# Patient Record
Sex: MALE | Race: WHITE | NOT HISPANIC OR LATINO | Employment: FULL TIME | URBAN - METROPOLITAN AREA
[De-identification: names, ages, dates, MRNs, and addresses within clinical notes are randomized per-mention and may not be internally consistent; named-entity substitution may affect disease eponyms.]

---

## 2018-04-26 ENCOUNTER — TELEPHONE (OUTPATIENT)
Dept: UROLOGY | Facility: MEDICAL CENTER | Age: 58
End: 2018-04-26

## 2018-04-26 ENCOUNTER — HOSPITAL ENCOUNTER (EMERGENCY)
Facility: HOSPITAL | Age: 58
Discharge: HOME | End: 2018-04-26
Attending: EMERGENCY MEDICINE | Admitting: EMERGENCY MEDICINE
Payer: COMMERCIAL

## 2018-04-26 ENCOUNTER — APPOINTMENT (EMERGENCY)
Dept: CT IMAGING | Facility: HOSPITAL | Age: 58
End: 2018-04-26
Payer: COMMERCIAL

## 2018-04-26 VITALS
TEMPERATURE: 97.8 F | SYSTOLIC BLOOD PRESSURE: 153 MMHG | WEIGHT: 265 LBS | DIASTOLIC BLOOD PRESSURE: 71 MMHG | OXYGEN SATURATION: 98 % | RESPIRATION RATE: 17 BRPM | HEART RATE: 60 BPM

## 2018-04-26 DIAGNOSIS — N20.1 RIGHT URETERAL STONE: Primary | ICD-10-CM

## 2018-04-26 LAB
BACTERIA UR QL AUTO: ABNORMAL /HPF
BILIRUB UR QL STRIP: NEGATIVE
CLARITY UR: CLEAR
COLOR UR: YELLOW
COLOR, POC: YELLOW
GLUCOSE UR STRIP-MCNC: NEGATIVE MG/DL
HGB UR QL STRIP.AUTO: ABNORMAL
KETONES UR STRIP-MCNC: NEGATIVE MG/DL
LEUKOCYTE ESTERASE UR QL STRIP: ABNORMAL
NITRITE UR QL STRIP: NEGATIVE
NON-SQ EPI CELLS URNS QL MICRO: ABNORMAL /HPF
PH UR STRIP.AUTO: 5 [PH] (ref 4.5–8)
PROT UR STRIP-MCNC: NEGATIVE MG/DL
RBC #/AREA URNS AUTO: ABNORMAL /HPF
SP GR UR STRIP.AUTO: 1.02 (ref 1–1.03)
UROBILINOGEN UR QL STRIP.AUTO: 0.2 E.U./DL
WBC #/AREA URNS AUTO: ABNORMAL /HPF

## 2018-04-26 PROCEDURE — 81002 URINALYSIS NONAUTO W/O SCOPE: CPT | Performed by: EMERGENCY MEDICINE

## 2018-04-26 PROCEDURE — 99284 EMERGENCY DEPT VISIT MOD MDM: CPT

## 2018-04-26 PROCEDURE — 81001 URINALYSIS AUTO W/SCOPE: CPT

## 2018-04-26 PROCEDURE — 74176 CT ABD & PELVIS W/O CONTRAST: CPT

## 2018-04-26 PROCEDURE — 96374 THER/PROPH/DIAG INJ IV PUSH: CPT

## 2018-04-26 RX ORDER — ROSUVASTATIN CALCIUM 20 MG/1
20 TABLET, COATED ORAL
COMMUNITY
Start: 2018-02-28 | End: 2021-07-29 | Stop reason: SDUPTHER

## 2018-04-26 RX ORDER — OMEGA-3 FATTY ACIDS/FISH OIL 300-1000MG
3 CAPSULE ORAL
COMMUNITY
Start: 2013-05-15 | End: 2021-04-20 | Stop reason: HOSPADM

## 2018-04-26 RX ORDER — ONDANSETRON 4 MG/1
4 TABLET, ORALLY DISINTEGRATING ORAL EVERY 8 HOURS PRN
Qty: 10 TABLET | Refills: 0 | Status: SHIPPED | OUTPATIENT
Start: 2018-04-26 | End: 2018-06-18

## 2018-04-26 RX ORDER — DEXLANSOPRAZOLE 60 MG/1
CAPSULE, DELAYED RELEASE ORAL
COMMUNITY
End: 2022-04-26 | Stop reason: SDUPTHER

## 2018-04-26 RX ORDER — KETOROLAC TROMETHAMINE 30 MG/ML
30 INJECTION, SOLUTION INTRAMUSCULAR; INTRAVENOUS ONCE
Status: DISCONTINUED | OUTPATIENT
Start: 2018-04-26 | End: 2018-04-26

## 2018-04-26 RX ORDER — KETOROLAC TROMETHAMINE 30 MG/ML
30 INJECTION, SOLUTION INTRAMUSCULAR; INTRAVENOUS ONCE
Status: COMPLETED | OUTPATIENT
Start: 2018-04-26 | End: 2018-04-26

## 2018-04-26 RX ORDER — METHYLPREDNISOLONE ACETATE 40 MG/ML
1 INJECTION, SUSPENSION INTRA-ARTICULAR; INTRALESIONAL; INTRAMUSCULAR; SOFT TISSUE
COMMUNITY
End: 2018-04-26

## 2018-04-26 RX ORDER — SIMVASTATIN 40 MG
TABLET ORAL
COMMUNITY
End: 2018-04-26

## 2018-04-26 RX ORDER — ALLOPURINOL 100 MG/1
TABLET ORAL
COMMUNITY
End: 2022-08-03

## 2018-04-26 RX ORDER — LISINOPRIL 20 MG/1
20 TABLET ORAL
COMMUNITY
Start: 2018-02-28 | End: 2019-08-22 | Stop reason: SDUPTHER

## 2018-04-26 RX ORDER — OXYCODONE HYDROCHLORIDE AND ACETAMINOPHEN 5; 325 MG/1; MG/1
1 TABLET ORAL EVERY 4 HOURS PRN
Qty: 10 TABLET | Refills: 0 | Status: SHIPPED | OUTPATIENT
Start: 2018-04-26 | End: 2018-06-18

## 2018-04-26 RX ORDER — HYDROCODONE BITARTRATE AND ACETAMINOPHEN 5; 325 MG/1; MG/1
TABLET ORAL
COMMUNITY
End: 2018-04-26 | Stop reason: ALTCHOICE

## 2018-04-26 RX ADMIN — KETOROLAC TROMETHAMINE 30 MG: 30 INJECTION, SOLUTION INTRAMUSCULAR at 12:36

## 2018-04-26 NOTE — ED PROVIDER NOTES
History  Chief Complaint   Patient presents with    Abdominal Pain     Patient reports RLQ abdominal pain which started this morning  Patient states he became diaphoretic and nauseous while experiencing the pain, which has improved  Patient also denies nausea  Patient has a hx of kidney stones and states the pain was similar to the kidney stone pain he had experienced in the past       39-year-old male with a history of kidney stones requiring stenting and lithotripsy, hypercholesterolemia presents to the emergency department with right inguinal pain that began early this morning associated with nausea and diaphoresis  He states he called his urologist and was told to come to the emergency department  He now states he has a dull ache in the right inguinal area but otherwise feels better  He states he has had a total of 13 kidney stones mostly on the right side  His last 1 did require stenting and this was about 5 years ago  He has had no fevers or chills  No dysuria  No gross hematuria noted          History provided by:  Patient   used: No    Abdominal Pain   Pain location:  RLQ  Pain quality: aching    Pain radiates to:  Does not radiate  Pain severity:  Unable to specify  Onset quality:  Gradual  Duration:  3 hours  Timing:  Constant  Progression:  Improving  Chronicity:  Recurrent  Context: not eating, not recent illness, not recent travel, not sick contacts, not suspicious food intake and not trauma    Relieved by:  None tried  Worsened by:  Nothing  Ineffective treatments:  None tried  Associated symptoms: nausea    Associated symptoms: no anorexia, no belching, no chest pain, no chills, no constipation, no cough, no diarrhea, no dysuria, no fatigue, no fever, no flatus, no hematemesis, no hematochezia, no hematuria, no melena, no shortness of breath, no sore throat and no vomiting    Risk factors: being elderly and obesity    Risk factors: has not had multiple surgeries and no NSAID use        Prior to Admission Medications   Prescriptions Last Dose Informant Patient Reported? Taking? ASPIRIN EC PO   Yes Yes   Sig: Take 81 mg by mouth   HYDROcodone-acetaminophen (NORCO) 5-325 mg per tablet   Yes Yes   Sig: hydrocodone 5 mg-acetaminophen 325 mg tablet   Omega 3 1000 MG CAPS   Yes Yes   Sig: Take 3 capsules by mouth   allopurinol (ZYLOPRIM) 100 mg tablet   Yes Yes   Sig: allopurinol 100 mg tablet   dexlansoprazole (DEXILANT) 60 MG capsule   Yes Yes   Sig: Dexilant 60 mg capsule, delayed release   lisinopril (ZESTRIL) 20 mg tablet   Yes Yes   Sig: Take 20 mg by mouth   rosuvastatin (CRESTOR) 20 MG tablet   Yes Yes   Sig: Take 20 mg by mouth      Facility-Administered Medications: None       Past Medical History:   Diagnosis Date    GERD (gastroesophageal reflux disease)     Gout     Hyperlipidemia     Kidney calculi        Past Surgical History:   Procedure Laterality Date    HERNIA REPAIR         History reviewed  No pertinent family history  I have reviewed and agree with the history as documented  Social History   Substance Use Topics    Smoking status: Never Smoker    Smokeless tobacco: Never Used    Alcohol use No        Review of Systems   Constitutional: Negative  Negative for chills, fatigue and fever  HENT: Negative  Negative for sore throat  Eyes: Negative  Respiratory: Negative  Negative for cough and shortness of breath  Cardiovascular: Negative  Negative for chest pain  Gastrointestinal: Positive for abdominal pain and nausea  Negative for anorexia, constipation, diarrhea, flatus, hematemesis, hematochezia, melena and vomiting  Genitourinary: Negative  Negative for decreased urine volume, difficulty urinating, discharge, dysuria, flank pain, frequency, hematuria, penile pain, penile swelling, scrotal swelling, testicular pain and urgency  Musculoskeletal: Negative for neck pain  Skin: Negative  Allergic/Immunologic: Negative  Neurological: Negative  Negative for weakness, numbness and headaches  Hematological: Negative  Psychiatric/Behavioral: Negative  All other systems reviewed and are negative  Physical Exam  ED Triage Vitals   Temperature Pulse Respirations Blood Pressure SpO2   04/26/18 0950 04/26/18 0950 04/26/18 0950 04/26/18 0950 04/26/18 0950   97 8 °F (36 6 °C) 69 18 (!) 192/88 98 %      Temp Source Heart Rate Source Patient Position - Orthostatic VS BP Location FiO2 (%)   04/26/18 0950 04/26/18 0950 04/26/18 0950 04/26/18 1111 --   Oral Monitor Sitting Left arm       Pain Score       04/26/18 0950       4           Orthostatic Vital Signs  Vitals:    04/26/18 0950 04/26/18 1111 04/26/18 1219   BP: (!) 192/88 155/72 153/71   Pulse: 69 58 60   Patient Position - Orthostatic VS: Sitting Lying Lying       Physical Exam   Constitutional: He is oriented to person, place, and time  He appears well-developed and well-nourished  Non-toxic appearance  He does not have a sickly appearance  He does not appear ill  No distress  HENT:   Head: Normocephalic and atraumatic  Right Ear: External ear normal    Left Ear: External ear normal    Eyes: Conjunctivae are normal  Pupils are equal, round, and reactive to light  No scleral icterus  Cardiovascular: Normal rate, regular rhythm and normal heart sounds  Pulmonary/Chest: Effort normal and breath sounds normal    Abdominal: Soft  Normal appearance and bowel sounds are normal  He exhibits no distension and no mass  There is no tenderness  There is no rebound, no guarding and no CVA tenderness  No hernia  Obese   Musculoskeletal: Normal range of motion  He exhibits no edema, tenderness or deformity  Neurological: He is alert and oriented to person, place, and time  He has normal strength and normal reflexes  He exhibits normal muscle tone  Skin: Skin is warm and dry  No rash noted  He is not diaphoretic  No erythema  No pallor     Psychiatric: He has a normal mood and affect  Nursing note and vitals reviewed  ED Medications  Medications   ketorolac (TORADOL) injection 30 mg (30 mg Intravenous Given 4/26/18 1236)       Diagnostic Studies  Results Reviewed     Procedure Component Value Units Date/Time    Urine Microscopic [[id number]]  (Abnormal) Collected:  04/26/18 1023    Lab Status:  Final result Specimen:  Urine from Urine, Clean Catch Updated:  04/26/18 1054     RBC, UA 20-30 (A) /hpf      WBC, UA 1-2 (A) /hpf      Epithelial Cells Occasional /hpf      Bacteria, UA Occasional /hpf     POCT urinalysis dipstick [[id number]]  (Abnormal) Resulted:  04/26/18 1024    Lab Status:  Final result Specimen:  Urine Updated:  04/26/18 1024     Color, UA yellow    ED Urine Macroscopic [[id number]]  (Abnormal) Collected:  04/26/18 1023    Lab Status:  Final result Specimen:  Urine Updated:  04/26/18 1022     Color, UA Yellow     Clarity, UA Clear     pH, UA 5 0     Leukocytes, UA Trace (A)     Nitrite, UA Negative     Protein, UA Negative mg/dl      Glucose, UA Negative mg/dl      Ketones, UA Negative mg/dl      Urobilinogen, UA 0 2 E U /dl      Bilirubin, UA Negative     Blood, UA Large (A)     Specific Ord, UA 1 020    Narrative:       CLINITEK RESULT                 CT renal stone study abdomen pelvis without contrast   Final Result by Joaquin Cutler MD (04/26 1136)   4 mm proximal right ureteral calculus resulting in mild right hydroureteronephrosis  Additional nonobstructive bilateral nephrolithiasis as above  Workstation performed: [id number]                    Procedures  Procedures       Phone Contacts  ED Phone Contact    ED Course  ED Course as of Apr 26 1244   Thu Apr 26, 2018   1239 Spoke With the office of Dr Aidan Rankin from Urology  They can see the patient tomorrow at 9:30 a m     1239 Patient rating his pain at a 4/10  He appears comfortable  He and his wife were given the results of the CT scan    He will be stable for discharge MDM  Number of Diagnoses or Management Options  Diagnosis management comments: 50-year-old male with a history of multiple kidney stones presents with right inguinal pain, diaphoresis and nausea that started this morning  This was similar to his previous kidney stones  Currently his pain is resolving  He describes it as a dull ache in the right inguinal region  On exam he appears well and is in no distress  He does not have any abdominal tenderness or hernia on exam   Given his multiple kidney stones in the past will CT abdomen pelvis to rule out kidney stone  Highly doubt aortic aneurysm given his well appearance  Currently he is declining any pain medications  Amount and/or Complexity of Data Reviewed  Tests in the radiology section of CPT®: ordered and reviewed  Independent visualization of images, tracings, or specimens: yes      CritCare Time    Disposition  Final diagnoses:   Right ureteral stone     Time reflects when diagnosis was documented in both MDM as applicable and the Disposition within this note     Time User Action Codes Description Comment    4/26/2018 12:40 PM Duane COLLINS Add [N20 1] Right ureteral stone       ED Disposition     ED Disposition Condition Comment    Discharge  Kamron Lockett discharge to home/self care      Condition at discharge: Good        Follow-up Information     Follow up With Specialties Details Why Ace Arrington MD Urology  You have an appointment tomorrow 4/27/18 at 9:30 a m  [address] [facility] [address] [phone]          Patient's Medications   Discharge Prescriptions    ONDANSETRON (ZOFRAN-ODT) 4 MG DISINTEGRATING TABLET    Take 1 tablet (4 mg total) by mouth every 8 (eight) hours as needed for nausea or vomiting       Start Date: 4/26/2018 End Date: --       Order Dose: 4 mg       Quantity: 10 tablet    Refills: 0    OXYCODONE-ACETAMINOPHEN (PERCOCET) 5-325 MG PER TABLET    Take 1 tablet by mouth every 4 (four) hours as needed for moderate pain Max Daily Amount: 6 tablets       Start Date: 4/26/2018 End Date: --       Order Dose: 1 tablet       Quantity: 10 tablet    Refills: 0     No discharge procedures on file      ED Provider  Electronically Signed by           Monica Douglas DO  04/26/18 1244

## 2018-04-26 NOTE — TELEPHONE ENCOUNTER
Triage needed  Patient has right side flank and front pain and thinks he has a kidney stone  He's nauseous and would like to speak to a nurse

## 2018-04-26 NOTE — TELEPHONE ENCOUNTER
Pt stated that he is having severe lower right abdomen pain with nausea  Stated that he is sweating and chills  Pt was instructed to go to the ER

## 2018-04-26 NOTE — DISCHARGE INSTRUCTIONS
Ureteral Stones   WHAT YOU NEED TO KNOW:   A ureteral stone is a stone that forms in the kidney and moves down the ureter and gets stuck there  The ureter is the tube that takes urine from the kidney to the bladder  Stones can form in the urinary system when your urine has high levels of minerals and salts  Urinary stones can be made of uric acid, calcium, phosphate, or oxalate crystals  DISCHARGE INSTRUCTIONS:   Return to the emergency department if:   · You have severe pain that does not improve, even after you take medicine  · You have vomiting that is not relieved by medicine  · You develop a fever  Contact your healthcare provider if:   · You develop a fever  · You have any questions or concerns about your condition or care  Follow up with your healthcare provider as directed: You may need to return for more tests  Write down your questions so you remember to ask them during your visits  Medicines: You may need any of the following:  · NSAIDs , such as ibuprofen, help decrease swelling, pain, and fever  This medicine is available with or without a doctor's order  NSAIDs can cause stomach bleeding or kidney problems in certain people  If you take blood thinner medicine, always ask your healthcare provider if NSAIDs are safe for you  Always read the medicine label and follow directions  · Prescription pain medicine  may help decrease pain or help your ureteral stone pass  Do not wait until the pain is severe before you take pain medicine  · Nausea medicine  may help calm your stomach and prevent vomiting  · Take your medicine as directed  Contact your healthcare provider if you think your medicine is not helping or if you have side effects  Tell him or her if you are allergic to any medicine  Keep a list of the medicines, vitamins, and herbs you take  Include the amounts, and when and why you take them  Bring the list or the pill bottles to follow-up visits   Carry your medicine list with you in case of an emergency  Self-care:   · Drink plenty of liquids  Your healthcare provider may tell you to drink at least 8 to 12 (eight-ounce) cups of liquids each day  This helps flush out the ureteral stones when you urinate  Water is the best liquid to drink  · Strain your urine every time you go to the bathroom  Urinate through a strainer or a piece of thin cloth to catch the stones  Take the stones to your healthcare provider so they can be sent to the lab for tests  This will help your healthcare providers plan the best treatment for you  · Ask your healthcare provider about any nutrition changes you need to make  You may need to limit certain foods such as foods high in sodium (salt), certain protein foods, or foods high in oxalate  After you pass your ureteral stone: Once you have passed your ureteral stone, you may need to do a 24-hour urine test  You may need to save all of your urine for 24 hours  Each time you go to the bathroom, you will urinate into a container  Then you will pour your urine into a larger container that is kept cold  You may be told to write down the time and amount of urine you passed  At the end of 24 hours, the urine is sent to a lab for tests  Results from the test will help your healthcare provider plan ways to prevent more stones from forming  © 2017 [address] [employer] Information is for End User's use only and may not be sold, redistributed or otherwise used for commercial purposes  All illustrations and images included in CareNotes® are the copyrighted property of [employer]  or [employer]  The above information is an  only  It is not intended as medical advice for individual conditions or treatments  Talk to your doctor, nurse or pharmacist before following any medical regimen to see if it is safe and effective for you

## 2018-04-26 NOTE — ED NOTES
Patient transported to [facility] [place] GUZMAN Gonzalez  04/26/18 7688

## 2018-04-27 ENCOUNTER — OFFICE VISIT (OUTPATIENT)
Dept: UROLOGY | Facility: MEDICAL CENTER | Age: 58
End: 2018-04-27
Payer: COMMERCIAL

## 2018-04-27 VITALS
WEIGHT: 270.2 LBS | HEIGHT: 68 IN | TEMPERATURE: 97.6 F | SYSTOLIC BLOOD PRESSURE: 142 MMHG | BODY MASS INDEX: 40.95 KG/M2 | DIASTOLIC BLOOD PRESSURE: 80 MMHG

## 2018-04-27 DIAGNOSIS — N20.1 RIGHT URETERAL STONE: Primary | ICD-10-CM

## 2018-04-27 DIAGNOSIS — N13.30 HYDROURETERONEPHROSIS: ICD-10-CM

## 2018-04-27 DIAGNOSIS — Z71.89 OTHER SPECIFIED COUNSELING: ICD-10-CM

## 2018-04-27 LAB
SL AMB  POCT GLUCOSE, UA: NEGATIVE
SL AMB LEUKOCYTE ESTERASE,UA: NEGATIVE
SL AMB POCT BILIRUBIN,UA: NEGATIVE
SL AMB POCT BLOOD,UA: ABNORMAL
SL AMB POCT CLARITY,UA: CLEAR
SL AMB POCT COLOR,UA: YELLOW
SL AMB POCT KETONES,UA: NEGATIVE
SL AMB POCT NITRITE,UA: NEGATIVE
SL AMB POCT PH,UA: 5.5
SL AMB POCT SPECIFIC GRAVITY,UA: 1.02
SL AMB POCT URINE PROTEIN: ABNORMAL
SL AMB POCT UROBILINOGEN: 0.2

## 2018-04-27 PROCEDURE — 81003 URINALYSIS AUTO W/O SCOPE: CPT | Performed by: UROLOGY

## 2018-04-27 PROCEDURE — 99214 OFFICE O/P EST MOD 30 MIN: CPT | Performed by: UROLOGY

## 2018-04-27 PROCEDURE — 96372 THER/PROPH/DIAG INJ SC/IM: CPT | Performed by: UROLOGY

## 2018-04-27 RX ORDER — KETOROLAC TROMETHAMINE 30 MG/ML
30 INJECTION, SOLUTION INTRAMUSCULAR; INTRAVENOUS ONCE
Status: DISCONTINUED | OUTPATIENT
Start: 2018-04-27 | End: 2018-04-27

## 2018-04-27 RX ORDER — OXYCODONE HYDROCHLORIDE AND ACETAMINOPHEN 5; 325 MG/1; MG/1
1 TABLET ORAL EVERY 4 HOURS PRN
Qty: 20 TABLET | Refills: 0 | Status: SHIPPED | OUTPATIENT
Start: 2018-04-27 | End: 2018-06-18

## 2018-04-27 RX ORDER — KETOROLAC TROMETHAMINE 30 MG/ML
30 INJECTION, SOLUTION INTRAMUSCULAR; INTRAVENOUS ONCE
Status: DISCONTINUED | OUTPATIENT
Start: 2018-04-27 | End: 2018-04-27 | Stop reason: HOSPADM

## 2018-04-27 RX ADMIN — KETOROLAC TROMETHAMINE 30 MG: 30 INJECTION, SOLUTION INTRAMUSCULAR; INTRAVENOUS at 11:31

## 2018-04-27 NOTE — PROGRESS NOTES
Assessment/Plan:    Assessment:  1  Right ureteral stone at the ureteropelvic junction on CT scan yesterday, based on location of pain today, the stone is in the distal ureter  Plan:    1  Continue conservative management and see if the patient can pass the stone  2  Percocet 5/325, 10 tablets without refills  The patient has 5 tablets left and may need more to get through the weekend  If no longer manageable as an outpatient, he can be put on the OR schedule for stone extraction next week  No problem-specific Assessment & Plan notes found for this encounter  Diagnoses and all orders for this visit:    Right ureteral stone  -     POCT urine dip auto non-scope  -     Discontinue: ketorolac (TORADOL) 60 mg/2 mL IM injection 30 mg; Inject 1 mL (30 mg total) into the shoulder, thigh, or buttocks once   -     oxyCODONE-acetaminophen (PERCOCET) 5-325 mg per tablet; Take 1 tablet by mouth every 4 (four) hours as needed for moderate pain for up to 10 doses Max Daily Amount: 6 tablets  -     ketorolac (TORADOL) injection 30 mg; Inject 1 mL (30 mg total) into the shoulder, thigh, or buttocks once     Hydroureteronephrosis          Subjective:      Patient ID: Vani Healy is a 60 y o  male  HPI  Right Ureteral Stone:  Seen in ER yesterday with right upper ureteral stone  I have reviewed the images and the report  Nauseated last night  Has taken 5 of 10 Percocet he was given  Pain now in right groin and testis  By the patient's count, this is his 14th stone  Stones have been both calcium oxalate and uric acid  Has gout hx  He is taking allopurinol which should prevent gout and uric acid lithiasis  Right now, the patient is in pain and he will be given a shot of ketoralac  Accompaned by wife          The following portions of the patient's history were reviewed and updated as appropriate: allergies, current medications, past family history, past medical history, past social history, past surgical history and problem list     Review of Systems   Constitutional: Negative for activity change and fatigue  Obese   Respiratory: Negative for shortness of breath and wheezing  Cardiovascular: Negative for chest pain  Mild hyperlipidemia and hypertension, controlled   Gastrointestinal: Negative for abdominal pain  Genitourinary: Negative for difficulty urinating, dysuria, frequency, hematuria and urgency  Musculoskeletal: Negative for back pain and gait problem  Skin: Negative  Allergic/Immunologic: Negative  Neurological: Negative  Psychiatric/Behavioral: Negative  Objective:      /80 (BP Location: Left arm, Patient Position: Sitting, Cuff Size: Standard)   Temp 97 6 °F (36 4 °C)   Ht 5' 8" (1 727 m)   Wt 123 kg (270 lb 3 2 oz)   BMI 41 08 kg/m²          Physical Exam   Constitutional: He is oriented to person, place, and time  He appears well-developed and well-nourished  HENT:   Head: Normocephalic and atraumatic  Pulmonary/Chest: Effort normal    Abdominal: Soft  Bowel sounds are normal    There is no left costovertebral angle tenderness  There is mild tenderness on the right  Musculoskeletal: Normal range of motion  Neurological: He is alert and oriented to person, place, and time  Skin: Skin is warm and dry  Psychiatric: He has a normal mood and affect   His behavior is normal  Judgment and thought content normal

## 2018-04-27 NOTE — PROGRESS NOTES
IPSS Questionnaire (AUA-7): Over the past month    1)  How often have you had a sensation of not emptying your bladder completely after you finish urinating? 2 - Less than half the time   2)  How often have you had to urinate again less than two hours after you finished urinating? 1 - Less than 1 time in 5   3)  How often have you found you stopped and started again several times when you urinated? 0 - Not at all   4) How difficult have you found it to postpone urination? 0 - Not at all   5) How often have you had a weak urinary stream?  0 - Not at all   6) How often have you had to push or strain to begin urination? 0 - Not at all   7) How many times did you most typically get up to urinate from the time you went to bed until the time you got up in the morning? 1 - 1 time   Total Score:  4     QOL: Pleased

## 2018-04-27 NOTE — LETTER
April 27, 2018     Sherri Trujillo MD  [address]    Patient: Rosmery Rivas   YOB: 1960   Date of Visit: 4/27/2018       Dear Dr Lauren Valentine: Thank you for referring [place] to me for evaluation  Below are my notes for this consultation  If you have questions, please do not hesitate to call me  I look forward to following your patient along with you  Sincerely,        Jonah Tinoco MD        CC: No Recipients  Jonah Tinoco MD  4/27/2018 11:40 AM  Sign at close encounter  Assessment/Plan:    Assessment:  1  Right ureteral stone at the ureteropelvic junction on CT scan yesterday, based on location of pain today, the stone is in the distal ureter  Plan:    1  Continue conservative management and see if the patient can pass the stone  2  Percocet 5/325, 10 tablets without refills  The patient has 5 tablets left and may need more to get through the weekend  If no longer manageable as an outpatient, he can be put on the OR schedule for stone extraction next week  No problem-specific Assessment & Plan notes found for this encounter  Diagnoses and all orders for this visit:    Right ureteral stone  -     POCT urine dip auto non-scope  -     Discontinue: ketorolac (TORADOL) 60 mg/2 mL IM injection 30 mg; Inject 1 mL (30 mg total) into the shoulder, thigh, or buttocks once   -     oxyCODONE-acetaminophen (PERCOCET) 5-325 mg per tablet; Take 1 tablet by mouth every 4 (four) hours as needed for moderate pain for up to 10 doses Max Daily Amount: 6 tablets  -     ketorolac (TORADOL) injection 30 mg; Inject 1 mL (30 mg total) into the shoulder, thigh, or buttocks once     Hydroureteronephrosis          Subjective:      Patient ID: Rosmery Rivas is a 60 y o  male  HPI  Right Ureteral Stone:  Seen in ER yesterday with right upper ureteral stone  I have reviewed the images and the report  Nauseated last night    Has taken 5 of 10 Percocet he was given   Pain now in right groin and testis  By the patient's count, this is his 14th stone  Stones have been both calcium oxalate and uric acid  Has gout hx  He is taking allopurinol which should prevent gout and uric acid lithiasis  Right now, the patient is in pain and he will be given a shot of ketoralac  Accompaned by wife  The following portions of the patient's history were reviewed and updated as appropriate: allergies, current medications, past family history, past medical history, past social history, past surgical history and problem list     Review of Systems   Constitutional: Negative for activity change and fatigue  Obese   Respiratory: Negative for shortness of breath and wheezing  Cardiovascular: Negative for chest pain  Mild hyperlipidemia and hypertension, controlled   Gastrointestinal: Negative for abdominal pain  Genitourinary: Negative for difficulty urinating, dysuria, frequency, hematuria and urgency  Musculoskeletal: Negative for back pain and gait problem  Skin: Negative  Allergic/Immunologic: Negative  Neurological: Negative  Psychiatric/Behavioral: Negative  Objective:      /80 (BP Location: Left arm, Patient Position: Sitting, Cuff Size: Standard)   Temp 97 6 °F (36 4 °C)   Ht 5' 8" (1 727 m)   Wt 123 kg (270 lb 3 2 oz)   BMI 41 08 kg/m²           Physical Exam   Constitutional: He is oriented to person, place, and time  He appears well-developed and well-nourished  HENT:   Head: Normocephalic and atraumatic  Pulmonary/Chest: Effort normal    Abdominal: Soft  Bowel sounds are normal    There is no left costovertebral angle tenderness  There is mild tenderness on the right  Musculoskeletal: Normal range of motion  Neurological: He is alert and oriented to person, place, and time  Skin: Skin is warm and dry  Psychiatric: He has a normal mood and affect   His behavior is normal  Judgment and thought content normal

## 2018-04-28 ENCOUNTER — HOSPITAL ENCOUNTER (OUTPATIENT)
Facility: HOSPITAL | Age: 58
Setting detail: OBSERVATION
Discharge: HOME | End: 2018-04-29
Attending: EMERGENCY MEDICINE | Admitting: UROLOGY
Payer: COMMERCIAL

## 2018-04-28 ENCOUNTER — APPOINTMENT (EMERGENCY)
Dept: RADIOLOGY | Facility: HOSPITAL | Age: 58
End: 2018-04-28
Payer: COMMERCIAL

## 2018-04-28 ENCOUNTER — ANESTHESIA EVENT (OUTPATIENT)
Dept: PERIOP | Facility: HOSPITAL | Age: 58
End: 2018-04-28
Payer: COMMERCIAL

## 2018-04-28 DIAGNOSIS — N20.1 RIGHT URETERAL CALCULUS: Primary | ICD-10-CM

## 2018-04-28 DIAGNOSIS — N20.1 RIGHT URETERAL STONE: ICD-10-CM

## 2018-04-28 DIAGNOSIS — N28.9 ACUTE RENAL INSUFFICIENCY: ICD-10-CM

## 2018-04-28 DIAGNOSIS — R52 INTRACTABLE PAIN: ICD-10-CM

## 2018-04-28 PROBLEM — N20.0 KIDNEY CALCULI: Status: ACTIVE | Noted: 2018-04-28

## 2018-04-28 LAB
ANION GAP SERPL CALCULATED.3IONS-SCNC: 12 MMOL/L (ref 4–13)
BACTERIA UR QL AUTO: ABNORMAL /HPF
BASOPHILS # BLD AUTO: 0.05 THOUSANDS/ΜL (ref 0–0.1)
BASOPHILS NFR BLD AUTO: 0 % (ref 0–1)
BILIRUB UR QL STRIP: NEGATIVE
BUN SERPL-MCNC: 23 MG/DL (ref 5–25)
CALCIUM SERPL-MCNC: 8.9 MG/DL (ref 8.3–10.1)
CHLORIDE SERPL-SCNC: 106 MMOL/L (ref 100–108)
CLARITY UR: CLEAR
CO2 SERPL-SCNC: 25 MMOL/L (ref 21–32)
COLOR UR: YELLOW
CREAT SERPL-MCNC: 1.57 MG/DL (ref 0.6–1.3)
EOSINOPHIL # BLD AUTO: 0.19 THOUSAND/ΜL (ref 0–0.61)
EOSINOPHIL NFR BLD AUTO: 1 % (ref 0–6)
ERYTHROCYTE [DISTWIDTH] IN BLOOD BY AUTOMATED COUNT: 13.7 % (ref 11.6–15.1)
GFR SERPL CREATININE-BSD FRML MDRD: 48 ML/MIN/1.73SQ M
GLUCOSE SERPL-MCNC: 118 MG/DL (ref 65–140)
GLUCOSE UR STRIP-MCNC: NEGATIVE MG/DL
HCT VFR BLD AUTO: 42.6 % (ref 36.5–49.3)
HGB BLD-MCNC: 14.1 G/DL (ref 12–17)
HGB UR QL STRIP.AUTO: ABNORMAL
KETONES UR STRIP-MCNC: NEGATIVE MG/DL
LEUKOCYTE ESTERASE UR QL STRIP: NEGATIVE
LYMPHOCYTES # BLD AUTO: 2.49 THOUSANDS/ΜL (ref 0.6–4.47)
LYMPHOCYTES NFR BLD AUTO: 17 % (ref 14–44)
MCH RBC QN AUTO: 29.4 PG (ref 26.8–34.3)
MCHC RBC AUTO-ENTMCNC: 33.1 G/DL (ref 31.4–37.4)
MCV RBC AUTO: 89 FL (ref 82–98)
MONOCYTES # BLD AUTO: 1.06 THOUSAND/ΜL (ref 0.17–1.22)
MONOCYTES NFR BLD AUTO: 7 % (ref 4–12)
NEUTROPHILS # BLD AUTO: 11.07 THOUSANDS/ΜL (ref 1.85–7.62)
NEUTS SEG NFR BLD AUTO: 75 % (ref 43–75)
NITRITE UR QL STRIP: NEGATIVE
NON-SQ EPI CELLS URNS QL MICRO: ABNORMAL /HPF
NRBC BLD AUTO-RTO: 0 /100 WBCS
OTHER STN SPEC: ABNORMAL
PH UR STRIP.AUTO: 5.5 [PH] (ref 4.5–8)
PLATELET # BLD AUTO: 255 THOUSANDS/UL (ref 149–390)
PMV BLD AUTO: 10.1 FL (ref 8.9–12.7)
POTASSIUM SERPL-SCNC: 4 MMOL/L (ref 3.5–5.3)
PROT UR STRIP-MCNC: NEGATIVE MG/DL
RBC # BLD AUTO: 4.8 MILLION/UL (ref 3.88–5.62)
RBC #/AREA URNS AUTO: ABNORMAL /HPF
SODIUM SERPL-SCNC: 143 MMOL/L (ref 136–145)
SP GR UR STRIP.AUTO: 1.02 (ref 1–1.03)
UROBILINOGEN UR QL STRIP.AUTO: 0.2 E.U./DL
WBC # BLD AUTO: 14.86 THOUSAND/UL (ref 4.31–10.16)
WBC #/AREA URNS AUTO: ABNORMAL /HPF

## 2018-04-28 PROCEDURE — 99285 EMERGENCY DEPT VISIT HI MDM: CPT

## 2018-04-28 PROCEDURE — 81002 URINALYSIS NONAUTO W/O SCOPE: CPT

## 2018-04-28 PROCEDURE — 36415 COLL VENOUS BLD VENIPUNCTURE: CPT | Performed by: EMERGENCY MEDICINE

## 2018-04-28 PROCEDURE — 96376 TX/PRO/DX INJ SAME DRUG ADON: CPT

## 2018-04-28 PROCEDURE — 81001 URINALYSIS AUTO W/SCOPE: CPT

## 2018-04-28 PROCEDURE — 96375 TX/PRO/DX INJ NEW DRUG ADDON: CPT

## 2018-04-28 PROCEDURE — 74018 RADEX ABDOMEN 1 VIEW: CPT

## 2018-04-28 PROCEDURE — 80048 BASIC METABOLIC PNL TOTAL CA: CPT | Performed by: EMERGENCY MEDICINE

## 2018-04-28 PROCEDURE — 96374 THER/PROPH/DIAG INJ IV PUSH: CPT

## 2018-04-28 PROCEDURE — 85025 COMPLETE CBC W/AUTO DIFF WBC: CPT | Performed by: EMERGENCY MEDICINE

## 2018-04-28 RX ORDER — HYDROCODONE BITARTRATE AND ACETAMINOPHEN 5; 325 MG/1; MG/1
1 TABLET ORAL EVERY 6 HOURS PRN
Status: DISCONTINUED | OUTPATIENT
Start: 2018-04-28 | End: 2018-04-29 | Stop reason: SDUPTHER

## 2018-04-28 RX ORDER — MORPHINE SULFATE 4 MG/ML
4 INJECTION, SOLUTION INTRAMUSCULAR; INTRAVENOUS
Status: DISCONTINUED | OUTPATIENT
Start: 2018-04-28 | End: 2018-04-29 | Stop reason: HOSPADM

## 2018-04-28 RX ORDER — ONDANSETRON 2 MG/ML
4 INJECTION INTRAMUSCULAR; INTRAVENOUS ONCE
Status: COMPLETED | OUTPATIENT
Start: 2018-04-28 | End: 2018-04-28

## 2018-04-28 RX ORDER — MORPHINE SULFATE 10 MG/ML
10 INJECTION, SOLUTION INTRAMUSCULAR; INTRAVENOUS ONCE AS NEEDED
Status: DISCONTINUED | OUTPATIENT
Start: 2018-04-28 | End: 2018-04-28

## 2018-04-28 RX ORDER — DEXTROSE AND SODIUM CHLORIDE 5; .45 G/100ML; G/100ML
125 INJECTION, SOLUTION INTRAVENOUS CONTINUOUS
Status: DISCONTINUED | OUTPATIENT
Start: 2018-04-28 | End: 2018-04-29 | Stop reason: HOSPADM

## 2018-04-28 RX ORDER — SODIUM CHLORIDE 9 MG/ML
125 INJECTION, SOLUTION INTRAVENOUS CONTINUOUS
Status: DISCONTINUED | OUTPATIENT
Start: 2018-04-28 | End: 2018-04-28

## 2018-04-28 RX ORDER — KETOROLAC TROMETHAMINE 30 MG/ML
15 INJECTION, SOLUTION INTRAMUSCULAR; INTRAVENOUS ONCE
Status: COMPLETED | OUTPATIENT
Start: 2018-04-28 | End: 2018-04-28

## 2018-04-28 RX ORDER — MORPHINE SULFATE 4 MG/ML
4 INJECTION, SOLUTION INTRAMUSCULAR; INTRAVENOUS
Status: COMPLETED | OUTPATIENT
Start: 2018-04-28 | End: 2018-04-28

## 2018-04-28 RX ADMIN — MORPHINE SULFATE 4 MG: 4 INJECTION, SOLUTION INTRAMUSCULAR; INTRAVENOUS at 10:50

## 2018-04-28 RX ADMIN — DEXTROSE AND SODIUM CHLORIDE 125 ML/HR: 5; .45 INJECTION, SOLUTION INTRAVENOUS at 23:42

## 2018-04-28 RX ADMIN — ONDANSETRON 4 MG: 2 INJECTION INTRAMUSCULAR; INTRAVENOUS at 09:52

## 2018-04-28 RX ADMIN — SODIUM CHLORIDE 125 ML/HR: 0.9 INJECTION, SOLUTION INTRAVENOUS at 13:17

## 2018-04-28 RX ADMIN — KETOROLAC TROMETHAMINE 15 MG: 30 INJECTION, SOLUTION INTRAMUSCULAR at 09:53

## 2018-04-28 RX ADMIN — MORPHINE SULFATE 4 MG: 4 INJECTION, SOLUTION INTRAMUSCULAR; INTRAVENOUS at 22:35

## 2018-04-28 RX ADMIN — DEXTROSE AND SODIUM CHLORIDE 125 ML/HR: 5; .45 INJECTION, SOLUTION INTRAVENOUS at 15:41

## 2018-04-28 RX ADMIN — HYDROCODONE BITARTRATE AND ACETAMINOPHEN 1 TABLET: 5; 325 TABLET ORAL at 14:05

## 2018-04-28 RX ADMIN — MORPHINE SULFATE 4 MG: 4 INJECTION, SOLUTION INTRAMUSCULAR; INTRAVENOUS at 11:52

## 2018-04-28 RX ADMIN — MORPHINE SULFATE 4 MG: 4 INJECTION, SOLUTION INTRAMUSCULAR; INTRAVENOUS at 09:55

## 2018-04-28 RX ADMIN — HYDROCODONE BITARTRATE AND ACETAMINOPHEN 1 TABLET: 5; 325 TABLET ORAL at 18:20

## 2018-04-28 NOTE — ED PROVIDER NOTES
History  Chief Complaint   Patient presents with    Flank Pain     Patient reports right sided flank and groin pain, patient has known stone  Reports was unable to get percocet filled until today  Patient reports here for a shot for something for pain  Patient reports took last percocet at 7am without relief  79-year-old male presents for evaluation persistent right flank and right lower quadrant pain  The patient was in the emergency department 2 days ago and diagnosed with a proximal 4 mm right ureteral calculus  He followed up with Dr don bee of Urology yesterday was given a shot of Toradol in the office as well as additional Percocet  The patient has been taking the Percocet at home without relief of pain  He attempted to call the service and was advised to come to the emergency department for further evaluation  The patient has had associated nausea and vomiting  Nothing has made his symptoms better  He denies any associated fevers or chills  The patient states this is his 14th kidney stone        History provided by:  Patient  Flank Pain   Associated symptoms: nausea and vomiting    Associated symptoms: no chest pain, no chills, no fever and no shortness of breath        Prior to Admission Medications   Prescriptions Last Dose Informant Patient Reported? Taking?    ASPIRIN EC PO   Yes No   Sig: Take 81 mg by mouth   Omega 3 1000 MG CAPS   Yes No   Sig: Take 3 capsules by mouth   allopurinol (ZYLOPRIM) 100 mg tablet   Yes No   Sig: allopurinol 100 mg tablet   dexlansoprazole (DEXILANT) 60 MG capsule   Yes No   Sig: Dexilant 60 mg capsule, delayed release   lisinopril (ZESTRIL) 20 mg tablet   Yes No   Sig: Take 20 mg by mouth   ondansetron (ZOFRAN-ODT) 4 mg disintegrating tablet   No No   Sig: Take 1 tablet (4 mg total) by mouth every 8 (eight) hours as needed for nausea or vomiting   oxyCODONE-acetaminophen (PERCOCET) 5-325 mg per tablet   No No   Sig: Take 1 tablet by mouth every 4 (four) hours as needed for moderate pain Max Daily Amount: 6 tablets   oxyCODONE-acetaminophen (PERCOCET) 5-325 mg per tablet   No No   Sig: Take 1 tablet by mouth every 4 (four) hours as needed for moderate pain for up to 10 doses Max Daily Amount: 6 tablets   rosuvastatin (CRESTOR) 20 MG tablet   Yes No   Sig: Take 20 mg by mouth      Facility-Administered Medications Last Administration Doses Remaining   ketorolac (TORADOL) injection 30 mg 4/27/2018 11:31 AM 0          Past Medical History:   Diagnosis Date    BPH without obstruction/lower urinary tract symptoms     Elevated PSA     GERD (gastroesophageal reflux disease)     Gout     Hyperlipidemia     Kidney calculi     Obese abdomen     Sleep apnea     Ureteral calculi        Past Surgical History:   Procedure Laterality Date    CYSTOSCOPY W/ LASER LITHOTRIPSY  2001    CYSTOSTOMY W/ STENT INSERTION  2011    ESOPHAGOGASTRODUODENOSCOPY      EXTRACORPOREAL SHOCK WAVE LITHOTRIPSY Right 2011    HERNIA REPAIR         History reviewed  No pertinent family history  I have reviewed and agree with the history as documented  Social History   Substance Use Topics    Smoking status: Never Smoker    Smokeless tobacco: Never Used    Alcohol use No        Review of Systems   Constitutional: Negative for chills and fever  HENT: Negative  Respiratory: Negative for shortness of breath  Cardiovascular: Negative for chest pain  Gastrointestinal: Positive for nausea and vomiting  Negative for abdominal pain  Genitourinary: Positive for flank pain  Negative for difficulty urinating  All other systems reviewed and are negative        Physical Exam  ED Triage Vitals [04/28/18 0928]   Temperature Pulse Respirations Blood Pressure SpO2   97 5 °F (36 4 °C) 65 18 (!) 201/84 99 %      Temp Source Heart Rate Source Patient Position - Orthostatic VS BP Location FiO2 (%)   Oral Monitor Sitting Right arm --      Pain Score       Worst Possible Pain Orthostatic Vital Signs  Vitals:    04/28/18 0928 04/28/18 1130   BP: (!) 201/84 143/65   Pulse: 65 62   Patient Position - Orthostatic VS: Sitting Lying       Physical Exam   Constitutional: He is oriented to person, place, and time  He appears well-developed and well-nourished  No distress  HENT:   Head: Normocephalic and atraumatic  Right Ear: External ear normal    Left Ear: External ear normal    Mouth/Throat: No oropharyngeal exudate  Eyes: EOM are normal  Pupils are equal, round, and reactive to light  No scleral icterus  Neck: Normal range of motion  Neck supple  Cardiovascular: Normal rate, regular rhythm and normal heart sounds  Pulmonary/Chest: Effort normal and breath sounds normal  No respiratory distress  Abdominal: Soft  Bowel sounds are normal  There is tenderness in the right lower quadrant  There is CVA tenderness ( right)  There is no rebound and no guarding  Musculoskeletal: Normal range of motion  Neurological: He is alert and oriented to person, place, and time  Skin: Skin is dry  No rash noted  Cool     Psychiatric: He has a normal mood and affect  Nursing note and vitals reviewed        ED Medications  Medications   sodium chloride 0 9 % infusion (not administered)   ondansetron (ZOFRAN) injection 4 mg (4 mg Intravenous Given 4/28/18 0952)   ketorolac (TORADOL) injection 15 mg (15 mg Intravenous Given 4/28/18 0953)   morphine (PF) 4 mg/mL injection 4 mg (4 mg Intravenous Given 4/28/18 1152)       Diagnostic Studies  Results Reviewed     Procedure Component Value Units Date/Time    Basic metabolic panel [[id number]]  (Abnormal) Collected:  04/28/18 0955    Lab Status:  Final result Specimen:  Blood from Arm, Right Updated:  04/28/18 1015     Sodium 143 mmol/L      Potassium 4 0 mmol/L      Chloride 106 mmol/L      CO2 25 mmol/L      Anion Gap 12 mmol/L      BUN 23 mg/dL      Creatinine 1 57 (H) mg/dL      Glucose 118 mg/dL      Calcium 8 9 mg/dL      eGFR 48 ml/min/1 73sq m     Narrative:         National Kidney Disease Education Program recommendations are as follows:  GFR calculation is accurate only with a steady state creatinine  Chronic Kidney disease less than 60 ml/min/1 73 sq  meters  Kidney failure less than 15 ml/min/1 73 sq  meters      CBC and differential [[id number]]  (Abnormal) Collected:  04/28/18 0955    Lab Status:  Final result Specimen:  Blood from Arm, Right Updated:  04/28/18 1007     WBC 14 86 (H) Thousand/uL      RBC 4 80 Million/uL      Hemoglobin 14 1 g/dL      Hematocrit 42 6 %      MCV 89 fL      MCH 29 4 pg      MCHC 33 1 g/dL      RDW 13 7 %      MPV 10 1 fL      Platelets 637 Thousands/uL      nRBC 0 /100 WBCs      Neutrophils Relative 75 %      Lymphocytes Relative 17 %      Monocytes Relative 7 %      Eosinophils Relative 1 %      Basophils Relative 0 %      Neutrophils Absolute 11 07 (H) Thousands/µL      Lymphocytes Absolute 2 49 Thousands/µL      Monocytes Absolute 1 06 Thousand/µL      Eosinophils Absolute 0 19 Thousand/µL      Basophils Absolute 0 05 Thousands/µL     Urine Microscopic [[id number]]  (Abnormal) Collected:  04/28/18 0932    Lab Status:  Final result Specimen:  Urine from Urine, Clean Catch Updated:  04/28/18 0949     RBC, UA 30-50 (A) /hpf      WBC, UA None Seen /hpf      Epithelial Cells Occasional /hpf      Bacteria, UA None Seen /hpf      OTHER OBSERVATIONS Yeast Cells Present    POCT urinalysis dipstick [[id number]]  (Abnormal) Resulted:  04/28/18 0933    Lab Status:  Final result Updated:  04/28/18 0933    ED Urine Macroscopic [[id number]]  (Abnormal) Collected:  04/28/18 0932    Lab Status:  Final result Specimen:  Urine Updated:  04/28/18 0931     Color, UA Yellow     Clarity, UA Clear     pH, UA 5 5     Leukocytes, UA Negative     Nitrite, UA Negative     Protein, UA Negative mg/dl      Glucose, UA Negative mg/dl      Ketones, UA Negative mg/dl      Urobilinogen, UA 0 2 E U /dl      Bilirubin, UA Negative     Blood, UA Large (A)     Specific [place], UA 1 025    Narrative:       CLINITEK RESULT                 XR abdomen 1 view kub   Final Result by Ciro Johnsno MD (04/28 1044)      No visible radiopaque ureteral calculus  Workstation performed: [id number]                    Procedures  Procedures       Phone Contacts  ED Phone Contact    ED Course  ED Course as of Apr 28 1221   Sat Apr 28, 2018   1048 Patient pain at a six upon re-eval  Will Re-dose morphine    1213 Patient's pain unchanged at a 6 upon re-evaluation after 3 doses of morphine  Urology paged    [id number] Discussed case with Urology (Dr Stephanie Tafoya) who will admit to his service                                MDM  Number of Diagnoses or Management Options  Acute renal insufficiency: new and requires workup  Intractable pain: new and requires workup  Right ureteral calculus: new and requires workup  Diagnosis management comments: Patient with known 4 mm ureteral calculus  Plan is for pain control as well as to check laboratories to ensure there is no evidence of infection or acute renal insufficiency  AK we will be obtained in temp to see if there has been stone migration      All labs reviewed and utilized in the medical decision making process    All radiology studies independently viewed by me and interpreted by the radiologist          Amount and/or Complexity of Data Reviewed  Clinical lab tests: ordered and reviewed  Tests in the radiology section of CPT®: ordered and reviewed  Review and summarize past medical records: yes (Reviewed recent your visit note from 2 days ago as well as Dr Byron Closs office note from yesterday noting plan for pain management and if unable to control pain, then outpatient urologic procedure)  Discuss the patient with other providers: yes  Independent visualization of images, tracings, or specimens: yes      CritCare Time    Disposition  Final diagnoses:   Right ureteral calculus   Intractable pain   Acute renal insufficiency     Time reflects when diagnosis was documented in both MDM as applicable and the Disposition within this note     Time User Action Codes Description Comment    4/28/2018 12:20 PM Sukhwinder Snow [N20 1] Right ureteral calculus     4/28/2018 12:20 PM Sukhwinder Snow [R52] Intractable pain     4/28/2018 12:21 PM Sukhwinder Snow [N28 9] Acute renal insufficiency       ED Disposition     ED Disposition Condition Comment    Admit  Case was discussed with Dr Stephen Hidalgo and the patient's admission status was agreed to be Admission Status: observation status to the service of Dr Stephen Hidalgo          Follow-up Information    None       Patient's Medications   Discharge Prescriptions    No medications on file     No discharge procedures on file      ED Provider  Electronically Signed by           Adilson Martinez DO  04/29/18 1116

## 2018-04-28 NOTE — PLAN OF CARE
DISCHARGE PLANNING     Discharge to home or other facility with appropriate resources Progressing        GENITOURINARY - ADULT     Maintains or returns to baseline urinary function Progressing        INFECTION - ADULT     Absence or prevention of progression during hospitalization Progressing        METABOLIC, FLUID AND ELECTROLYTES - ADULT     Electrolytes maintained within normal limits Progressing        PAIN - ADULT     Verbalizes/displays adequate comfort level or baseline comfort level Progressing        Potential for Falls     Patient will remain free of falls Progressing

## 2018-04-29 ENCOUNTER — ANESTHESIA (OUTPATIENT)
Dept: PERIOP | Facility: HOSPITAL | Age: 58
End: 2018-04-29
Payer: COMMERCIAL

## 2018-04-29 ENCOUNTER — APPOINTMENT (OUTPATIENT)
Dept: RADIOLOGY | Facility: HOSPITAL | Age: 58
End: 2018-04-29
Payer: COMMERCIAL

## 2018-04-29 VITALS
WEIGHT: 268 LBS | DIASTOLIC BLOOD PRESSURE: 58 MMHG | SYSTOLIC BLOOD PRESSURE: 127 MMHG | HEART RATE: 61 BPM | RESPIRATION RATE: 12 BRPM | OXYGEN SATURATION: 95 % | BODY MASS INDEX: 40.75 KG/M2 | TEMPERATURE: 98.8 F

## 2018-04-29 PROCEDURE — 52332 CYSTOSCOPY AND TREATMENT: CPT | Performed by: UROLOGY

## 2018-04-29 PROCEDURE — C1769 GUIDE WIRE: HCPCS | Performed by: UROLOGY

## 2018-04-29 PROCEDURE — 99223 1ST HOSP IP/OBS HIGH 75: CPT | Performed by: UROLOGY

## 2018-04-29 PROCEDURE — 52351 CYSTOURETERO & OR PYELOSCOPE: CPT | Performed by: UROLOGY

## 2018-04-29 PROCEDURE — 74450 X-RAY URETHRA/BLADDER: CPT

## 2018-04-29 PROCEDURE — C2617 STENT, NON-COR, TEM W/O DEL: HCPCS | Performed by: UROLOGY

## 2018-04-29 DEVICE — STENT URETERAL 6 FR 26CM INLAY OPTIMA: Type: IMPLANTABLE DEVICE | Site: URETER | Status: FUNCTIONAL

## 2018-04-29 RX ORDER — ONDANSETRON 2 MG/ML
INJECTION INTRAMUSCULAR; INTRAVENOUS AS NEEDED
Status: DISCONTINUED | OUTPATIENT
Start: 2018-04-29 | End: 2018-04-29 | Stop reason: SURG

## 2018-04-29 RX ORDER — SODIUM CHLORIDE 9 MG/ML
INJECTION, SOLUTION INTRAVENOUS CONTINUOUS PRN
Status: DISCONTINUED | OUTPATIENT
Start: 2018-04-29 | End: 2018-04-29 | Stop reason: SURG

## 2018-04-29 RX ORDER — FENTANYL CITRATE 50 UG/ML
INJECTION, SOLUTION INTRAMUSCULAR; INTRAVENOUS AS NEEDED
Status: DISCONTINUED | OUTPATIENT
Start: 2018-04-29 | End: 2018-04-29 | Stop reason: SURG

## 2018-04-29 RX ORDER — OXYCODONE HYDROCHLORIDE AND ACETAMINOPHEN 5; 325 MG/1; MG/1
1 TABLET ORAL EVERY 4 HOURS PRN
Status: DISCONTINUED | OUTPATIENT
Start: 2018-04-29 | End: 2018-04-29 | Stop reason: SDUPTHER

## 2018-04-29 RX ORDER — MIDAZOLAM HYDROCHLORIDE 1 MG/ML
INJECTION INTRAMUSCULAR; INTRAVENOUS AS NEEDED
Status: DISCONTINUED | OUTPATIENT
Start: 2018-04-29 | End: 2018-04-29 | Stop reason: SURG

## 2018-04-29 RX ORDER — ROCURONIUM BROMIDE 10 MG/ML
INJECTION, SOLUTION INTRAVENOUS AS NEEDED
Status: DISCONTINUED | OUTPATIENT
Start: 2018-04-29 | End: 2018-04-29 | Stop reason: SURG

## 2018-04-29 RX ORDER — MAGNESIUM HYDROXIDE 1200 MG/15ML
LIQUID ORAL AS NEEDED
Status: DISCONTINUED | OUTPATIENT
Start: 2018-04-29 | End: 2018-04-29 | Stop reason: HOSPADM

## 2018-04-29 RX ORDER — PANTOPRAZOLE SODIUM 40 MG/1
40 TABLET, DELAYED RELEASE ORAL
Status: DISCONTINUED | OUTPATIENT
Start: 2018-04-29 | End: 2018-04-29 | Stop reason: HOSPADM

## 2018-04-29 RX ORDER — FENTANYL CITRATE/PF 50 MCG/ML
25 SYRINGE (ML) INJECTION
Status: DISCONTINUED | OUTPATIENT
Start: 2018-04-29 | End: 2018-04-29 | Stop reason: HOSPADM

## 2018-04-29 RX ORDER — OXYCODONE HYDROCHLORIDE AND ACETAMINOPHEN 5; 325 MG/1; MG/1
1 TABLET ORAL EVERY 4 HOURS PRN
Status: DISCONTINUED | OUTPATIENT
Start: 2018-04-29 | End: 2018-04-29 | Stop reason: HOSPADM

## 2018-04-29 RX ORDER — LISINOPRIL 20 MG/1
20 TABLET ORAL DAILY
Status: DISCONTINUED | OUTPATIENT
Start: 2018-04-29 | End: 2018-04-29 | Stop reason: HOSPADM

## 2018-04-29 RX ORDER — PROPOFOL 10 MG/ML
INJECTION, EMULSION INTRAVENOUS AS NEEDED
Status: DISCONTINUED | OUTPATIENT
Start: 2018-04-29 | End: 2018-04-29 | Stop reason: SURG

## 2018-04-29 RX ORDER — ONDANSETRON 2 MG/ML
4 INJECTION INTRAMUSCULAR; INTRAVENOUS ONCE AS NEEDED
Status: DISCONTINUED | OUTPATIENT
Start: 2018-04-29 | End: 2018-04-29 | Stop reason: HOSPADM

## 2018-04-29 RX ORDER — GLYCOPYRROLATE 0.2 MG/ML
INJECTION INTRAMUSCULAR; INTRAVENOUS AS NEEDED
Status: DISCONTINUED | OUTPATIENT
Start: 2018-04-29 | End: 2018-04-29 | Stop reason: SURG

## 2018-04-29 RX ORDER — ONDANSETRON 4 MG/1
4 TABLET, ORALLY DISINTEGRATING ORAL EVERY 8 HOURS PRN
Status: DISCONTINUED | OUTPATIENT
Start: 2018-04-29 | End: 2018-04-29 | Stop reason: HOSPADM

## 2018-04-29 RX ORDER — SODIUM CHLORIDE 9 MG/ML
INJECTION, SOLUTION INTRAVENOUS AS NEEDED
Status: DISCONTINUED | OUTPATIENT
Start: 2018-04-29 | End: 2018-04-29 | Stop reason: HOSPADM

## 2018-04-29 RX ORDER — KETOROLAC TROMETHAMINE 30 MG/ML
INJECTION, SOLUTION INTRAMUSCULAR; INTRAVENOUS AS NEEDED
Status: DISCONTINUED | OUTPATIENT
Start: 2018-04-29 | End: 2018-04-29 | Stop reason: SURG

## 2018-04-29 RX ADMIN — ONDANSETRON HYDROCHLORIDE 4 MG: 2 INJECTION, SOLUTION INTRAVENOUS at 08:21

## 2018-04-29 RX ADMIN — NEOSTIGMINE METHYLSULFATE 3 MG: 1 INJECTION, SOLUTION INTRAMUSCULAR; INTRAVENOUS; SUBCUTANEOUS at 08:43

## 2018-04-29 RX ADMIN — PROPOFOL 300 MG: 10 INJECTION, EMULSION INTRAVENOUS at 08:15

## 2018-04-29 RX ADMIN — DEXAMETHASONE SODIUM PHOSPHATE 4 MG: 10 INJECTION INTRAMUSCULAR; INTRAVENOUS at 08:21

## 2018-04-29 RX ADMIN — Medication 3000 MG: at 08:23

## 2018-04-29 RX ADMIN — ROCURONIUM BROMIDE 40 MG: 10 INJECTION INTRAVENOUS at 08:15

## 2018-04-29 RX ADMIN — KETOROLAC TROMETHAMINE 30 MG: 30 INJECTION, SOLUTION INTRAMUSCULAR at 08:47

## 2018-04-29 RX ADMIN — SODIUM CHLORIDE: 0.9 INJECTION, SOLUTION INTRAVENOUS at 08:09

## 2018-04-29 RX ADMIN — MIDAZOLAM HYDROCHLORIDE 2 MG: 1 INJECTION, SOLUTION INTRAMUSCULAR; INTRAVENOUS at 08:08

## 2018-04-29 RX ADMIN — GLYCOPYRROLATE 0.6 MG: 0.2 INJECTION, SOLUTION INTRAMUSCULAR; INTRAVENOUS at 08:43

## 2018-04-29 RX ADMIN — DEXTROSE AND SODIUM CHLORIDE 125 ML/HR: 5; .45 INJECTION, SOLUTION INTRAVENOUS at 09:31

## 2018-04-29 RX ADMIN — FENTANYL CITRATE 100 MCG: 50 INJECTION, SOLUTION INTRAMUSCULAR; INTRAVENOUS at 08:11

## 2018-04-29 NOTE — CASE MANAGEMENT
Thank you,  [address] [facility] in the [facility] by Keo Tamayo for 2017  Network Utilization Review Department  Phone: [phone]; Fax [phone]  ATTENTION: The Network Utilization Review Department is now centralized for our 7 Facilities  Make a note that we have a new phone and fax numbers for our Department  Please call with any questions or concerns to [phone] and carefully follow the prompts so that you are directed to the right person  All voicemails are confidential  Fax any determinations, approvals, denials, and requests for initial or continue stay review clinical to [phone]  Due to HIGH CALL volume, it would be easier if you could please send faxed requests to expedite your requests and in part, help us provide discharge notifications faster  Initial Clinical Review    Admission: Date/Time/Statement: OBSERVATION 4/2/18 @1220    Orders Placed This Encounter   Procedures    Place in Observation (expected length of stay for this patient is less than two midnights)     Standing Status:   Standing     Number of Occurrences:   1     Order Specific Question:   Admitting Physician     Answer:   Dennis Koehler     Order Specific Question:   Level of Care     Answer:   Med Surg [16]       ED Arrival Information     Expected Arrival Acuity Means of Arrival Escorted By Service Admission Type    - 4/28/2018 09:15 Urgent Walk-In Self Urology Urgent    Arrival Complaint    Abdominal Pain          Chief Complaint   Patient presents with    Flank Pain     Patient reports right sided flank and groin pain, patient has known stone  Reports was unable to get percocet filled until today  Patient reports here for a shot for something for pain  Patient reports took last percocet at 7am without relief  History of Illness: Pt gives history of 14 prior stones in the past  On one occasion he required stone extraction   He was seen twice this week for right flank pain radiating to groin  CT revealed proximal right 4 mm calculus with hydronephrosis  D/c from ER  Seen by Dr Herman Vargas in office, plan for conservative management  He returned to ER with persistent pain, nausea  No fever, chills  +right CVA tenderness  ED Vital Signs:   ED Triage Vitals [04/28/18 0928]   Temperature Pulse Respirations Blood Pressure SpO2   97 5 °F (36 4 °C) 65 18 (!) 201/84 99 %      Temp Source Heart Rate Source Patient Position - Orthostatic VS BP Location FiO2 (%)   Oral Monitor Sitting Right arm --      Pain Score       Worst Possible Pain        Wt Readings from Last 1 Encounters:   04/28/18 122 kg (268 lb)        Abnormal Labs/Diagnostic Test Results:   4/26 outpt CT stone study:RIGHT KIDNEY AND URETER:4 mm proximal right ureteral calculus resulting in mild right hydroureteronephrosis   No perinephric collection evident   Additional punctate nonobstructive calculi  LEFT KIDNEY AND URETER:Punctate nonobstructive calculi without hydronephrosis or hydroureter  4/27: UA was negative  4/28: wbc 14 86   Creat 1 57   UA: lg bld   30-50 rbc   +yeast   occ epithelials    KUB: no ureteral calculus    ED Treatment:   Medication Administration from 04/28/2018 0915 to 04/28/2018 1306       Date/Time Order Dose Route Action Action by Comments     04/28/2018 0952 ondansetron (ZOFRAN) injection 4 mg 4 mg Intravenous Given Reina Pressley RN      04/28/2018 0953 ketorolac (TORADOL) injection 15 mg 15 mg Intravenous Given Reina Pressley RN      04/28/2018 1152 morphine (PF) 4 mg/mL injection 4 mg 4 mg Intravenous Given Jade Donovan RN      04/28/2018 1050 morphine (PF) 4 mg/mL injection 4 mg 4 mg Intravenous Given Reina Pressley RN      04/28/2018 0955 morphine (PF) 4 mg/mL injection 4 mg 4 mg Intravenous Given Reina Pressley RN           Past Medical/Surgical History:    Active Ambulatory Problems     Diagnosis Date Noted    No Active Ambulatory Problems     Resolved Ambulatory Problems     Diagnosis Date Noted    No Resolved Ambulatory Problems     Past Medical History:   Diagnosis Date    BPH without obstruction/lower urinary tract symptoms     Elevated PSA     GERD (gastroesophageal reflux disease)     Gout     Hyperlipidemia     Kidney calculi     Obese abdomen     Sleep apnea     Ureteral calculi        Admitting Diagnosis: Acute renal insufficiency [N28 9]  Right ureteral calculus [N20 1]  Intractable pain [R52]  Unspecified abdominal pain [R10 9]    Age/Sex: 60 y o  male    Assessment/Plan: Assessment  Right ureteral calculus, refractory pain and nausea     Plan  Admitted for hydration, analgesia, anti-emetic  No evidence of spontaneous passage, discussed observation vs intervention  Wants stone removed  Discussed technique, risks, benefits of right ureteroscopy, laser, stone extraction, stent placement  Consent obtained  Understands possibility of 2 stage procedure      Admission Orders:  Scheduled Meds:   Current Facility-Administered Medications:  dextrose 5 % and sodium chloride 0 45 % 125 mL/hr Intravenous Continuous Marce Anthony MD Last Rate: 125 mL/hr (04/29/18 0931)   lisinopril 20 mg Oral Daily Marce Anthony MD    morphine injection 4 mg Intravenous Q3H PRN Marce Anthony MD    ondansetron 4 mg Oral Q8H PRN Marce Anthony MD    oxyCODONE-acetaminophen 1 tablet Oral Q4H PRN Marce Anthony MD    pantoprazole 40 mg Oral Daily Before Breakfast Marce Anthony MD      Continuous Infusions:   dextrose 5 % and sodium chloride 0 45 % 125 mL/hr Last Rate: 125 mL/hr (04/29/18 0931)     PRN Meds: morphine injection    ondansetron    oxyCODONE-acetaminophen     hse diet  Activity as tarsha  Retrograde pyelogram  Strain all urine    TAKEN TO OR 4/29 @0827:  CYSTOSCOPY URETEROSCOPY, RETROGRADE PYELOGRAM AND INSERTION STENT URETERAL (Right)  General anesthesia

## 2018-04-29 NOTE — ANESTHESIA PREPROCEDURE EVALUATION
Review of Systems/Medical History          Cardiovascular  Hyperlipidemia, Hypertension ,    Pulmonary  Sleep apnea ,        GI/Hepatic    GERD ,        Kidney stones,        Endo/Other    Obesity  morbid obesity   GYN       Hematology  Negative hematology ROS      Musculoskeletal  Gout,        Neurology  Negative neurology ROS      Psychology   Negative psychology ROS              Physical Exam    Airway    Mallampati score: II  TM Distance: >3 FB  Neck ROM: full     Dental   No notable dental hx     Cardiovascular  Rhythm: regular, Rate: normal, Cardiovascular exam normal    Pulmonary  Pulmonary exam normal     Other Findings        Anesthesia Plan  ASA Score- 3 Emergent    Anesthesia Type- general with ASA Monitors  Additional Monitors:   Airway Plan: ETT  Plan Factors-    Induction- intravenous  Postoperative Plan-     Informed Consent- Anesthetic plan and risks discussed with patient

## 2018-04-29 NOTE — OP NOTE
OPERATIVE REPORT  PATIENT NAME: Mary Nicole    :  1960  MRN: [id number]  Pt Location: [facility] OR ROOM 08    SURGERY DATE: 2018    Surgeon(s) and Role:     * Lore Vásquez MD - Primary    Preop Diagnosis:  Right ureteral calculus [N20 1]    Post-Op Diagnosis Codes:     * Right ureteral calculus [N20 1]    Procedure(s) (LRB):  CYSTOSCOPY URETEROSCOPY, RETROGRADE PYELOGRAM AND INSERTION STENT URETERAL (Right)    Specimen(s):  * No specimens in log *    Estimated Blood Loss:   Minimal    Drains:  Ureteral Drain/Stent Right ureter 6 Fr  (Active)   Number of days: 0       Anesthesia Type:   General    Operative Indications:  Right ureteral calculus [N20 1]      Operative Findings:  No stone identified    Complications:   None    Procedure and Technique:  The patient was identified, brought to the operating room, and placed on the table in supine position  After induction of general anesthesia, the patient was placed in dorsal lithotomy position and prepped and draped in the usual sterile fashion  A complete formal timeout was performed  The 25 Canadian rigid cystoscope was placed per urethra and cystoscopy was performed  There was no bladder abnormality identified  The right ureteral orifice was identified and cannulated with a sensor wire  A semi rigid ureteral scope was passed alongside the wire into the ureter  The scope was able to reach up to the proximal ureter and no stone was identified  Because the stone may have been displaced into the kidney, I then placed a second guidewire and a 5 3 Canadian flexible ureteral scope  Complete ureteroscopy and renoscopy evaluating all calices was then performed and there was no stone identified  I carefully re-evaluated the ureter on removal of the scope and there was no stone seen  At this point, a retrograde pyelogram was performed delineating the upper urinary tract anatomy  No filling defect identified    The safety wire was backloaded into the cystoscope and a 26 cm x 6 Danish double-J stent was placed with string  The patient tolerated the procedure well and was transferred to the recovery room awake alert and in stable condition       I was present for the entire procedure    Patient Disposition:  PACU     SIGNATURE: Marce Anthony MD  DATE: April 29, 2018  TIME: 8:52 AM

## 2018-04-29 NOTE — H&P
HPI  Pt gives history of 14 prior stones in the past  On one occasion he required stone extraction  He was seen twice this week for right flank pain radiating to groin  CT revealed proximal right 4 mm calculus with hydronephrosis  D/c from ER  Seen by Dr Cintia Butt in office, plan for conservative management  He returned to ER with persistent pain, nausea  No fever, chills  History of both calcium and uric acid stones  PMH/PSH  Calculi  BPH  GERD  Hyperlipidemia  Obesity  Sleep apnea  Lithotripsy  Hernia repair    Social Hx  No tobacco, alcohol    Family Hx  Non-contributory    ROS  +nausea  +flank pain  No fever, chills  All other systems on 10 point review negative  PE  Alert, oriented x3  Vital signs stable  Well developed  Chest clear  Heart regular rhythm  Abdomen obese, soft   +right CVA tenderness  Ext moving all 4 without difficulty  Neuro grossly non-focal    Assessment  Right ureteral calculus, refractory pain and nausea    Plan  Admitted for hydration, analgesia, anti-emetic  No evidence of spontaneous passage, discussed observation vs intervention  Wants stone removed  Discussed technique, risks, benefits of right ureteroscopy, laser, stone extraction, stent placement  Consent obtained  Understands possibility of 2 stage procedure  All questions answered

## 2018-04-30 ENCOUNTER — TELEPHONE (OUTPATIENT)
Dept: UROLOGY | Facility: AMBULATORY SURGERY CENTER | Age: 58
End: 2018-04-30

## 2018-05-01 ENCOUNTER — OFFICE VISIT (OUTPATIENT)
Dept: UROLOGY | Facility: MEDICAL CENTER | Age: 58
End: 2018-05-01
Payer: COMMERCIAL

## 2018-05-01 VITALS
HEIGHT: 69 IN | SYSTOLIC BLOOD PRESSURE: 136 MMHG | WEIGHT: 270 LBS | DIASTOLIC BLOOD PRESSURE: 78 MMHG | BODY MASS INDEX: 39.99 KG/M2

## 2018-05-01 DIAGNOSIS — N20.1 RIGHT URETERAL STONE: Primary | ICD-10-CM

## 2018-05-01 LAB
SL AMB  POCT GLUCOSE, UA: NEGATIVE
SL AMB LEUKOCYTE ESTERASE,UA: ABNORMAL
SL AMB POCT BILIRUBIN,UA: NEGATIVE
SL AMB POCT BLOOD,UA: ABNORMAL
SL AMB POCT CLARITY,UA: ABNORMAL
SL AMB POCT COLOR,UA: ABNORMAL
SL AMB POCT KETONES,UA: NEGATIVE
SL AMB POCT NITRITE,UA: NEGATIVE
SL AMB POCT PH,UA: 5.5
SL AMB POCT SPECIFIC GRAVITY,UA: 1.02
SL AMB POCT URINE PROTEIN: ABNORMAL
SL AMB POCT UROBILINOGEN: 0.2

## 2018-05-01 PROCEDURE — 99213 OFFICE O/P EST LOW 20 MIN: CPT | Performed by: UROLOGY

## 2018-05-01 PROCEDURE — 81003 URINALYSIS AUTO W/O SCOPE: CPT | Performed by: UROLOGY

## 2018-05-01 NOTE — PATIENT INSTRUCTIONS
Ureteral Stones   WHAT YOU NEED TO KNOW:   What is a ureteral stone? A ureteral stone is a stone that forms in the kidney and moves down the ureter and gets stuck there  The ureter is the tube that takes urine from the kidney to the bladder  Stones can form in the urinary system when your urine has high levels of minerals and salts  Urinary stones can be made of uric acid, calcium, phosphate, or oxalate crystals  What increases my risk for urinary stones? · You do not drink enough liquids (especially water) each day  · You follow a certain type of diet  For example, people who eat a diet high in meat or salt may be at higher risk for urinary stones  People who eat foods high in oxalate may also be at higher risk  Foods that are high in oxalate include nuts, chocolate, coffee, and green leafy vegetables  · You take certain medicines such as diuretics or calcium or vitamin C supplements  · You have a family member who has had urinary stones  · You have conditions such as obesity, a kidney or bowel disorder, or gout  What are the signs and symptoms of ureteral stones? · Severe pain on your lower abdomen or groin    · Nausea and vomiting    · Urge to urinate often, burning feeling when you urinate, or pink or red urine  How are ureteral stones diagnosed? · Urine tests  may show if you have blood in your urine  They may also show high amounts of the substances that form stones, such as uric acid  · Blood tests  show how well your kidneys are working  They may also be used to check the levels of calcium or uric acid in your blood  · An x-ray or CT scan  of your kidneys, ureters, and bladder may be done  You may be given a dye before the pictures are taken to help healthcare providers see the pictures better  You may need to have more than one x-ray  Tell the healthcare provider if you have ever had an allergic reaction to contrast dye  How are ureteral stones treated?    · NSAIDs , such as ibuprofen, help decrease swelling, pain, and fever  This medicine is available with or without a doctor's order  NSAIDs can cause stomach bleeding or kidney problems in certain people  If you take blood thinner medicine, always ask your healthcare provider if NSAIDs are safe for you  Always read the medicine label and follow directions  · Prescription pain medicine  may help decrease pain or help your ureteral stone pass  Do not wait until the pain is severe before you take pain medicine  · Nausea medicine  may help calm your stomach and prevent vomiting  · A procedure or surgery  to remove the ureteral stone may be needed if it does not pass on its own  How can I care for myself at home? · Drink plenty of liquids  Your healthcare provider may tell you to drink at least 8 to 12 (eight-ounce) cups of liquids each day  This helps flush out the stones when you urinate  Water is the best liquid to drink  · Strain your urine every time you go to the bathroom  Urinate through a strainer or a piece of thin cloth to catch the stone  Take the stone to your healthcare provider so it can be sent to the lab for tests  This will help your healthcare providers plan the best treatment for you  · Ask your healthcare provider about any nutrition changes you need to make  You may need to limit certain foods such as foods high in sodium (salt), certain protein foods, or foods high in oxalate  When should I seek immediate care? · You have severe pain that does not improve, even after you take medicine  · You have vomiting that is not relieved by medicine  When should I contact my healthcare provider? · You develop a fever  · You have any questions or concerns about your condition or care  CARE AGREEMENT:   You have the right to help plan your care  Learn about your health condition and how it may be treated   Discuss treatment options with your caregivers to decide what care you want to receive  You always have the right to refuse treatment  The above information is an  only  It is not intended as medical advice for individual conditions or treatments  Talk to your doctor, nurse or pharmacist before following any medical regimen to see if it is safe and effective for you  © 2017 [employer] Information is for End User's use only and may not be sold, redistributed or otherwise used for commercial purposes  All illustrations and images included in CareNotes® are the copyrighted property of [employer]  or [employer]

## 2018-05-01 NOTE — PROGRESS NOTES
Assessment/Plan:    Assessment:  1  Right ureteral stone, possibly retained, possibly passed spontaneously    Plan:  1   CT stone study to be sure the stone has passed  2  If stone is passed, remove stent  If stone has not passed, repeat ureteroscopy    No problem-specific Assessment & Plan notes found for this encounter  Diagnoses and all orders for this visit:    Right ureteral stone  -     POCT urine dip auto non-scope  -     CT renal stone study abdomen pelvis wo contrast; Future          Subjective:      Patient ID: Maria Eugenia Ponce is a 60 y o  male  HPI  Right ureteral stone: The patient was hospitalized last week and with right renal colic  He had been seen in the office previously and the decision was made at that time to try to treat his stone conservatively  This proved unsuccessful  Dr Vanesa Santoro took the patient to the operating room over the weekend and performed ureteroscopy and pyeloscopy  He was unable to identify the stone  The patient was discharged with a stent in place and a string at his urethral meatus  At this point, it is not clear whether not the patient passed his stone  Before pulling the stent, I want to be sure that he does not have a retained stone  We will repeat his CT stone study  If the stone is gone, we can discontinue the stent  If the stone remains in the kidney, he should be ureteroscoped once again  The patient is having some stent related symptoms, primarily right flank pain with a full bladder and urinary urgency  The stent has certainly worn out it is welcome and will be discontinued as soon as possible  The patient understands the clinical strategy were care and is agreeable to this plan of care      The following portions of the patient's history were reviewed and updated as appropriate: allergies, current medications, past family history, past medical history, past social history, past surgical history and problem list     Review of Systems Constitutional: Negative for activity change and fatigue  Obese   Respiratory: Negative for shortness of breath and wheezing  Cardiovascular: Negative for chest pain  Gastrointestinal: Negative for abdominal pain  Genitourinary: Negative for difficulty urinating, dysuria, frequency, hematuria and urgency  Musculoskeletal: Negative for back pain and gait problem  Skin: Negative  Allergic/Immunologic: Negative  Neurological: Negative  Psychiatric/Behavioral: Negative  Objective:      /78 (BP Location: Left arm, Patient Position: Sitting, Cuff Size: Standard)   Ht 5' 9" (1 753 m)   Wt 122 kg (270 lb)   BMI 39 87 kg/m²          Physical Exam   Constitutional: He is oriented to person, place, and time  He appears well-developed and well-nourished  HENT:   Head: Normocephalic and atraumatic  Neck: Normal range of motion  Neck supple  Pulmonary/Chest: Effort normal    Musculoskeletal: Normal range of motion  Neurological: He is alert and oriented to person, place, and time  He has normal reflexes  Skin: Skin is warm and dry  Psychiatric: He has a normal mood and affect   His behavior is normal  Judgment and thought content normal

## 2018-05-01 NOTE — LETTER
May 1, 2018     Nile Solano MD  [address]    Patient: Spencer Ceron   YOB: 1960   Date of Visit: 5/1/2018       Dear Dr Jace Tello: Thank you for referring Spencer Ceron to me for evaluation  Below are my notes for this consultation  If you have questions, please do not hesitate to call me  I look forward to following your patient along with you  Sincerely,        Elsi Barajas MD        CC: No Recipients  Elsi Barajas MD  5/1/2018  4:11 PM  Sign at close encounter  Assessment/Plan:    Assessment:  1  Right ureteral stone, possibly retained, possibly passed spontaneously    Plan:  1   CT stone study to be sure the stone has passed  2  If stone is passed, remove stent  If stone has not passed, repeat ureteroscopy    No problem-specific Assessment & Plan notes found for this encounter  Diagnoses and all orders for this visit:    Right ureteral stone  -     POCT urine dip auto non-scope  -     CT renal stone study abdomen pelvis wo contrast; Future          Subjective:      Patient ID: Spencer Ceron is a 60 y o  male  HPI  Right ureteral stone: The patient was hospitalized last week and with right renal colic  He had been seen in the office previously and the decision was made at that time to try to treat his stone conservatively  This proved unsuccessful  Dr Ishaan Rodriguez took the patient to the operating room over the weekend and performed ureteroscopy and pyeloscopy  He was unable to identify the stone  The patient was discharged with a stent in place and a string at his urethral meatus  At this point, it is not clear whether not the patient passed his stone  Before pulling the stent, I want to be sure that he does not have a retained stone  We will repeat his CT stone study  If the stone is gone, we can discontinue the stent  If the stone remains in the kidney, he should be ureteroscoped once again      The patient is having some stent related symptoms, primarily right flank pain with a full bladder and urinary urgency  The stent has certainly worn out it is welcome and will be discontinued as soon as possible  The patient understands the clinical strategy were care and is agreeable to this plan of care  The following portions of the patient's history were reviewed and updated as appropriate: allergies, current medications, past family history, past medical history, past social history, past surgical history and problem list     Review of Systems   Constitutional: Negative for activity change and fatigue  Obese   Respiratory: Negative for shortness of breath and wheezing  Cardiovascular: Negative for chest pain  Gastrointestinal: Negative for abdominal pain  Genitourinary: Negative for difficulty urinating, dysuria, frequency, hematuria and urgency  Musculoskeletal: Negative for back pain and gait problem  Skin: Negative  Allergic/Immunologic: Negative  Neurological: Negative  Psychiatric/Behavioral: Negative  Objective:      /78 (BP Location: Left arm, Patient Position: Sitting, Cuff Size: Standard)   Ht 5' 9" (1 753 m)   Wt 122 kg (270 lb)   BMI 39 87 kg/m²           Physical Exam   Constitutional: He is oriented to person, place, and time  He appears well-developed and well-nourished  HENT:   Head: Normocephalic and atraumatic  Neck: Normal range of motion  Neck supple  Pulmonary/Chest: Effort normal    Musculoskeletal: Normal range of motion  Neurological: He is alert and oriented to person, place, and time  He has normal reflexes  Skin: Skin is warm and dry  Psychiatric: He has a normal mood and affect   His behavior is normal  Judgment and thought content normal

## 2018-05-04 ENCOUNTER — HOSPITAL ENCOUNTER (OUTPATIENT)
Dept: CT IMAGING | Facility: HOSPITAL | Age: 58
Discharge: HOME | End: 2018-05-04
Attending: UROLOGY
Payer: COMMERCIAL

## 2018-05-04 DIAGNOSIS — N20.1 RIGHT URETERAL STONE: ICD-10-CM

## 2018-05-04 PROCEDURE — 74176 CT ABD & PELVIS W/O CONTRAST: CPT

## 2018-05-07 ENCOUNTER — TELEPHONE (OUTPATIENT)
Dept: UROLOGY | Facility: AMBULATORY SURGERY CENTER | Age: 58
End: 2018-05-07

## 2018-05-07 NOTE — TELEPHONE ENCOUNTER
Spoke with patient, patient wants a call back from the nurses  He has some questions and wants to know what do to  Patient is also looking for results of his cat scan  Please advise and call him back  Thank you

## 2018-05-07 NOTE — TELEPHONE ENCOUNTER
Pt had CT done 5/4 at 7 pm  Notified final report not in office, to call Wed if he does not hear from office

## 2018-05-09 NOTE — TELEPHONE ENCOUNTER
Checked if results final, in transcription at this time  Pt notified  Hoping to get stent out this week depending on results  Will periodically check for final report  Pt notified

## 2018-05-09 NOTE — TELEPHONE ENCOUNTER
Report final  Will have Triage 240 address with Dr Devon Sanabria to see if stent can be removed this week

## 2018-05-10 ENCOUNTER — CLINICAL SUPPORT (OUTPATIENT)
Dept: UROLOGY | Facility: MEDICAL CENTER | Age: 58
End: 2018-05-10
Payer: COMMERCIAL

## 2018-05-10 VITALS
WEIGHT: 270 LBS | SYSTOLIC BLOOD PRESSURE: 130 MMHG | HEIGHT: 69 IN | BODY MASS INDEX: 39.99 KG/M2 | DIASTOLIC BLOOD PRESSURE: 78 MMHG

## 2018-05-10 DIAGNOSIS — N20.1 RIGHT URETERAL STONE: Primary | ICD-10-CM

## 2018-05-10 PROCEDURE — 99211 OFF/OP EST MAY X REQ PHY/QHP: CPT

## 2018-05-10 NOTE — PROGRESS NOTES
The patient was placed in the supine position  The stent was gently removed in its entirety by using the string which was present at the urethral meatus  The procedure was well-tolerated and without complications  Instructions were given to call the office immediately for bloody urine, difficulty urinating, urinary retention, painful or frequent urination, fever, chills, nausea, vomiting or other illness  The patient stated that they understood these instructions and would comply with them

## 2018-06-18 ENCOUNTER — OFFICE VISIT (OUTPATIENT)
Dept: UROLOGY | Facility: MEDICAL CENTER | Age: 58
End: 2018-06-18
Payer: COMMERCIAL

## 2018-06-18 VITALS
HEIGHT: 69 IN | BODY MASS INDEX: 39.69 KG/M2 | WEIGHT: 268 LBS | SYSTOLIC BLOOD PRESSURE: 136 MMHG | DIASTOLIC BLOOD PRESSURE: 84 MMHG

## 2018-06-18 DIAGNOSIS — N20.1 RIGHT URETERAL CALCULUS: Primary | ICD-10-CM

## 2018-06-18 DIAGNOSIS — R97.20 ELEVATED PSA: ICD-10-CM

## 2018-06-18 DIAGNOSIS — N40.1 BENIGN PROSTATIC HYPERPLASIA WITH NOCTURIA: ICD-10-CM

## 2018-06-18 DIAGNOSIS — R35.1 BENIGN PROSTATIC HYPERPLASIA WITH NOCTURIA: ICD-10-CM

## 2018-06-18 PROCEDURE — 99214 OFFICE O/P EST MOD 30 MIN: CPT | Performed by: UROLOGY

## 2018-06-18 PROCEDURE — 81003 URINALYSIS AUTO W/O SCOPE: CPT | Performed by: UROLOGY

## 2018-06-18 NOTE — LETTER
June 18, 2018     Hamilton Oleary MD  [address] [address]    Patient: Marialuisa Kerr   YOB: 1960   Date of Visit: 6/18/2018       Dear Dr Mariel Wills: Thank you for referring Nain Hunt to me for evaluation  Below are my notes for this consultation  If you have questions, please do not hesitate to call me  I look forward to following your patient along with you  Sincerely,        Jeanna Buck MD        CC: No Recipients  Jeanna Buck MD  6/18/2018  4:22 PM  Sign at close encounter  Assessment/Plan:    Kidney calculi   Known retained 2 mm stones in the left kidney, no treatment indicated  Benign prostatic hyperplasia with nocturia    Minimally symptomatic  Return in 1 year for re-evaluation  Elevated PSA    Total and free PSA ordered  Diagnoses and all orders for this visit:    Right ureteral calculus  -     POCT urine dip auto non-scope    Benign prostatic hyperplasia with nocturia    Elevated PSA  -     PSA, total and free; Future          Subjective:      Patient ID: Marialuisa Kerr is a 60 y o  male  HPI  BPH:  He notes Very mildurinary frequency, urinary urgency  He denies other significant urinary symptoms  He denies gross hematuria, urinary tract infections or incontinence  He is taking  Neither medications nor supplements for his symptoms  Elevated PSA:   His PSA in December 2016 was 4 24  He is due for a total and free PSA  Ureteral stone: The patient has recovered completely from his recent stone episode  He reports a total of 14 stones past during his lifetime  He was placed on allopurinol years ago by Dr Kam Cade and has continued ever since  The patient has 3 stones remaining in his left kidney, approximately 2 mm each      The following portions of the patient's history were reviewed and updated as appropriate: allergies, current medications, past family history, past medical history, past social history, past surgical history and problem list     Review of Systems   Constitutional: Negative for activity change and fatigue  Respiratory: Negative for shortness of breath and wheezing  Cardiovascular: Negative for chest pain  Gastrointestinal: Negative for abdominal pain  Genitourinary: Negative for difficulty urinating, dysuria, frequency, hematuria and urgency  Musculoskeletal: Negative for back pain and gait problem  Skin: Negative  Allergic/Immunologic: Negative  Neurological: Negative  Psychiatric/Behavioral: Negative  Objective:      /84 (BP Location: Left arm, Patient Position: Sitting, Cuff Size: Standard)   Ht 5' 9" (1 753 m)   Wt 122 kg (268 lb)   BMI 39 58 kg/m²           Physical Exam   Constitutional: He is oriented to person, place, and time  He appears well-developed and well-nourished  HENT:   Head: Normocephalic and atraumatic  Neck: Normal range of motion  Neck supple  Pulmonary/Chest: Effort normal    Genitourinary: Rectum normal    Genitourinary Comments: The prostate is approximately 30 g, smooth, firm, nontender  Musculoskeletal: Normal range of motion  Neurological: He is alert and oriented to person, place, and time  He has normal reflexes  Skin: Skin is warm and dry  Psychiatric: He has a normal mood and affect   His behavior is normal  Judgment and thought content normal

## 2018-06-18 NOTE — PROGRESS NOTES
IPSS Questionnaire (AUA-7): Over the past month    1)  How often have you had a sensation of not emptying your bladder completely after you finish urinating? 1 - Less than 1 time in 5   2)  How often have you had to urinate again less than two hours after you finished urinating? 1 - Less than 1 time in 5   3)  How often have you found you stopped and started again several times when you urinated? 1 - Less than 1 time in 5   4) How difficult have you found it to postpone urination? 1 - Less than 1 time in 5   5) How often have you had a weak urinary stream?  0 - Not at all   6) How often have you had to push or strain to begin urination? 0 - Not at all   7) How many times did you most typically get up to urinate from the time you went to bed until the time you got up in the morning?  0 - None   Total Score:  4     QOL: Pleased

## 2018-06-18 NOTE — PROGRESS NOTES
Assessment/Plan:    Kidney calculi   Known retained 2 mm stones in the left kidney, no treatment indicated  Benign prostatic hyperplasia with nocturia    Minimally symptomatic  Return in 1 year for re-evaluation  Elevated PSA    Total and free PSA ordered  Diagnoses and all orders for this visit:    Right ureteral calculus  -     POCT urine dip auto non-scope    Benign prostatic hyperplasia with nocturia    Elevated PSA  -     PSA, total and free; Future          Subjective:      Patient ID: Marialuisa Kerr is a 60 y o  male  HPI  BPH:  He notes Very mildurinary frequency, urinary urgency  He denies other significant urinary symptoms  He denies gross hematuria, urinary tract infections or incontinence  He is taking  Neither medications nor supplements for his symptoms  Elevated PSA:   His PSA in December 2016 was 4 24  He is due for a total and free PSA  Ureteral stone: The patient has recovered completely from his recent stone episode  He reports a total of 14 stones past during his lifetime  He was placed on allopurinol years ago by Dr Kam Cade and has continued ever since  The patient has 3 stones remaining in his left kidney, approximately 2 mm each  The following portions of the patient's history were reviewed and updated as appropriate: allergies, current medications, past family history, past medical history, past social history, past surgical history and problem list     Review of Systems   Constitutional: Negative for activity change and fatigue  Respiratory: Negative for shortness of breath and wheezing  Cardiovascular: Negative for chest pain  Gastrointestinal: Negative for abdominal pain  Genitourinary: Negative for difficulty urinating, dysuria, frequency, hematuria and urgency  Musculoskeletal: Negative for back pain and gait problem  Skin: Negative  Allergic/Immunologic: Negative  Neurological: Negative  Psychiatric/Behavioral: Negative  Objective:      /84 (BP Location: Left arm, Patient Position: Sitting, Cuff Size: Standard)   Ht 5' 9" (1 753 m)   Wt 122 kg (268 lb)   BMI 39 58 kg/m²          Physical Exam   Constitutional: He is oriented to person, place, and time  He appears well-developed and well-nourished  HENT:   Head: Normocephalic and atraumatic  Neck: Normal range of motion  Neck supple  Pulmonary/Chest: Effort normal    Genitourinary: Rectum normal    Genitourinary Comments: The prostate is approximately 30 g, smooth, firm, nontender  Musculoskeletal: Normal range of motion  Neurological: He is alert and oriented to person, place, and time  He has normal reflexes  Skin: Skin is warm and dry  Psychiatric: He has a normal mood and affect   His behavior is normal  Judgment and thought content normal

## 2018-11-05 ENCOUNTER — TELEPHONE (OUTPATIENT)
Dept: UROLOGY | Facility: AMBULATORY SURGERY CENTER | Age: 58
End: 2018-11-05

## 2018-11-05 ENCOUNTER — TELEPHONE (OUTPATIENT)
Dept: UROLOGY | Facility: MEDICAL CENTER | Age: 58
End: 2018-11-05

## 2018-11-05 ENCOUNTER — HOSPITAL ENCOUNTER (EMERGENCY)
Facility: HOSPITAL | Age: 58
Discharge: HOME | End: 2018-11-05
Attending: EMERGENCY MEDICINE | Admitting: EMERGENCY MEDICINE
Payer: COMMERCIAL

## 2018-11-05 ENCOUNTER — APPOINTMENT (EMERGENCY)
Dept: CT IMAGING | Facility: HOSPITAL | Age: 58
End: 2018-11-05
Payer: COMMERCIAL

## 2018-11-05 VITALS
OXYGEN SATURATION: 100 % | DIASTOLIC BLOOD PRESSURE: 93 MMHG | BODY MASS INDEX: 39.58 KG/M2 | WEIGHT: 268 LBS | RESPIRATION RATE: 20 BRPM | SYSTOLIC BLOOD PRESSURE: 201 MMHG | HEART RATE: 70 BPM | TEMPERATURE: 97.4 F

## 2018-11-05 DIAGNOSIS — N20.1 LEFT URETERAL CALCULUS: Primary | ICD-10-CM

## 2018-11-05 LAB
ANION GAP SERPL CALCULATED.3IONS-SCNC: 9 MMOL/L (ref 4–13)
BACTERIA UR QL AUTO: ABNORMAL /HPF
BASOPHILS # BLD AUTO: 0.08 THOUSANDS/ΜL (ref 0–0.1)
BASOPHILS NFR BLD AUTO: 1 % (ref 0–1)
BILIRUB UR QL STRIP: NEGATIVE
BUN SERPL-MCNC: 19 MG/DL (ref 5–25)
CALCIUM SERPL-MCNC: 9.4 MG/DL (ref 8.3–10.1)
CHLORIDE SERPL-SCNC: 102 MMOL/L (ref 100–108)
CLARITY UR: CLEAR
CO2 SERPL-SCNC: 28 MMOL/L (ref 21–32)
COLOR UR: YELLOW
COLOR, POC: YELLOW
CREAT SERPL-MCNC: 1.41 MG/DL (ref 0.6–1.3)
EOSINOPHIL # BLD AUTO: 0.28 THOUSAND/ΜL (ref 0–0.61)
EOSINOPHIL NFR BLD AUTO: 2 % (ref 0–6)
ERYTHROCYTE [DISTWIDTH] IN BLOOD BY AUTOMATED COUNT: 13.1 % (ref 11.6–15.1)
GFR SERPL CREATININE-BSD FRML MDRD: 54 ML/MIN/1.73SQ M
GLUCOSE SERPL-MCNC: 151 MG/DL (ref 65–140)
GLUCOSE UR STRIP-MCNC: NEGATIVE MG/DL
HCT VFR BLD AUTO: 43.7 % (ref 36.5–49.3)
HGB BLD-MCNC: 14.6 G/DL (ref 12–17)
HGB UR QL STRIP.AUTO: ABNORMAL
IMM GRANULOCYTES # BLD AUTO: 0.07 THOUSAND/UL (ref 0–0.2)
IMM GRANULOCYTES NFR BLD AUTO: 1 % (ref 0–2)
KETONES UR STRIP-MCNC: NEGATIVE MG/DL
LEUKOCYTE ESTERASE UR QL STRIP: NEGATIVE
LYMPHOCYTES # BLD AUTO: 3.41 THOUSANDS/ΜL (ref 0.6–4.47)
LYMPHOCYTES NFR BLD AUTO: 24 % (ref 14–44)
MCH RBC QN AUTO: 30.3 PG (ref 26.8–34.3)
MCHC RBC AUTO-ENTMCNC: 33.4 G/DL (ref 31.4–37.4)
MCV RBC AUTO: 91 FL (ref 82–98)
MONOCYTES # BLD AUTO: 0.84 THOUSAND/ΜL (ref 0.17–1.22)
MONOCYTES NFR BLD AUTO: 6 % (ref 4–12)
NEUTROPHILS # BLD AUTO: 9.28 THOUSANDS/ΜL (ref 1.85–7.62)
NEUTS SEG NFR BLD AUTO: 66 % (ref 43–75)
NITRITE UR QL STRIP: NEGATIVE
NON-SQ EPI CELLS URNS QL MICRO: ABNORMAL /HPF
NRBC BLD AUTO-RTO: 0 /100 WBCS
PH UR STRIP.AUTO: 5 [PH] (ref 4.5–8)
PLATELET # BLD AUTO: 318 THOUSANDS/UL (ref 149–390)
PMV BLD AUTO: 8.9 FL (ref 8.9–12.7)
POTASSIUM SERPL-SCNC: 4 MMOL/L (ref 3.5–5.3)
PROT UR STRIP-MCNC: NEGATIVE MG/DL
RBC # BLD AUTO: 4.82 MILLION/UL (ref 3.88–5.62)
RBC #/AREA URNS AUTO: ABNORMAL /HPF
SODIUM SERPL-SCNC: 139 MMOL/L (ref 136–145)
SP GR UR STRIP.AUTO: >=1.03 (ref 1–1.03)
UROBILINOGEN UR QL STRIP.AUTO: 0.2 E.U./DL
WBC # BLD AUTO: 13.96 THOUSAND/UL (ref 4.31–10.16)
WBC #/AREA URNS AUTO: ABNORMAL /HPF

## 2018-11-05 PROCEDURE — 85025 COMPLETE CBC W/AUTO DIFF WBC: CPT | Performed by: EMERGENCY MEDICINE

## 2018-11-05 PROCEDURE — 80048 BASIC METABOLIC PNL TOTAL CA: CPT | Performed by: EMERGENCY MEDICINE

## 2018-11-05 PROCEDURE — 96374 THER/PROPH/DIAG INJ IV PUSH: CPT

## 2018-11-05 PROCEDURE — 81001 URINALYSIS AUTO W/SCOPE: CPT

## 2018-11-05 PROCEDURE — 36415 COLL VENOUS BLD VENIPUNCTURE: CPT | Performed by: EMERGENCY MEDICINE

## 2018-11-05 PROCEDURE — 99284 EMERGENCY DEPT VISIT MOD MDM: CPT

## 2018-11-05 PROCEDURE — 96375 TX/PRO/DX INJ NEW DRUG ADDON: CPT

## 2018-11-05 PROCEDURE — 74176 CT ABD & PELVIS W/O CONTRAST: CPT

## 2018-11-05 RX ORDER — MORPHINE SULFATE 4 MG/ML
4 INJECTION, SOLUTION INTRAMUSCULAR; INTRAVENOUS ONCE
Status: COMPLETED | OUTPATIENT
Start: 2018-11-05 | End: 2018-11-05

## 2018-11-05 RX ORDER — OXYCODONE HYDROCHLORIDE AND ACETAMINOPHEN 5; 325 MG/1; MG/1
1 TABLET ORAL ONCE
Status: COMPLETED | OUTPATIENT
Start: 2018-11-05 | End: 2018-11-05

## 2018-11-05 RX ORDER — ONDANSETRON 4 MG/1
4 TABLET, ORALLY DISINTEGRATING ORAL EVERY 6 HOURS PRN
Qty: 8 TABLET | Refills: 0 | Status: SHIPPED | OUTPATIENT
Start: 2018-11-05 | End: 2019-06-26 | Stop reason: ALTCHOICE

## 2018-11-05 RX ORDER — ONDANSETRON 2 MG/ML
4 INJECTION INTRAMUSCULAR; INTRAVENOUS ONCE
Status: COMPLETED | OUTPATIENT
Start: 2018-11-05 | End: 2018-11-05

## 2018-11-05 RX ORDER — OXYCODONE HYDROCHLORIDE AND ACETAMINOPHEN 5; 325 MG/1; MG/1
1 TABLET ORAL EVERY 4 HOURS PRN
Qty: 15 TABLET | Refills: 0 | Status: SHIPPED | OUTPATIENT
Start: 2018-11-05 | End: 2018-11-15

## 2018-11-05 RX ORDER — KETOROLAC TROMETHAMINE 30 MG/ML
15 INJECTION, SOLUTION INTRAMUSCULAR; INTRAVENOUS ONCE
Status: COMPLETED | OUTPATIENT
Start: 2018-11-05 | End: 2018-11-05

## 2018-11-05 RX ORDER — NAPROXEN 500 MG/1
500 TABLET ORAL 2 TIMES DAILY PRN
Qty: 14 TABLET | Refills: 0 | Status: SHIPPED | OUTPATIENT
Start: 2018-11-05 | End: 2021-02-12

## 2018-11-05 RX ADMIN — MORPHINE SULFATE 4 MG: 4 INJECTION INTRAVENOUS at 18:26

## 2018-11-05 RX ADMIN — KETOROLAC TROMETHAMINE 15 MG: 30 INJECTION, SOLUTION INTRAMUSCULAR at 17:20

## 2018-11-05 RX ADMIN — ONDANSETRON 4 MG: 2 INJECTION INTRAMUSCULAR; INTRAVENOUS at 17:17

## 2018-11-05 RX ADMIN — OXYCODONE HYDROCHLORIDE AND ACETAMINOPHEN 1 TABLET: 5; 325 TABLET ORAL at 18:25

## 2018-11-05 NOTE — ED PROVIDER NOTES
History  Chief Complaint   Patient presents with    Flank Pain     c/o left sided flank and abdominal pain x 1 hour  Hx of kidney stone on b/l sides  Also reports nausea  History provided by:  Patient  Abdominal Pain   Pain location:  LLQ and L flank  Pain quality: aching    Pain radiates to:  Groin  Pain severity:  Severe  Onset quality:  Sudden  Duration:  1 hour  Timing:  Constant  Progression:  Waxing and waning  Chronicity:  Recurrent (similar to previous renal colic, although worse than the last time April)  Associated symptoms: nausea    Associated symptoms: no chest pain, no chills, no cough, no diarrhea, no dysuria, no fever, no hematemesis, no hematochezia, no hematuria, no shortness of breath, no sore throat and no vomiting    Risk factors: has not had multiple surgeries (he has had lithotripsy, but he says most of the time the stone pass)        Prior to Admission Medications   Prescriptions Last Dose Informant Patient Reported? Taking?    ASPIRIN EC PO   Yes Yes   Sig: Take 81 mg by mouth   Omega 3 1000 MG CAPS   Yes Yes   Sig: Take 3 capsules by mouth   allopurinol (ZYLOPRIM) 100 mg tablet   Yes Yes   Sig: allopurinol 100 mg tablet   dexlansoprazole (DEXILANT) 60 MG capsule   Yes Yes   Sig: Dexilant 60 mg capsule, delayed release   lisinopril (ZESTRIL) 20 mg tablet   Yes Yes   Sig: Take 20 mg by mouth   rosuvastatin (CRESTOR) 20 MG tablet   Yes Yes   Sig: Take 20 mg by mouth      Facility-Administered Medications: None       Past Medical History:   Diagnosis Date    BPH without obstruction/lower urinary tract symptoms     Elevated PSA     GERD (gastroesophageal reflux disease)     Gout     Hyperlipidemia     Kidney calculi     Obese abdomen     Sleep apnea     Ureteral calculi        Past Surgical History:   Procedure Laterality Date    CYSTOSCOPY W/ LASER LITHOTRIPSY  2001    CYSTOSTOMY W/ STENT INSERTION  2011    ESOPHAGOGASTRODUODENOSCOPY      EXTRACORPOREAL SHOCK WAVE LITHOTRIPSY Right 2011    HERNIA REPAIR      TX CYSTO/URETERO W/LITHOTRIPSY &INDWELL STENT INSRT Right 4/29/2018    Procedure: CYSTOSCOPY URETEROSCOPY, RETROGRADE PYELOGRAM AND INSERTION STENT URETERAL;  Surgeon: Ant Oconnor MD;  Location: [facility] OR;  Service: Urology       History reviewed  No pertinent family history  I have reviewed and agree with the history as documented  Social History   Substance Use Topics    Smoking status: Never Smoker    Smokeless tobacco: Never Used    Alcohol use No        Review of Systems   Constitutional: Negative for appetite change, chills and fever  HENT: Negative for sore throat  Respiratory: Negative for cough, shortness of breath and wheezing  Cardiovascular: Negative for chest pain and palpitations  Gastrointestinal: Positive for abdominal pain and nausea  Negative for diarrhea, hematemesis, hematochezia and vomiting  Genitourinary: Negative for dysuria and hematuria  Musculoskeletal: Negative for neck pain  Skin: Negative for rash  Neurological: Negative for dizziness, weakness and headaches  Psychiatric/Behavioral: Negative for suicidal ideas  All other systems reviewed and are negative  Physical Exam  Physical Exam   Constitutional: He is oriented to person, place, and time  He appears well-developed and well-nourished  Non-toxic appearance  Appears uncomfortable, wants to stand,    HENT:   Head: Normocephalic  Right Ear: Tympanic membrane and external ear normal    Left Ear: External ear normal    Nose: Nose normal    Mouth/Throat: Oropharynx is clear and moist    Eyes: Pupils are equal, round, and reactive to light  Conjunctivae, EOM and lids are normal    Neck: Normal range of motion  Neck supple  No JVD present  No Brudzinski's sign and no Kernig's sign noted  Cardiovascular: Normal rate, regular rhythm and normal heart sounds  No murmur heard    Pulmonary/Chest: Effort normal and breath sounds normal  No accessory muscle usage  No tachypnea  No respiratory distress  He has no wheezes  Abdominal: Soft  Normal appearance and bowel sounds are normal  He exhibits no distension and no mass  There is no tenderness (obese, no localizing tenderness, he subjectively indicates L inguinal area and LLQ as location of constant pain)  There is no rigidity, no rebound and no guarding  Musculoskeletal: Normal range of motion  Lymphadenopathy:        Head (right side): No submental, no submandibular, no preauricular and no posterior auricular adenopathy present  Head (left side): No submental, no submandibular, no preauricular and no posterior auricular adenopathy present  He has no cervical adenopathy  Neurological: He is alert and oriented to person, place, and time  He has normal strength and normal reflexes  No cranial nerve deficit or sensory deficit  Coordination normal  GCS eye subscore is 4  GCS verbal subscore is 5  GCS motor subscore is 6  Skin: Skin is warm and dry  No rash noted  He is not diaphoretic  Psychiatric: He has a normal mood and affect  His speech is normal and behavior is normal  Thought content normal  Cognition and memory are normal    Nursing note and vitals reviewed        Vital Signs  ED Triage Vitals [11/05/18 1626]   Temperature Pulse Respirations Blood Pressure SpO2   (!) 97 4 °F (36 3 °C) 70 (!) 24 (!) 180/80 98 %      Temp Source Heart Rate Source Patient Position - Orthostatic VS BP Location FiO2 (%)   Temporal -- -- -- --      Pain Score       9           Vitals:    11/05/18 1626 11/05/18 1813   BP: (!) 180/80 (!) 201/93   Pulse: 70 70       Visual Acuity      ED Medications  Medications   ketorolac (TORADOL) injection 15 mg (15 mg Intravenous Given 11/5/18 1720)   ondansetron (ZOFRAN) injection 4 mg (4 mg Intravenous Given 11/5/18 1717)   morphine (PF) 4 mg/mL injection 4 mg (4 mg Intravenous Given 11/5/18 1826)   oxyCODONE-acetaminophen (PERCOCET) 5-325 mg per tablet 1 tablet (1 tablet Oral Given 11/5/18 1825)       Diagnostic Studies  Results Reviewed     Procedure Component Value Units Date/Time    Urine Microscopic [[id number]]  (Abnormal) Collected:  11/05/18 1849    Lab Status:  Final result Specimen:  Urine from Urine, Clean Catch Updated:  11/05/18 1818     RBC, UA 10-20 (A) /hpf      WBC, UA 1-2 (A) /hpf      Epithelial Cells Occasional /hpf      Bacteria, UA Occasional /hpf     POCT urinalysis dipstick [[id number]]  (Normal) Resulted:  11/05/18 1737    Lab Status:  Final result Specimen:  Urine Updated:  11/05/18 1737     Color, UA yellow    ED Urine Macroscopic [[id number]]  (Abnormal) Collected:  11/05/18 1849    Lab Status:  Final result Specimen:  Urine Updated:  11/05/18 1736     Color, UA Yellow     Clarity, UA Clear     pH, UA 5 0     Leukocytes, UA Negative     Nitrite, UA Negative     Protein, UA Negative mg/dl      Glucose, UA Negative mg/dl      Ketones, UA Negative mg/dl      Urobilinogen, UA 0 2 E U /dl      Bilirubin, UA Negative     Blood, UA Large (A)     Specific Gravity, UA >=1 030    Narrative:       CLINITEK RESULT    Basic metabolic panel [[id number]]  (Abnormal) Collected:  11/05/18 1713    Lab Status:  Final result Specimen:  Blood from Arm, Right Updated:  11/05/18 1728     Sodium 139 mmol/L      Potassium 4 0 mmol/L      Chloride 102 mmol/L      CO2 28 mmol/L      ANION GAP 9 mmol/L      BUN 19 mg/dL      Creatinine 1 41 (H) mg/dL      Glucose 151 (H) mg/dL      Calcium 9 4 mg/dL      eGFR 54 ml/min/1 73sq m     Narrative:         National Kidney Disease Education Program recommendations are as follows:  GFR calculation is accurate only with a steady state creatinine  Chronic Kidney disease less than 60 ml/min/1 73 sq  meters  Kidney failure less than 15 ml/min/1 73 sq  meters      CBC and differential [[id number]]  (Abnormal) Collected:  11/05/18 1713    Lab Status:  Final result Specimen:  Blood from Arm, Right Updated:  11/05/18 1721     WBC 13 96 (H) Thousand/uL RBC 4 82 Million/uL      Hemoglobin 14 6 g/dL      Hematocrit 43 7 %      MCV 91 fL      MCH 30 3 pg      MCHC 33 4 g/dL      RDW 13 1 %      MPV 8 9 fL      Platelets 668 Thousands/uL      nRBC 0 /100 WBCs      Neutrophils Relative 66 %      Immat GRANS % 1 %      Lymphocytes Relative 24 %      Monocytes Relative 6 %      Eosinophils Relative 2 %      Basophils Relative 1 %      Neutrophils Absolute 9 28 (H) Thousands/µL      Immature Grans Absolute 0 07 Thousand/uL      Lymphocytes Absolute 3 41 Thousands/µL      Monocytes Absolute 0 84 Thousand/µL      Eosinophils Absolute 0 28 Thousand/µL      Basophils Absolute 0 08 Thousands/µL                  CT renal stone study abdomen pelvis without contrast   Final Result by Arjun Gonzalez MD (11/05 1741)      2 mm left UVJ calculus causes mild upstream hydroureteronephrosis  The study was marked in [facility] for immediate notification  Workstation performed: [id number]                    Procedures  Procedures       Phone Contacts  ED Phone Contact    ED Course  ED Course as of Nov 05 1909   [facility] Nov 05, 2018   9755 Reviewed results with patient at bedside and updated on the plan  Pain is controlled at this time  OK to followup  MDM  CritCare Time    Disposition  Final diagnoses:   Left ureteral calculus     Time reflects when diagnosis was documented in both MDM as applicable and the Disposition within this note     Time User Action Codes Description Comment    11/5/2018  7:09 PM Rocco TARIQ Add [N20 1] Left ureteral calculus       ED Disposition     ED Disposition Condition Comment    Discharge  [address] [facility] discharge to home/self care      Condition at discharge: Good        Follow-up Information     Follow up With Specialties Details Why Mamie Callejas MD Urology Call For followup, Next available [address]  [place] [address]  [phone] Patient's Medications   Discharge Prescriptions    NAPROXEN (NAPROSYN) 500 MG TABLET    Take 1 tablet (500 mg total) by mouth 2 (two) times a day as needed for mild pain or moderate pain for up to 14 doses       Start Date: 11/5/2018 End Date: --       Order Dose: 500 mg       Quantity: 14 tablet    Refills: 0    ONDANSETRON (ZOFRAN-ODT) 4 MG DISINTEGRATING TABLET    Take 1 tablet (4 mg total) by mouth every 6 (six) hours as needed for nausea or vomiting       Start Date: 11/5/2018 End Date: --       Order Dose: 4 mg       Quantity: 8 tablet    Refills: 0    OXYCODONE-ACETAMINOPHEN (PERCOCET) 5-325 MG PER TABLET    Take 1 tablet by mouth every 4 (four) hours as needed for severe pain for up to 10 days Max Daily Amount: 6 tablets       Start Date: 11/5/2018 End Date: 11/15/2018       Order Dose: 1 tablet       Quantity: 15 tablet    Refills: 0     No discharge procedures on file      ED Provider  Electronically Signed by           Sharlene Mao MD  11/05/18 4603

## 2018-11-05 NOTE — TELEPHONE ENCOUNTER
Spoke with Tammy Gonsalez from the Call Center; She states that the patient is calling with severe stone pains  While on the phone with her, she can hear the patient vomiting  I've told her that she must instruct the patient to go to the ER immediately  She states that she's already hung up on him, but that she'll call back with these instructions

## 2018-11-05 NOTE — ED NOTES
Patient requesting additional pain medication as "my pain is back" per patient  Dr Eddie Medina notified       Kati Rodrigues, RN  11/05/18 Belinda Quintero , RN  11/05/18 [id number]

## 2018-11-05 NOTE — ED NOTES
Patient actively vomiting in triage, appears in distress at this time        Noa Gale RN  11/05/18 [id number]

## 2019-06-19 DIAGNOSIS — R97.20 ELEVATED PSA: Primary | ICD-10-CM

## 2019-06-20 LAB
PSA FREE MFR SERPL: 27 % (CALC)
PSA FREE SERPL-MCNC: 2.1 NG/ML
PSA SERPL-MCNC: 7.7 NG/ML

## 2019-06-24 RX ORDER — SIMVASTATIN 40 MG
TABLET ORAL
COMMUNITY
End: 2019-06-26

## 2019-06-26 ENCOUNTER — OFFICE VISIT (OUTPATIENT)
Dept: UROLOGY | Facility: MEDICAL CENTER | Age: 59
End: 2019-06-26
Payer: COMMERCIAL

## 2019-06-26 VITALS
HEIGHT: 69 IN | DIASTOLIC BLOOD PRESSURE: 70 MMHG | HEART RATE: 68 BPM | WEIGHT: 266 LBS | BODY MASS INDEX: 39.4 KG/M2 | SYSTOLIC BLOOD PRESSURE: 150 MMHG

## 2019-06-26 DIAGNOSIS — N20.0 CALCULUS OF KIDNEY: ICD-10-CM

## 2019-06-26 DIAGNOSIS — N40.1 BENIGN PROSTATIC HYPERPLASIA WITH NOCTURIA: Primary | ICD-10-CM

## 2019-06-26 DIAGNOSIS — R35.1 BENIGN PROSTATIC HYPERPLASIA WITH NOCTURIA: Primary | ICD-10-CM

## 2019-06-26 DIAGNOSIS — R97.20 ELEVATED PSA: ICD-10-CM

## 2019-06-26 LAB
SL AMB  POCT GLUCOSE, UA: ABNORMAL
SL AMB LEUKOCYTE ESTERASE,UA: ABNORMAL
SL AMB POCT BILIRUBIN,UA: ABNORMAL
SL AMB POCT BLOOD,UA: ABNORMAL
SL AMB POCT CLARITY,UA: CLEAR
SL AMB POCT COLOR,UA: YELLOW
SL AMB POCT KETONES,UA: ABNORMAL
SL AMB POCT NITRITE,UA: ABNORMAL
SL AMB POCT PH,UA: 5.5
SL AMB POCT SPECIFIC GRAVITY,UA: 1.02
SL AMB POCT URINE PROTEIN: ABNORMAL
SL AMB POCT UROBILINOGEN: 0.2

## 2019-06-26 PROCEDURE — 99214 OFFICE O/P EST MOD 30 MIN: CPT | Performed by: UROLOGY

## 2019-06-26 PROCEDURE — 81003 URINALYSIS AUTO W/O SCOPE: CPT | Performed by: UROLOGY

## 2019-06-26 RX ORDER — METHYLPREDNISOLONE 4 MG/1
TABLET ORAL
COMMUNITY
Start: 2019-06-01 | End: 2019-06-26 | Stop reason: ALTCHOICE

## 2019-06-26 RX ORDER — LISINOPRIL 20 MG/1
20 TABLET ORAL DAILY
COMMUNITY
Start: 2019-06-13 | End: 2021-04-20 | Stop reason: HOSPADM

## 2019-06-26 RX ORDER — DOXYCYCLINE 100 MG/1
100 CAPSULE ORAL 2 TIMES DAILY
Qty: 28 CAPSULE | Refills: 0 | Status: SHIPPED | OUTPATIENT
Start: 2019-06-26 | End: 2019-07-10

## 2019-07-29 LAB
PSA FREE MFR SERPL: 18 % (CALC)
PSA FREE SERPL-MCNC: 1 NG/ML
PSA SERPL-MCNC: 5.5 NG/ML

## 2019-07-31 ENCOUNTER — TELEPHONE (OUTPATIENT)
Dept: UROLOGY | Facility: AMBULATORY SURGERY CENTER | Age: 59
End: 2019-07-31

## 2019-07-31 NOTE — TELEPHONE ENCOUNTER
Notes recorded by Jonah Tinoco MD on 7/30/2019 at 2:28 PM EDT  Please call the patient regarding his abnormal result   Repeat PSA is down to 5 5   This is still too high given the patient's long history of much lower PSA is   His free PSA fraction indicates ordered chance of prostate cancer of approximately 20%   Please ask the patient to make an appointment with me to discuss this in more detail   Thank you  Pt notified  Appt for 8/22 to discuss level

## 2019-08-22 ENCOUNTER — OFFICE VISIT (OUTPATIENT)
Dept: UROLOGY | Facility: MEDICAL CENTER | Age: 59
End: 2019-08-22
Payer: COMMERCIAL

## 2019-08-22 VITALS
HEART RATE: 81 BPM | BODY MASS INDEX: 39.99 KG/M2 | SYSTOLIC BLOOD PRESSURE: 114 MMHG | HEIGHT: 69 IN | DIASTOLIC BLOOD PRESSURE: 78 MMHG | WEIGHT: 270 LBS

## 2019-08-22 DIAGNOSIS — R97.20 ELEVATED PSA: Primary | ICD-10-CM

## 2019-08-22 LAB
SL AMB  POCT GLUCOSE, UA: NEGATIVE
SL AMB LEUKOCYTE ESTERASE,UA: NEGATIVE
SL AMB POCT BILIRUBIN,UA: NEGATIVE
SL AMB POCT BLOOD,UA: ABNORMAL
SL AMB POCT CLARITY,UA: CLEAR
SL AMB POCT COLOR,UA: YELLOW
SL AMB POCT KETONES,UA: NEGATIVE
SL AMB POCT NITRITE,UA: NEGATIVE
SL AMB POCT PH,UA: 5.5
SL AMB POCT SPECIFIC GRAVITY,UA: 1.02
SL AMB POCT URINE PROTEIN: NEGATIVE
SL AMB POCT UROBILINOGEN: 0.2

## 2019-08-22 PROCEDURE — 81003 URINALYSIS AUTO W/O SCOPE: CPT | Performed by: UROLOGY

## 2019-08-22 PROCEDURE — 99214 OFFICE O/P EST MOD 30 MIN: CPT | Performed by: UROLOGY

## 2019-08-22 RX ORDER — LORAZEPAM 1 MG/1
1 TABLET ORAL ONCE
Qty: 1 TABLET | Refills: 0 | Status: SHIPPED | OUTPATIENT
Start: 2019-08-22 | End: 2021-02-12

## 2019-08-22 RX ORDER — CIPROFLOXACIN 500 MG/1
500 TABLET, FILM COATED ORAL EVERY 12 HOURS SCHEDULED
Qty: 2 TABLET | Refills: 0 | Status: SHIPPED | OUTPATIENT
Start: 2019-08-22 | End: 2019-08-23

## 2019-08-22 NOTE — PROGRESS NOTES
Assessment/Plan:    Elevated PSA  Prostate ultrasound and biopsy scheduled  Diagnoses and all orders for this visit:    Elevated PSA  -     POCT urine dip auto non-scope  -     ciprofloxacin (CIPRO) 500 mg tablet; Take 1 tablet (500 mg total) by mouth every 12 (twelve) hours for 1 day Begin the day before biopsy   -     LORazepam (ATIVAN) 1 mg tablet; Take 1 tablet (1 mg total) by mouth once for 1 dose          Subjective:      Patient ID: Beckie Ferro is a 60 y o  male  HPI  Elevated PSA:  Repeat PSA following a course of antibiotics was 5 5 which is still too high given the patient's PSA history  Free PSA indicates chance of prostate cancer of approximately 20%  The patient returns today to discuss a prostate ultrasound and biopsy  The following portions of the patient's history were reviewed and updated as appropriate: allergies, current medications, past family history, past medical history, past social history, past surgical history and problem list     Review of Systems   Constitutional: Negative for activity change and fatigue  Respiratory: Negative for shortness of breath and wheezing  Cardiovascular: Negative for chest pain  Hypertension  Gastrointestinal: Negative for abdominal pain  Genitourinary: Negative for difficulty urinating, dysuria, frequency, hematuria and urgency  Musculoskeletal: Negative for back pain and gait problem  Skin: Negative  Allergic/Immunologic: Negative  Neurological: Negative  Psychiatric/Behavioral: Negative  Objective:      /78 (BP Location: Left arm, Patient Position: Sitting, Cuff Size: Standard)   Pulse 81   Ht 5' 9" (1 753 m)   Wt 122 kg (270 lb)   BMI 39 87 kg/m²          Physical Exam   Constitutional: He is oriented to person, place, and time  He appears well-developed and well-nourished  HENT:   Head: Normocephalic and atraumatic  Eyes: EOM are normal    Neck: Normal range of motion  Neck supple  Cardiovascular: Normal rate, regular rhythm and normal heart sounds  Pulmonary/Chest: Effort normal and breath sounds normal    Abdominal: Soft  There is no tenderness  Genitourinary:   Genitourinary Comments: The prostate is approximately 30 g, firm, smooth, non-tender  Musculoskeletal: Normal range of motion  Neurological: He is alert and oriented to person, place, and time  Skin: Skin is warm and dry  Psychiatric: He has a normal mood and affect   His behavior is normal  Judgment and thought content normal

## 2019-09-24 ENCOUNTER — PROCEDURE VISIT (OUTPATIENT)
Dept: UROLOGY | Facility: MEDICAL CENTER | Age: 59
End: 2019-09-24
Payer: COMMERCIAL

## 2019-09-24 VITALS
BODY MASS INDEX: 39.99 KG/M2 | SYSTOLIC BLOOD PRESSURE: 152 MMHG | RESPIRATION RATE: 71 BRPM | WEIGHT: 270 LBS | HEIGHT: 69 IN | DIASTOLIC BLOOD PRESSURE: 86 MMHG

## 2019-09-24 DIAGNOSIS — R97.20 ELEVATED PSA: Primary | ICD-10-CM

## 2019-09-24 PROCEDURE — 88342 IMHCHEM/IMCYTCHM 1ST ANTB: CPT | Performed by: PATHOLOGY

## 2019-09-24 PROCEDURE — G0416 PROSTATE BIOPSY, ANY MTHD: HCPCS | Performed by: PATHOLOGY

## 2019-09-24 PROCEDURE — 76942 ECHO GUIDE FOR BIOPSY: CPT | Performed by: UROLOGY

## 2019-09-24 PROCEDURE — 88344 IMHCHEM/IMCYTCHM EA MLT ANTB: CPT | Performed by: PATHOLOGY

## 2019-09-24 PROCEDURE — 55700 PR BIOPSY OF PROSTATE,NEEDLE/PUNCH: CPT | Performed by: UROLOGY

## 2019-09-24 RX ORDER — PREDNISONE 1 MG/1
TABLET ORAL
COMMUNITY
Start: 2019-08-29 | End: 2021-02-12

## 2019-09-24 NOTE — PROGRESS NOTES
Preoperative diagnosis:  Elevated PSA     Postoperative diagnosis: Same    Operation:  Transrectal ultrasound of the prostate with biopsy    Surgeon: Dorian Gosselin, MD, [facility]    Anesthesia:  Local, xylocaine 1% without epinephrine    Procedure: The patient was placed on the examining table with his left side down  Xylocaine jelly, 10 cc,  was instilled into the rectum for mucosal analgesia and anesthesia  The ultrasound probe was inserted  Five mL of 1% xylocaine without epinephrine was infiltrated into each neurovascular bundle in the groove between the base of the prostate and the seminal vesicles  A total of 10 mL was injected  Ultrasound images were obtained scanning from the base to the apex and from the left side to the right side  The prostate volume was 45 g  No hypoechoic lesions were identified  Twelve biopsies were taken per protocol  The procedure was completed  The patient tolerated well  He was discharged home in satisfactory condition  Blood loss was insignificant  Following the procedure, the patient and his wife were advised to complete his antibiotics  He was alerted to the possibility of hematuria, blood per rectum and blood his semen

## 2019-09-24 NOTE — LETTER
September 24, 2019     Katelynn Leahy DO  [address]    Patient: Zahida Connor   YOB: 1960   Date of Visit: 9/24/2019       Dear Dr Darlyn Rodriguez: Thank you for referring Maria Eugenia Ponce to me for evaluation  Below are my notes for this consultation  If you have questions, please do not hesitate to call me  I look forward to following your patient along with you  Sincerely,        Phineas Boast, MD        CC: No Recipients  Phineas Boast, MD  9/24/2019  9:19 AM  Sign at close encounter      Preoperative diagnosis:  Elevated PSA     Postoperative diagnosis: Same    Operation:  Transrectal ultrasound of the prostate with biopsy    Surgeon: Candance Farr, MD, [facility]    Anesthesia:  Local, xylocaine 1% without epinephrine    Procedure: The patient was placed on the examining table with his left side down  Xylocaine jelly, 10 cc,  was instilled into the rectum for mucosal analgesia and anesthesia  The ultrasound probe was inserted  Five mL of 1% xylocaine without epinephrine was infiltrated into each neurovascular bundle in the groove between the base of the prostate and the seminal vesicles  A total of 10 mL was injected  Ultrasound images were obtained scanning from the base to the apex and from the left side to the right side  The prostate volume was 45 g  No hypoechoic lesions were identified  Twelve biopsies were taken per protocol  The procedure was completed  The patient tolerated well  He was discharged home in satisfactory condition  Blood loss was insignificant  Following the procedure, the patient and his wife were advised to complete his antibiotics  He was alerted to the possibility of hematuria, blood per rectum and blood his semen

## 2019-10-07 ENCOUNTER — OFFICE VISIT (OUTPATIENT)
Dept: UROLOGY | Facility: MEDICAL CENTER | Age: 59
End: 2019-10-07
Payer: COMMERCIAL

## 2019-10-07 VITALS
HEIGHT: 69 IN | HEART RATE: 57 BPM | WEIGHT: 271 LBS | DIASTOLIC BLOOD PRESSURE: 84 MMHG | SYSTOLIC BLOOD PRESSURE: 132 MMHG | BODY MASS INDEX: 40.14 KG/M2

## 2019-10-07 DIAGNOSIS — C61 PROSTATE CANCER (HCC): Primary | ICD-10-CM

## 2019-10-07 PROCEDURE — 99213 OFFICE O/P EST LOW 20 MIN: CPT | Performed by: UROLOGY

## 2019-10-07 PROCEDURE — 81003 URINALYSIS AUTO W/O SCOPE: CPT | Performed by: UROLOGY

## 2019-10-07 RX ORDER — ACETAMINOPHEN 160 MG
1 TABLET,DISINTEGRATING ORAL DAILY
COMMUNITY
Start: 2019-10-02 | End: 2022-07-24

## 2019-10-07 NOTE — PROGRESS NOTES
Assessment/Plan:    No problem-specific Assessment & Plan notes found for this encounter  Diagnoses and all orders for this visit:    Prostate cancer ([facility] [address] )  -     PSA, Total Screen; Future    Other orders  -     Cholecalciferol (VITAMIN D3) 2000 units capsule; Take 1 capsule by mouth daily          Subjective:      Patient ID: Daymon Dubin is a 60 y o  male  HPI  Prostate cancer: The patient's biopsy showed a single core with a 4 mm stretch of prostate cancer  Per NCCN guidelines version 2 2019, the patient falls into the very low risk group  Per guidelines on page prostate 4, he fits comfortably into the category for active surveillance  We discussed active surveillance in detail  The patient could also qualify for radiation therapy if his life expectancy recur greater than 20 years  We discussed this in some detail  In general, the radiation therapy is more likely in my opinion to make him worse rather than better  The patient agree to a course of active surveillance  He will be reassessed in 6 months I will consider an MRI in 1 year  New PSA baseline is 7 7 in June 2019  The following portions of the patient's history were reviewed and updated as appropriate: allergies, current medications, past family history, past medical history, past social history, past surgical history and problem list     Review of Systems   Constitutional: Negative for activity change and fatigue  Respiratory: Negative for shortness of breath and wheezing  Cardiovascular: Negative for chest pain  Hypertension  Gastrointestinal: Negative for abdominal pain  Genitourinary: Negative for difficulty urinating, dysuria, frequency, hematuria and urgency  Musculoskeletal: Negative for back pain and gait problem  Skin: Negative  Allergic/Immunologic: Negative  Neurological: Negative  Psychiatric/Behavioral: Negative            Objective:      /84 (BP Location: Left arm, Patient Position: Sitting, Cuff Size: Standard)   Pulse 57   Ht 5' 9" (1 753 m)   Wt 123 kg (271 lb)   BMI 40 02 kg/m²          Physical Exam   Constitutional: He is oriented to person, place, and time  He appears well-developed and well-nourished  HENT:   Head: Normocephalic and atraumatic  Eyes: EOM are normal    Neck: Normal range of motion  Pulmonary/Chest: Effort normal    Musculoskeletal: Normal range of motion  Neurological: He is alert and oriented to person, place, and time  Skin: Skin is warm and dry  Psychiatric: He has a normal mood and affect  His behavior is normal  Judgment and thought content normal          I have spent 25 minutes with Patient  today in which greater than 50% of this time was spent in counseling/coordination of care regarding Diagnostic results, Prognosis and Risks and benefits of tx options

## 2019-10-20 PROBLEM — C61 PROSTATE CANCER (HCC): Status: ACTIVE | Noted: 2019-10-20

## 2019-10-20 NOTE — ASSESSMENT & PLAN NOTE
The patient will go on active surveillance for his prostate cancer  He will have PSAs and office visits semiannually  Should his PSA began to rise, we will consider either repeat biopsy or multiparametric MRI

## 2020-03-26 ENCOUNTER — TELEPHONE (OUTPATIENT)
Dept: UROLOGY | Facility: AMBULATORY SURGERY CENTER | Age: 60
End: 2020-03-26

## 2020-03-26 DIAGNOSIS — C61 PROSTATE CANCER (HCC): Primary | ICD-10-CM

## 2020-03-26 LAB — PSA SERPL-MCNC: 4.9 NG/ML

## 2020-03-26 NOTE — TELEPHONE ENCOUNTER
Left a voicemail for patient to call us back to get his results and recommendations from Dr Susanna Ruelas   Office phone number given

## 2020-03-26 NOTE — TELEPHONE ENCOUNTER
----- Message from Smitha Vasquez MD sent at 3/26/2020  8:33 AM EDT -----  The patient's PSA is down a little bit to 4 9 from 5 58 months ago  These minor fluctuations are not unusual, and the downward trend is reassuring that there is nothing bag going on  At this point, I would like to check a PSA on him in October and see him in the office then  Please inform the patient  Thank you

## 2020-04-13 ENCOUNTER — TELEMEDICINE (OUTPATIENT)
Dept: UROLOGY | Facility: MEDICAL CENTER | Age: 60
End: 2020-04-13
Payer: COMMERCIAL

## 2020-04-13 VITALS — WEIGHT: 270 LBS | BODY MASS INDEX: 39.99 KG/M2 | HEIGHT: 69 IN

## 2020-04-13 DIAGNOSIS — C61 PROSTATE CANCER (HCC): Primary | ICD-10-CM

## 2020-04-13 PROCEDURE — 99442 PR PHYS/QHP TELEPHONE EVALUATION 11-20 MIN: CPT | Performed by: UROLOGY

## 2020-10-05 ENCOUNTER — TELEPHONE (OUTPATIENT)
Dept: UROLOGY | Facility: MEDICAL CENTER | Age: 60
End: 2020-10-05

## 2020-10-19 RX ORDER — CYCLOBENZAPRINE HCL 10 MG
TABLET ORAL
COMMUNITY
End: 2020-10-21 | Stop reason: ALTCHOICE

## 2020-10-21 ENCOUNTER — OFFICE VISIT (OUTPATIENT)
Dept: UROLOGY | Facility: MEDICAL CENTER | Age: 60
End: 2020-10-21
Payer: COMMERCIAL

## 2020-10-21 VITALS
BODY MASS INDEX: 39.99 KG/M2 | TEMPERATURE: 97.8 F | DIASTOLIC BLOOD PRESSURE: 70 MMHG | SYSTOLIC BLOOD PRESSURE: 130 MMHG | HEART RATE: 73 BPM | WEIGHT: 270 LBS | HEIGHT: 69 IN

## 2020-10-21 DIAGNOSIS — N40.1 BENIGN PROSTATIC HYPERPLASIA WITH NOCTURIA: ICD-10-CM

## 2020-10-21 DIAGNOSIS — R97.20 ELEVATED PSA: ICD-10-CM

## 2020-10-21 DIAGNOSIS — N43.3 HYDROCELE IN ADULT: ICD-10-CM

## 2020-10-21 DIAGNOSIS — C61 PROSTATE CANCER (HCC): Primary | ICD-10-CM

## 2020-10-21 DIAGNOSIS — R35.1 BENIGN PROSTATIC HYPERPLASIA WITH NOCTURIA: ICD-10-CM

## 2020-10-21 PROBLEM — N20.1 RIGHT URETERAL CALCULUS: Status: RESOLVED | Noted: 2018-04-28 | Resolved: 2020-10-21

## 2020-10-21 LAB
SL AMB  POCT GLUCOSE, UA: ABNORMAL
SL AMB LEUKOCYTE ESTERASE,UA: ABNORMAL
SL AMB POCT BILIRUBIN,UA: ABNORMAL
SL AMB POCT BLOOD,UA: ABNORMAL
SL AMB POCT CLARITY,UA: CLEAR
SL AMB POCT COLOR,UA: YELLOW
SL AMB POCT KETONES,UA: ABNORMAL
SL AMB POCT NITRITE,UA: ABNORMAL
SL AMB POCT PH,UA: 5
SL AMB POCT SPECIFIC GRAVITY,UA: 1.02
SL AMB POCT URINE PROTEIN: ABNORMAL
SL AMB POCT UROBILINOGEN: 0.2

## 2020-10-21 PROCEDURE — 81003 URINALYSIS AUTO W/O SCOPE: CPT | Performed by: UROLOGY

## 2020-10-21 PROCEDURE — 99214 OFFICE O/P EST MOD 30 MIN: CPT | Performed by: UROLOGY

## 2020-10-24 ENCOUNTER — HOSPITAL ENCOUNTER (OUTPATIENT)
Dept: ULTRASOUND IMAGING | Facility: HOSPITAL | Age: 60
Discharge: HOME | End: 2020-10-24
Attending: UROLOGY
Payer: COMMERCIAL

## 2020-10-24 DIAGNOSIS — N43.3 HYDROCELE IN ADULT: ICD-10-CM

## 2020-10-24 PROCEDURE — 76870 US EXAM SCROTUM: CPT

## 2020-11-09 ENCOUNTER — TELEPHONE (OUTPATIENT)
Dept: UROLOGY | Facility: MEDICAL CENTER | Age: 60
End: 2020-11-09

## 2020-11-09 DIAGNOSIS — R93.89 ABNORMAL ULTRASOUND: Primary | ICD-10-CM

## 2020-11-09 RX ORDER — DOXYCYCLINE 100 MG/1
100 TABLET ORAL 2 TIMES DAILY
Qty: 28 TABLET | Refills: 1 | Status: SHIPPED | OUTPATIENT
Start: 2020-11-09 | End: 2020-11-30

## 2020-11-27 ENCOUNTER — OFFICE VISIT (OUTPATIENT)
Dept: UROLOGY | Facility: MEDICAL CENTER | Age: 60
End: 2020-11-27
Payer: COMMERCIAL

## 2020-11-27 VITALS
WEIGHT: 275 LBS | DIASTOLIC BLOOD PRESSURE: 80 MMHG | BODY MASS INDEX: 40.73 KG/M2 | SYSTOLIC BLOOD PRESSURE: 140 MMHG | HEART RATE: 75 BPM | HEIGHT: 69 IN

## 2020-11-27 DIAGNOSIS — N43.3 HYDROCELE IN ADULT: Primary | ICD-10-CM

## 2020-11-27 DIAGNOSIS — N50.89 MASS OF LEFT TESTICLE: ICD-10-CM

## 2020-11-27 PROCEDURE — 99213 OFFICE O/P EST LOW 20 MIN: CPT | Performed by: UROLOGY

## 2020-11-30 DIAGNOSIS — R93.89 ABNORMAL ULTRASOUND: ICD-10-CM

## 2020-11-30 RX ORDER — DOXYCYCLINE 100 MG/1
100 TABLET ORAL 2 TIMES DAILY
Qty: 28 TABLET | Refills: 0 | Status: SHIPPED | OUTPATIENT
Start: 2020-11-30 | End: 2020-12-14

## 2021-01-20 ENCOUNTER — HOSPITAL ENCOUNTER (OUTPATIENT)
Dept: ULTRASOUND IMAGING | Facility: HOSPITAL | Age: 61
Discharge: HOME | End: 2021-01-20
Attending: UROLOGY
Payer: COMMERCIAL

## 2021-01-20 DIAGNOSIS — N43.3 HYDROCELE IN ADULT: ICD-10-CM

## 2021-01-20 DIAGNOSIS — N50.89 MASS OF LEFT TESTICLE: ICD-10-CM

## 2021-01-20 PROCEDURE — 76870 US EXAM SCROTUM: CPT

## 2021-01-27 ENCOUNTER — OFFICE VISIT (OUTPATIENT)
Dept: UROLOGY | Facility: MEDICAL CENTER | Age: 61
End: 2021-01-27
Payer: COMMERCIAL

## 2021-01-27 ENCOUNTER — TELEPHONE (OUTPATIENT)
Dept: UROLOGY | Facility: MEDICAL CENTER | Age: 61
End: 2021-01-27

## 2021-01-27 VITALS
SYSTOLIC BLOOD PRESSURE: 150 MMHG | DIASTOLIC BLOOD PRESSURE: 80 MMHG | HEART RATE: 78 BPM | HEIGHT: 69 IN | BODY MASS INDEX: 39.99 KG/M2 | WEIGHT: 270 LBS

## 2021-01-27 DIAGNOSIS — C61 PROSTATE CANCER (HCC): Primary | ICD-10-CM

## 2021-01-27 DIAGNOSIS — N50.89 MASS OF LEFT TESTIS: ICD-10-CM

## 2021-01-27 LAB
SL AMB  POCT GLUCOSE, UA: NORMAL
SL AMB LEUKOCYTE ESTERASE,UA: NORMAL
SL AMB POCT BILIRUBIN,UA: NORMAL
SL AMB POCT BLOOD,UA: NORMAL
SL AMB POCT CLARITY,UA: CLEAR
SL AMB POCT COLOR,UA: YELLOW
SL AMB POCT KETONES,UA: NORMAL
SL AMB POCT NITRITE,UA: NORMAL
SL AMB POCT PH,UA: 6
SL AMB POCT SPECIFIC GRAVITY,UA: 1.02
SL AMB POCT URINE PROTEIN: NORMAL
SL AMB POCT UROBILINOGEN: 0.2

## 2021-01-27 PROCEDURE — 99215 OFFICE O/P EST HI 40 MIN: CPT | Performed by: UROLOGY

## 2021-01-27 PROCEDURE — 81003 URINALYSIS AUTO W/O SCOPE: CPT | Performed by: UROLOGY

## 2021-01-27 RX ORDER — CEFAZOLIN SODIUM 2 G/50ML
2000 SOLUTION INTRAVENOUS ONCE
Status: CANCELLED | OUTPATIENT
Start: 2021-02-19 | End: 2021-01-27

## 2021-01-27 NOTE — PROGRESS NOTES
Assessment/Plan:    Mass of left testis  Left inguinal orchiectomy scheduled  Consent signed  Prostate cancer [facility])  This was not the focus of today's visit  The patient has done well on active surveillance  Diagnoses and all orders for this visit:    Prostate cancer ([facility] [address] )  -     POCT urine dip auto non-scope    Mass of left testis  -     Case request operating room: ORCHIECTOMY INGUINAL, possible biopsy; Standing  -     Case request operating room: ORCHIECTOMY INGUINAL, possible biopsy    Other orders  -     Diet NPO; Sips with meds; Standing  -     Place sequential compression device; Standing  -     Electrocardiogram, 12-lead; Standing  -     ceFAZolin (ANCEF) 2,000 mg in dextrose 5 % 100 mL IVPB  -     hCG, quantitative; Standing  -     AFP tumor marker; Standing  -     Comprehensive metabolic panel; Standing  -     CBC and Platelet; Standing          Subjective:      Patient ID: Rosalinda De is a 63 y o  male  HPI  Testis mass:  The patient presented with a hydrocele in October 2020  Scrotal ultrasound revealed a mildly lobulated and heterogeneous testis with increased vascularity  A repeat ultrasound performed on January 20, 2021 showed continued enlargement of the testis raising the question of neoplasm  The patient returns today for further evaluation  Testis is markedly enlarged  It is now interfering with ADL's  Discussed radical inguinal orchiectomy andpt agrees  Prostate cancer on active surveillance: The patient's biopsy on September 24, 2019 showed a single core with a 4 mm stretch of prostate cancer   Per NCCN guidelines version 2 2019, the patient fell into the very low risk group   Per guidelines on page prostate 4, he fit comfortably into the category for active surveillance  AUA SYMPTOM SCORE      Most Recent Value   AUA SYMPTOM SCORE   How often have you had a sensation of not emptying your bladder completely after you finished urinating?   3   How often have you had to urinate again less than two hours after you finished urinating? 3   How often have you found you stopped and started again several times when you urinate? 2   How often have you found it difficult to postpone urination? 4   How often have you had a weak urinary stream?  2   How often have you had to push or strain to begin urination? 1   How many times did you most typically get up to urinate from the time you went to bed at night until the time you got up in the morning? 2   Quality of Life: If you were to spend the rest of your life with your urinary condition just the way it is now, how would you feel about that?  4   AUA SYMPTOM SCORE  17          The following portions of the patient's history were reviewed and updated as appropriate: allergies, current medications, past family history, past medical history, past social history, past surgical history and problem list     Review of Systems    Constitutional: Negative for activity change and fatigue  Respiratory: Negative for shortness of breath and wheezing  Cardiovascular: Negative for chest pain  Hypertension  Gastrointestinal: Negative for abdominal pain  Endocrine:        Non-insulin dependent diabetes  Hemoglobin A1c's run in the low sevens  Taking metformin to counteract the Prednisone he is taking for polymyalgia rheumatica  He is tapering off it  Genitourinary: Negative for difficulty urinating, dysuria, frequency, hematuria and urgency  Known punctate renal stones bilaterally  Musculoskeletal: Negative for back pain and gait problem  Polymyalgia rheumatica  Skin: Negative  Allergic/Immunologic: Negative  Neurological: Negative  Psychiatric/Behavioral: Negative  Objective:      /80   Pulse 78   Ht 5' 9" (1 753 m)   Wt 122 kg (270 lb)   BMI 39 87 kg/m²          Physical Exam  Constitutional:       Appearance: He is well-developed  Comments: Obese     HENT:      Head: Normocephalic and atraumatic  Neck:      Musculoskeletal: Normal range of motion and neck supple  Cardiovascular:      Rate and Rhythm: Normal rate and regular rhythm  Heart sounds: Normal heart sounds  Pulmonary:      Effort: Pulmonary effort is normal       Breath sounds: Normal breath sounds  Abdominal:      Palpations: Abdomen is soft  Tenderness: There is no abdominal tenderness  Genitourinary:     Comments: The left testis is markedly enlarged consistent with the measurements on ultrasound  It is smooth with and firm-to-hard but not distinctly nodular  The right testis is almost crowded out of the scrotum  FIGUEROA not performed today  Musculoskeletal: Normal range of motion  Skin:     General: Skin is warm and dry  Neurological:      Mental Status: He is alert and oriented to person, place, and time  Psychiatric:         Behavior: Behavior normal          Thought Content:  Thought content normal          Judgment: Judgment normal

## 2021-01-27 NOTE — TELEPHONE ENCOUNTER
I spoke to the patient and scheduled his Left Ing Orchiectomy for 2/19/2021 at [facility] with Dr Bubba Orozco

## 2021-02-09 ENCOUNTER — TRANSCRIBE ORDERS (OUTPATIENT)
Dept: ADMINISTRATIVE | Facility: HOSPITAL | Age: 61
End: 2021-02-09

## 2021-02-09 ENCOUNTER — APPOINTMENT (OUTPATIENT)
Dept: LAB | Facility: CLINIC | Age: 61
End: 2021-02-09
Payer: COMMERCIAL

## 2021-02-09 DIAGNOSIS — N50.89 MASS OF LEFT TESTIS: ICD-10-CM

## 2021-02-09 LAB
ALBUMIN SERPL BCP-MCNC: 3.6 G/DL (ref 3.5–5)
ALP SERPL-CCNC: 104 U/L (ref 46–116)
ALT SERPL W P-5'-P-CCNC: 83 U/L (ref 12–78)
ANION GAP SERPL CALCULATED.3IONS-SCNC: 5 MMOL/L (ref 4–13)
AST SERPL W P-5'-P-CCNC: 80 U/L (ref 5–45)
BASOPHILS # BLD AUTO: 0.06 THOUSANDS/ΜL (ref 0–0.1)
BASOPHILS NFR BLD AUTO: 1 % (ref 0–1)
BILIRUB SERPL-MCNC: 0.71 MG/DL (ref 0.2–1)
BUN SERPL-MCNC: 19 MG/DL (ref 5–25)
CALCIUM SERPL-MCNC: 9.7 MG/DL (ref 8.3–10.1)
CHLORIDE SERPL-SCNC: 109 MMOL/L (ref 100–108)
CO2 SERPL-SCNC: 28 MMOL/L (ref 21–32)
CREAT SERPL-MCNC: 1.05 MG/DL (ref 0.6–1.3)
EOSINOPHIL # BLD AUTO: 0.2 THOUSAND/ΜL (ref 0–0.61)
EOSINOPHIL NFR BLD AUTO: 2 % (ref 0–6)
ERYTHROCYTE [DISTWIDTH] IN BLOOD BY AUTOMATED COUNT: 13.4 % (ref 11.6–15.1)
GFR SERPL CREATININE-BSD FRML MDRD: 76 ML/MIN/1.73SQ M
GLUCOSE P FAST SERPL-MCNC: 105 MG/DL (ref 65–99)
HCT VFR BLD AUTO: 42.7 % (ref 36.5–49.3)
HGB BLD-MCNC: 13.6 G/DL (ref 12–17)
IMM GRANULOCYTES # BLD AUTO: 0.03 THOUSAND/UL (ref 0–0.2)
IMM GRANULOCYTES NFR BLD AUTO: 0 % (ref 0–2)
LYMPHOCYTES # BLD AUTO: 2.83 THOUSANDS/ΜL (ref 0.6–4.47)
LYMPHOCYTES NFR BLD AUTO: 31 % (ref 14–44)
MCH RBC QN AUTO: 28.9 PG (ref 26.8–34.3)
MCHC RBC AUTO-ENTMCNC: 31.9 G/DL (ref 31.4–37.4)
MCV RBC AUTO: 91 FL (ref 82–98)
MONOCYTES # BLD AUTO: 0.87 THOUSAND/ΜL (ref 0.17–1.22)
MONOCYTES NFR BLD AUTO: 10 % (ref 4–12)
NEUTROPHILS # BLD AUTO: 5.05 THOUSANDS/ΜL (ref 1.85–7.62)
NEUTS SEG NFR BLD AUTO: 56 % (ref 43–75)
NRBC BLD AUTO-RTO: 0 /100 WBCS
PLATELET # BLD AUTO: 333 THOUSANDS/UL (ref 149–390)
PMV BLD AUTO: 10 FL (ref 8.9–12.7)
POTASSIUM SERPL-SCNC: 4.4 MMOL/L (ref 3.5–5.3)
PROT SERPL-MCNC: 7.7 G/DL (ref 6.4–8.2)
RBC # BLD AUTO: 4.71 MILLION/UL (ref 3.88–5.62)
SODIUM SERPL-SCNC: 142 MMOL/L (ref 136–145)
WBC # BLD AUTO: 9.04 THOUSAND/UL (ref 4.31–10.16)

## 2021-02-09 PROCEDURE — 36415 COLL VENOUS BLD VENIPUNCTURE: CPT

## 2021-02-09 PROCEDURE — 85025 COMPLETE CBC W/AUTO DIFF WBC: CPT

## 2021-02-09 PROCEDURE — 87086 URINE CULTURE/COLONY COUNT: CPT

## 2021-02-09 PROCEDURE — 80053 COMPREHEN METABOLIC PANEL: CPT

## 2021-02-10 LAB — BACTERIA UR CULT: NORMAL

## 2021-02-12 NOTE — PRE-PROCEDURE INSTRUCTIONS
Pre-Surgery Instructions:   Medication Instructions    allopurinol (ZYLOPRIM) 100 mg tablet Instructed patient per Anesthesia Guidelines   ASPIRIN EC PO Instructed patient per Anesthesia Guidelines   dexlansoprazole (DEXILANT) 60 MG capsule Instructed patient per Anesthesia Guidelines   lisinopril (ZESTRIL) 20 mg tablet Instructed patient per Anesthesia Guidelines   metFORMIN (GLUCOPHAGE) 500 mg tablet Instructed patient per Anesthesia Guidelines   Omega 3 1000 MG CAPS Instructed patient per Anesthesia Guidelines   PREDNISONE PO Instructed patient per Anesthesia Guidelines   rosuvastatin (CRESTOR) 20 MG tablet Instructed patient per Anesthesia Guidelines  Pt instructed to take allopurinol, prednisone and dexilant the morning of surgery with a small sip of water  [facility] preop instructions reviewed with pt  Pt will get surgical soap

## 2021-02-14 DIAGNOSIS — N50.89 MASS OF LEFT TESTIS: Primary | ICD-10-CM

## 2021-02-14 PROCEDURE — NC001 PR NO CHARGE: Performed by: UROLOGY

## 2021-02-14 NOTE — H&P
Rajendra Thomason MD   Physician   Specialty:  Urology   Progress Notes       Signed   Encounter Date:  1/27/2021            Progress Notes         Show:Clear all  [x]Manual[x]Template[x]Copied    Added by:  Marianela Villalobos MD    []Felicia for details  Assessment/Plan:     Mass of left testis  Left inguinal orchiectomy scheduled  Consent signed      Prostate cancer [facility])  This was not the focus of today's visit  The patient has done well on active surveillance          Diagnoses and all orders for this visit:     Prostate cancer ([facility] [address] )  -     POCT urine dip auto non-scope     Mass of left testis  -     Case request operating room: ORCHIECTOMY INGUINAL, possible biopsy; Standing  -     Case request operating room: ORCHIECTOMY INGUINAL, possible biopsy     Other orders  -     Diet NPO; Sips with meds; Standing  -     Place sequential compression device; Standing  -     Electrocardiogram, 12-lead; Standing  -     ceFAZolin (ANCEF) 2,000 mg in dextrose 5 % 100 mL IVPB  -     hCG, quantitative; Standing  -     AFP tumor marker; Standing  -     Comprehensive metabolic panel; Standing  -     CBC and Platelet; Standing            Subjective:       Patient ID: Princess Huddleston is a 63 y o  male      HPI  Testis mass:  The patient presented with a hydrocele in October 2020  Scrotal ultrasound revealed a mildly lobulated and heterogeneous testis with increased vascularity  A repeat ultrasound performed on January 20, 2021 showed continued enlargement of the testis raising the question of neoplasm  The patient returns today for further evaluation      Testis is markedly enlarged  It is now interfering with ADL's        Discussed radical inguinal orchiectomy andpt agrees        Prostate cancer on active surveillance:   The patient's biopsy on September 24, 2019 showed a single core with a 4 mm stretch of prostate cancer   Per NCCN guidelines version 2 2019, the patient fell into the very low risk group   Per guidelines on page prostate 4, he fit comfortably into the category for active surveillance          AUA SYMPTOM SCORE      Most Recent Value   AUA SYMPTOM SCORE   How often have you had a sensation of not emptying your bladder completely after you finished urinating? 3   How often have you had to urinate again less than two hours after you finished urinating? 3   How often have you found you stopped and started again several times when you urinate? 2   How often have you found it difficult to postpone urination? 4   How often have you had a weak urinary stream?  2   How often have you had to push or strain to begin urination? 1   How many times did you most typically get up to urinate from the time you went to bed at night until the time you got up in the morning? 2   Quality of Life: If you were to spend the rest of your life with your urinary condition just the way it is now, how would you feel about that?  4   AUA SYMPTOM SCORE  17             The following portions of the patient's history were reviewed and updated as appropriate: allergies, current medications, past family history, past medical history, past social history, past surgical history and problem list      Review of Systems    Constitutional: Negative for activity change and fatigue  Respiratory: Negative for shortness of breath and wheezing     Cardiovascular: Negative for chest pain         Hypertension    Gastrointestinal: Negative for abdominal pain  Endocrine:        Non-insulin dependent diabetes   Hemoglobin A1c's run in the low sevens   Taking metformin to counteract the Prednisone he is taking for polymyalgia rheumatica   He is tapering off it    Genitourinary: Negative for difficulty urinating, dysuria, frequency, hematuria and urgency          Known punctate renal stones bilaterally    Musculoskeletal: Negative for back pain and gait problem         Polymyalgia rheumatica    Skin: Negative     Allergic/Immunologic: Negative     Neurological: Negative     Psychiatric/Behavioral: Negative     Objective:        /80   Pulse 78   Ht 5' 9" (1 753 m)   Wt 122 kg (270 lb)   BMI 39 87 kg/m²             Physical Exam  Constitutional:       Appearance: He is well-developed  Comments: Obese  HENT:      Head: Normocephalic and atraumatic  Neck:      Musculoskeletal: Normal range of motion and neck supple  Cardiovascular:      Rate and Rhythm: Normal rate and regular rhythm  Heart sounds: Normal heart sounds  Pulmonary:      Effort: Pulmonary effort is normal       Breath sounds: Normal breath sounds  Abdominal:      Palpations: Abdomen is soft  Tenderness: There is no abdominal tenderness  Genitourinary:     Comments: The left testis is markedly enlarged consistent with the measurements on ultrasound  It is smooth with and firm-to-hard but not distinctly nodular  The right testis is almost crowded out of the scrotum  FIGUEROA not performed today  Musculoskeletal: Normal range of motion  Skin:     General: Skin is warm and dry  Neurological:      Mental Status: He is alert and oriented to person, place, and time  Psychiatric:         Behavior: Behavior normal          Thought Content: Thought content normal          Judgment: Judgment normal               Electronically signed by Marivel Mathews MD at 1/27/2021 12:22 PM        US scrotum and testicles  Status: Final result   PACS Images     Show images for US scrotum and testicles   Study Result    SCROTAL ULTRASOUND     INDICATION:    N43 3: Hydrocele, unspecified  N50 89: Other specified disorders of the male genital organs  Testicular mass      COMPARISON: Testicle ultrasound from 10/24/2020      TECHNIQUE:   Ultrasound the scrotal contents was performed with a high frequency linear transducer utilizing volumetric sweep imaging as well as standard still image techniques  Imaging performed in longitudinal and transverse orientation   Color and spectral Doppler evaluation also performed bilaterally      FINDINGS:     TESTES:      RIGHT testis = 4 7 x 2 4 x 2 9 cm   Normal contour with homogeneous smooth echotexture  No intratesticular mass lesion or calcifications      LEFT testis = 9 1 x 4 9 x 6 8 cm corresponding to 157 mL volume (this is increased in size compared to prior, where it measured 7 2 x 3 9 x 5 1 cm, 76 mL in volume )     Again seen is diffusely heterogeneous echotexture throughout the left testicle      Vascularity is seen throughout the enlarged left testicle      EPIDIDYMIDES:   Normal Size  Doppler ultrasound demonstrates normal blood flow  No epididymal lesions      HYDROCELE:  Unchanged trace bilateral hydroceles      VARICOCELE:  None present      SCROTUM:  Scrotal thickness and appearance within normal limits  No evidence for extratesticular mass or hernia demonstrated      IMPRESSION:     Compared to 10/24/2020, there is further enlargement of a diffusely heterogeneous left testicle, currently 9 1 x 4 9 x 6 8 cm, previously 7 2 x 3 9 x 5 1 cm  Because of persistent abnormal appearance and increased size, this is highly suspicious for   testicular neoplasm  I personally discussed this study with Noé Huerta on 1/20/2021 at 3:08 PM               Workstation performed: [id number]      Imaging    US scrotum and testicles (Order: [id number]) - 1/20/2021  Result History    US scrotum and testicles (Order #[id number]) on 1/20/2021 - Order Result History Report   Order Report    Order Details  Order Questions    Question Answer Comment   Exam reason testicular mass    Note:  Enter reason for exam          Reason for Exam    testicular mass   Dx: Hydrocele in adult [N43 3 (ICD-10-CM)];  Mass of left testicle [N50 89 (ICD-10-CM)]   All Reviewers List    Peyton Starr MD on 1/22/2021 11:25 AM   Signed by    Signed Date/Time  Phone Pager   Lorna Atkinosn 1/20/2021 15:08 [phone]    Exam Information    Status Exam Begun  Exam Ended Performing Tech   Final [99] 1/20/2021 07:32 1/20/2021 07:55 Eloina Fausto   External Results Report    Open External Results Report    Encounter    View Encounter             Patient Care Timeline    No data selected in time range  Pre-op Summary    Pre-op           Recovery Summary    Recovery             Routing History    None

## 2021-02-15 ENCOUNTER — TELEPHONE (OUTPATIENT)
Dept: UROLOGY | Facility: MEDICAL CENTER | Age: 61
End: 2021-02-15

## 2021-02-15 NOTE — TELEPHONE ENCOUNTER
----- Message from José Luis Finn MD sent at 2/14/2021  8:50 AM EST -----   I am reordering tumor markers for this patient to be performed prior to surgical date this week

## 2021-02-16 ENCOUNTER — APPOINTMENT (OUTPATIENT)
Dept: LAB | Facility: CLINIC | Age: 61
End: 2021-02-16
Payer: COMMERCIAL

## 2021-02-16 LAB
AFP-TM SERPL-MCNC: 1.6 NG/ML (ref 0.5–8)
B-HCG SERPL-ACNC: <2 MIU/ML

## 2021-02-16 PROCEDURE — 84702 CHORIONIC GONADOTROPIN TEST: CPT | Performed by: UROLOGY

## 2021-02-16 PROCEDURE — 82105 ALPHA-FETOPROTEIN SERUM: CPT | Performed by: UROLOGY

## 2021-02-16 PROCEDURE — 36415 COLL VENOUS BLD VENIPUNCTURE: CPT | Performed by: UROLOGY

## 2021-02-18 ENCOUNTER — ANESTHESIA EVENT (OUTPATIENT)
Dept: PERIOP | Facility: HOSPITAL | Age: 61
End: 2021-02-18
Payer: COMMERCIAL

## 2021-02-19 ENCOUNTER — HOSPITAL ENCOUNTER (OUTPATIENT)
Facility: HOSPITAL | Age: 61
Setting detail: OUTPATIENT SURGERY
Discharge: HOME | End: 2021-02-19
Attending: UROLOGY | Admitting: UROLOGY
Payer: COMMERCIAL

## 2021-02-19 ENCOUNTER — ANESTHESIA (OUTPATIENT)
Dept: PERIOP | Facility: HOSPITAL | Age: 61
End: 2021-02-19
Payer: COMMERCIAL

## 2021-02-19 VITALS
WEIGHT: 270 LBS | OXYGEN SATURATION: 95 % | RESPIRATION RATE: 12 BRPM | DIASTOLIC BLOOD PRESSURE: 67 MMHG | HEIGHT: 69 IN | BODY MASS INDEX: 39.99 KG/M2 | TEMPERATURE: 98.3 F | HEART RATE: 64 BPM | SYSTOLIC BLOOD PRESSURE: 133 MMHG

## 2021-02-19 VITALS — HEART RATE: 103 BPM

## 2021-02-19 DIAGNOSIS — N50.89 MASS OF LEFT TESTIS: ICD-10-CM

## 2021-02-19 LAB — GLUCOSE SERPL-MCNC: 111 MG/DL (ref 65–140)

## 2021-02-19 PROCEDURE — 54530 REMOVAL OF TESTIS: CPT | Performed by: PHYSICIAN ASSISTANT

## 2021-02-19 PROCEDURE — 88309 TISSUE EXAM BY PATHOLOGIST: CPT | Performed by: PATHOLOGY

## 2021-02-19 PROCEDURE — 54530 REMOVAL OF TESTIS: CPT | Performed by: UROLOGY

## 2021-02-19 PROCEDURE — 82948 REAGENT STRIP/BLOOD GLUCOSE: CPT

## 2021-02-19 RX ORDER — ROCURONIUM BROMIDE 10 MG/ML
INJECTION, SOLUTION INTRAVENOUS AS NEEDED
Status: DISCONTINUED | OUTPATIENT
Start: 2021-02-19 | End: 2021-02-19

## 2021-02-19 RX ORDER — ONDANSETRON 2 MG/ML
4 INJECTION INTRAMUSCULAR; INTRAVENOUS ONCE AS NEEDED
Status: DISCONTINUED | OUTPATIENT
Start: 2021-02-19 | End: 2021-02-19 | Stop reason: HOSPADM

## 2021-02-19 RX ORDER — DEXAMETHASONE SODIUM PHOSPHATE 10 MG/ML
INJECTION, SOLUTION INTRAMUSCULAR; INTRAVENOUS AS NEEDED
Status: DISCONTINUED | OUTPATIENT
Start: 2021-02-19 | End: 2021-02-19

## 2021-02-19 RX ORDER — PROPOFOL 10 MG/ML
INJECTION, EMULSION INTRAVENOUS AS NEEDED
Status: DISCONTINUED | OUTPATIENT
Start: 2021-02-19 | End: 2021-02-19

## 2021-02-19 RX ORDER — MIDAZOLAM HYDROCHLORIDE 2 MG/2ML
INJECTION, SOLUTION INTRAMUSCULAR; INTRAVENOUS AS NEEDED
Status: DISCONTINUED | OUTPATIENT
Start: 2021-02-19 | End: 2021-02-19

## 2021-02-19 RX ORDER — SODIUM CHLORIDE 9 MG/ML
125 INJECTION, SOLUTION INTRAVENOUS CONTINUOUS
Status: DISCONTINUED | OUTPATIENT
Start: 2021-02-19 | End: 2021-02-19 | Stop reason: HOSPADM

## 2021-02-19 RX ORDER — OXYCODONE HYDROCHLORIDE AND ACETAMINOPHEN 5; 325 MG/1; MG/1
1 TABLET ORAL EVERY 6 HOURS PRN
Qty: 15 TABLET | Refills: 0 | Status: SHIPPED | OUTPATIENT
Start: 2021-02-19 | End: 2021-03-01

## 2021-02-19 RX ORDER — HYDROMORPHONE HCL/PF 1 MG/ML
0.5 SYRINGE (ML) INJECTION
Status: DISCONTINUED | OUTPATIENT
Start: 2021-02-19 | End: 2021-02-19 | Stop reason: HOSPADM

## 2021-02-19 RX ORDER — FENTANYL CITRATE/PF 50 MCG/ML
50 SYRINGE (ML) INJECTION
Status: DISCONTINUED | OUTPATIENT
Start: 2021-02-19 | End: 2021-02-19 | Stop reason: HOSPADM

## 2021-02-19 RX ORDER — ONDANSETRON 2 MG/ML
INJECTION INTRAMUSCULAR; INTRAVENOUS AS NEEDED
Status: DISCONTINUED | OUTPATIENT
Start: 2021-02-19 | End: 2021-02-19

## 2021-02-19 RX ORDER — MAGNESIUM HYDROXIDE 1200 MG/15ML
LIQUID ORAL AS NEEDED
Status: DISCONTINUED | OUTPATIENT
Start: 2021-02-19 | End: 2021-02-19 | Stop reason: HOSPADM

## 2021-02-19 RX ORDER — FENTANYL CITRATE 50 UG/ML
INJECTION, SOLUTION INTRAMUSCULAR; INTRAVENOUS AS NEEDED
Status: DISCONTINUED | OUTPATIENT
Start: 2021-02-19 | End: 2021-02-19

## 2021-02-19 RX ORDER — SUCCINYLCHOLINE/SOD CL,ISO/PF 100 MG/5ML
SYRINGE (ML) INTRAVENOUS AS NEEDED
Status: DISCONTINUED | OUTPATIENT
Start: 2021-02-19 | End: 2021-02-19

## 2021-02-19 RX ORDER — KETOROLAC TROMETHAMINE 30 MG/ML
INJECTION, SOLUTION INTRAMUSCULAR; INTRAVENOUS AS NEEDED
Status: DISCONTINUED | OUTPATIENT
Start: 2021-02-19 | End: 2021-02-19

## 2021-02-19 RX ORDER — OXYCODONE HYDROCHLORIDE AND ACETAMINOPHEN 5; 325 MG/1; MG/1
1 TABLET ORAL EVERY 4 HOURS PRN
Status: DISCONTINUED | OUTPATIENT
Start: 2021-02-19 | End: 2021-02-19 | Stop reason: HOSPADM

## 2021-02-19 RX ORDER — CEFAZOLIN SODIUM 2 G/50ML
2000 SOLUTION INTRAVENOUS ONCE
Status: COMPLETED | OUTPATIENT
Start: 2021-02-19 | End: 2021-02-19

## 2021-02-19 RX ORDER — MORPHINE SULFATE 4 MG/ML
4 INJECTION, SOLUTION INTRAMUSCULAR; INTRAVENOUS EVERY 6 HOURS PRN
Status: DISCONTINUED | OUTPATIENT
Start: 2021-02-19 | End: 2021-02-19 | Stop reason: HOSPADM

## 2021-02-19 RX ORDER — CEFAZOLIN SODIUM 1 G/3ML
INJECTION, POWDER, FOR SOLUTION INTRAMUSCULAR; INTRAVENOUS AS NEEDED
Status: DISCONTINUED | OUTPATIENT
Start: 2021-02-19 | End: 2021-02-19

## 2021-02-19 RX ADMIN — SODIUM CHLORIDE: 0.9 INJECTION, SOLUTION INTRAVENOUS at 08:03

## 2021-02-19 RX ADMIN — MIDAZOLAM 2 MG: 1 INJECTION INTRAMUSCULAR; INTRAVENOUS at 07:56

## 2021-02-19 RX ADMIN — ROCURONIUM BROMIDE 5 MG: 10 INJECTION, SOLUTION INTRAVENOUS at 08:04

## 2021-02-19 RX ADMIN — PROPOFOL 200 MG: 10 INJECTION, EMULSION INTRAVENOUS at 08:04

## 2021-02-19 RX ADMIN — ONDANSETRON 4 MG: 2 INJECTION INTRAMUSCULAR; INTRAVENOUS at 09:36

## 2021-02-19 RX ADMIN — ROCURONIUM BROMIDE 45 MG: 10 INJECTION, SOLUTION INTRAVENOUS at 08:30

## 2021-02-19 RX ADMIN — FENTANYL CITRATE 100 MCG: 50 INJECTION, SOLUTION INTRAMUSCULAR; INTRAVENOUS at 08:04

## 2021-02-19 RX ADMIN — SUGAMMADEX 200 MG: 100 INJECTION, SOLUTION INTRAVENOUS at 09:52

## 2021-02-19 RX ADMIN — DEXAMETHASONE SODIUM PHOSPHATE 8 MG: 10 INJECTION, SOLUTION INTRAMUSCULAR; INTRAVENOUS at 08:40

## 2021-02-19 RX ADMIN — Medication 100 MG: at 08:04

## 2021-02-19 RX ADMIN — SODIUM CHLORIDE 125 ML/HR: 0.9 INJECTION, SOLUTION INTRAVENOUS at 06:10

## 2021-02-19 RX ADMIN — KETOROLAC TROMETHAMINE 30 MG: 30 INJECTION, SOLUTION INTRAMUSCULAR at 09:36

## 2021-02-19 RX ADMIN — CEFAZOLIN SODIUM 1000 MG: 1 INJECTION, POWDER, FOR SOLUTION INTRAMUSCULAR; INTRAVENOUS at 08:09

## 2021-02-19 RX ADMIN — CEFAZOLIN SODIUM 2000 MG: 2 SOLUTION INTRAVENOUS at 07:52

## 2021-02-19 RX ADMIN — FENTANYL CITRATE 100 MCG: 50 INJECTION, SOLUTION INTRAMUSCULAR; INTRAVENOUS at 09:09

## 2021-02-19 RX ADMIN — OXYCODONE HYDROCHLORIDE AND ACETAMINOPHEN 1 TABLET: 5; 325 TABLET ORAL at 11:37

## 2021-02-19 RX ADMIN — FENTANYL CITRATE 50 MCG: 50 INJECTION INTRAMUSCULAR; INTRAVENOUS at 10:44

## 2021-02-19 NOTE — INTERVAL H&P NOTE
H&P reviewed  After examining the patient I find no changes in the patients condition since the H&P had been written    /67   Pulse 66   Temp 98 3 °F (36 8 °C) (Temporal)   Resp 18   Ht 5' 9" (1 753 m)   Wt 122 kg (270 lb)   SpO2 98%   BMI 39 87 kg/m²     Vitals:    02/19/21 0606   BP: 147/67   Pulse: 66   Resp: 18   Temp: 98 3 °F (36 8 °C)   SpO2: 98%

## 2021-02-19 NOTE — OP NOTE
OPERATIVE REPORT  PATIENT NAME: Garfield Interiano    :  1960  MRN: [id number]  Pt Location: [facility] OR ROOM 07    SURGERY DATE: 2021    Surgeon(s) and Role:     * Sydney Samuels MD - Primary     * Jeferson Nichols PA-C - Assisting--no qualified resident or attending was available to provide 1st assistance    Preop Diagnosis:  Mass of left testis [N50 89]    Post-Op Diagnosis Codes:     * Mass of left testis [N50 89]    Procedure(s) (LRB):  Radical  ORCHIECTOMY (Left)    Specimen(s):  ID Type Source Tests Collected by Time Destination   1 :  Left Testicle and spermatic cord Tissue Testis, Left TISSUE EXAM Sydney Samuels MD 2021 [id number]        Estimated Blood Loss:   Minimal    Drains:  * No LDAs found *    Anesthesia Type:   General    Operative Indications: Mass of left testis [N50 89]       Operative Findings:  Please see operative note below    Complications:   None    Procedure and Technique:  The patient was seen in the [facility] area and I reviewed his history testicular ultrasound examinations in  and  and performed a physical examination  Identified the left testicle as having a palpable mass which at least on palpation in part included a hydrocele  That along with ultrasound results indicated the possibility of a testicular malignancy and I explained radical left orchiectomy to the patient  He was made aware of possible complications of bleeding infection need for additional surgical procedures or treatments the possibility of malignancy either local or metastatic  The patient understands that his significant morbid obesity may make the complication rate increase in this patient  He agreed to proceed  The patient was taken to the operating room identified by the surgeon placed supine on the operating table prepped and draped in the usual sterile fashion  After appropriate time-out a curvilinear incision was made overlying the left inguinal skin crease    Electric cautery was used to divide the subcutaneous fatty tissue and Favio's fascia along the extent of the original skin incision  The patient's extreme obesity made anatomical landmarks difficult to identify and retraction was quite difficult  Ultimately a bowel 4 retractor was used to retract the edges of the left inguinal incision  External oblique aponeurosis was identified at the external inguinal ring and divided along a line parallel with his surgical fibers up to an area overlying the internal inguinal ring  Blunt sharp dissection at the level of the pubic tubercle on the left took place mobilizing a very thick spermatic cord with associated lipoma  Once the surgeon was able to get his hand circumferentially around the spermatic cord at this level the Kittner dissect ir i e  P not was used to brush avascular fibers away from the spermatic cord moving up towards the internal inguinal ring  Exposure and retraction was quite difficult at this point  Once the spermatic cord was mobilized proximally to very large Armida clamps were placed on the spermatic cord proximally and 1 distally still in the inguinal canal   The electric cautery was then used to divide the spermatic cord allowing the 3rd Armida clamp to remain on the spermatic cord in the inguinal canal   The proximal portion of the spermatic cord was then doubly ligated using 0 silk suture ligatures with no bleeding  The 0 silk suture ligature was left 1 in long for future identification should retroperitoneal lymph node dissection become necessary from a therapeutic standpoint  Blunt sharp dissection of the remaining portion of the spermatic cord moving distally took place without any blood loss  The testis was then pushed up towards the external inguinal ring and the surgeon's finger was inserted around the testis and intact tunica vaginalis allowing mobilization of the testis up into the inguinal region    The only attachment at this point was the good burn Accu lump and using both blunt sharp dissection and the electric cautery loop inaccurate attachments were divided without entering the tunica vaginalis  There was no scrotal bleeding and no injury to the scrotal skin and complete removal of the testis and spermatic cord with associated adnexa with intact tunica vaginalis and no spillage  There was no bleeding after the specimen was handed off to be evaluated by pathology  Please note that upon dissection and division of the spermatic cord attention was taken to identify any intra-abdominal elements of a hernia and this was not identified  Hemostasis was obtained through the use electric cautery  Favio's fascia was closed using running 2 0 Vicryl suture in a simple fashion  Skin was then closed using 4-0 Monocryl subcuticular closure and bio glue was applied to the incision and after that dried sterile dressings were applied  Instrument sponge and needle counts were reported to me as being correct  There were no intraoperative complications  The patient was extubated and transferred to the recovery room having tolerated the procedure well and in good condition  He recovered uneventfully that day was discharged from the hospital   Instructions to return to me or Dr Thomas Reeves in approximately 2 weeks to discuss pathology as well as for wound inspection were given  The patient and his wife expressed understanding         I was present for the entire procedure    Patient Disposition:  PACU     SIGNATURE: Jacklyn Martinez MD  DATE: February 19, 2021  TIME: 9:39 AM

## 2021-02-19 NOTE — DISCHARGE INSTRUCTIONS
Vigorous oral fluid intake  Limited activity for 2 weeks avoid heavy lifting exertion all exercise contact her impact sports    Further instructions after return to office

## 2021-02-19 NOTE — ANESTHESIA PREPROCEDURE EVALUATION
Procedure:  INGUINAL ORCHIECTOMY, poss biopsy (Left Groin)    Relevant Problems   CARDIO   (+) Hypertension      GI/HEPATIC   (+) GERD (gastroesophageal reflux disease)      /RENAL   (+) Calculus of kidney   (+) Prostate cancer ([facility] [address] )      Other   (+) Mass of left testis        Physical Exam    Airway    Mallampati score: II  TM Distance: <3 FB  Neck ROM: full     Dental       Cardiovascular  Rhythm: regular, Rate: normal,     Pulmonary  Breath sounds clear to auscultation,     Other Findings        Anesthesia Plan  ASA Score- 2     Anesthesia Type- general with ASA Monitors  Additional Monitors:   Airway Plan:           Plan Factors-Exercise tolerance (METS): <4 METS  Chart reviewed  Existing labs reviewed  Patient summary reviewed  Patient is not a current smoker  Patient not instructed to abstain from smoking on day of procedure  Patient did not smoke on day of surgery  Induction- intravenous  Postoperative Plan-     Informed Consent- Anesthetic plan and risks discussed with patient

## 2021-03-04 NOTE — TELEPHONE ENCOUNTER
Patient called stating he had surgery and a biopsy was done  Patient would like to know if results are in        Patient can be reached at [phone]

## 2021-03-04 NOTE — TELEPHONE ENCOUNTER
Called pt  Biopsy results are back  He has appt with Dr Yuni Bustos on 3/11/21 to discuss results and post op ck

## 2021-03-11 ENCOUNTER — PATIENT OUTREACH (OUTPATIENT)
Dept: UROLOGY | Facility: MEDICAL CENTER | Age: 61
End: 2021-03-11

## 2021-03-11 ENCOUNTER — OFFICE VISIT (OUTPATIENT)
Dept: UROLOGY | Facility: MEDICAL CENTER | Age: 61
End: 2021-03-11
Payer: COMMERCIAL

## 2021-03-11 VITALS
DIASTOLIC BLOOD PRESSURE: 68 MMHG | BODY MASS INDEX: 39.99 KG/M2 | WEIGHT: 270 LBS | SYSTOLIC BLOOD PRESSURE: 122 MMHG | HEIGHT: 69 IN

## 2021-03-11 DIAGNOSIS — C62.12 SEMINOMA OF DESCENDED LEFT TESTIS (HCC): Primary | ICD-10-CM

## 2021-03-11 PROCEDURE — 99215 OFFICE O/P EST HI 40 MIN: CPT | Performed by: UROLOGY

## 2021-03-11 NOTE — PROGRESS NOTES
I was present at [facility] visit with Dr Artemio Gregg today  His wife was also at the visit  Dr Artemio Gregg reviewed his path  He was diagnosed with seminoma of the left testicle  Dr Artemio Gregg ordered CT C/A/P and repeat tumor markers  He understands his diagnosis and we reviewed that staging studies are needed prior to determining if further treatment is needed  Dr Artemio Gregg will request Medical Oncology consult to be done after staging studies are completed  Intermittent [place] paperwork completed for his wife and given back to them at the visit  He was given my direct contact information and encouraged to call if he has questions or need assistance

## 2021-03-11 NOTE — PROGRESS NOTES
Assessment/Plan:    Seminoma of descended left testis [facility])    Metastatic workup ordered  The patient will then be referred to Hematology - oncology  Diagnoses and all orders for this visit:    Seminoma of descended left testis ([address] )  -     CBC and Platelet; Future  -     Comprehensive metabolic panel; Future  -     Cancel: CT chest w contrast abdomen and pelvis wo; Future  -     CT chest abdomen pelvis w contrast; Future  -     LD,Blood; Future  -     AFP tumor marker; Future  -     HCG, tumor marker; Future  -     Ambulatory referral to Hematology / Oncology; Future          Subjective:      Patient ID: Cipriano Husain is a 57 y o  male  HPI  Testicular cancer: The patient's pathology report revealed pure seminoma with involvement of the rete testis and a negative spermatic cord margin of resection  The patient and his wife return today to discuss the pathology report and the path forward  Figueroa Monica, RN was present in for the entire meeting  We reviewed the pathology report  We then moved on to the NCCN guidelines version 1 2021 for workup of stage I B testicular tumor  We discussed the need to look for metastatic disease and the role of CT scan of the chest abdomen and pelvis  We discussed the concept of tumor markers and the need to repeat tumor markers including LDH  The NCCN guidelines mention a brain MRI but at this point, the patient does not need this test     I indicated that the patient would need a referral to Hematology - oncology  This was ordered but will not be carried out until the patient's metastatic workup has been completed  Regarding the patient's recovery from surgery, he had very little pain afterwards  Wound care has not been a problem  Please see the physical exam below  Figueroa Other, our nurse navigator then spent time with the patient and his wife helping with FMLA forms and continuing her role as patient educator and nurse navigator      The following portions of the patient's history were reviewed and updated as appropriate: allergies, current medications, past family history, past medical history, past social history, past surgical history and problem list     Review of Systems    Constitutional: Negative for activity change and fatigue  Respiratory: Negative for shortness of breath and wheezing     Cardiovascular: Negative for chest pain         Hypertension    Gastrointestinal: Negative for abdominal pain  Endocrine:        Non-insulin dependent diabetes   Hemoglobin A1c's run in the low sevens   Taking metformin to counteract the Prednisone he is taking for polymyalgia rheumatica   He is tapering off it    Genitourinary: Negative for difficulty urinating, dysuria, frequency, hematuria and urgency          Known punctate renal stones bilaterally    Musculoskeletal: Negative for back pain and gait problem         Polymyalgia rheumatica    Skin: Negative     Allergic/Immunologic: Negative     Neurological: Negative     Psychiatric/Behavioral: Negative    Objective:      /68   Ht 5' 9" (1 753 m)   Wt 122 kg (270 lb)   BMI 39 87 kg/m²          Physical Exam  Constitutional:       Appearance: He is well-developed  HENT:      Head: Normocephalic and atraumatic  Neck:      Musculoskeletal: Normal range of motion  Pulmonary:      Effort: Pulmonary effort is normal    Abdominal:      Comments: The left inguinal incision is healing nicely  There is no sign of infection  The surface coding applied at the end of the case is still in situ  There is some diaper rash from moisture in the skin fold  Genitourinary:     Comments: There is swelling in the left hemiscrotum without  ecchymosis or skin discoloration  It actually feels like the testis is still present  The area of swelling is approximately 2 x 2 by a 4 cm  The area is not tender  Musculoskeletal: Normal range of motion  Skin:     General: Skin is warm and dry     Neurological: Mental Status: He is alert and oriented to person, place, and time  Psychiatric:         Behavior: Behavior normal          Thought Content:  Thought content normal          Judgment: Judgment normal

## 2021-03-12 ENCOUNTER — TELEPHONE (OUTPATIENT)
Dept: HEMATOLOGY ONCOLOGY | Facility: CLINIC | Age: 61
End: 2021-03-12

## 2021-03-12 NOTE — TELEPHONE ENCOUNTER
New Patient  Form     Patient Details:     Cipriano Husain     1960     [id number]     Background Information:     [address] starts by opening a telephone encounter and gathering the following information     Who is calling to schedule and relationship?  self   Who is the referring provider? Dr Kaushik Scott   Reason for Visit? New Diagnosis   Tumor Type?  testicular cancer    Does the patient have confirmed tissue pathology with either biopsy or surgery? yes   When was biopsy/surgery (diagnosis made) and where 2/19/21   Does the Patient have a Urologist?  Yes   Name of Patient's Urologist Dr Kaushik Scott   Did the patient have imaging done? yes   If yes, when and where CT Scheduled 3/20/21   Did patient have blood work done?  yes   If Yes, when and where [facility]   *Please contact  Navigator for review if Urology is NOT making the referral to Med Onc, or     They answer No to Urologist and Pathology question  *     Scheduling Information:     [facility]   Are there any days the patient cannot be seen? No   Miscellaneous: Pt scheduled with Dr Bonny Schafer on 4/1/21 in [facility]  After completing the above information, please route to finance, nurse navigation and clinical trials for review

## 2021-03-12 NOTE — PROGRESS NOTES
Tess Hernandez is aware of the date, time, and location of CT, follow up with Dr Андрей Vargas, and Consult with Dr Bee Degroot

## 2021-03-15 ENCOUNTER — TRANSCRIBE ORDERS (OUTPATIENT)
Dept: ADMINISTRATIVE | Facility: HOSPITAL | Age: 61
End: 2021-03-15

## 2021-03-15 ENCOUNTER — APPOINTMENT (OUTPATIENT)
Dept: LAB | Facility: CLINIC | Age: 61
End: 2021-03-15
Payer: COMMERCIAL

## 2021-03-15 DIAGNOSIS — C62.12 SEMINOMA OF DESCENDED LEFT TESTIS (HCC): ICD-10-CM

## 2021-03-15 LAB
AFP-TM SERPL-MCNC: 1.7 NG/ML (ref 0.5–8)
ALBUMIN SERPL BCP-MCNC: 3 G/DL (ref 3.5–5)
ALP SERPL-CCNC: 81 U/L (ref 46–116)
ALT SERPL W P-5'-P-CCNC: 48 U/L (ref 12–78)
ANION GAP SERPL CALCULATED.3IONS-SCNC: 5 MMOL/L (ref 4–13)
AST SERPL W P-5'-P-CCNC: 45 U/L (ref 5–45)
BILIRUB SERPL-MCNC: 0.74 MG/DL (ref 0.2–1)
BUN SERPL-MCNC: 16 MG/DL (ref 5–25)
CALCIUM ALBUM COR SERPL-MCNC: 10 MG/DL (ref 8.3–10.1)
CALCIUM SERPL-MCNC: 9.2 MG/DL (ref 8.3–10.1)
CHLORIDE SERPL-SCNC: 109 MMOL/L (ref 100–108)
CO2 SERPL-SCNC: 26 MMOL/L (ref 21–32)
CREAT SERPL-MCNC: 1.04 MG/DL (ref 0.6–1.3)
ERYTHROCYTE [DISTWIDTH] IN BLOOD BY AUTOMATED COUNT: 12.9 % (ref 11.6–15.1)
GFR SERPL CREATININE-BSD FRML MDRD: 77 ML/MIN/1.73SQ M
GLUCOSE P FAST SERPL-MCNC: 153 MG/DL (ref 65–99)
HCG-TM SERPL-SCNC: <2 MLU/ML
HCT VFR BLD AUTO: 39.1 % (ref 36.5–49.3)
HGB BLD-MCNC: 12.3 G/DL (ref 12–17)
LDH SERPL-CCNC: 339 U/L (ref 81–234)
MCH RBC QN AUTO: 28.2 PG (ref 26.8–34.3)
MCHC RBC AUTO-ENTMCNC: 31.5 G/DL (ref 31.4–37.4)
MCV RBC AUTO: 90 FL (ref 82–98)
PLATELET # BLD AUTO: 301 THOUSANDS/UL (ref 149–390)
PMV BLD AUTO: 10.4 FL (ref 8.9–12.7)
POTASSIUM SERPL-SCNC: 4.7 MMOL/L (ref 3.5–5.3)
PROT SERPL-MCNC: 7.5 G/DL (ref 6.4–8.2)
RBC # BLD AUTO: 4.36 MILLION/UL (ref 3.88–5.62)
SODIUM SERPL-SCNC: 140 MMOL/L (ref 136–145)
WBC # BLD AUTO: 6.26 THOUSAND/UL (ref 4.31–10.16)

## 2021-03-15 PROCEDURE — 82105 ALPHA-FETOPROTEIN SERUM: CPT

## 2021-03-15 PROCEDURE — 84702 CHORIONIC GONADOTROPIN TEST: CPT

## 2021-03-15 PROCEDURE — 36415 COLL VENOUS BLD VENIPUNCTURE: CPT

## 2021-03-15 PROCEDURE — 80053 COMPREHEN METABOLIC PANEL: CPT

## 2021-03-15 PROCEDURE — 85027 COMPLETE CBC AUTOMATED: CPT

## 2021-03-15 PROCEDURE — 83615 LACTATE (LD) (LDH) ENZYME: CPT

## 2021-03-16 ENCOUNTER — PATIENT OUTREACH (OUTPATIENT)
Dept: UROLOGY | Facility: AMBULATORY SURGERY CENTER | Age: 61
End: 2021-03-16

## 2021-03-16 ENCOUNTER — DOCUMENTATION (OUTPATIENT)
Dept: UROLOGY | Facility: AMBULATORY SURGERY CENTER | Age: 61
End: 2021-03-16

## 2021-03-16 DIAGNOSIS — C62.12 SEMINOMA OF DESCENDED LEFT TESTIS (HCC): Primary | ICD-10-CM

## 2021-03-16 NOTE — PROGRESS NOTES
Jessenia Bermudez  Made him aware of the order for repeat LD in 1 week prior to his follow up with Dr Arun Maurice

## 2021-03-16 NOTE — PROGRESS NOTES
Oncology Navigator Note: Multidisciplinary Urology Case Review 3/16/21  Physician Recommended Plan    Estefania Garcia is a 57 y o  male    Diagnosis: Seminoma of left testis    Patient was discussed at the Multidisciplinary Urology Case review on 3/16/21  The group agreed with the plan for CT, tumor markers, and referral to Medical Oncology  They recommend to repeat LD in about 1 week      NCCN guidelines were available for review    CT scheduled 3/20/21  Consult with Dr Luis Soto scheduled for 4/1/21

## 2021-03-20 ENCOUNTER — HOSPITAL ENCOUNTER (OUTPATIENT)
Dept: CT IMAGING | Facility: HOSPITAL | Age: 61
Discharge: HOME | End: 2021-03-20
Attending: UROLOGY
Payer: COMMERCIAL

## 2021-03-20 DIAGNOSIS — C62.12 SEMINOMA OF DESCENDED LEFT TESTIS (HCC): ICD-10-CM

## 2021-03-20 PROCEDURE — G1004 CDSM NDSC: HCPCS

## 2021-03-20 PROCEDURE — 74177 CT ABD & PELVIS W/CONTRAST: CPT

## 2021-03-20 PROCEDURE — 71260 CT THORAX DX C+: CPT

## 2021-03-20 RX ADMIN — IOHEXOL 100 ML: 350 INJECTION, SOLUTION INTRAVENOUS at 08:14

## 2021-03-23 ENCOUNTER — APPOINTMENT (OUTPATIENT)
Dept: LAB | Facility: CLINIC | Age: 61
End: 2021-03-23
Payer: COMMERCIAL

## 2021-03-23 DIAGNOSIS — C62.12 SEMINOMA OF DESCENDED LEFT TESTIS (HCC): ICD-10-CM

## 2021-03-23 LAB — LDH SERPL-CCNC: 358 U/L (ref 81–234)

## 2021-03-23 PROCEDURE — 83615 LACTATE (LD) (LDH) ENZYME: CPT

## 2021-03-23 PROCEDURE — 36415 COLL VENOUS BLD VENIPUNCTURE: CPT

## 2021-03-25 ENCOUNTER — TELEPHONE (OUTPATIENT)
Dept: UROLOGY | Facility: AMBULATORY SURGERY CENTER | Age: 61
End: 2021-03-25

## 2021-03-25 NOTE — TELEPHONE ENCOUNTER
Patient managed by Dr Castro [facility] called in to report significant CT findings   Radiologist can be reached at  [phone]

## 2021-03-26 ENCOUNTER — OFFICE VISIT (OUTPATIENT)
Dept: UROLOGY | Facility: MEDICAL CENTER | Age: 61
End: 2021-03-26
Payer: COMMERCIAL

## 2021-03-26 VITALS
HEART RATE: 64 BPM | BODY MASS INDEX: 37.95 KG/M2 | SYSTOLIC BLOOD PRESSURE: 126 MMHG | DIASTOLIC BLOOD PRESSURE: 88 MMHG | WEIGHT: 257 LBS

## 2021-03-26 DIAGNOSIS — Z71.89 OTHER SPECIFIED COUNSELING: ICD-10-CM

## 2021-03-26 DIAGNOSIS — R74.02 ELEVATED LDH: ICD-10-CM

## 2021-03-26 DIAGNOSIS — R59.0 LYMPHADENOPATHY, RETROPERITONEAL: ICD-10-CM

## 2021-03-26 DIAGNOSIS — C62.12 SEMINOMA OF DESCENDED LEFT TESTIS (HCC): Primary | ICD-10-CM

## 2021-03-26 PROCEDURE — 99214 OFFICE O/P EST MOD 30 MIN: CPT | Performed by: UROLOGY

## 2021-03-26 NOTE — PROGRESS NOTES
Assessment/Plan:    Seminoma of descended left testis [facility])  The patient has a appointment with Hematology-Oncology on April 1  At this point, he is a chemotherapy candidate due to his metastatic disease in elevated LDH  We will continue to follow him through his post- surgical   But care will be dictated primarily by Oncology at this point  Diagnoses and all orders for this visit:    Seminoma of descended left testis [facility])          Subjective:      Patient ID: Teresa Ramirez is a 57 y o  male  HPI  Testicular cancer -pure seminoma: The patient returns to discuss his metabolic workup  After reviewing NCCN guidelines version 1 2021, based upon the patient's LDH which is elevated to 1 52 times the upper limit of normal and rising, he is at the low end of stage IIIB  This should be treated with chemotherapy  The patient has an appointment to see Hematology - oncology on April 1  Note that had his LDH been just a little bit lower (<1 5 X upper limit of nl) he would have had been stage II and possibly qualified for radiation instead of chemotherapy  Accompanied by wife  The following portions of the patient's history were reviewed and updated as appropriate: allergies, current medications, past family history, past medical history, past social history, past surgical history and problem list     Review of Systems    Constitutional: Negative for activity change and fatigue  Respiratory: Negative for shortness of breath and wheezing     Cardiovascular: Negative for chest pain         Hypertension    Gastrointestinal: Negative for abdominal pain  Endocrine:        Non-insulin dependent diabetes   Hemoglobin A1c's run in the low sevens   Taking metformin to counteract the Prednisone he is taking for polymyalgia rheumatica   He is tapering off it    Genitourinary: Negative for difficulty urinating, dysuria, frequency, hematuria and urgency          Known punctate renal stones bilaterally    Musculoskeletal: Negative for back pain and gait problem         Polymyalgia rheumatica    Skin: Negative     Allergic/Immunologic: Negative     Neurological: Negative     Psychiatric/Behavioral: Negative     Objective:      /88   Pulse 64   Wt 117 kg (257 lb)   BMI 37 95 kg/m²          Physical Exam  Constitutional:       Appearance: He is well-developed  HENT:      Head: Normocephalic and atraumatic  Neck:      Musculoskeletal: Normal range of motion  Pulmonary:      Effort: Pulmonary effort is normal    Abdominal:      Comments: Incision is healing normally  Genitourinary:     Penis: Normal        Comments: Right testis nl  There is swelling in left hemiscrotum  If I did not know better I would think he still has a testis  This is smooth and soft and does not feel like recurrent tumor  It is softer and smaller than at last visit  Musculoskeletal: Normal range of motion  Skin:     General: Skin is warm and dry  Neurological:      Mental Status: He is alert and oriented to person, place, and time  Psychiatric:         Behavior: Behavior normal          Thought Content: Thought content normal          Judgment: Judgment normal          I have spent  30 minutes with Patient and wife today in which greater than 50% of this time was spent in counseling/coordination of care regarding Diagnostic results, Risks and benefits of tx options and Intructions for management  Note that only a few minutes was spent examining the patient's surgical site  More than 90% of the visit was spent discussing future treatment

## 2021-03-27 NOTE — ASSESSMENT & PLAN NOTE
The patient has a appointment with Hematology-Oncology on April 1  At this point, he is a chemotherapy candidate due to his metastatic disease in elevated LDH  We will continue to follow him through his post- surgical   But care will be dictated primarily by Oncology at this point

## 2021-03-31 ENCOUNTER — TELEPHONE (OUTPATIENT)
Dept: UROLOGY | Facility: MEDICAL CENTER | Age: 61
End: 2021-03-31

## 2021-03-31 NOTE — TELEPHONE ENCOUNTER
Received signed authorization from the patient to complete his [employer] cancer forms for his initial CA prostate claim & Left testicular seminoma claim  Per patient request, Medical Records & completed [employer] claim forms were faxed to [address] [place] [address] on 3/31/2021  Copy of completed [employer] claim forms were mailed to patient home address

## 2021-04-01 ENCOUNTER — CONSULT (OUTPATIENT)
Dept: HEMATOLOGY ONCOLOGY | Facility: CLINIC | Age: 61
End: 2021-04-01
Payer: COMMERCIAL

## 2021-04-01 ENCOUNTER — APPOINTMENT (OUTPATIENT)
Dept: LAB | Facility: CLINIC | Age: 61
End: 2021-04-01
Payer: COMMERCIAL

## 2021-04-01 VITALS
WEIGHT: 261.6 LBS | DIASTOLIC BLOOD PRESSURE: 76 MMHG | HEART RATE: 76 BPM | RESPIRATION RATE: 16 BRPM | SYSTOLIC BLOOD PRESSURE: 140 MMHG | TEMPERATURE: 98.6 F | OXYGEN SATURATION: 97 % | BODY MASS INDEX: 38.75 KG/M2 | HEIGHT: 69 IN

## 2021-04-01 DIAGNOSIS — C62.92 SEMINOMA OF TESTIS, STAGE 3, LEFT (HCC): Primary | ICD-10-CM

## 2021-04-01 DIAGNOSIS — Z29.8 CHEMOPREVENTION: ICD-10-CM

## 2021-04-01 DIAGNOSIS — D70.1 CHEMOTHERAPY-INDUCED NEUTROPENIA (HCC): ICD-10-CM

## 2021-04-01 DIAGNOSIS — T45.1X5A CHEMOTHERAPY-INDUCED NEUTROPENIA (HCC): ICD-10-CM

## 2021-04-01 DIAGNOSIS — C62.92 SEMINOMA OF TESTIS, STAGE 3, LEFT (HCC): ICD-10-CM

## 2021-04-01 DIAGNOSIS — Z78.9 NEED FOR FOLLOW-UP BY SOCIAL WORKER: ICD-10-CM

## 2021-04-01 DIAGNOSIS — C62.12 SEMINOMA OF DESCENDED LEFT TESTIS (HCC): ICD-10-CM

## 2021-04-01 PROBLEM — Z29.89 CHEMOPREVENTION: Status: ACTIVE | Noted: 2021-04-01

## 2021-04-01 LAB
ALBUMIN SERPL BCP-MCNC: 3.2 G/DL (ref 3.5–5)
ALP SERPL-CCNC: 97 U/L (ref 46–116)
ALT SERPL W P-5'-P-CCNC: 50 U/L (ref 12–78)
ANION GAP SERPL CALCULATED.3IONS-SCNC: 5 MMOL/L (ref 4–13)
AST SERPL W P-5'-P-CCNC: 41 U/L (ref 5–45)
BASOPHILS # BLD AUTO: 0.05 THOUSANDS/ΜL (ref 0–0.1)
BASOPHILS NFR BLD AUTO: 1 % (ref 0–1)
BILIRUB SERPL-MCNC: 0.68 MG/DL (ref 0.2–1)
BUN SERPL-MCNC: 14 MG/DL (ref 5–25)
CALCIUM ALBUM COR SERPL-MCNC: 10 MG/DL (ref 8.3–10.1)
CALCIUM SERPL-MCNC: 9.4 MG/DL (ref 8.3–10.1)
CHLORIDE SERPL-SCNC: 110 MMOL/L (ref 100–108)
CO2 SERPL-SCNC: 26 MMOL/L (ref 21–32)
CREAT SERPL-MCNC: 1.13 MG/DL (ref 0.6–1.3)
EOSINOPHIL # BLD AUTO: 0.17 THOUSAND/ΜL (ref 0–0.61)
EOSINOPHIL NFR BLD AUTO: 2 % (ref 0–6)
ERYTHROCYTE [DISTWIDTH] IN BLOOD BY AUTOMATED COUNT: 13.5 % (ref 11.6–15.1)
GFR SERPL CREATININE-BSD FRML MDRD: 69 ML/MIN/1.73SQ M
GLUCOSE SERPL-MCNC: 127 MG/DL (ref 65–140)
HCT VFR BLD AUTO: 41.4 % (ref 36.5–49.3)
HGB BLD-MCNC: 13.3 G/DL (ref 12–17)
IMM GRANULOCYTES # BLD AUTO: 0.04 THOUSAND/UL (ref 0–0.2)
IMM GRANULOCYTES NFR BLD AUTO: 1 % (ref 0–2)
LDH SERPL-CCNC: 403 U/L (ref 81–234)
LYMPHOCYTES # BLD AUTO: 2.53 THOUSANDS/ΜL (ref 0.6–4.47)
LYMPHOCYTES NFR BLD AUTO: 33 % (ref 14–44)
MCH RBC QN AUTO: 28.5 PG (ref 26.8–34.3)
MCHC RBC AUTO-ENTMCNC: 32.1 G/DL (ref 31.4–37.4)
MCV RBC AUTO: 89 FL (ref 82–98)
MONOCYTES # BLD AUTO: 0.9 THOUSAND/ΜL (ref 0.17–1.22)
MONOCYTES NFR BLD AUTO: 12 % (ref 4–12)
NEUTROPHILS # BLD AUTO: 4.06 THOUSANDS/ΜL (ref 1.85–7.62)
NEUTS SEG NFR BLD AUTO: 51 % (ref 43–75)
NRBC BLD AUTO-RTO: 0 /100 WBCS
PLATELET # BLD AUTO: 289 THOUSANDS/UL (ref 149–390)
PMV BLD AUTO: 10.2 FL (ref 8.9–12.7)
POTASSIUM SERPL-SCNC: 4.3 MMOL/L (ref 3.5–5.3)
PROT SERPL-MCNC: 7.9 G/DL (ref 6.4–8.2)
RBC # BLD AUTO: 4.66 MILLION/UL (ref 3.88–5.62)
SODIUM SERPL-SCNC: 141 MMOL/L (ref 136–145)
WBC # BLD AUTO: 7.75 THOUSAND/UL (ref 4.31–10.16)

## 2021-04-01 PROCEDURE — 83615 LACTATE (LD) (LDH) ENZYME: CPT

## 2021-04-01 PROCEDURE — 85025 COMPLETE CBC W/AUTO DIFF WBC: CPT

## 2021-04-01 PROCEDURE — 80053 COMPREHEN METABOLIC PANEL: CPT

## 2021-04-01 PROCEDURE — 36415 COLL VENOUS BLD VENIPUNCTURE: CPT

## 2021-04-01 PROCEDURE — 99205 OFFICE O/P NEW HI 60 MIN: CPT | Performed by: INTERNAL MEDICINE

## 2021-04-01 RX ORDER — SODIUM CHLORIDE 9 MG/ML
20 INJECTION, SOLUTION INTRAVENOUS ONCE
Status: CANCELLED | OUTPATIENT
Start: 2021-04-06

## 2021-04-01 RX ORDER — SODIUM CHLORIDE 9 MG/ML
20 INJECTION, SOLUTION INTRAVENOUS ONCE
Status: CANCELLED | OUTPATIENT
Start: 2021-04-08

## 2021-04-01 RX ORDER — SODIUM CHLORIDE 9 MG/ML
20 INJECTION, SOLUTION INTRAVENOUS ONCE
Status: CANCELLED | OUTPATIENT
Start: 2021-04-07

## 2021-04-01 RX ORDER — PROCHLORPERAZINE MALEATE 10 MG
10 TABLET ORAL EVERY 6 HOURS PRN
Qty: 60 TABLET | Refills: 0 | Status: SHIPPED | OUTPATIENT
Start: 2021-04-01 | End: 2021-04-20 | Stop reason: HOSPADM

## 2021-04-01 RX ORDER — SODIUM CHLORIDE 9 MG/ML
20 INJECTION, SOLUTION INTRAVENOUS ONCE
Status: CANCELLED | OUTPATIENT
Start: 2021-04-05

## 2021-04-01 RX ORDER — SODIUM CHLORIDE 9 MG/ML
20 INJECTION, SOLUTION INTRAVENOUS ONCE
Status: CANCELLED | OUTPATIENT
Start: 2021-04-09

## 2021-04-01 NOTE — PROGRESS NOTES
Oncology Consult Note  Anny Smith 57 y o  male MRN: [id number]  Unit/Bed#:  Encounter: [id number]      Presenting Complaint: stage II B (pT2, pN2, S1) left-sided pure seminoma    History of Presenting Illness:   he is 50-year-old  male with history of prostate cancer, nephrolithiasis, benign prostatic hypertrophy, polymyalgia, hypertension, sleep apnea, obesity, GERD, hiatal hernia, gout          His prostate cancers diagnosed on September 2019 with single core with 4 mm stretch of prostate cancer with very low risk group he is on active surveillance with PSA 4 9 on March 2020 followed by Urology, was found to have enlarged of the left testicle area    Ultrasound of the scrotum on 10/24/2020 showed left testicle measuring 7 2 x 3 9 x 5 1 cm with lobulated contour and diffuse heterogeneous echotexture    Repeat ultrasound on 01/20/2021 showed 9 1 x 4 9 x 6 8 cm increase in size compared to prior ultrasound    LDH was elevated at 376 ( upper 250) normal hCG, alpha fetoprotein    Status post left inguinal orchiectomy on 02/19/2021, showing pure seminoma primary measuring 8 2 cm, abuts and invades into but not through tunica albuginea, invades hilar fat and spermatic cord soft tissue with lymphovascular invasion, spermatic cord is not involved by the tumor confirmed by 2nd opinion at [facility]   CT scan of the chest abdomen and pelvis on 03/20/2021 showed enlarged left iliac and left para-aortic lymph nodes for example left para-aortic lymph node measuring 2 3 cm, left external iliac lymph node measuring 30 mm no evidence of metastatic disease in the chest    He has pain at the left orchiectomy site on and off most likely responding to acetaminophen from healing    He reported 12 lb weight loss after the surgery denies any headache blurred vision nausea vomiting diarrhea constipation dysuria hematuria melena hematochezia night sweats low-grade fever tingling or numbness    Review of Systems - As stated in the HPI otherwise the fourteen point review of systems was negative      Past Medical History:   Diagnosis Date    BPH without obstruction/lower urinary tract symptoms     Cancer (HCC)     prostate    CPAP (continuous positive airway pressure) dependence     Elevated PSA     GERD (gastroesophageal reflux disease)     Gout     Hyperlipidemia     Hypertension     Kidney stone     Obese abdomen     Obesity     Polymyalgia (HCC)     Rash     under both arms and in between legs - family doctor aware - pt uses Desitin    Sleep apnea     Urinary frequency     Urinary urgency     Wears glasses        Social History     Socioeconomic History    Marital status: /Civil Union     Spouse name: None    Number of children: None    Years of education: None    Highest education level: None   Occupational History    None   Social Needs    Financial resource strain: None    Food insecurity     Worry: None     Inability: None    Transportation needs     Medical: None     Non-medical: None   Tobacco Use    Smoking status: Never Smoker    Smokeless tobacco: Never Used   Substance and Sexual Activity    Alcohol use: No    Drug use: No    Sexual activity: None   Lifestyle    Physical activity     Days per week: None     Minutes per session: None    Stress: None   Relationships    Social connections     Talks on phone: None     Gets together: None     Attends Orthodox service: None     Active member of club or organization: None     Attends meetings of clubs or organizations: None     Relationship status: None    Intimate partner violence     Fear of current or ex partner: None     Emotionally abused: None     Physically abused: None     Forced sexual activity: None   Other Topics Concern    None   Social History Narrative    None       Family History   Problem Relation Age of Onset    Alzheimer's disease Mother        No Known Allergies      Current Outpatient Medications:    allopurinol (ZYLOPRIM) 100 mg tablet, allopurinol 100 mg tablet, Disp: , Rfl:     ASPIRIN EC PO, Take 81 mg by mouth, Disp: , Rfl:     Cholecalciferol (VITAMIN D3) 2000 units capsule, Take 1 capsule by mouth daily, Disp: , Rfl:     dexlansoprazole (DEXILANT) 60 MG capsule, Dexilant 60 mg capsule, delayed release, Disp: , Rfl:     lisinopril (ZESTRIL) 20 mg tablet, Take 10 mg by mouth daily , Disp: , Rfl:     metFORMIN (GLUCOPHAGE) 500 mg tablet, , Disp: , Rfl:     Omega 3 1000 MG CAPS, Take 3 capsules by mouth, Disp: , Rfl:     PREDNISONE PO, Take 2 mg by mouth daily , Disp: , Rfl:     rosuvastatin (CRESTOR) 20 MG tablet, Take 20 mg by mouth, Disp: , Rfl:     prochlorperazine (COMPAZINE) 10 mg tablet, Take 1 tablet (10 mg total) by mouth every 6 (six) hours as needed for nausea or vomiting, Disp: 60 tablet, Rfl: 0      /76 (BP Location: Left arm, Patient Position: Sitting, Cuff Size: Adult)   Pulse 76   Temp 98 6 °F (37 °C) (Tympanic)   Resp 16   Ht 5' 9" (1 753 m)   Wt 119 kg (261 lb 9 6 oz)   SpO2 97%   BMI 38 63 kg/m²       General Appearance:    Alert, oriented, obese        Eyes:    PERRL   Ears:    Normal external ear canals, both ears   Nose:   Nares normal, septum midline   Throat:   Mucosa moist  Pharynx without injection  Neck:   Supple, short neck       Lungs:     Clear to auscultation bilaterally   Chest Wall:    No tenderness or deformity    Heart:    Regular rate and rhythm       Abdomen:     Soft, non-tender, obese with ventral hernia bowel sounds +, no organomegaly           Extremities:   Extremities no cyanosis or edema       Skin:   no rash or icterus  Lymph nodes:   Cervical, supraclavicular, and axillary nodes normal   Neurologic:   CNII-XII intact, normal strength, sensation and reflexes     Throughout               No results found for this or any previous visit (from the past 48 hour(s))        Ct Chest Abdomen Pelvis W Contrast    Result Date: 3/25/2021  Narrative: CT CHEST, ABDOMEN AND PELVIS WITH IV CONTRAST INDICATION:   C62 12: Malignant neoplasm of descended left testis  Stage IB seminoma of the left rete testis, status post resection  Staging  COMPARISON:  CT, dated 11/5/2018  TECHNIQUE: CT examination of the chest, abdomen and pelvis was performed  Axial, sagittal, and coronal 2D reformatted images were created from the source data and submitted for interpretation  Radiation dose length product (DLP) for this visit:  1380 mGy-cm   This examination, like all CT scans performed in the [facility], was performed utilizing techniques to minimize radiation dose exposure, including the use of iterative reconstruction and automated exposure control  IV Contrast:  100 mL of iohexol (OMNIPAQUE) Enteric Contrast: Enteric contrast was administered  FINDINGS: CHEST LUNGS:  Lungs are clear  There is no tracheal or endobronchial lesion  PLEURA:  Unremarkable  HEART/GREAT VESSELS:  Unremarkable for patient's age  MEDIASTINUM AND ANNA:  Unremarkable  CHEST WALL AND LOWER NECK: Note is made of a 7 mm hypoattenuating left thyroid nodule  Incidental discovery of one or more thyroid nodule(s) measuring less than 1 5 cm and without suspicious features is noted in this patient who is above 28years old; according to guidelines published in the February 2015 white paper on incidental thyroid nodules in the Journal of the [employer] of Radiology [facility]), no further evaluation is recommended  ABDOMEN LIVER/BILIARY TREE:  Unremarkable  GALLBLADDER:  No calcified gallstones  No pericholecystic inflammatory change  SPLEEN:  Unremarkable  PANCREAS:  Unremarkable  ADRENAL GLANDS:  Unremarkable  KIDNEYS/URETERS:  Unremarkable  No hydronephrosis  STOMACH AND BOWEL:  Unremarkable  APPENDIX:  No findings to suggest appendicitis  ABDOMINOPELVIC CAVITY:  There are multiple enlarged lymph nodes extending along the left external iliac and left para-aortic chain    A reference left para-aortic lymph node measures 23 mm in short axis (series 2, image 75), while a reference left external iliac lymph node measures 30 mm in short axis (series 2, image 99)  VESSELS:  Unremarkable for patient's age  PELVIS REPRODUCTIVE ORGANS:  Postsurgical changes related to left orchiectomy are present  Infiltration within the left inguinal region is expected  URINARY BLADDER:  Unremarkable  ABDOMINAL WALL/INGUINAL REGIONS:  Unremarkable  OSSEOUS STRUCTURES:  No acute fracture or destructive osseous lesion  Impression: CHEST: No evidence of metastatic disease  ABDOMEN AND PELVIS: Enlarged lymph nodes extending along the left external iliac and para-aortic chain are concerning for metastatic involvement  The left para-aortic lymph nodes would be amenable to percutaneous biopsy  The study was marked in EPIC for significant notification  Workstation performed: [id number]     ECOG :0    Assessment and plan:  at least 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 with external right iliac lymphadenopathy measuring up to 3 cm and left para-aortic lymphadenopathy measuring up to 2 3 cm (pT2, N2, S1) with persistent elevation of LDH less than 1 5 normal upper limit    Repeat alpha fetoprotein, hCG are normal, repeat  ( )     we reviewed the CT scans on the epic system     according to the NCCN guideline patient needs to be treated with 4 cycles of  Etoposide/ cisplatin     Side effects such as alopecia, nausea, vomiting, neutropenic fever, hepatic and renal insufficiency, hearing loss, secondary malignancy were told he agree to proceed, he signed the consent    To prevent chemotherapy-induced neutropenia pegfilgrastim a day 5  Compazine 10 mg p o  every 6 hours p r n  60 tablet prescription was given     Emend, Zofran, dexamethasone for chemotherapy-induced nausea and vomiting    Follow-up every 3 weeks with CBC, CMP, LDH    He signed the consent for the PICC line a day 1   [facility]

## 2021-04-02 ENCOUNTER — PATIENT OUTREACH (OUTPATIENT)
Dept: CASE MANAGEMENT | Facility: HOSPITAL | Age: 61
End: 2021-04-02

## 2021-04-02 ENCOUNTER — HOSPITAL ENCOUNTER (OUTPATIENT)
Dept: INFUSION CENTER | Facility: HOSPITAL | Age: 61
Discharge: HOME | End: 2021-04-02
Payer: COMMERCIAL

## 2021-04-02 VITALS — HEART RATE: 98 BPM

## 2021-04-02 DIAGNOSIS — C62.12 SEMINOMA OF DESCENDED LEFT TESTIS (HCC): ICD-10-CM

## 2021-04-02 DIAGNOSIS — C62.92 SEMINOMA OF TESTIS, STAGE 3, LEFT (HCC): Primary | ICD-10-CM

## 2021-04-02 PROCEDURE — C1751 CATH, INF, PER/CENT/MIDLINE: HCPCS

## 2021-04-02 PROCEDURE — 36569 INSJ PICC 5 YR+ W/O IMAGING: CPT

## 2021-04-02 NOTE — PROCEDURES
Insert PICC line    Date/Time: 4/2/2021 9:02 AM  Performed by: Cosme Byrd RN  Authorized by: Nomi Herrera MD     Patient location:  Bedside  Other Assisting Provider: Yes (comment) Matt Mackenzie Infusion Tech)    Consent:     Consent obtained:  Written    Consent given by:  Patient    Procedural risks discussed: with MD when obtaining consent  Alternatives discussed: with MD when obtaining consent  Universal protocol:     Procedure explained and questions answered to patient or proxy's satisfaction: yes      Relevant documents present and verified: yes      Test results available and properly labeled: yes      Radiology Images displayed and confirmed  If images not available, report reviewed: yes      Required blood products, implants, devices, and special equipment available: yes      Site/side marked: yes      Immediately prior to procedure, a time out was called: yes      Patient identity confirmed:  Verbally with patient and hospital-assigned identification number  Pre-procedure details:     Hand hygiene: Hand hygiene performed prior to insertion      Sterile barrier technique: All elements of maximal sterile technique followed      Skin preparation:  ChloraPrep    Skin preparation agent: Skin preparation agent completely dried prior to procedure    Indications:     PICC line indications: chemotherapy    Anesthesia (see MAR for exact dosages):      Anesthesia method:  Local infiltration    Local anesthetic:  Lidocaine 1% w/o epi (3 mls)  Procedure details:     Location:  Basilic    Vessel type: vein      Laterality:  Right    Approach: percutaneous technique used      Patient position:  Flat    Procedural supplies:  Single lumen    Catheter size:  4 Fr    Landmarks identified: yes      Ultrasound guidance: yes      Ultrasound image availability:  Not saved    Sterile ultrasound techniques: Sterile gel and sterile probe covers were used      Number of attempts:  1    Successful placement: yes Vessel of catheter tip end:  Sherlock 3CG confirmed ([id number] Giulia Bingham to use, no xray required)    Total catheter length (cm):  46    Catheter out on skin (cm):  0    Max flow rate:  999    Arm circumference:  36  Post-procedure details:     Post-procedure:  Dressing applied and securement device placed    Assessment:  Blood return through all ports    Post-procedure complications: none      Patient tolerance of procedure:   Tolerated well, no immediate complications

## 2021-04-02 NOTE — PROGRESS NOTES
Oncology LSW received pt's completed distress thermometer in which pt self scored a 1/10 and noted insurance/financial, work/school, treatment decisions and fears  Pt has a h/o prostate cancer with recent plan to start chemotherapy  Phoned pt today for outreach  Mr  Hany Dexter stated he was just leaving [facility] after PICC placement  He explained he will be receiving chemotherapy M-F for 4 treatments with 2 week breaks in between  He [place] with his wife and will be attending [facility] for treatments  His wife and son will assist with transportation  Pt is employed and was working up until his surgery in February  He is not collecting any STD or bringing in any income at this time  He shared his STD is based on a person's weight and he is currently "overweight "  His wife does work and is very supportive  LSW offered to meet with pt next Tuesday during his treatments to discuss further and he is agreeable

## 2021-04-02 NOTE — PLAN OF CARE
Problem: Potential for Falls  Goal: Patient will remain free of falls  Description: INTERVENTIONS:  - Assess patient frequently for physical needs  -  Identify cognitive and physical deficits and behaviors that affect risk of falls    -  [place] fall precautions as indicated by assessment   - Educate patient/family on patient safety including physical limitations  - Instruct patient to call for assistance with activity based on assessment  - Modify environment to reduce risk of injury  - Consider OT/PT consult to assist with strengthening/mobility  Outcome: Progressing No difficulties

## 2021-04-02 NOTE — PROGRESS NOTES
Single lumen 4 FR PICC inserted in R basilic vein  Patient is for chemo next week at [facility] infusion  Offered patient appointment Saturday at [facility] for PICC flush and skip Sunday PICC flush or teaching for him how to flush it over the weekend  Patient opted for teaching  Patient and son instructed how to care for and flush PICC line over the weekend  Steps in PICC flush also recorded for patient's wife and daughter  Patient aware if he is unable to flush PICC or has significant bleeding outside the dressing he is to call [place] infusion on Saturday in the morning  Any signs of infection or other problems with the PICC he is to call oncologist office  [facility] will be caring for the PICC starting Monday and it is to be pulled next Friday

## 2021-04-05 ENCOUNTER — HOSPITAL ENCOUNTER (OUTPATIENT)
Dept: INFUSION CENTER | Facility: CLINIC | Age: 61
Discharge: HOME | End: 2021-04-05
Payer: COMMERCIAL

## 2021-04-05 VITALS
TEMPERATURE: 98.4 F | RESPIRATION RATE: 18 BRPM | BODY MASS INDEX: 38.2 KG/M2 | SYSTOLIC BLOOD PRESSURE: 136 MMHG | WEIGHT: 257.94 LBS | HEART RATE: 70 BPM | DIASTOLIC BLOOD PRESSURE: 76 MMHG | HEIGHT: 69 IN

## 2021-04-05 DIAGNOSIS — D70.1 CHEMOTHERAPY-INDUCED NEUTROPENIA (HCC): ICD-10-CM

## 2021-04-05 DIAGNOSIS — Z29.8 CHEMOPREVENTION: Primary | ICD-10-CM

## 2021-04-05 DIAGNOSIS — C62.92 SEMINOMA OF TESTIS, STAGE 3, LEFT (HCC): ICD-10-CM

## 2021-04-05 DIAGNOSIS — C62.92 SEMINOMA OF TESTIS, STAGE 3, LEFT (HCC): Primary | ICD-10-CM

## 2021-04-05 DIAGNOSIS — T45.1X5A CHEMOTHERAPY-INDUCED NEUTROPENIA (HCC): ICD-10-CM

## 2021-04-05 PROCEDURE — 96413 CHEMO IV INFUSION 1 HR: CPT

## 2021-04-05 PROCEDURE — 96361 HYDRATE IV INFUSION ADD-ON: CPT

## 2021-04-05 PROCEDURE — 96367 TX/PROPH/DG ADDL SEQ IV INF: CPT

## 2021-04-05 PROCEDURE — 96417 CHEMO IV INFUS EACH ADDL SEQ: CPT

## 2021-04-05 RX ORDER — SODIUM CHLORIDE 9 MG/ML
20 INJECTION, SOLUTION INTRAVENOUS ONCE
Status: COMPLETED | OUTPATIENT
Start: 2021-04-05 | End: 2021-04-05

## 2021-04-05 RX ADMIN — FOSAPREPITANT 150 MG: 150 INJECTION, POWDER, LYOPHILIZED, FOR SOLUTION INTRAVENOUS at 09:14

## 2021-04-05 RX ADMIN — DEXAMETHASONE SODIUM PHOSPHATE: 10 INJECTION, SOLUTION INTRAMUSCULAR; INTRAVENOUS at 08:49

## 2021-04-05 RX ADMIN — SODIUM CHLORIDE 20 ML/HR: 0.9 INJECTION, SOLUTION INTRAVENOUS at 08:47

## 2021-04-05 RX ADMIN — CISPLATIN 46.4 MG: 1 INJECTION INTRAVENOUS at 09:47

## 2021-04-05 RX ADMIN — SODIUM CHLORIDE 232 MG: 0.9 INJECTION, SOLUTION INTRAVENOUS at 10:56

## 2021-04-05 RX ADMIN — SODIUM CHLORIDE 500 ML: 0.9 INJECTION, SOLUTION INTRAVENOUS at 12:07

## 2021-04-05 RX ADMIN — SODIUM CHLORIDE 500 ML: 0.9 INJECTION, SOLUTION INTRAVENOUS at 08:47

## 2021-04-05 NOTE — PLAN OF CARE
Problem: Potential for Falls  Goal: Patient will remain free of falls  Description: INTERVENTIONS:  - Assess patient frequently for physical needs  -  Identify cognitive and physical deficits and behaviors that affect risk of falls    -  [place] fall precautions as indicated by assessment   - Educate patient/family on patient safety including physical limitations  - Instruct patient to call for assistance with activity based on assessment  - Modify environment to reduce risk of injury  - Consider OT/PT consult to assist with strengthening/mobility  Outcome: Progressing

## 2021-04-06 ENCOUNTER — HOSPITAL ENCOUNTER (OUTPATIENT)
Dept: INFUSION CENTER | Facility: CLINIC | Age: 61
Discharge: HOME | End: 2021-04-06
Payer: COMMERCIAL

## 2021-04-06 VITALS — HEIGHT: 69 IN | WEIGHT: 257.94 LBS | BODY MASS INDEX: 38.2 KG/M2

## 2021-04-06 DIAGNOSIS — C62.92 SEMINOMA OF TESTIS, STAGE 3, LEFT (HCC): ICD-10-CM

## 2021-04-06 DIAGNOSIS — D70.1 CHEMOTHERAPY-INDUCED NEUTROPENIA (HCC): ICD-10-CM

## 2021-04-06 DIAGNOSIS — D70.1 CHEMOTHERAPY-INDUCED NEUTROPENIA (HCC): Primary | ICD-10-CM

## 2021-04-06 DIAGNOSIS — T45.1X5A CHEMOTHERAPY-INDUCED NEUTROPENIA (HCC): ICD-10-CM

## 2021-04-06 DIAGNOSIS — Z29.8 CHEMOPREVENTION: Primary | ICD-10-CM

## 2021-04-06 DIAGNOSIS — T45.1X5A CHEMOTHERAPY-INDUCED NEUTROPENIA (HCC): Primary | ICD-10-CM

## 2021-04-06 PROCEDURE — 96417 CHEMO IV INFUS EACH ADDL SEQ: CPT

## 2021-04-06 PROCEDURE — 96367 TX/PROPH/DG ADDL SEQ IV INF: CPT

## 2021-04-06 PROCEDURE — 96361 HYDRATE IV INFUSION ADD-ON: CPT

## 2021-04-06 PROCEDURE — 96413 CHEMO IV INFUSION 1 HR: CPT

## 2021-04-06 RX ORDER — SODIUM CHLORIDE 9 MG/ML
20 INJECTION, SOLUTION INTRAVENOUS ONCE
Status: COMPLETED | OUTPATIENT
Start: 2021-04-06 | End: 2021-04-06

## 2021-04-06 RX ADMIN — CISPLATIN 46.4 MG: 1 INJECTION INTRAVENOUS at 09:50

## 2021-04-06 RX ADMIN — SODIUM CHLORIDE 500 ML: 0.9 INJECTION, SOLUTION INTRAVENOUS at 08:40

## 2021-04-06 RX ADMIN — SODIUM CHLORIDE 500 ML: 0.9 INJECTION, SOLUTION INTRAVENOUS at 12:24

## 2021-04-06 RX ADMIN — SODIUM CHLORIDE 20 ML/HR: 0.9 INJECTION, SOLUTION INTRAVENOUS at 08:40

## 2021-04-06 RX ADMIN — SODIUM CHLORIDE 232 MG: 0.9 INJECTION, SOLUTION INTRAVENOUS at 10:57

## 2021-04-06 RX ADMIN — DEXAMETHASONE SODIUM PHOSPHATE: 10 INJECTION, SOLUTION INTRAMUSCULAR; INTRAVENOUS at 09:25

## 2021-04-06 NOTE — PLAN OF CARE
Problem: Potential for Falls  Goal: Patient will remain free of falls  Description: INTERVENTIONS:  - Assess patient frequently for physical needs  -  Identify cognitive and physical deficits and behaviors that affect risk of falls    -  [place] fall precautions as indicated by assessment   - Educate patient/family on patient safety including physical limitations  - Instruct patient to call for assistance with activity based on assessment  - Modify environment to reduce risk of injury  - Consider OT/PT consult to assist with strengthening/mobility  Outcome: Progressing done

## 2021-04-07 ENCOUNTER — HOSPITAL ENCOUNTER (OUTPATIENT)
Dept: INFUSION CENTER | Facility: CLINIC | Age: 61
Discharge: HOME | End: 2021-04-07
Payer: COMMERCIAL

## 2021-04-07 ENCOUNTER — PATIENT OUTREACH (OUTPATIENT)
Dept: CASE MANAGEMENT | Facility: HOSPITAL | Age: 61
End: 2021-04-07

## 2021-04-07 VITALS
RESPIRATION RATE: 18 BRPM | TEMPERATURE: 98.5 F | WEIGHT: 257.94 LBS | BODY MASS INDEX: 38.2 KG/M2 | HEIGHT: 69 IN | DIASTOLIC BLOOD PRESSURE: 70 MMHG | OXYGEN SATURATION: 99 % | SYSTOLIC BLOOD PRESSURE: 141 MMHG | HEART RATE: 69 BPM

## 2021-04-07 DIAGNOSIS — Z29.8 CHEMOPREVENTION: Primary | ICD-10-CM

## 2021-04-07 DIAGNOSIS — C62.92 SEMINOMA OF TESTIS, STAGE 3, LEFT (HCC): ICD-10-CM

## 2021-04-07 DIAGNOSIS — D70.1 CHEMOTHERAPY-INDUCED NEUTROPENIA (HCC): ICD-10-CM

## 2021-04-07 DIAGNOSIS — T45.1X5A CHEMOTHERAPY-INDUCED NEUTROPENIA (HCC): ICD-10-CM

## 2021-04-07 PROCEDURE — 96413 CHEMO IV INFUSION 1 HR: CPT

## 2021-04-07 PROCEDURE — 96367 TX/PROPH/DG ADDL SEQ IV INF: CPT

## 2021-04-07 PROCEDURE — 96417 CHEMO IV INFUS EACH ADDL SEQ: CPT

## 2021-04-07 PROCEDURE — 96361 HYDRATE IV INFUSION ADD-ON: CPT

## 2021-04-07 RX ORDER — SODIUM CHLORIDE 9 MG/ML
20 INJECTION, SOLUTION INTRAVENOUS ONCE
Status: COMPLETED | OUTPATIENT
Start: 2021-04-07 | End: 2021-04-07

## 2021-04-07 RX ADMIN — SODIUM CHLORIDE 500 ML: 0.9 INJECTION, SOLUTION INTRAVENOUS at 08:58

## 2021-04-07 RX ADMIN — SODIUM CHLORIDE 20 ML/HR: 0.9 INJECTION, SOLUTION INTRAVENOUS at 09:25

## 2021-04-07 RX ADMIN — DEXAMETHASONE SODIUM PHOSPHATE: 10 INJECTION, SOLUTION INTRAMUSCULAR; INTRAVENOUS at 09:25

## 2021-04-07 RX ADMIN — SODIUM CHLORIDE 500 ML: 0.9 INJECTION, SOLUTION INTRAVENOUS at 12:26

## 2021-04-07 RX ADMIN — CISPLATIN 46.4 MG: 1 INJECTION INTRAVENOUS at 10:06

## 2021-04-07 RX ADMIN — SODIUM CHLORIDE 232 MG: 0.9 INJECTION, SOLUTION INTRAVENOUS at 11:13

## 2021-04-07 NOTE — PROGRESS NOTES
Patient tolerated treatment well  Patient states light headedness has gotten better  Patent encouraged to make movements slowly  Patient encouraged to drink more fluids   Patient declined AVS

## 2021-04-07 NOTE — PROGRESS NOTES
Oncology LSW met with pt today during infusion  Provided emotional support as well as printed out information about SSI and SSDI  Allowed for time for pt to share his concerns and family information  Ally Freeman lives with his wife, daughter and 10year old grandson  His daughter recently fell and broke her ankle and is unable to work because of this  His wife is working as a  at [employer]  She carries their health benefits  She has been utilizing intermittent FMLA to help get pt to appointments  Pt's son is also local and works at [employer]  He is bringing pt to his infusion treatments as he works 2nd shift and is available in the morning  Pt's wife will pick him up in the afternoon  Pt was working FT as a propane   He received his last paycheck last week and is now out of vacation time  He stated his boss is keeping his job open for him when he returns in July  It appears there is no STD or LTD available through his employer, as the benefit seems to be based on a person's weight  He stated "the more overweight you are, the higher the cost to pay in "  LSW reviewed printed out information for SSI and SSDI  Encouraged pt to apply for both, as he may be eligible for SNAP benefits in addition to receiving supplemental income  He stated he will look at information with his daughter who will help him apply online  Pt's treatments are one week on and two weeks off for a total of 4 cycles  LSW will continue to follow and offer support

## 2021-04-07 NOTE — PROGRESS NOTES
Upon arrival patient with complain of feeling wobbly since last night  Patient also not eating well since surgery  Patient's PICC line dressing with a little bit of bloody drainage and some yellowish drainage  Patient states he took shower last night  Beige made aware  No new orders received at this time  PICC dressing changed  Will continue to monitor

## 2021-04-07 NOTE — PLAN OF CARE
Problem: Potential for Falls  Goal: Patient will remain free of falls  Description: INTERVENTIONS:  - Assess patient frequently for physical needs  -  Identify cognitive and physical deficits and behaviors that affect risk of falls    -  [place] fall precautions as indicated by assessment   - Educate patient/family on patient safety including physical limitations  - Instruct patient to call for assistance with activity based on assessment  - Modify environment to reduce risk of injury  - Consider OT/PT consult to assist with strengthening/mobility  Outcome: Progressing

## 2021-04-08 ENCOUNTER — HOSPITAL ENCOUNTER (OUTPATIENT)
Dept: INFUSION CENTER | Facility: CLINIC | Age: 61
Discharge: HOME | End: 2021-04-08
Payer: COMMERCIAL

## 2021-04-08 VITALS
HEART RATE: 55 BPM | WEIGHT: 279 LBS | TEMPERATURE: 97.9 F | SYSTOLIC BLOOD PRESSURE: 151 MMHG | RESPIRATION RATE: 18 BRPM | DIASTOLIC BLOOD PRESSURE: 73 MMHG | HEIGHT: 69 IN | BODY MASS INDEX: 41.32 KG/M2

## 2021-04-08 DIAGNOSIS — D70.1 CHEMOTHERAPY-INDUCED NEUTROPENIA (HCC): ICD-10-CM

## 2021-04-08 DIAGNOSIS — T45.1X5A CHEMOTHERAPY-INDUCED NEUTROPENIA (HCC): ICD-10-CM

## 2021-04-08 DIAGNOSIS — C62.92 SEMINOMA OF TESTIS, STAGE 3, LEFT (HCC): ICD-10-CM

## 2021-04-08 DIAGNOSIS — Z29.8 CHEMOPREVENTION: Primary | ICD-10-CM

## 2021-04-08 DIAGNOSIS — C62.12 SEMINOMA OF DESCENDED LEFT TESTIS (HCC): Primary | ICD-10-CM

## 2021-04-08 PROCEDURE — 96417 CHEMO IV INFUS EACH ADDL SEQ: CPT

## 2021-04-08 PROCEDURE — 96413 CHEMO IV INFUSION 1 HR: CPT

## 2021-04-08 PROCEDURE — 96367 TX/PROPH/DG ADDL SEQ IV INF: CPT

## 2021-04-08 PROCEDURE — 96361 HYDRATE IV INFUSION ADD-ON: CPT

## 2021-04-08 RX ORDER — SODIUM CHLORIDE 9 MG/ML
20 INJECTION, SOLUTION INTRAVENOUS ONCE
Status: COMPLETED | OUTPATIENT
Start: 2021-04-08 | End: 2021-04-08

## 2021-04-08 RX ORDER — FUROSEMIDE 20 MG/1
20 TABLET ORAL DAILY
Qty: 30 TABLET | Refills: 3 | Status: SHIPPED | OUTPATIENT
Start: 2021-04-08 | End: 2021-04-20 | Stop reason: HOSPADM

## 2021-04-08 RX ADMIN — DEXAMETHASONE SODIUM PHOSPHATE: 10 INJECTION, SOLUTION INTRAMUSCULAR; INTRAVENOUS at 09:58

## 2021-04-08 RX ADMIN — SODIUM CHLORIDE 500 ML: 0.9 INJECTION, SOLUTION INTRAVENOUS at 12:45

## 2021-04-08 RX ADMIN — CISPLATIN 46.4 MG: 1 INJECTION INTRAVENOUS at 10:38

## 2021-04-08 RX ADMIN — SODIUM CHLORIDE 232 MG: 0.9 INJECTION, SOLUTION INTRAVENOUS at 11:44

## 2021-04-08 RX ADMIN — SODIUM CHLORIDE 500 ML: 0.9 INJECTION, SOLUTION INTRAVENOUS at 09:20

## 2021-04-08 RX ADMIN — SODIUM CHLORIDE 20 ML/HR: 0.9 INJECTION, SOLUTION INTRAVENOUS at 09:15

## 2021-04-08 NOTE — PROGRESS NOTES
Pt  Verbalized feeling bloated today; wt gain noted at 22 lbs  Pt  Also states his urine output has decreased  Office of Dr Natalie Sam notified

## 2021-04-08 NOTE — PROGRESS NOTES
Proceeded with treatment as per Dr Teri Springer ordered Lasix 20mg daily and prescription sent to pharmacy  Pt  Received alert that medication was ready for  prior to leaving infusion  RN instructed on the importance of picking up the Lasix and taking the medication today  Pt also instructed to call physician with any new concerns  Pt  Will return tomorrow for Day #5 Cisplatin Etoposide    Pt  Declined AVS

## 2021-04-09 ENCOUNTER — HOSPITAL ENCOUNTER (OUTPATIENT)
Dept: INFUSION CENTER | Facility: CLINIC | Age: 61
Discharge: HOME | End: 2021-04-09
Payer: COMMERCIAL

## 2021-04-09 ENCOUNTER — PATIENT OUTREACH (OUTPATIENT)
Dept: CASE MANAGEMENT | Facility: HOSPITAL | Age: 61
End: 2021-04-09

## 2021-04-09 VITALS
DIASTOLIC BLOOD PRESSURE: 77 MMHG | HEIGHT: 69 IN | HEART RATE: 56 BPM | RESPIRATION RATE: 20 BRPM | BODY MASS INDEX: 41.08 KG/M2 | WEIGHT: 277.34 LBS | SYSTOLIC BLOOD PRESSURE: 159 MMHG | TEMPERATURE: 97 F

## 2021-04-09 DIAGNOSIS — T45.1X5A CHEMOTHERAPY-INDUCED NEUTROPENIA (HCC): ICD-10-CM

## 2021-04-09 DIAGNOSIS — C62.92 SEMINOMA OF TESTIS, STAGE 3, LEFT (HCC): ICD-10-CM

## 2021-04-09 DIAGNOSIS — Z29.8 CHEMOPREVENTION: Primary | ICD-10-CM

## 2021-04-09 DIAGNOSIS — D70.1 CHEMOTHERAPY-INDUCED NEUTROPENIA (HCC): ICD-10-CM

## 2021-04-09 PROCEDURE — 96361 HYDRATE IV INFUSION ADD-ON: CPT

## 2021-04-09 PROCEDURE — 96377 APPLICATON ON-BODY INJECTOR: CPT

## 2021-04-09 PROCEDURE — 96367 TX/PROPH/DG ADDL SEQ IV INF: CPT

## 2021-04-09 PROCEDURE — 96413 CHEMO IV INFUSION 1 HR: CPT

## 2021-04-09 PROCEDURE — 96417 CHEMO IV INFUS EACH ADDL SEQ: CPT

## 2021-04-09 RX ORDER — SODIUM CHLORIDE 9 MG/ML
20 INJECTION, SOLUTION INTRAVENOUS ONCE
Status: COMPLETED | OUTPATIENT
Start: 2021-04-09 | End: 2021-04-09

## 2021-04-09 RX ADMIN — SODIUM CHLORIDE 232 MG: 0.9 INJECTION, SOLUTION INTRAVENOUS at 11:52

## 2021-04-09 RX ADMIN — PEGFILGRASTIM 6 MG: KIT SUBCUTANEOUS at 14:27

## 2021-04-09 RX ADMIN — SODIUM CHLORIDE 20 ML/HR: 0.9 INJECTION, SOLUTION INTRAVENOUS at 10:06

## 2021-04-09 RX ADMIN — DEXAMETHASONE SODIUM PHOSPHATE: 10 INJECTION, SOLUTION INTRAMUSCULAR; INTRAVENOUS at 10:08

## 2021-04-09 RX ADMIN — SODIUM CHLORIDE 500 ML: 0.9 INJECTION, SOLUTION INTRAVENOUS at 13:01

## 2021-04-09 RX ADMIN — CISPLATIN 46.4 MG: 1 INJECTION INTRAVENOUS at 10:47

## 2021-04-09 RX ADMIN — SODIUM CHLORIDE 500 ML: 0.9 INJECTION, SOLUTION INTRAVENOUS at 09:04

## 2021-04-09 NOTE — PROGRESS NOTES
Oncology LSW met with pt today during infusion  Today is his last treatment for 2 weeks until he returns for his 2nd cycle  He stated he is feeling "not that great" but denies any uncontrolled symptoms  He shared he has a lot of fluid retention and now has to take Lasix to diurese some fluid weight  Pt stated he will work on the SSI/SSDI application with his daughter next week  Provided emotional support and active listening  Will follow

## 2021-04-09 NOTE — PROGRESS NOTES
Pt arrived to unit c/o feeling bloated  Pt took Lasix as instructed on 4/8/21, pt did notice slight improvement  Tolerated Hydration, Cisplatin and Toposar infusions without incident  PICC removed per protocol  Neulasta onpro placed on left arm  Pt given education on Neulasta onpro  Pt verbalized understanding  AVS provided  Pt left unit in stable condition

## 2021-04-09 NOTE — PLAN OF CARE
Problem: Potential for Falls  Goal: Patient will remain free of falls  Description: INTERVENTIONS:  - Assess patient frequently for physical needs  -  Identify cognitive and physical deficits and behaviors that affect risk of falls    -  [place] fall precautions as indicated by assessment   - Educate patient/family on patient safety including physical limitations  - Instruct patient to call for assistance with activity based on assessment  - Modify environment to reduce risk of injury  - Consider OT/PT consult to assist with strengthening/mobility  Outcome: Progressing

## 2021-04-15 ENCOUNTER — APPOINTMENT (EMERGENCY)
Dept: CT IMAGING | Facility: HOSPITAL | Age: 61
DRG: 682 | End: 2021-04-15
Payer: COMMERCIAL

## 2021-04-15 ENCOUNTER — NURSE TRIAGE (OUTPATIENT)
Dept: HEMATOLOGY ONCOLOGY | Facility: CLINIC | Age: 61
End: 2021-04-15

## 2021-04-15 ENCOUNTER — HOSPITAL ENCOUNTER (INPATIENT)
Facility: HOSPITAL | Age: 61
LOS: 5 days | Discharge: HOME | DRG: 682 | End: 2021-04-20
Attending: EMERGENCY MEDICINE | Admitting: INTERNAL MEDICINE
Payer: COMMERCIAL

## 2021-04-15 DIAGNOSIS — R53.83 FATIGUE: ICD-10-CM

## 2021-04-15 DIAGNOSIS — N17.9 AKI (ACUTE KIDNEY INJURY) (HCC): Primary | ICD-10-CM

## 2021-04-15 DIAGNOSIS — R07.9 CHEST PAIN, UNSPECIFIED TYPE: ICD-10-CM

## 2021-04-15 DIAGNOSIS — R42 DIZZINESS: ICD-10-CM

## 2021-04-15 DIAGNOSIS — D70.9 NEUTROPENIA, UNSPECIFIED TYPE (HCC): ICD-10-CM

## 2021-04-15 DIAGNOSIS — R10.84 GENERALIZED ABDOMINAL PAIN: ICD-10-CM

## 2021-04-15 DIAGNOSIS — R73.9 STEROID-INDUCED HYPERGLYCEMIA: ICD-10-CM

## 2021-04-15 DIAGNOSIS — T38.0X5A STEROID-INDUCED HYPERGLYCEMIA: ICD-10-CM

## 2021-04-15 PROBLEM — G47.33 OSA ON CPAP: Status: ACTIVE | Noted: 2021-04-15

## 2021-04-15 PROBLEM — M35.3 POLYMYALGIA RHEUMATICA SYNDROME (HCC): Status: ACTIVE | Noted: 2019-11-19

## 2021-04-15 PROBLEM — Z99.89 OSA ON CPAP: Status: ACTIVE | Noted: 2021-04-15

## 2021-04-15 PROBLEM — R07.89 CHEST DISCOMFORT: Status: ACTIVE | Noted: 2021-04-15

## 2021-04-15 LAB
ALBUMIN SERPL BCP-MCNC: 3.2 G/DL (ref 3.5–5)
ALP SERPL-CCNC: 82 U/L (ref 46–116)
ALT SERPL W P-5'-P-CCNC: 41 U/L (ref 12–78)
ANION GAP SERPL CALCULATED.3IONS-SCNC: 18 MMOL/L (ref 4–13)
APTT PPP: 31 SECONDS (ref 23–37)
AST SERPL W P-5'-P-CCNC: 15 U/L (ref 5–45)
ATRIAL RATE: 75 BPM
ATRIAL RATE: 80 BPM
ATRIAL RATE: 84 BPM
BACTERIA UR QL AUTO: ABNORMAL /HPF
BASOPHILS # BLD MANUAL: 0 THOUSAND/UL (ref 0–0.1)
BASOPHILS NFR MAR MANUAL: 0 % (ref 0–1)
BILIRUB SERPL-MCNC: 1.12 MG/DL (ref 0.2–1)
BILIRUB UR QL STRIP: NEGATIVE
BUN SERPL-MCNC: 100 MG/DL (ref 5–25)
CALCIUM ALBUM COR SERPL-MCNC: 8.2 MG/DL (ref 8.3–10.1)
CALCIUM SERPL-MCNC: 7.6 MG/DL (ref 8.3–10.1)
CHLORIDE SERPL-SCNC: 98 MMOL/L (ref 100–108)
CK SERPL-CCNC: 35 U/L (ref 39–308)
CLARITY UR: CLEAR
CO2 SERPL-SCNC: 22 MMOL/L (ref 21–32)
COLOR UR: YELLOW
CREAT SERPL-MCNC: 4.29 MG/DL (ref 0.6–1.3)
EOSINOPHIL # BLD MANUAL: 0 THOUSAND/UL (ref 0–0.4)
EOSINOPHIL NFR BLD MANUAL: 0 % (ref 0–6)
ERYTHROCYTE [DISTWIDTH] IN BLOOD BY AUTOMATED COUNT: 12.8 % (ref 11.6–15.1)
EST. AVERAGE GLUCOSE BLD GHB EST-MCNC: 148 MG/DL
FLUAV RNA RESP QL NAA+PROBE: NEGATIVE
FLUBV RNA RESP QL NAA+PROBE: NEGATIVE
GFR SERPL CREATININE-BSD FRML MDRD: 14 ML/MIN/1.73SQ M
GLUCOSE SERPL-MCNC: 118 MG/DL (ref 65–140)
GLUCOSE SERPL-MCNC: 132 MG/DL (ref 65–140)
GLUCOSE SERPL-MCNC: 137 MG/DL (ref 65–140)
GLUCOSE SERPL-MCNC: 140 MG/DL (ref 65–140)
GLUCOSE UR STRIP-MCNC: NEGATIVE MG/DL
HBA1C MFR BLD: 6.8 %
HCT VFR BLD AUTO: 37.4 % (ref 36.5–49.3)
HGB BLD-MCNC: 12.6 G/DL (ref 12–17)
HGB UR QL STRIP.AUTO: ABNORMAL
HYALINE CASTS #/AREA URNS LPF: ABNORMAL /LPF
INR PPP: 1.32 (ref 0.84–1.19)
KETONES UR STRIP-MCNC: NEGATIVE MG/DL
LEUKOCYTE ESTERASE UR QL STRIP: NEGATIVE
LYMPHOCYTES # BLD AUTO: 1.23 THOUSAND/UL (ref 0.6–4.47)
LYMPHOCYTES # BLD AUTO: 98 % (ref 14–44)
MAGNESIUM SERPL-MCNC: 1.8 MG/DL (ref 1.6–2.6)
MCH RBC QN AUTO: 27.9 PG (ref 26.8–34.3)
MCHC RBC AUTO-ENTMCNC: 33.7 G/DL (ref 31.4–37.4)
MCV RBC AUTO: 83 FL (ref 82–98)
MONOCYTES # BLD AUTO: 0.01 THOUSAND/UL (ref 0–1.22)
MONOCYTES NFR BLD: 1 % (ref 4–12)
NEUTROPHILS # BLD MANUAL: 0.01 THOUSAND/UL (ref 1.85–7.62)
NEUTS SEG NFR BLD AUTO: 1 % (ref 43–75)
NITRITE UR QL STRIP: NEGATIVE
NON-SQ EPI CELLS URNS QL MICRO: ABNORMAL /HPF
NRBC BLD AUTO-RTO: 0 /100 WBCS
NT-PROBNP SERPL-MCNC: 66 PG/ML
P AXIS: 56 DEGREES
P AXIS: 65 DEGREES
P AXIS: 71 DEGREES
PH UR STRIP.AUTO: 5 [PH] (ref 4.5–8)
PLATELET # BLD AUTO: 103 THOUSANDS/UL (ref 149–390)
PLATELET BLD QL SMEAR: ABNORMAL
PMV BLD AUTO: 10.6 FL (ref 8.9–12.7)
POTASSIUM SERPL-SCNC: 3.7 MMOL/L (ref 3.5–5.3)
PR INTERVAL: 162 MS
PR INTERVAL: 162 MS
PR INTERVAL: 166 MS
PROT SERPL-MCNC: 7.7 G/DL (ref 6.4–8.2)
PROT UR STRIP-MCNC: ABNORMAL MG/DL
PROTHROMBIN TIME: 16.1 SECONDS (ref 11.6–14.5)
QRS AXIS: 45 DEGREES
QRS AXIS: 50 DEGREES
QRS AXIS: 63 DEGREES
QRSD INTERVAL: 78 MS
QRSD INTERVAL: 78 MS
QRSD INTERVAL: 80 MS
QT INTERVAL: 402 MS
QT INTERVAL: 422 MS
QT INTERVAL: 438 MS
QTC INTERVAL: 463 MS
QTC INTERVAL: 489 MS
QTC INTERVAL: 498 MS
RBC # BLD AUTO: 4.52 MILLION/UL (ref 3.88–5.62)
RBC #/AREA URNS AUTO: ABNORMAL /HPF
RBC MORPH BLD: NORMAL
RSV RNA RESP QL NAA+PROBE: NEGATIVE
SARS-COV-2 RNA RESP QL NAA+PROBE: NEGATIVE
SODIUM SERPL-SCNC: 138 MMOL/L (ref 136–145)
SP GR UR STRIP.AUTO: 1.02 (ref 1–1.03)
T WAVE AXIS: 57 DEGREES
T WAVE AXIS: 64 DEGREES
T WAVE AXIS: 77 DEGREES
TOTAL CELLS COUNTED SPEC: 100
TROPONIN I SERPL-MCNC: <0.02 NG/ML
TSH SERPL DL<=0.05 MIU/L-ACNC: 0.75 UIU/ML (ref 0.36–3.74)
UROBILINOGEN UR QL STRIP.AUTO: 0.2 E.U./DL
VENTRICULAR RATE: 75 BPM
VENTRICULAR RATE: 80 BPM
VENTRICULAR RATE: 84 BPM
WBC # BLD AUTO: 1.26 THOUSAND/UL (ref 4.31–10.16)
WBC #/AREA URNS AUTO: ABNORMAL /HPF

## 2021-04-15 PROCEDURE — 80053 COMPREHEN METABOLIC PANEL: CPT | Performed by: NURSE PRACTITIONER

## 2021-04-15 PROCEDURE — 93005 ELECTROCARDIOGRAM TRACING: CPT

## 2021-04-15 PROCEDURE — 84484 ASSAY OF TROPONIN QUANT: CPT | Performed by: NURSE PRACTITIONER

## 2021-04-15 PROCEDURE — 93010 ELECTROCARDIOGRAM REPORT: CPT | Performed by: INTERNAL MEDICINE

## 2021-04-15 PROCEDURE — 85007 BL SMEAR W/DIFF WBC COUNT: CPT | Performed by: NURSE PRACTITIONER

## 2021-04-15 PROCEDURE — 85610 PROTHROMBIN TIME: CPT | Performed by: NURSE PRACTITIONER

## 2021-04-15 PROCEDURE — 84443 ASSAY THYROID STIM HORMONE: CPT | Performed by: NURSE PRACTITIONER

## 2021-04-15 PROCEDURE — 85730 THROMBOPLASTIN TIME PARTIAL: CPT | Performed by: NURSE PRACTITIONER

## 2021-04-15 PROCEDURE — 99285 EMERGENCY DEPT VISIT HI MDM: CPT | Performed by: NURSE PRACTITIONER

## 2021-04-15 PROCEDURE — 81001 URINALYSIS AUTO W/SCOPE: CPT

## 2021-04-15 PROCEDURE — 96374 THER/PROPH/DIAG INJ IV PUSH: CPT

## 2021-04-15 PROCEDURE — 0241U HB NFCT DS VIR RESP RNA 4 TRGT: CPT | Performed by: INTERNAL MEDICINE

## 2021-04-15 PROCEDURE — G1004 CDSM NDSC: HCPCS

## 2021-04-15 PROCEDURE — 83880 ASSAY OF NATRIURETIC PEPTIDE: CPT | Performed by: NURSE PRACTITIONER

## 2021-04-15 PROCEDURE — 85027 COMPLETE CBC AUTOMATED: CPT | Performed by: NURSE PRACTITIONER

## 2021-04-15 PROCEDURE — 96361 HYDRATE IV INFUSION ADD-ON: CPT

## 2021-04-15 PROCEDURE — 83735 ASSAY OF MAGNESIUM: CPT | Performed by: NURSE PRACTITIONER

## 2021-04-15 PROCEDURE — 96375 TX/PRO/DX INJ NEW DRUG ADDON: CPT

## 2021-04-15 PROCEDURE — 82948 REAGENT STRIP/BLOOD GLUCOSE: CPT

## 2021-04-15 PROCEDURE — 74176 CT ABD & PELVIS W/O CONTRAST: CPT

## 2021-04-15 PROCEDURE — 70450 CT HEAD/BRAIN W/O DYE: CPT

## 2021-04-15 PROCEDURE — 71250 CT THORAX DX C-: CPT

## 2021-04-15 PROCEDURE — 99285 EMERGENCY DEPT VISIT HI MDM: CPT

## 2021-04-15 PROCEDURE — 82550 ASSAY OF CK (CPK): CPT | Performed by: NURSE PRACTITIONER

## 2021-04-15 PROCEDURE — 36415 COLL VENOUS BLD VENIPUNCTURE: CPT | Performed by: NURSE PRACTITIONER

## 2021-04-15 PROCEDURE — 83036 HEMOGLOBIN GLYCOSYLATED A1C: CPT | Performed by: NURSE PRACTITIONER

## 2021-04-15 PROCEDURE — 99222 1ST HOSP IP/OBS MODERATE 55: CPT | Performed by: INTERNAL MEDICINE

## 2021-04-15 RX ORDER — ONDANSETRON 2 MG/ML
4 INJECTION INTRAMUSCULAR; INTRAVENOUS EVERY 4 HOURS PRN
Status: DISCONTINUED | OUTPATIENT
Start: 2021-04-15 | End: 2021-04-20 | Stop reason: HOSPADM

## 2021-04-15 RX ORDER — SODIUM CHLORIDE 9 MG/ML
125 INJECTION, SOLUTION INTRAVENOUS CONTINUOUS
Status: DISCONTINUED | OUTPATIENT
Start: 2021-04-15 | End: 2021-04-15

## 2021-04-15 RX ORDER — OYSTER SHELL CALCIUM WITH VITAMIN D 500; 200 MG/1; [IU]/1
1 TABLET, FILM COATED ORAL 2 TIMES DAILY
COMMUNITY
End: 2021-04-20 | Stop reason: HOSPADM

## 2021-04-15 RX ORDER — ONDANSETRON 2 MG/ML
4 INJECTION INTRAMUSCULAR; INTRAVENOUS ONCE
Status: COMPLETED | OUTPATIENT
Start: 2021-04-15 | End: 2021-04-15

## 2021-04-15 RX ORDER — FAMOTIDINE 20 MG/1
20 TABLET, FILM COATED ORAL DAILY
Status: DISCONTINUED | OUTPATIENT
Start: 2021-04-16 | End: 2021-04-16

## 2021-04-15 RX ORDER — HEPARIN SODIUM 5000 [USP'U]/ML
5000 INJECTION, SOLUTION INTRAVENOUS; SUBCUTANEOUS EVERY 8 HOURS SCHEDULED
Status: DISCONTINUED | OUTPATIENT
Start: 2021-04-15 | End: 2021-04-18

## 2021-04-15 RX ORDER — FENTANYL CITRATE 50 UG/ML
25 INJECTION, SOLUTION INTRAMUSCULAR; INTRAVENOUS ONCE
Status: COMPLETED | OUTPATIENT
Start: 2021-04-15 | End: 2021-04-15

## 2021-04-15 RX ORDER — ASPIRIN 81 MG/1
81 TABLET ORAL DAILY
Status: DISCONTINUED | OUTPATIENT
Start: 2021-04-16 | End: 2021-04-20 | Stop reason: HOSPADM

## 2021-04-15 RX ORDER — SODIUM CHLORIDE, SODIUM GLUCONATE, SODIUM ACETATE, POTASSIUM CHLORIDE, MAGNESIUM CHLORIDE, SODIUM PHOSPHATE, DIBASIC, AND POTASSIUM PHOSPHATE .53; .5; .37; .037; .03; .012; .00082 G/100ML; G/100ML; G/100ML; G/100ML; G/100ML; G/100ML; G/100ML
125 INJECTION, SOLUTION INTRAVENOUS CONTINUOUS
Status: DISCONTINUED | OUTPATIENT
Start: 2021-04-15 | End: 2021-04-16

## 2021-04-15 RX ORDER — HYDROMORPHONE HCL/PF 1 MG/ML
0.5 SYRINGE (ML) INJECTION ONCE
Status: COMPLETED | OUTPATIENT
Start: 2021-04-15 | End: 2021-04-15

## 2021-04-15 RX ORDER — ACETAMINOPHEN 325 MG/1
650 TABLET ORAL EVERY 6 HOURS PRN
Status: DISCONTINUED | OUTPATIENT
Start: 2021-04-15 | End: 2021-04-20 | Stop reason: HOSPADM

## 2021-04-15 RX ORDER — PREDNISONE 1 MG/1
1 TABLET ORAL DAILY
Status: DISCONTINUED | OUTPATIENT
Start: 2021-04-16 | End: 2021-04-20 | Stop reason: HOSPADM

## 2021-04-15 RX ORDER — ATORVASTATIN CALCIUM 40 MG/1
40 TABLET, FILM COATED ORAL
Status: DISCONTINUED | OUTPATIENT
Start: 2021-04-15 | End: 2021-04-20 | Stop reason: HOSPADM

## 2021-04-15 RX ADMIN — ONDANSETRON 4 MG: 2 INJECTION INTRAMUSCULAR; INTRAVENOUS at 21:00

## 2021-04-15 RX ADMIN — SODIUM CHLORIDE, SODIUM GLUCONATE, SODIUM ACETATE, POTASSIUM CHLORIDE, MAGNESIUM CHLORIDE, SODIUM PHOSPHATE, DIBASIC, AND POTASSIUM PHOSPHATE 125 ML/HR: .53; .5; .37; .037; .03; .012; .00082 INJECTION, SOLUTION INTRAVENOUS at 18:24

## 2021-04-15 RX ADMIN — FENTANYL CITRATE 25 MCG: 50 INJECTION, SOLUTION INTRAMUSCULAR; INTRAVENOUS at 11:47

## 2021-04-15 RX ADMIN — SODIUM CHLORIDE 1000 ML: 0.9 INJECTION, SOLUTION INTRAVENOUS at 16:41

## 2021-04-15 RX ADMIN — FAMOTIDINE 20 MG: 10 INJECTION INTRAVENOUS at 11:50

## 2021-04-15 RX ADMIN — HYDROMORPHONE HYDROCHLORIDE 0.5 MG: 1 INJECTION, SOLUTION INTRAMUSCULAR; INTRAVENOUS; SUBCUTANEOUS at 20:16

## 2021-04-15 RX ADMIN — ONDANSETRON 4 MG: 2 INJECTION INTRAMUSCULAR; INTRAVENOUS at 11:43

## 2021-04-15 RX ADMIN — HEPARIN SODIUM 5000 UNITS: 5000 INJECTION INTRAVENOUS; SUBCUTANEOUS at 21:01

## 2021-04-15 RX ADMIN — ACETAMINOPHEN 650 MG: 325 TABLET, FILM COATED ORAL at 20:16

## 2021-04-15 RX ADMIN — SODIUM CHLORIDE 75 ML/HR: 0.9 INJECTION, SOLUTION INTRAVENOUS at 12:54

## 2021-04-15 NOTE — ED NOTES
Patient's daughter left bedside and patient's wife will remain at bedside at this time       Darcy Russell RN  04/15/21 3039

## 2021-04-15 NOTE — PLAN OF CARE
Problem: Potential for Falls  Goal: Patient will remain free of falls  Description: INTERVENTIONS:  - Assess patient frequently for physical needs  -  Identify cognitive and physical deficits and behaviors that affect risk of falls  -  [place] fall precautions as indicated by assessment   - Educate patient/family on patient safety including physical limitations  - Instruct patient to call for assistance with activity based on assessment  - Modify environment to reduce risk of injury  - Consider OT/PT consult to assist with strengthening/mobility  Outcome: Progressing     Problem: Nutrition/Hydration-ADULT  Goal: Nutrient/Hydration intake appropriate for improving, restoring or maintaining nutritional needs  Description: Monitor and assess patient's nutrition/hydration status for malnutrition  Collaborate with interdisciplinary team and initiate plan and interventions as ordered  Monitor patient's weight and dietary intake as ordered or per policy  Utilize nutrition screening tool and intervene as necessary  Determine patient's food preferences and provide high-protein, high-caloric foods as appropriate       INTERVENTIONS:  - Monitor oral intake, urinary output, labs, and treatment plans  - Assess nutrition and hydration status and recommend course of action  - Evaluate amount of meals eaten  - Assist patient with eating if necessary   - Allow adequate time for meals  - Recommend/ encourage appropriate diets, oral nutritional supplements, and vitamin/mineral supplements  - Order, calculate, and assess calorie counts as needed  - Recommend, monitor, and adjust tube feedings and TPN/PPN based on assessed needs  - Assess need for intravenous fluids  - Provide specific nutrition/hydration education as appropriate  - Include patient/family/caregiver in decisions related to nutrition  Outcome: Progressing     Problem: PAIN - ADULT  Goal: Verbalizes/displays adequate comfort level or baseline comfort level  Description: Interventions:  - Encourage patient to monitor pain and request assistance  - Assess pain using appropriate pain scale  - Administer analgesics based on type and severity of pain and evaluate response  - Implement non-pharmacological measures as appropriate and evaluate response  - Consider cultural and social influences on pain and pain management  - Notify physician/advanced practitioner if interventions unsuccessful or patient reports new pain  Outcome: Progressing     Problem: INFECTION - ADULT  Goal: Absence or prevention of progression during hospitalization  Description: INTERVENTIONS:  - Assess and monitor for signs and symptoms of infection  - Monitor lab/diagnostic results  - Monitor all insertion sites, i e  indwelling lines, tubes, and drains  - Monitor endotracheal if appropriate and nasal secretions for changes in amount and color  - [place] appropriate cooling/warming therapies per order  - Administer medications as ordered  - Instruct and encourage patient and family to use good hand hygiene technique  - Identify and instruct in appropriate isolation precautions for identified infection/condition  Outcome: Progressing  Goal: Absence of fever/infection during neutropenic period  Description: INTERVENTIONS:  - Monitor WBC    Outcome: Progressing     Problem: SAFETY ADULT  Goal: Patient will remain free of falls  Description: INTERVENTIONS:  - Assess patient frequently for physical needs  -  Identify cognitive and physical deficits and behaviors that affect risk of falls    -  [place] fall precautions as indicated by assessment   - Educate patient/family on patient safety including physical limitations  - Instruct patient to call for assistance with activity based on assessment  - Modify environment to reduce risk of injury  - Consider OT/PT consult to assist with strengthening/mobility  Outcome: Progressing  Goal: Maintain or return to baseline ADL function  Description: INTERVENTIONS:  -  Assess patient's ability to carry out ADLs; assess patient's baseline for ADL function and identify physical deficits which impact ability to perform ADLs (bathing, care of mouth/teeth, toileting, grooming, dressing, etc )  - Assess/evaluate cause of self-care deficits   - Assess range of motion  - Assess patient's mobility; develop plan if impaired  - Assess patient's need for assistive devices and provide as appropriate  - Encourage maximum independence but intervene and supervise when necessary  - Involve family in performance of ADLs  - Assess for home care needs following discharge   - Consider OT consult to assist with ADL evaluation and planning for discharge  - Provide patient education as appropriate  Outcome: Progressing  Goal: Maintain or return mobility status to optimal level  Description: INTERVENTIONS:  - Assess patient's baseline mobility status (ambulation, transfers, stairs, etc )    - Identify cognitive and physical deficits and behaviors that affect mobility  - Identify mobility aids required to assist with transfers and/or ambulation (gait belt, sit-to-stand, lift, walker, cane, etc )  - [place] fall precautions as indicated by assessment  - Record patient progress and toleration of activity level on Mobility SBAR; progress patient to next Phase/Stage  - Instruct patient to call for assistance with activity based on assessment  - Consider rehabilitation consult to assist with strengthening/weightbearing, etc   Outcome: Progressing     Problem: DISCHARGE PLANNING  Goal: Discharge to home or other facility with appropriate resources  Description: INTERVENTIONS:  - Identify barriers to discharge w/patient and caregiver  - Arrange for needed discharge resources and transportation as appropriate  - Identify discharge learning needs (meds, wound care, etc )  - Arrange for interpretive services to assist at discharge as needed  - Refer to Case Management Department for coordinating discharge planning if the patient needs post-hospital services based on physician/advanced practitioner order or complex needs related to functional status, cognitive ability, or social support system  Outcome: Progressing     Problem: Knowledge Deficit  Goal: Patient/family/caregiver demonstrates understanding of disease process, treatment plan, medications, and discharge instructions  Description: Complete learning assessment and assess knowledge base    Interventions:  - Provide teaching at level of understanding  - Provide teaching via preferred learning methods  Outcome: Progressing     Problem: GENITOURINARY - ADULT  Goal: Maintains or returns to baseline urinary function  Description: INTERVENTIONS:  - Assess urinary function  - Encourage oral fluids to ensure adequate hydration if ordered  - Administer IV fluids as ordered to ensure adequate hydration  - Administer ordered medications as needed  - Offer frequent toileting  - Follow urinary retention protocol if ordered  Outcome: Progressing  Goal: Absence of urinary retention  Description: INTERVENTIONS:  - Assess patients ability to void and empty bladder  - Monitor I/O  - Bladder scan as needed  - Discuss with physician/AP medications to alleviate retention as needed  - Discuss catheterization for long term situations as appropriate  Outcome: Progressing  Goal: Urinary catheter remains patent  Description: INTERVENTIONS:  - Assess patency of urinary catheter  - If patient has a chronic [place]nard, consider changing catheter if non-functioning  - Follow guidelines for intermittent irrigation of non-functioning urinary catheter  Outcome: Progressing     Problem: METABOLIC, FLUID AND ELECTROLYTES - ADULT  Goal: Electrolytes maintained within normal limits  Description: INTERVENTIONS:  - Monitor labs and assess patient for signs and symptoms of electrolyte imbalances  - Administer electrolyte replacement as ordered  - Monitor response to electrolyte replacements, including repeat lab results as appropriate  - Instruct patient on fluid and nutrition as appropriate  Outcome: Progressing  Goal: Fluid balance maintained  Description: INTERVENTIONS:  - Monitor labs   - Monitor I/O and WT  - Instruct patient on fluid and nutrition as appropriate  - Assess for signs & symptoms of volume excess or deficit  Outcome: Progressing  Goal: Glucose maintained within target range  Description: INTERVENTIONS:  - Monitor Blood Glucose as ordered  - Assess for signs and symptoms of hyperglycemia and hypoglycemia  - Administer ordered medications to maintain glucose within target range  - Assess nutritional intake and initiate nutrition service referral as needed  Outcome: Progressing

## 2021-04-15 NOTE — H&P
[facility]  H&P- Hailey Salvage 1960, 57 y o  male MRN: [id number]  Unit/Bed#: ED 06 Encounter: [id number]  Primary Care Provider: Nunu Bray DO   Date and time admitted to hospital: 4/15/2021 10:54 AM    * JULIO C (acute kidney injury) ([facility] )  Assessment & Plan  · POA with generalized weakness, abdominal pain, nausea  · CR 4 29, increased from 1 13 only 2 weeks ago  · Over the past 2 weeks patient completed his 1st round of chemotherapy and due to volume overload was initiated on furosemide (reports he gained 22 pounds)  · Patient appears hypovolemic, mildly hypotensive SBP 92 in the ER  · Fluid resuscitation with 2L NSS ordered, IV hydration with Plasmalyte  · Hold furosemide, lisinopril, metformin  · Renally dose all medications  · UA no indication of infection- + hyaline casts, 1+protein  · Trend creatinine  · Avoid hypotension  · CT abdomen/pelvis no acute renal abnormality  · Appreciate nephrology consultation    Chest discomfort  Assessment & Plan  · Chest discomfort midsternal, not reproducible  · No history of cardiac disease  · EKG on admission bradycardic with nonspecific ST changes  · Initial troponin negative, continue to trend  · Aspirin 81 mg daily  · JOSEFINA 1  · Suspect uremia symptoms    Steroid-induced hyperglycemia  Assessment & Plan  · Check hemoglobin A1c  · Metformin on hold in setting of JULIO C  · Sliding scale insulin coverage with Accu-Cheks  · Carbohydrate controlled diet    Polymyalgia rheumatica syndrome ([facility] )  Assessment & Plan  · Follows with Rheumatology  · Currently on prednisone taper, 1 mg daily for month of April then will decrease to 0 5 mg in May  · Continue steroid taper  · Outpatient follow-up    SIMON on CPAP  Assessment & Plan  · CPAP ordered    Chemotherapy-induced neutropenia (HCC)  Assessment & Plan  · Completed chemotherapy regimen 4/8  · WBC 1 26 with ANC 0 01  · Thrombocytopenia 103  · Continue to trend blood count  · Neutropenia precautions    Seminoma of descended left testis [facility])  Assessment & Plan  · Status post left inguinal orchiectomy on 02/19/2021, showing pure seminoma primary   · Completed 1/3 rounds of chemotherapy on 04/08   · Due to initiate next round on  Monday  · Outpatient heme Onc follow-up    Obesity  Assessment & Plan  · BMI 41 5  · Lifestyle interventions encouraged    GERD (gastroesophageal reflux disease)  Assessment & Plan  · Dexilant home regimen on hold  · Continue Pepcid 20 mg renally dosed    Essential hypertension  Assessment & Plan  · History hypertension  · Home regimen lisinopril 20 mg daily  · Blood pressure medications on hold due to JULIO C  · Avoid hypotension  · Trend blood pressures    VTE Prophylaxis: Heparin  / sequential compression device   Code Status: DNR/DNI  POLST: POLST form is not discussed and not completed at this time  Discussion with family: wife at bedside    Anticipated Length of Stay:  Patient will be admitted on an Inpatient basis with an anticipated length of stay of  > 2 midnights  Justification for Hospital Stay: JULIO C    Total Time for Visit, including Counseling / Coordination of Care: 30 minutes  Greater than 50% of this total time spent on direct patient counseling and coordination of care  Chief Complaint:   weakness    History of Present Illness:    Kyle Horn is a 57 y o  male who presents with history of prostate cancer diagnosed in September 2019 than left testicular seminoma s/p orchiectomy on 02/19/21  Initiated on chemotherapy with completion of 1st round of 3 on 04/08/2021  Patient reports he had a 22 lb weight gain during that week along with significant abdominal distension was initiated on furosemide 20 mg daily  Over the past week he has had progressively worsening generalized weakness, chest and abdominal pain, nausea with dry heaving  He reports continuing to try to drink fluids but unable to tolerate food  Denies decrease in urinary output    In the emergency department patient was found with significant JULIO C creatinine increased from 1 13 to over 4 in a 2 week time span  Suspected multifactorial with chemotherapy-induced nausea/vomiting along with multiple nephrotoxic medications  Review of Systems:    Review of Systems   Constitutional: Positive for fatigue  Negative for chills and fever  HENT: Negative for ear pain and sore throat  Eyes: Negative for pain and visual disturbance  Respiratory: Negative for cough and shortness of breath  Cardiovascular: Positive for chest pain  Negative for palpitations  Gastrointestinal: Positive for abdominal distention, abdominal pain, nausea and vomiting  Genitourinary: Negative for dysuria and hematuria  Musculoskeletal: Positive for arthralgias and myalgias  Negative for back pain  Skin: Negative for color change and rash  Neurological: Positive for weakness  Negative for seizures and syncope  Psychiatric/Behavioral: Negative for confusion  All other systems reviewed and are negative        Past Medical and Surgical History:     Past Medical History:   Diagnosis Date    BPH without obstruction/lower urinary tract symptoms     Cancer (HCC)     prostate    CPAP (continuous positive airway pressure) dependence     Elevated PSA     GERD (gastroesophageal reflux disease)     Gout     Hyperlipidemia     Hypertension     Kidney stone     Obese abdomen     Obesity     Polymyalgia (HCC)     Prostate cancer (HCC)     Rash     under both arms and in between legs - family doctor aware - pt uses Desitin    Sleep apnea     [facility] )     Urinary frequency     Urinary urgency     Wears glasses        Past Surgical History:   Procedure Laterality Date    CARPAL TUNNEL RELEASE Bilateral 07/2020    COLONOSCOPY      CYSTOSCOPY W/ LASER LITHOTRIPSY  2001    CYSTOSTOMY W/ STENT INSERTION  2011    ESOPHAGOGASTRODUODENOSCOPY      EXTRACORPOREAL SHOCK WAVE LITHOTRIPSY Right 2011  HERNIA REPAIR      KNEE ARTHROSCOPY Left     KS CYSTO/URETERO W/LITHOTRIPSY &INDWELL STENT INSRT Right 4/29/2018    Procedure: CYSTOSCOPY URETEROSCOPY, RETROGRADE PYELOGRAM AND INSERTION STENT URETERAL;  Surgeon: Matilda Carbone MD;  Location: [facility];  Service: Urology    KS REMOVAL TESTIS,RADICAL Left 2/19/2021    Procedure: Radical  ORCHIECTOMY;  Surgeon: Rc Rivera MD;  Location: [facility];  Service: Urology       Meds/Allergies:    Prior to Admission medications    Medication Sig Start Date End Date Taking? Authorizing Provider   allopurinol (ZYLOPRIM) 100 mg tablet allopurinol 100 mg tablet   Yes Historical Provider, MD   ASPIRIN EC PO Take 81 mg by mouth   Yes Historical Provider, MD   calcium-vitamin D (OSCAL 500 + D) 500 mg-200 units per tablet Take 1 tablet by mouth 2 (two) times a day   Yes Historical Provider, MD   dexlansoprazole (DEXILANT) 60 MG capsule Dexilant 60 mg capsule, delayed release   Yes Historical Provider, MD   furosemide (LASIX) 20 mg tablet Take 1 tablet (20 mg total) by mouth daily 4/8/21  Yes Briana Cortez MD   lisinopril (ZESTRIL) 20 mg tablet Take 20 mg by mouth daily  6/13/19  Yes Historical Provider, MD   metFORMIN (GLUCOPHAGE) 500 mg tablet 2 (two) times a day with meals  4/11/20  Yes Historical Provider, MD   Omega 3 1000 MG CAPS Take 3 capsules by mouth 5/15/13  Yes Historical Provider, MD   PREDNISONE PO Take 1 mg by mouth daily 1mg daily in April   0 5mg daily in May   Yes Historical Provider, MD   prochlorperazine (COMPAZINE) 10 mg tablet Take 1 tablet (10 mg total) by mouth every 6 (six) hours as needed for nausea or vomiting 4/1/21  Yes Briana Cortez MD   rosuvastatin (CRESTOR) 20 MG tablet Take 20 mg by mouth daily at bedtime  2/28/18 4/15/21 Yes Historical Provider, MD   Cholecalciferol (VITAMIN D3) 2000 units capsule Take 1 capsule by mouth daily 10/2/19 4/5/21  Historical Provider, MD     I have reviewed home medications with patient personally  Allergies: No Known Allergies    Social History:     Marital Status: /Civil Union   Occupation:  [employer] , last worked in February  Patient Pre-hospital Living Situation:  Independent with spouse  Patient Pre-hospital Level of Mobility:  Independent  Patient Pre-hospital Diet Restrictions:  Cardiac/diabetic  Substance Use History:   Social History     Substance and Sexual Activity   Alcohol Use No     Social History     Tobacco Use   Smoking Status Never Smoker   Smokeless Tobacco Never Used     Social History     Substance and Sexual Activity   Drug Use No       Family History:    Family History   Problem Relation Age of Onset    Alzheimer's disease Mother        Physical Exam:     Vitals:   Blood Pressure: 108/58 (04/15/21 1620)  Pulse: 78 (04/15/21 1620)  Temperature: 98 6 °F (37 °C) (04/15/21 1058)  Temp Source: Oral (04/15/21 1058)  Respirations: 16 (04/15/21 1620)  SpO2: 99 % (04/15/21 1620)    Physical Exam  Vitals signs and nursing note reviewed  Constitutional:       Appearance: Normal appearance  He is normal weight  He is ill-appearing  HENT:      Head: Normocephalic and atraumatic  Mouth/Throat:      Mouth: Mucous membranes are dry  Eyes:      Conjunctiva/sclera: Conjunctivae normal    Neck:      Musculoskeletal: Normal range of motion and neck supple  Cardiovascular:      Rate and Rhythm: Normal rate and regular rhythm  Pulses: Normal pulses  Heart sounds: Normal heart sounds  Pulmonary:      Effort: Pulmonary effort is normal       Breath sounds: Normal breath sounds  Abdominal:      General: Abdomen is flat  There is distension  Palpations: Abdomen is soft  Tenderness: There is abdominal tenderness  Comments: Generalized tenderness without guarding or rebound   Musculoskeletal: Normal range of motion  Right lower leg: No edema  Left lower leg: No edema        Comments: No lower extremity edema   Skin:     General: Skin is warm and dry  Capillary Refill: Capillary refill takes less than 2 seconds  Neurological:      General: No focal deficit present  Mental Status: He is alert and oriented to person, place, and time  Cranial Nerves: No cranial nerve deficit  Sensory: No sensory deficit  Motor: Weakness present  Coordination: Coordination normal       Gait: Gait normal       Comments: Generalized weakness, neurological exam nonfocal   Psychiatric:         Mood and Affect: Mood normal          Behavior: Behavior normal          Additional Data:     Lab Results: I have personally reviewed pertinent reports  Results from last 7 days   Lab Units 04/15/21  1141   WBC Thousand/uL 1 26*   HEMOGLOBIN g/dL 12 6   HEMATOCRIT % 37 4   PLATELETS Thousands/uL 103*   LYMPHO PCT % 98*   MONO PCT % 1*   EOS PCT % 0     Results from last 7 days   Lab Units 04/15/21  1141   SODIUM mmol/L 138   POTASSIUM mmol/L 3 7   CHLORIDE mmol/L 98*   CO2 mmol/L 22   BUN mg/dL 100*   CREATININE mg/dL 4 29*   ANION GAP mmol/L 18*   CALCIUM mg/dL 7 6*   ALBUMIN g/dL 3 2*   TOTAL BILIRUBIN mg/dL 1 12*   ALK PHOS U/L 82   ALT U/L 41   AST U/L 15   GLUCOSE RANDOM mg/dL 140     Results from last 7 days   Lab Units 04/15/21  1141   INR  1 32*     Results from last 7 days   Lab Units 04/15/21  1122   POC GLUCOSE mg/dl 132               Imaging: I have personally reviewed pertinent films in PACS    CT chest abdomen pelvis wo contrast   Final Result by Marielena Pelaez MD (04/15 9123)         1  Postoperative change again noted reflecting previous left orchiectomy  2   Predominantly left-sided retroperitoneal abdominal and pelvic adenopathy again noted, significantly improved when compared to prior exam of 3/20/2021, presumably reflecting favorable therapeutic response  3   No acute abnormality identified in the chest, abdomen or pelvis  4   Additional findings as noted                       Workstation performed: [id number] CT head without contrast   Final Result by Rhea Lazo MD (04/15 3320)      No acute intracranial abnormality  Workstation performed: [id number]             EKG, Pathology, and Other Studies Reviewed on Admission:   · EKG:  Bradycardia with nonspecific ST changes    Allscripts / Epic Records Reviewed: Yes     ** Please Note: This note has been constructed using a voice recognition system   **

## 2021-04-15 NOTE — ED PROVIDER NOTES
History  Chief Complaint   Patient presents with    Weakness - Generalized     patient states extreme generalized weakness  dizziness  epigastric pain  hx of GERD but unsure if it the same pain  nausea      This is a 57year old male who comes to the ED with c/o testicular cancer diagnosis 1 month ago and just started chemo last week and was doing well until earlier in the week he became weak, fatigued and today was dry heaving  He has had pressure like chest pain x 1 week  He denies radiation of the pain  She he is short of breath at times  He states he does have abdominal pain and bloating  States had 3 loose stools this am   He states he has GERD but this doesn't feel like his GERD  He states that he has not taken any of his medications this am due to nausea  He denies vomiting or diarrhea  He states he is voiding in small amounts  History provided by:  Medical records and relative   used: No        Prior to Admission Medications   Prescriptions Last Dose Informant Patient Reported? Taking? ASPIRIN EC PO Past Week at Unknown time Self Yes Yes   Sig: Take 81 mg by mouth   Cholecalciferol (VITAMIN D3) 2000 units capsule  Self Yes No   Sig: Take 1 capsule by mouth daily   Omega 3 1000 MG CAPS Past Week at Unknown time Self Yes Yes   Sig: Take 3 capsules by mouth   PREDNISONE PO 4/14/2021 at Unknown time Self Yes Yes   Sig: Take 1 mg by mouth daily 1mg daily in April   0 5mg daily in May   allopurinol (ZYLOPRIM) 100 mg tablet Past Week at Unknown time Self Yes Yes   Sig: allopurinol 100 mg tablet   calcium-vitamin D (OSCAL 500 + D) 500 mg-200 units per tablet Past Week at Unknown time  Yes Yes   Sig: Take 1 tablet by mouth 2 (two) times a day   dexlansoprazole (DEXILANT) 60 MG capsule 4/14/2021 at Unknown time Self Yes Yes   Sig: Dexilant 60 mg capsule, delayed release   furosemide (LASIX) 20 mg tablet 4/14/2021 at Unknown time  No Yes   Sig: Take 1 tablet (20 mg total) by mouth daily   lisinopril (ZESTRIL) 20 mg tablet 2021 at Unknown time Self Yes Yes   Sig: Take 20 mg by mouth daily    metFORMIN (GLUCOPHAGE) 500 mg tablet Past Week at Unknown time Self Yes Yes   Si (two) times a day with meals    prochlorperazine (COMPAZINE) 10 mg tablet 2021 at Unknown time  No Yes   Sig: Take 1 tablet (10 mg total) by mouth every 6 (six) hours as needed for nausea or vomiting   rosuvastatin (CRESTOR) 20 MG tablet Past Week at Unknown time Self Yes Yes   Sig: Take 20 mg by mouth daily at bedtime       Facility-Administered Medications: None       Past Medical History:   Diagnosis Date    BPH without obstruction/lower urinary tract symptoms     Cancer (HCC)     prostate    CPAP (continuous positive airway pressure) dependence     Elevated PSA     GERD (gastroesophageal reflux disease)     Gout     Hyperlipidemia     Hypertension     Kidney stone     Obese abdomen     Obesity     Polymyalgia ([address]     Pro[facility] [address] [facility]     Rash     under both arms and in between legs - family doctor aware - pt uses [facility]    Sleep apnea     Testicular carcinoma ([facility] [address] [facility]     Urinary frequency     Urinary urgency     Wears glasses        Past Surgical History:   Procedure Laterality Date    CARPAL TUNNEL RELEASE Bilateral 2020    COLONOSCOPY      CYSTOSCOPY W/ LASER LITHOTRIPSY      CYSTOSTOMY W/ STENT INSERTION      ESOPHAGOGASTRODUODENOSCOPY      EXTRACORPOREAL SHOCK WAVE LITHOTRIPSY Right     HERNIA REPAIR      KNEE ARTHROSCOPY Left     LA CYSTO/URETERO W/LITHOTRIPSY &INDWELL STENT INSRT Right 2018    Procedure: CYSTOSCOPY URETEROSCOPY, RETROGRADE PYELOGRAM AND INSERTION STENT URETERAL;  Surgeon: Geraldine Inman MD;  Location: [facility];  Service: Urology    LA REMOVAL TESTIS,RADICAL Left 2021    Procedure: Radical  ORCHIECTOMY;  Surgeon: Walter Banegas MD;  Location: [facility];  Service: Urology       Family History   Problem Relation Age of Onset    Alzheimer's disease Mother      I have reviewed and agree with the history as documented  E-Cigarette/Vaping    E-Cigarette Use Never User      E-Cigarette/Vaping Substances     Social History     Tobacco Use    Smoking status: Never Smoker    Smokeless tobacco: Never Used   Substance Use Topics    Alcohol use: No    Drug use: No       Review of Systems   Constitutional: Positive for fatigue  HENT: Negative  Eyes: Negative  Respiratory: Positive for shortness of breath  Cardiovascular: Positive for chest pain  Gastrointestinal: Positive for abdominal distention, abdominal pain and nausea  Endocrine: Negative  Genitourinary: Negative  Musculoskeletal: Negative  Skin: Negative  Allergic/Immunologic: Negative  Neurological: Negative  Hematological: Negative  Psychiatric/Behavioral: Negative  Physical Exam  Physical Exam  Vitals signs and nursing note reviewed  Constitutional:       General: He is not in acute distress  Appearance: Normal appearance  He is obese  He is not ill-appearing, toxic-appearing or diaphoretic  HENT:      Head: Normocephalic and atraumatic  Eyes:      Extraocular Movements: Extraocular movements intact  Neck:      Musculoskeletal: Normal range of motion  Cardiovascular:      Rate and Rhythm: Normal rate and regular rhythm  Pulses: Normal pulses  Heart sounds: Normal heart sounds  Pulmonary:      Effort: Pulmonary effort is normal       Comments: RUL decreased   Abdominal:      General: Bowel sounds are normal  There is distension  Tenderness: There is abdominal tenderness  Musculoskeletal: Normal range of motion  Skin:     General: Skin is warm and dry  Capillary Refill: Capillary refill takes less than 2 seconds  Neurological:      General: No focal deficit present  Mental Status: He is alert and oriented to person, place, and time     Psychiatric:         Mood and Affect: Mood normal  Behavior: Behavior normal          Thought Content:  Thought content normal          Judgment: Judgment normal          Vital Signs  ED Triage Vitals   Temperature Pulse Respirations Blood Pressure SpO2   04/15/21 1058 04/15/21 1058 04/15/21 1058 04/15/21 1058 04/15/21 1058   98 6 °F (37 °C) 82 18 140/62 97 %      Temp Source Heart Rate Source Patient Position - Orthostatic VS BP Location FiO2 (%)   04/15/21 1058 04/15/21 1058 04/15/21 1221 04/15/21 1221 --   Oral Monitor Sitting Right arm       Pain Score       04/15/21 1147       6           Vitals:    04/15/21 1221 04/15/21 1330 04/15/21 1517 04/15/21 1620   BP: 93/54 94/50 112/59 108/58   Pulse: 69 72 78 78   Patient Position - Orthostatic VS: Sitting Lying Lying Lying         Visual Acuity      ED Medications  Medications   sodium chloride 0 9 % bolus 1,000 mL (has no administration in time range)   multi-electrolyte (PLASMALYTE-A/ISOLYTE-S PH 7 4) IV solution (has no administration in time range)   sodium chloride 0 9 % bolus 1,000 mL (has no administration in time range)   famotidine (PEPCID) injection 20 mg (20 mg Intravenous Given 4/15/21 1150)   ondansetron (ZOFRAN) injection 4 mg (4 mg Intravenous Given 4/15/21 1143)   fentanyl citrate (PF) 100 MCG/2ML 25 mcg (25 mcg Intravenous Given 4/15/21 1147)       Diagnostic Studies  Results Reviewed     Procedure Component Value Units Date/Time    Urine Microscopic [[id number]]  (Abnormal) Collected: 04/15/21 1514    Lab Status: Final result Specimen: Urine, Clean Catch Updated: 04/15/21 1544     RBC, UA None Seen /hpf      WBC, UA 2-4 /hpf      Epithelial Cells Occasional /hpf      Bacteria, UA Moderate /hpf      Hyaline Casts, UA 2-4 /lpf     Urine Macroscopic, POC [[id number]]  (Abnormal) Collected: 04/15/21 1514    Lab Status: Final result Specimen: Urine Updated: 04/15/21 1516     Color, UA Yellow     Clarity, UA Clear     pH, UA 5 0     Leukocytes, UA Negative     Nitrite, UA Negative     Protein, UA 30 (1+) mg/dl      Glucose, UA Negative mg/dl      Ketones, UA Negative mg/dl      Urobilinogen, UA 0 2 E U /dl      Bilirubin, UA Negative     Blood, UA Trace     Specific [place], UA 1 020    Narrative:      CLINITEK RESULT    Troponin I repeat in 6 hrs [[id number]]     Lab Status: No result Specimen: Blood     Troponin I repeat in 3 hrs [[id number]]  (Normal) Collected: 04/15/21 1421    Lab Status: Final result Specimen: Blood from Arm, Right Updated: 04/15/21 1447     Troponin I <0 02 ng/mL     TSH [[id number]]  (Normal) Collected: 04/15/21 1141    Lab Status: Final result Specimen: Blood from Arm, Right Updated: 04/15/21 1348     TSH 3RD GENERATON 0 753 uIU/mL     Narrative:      Patients undergoing fluorescein dye angiography may retain small amounts of fluorescein in the body for 48-72 hours post procedure  Samples containing fluorescein can produce falsely depressed TSH values  If the patient had this procedure,a specimen should be resubmitted post fluorescein clearance        Magnesium [[id number]]  (Normal) Collected: 04/15/21 1141    Lab Status: Final result Specimen: Blood from Arm, Right Updated: 04/15/21 1316     Magnesium 1 8 mg/dL     CK Total with Reflex CKMB [[id number]]  (Abnormal) Collected: 04/15/21 1141    Lab Status: Final result Specimen: Blood from Arm, Right Updated: 04/15/21 1316     Total CK 35 U/L     NT-BNP PRO [[id number]]  (Normal) Collected: 04/15/21 1141    Lab Status: Final result Specimen: Blood from Arm, Right Updated: 04/15/21 1316     NT-proBNP 66 pg/mL     Troponin I [[id number]]  (Normal) Collected: 04/15/21 1141    Lab Status: Final result Specimen: Blood from Arm, Right Updated: 04/15/21 1314     Troponin I <0 02 ng/mL     Comprehensive metabolic panel [[id number]]  (Abnormal) Collected: 04/15/21 1141    Lab Status: Final result Specimen: Blood from Arm, Right Updated: 04/15/21 1309     Sodium 138 mmol/L      Potassium 3 7 mmol/L      Chloride 98 mmol/L      CO2 22 mmol/L      ANION GAP 18 mmol/L       mg/dL      Creatinine 4 29 mg/dL      Glucose 140 mg/dL      Calcium 7 6 mg/dL      Corrected Calcium 8 2 mg/dL      AST 15 U/L      ALT 41 U/L      Alkaline Phosphatase 82 U/L      Total Protein 7 7 g/dL      Albumin 3 2 g/dL      Total Bilirubin 1 12 mg/dL      eGFR 14 ml/min/1 73sq m     Narrative:      National Kidney Disease Foundation guidelines for Chronic Kidney Disease (CKD):     Stage 1 with normal or high GFR (GFR > 90 mL/min/1 73 square meters)    Stage 2 Mild CKD (GFR = 60-89 mL/min/1 73 square meters)    Stage 3A Moderate CKD (GFR = 45-59 mL/min/1 73 square meters)    Stage 3B Moderate CKD (GFR = 30-44 mL/min/1 73 square meters)    Stage 4 Severe CKD (GFR = 15-29 mL/min/1 73 square meters)    Stage 5 End Stage CKD (GFR <15 mL/min/1 73 square meters)  Note: GFR calculation is accurate only with a steady state creatinine    CBC and differential [[id number]]  (Abnormal) Collected: 04/15/21 1141    Lab Status: Final result Specimen: Blood from Arm, Right Updated: 04/15/21 1246     WBC 1 26 Thousand/uL      RBC 4 52 Million/uL      Hemoglobin 12 6 g/dL      Hematocrit 37 4 %      MCV 83 fL      MCH 27 9 pg      MCHC 33 7 g/dL      RDW 12 8 %      MPV 10 6 fL      Platelets 796 Thousands/uL      nRBC 0 /100 WBCs     Manual Differential(PHLEBS Do Not Order) [[id number]]  (Abnormal) Collected: 04/15/21 1141    Lab Status: Final result Specimen: Blood from Arm, Right Updated: 04/15/21 1246     Segmented % 1 %      Lymphocytes % 98 %      Monocytes % 1 %      Eosinophils, % 0 %      Basophils % 0 %      Absolute Neutrophils 0 01 Thousand/uL      Lymphocytes Absolute 1 23 Thousand/uL      Monocytes Absolute 0 01 Thousand/uL      Eosinophils Absolute 0 00 Thousand/uL      Basophils Absolute 0 00 Thousand/uL      Total Counted 100     RBC Morphology Normal     Platelet Estimate Borderline    Protime-INR [[id number]]  (Abnormal) Collected: 04/15/21 1141    Lab Status: Final result Specimen: Blood from Arm, Right Updated: 04/15/21 1222     Protime 16 1 seconds      INR 1 32    APTT [[id number]]  (Normal) Collected: 04/15/21 1141    Lab Status: Final result Specimen: Blood from Arm, Right Updated: 04/15/21 1222     PTT 31 seconds     Fingerstick Glucose (POCT) [[id number]]  (Normal) Collected: 04/15/21 1122    Lab Status: Final result Updated: 04/15/21 1126     POC Glucose 132 mg/dl                  CT chest abdomen pelvis wo contrast   Final Result by Sanjay Rodríguez MD (04/15 3053)         1  Postoperative change again noted reflecting previous left orchiectomy  2   Predominantly left-sided retroperitoneal abdominal and pelvic adenopathy again noted, significantly improved when compared to prior exam of 3/20/2021, presumably reflecting favorable therapeutic response  3   No acute abnormality identified in the chest, abdomen or pelvis  4   Additional findings as noted  Workstation performed: [id number]         CT head without contrast   Final Result by Sanjay Rodríguez MD (04/15 3002)      No acute intracranial abnormality                    Workstation performed: [id number]                    Procedures  ECG 12 Lead Documentation Only    Date/Time: 4/15/2021 11:06 AM  Performed by: PEREZ Cesar  Authorized by: PEREZ Cesar     Indications / Diagnosis:  Chest pain, weakness, fatigue   ECG reviewed by me, the ED Provider: yes (Dr Mackenzie Mesa )    Patient location:  ED  Previous ECG:     Previous ECG:  Compared to current    Similarity:  No change    Comparison to cardiac monitor: Yes    Interpretation:     Interpretation: normal    Rate:     ECG rate:  80    ECG rate assessment: normal    Rhythm:     Rhythm: sinus rhythm    Ectopy:     Ectopy: none    QRS:     QRS axis:  Normal    QRS intervals:  Normal  Conduction:     Conduction: normal    ST segments:     ST segments:  Normal  T waves:     T waves: normal      ECG 12 Lead Documentation Only    Date/Time: 4/15/2021 2:18 PM  Performed by: PEREZ Walker  Authorized by: PEREZ Walker     Indications / Diagnosis:  Chest pain  ECG reviewed by me, the ED Provider: yes (Dr Preethi Caba )    Patient location:  ED  Previous ECG:     Previous ECG:  Compared to current    Comparison ECG info:       Similarity:  Changes noted    Comparison to cardiac monitor: Yes    Interpretation:     Interpretation: abnormal    Rate:     ECG rate assessment: normal    Rhythm:     Rhythm: sinus rhythm    Ectopy:     Ectopy: none    QRS:     QRS axis:  Normal    QRS intervals:  Normal  Conduction:     Conduction: normal    ST segments:     ST segments:  Normal  T waves:     T waves: normal               ED Course  ED Course as of Apr 15 1637   Thu Apr 15, 2021   1210 ECG 12 lead   1246 WBC(!!): 1 26   1246 Protime(!): 16 1   1246 INR(!): 1 32   1247 Platelet Count(!): 735   1247 Platelet Estimate(!): Borderline   1247 Segs Relative(!): 1   1247 Lymphocytes %(!): 98   1247 WBC 1 26 - pt has just started chemo  Monocytes(!): 1   1317 eGFR: 14   1317 Creatinine(!): 4 29   1317 BUN(!): 100   1317 Anion Gap(!): 18   1319 Trop < 0 02  EKG NSR    Unable to perform CT for PE due to GFR 14 and Cr 4 29   2 weeks ago was Gfr 69 and cr  113  Pt is now netropenic as well  Will perform dry scan and admit to [facility]        1331 All labs reviewed and discussed with pt and wife  If PE study needed a VQ will need to be ordered  I doubt PE as VSS, hr 69 and respers 18  Sat 100%  HR score 2       1449 CT head negative for acute process  1504 EKG NSR with QTC of 489 (was 463)  Trop#2 < 0 02   Waiting on CT results  18 CT C/A/P IMPRESSION:     1  Postoperative change again noted reflecting previous left orchiectomy  2   Predominantly left-sided retroperitoneal abdominal and pelvic adenopathy again noted, significantly improved when compared to prior exam of 3/20/2021, presumably reflecting favorable therapeutic response    3  No acute abnormality identified in the chest, abdomen or pelvis  4   Additional findings as noted          1518 Discussed CT results with pt and wife  Eduard sandie to ANN MARIE for admission         [address]  for admission                 HEART Risk Score      Most Recent Value   Heart Score Risk Calculator   History  0 Filed at: 04/15/2021 1332   ECG  0 Filed at: 04/15/2021 1332   Age  1 Filed at: 04/15/2021 1332   Risk Factors  1 Filed at: 04/15/2021 1332   Troponin  0 Filed at: 04/15/2021 1332   HEART Score  2 Filed at: 04/15/2021 1332                                    [facility]  Number of Diagnoses or Management Options  Diagnosis management comments: Chest pain, SOB, Nausea, loose stool, abdominal pain, weakness, fatigue  Recently diagnosed with testicular cancer and started chemo last week    DDX:  Illness related to chemo  PE  metastatic disease  Dehydration    Plan  Labs  Urine  IV  EKG  Tele  CT C/A/P  Oxygen  Pain control  pepcid  Most likely will need admission           Amount and/or Complexity of Data Reviewed  Clinical lab tests: ordered and reviewed  Tests in the radiology section of CPT®: ordered and reviewed  Review and summarize past medical records: yes  Independent visualization of images, tracings, or specimens: yes        Disposition  Final diagnoses:   JULIO C (acute kidney injury) ([facility] [address] )   Dizziness   Chest pain, unspecified type   Neutropenia, unspecified type ([facility] [address] )   Generalized abdominal pain   Fatigue     Time reflects when diagnosis was documented in both MDM as applicable and the Disposition within this note     Time User Action Codes Description Comment    4/15/2021  1:48 PM Tiney Fisher Add [N17 9] JULIO C (acute kidney injury) ([facility] [address] )     4/15/2021  1:48 PM Tiney Fisher Add [R42] Dizziness     4/15/2021  1:48 PM Tiney Fisher Add [R07 9] Chest pain, unspecified type     4/15/2021  1:49 PM Tiney Fisher Add [D70 9] Neutropenia, unspecified type ([facility] [address] )     4/15/2021  1:49 PM Bath Alna, Arnulfo Wilkinson Add [R10 84] Generalized abdominal pain     4/15/2021  1:49 PM Mikalabeba Antonio Add [R53 83] Fatigue       ED Disposition     ED Disposition Condition Date/Time Comment    Admit Stable Thu Apr 15, 2021  3:24 PM Case was discussed with ARTHUR and the patient's admission status was agreed to be Admission Status: inpatient status to the service of Dr Beba Morales    Follow-up Information    None         Patient's Medications   Discharge Prescriptions    No medications on file     No discharge procedures on file      PDMP Review       Value Time User    PDMP Reviewed  Yes 2/19/2021  9:50 AM Luna Joseph MD          ED Provider  Electronically Signed by           Mark Ansari  04/15/21 6522

## 2021-04-15 NOTE — ASSESSMENT & PLAN NOTE
· Follows with Rheumatology  · Currently on prednisone taper, 1 mg daily for month of April then will decrease to 0 5 mg in May  · Continue steroid taper  · Outpatient follow-up

## 2021-04-15 NOTE — ASSESSMENT & PLAN NOTE
· Check hemoglobin A1c  · Metformin on hold in setting of JULIO C  · Sliding scale insulin coverage with Accu-Cheks  · Carbohydrate controlled diet

## 2021-04-15 NOTE — ASSESSMENT & PLAN NOTE
· POA with generalized weakness, abdominal pain, nausea  · CR 4 29, increased from 1 13 only 2 weeks ago  · Over the past 2 weeks patient completed his 1st round of chemotherapy and due to volume overload was initiated on furosemide (reports he gained 22 pounds)  · Patient appears hypovolemic, mildly hypotensive SBP 92 in the ER  · Fluid resuscitation with 2L NSS ordered, IV hydration with Plasmalyte  · Hold furosemide, lisinopril, metformin  · Renally dose all medications  · UA no indication of infection- + hyaline casts, 1+protein  · Trend creatinine  · Avoid hypotension  · CT abdomen/pelvis no acute renal abnormality  · Appreciate nephrology consultation

## 2021-04-15 NOTE — ASSESSMENT & PLAN NOTE
· Completed chemotherapy regimen 4/8  · WBC 1 26 with ANC 0 01  · Thrombocytopenia 103  · Continue to trend blood count  · Neutropenia precautions

## 2021-04-15 NOTE — ASSESSMENT & PLAN NOTE
· Chest discomfort midsternal, not reproducible  · No history of cardiac disease  · EKG on admission bradycardic with nonspecific ST changes  · Initial troponin negative, continue to trend  · Aspirin 81 mg daily  · JOSEFINA 1  · Suspect uremia symptoms

## 2021-04-15 NOTE — ASSESSMENT & PLAN NOTE
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

## 2021-04-15 NOTE — ED NOTES
Per charge RN, patient may be COVID swabbed and sent to his assigned inpatient bed at this time       Audra Garay RN  04/15/21 2864

## 2021-04-15 NOTE — TELEPHONE ENCOUNTER
Patients daughter calling to report that patient has not been feeling well the past week  Patient has nausea, dry heaves, not eating or drinking, has difficulty walking/balancing and is very weak  I spoke with patient briefly - he had difficulty explaining his symptoms due to weakness and feeling ill  Suggested to daughter that they take patient to the ED for evaluation    Daughter agreeable - she states patient is "very sick"  If daughter is unable to get patient in the car she will call an ambulance to transport him    Will forward to Dr Nicola Carrillo team to update

## 2021-04-15 NOTE — ASSESSMENT & PLAN NOTE
· History hypertension  · Home regimen lisinopril 20 mg daily  · Blood pressure medications on hold due to JULIO C  · Avoid hypotension  · Trend blood pressures

## 2021-04-15 NOTE — TELEPHONE ENCOUNTER
Patient's daughter called back to report that they are driving her father to [facility] ER  She requested that they bring a wheelchair out to them  I did advise her that her brother can go into the ER and request a wheelchair if there is not one at the door  Will notify ER of request     Reason for Disposition   Nursing judgment or information in reference    Protocols used: NO GUIDELINE AVAILABLE-ADULT-    Message sent to [place] [id number] ER via Epic notification

## 2021-04-15 NOTE — ED NOTES
Patient given a bedside urinal and is aware that a urine sample is needed       Bert Abbott RN  04/15/21 6416

## 2021-04-16 LAB
ALBUMIN SERPL BCP-MCNC: 2.8 G/DL (ref 3.5–5)
ALP SERPL-CCNC: 69 U/L (ref 46–116)
ALT SERPL W P-5'-P-CCNC: 30 U/L (ref 12–78)
ANION GAP SERPL CALCULATED.3IONS-SCNC: 15 MMOL/L (ref 4–13)
AST SERPL W P-5'-P-CCNC: 11 U/L (ref 5–45)
BILIRUB SERPL-MCNC: 0.95 MG/DL (ref 0.2–1)
BUN SERPL-MCNC: 99 MG/DL (ref 5–25)
CALCIUM ALBUM COR SERPL-MCNC: 8 MG/DL (ref 8.3–10.1)
CALCIUM SERPL-MCNC: 7 MG/DL (ref 8.3–10.1)
CHLORIDE SERPL-SCNC: 99 MMOL/L (ref 100–108)
CHLORIDE UR-SCNC: 61 MMOL/L
CO2 SERPL-SCNC: 22 MMOL/L (ref 21–32)
CREAT SERPL-MCNC: 4.71 MG/DL (ref 0.6–1.3)
CREAT UR-MCNC: 71.9 MG/DL
ERYTHROCYTE [DISTWIDTH] IN BLOOD BY AUTOMATED COUNT: 13 % (ref 11.6–15.1)
GFR SERPL CREATININE-BSD FRML MDRD: 12 ML/MIN/1.73SQ M
GLUCOSE SERPL-MCNC: 113 MG/DL (ref 65–140)
GLUCOSE SERPL-MCNC: 121 MG/DL (ref 65–140)
GLUCOSE SERPL-MCNC: 129 MG/DL (ref 65–140)
GLUCOSE SERPL-MCNC: 139 MG/DL (ref 65–140)
GLUCOSE SERPL-MCNC: 150 MG/DL (ref 65–140)
HCT VFR BLD AUTO: 34.8 % (ref 36.5–49.3)
HGB BLD-MCNC: 11.4 G/DL (ref 12–17)
MAGNESIUM SERPL-MCNC: 1.7 MG/DL (ref 1.6–2.6)
MCH RBC QN AUTO: 28.1 PG (ref 26.8–34.3)
MCHC RBC AUTO-ENTMCNC: 32.8 G/DL (ref 31.4–37.4)
MCV RBC AUTO: 86 FL (ref 82–98)
NRBC BLD AUTO-RTO: 0 /100 WBCS
PHOSPHATE SERPL-MCNC: 4.5 MG/DL (ref 2.3–4.1)
PLATELET # BLD AUTO: 46 THOUSANDS/UL (ref 149–390)
PMV BLD AUTO: 10.4 FL (ref 8.9–12.7)
POTASSIUM SERPL-SCNC: 3.5 MMOL/L (ref 3.5–5.3)
PROT SERPL-MCNC: 6.8 G/DL (ref 6.4–8.2)
RBC # BLD AUTO: 4.06 MILLION/UL (ref 3.88–5.62)
SODIUM 24H UR-SCNC: 55 MOL/L
SODIUM SERPL-SCNC: 136 MMOL/L (ref 136–145)
WBC # BLD AUTO: 0.76 THOUSAND/UL (ref 4.31–10.16)

## 2021-04-16 PROCEDURE — 82948 REAGENT STRIP/BLOOD GLUCOSE: CPT

## 2021-04-16 PROCEDURE — 84300 ASSAY OF URINE SODIUM: CPT | Performed by: NURSE PRACTITIONER

## 2021-04-16 PROCEDURE — 80053 COMPREHEN METABOLIC PANEL: CPT | Performed by: NURSE PRACTITIONER

## 2021-04-16 PROCEDURE — 85027 COMPLETE CBC AUTOMATED: CPT | Performed by: NURSE PRACTITIONER

## 2021-04-16 PROCEDURE — 82436 ASSAY OF URINE CHLORIDE: CPT | Performed by: NURSE PRACTITIONER

## 2021-04-16 PROCEDURE — 99223 1ST HOSP IP/OBS HIGH 75: CPT | Performed by: PHYSICIAN ASSISTANT

## 2021-04-16 PROCEDURE — 84100 ASSAY OF PHOSPHORUS: CPT | Performed by: NURSE PRACTITIONER

## 2021-04-16 PROCEDURE — 83735 ASSAY OF MAGNESIUM: CPT | Performed by: NURSE PRACTITIONER

## 2021-04-16 PROCEDURE — 82570 ASSAY OF URINE CREATININE: CPT | Performed by: NURSE PRACTITIONER

## 2021-04-16 PROCEDURE — 99232 SBSQ HOSP IP/OBS MODERATE 35: CPT | Performed by: INTERNAL MEDICINE

## 2021-04-16 PROCEDURE — 99223 1ST HOSP IP/OBS HIGH 75: CPT | Performed by: NURSE PRACTITIONER

## 2021-04-16 RX ORDER — FAMOTIDINE 20 MG/1
20 TABLET, FILM COATED ORAL 2 TIMES DAILY
Status: DISCONTINUED | OUTPATIENT
Start: 2021-04-16 | End: 2021-04-20 | Stop reason: HOSPADM

## 2021-04-16 RX ORDER — CALCIUM CARBONATE 200(500)MG
500 TABLET,CHEWABLE ORAL 3 TIMES DAILY PRN
Status: DISCONTINUED | OUTPATIENT
Start: 2021-04-16 | End: 2021-04-20 | Stop reason: HOSPADM

## 2021-04-16 RX ORDER — OXYCODONE HYDROCHLORIDE 10 MG/1
10 TABLET ORAL ONCE
Status: COMPLETED | OUTPATIENT
Start: 2021-04-16 | End: 2021-04-16

## 2021-04-16 RX ORDER — SODIUM CHLORIDE AND POTASSIUM CHLORIDE .9; .15 G/100ML; G/100ML
50 SOLUTION INTRAVENOUS CONTINUOUS
Status: DISCONTINUED | OUTPATIENT
Start: 2021-04-16 | End: 2021-04-19

## 2021-04-16 RX ADMIN — HEPARIN SODIUM 5000 UNITS: 5000 INJECTION INTRAVENOUS; SUBCUTANEOUS at 14:05

## 2021-04-16 RX ADMIN — ASPIRIN 81 MG: 81 TABLET ORAL at 08:42

## 2021-04-16 RX ADMIN — FAMOTIDINE 20 MG: 20 TABLET ORAL at 21:33

## 2021-04-16 RX ADMIN — SODIUM CHLORIDE AND POTASSIUM CHLORIDE 100 ML/HR: .9; .15 SOLUTION INTRAVENOUS at 12:34

## 2021-04-16 RX ADMIN — FAMOTIDINE 20 MG: 20 TABLET ORAL at 08:42

## 2021-04-16 RX ADMIN — HEPARIN SODIUM 5000 UNITS: 5000 INJECTION INTRAVENOUS; SUBCUTANEOUS at 21:33

## 2021-04-16 RX ADMIN — PREDNISONE 1 MG: 1 TABLET ORAL at 08:42

## 2021-04-16 RX ADMIN — ATORVASTATIN CALCIUM 40 MG: 40 TABLET, FILM COATED ORAL at 17:05

## 2021-04-16 RX ADMIN — SODIUM CHLORIDE, SODIUM GLUCONATE, SODIUM ACETATE, POTASSIUM CHLORIDE, MAGNESIUM CHLORIDE, SODIUM PHOSPHATE, DIBASIC, AND POTASSIUM PHOSPHATE 125 ML/HR: .53; .5; .37; .037; .03; .012; .00082 INJECTION, SOLUTION INTRAVENOUS at 02:28

## 2021-04-16 RX ADMIN — OXYCODONE HYDROCHLORIDE 10 MG: 10 TABLET ORAL at 02:27

## 2021-04-16 RX ADMIN — HEPARIN SODIUM 5000 UNITS: 5000 INJECTION INTRAVENOUS; SUBCUTANEOUS at 05:33

## 2021-04-16 RX ADMIN — ANTACID TABLETS 500 MG: 500 TABLET, CHEWABLE ORAL at 00:56

## 2021-04-16 NOTE — CONSULTS
Consultation - Nephrology   Juany Smith 57 y o  male MRN: [id number]  Unit/Bed#: E5 -01 Encounter: [id number]    Plan:  -acute kidney injury with creatinine increasing to 4 7, baseline 1-1 1  Continues on IV fluids with potassium  Holding further diuretics and Ace inhibitor  Checking bladder scan  CT negative for hydro  -hypertension, with fluctuations in blood pressure, holding Lasix and ACE-inhibitor  Monitoring off of diuretics  -testicular cancer, last chemotherapy on Friday, plan for next chemotherapy next week  Would hold off if creatinine is elevated  ASSESSMENT/PLAN:  JULIO C (POA):  Suspected prerenal etiology with recent chemotherapy treatment (cisplatin) and diuretic initiation also hemodynamics with drop in BP  Likely progress ATN  -baseline creatinine 1 0-1 1   -presented with creatinine of 4 29   -received fluid bolus in the emergency department   -continues on IV fluids with potassium  -would hold further diuretics and Ace inhibitor  -CT abdomen/pelvis negative for hydro   -continue to check bladder scans PVR with urinary retention protocol  -UA shows trace blood, +1 protein, 2-4 hyaline casts, moderate bacteria  Hypertension:  Drop in blood pressure from 140/62 to 93/54, now stable and acceptable   -continue to avoid hypotension high fluctuations in blood pressure   -recommend holding parameters antihypertensive for systolic blood pressure less than 130    -home antihypertensives:  Lasix 20 mg daily, lisinopril 20 mg daily  -currently monitoring off of antihypertensives  Testicular cancer with retroperitoneal abdominal adenopathy:  -receiving chemotherapy with cisplatin and topside    -oncology consulted  Neutropenia/thrombocytopenia: In the setting of cancer with chemotherapy  Continues on neutropenic precautions  -oncology consulted  Polymyalgia rheumatica syndrome:  Following outpatient with Rheumatology    -currently on prednisone taper 1 mg for the month of April with decreased to 0 5 mg in May  Hyperphosphatemia: In the setting of acute renal failure  Will continue to monitor and trend  Will start on phosphorus binder if remains elevated  Recommend low phos diet  Other:  SIMON on CPAP, GERD  HISTORY OF PRESENT ILLNESS:  Requesting Physician: Padmini Gu DO  Reason for Consult: JULIO C (POA)    Lynda Ha is a 57y o  year old male past medical history of renal calculi, hypertension, testicular cancer with recent start on chemotherapy, hyperglycemia induced by steroids, polymyalgia rheumatica syndrome, who was admitted to [facility] after presenting with complaints of generalized weakness  A renal consultation is requested today for assistance in the management of acute kidney injury  The patient reports completing his 1st round of chemotherapy last Friday  He reports being initiated on Lasix last Friday as well  He reports ever since his chemotherapy and initiation on Lasix he is felt generally fatigued  He reports having indigestion when eating  He reports having overall poor appetite  He admits to shortness of breath with exertion  He admits to intermittent chest pain  He denies any diarrhea  He denies any changes to his urination  He is not checking his weights at home  The patient reports having history of at least 11 kidney stones in the past   He denies taking NSAIDs at home      PAST MEDICAL HISTORY:  Past Medical History:   Diagnosis Date    BPH without obstruction/lower urinary tract symptoms     Cancer (HCC)     prostate    CPAP (continuous positive airway pressure) dependence     Elevated PSA     GERD (gastroesophageal reflux disease)     Gout     Hyperlipidemia     Hypertension     Kidney stone     Obese abdomen     Obesity     Polymyalgia (HCC)     Prostate cancer (HCC)     Rash     under both arms and in between legs - family doctor aware - pt uses Desitin    Sleep apnea     Testicular carcinoma ([facility] [address] )     Urinary frequency     Urinary urgency     Wears glasses        PAST SURGICAL HISTORY:  Past Surgical History:   Procedure Laterality Date    CARPAL TUNNEL RELEASE Bilateral 07/2020    COLONOSCOPY      CYSTOSCOPY W/ LASER LITHOTRIPSY  2001    CYSTOSTOMY W/ STENT INSERTION  2011    ESOPHAGOGASTRODUODENOSCOPY      EXTRACORPOREAL SHOCK WAVE LITHOTRIPSY Right 2011    HERNIA REPAIR      KNEE ARTHROSCOPY Left     CT CYSTO/URETERO W/LITHOTRIPSY &INDWELL STENT INSRT Right 4/29/2018    Procedure: CYSTOSCOPY URETEROSCOPY, RETROGRADE PYELOGRAM AND INSERTION STENT URETERAL;  Surgeon: Antony Saldivar MD;  Location: [facility];  Service: Urology    CT REMOVAL TESTIS,RADICAL Left 2/19/2021    Procedure: Radical  ORCHIECTOMY;  Surgeon: Adams Portillo MD;  Location: [facility];  Service: Urology       ALLERGIES:  No Known Allergies    SOCIAL HISTORY:  Social History     Substance and Sexual Activity   Alcohol Use No     Social History     Substance and Sexual Activity   Drug Use No     Social History     Tobacco Use   Smoking Status Never Smoker   Smokeless Tobacco Never Used       FAMILY HISTORY:  Family History   Problem Relation Age of Onset    Alzheimer's disease Mother        MEDICATIONS:    Current Facility-Administered Medications:     acetaminophen (TYLENOL) tablet 650 mg, 650 mg, Oral, Q6H PRN, Yesi S Sophy, CRNP, 650 mg at 04/15/21 2016    aspirin (ECOTRIN LOW STRENGTH) EC tablet 81 mg, 81 mg, Oral, Daily, Yesi S Sophy, CRNP, 81 mg at 04/16/21 0842    atorvastatin (LIPITOR) tablet 40 mg, 40 mg, Oral, Daily With Dinner, Yesi S Sophy, CRNP    calcium carbonate (TUMS) chewable tablet 500 mg, 500 mg, Oral, TID PRN, Lilli Darby PA-C, 500 mg at 04/16/21 0056    famotidine (PEPCID) tablet 20 mg, 20 mg, Oral, Daily, Yesi S Sophy, CRNP, 20 mg at 04/16/21 0842    heparin (porcine) subcutaneous injection 5,000 Units, 5,000 Units, Subcutaneous, Q8H [address], Yesi S Sophy, CRNP, 5,000 Units at 04/16/21 0533   insulin lispro (HumaLOG) 100 units/mL subcutaneous injection 2-12 Units, 2-12 Units, Subcutaneous, TID AC **AND** Fingerstick Glucose (POCT), , , TID AC, Yesi S Sophy, CRNP    insulin lispro (HumaLOG) 100 units/mL subcutaneous injection 2-12 Units, 2-12 Units, Subcutaneous, HS, Yesi S Sophy, CRNP    multi-electrolyte (PLASMALYTE-A/ISOLYTE-S PH 7 4) IV solution, 125 mL/hr, Intravenous, Continuous, Yesi S Sophy, CRNP, Last Rate: 125 mL/hr at 04/16/21 0228, 125 mL/hr at 04/16/21 0228    ondansetron (ZOFRAN) injection 4 mg, 4 mg, Intravenous, Q4H PRN, Abelardo Farias, DO, 4 mg at 04/15/21 2100    predniSONE tablet 1 mg, 1 mg, Oral, Daily, Yesi S Sophy, CRNP, 1 mg at 04/16/21 1397    REVIEW OF SYSTEMS:  A complete review of systems was performed and found to be negative unless otherwise noted in the history of present illness  General: No fevers, chills  Cardiovascular:  + chest pain, - leg edema  Respiratory: No cough, sputum production,  + shortness of breath  Gastrointestinal:  + nausea/-vomiting,  - diarrhea,  - abdominal pain  Genitourinary: No hematuria  No foamy urine    No dysuria    PHYSICAL EXAM:  Current Weight: Weight - Scale: 112 kg (246 lb 14 6 oz)  First Weight: Weight - Scale: 115 kg (253 lb 15 5 oz)  Vitals:    04/15/21 1802 04/15/21 2300 04/16/21 0600 04/16/21 0823   BP:  125/56  130/58   BP Location:  Left arm  Right arm   Pulse:  80  82   Resp:  18  18   Temp:  97 9 °F (36 6 °C)  97 9 °F (36 6 °C)   TempSrc:  Temporal  Temporal   SpO2:  98%  99%   Weight: 115 kg (253 lb 15 5 oz)  112 kg (246 lb 14 6 oz)        Intake/Output Summary (Last 24 hours) at 4/16/2021 1018  Last data filed at 4/16/2021 0824  Gross per 24 hour   Intake 1586 25 ml   Output 550 ml   Net 1036 25 ml     General: NAD  Skin: warm, dry, intact, no rash  HEENT: Moist mucous membranes, sclera anicteric, normocephalic, atraumatic  Neck: No apparent JVD appreciated  Chest:lung sounds clear B/L, on RA   CVS:Regular rate and rhythm, no murmer   Abdomen: Soft, round, non-tender, +BS  Extremities:  B/L LE edema present  Neuro: alert and oriented  Psych: appropriate mood and affect     Invasive Devices:      Lab Results:   Results from last 7 days   Lab Units 04/16/21  0842 04/15/21  1141   WBC Thousand/uL 0 76* 1 26*   HEMOGLOBIN g/dL 11 4* 12 6   HEMATOCRIT % 34 8* 37 4   PLATELETS Thousands/uL 46* 103*   SODIUM mmol/L 136 138   POTASSIUM mmol/L 3 5 3 7   CHLORIDE mmol/L 99* 98*   CO2 mmol/L 22 22   BUN mg/dL 99* 100*   CREATININE mg/dL 4 71* 4 29*   CALCIUM mg/dL 7 0* 7 6*   MAGNESIUM mg/dL 1 7 1 8   PHOSPHORUS mg/dL 4 5*  --    ALK PHOS U/L 69 82   ALT U/L 30 41   AST U/L 11 15

## 2021-04-16 NOTE — PLAN OF CARE
Problem: Potential for Falls  Goal: Patient will remain free of falls  Description: INTERVENTIONS:  - Assess patient frequently for physical needs  -  Identify cognitive and physical deficits and behaviors that affect risk of falls  -  [place] fall precautions as indicated by assessment   - Educate patient/family on patient safety including physical limitations  - Instruct patient to call for assistance with activity based on assessment  - Modify environment to reduce risk of injury  - Consider OT/PT consult to assist with strengthening/mobility  Outcome: Progressing     Problem: Nutrition/Hydration-ADULT  Goal: Nutrient/Hydration intake appropriate for improving, restoring or maintaining nutritional needs  Description: Monitor and assess patient's nutrition/hydration status for malnutrition  Collaborate with interdisciplinary team and initiate plan and interventions as ordered  Monitor patient's weight and dietary intake as ordered or per policy  Utilize nutrition screening tool and intervene as necessary  Determine patient's food preferences and provide high-protein, high-caloric foods as appropriate       INTERVENTIONS:  - Monitor oral intake, urinary output, labs, and treatment plans  - Assess nutrition and hydration status and recommend course of action  - Evaluate amount of meals eaten  - Assist patient with eating if necessary   - Allow adequate time for meals  - Recommend/ encourage appropriate diets, oral nutritional supplements, and vitamin/mineral supplements  - Order, calculate, and assess calorie counts as needed  - Recommend, monitor, and adjust tube feedings and TPN/PPN based on assessed needs  - Assess need for intravenous fluids  - Provide specific nutrition/hydration education as appropriate  - Include patient/family/caregiver in decisions related to nutrition  Outcome: Progressing     Problem: PAIN - ADULT  Goal: Verbalizes/displays adequate comfort level or baseline comfort level  Description: Interventions:  - Encourage patient to monitor pain and request assistance  - Assess pain using appropriate pain scale  - Administer analgesics based on type and severity of pain and evaluate response  - Implement non-pharmacological measures as appropriate and evaluate response  - Consider cultural and social influences on pain and pain management  - Notify physician/advanced practitioner if interventions unsuccessful or patient reports new pain  Outcome: Progressing     Problem: INFECTION - ADULT  Goal: Absence or prevention of progression during hospitalization  Description: INTERVENTIONS:  - Assess and monitor for signs and symptoms of infection  - Monitor lab/diagnostic results  - Monitor all insertion sites, i e  indwelling lines, tubes, and drains  - Monitor endotracheal if appropriate and nasal secretions for changes in amount and color  - [place] appropriate cooling/warming therapies per order  - Administer medications as ordered  - Instruct and encourage patient and family to use good hand hygiene technique  - Identify and instruct in appropriate isolation precautions for identified infection/condition  Outcome: Progressing  Goal: Absence of fever/infection during neutropenic period  Description: INTERVENTIONS:  - Monitor WBC    Outcome: Progressing     Problem: SAFETY ADULT  Goal: Patient will remain free of falls  Description: INTERVENTIONS:  - Assess patient frequently for physical needs  -  Identify cognitive and physical deficits and behaviors that affect risk of falls    -  [place] fall precautions as indicated by assessment   - Educate patient/family on patient safety including physical limitations  - Instruct patient to call for assistance with activity based on assessment  - Modify environment to reduce risk of injury  - Consider OT/PT consult to assist with strengthening/mobility  Outcome: Progressing  Goal: Maintain or return to baseline ADL function  Description: INTERVENTIONS:  -  Assess patient's ability to carry out ADLs; assess patient's baseline for ADL function and identify physical deficits which impact ability to perform ADLs (bathing, care of mouth/teeth, toileting, grooming, dressing, etc )  - Assess/evaluate cause of self-care deficits   - Assess range of motion  - Assess patient's mobility; develop plan if impaired  - Assess patient's need for assistive devices and provide as appropriate  - Encourage maximum independence but intervene and supervise when necessary  - Involve family in performance of ADLs  - Assess for home care needs following discharge   - Consider OT consult to assist with ADL evaluation and planning for discharge  - Provide patient education as appropriate  Outcome: Progressing  Goal: Maintain or return mobility status to optimal level  Description: INTERVENTIONS:  - Assess patient's baseline mobility status (ambulation, transfers, stairs, etc )    - Identify cognitive and physical deficits and behaviors that affect mobility  - Identify mobility aids required to assist with transfers and/or ambulation (gait belt, sit-to-stand, lift, walker, cane, etc )  - [place] fall precautions as indicated by assessment  - Record patient progress and toleration of activity level on Mobility SBAR; progress patient to next Phase/Stage  - Instruct patient to call for assistance with activity based on assessment  - Consider rehabilitation consult to assist with strengthening/weightbearing, etc   Outcome: Progressing     Problem: DISCHARGE PLANNING  Goal: Discharge to home or other facility with appropriate resources  Description: INTERVENTIONS:  - Identify barriers to discharge w/patient and caregiver  - Arrange for needed discharge resources and transportation as appropriate  - Identify discharge learning needs (meds, wound care, etc )  - Arrange for interpretive services to assist at discharge as needed  - Refer to Case Management Department for coordinating discharge planning if the patient needs post-hospital services based on physician/advanced practitioner order or complex needs related to functional status, cognitive ability, or social support system  Outcome: Progressing     Problem: Knowledge Deficit  Goal: Patient/family/caregiver demonstrates understanding of disease process, treatment plan, medications, and discharge instructions  Description: Complete learning assessment and assess knowledge base    Interventions:  - Provide teaching at level of understanding  - Provide teaching via preferred learning methods  Outcome: Progressing     Problem: GENITOURINARY - ADULT  Goal: Maintains or returns to baseline urinary function  Description: INTERVENTIONS:  - Assess urinary function  - Encourage oral fluids to ensure adequate hydration if ordered  - Administer IV fluids as ordered to ensure adequate hydration  - Administer ordered medications as needed  - Offer frequent toileting  - Follow urinary retention protocol if ordered  Outcome: Progressing  Goal: Absence of urinary retention  Description: INTERVENTIONS:  - Assess patients ability to void and empty bladder  - Monitor I/O  - Bladder scan as needed  - Discuss with physician/AP medications to alleviate retention as needed  - Discuss catheterization for long term situations as appropriate  Outcome: Progressing  Goal: Urinary catheter remains patent  Description: INTERVENTIONS:  - Assess patency of urinary catheter  - If patient has a chronic maynard, consider changing catheter if non-functioning  - Follow guidelines for intermittent irrigation of non-functioning urinary catheter  Outcome: Progressing     Problem: METABOLIC, FLUID AND ELECTROLYTES - ADULT  Goal: Electrolytes maintained within normal limits  Description: INTERVENTIONS:  - Monitor labs and assess patient for signs and symptoms of electrolyte imbalances  - Administer electrolyte replacement as ordered  - Monitor response to electrolyte replacements, including repeat lab results as appropriate  - Instruct patient on fluid and nutrition as appropriate  Outcome: Progressing  Goal: Fluid balance maintained  Description: INTERVENTIONS:  - Monitor labs   - Monitor I/O and WT  - Instruct patient on fluid and nutrition as appropriate  - Assess for signs & symptoms of volume excess or deficit  Outcome: Progressing  Goal: Glucose maintained within target range  Description: INTERVENTIONS:  - Monitor Blood Glucose as ordered  - Assess for signs and symptoms of hyperglycemia and hypoglycemia  - Administer ordered medications to maintain glucose within target range  - Assess nutritional intake and initiate nutrition service referral as needed  Outcome: Progressing

## 2021-04-16 NOTE — NURSING NOTE
Agree with previous nurse's assessment  Patient resting in bed comfortably with no complaints of pain or discomfort at this time  Blood sugars stable  Bed low and locked, alarm activated with call bell and belongings within reach

## 2021-04-16 NOTE — PROGRESS NOTES
Progress Note - Navin Smith 57 y o  male MRN: [id number]    Unit/Bed#: E5 -01 Encounter: [id number]    Assessment/Plan:    JULIO C    creatinine increased to 4 7 no benefit with fluids, await nephrology consult, reviewed urinalysis, possibly related to metformin/diuretic and recent chemotherapy, holding nephrotoxic agents    PMR    continue low-dose prednisone and rheumatology follow-up    SIMON    continue CPAP    Obesity   would benefit from weight loss    [place]   completed 1 of 3 courses of chemotherapy continue oncology follow-up    Dyslipidemia   continue statin for LDL control    Hypertension   currently controlled without meds    Pancytopenia   related to chemotherapy    Subjective:   Feels weak and worn out, chronic chest pain/GERD, no shortness of breath no nausea vomiting no fevers chills tolerating appetite    Objective:     Vitals: Blood pressure 130/58, pulse 82, temperature 97 9 °F (36 6 °C), temperature source Temporal, resp  rate 18, weight 112 kg (246 lb 14 6 oz), SpO2 99 %  ,Body mass index is 36 99 kg/m²        Results from last 7 days   Lab Units 04/16/21  0842 04/15/21  1141   WBC Thousand/uL 0 76* 1 26*   HEMOGLOBIN g/dL 11 4* 12 6   HEMATOCRIT % 34 8* 37 4   PLATELETS Thousands/uL 46* 103*   INR   --  1 32*     Results from last 7 days   Lab Units 04/16/21  0842   POTASSIUM mmol/L 3 5   CHLORIDE mmol/L 99*   CO2 mmol/L 22   BUN mg/dL 99*   CREATININE mg/dL 4 71*   CALCIUM mg/dL 7 0*   ALK PHOS U/L 69   ALT U/L 30   AST U/L 11       Scheduled Meds:  Current Facility-Administered Medications   Medication Dose Route Frequency Provider Last Rate    acetaminophen  650 mg Oral Q6H PRN Yesi S Sophy, CRNP      aspirin  81 mg Oral Daily Yesi S Sophy, CRNP      atorvastatin  40 mg Oral Daily With [employer] S Sophy, CRNP      calcium carbonate  500 mg Oral TID PRN Michelle Braxton PA-C      famotidine  20 mg Oral Daily Yesi S Sophy, CRNP      heparin (porcine)  5,000 Units Subcutaneous Q8H [facility] Yesi S Sophy, CRNP      insulin lispro  2-12 Units Subcutaneous TID AC Yesi S Sophy, CRNP      insulin lispro  2-12 Units Subcutaneous HS Yesi S Sophy, CRNP      ondansetron  4 mg Intravenous Q4H PRN Abelardo Farias,       predniSONE  1 mg Oral Daily Yesi S Sophy, CRNP      sodium chloride 0 9 % with KCl 20 mEq/L  75 mL/hr Intravenous Continuous [place], DO         Continuous Infusions:  sodium chloride 0 9 % with KCl 20 mEq/L, 75 mL/hr          Physical exam:  General appearance:  Alert no distress interaction appropriate  Head/Eyes:  Nonicteric pale PERRL EOMI  Neck:  Supple  Lungs: CTA bilateral no wheezing rhonchi or rales  Heart: normal S1 S2 regular  Abdomen:  Obese nontender with bowel sounds  Extremities: no edema  Skin: no rash    Invasive Devices     Peripheral Intravenous Line            Peripheral IV 04/15/21 Right Antecubital less than 1 day                  VTE Pharmacologic Prophylaxis:  Heparin      Counseling / Coordination of Care  Total floor / unit time spent today 30  minutes  Greater than 50% of total time was spent with the patient and / or family counseling and / or coordination of care  A description of the counseling / coordination of care: Laura Goldberg

## 2021-04-16 NOTE — PLAN OF CARE
Problem: Potential for Falls  Goal: Patient will remain free of falls  Description: INTERVENTIONS:  - Assess patient frequently for physical needs  -  Identify cognitive and physical deficits and behaviors that affect risk of falls  -  [place] fall precautions as indicated by assessment   - Educate patient/family on patient safety including physical limitations  - Instruct patient to call for assistance with activity based on assessment  - Modify environment to reduce risk of injury  - Consider OT/PT consult to assist with strengthening/mobility  Outcome: Progressing     Problem: Nutrition/Hydration-ADULT  Goal: Nutrient/Hydration intake appropriate for improving, restoring or maintaining nutritional needs  Description: Monitor and assess patient's nutrition/hydration status for malnutrition  Collaborate with interdisciplinary team and initiate plan and interventions as ordered  Monitor patient's weight and dietary intake as ordered or per policy  Utilize nutrition screening tool and intervene as necessary  Determine patient's food preferences and provide high-protein, high-caloric foods as appropriate       INTERVENTIONS:  - Monitor oral intake, urinary output, labs, and treatment plans  - Assess nutrition and hydration status and recommend course of action  - Evaluate amount of meals eaten  - Assist patient with eating if necessary   - Allow adequate time for meals  - Recommend/ encourage appropriate diets, oral nutritional supplements, and vitamin/mineral supplements  - Order, calculate, and assess calorie counts as needed  - Recommend, monitor, and adjust tube feedings and TPN/PPN based on assessed needs  - Assess need for intravenous fluids  - Provide specific nutrition/hydration education as appropriate  - Include patient/family/caregiver in decisions related to nutrition  Outcome: Progressing     Problem: PAIN - ADULT  Goal: Verbalizes/displays adequate comfort level or baseline comfort level  Description: Interventions:  - Encourage patient to monitor pain and request assistance  - Assess pain using appropriate pain scale  - Administer analgesics based on type and severity of pain and evaluate response  - Implement non-pharmacological measures as appropriate and evaluate response  - Consider cultural and social influences on pain and pain management  - Notify physician/advanced practitioner if interventions unsuccessful or patient reports new pain  Outcome: Progressing     Problem: INFECTION - ADULT  Goal: Absence or prevention of progression during hospitalization  Description: INTERVENTIONS:  - Assess and monitor for signs and symptoms of infection  - Monitor lab/diagnostic results  - Monitor all insertion sites, i e  indwelling lines, tubes, and drains  - Monitor endotracheal if appropriate and nasal secretions for changes in amount and color  - [place] appropriate cooling/warming therapies per order  - Administer medications as ordered  - Instruct and encourage patient and family to use good hand hygiene technique  - Identify and instruct in appropriate isolation precautions for identified infection/condition  Outcome: Progressing  Goal: Absence of fever/infection during neutropenic period  Description: INTERVENTIONS:  - Monitor WBC    Outcome: Progressing     Problem: SAFETY ADULT  Goal: Patient will remain free of falls  Description: INTERVENTIONS:  - Assess patient frequently for physical needs  -  Identify cognitive and physical deficits and behaviors that affect risk of falls    -  [place] fall precautions as indicated by assessment   - Educate patient/family on patient safety including physical limitations  - Instruct patient to call for assistance with activity based on assessment  - Modify environment to reduce risk of injury  - Consider OT/PT consult to assist with strengthening/mobility  Outcome: Progressing  Goal: Maintain or return to baseline ADL function  Description: INTERVENTIONS:  -  Assess patient's ability to carry out ADLs; assess patient's baseline for ADL function and identify physical deficits which impact ability to perform ADLs (bathing, care of mouth/teeth, toileting, grooming, dressing, etc )  - Assess/evaluate cause of self-care deficits   - Assess range of motion  - Assess patient's mobility; develop plan if impaired  - Assess patient's need for assistive devices and provide as appropriate  - Encourage maximum independence but intervene and supervise when necessary  - Involve family in performance of ADLs  - Assess for home care needs following discharge   - Consider OT consult to assist with ADL evaluation and planning for discharge  - Provide patient education as appropriate  Outcome: Progressing  Goal: Maintain or return mobility status to optimal level  Description: INTERVENTIONS:  - Assess patient's baseline mobility status (ambulation, transfers, stairs, etc )    - Identify cognitive and physical deficits and behaviors that affect mobility  - Identify mobility aids required to assist with transfers and/or ambulation (gait belt, sit-to-stand, lift, walker, cane, etc )  - [place] fall precautions as indicated by assessment  - Record patient progress and toleration of activity level on Mobility SBAR; progress patient to next Phase/Stage  - Instruct patient to call for assistance with activity based on assessment  - Consider rehabilitation consult to assist with strengthening/weightbearing, etc   Outcome: Progressing     Problem: DISCHARGE PLANNING  Goal: Discharge to home or other facility with appropriate resources  Description: INTERVENTIONS:  - Identify barriers to discharge w/patient and caregiver  - Arrange for needed discharge resources and transportation as appropriate  - Identify discharge learning needs (meds, wound care, etc )  - Arrange for interpretive services to assist at discharge as needed  - Refer to Case Management Department for coordinating discharge planning if the patient needs post-hospital services based on physician/advanced practitioner order or complex needs related to functional status, cognitive ability, or social support system  Outcome: Progressing     Problem: Knowledge Deficit  Goal: Patient/family/caregiver demonstrates understanding of disease process, treatment plan, medications, and discharge instructions  Description: Complete learning assessment and assess knowledge base    Interventions:  - Provide teaching at level of understanding  - Provide teaching via preferred learning methods  Outcome: Progressing     Problem: GENITOURINARY - ADULT  Goal: Maintains or returns to baseline urinary function  Description: INTERVENTIONS:  - Assess urinary function  - Encourage oral fluids to ensure adequate hydration if ordered  - Administer IV fluids as ordered to ensure adequate hydration  - Administer ordered medications as needed  - Offer frequent toileting  - Follow urinary retention protocol if ordered  Outcome: Progressing  Goal: Absence of urinary retention  Description: INTERVENTIONS:  - Assess patients ability to void and empty bladder  - Monitor I/O  - Bladder scan as needed  - Discuss with physician/AP medications to alleviate retention as needed  - Discuss catheterization for long term situations as appropriate  Outcome: Progressing  Goal: Urinary catheter remains patent  Description: INTERVENTIONS:  - Assess patency of urinary catheter  - If patient has a chronic [place]nard, consider changing catheter if non-functioning  - Follow guidelines for intermittent irrigation of non-functioning urinary catheter  Outcome: Progressing     Problem: METABOLIC, FLUID AND ELECTROLYTES - ADULT  Goal: Electrolytes maintained within normal limits  Description: INTERVENTIONS:  - Monitor labs and assess patient for signs and symptoms of electrolyte imbalances  - Administer electrolyte replacement as ordered  - Monitor response to electrolyte replacements, including repeat lab results as appropriate  - Instruct patient on fluid and nutrition as appropriate  Outcome: Progressing  Goal: Fluid balance maintained  Description: INTERVENTIONS:  - Monitor labs   - Monitor I/O and WT  - Instruct patient on fluid and nutrition as appropriate  - Assess for signs & symptoms of volume excess or deficit  Outcome: Progressing  Goal: Glucose maintained within target range  Description: INTERVENTIONS:  - Monitor Blood Glucose as ordered  - Assess for signs and symptoms of hyperglycemia and hypoglycemia  - Administer ordered medications to maintain glucose within target range  - Assess nutritional intake and initiate nutrition service referral as needed  Outcome: Progressing     Problem: Prexisting or High Potential for Compromised Skin Integrity  Goal: Skin integrity is maintained or improved  Description: INTERVENTIONS:  - Identify patients at risk for skin breakdown  - Assess and monitor skin integrity  - Assess and monitor nutrition and hydration status  - Monitor labs   - Assess for incontinence   - Turn and reposition patient  - Assist with mobility/ambulation  - Relieve pressure over bony prominences  - Avoid friction and shearing  - Provide appropriate hygiene as needed including keeping skin clean and dry  - Evaluate need for skin moisturizer/barrier cream  - Collaborate with interdisciplinary team   - Patient/family teaching  - Consider wound care consult   Outcome: Progressing

## 2021-04-16 NOTE — CONSULTS
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 with external right iliac lymphadenopathy measuring up to 3 cm and left para-aortic lymphadenopathy measuring up to 2 3 cm (pT2, N2, S1) with persistent elevation of LDH less than 1 5 normal upper limit    Etoposide/ cisplatin C1D1 within min day 1 and daily Zofran 16 milligrams dexamethasone 10 milligrams along with Neulasta Onpro day 5 initiated 4/5/21 (peg-filgrastim 4/10/21)     2  Fatigue, weakness  Multifactorial        3   JULIO C  Neprhology evaluating  Diuretics, ACEI on hold  He is on IV hydration and potassium     4   chemo induced cytopenias  Total WBC 0 76 with platelet count 37,446  As this is day 6 post Neulasta, will see if there is improvement in WBC tomorrow 4/17/21  If still no recovery, Neupogen could be considered  Patient has outpatient f/u 4/22/21 in our office which he is encouraged to keep         History of Presenting Illness:    Piyush Angel is a 57-year-old  male seen initially 4/1/21 regarding stage II B (pT2, 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, S1) left-sided pure seminoma    He has a history of prostate cancer, nephrolithiasis, benign prostatic hypertrophy, polymyalgia, hypertension, sleep apnea, obesity, GERD, hiatal hernia, gout  His prostate cancer was diagnosed on September 2019 with single core with 4 mm focus of prostate cancer with very low risk group  He is on active surveillance with PSA 4 9 on March 2020 followed by Urology  He was found to have enlargement of the left testicle        Ultrasound of the scrotum on 10/24/2020 showed left testicle measuring 7 2 x 3 9 x 5 1 cm with lobulated contour and diffuse heterogeneous echotexture     Repeat ultrasound on 01/20/2021 showed 9 1 x 4 9 x 6 8 cm increase in size compared to prior ultrasound    LDH was elevated at 376 ( ULN 250) normal hCG, alpha fetoprotein    Status post left inguinal orchiectomy on 02/19/2021, showing pure seminoma primary measuring 8 2 cm, abuts and invades into but not through tunica albuginea, invades hilar fat and spermatic cord soft tissue with lymphovascular invasion, spermatic cord is not involved by the tumor [place] [facility] [place] scan of the chest abdomen and pelvis on 03/20/2021 showed enlarged left iliac and left para-aortic lymph nodes for example left para-aortic lymph node measuring 2 3 cm, left external iliac lymph node measuring 30 mm no evidence of metastatic disease in the chest  He had pain at the left orchiectomy site on and off most likely responding to acetaminophen from healing  He reported 12 lb weight loss after the surgery  4/1/21:  cr 1 13, hemoglobin 1 3 3, MCV of 89, white blood cell count 7 75, 51% neutrophils, 33% lymphocytes, 12% monocytes, platelets 438    4 cycles of  Etoposide/ cisplatin per the NCCN guidelines recommended  For chemotherapy-induced neutropenia, pegfilgrastim recommended  C1D1 4/5/21    Emend given day one with Zofran 16mg and decadron 10mg days 1-5  Neulasta Onpro placed 4/9/21 (4/10/21 administration)       Presented to [address] ED 4/15/21 with weakness, abdominal pain, nausea  He was mildly hypotensive in ED  Cr 4 29 with  4/15/21  Review of Systems - As stated in the HPI otherwise the fourteen point review of systems was negative        Past Medical History:   Diagnosis Date    BPH without obstruction/lower urinary tract symptoms     Cancer (HCC)     prostate    CPAP (continuous positive airway pressure) dependence     Elevated PSA     GERD (gastroesophageal reflux disease)     Gout     Hyperlipidemia     Hypertension     Kidney stone     Obese abdomen     Obesity     Polymyalgia (HCC)     Prostate cancer (HCC)     Rash     under both arms and in between legs - family doctor aware - pt uses Desitin    Sleep apnea     Testicular carcinoma (HCC)     Urinary frequency     Urinary urgency     Wears glasses        Past Surgical History:   Procedure Laterality Date    CARPAL TUNNEL RELEASE Bilateral 07/2020    COLONOSCOPY      CYSTOSCOPY W/ LASER LITHOTRIPSY  2001    CYSTOSTOMY W/ STENT INSERTION  2011    ESOPHAGOGASTRODUODENOSCOPY      EXTRACORPOREAL SHOCK WAVE LITHOTRIPSY Right 2011    HERNIA REPAIR      KNEE ARTHROSCOPY Left     NE CYSTO/URETERO W/LITHOTRIPSY &INDWELL STENT INSRT Right 4/29/2018    Procedure: CYSTOSCOPY URETEROSCOPY, RETROGRADE PYELOGRAM AND INSERTION STENT URETERAL;  Surgeon: Khari Edwards MD;  Location: [facility];  Service: Urology    NE REMOVAL TESTIS,RADICAL Left 2/19/2021    Procedure: Radical  ORCHIECTOMY;  Surgeon: Doris Park MD;  Location[facility];  Service: Urology       Social History     Socioeconomic History    Marital status: /Civil Union     Spouse name: None    Number of children: None    Years of education: None    Highest education level: None   Occupational History    None   Social Needs    Financial resource strain: None    Food insecurity     Worry: None     Inability: None    Transportation needs     Medical: None     Non-medical: None   Tobacco Use    Smoking status: Never Smoker    Smokeless tobacco: Never Used   Substance and Sexual Activity    Alcohol use: No    Drug use: No    Sexual activity: None   Lifestyle    Physical activity     Days per week: None     Minutes per session: None    Stress: None   Relationships    Social connections     Talks on phone: None     Gets together: None     Attends Sikh service: None     Active member of club or organization: None     Attends meetings of clubs or organizations: None     Relationship status: None    Intimate partner violence     Fear of current or ex partner: None     Emotionally abused: None     Physically abused: None     Forced sexual activity: None   Other Topics Concern    None   Social History Narrative    None       Family History   Problem Relation Age of Onset    Alzheimer's disease Mother        No Known Allergies      Current Facility-Administered Medications:     acetaminophen (TYLENOL) tablet 650 mg, 650 mg, Oral, Q6H PRN, Yesi S Sophy, CRNP, 650 mg at 04/15/21 2016    aspirin (ECOTRIN LOW STRENGTH) EC tablet 81 mg, 81 mg, Oral, Daily, Yesi S Sophy, CRNP, 81 mg at 04/16/21 0842    atorvastatin (LIPITOR) tablet 40 mg, 40 mg, Oral, Daily With Dinner, Yesi S Sophy, CRNP    calcium carbonate (TUMS) chewable tablet 500 mg, 500 mg, Oral, TID PRN, Norvell Snellen, PA-C, 500 mg at 04/16/21 0056    famotidine (PEPCID) tablet 20 mg, 20 mg, Oral, BID, Andres Valiente DO    heparin (porcine) subcutaneous injection 5,000 Units, 5,000 Units, Subcutaneous, Q8H [address], Yesi S Sophy, CRNP, 5,000 Units at 04/16/21 1405    insulin lispro (HumaLOG) 100 units/mL subcutaneous injection 2-12 Units, 2-12 Units, Subcutaneous, TID AC **AND** Fingerstick Glucose (POCT), , , TID AC, Yesi S Sophy, CRNP    insulin lispro (HumaLOG) 100 units/mL subcutaneous injection 2-12 Units, 2-12 Units, Subcutaneous, HS, Yesi S Sophy, CRNP    ondansetron (ZOFRAN) injection 4 mg, 4 mg, Intravenous, Q4H PRN, Abelardo Farias DO, 4 mg at 04/15/21 2100    predniSONE tablet 1 mg, 1 mg, Oral, Daily, Yesi S Sophy, CRNP, 1 mg at 04/16/21 0842    sodium chloride 0 9 % with KCl 20 mEq/L infusion (premix), 100 mL/hr, Intravenous, Continuous, PEREZ Johnson, Last Rate: 100 mL/hr at 04/16/21 1234, 100 mL/hr at 04/16/21 1234    Medications Prior to Admission   Medication    allopurinol (ZYLOPRIM) 100 mg tablet    ASPIRIN EC PO    calcium-vitamin D (OSCAL 500 + D) 500 mg-200 units per tablet    dexlansoprazole (DEXILANT) 60 MG capsule    furosemide (LASIX) 20 mg tablet    lisinopril (ZESTRIL) 20 mg tablet    metFORMIN (GLUCOPHAGE) 500 mg tablet    Omega 3 1000 MG CAPS    PREDNISONE PO    prochlorperazine (COMPAZINE) 10 mg tablet    rosuvastatin (CRESTOR) 20 MG tablet    Cholecalciferol (VITAMIN D3) 2000 units capsule         PHYSICAL EXAMINATION     /58 (BP Location: Right arm)   Pulse 82   Temp 97 9 °F (36 6 °C) (Temporal)   Resp 18   Wt 112 kg (246 lb 14 6 oz)   SpO2 99%   BMI 36 99 kg/m²     Ht Readings from Last 3 Encounters:   04/09/21 5' 8 5" (1 74 m)   04/08/21 5' 8 5" (1 74 m)   04/07/21 5' 8 5" (1 74 m)        Wt Readings from Last 3 Encounters:   04/16/21 112 kg (246 lb 14 6 oz)   04/09/21 126 kg (277 lb 5 4 oz)   04/08/21 127 kg (279 lb)       2 25 meters squared    Physical Exam:      Constitutional:  Oriented to person, place, and time  Appears well-developed and well-nourished  Looks Tired  HEENT: Normocephalic and atraumatic  Conjunctivae, EOM and lids are normal  Pupils are equal, round  Oral mucosa moist   Hearing intact  Cardiovascular: Regular rate and rhythm without rubs, murmurs or gallops  Extremities:  Trace BLE edema  Pulmonary/Chest: CTA without wheezing, rales or rhonchi  Abdomen:  NABS, Soft, non-tender without rebound or guarding  Liver and spleen non-palpable  Musculoskeletal: Normal range of motion  Neurological: Grossly normal strength without sensory deficit  Skin: Skin is warm, dry and intact  No diaphoresis  No rashes or bruises    Psychiatric:  Normal mood and affect, speech    Cognition and memory are normal            RESULTS    Recent Results (from the past 48 hour(s))   ECG 12 lead    Collection Time: 04/15/21 11:06 AM   Result Value Ref Range    Ventricular Rate 80 BPM    Atrial Rate 80 BPM    WA Interval 162 ms    QRSD Interval 78 ms    QT Interval 402 ms    QTC Interval 463 ms    P Axis 71 degrees    QRS Axis 63 degrees    T Wave Axis 77 degrees   Fingerstick Glucose (POCT)    Collection Time: 04/15/21 11:22 AM   Result Value Ref Range    POC Glucose 132 65 - 140 mg/dl   CBC and differential Collection Time: 04/15/21 11:41 AM   Result Value Ref Range    WBC 1 26 (LL) 4 31 - 10 16 Thousand/uL    RBC 4 52 3 88 - 5 62 Million/uL    Hemoglobin 12 6 12 0 - 17 0 g/dL    Hematocrit 37 4 36 5 - 49 3 %    MCV 83 82 - 98 fL    MCH 27 9 26 8 - 34 3 pg    MCHC 33 7 31 4 - 37 4 g/dL    RDW 12 8 11 6 - 15 1 %    MPV 10 6 8 9 - 12 7 fL    Platelets 434 (L) 191 - 390 Thousands/uL    nRBC 0 /100 WBCs   Protime-INR    Collection Time: 04/15/21 11:41 AM   Result Value Ref Range    Protime 16 1 (H) 11 6 - 14 5 seconds    INR 1 32 (H) 0 84 - 1 19   APTT    Collection Time: 04/15/21 11:41 AM   Result Value Ref Range    PTT 31 23 - 37 seconds   Comprehensive metabolic panel    Collection Time: 04/15/21 11:41 AM   Result Value Ref Range    Sodium 138 136 - 145 mmol/L    Potassium 3 7 3 5 - 5 3 mmol/L    Chloride 98 (L) 100 - 108 mmol/L    CO2 22 21 - 32 mmol/L    ANION GAP 18 (H) 4 - 13 mmol/L     (H) 5 - 25 mg/dL    Creatinine 4 29 (H) 0 60 - 1 30 mg/dL    Glucose 140 65 - 140 mg/dL    Calcium 7 6 (L) 8 3 - 10 1 mg/dL    Corrected Calcium 8 2 (L) 8 3 - 10 1 mg/dL    AST 15 5 - 45 U/L    ALT 41 12 - 78 U/L    Alkaline Phosphatase 82 46 - 116 U/L    Total Protein 7 7 6 4 - 8 2 g/dL    Albumin 3 2 (L) 3 5 - 5 0 g/dL    Total Bilirubin 1 12 (H) 0 20 - 1 00 mg/dL    eGFR 14 ml/min/1 73sq m   TSH    Collection Time: 04/15/21 11:41 AM   Result Value Ref Range    TSH 3RD GENERATON 0 753 0 358 - 3 740 uIU/mL   Magnesium    Collection Time: 04/15/21 11:41 AM   Result Value Ref Range    Magnesium 1 8 1 6 - 2 6 mg/dL   CK Total with Reflex CKMB    Collection Time: 04/15/21 11:41 AM   Result Value Ref Range    Total CK 35 (L) 39 - 308 U/L   Troponin I    Collection Time: 04/15/21 11:41 AM   Result Value Ref Range    Troponin I <0 02 <=0 04 ng/mL   NT-BNP PRO    Collection Time: 04/15/21 11:41 AM   Result Value Ref Range    NT-proBNP 66 <125 pg/mL   Manual Differential(PHLEBS Do Not Order)    Collection Time: 04/15/21 11:41 AM Result Value Ref Range    Segmented % 1 (L) 43 - 75 %    Lymphocytes % 98 (H) 14 - 44 %    Monocytes % 1 (L) 4 - 12 %    Eosinophils, % 0 0 - 6 %    Basophils % 0 0 - 1 %    Absolute Neutrophils 0 01 (L) 1 85 - 7 62 Thousand/uL    Lymphocytes Absolute 1 23 0 60 - 4 47 Thousand/uL    Monocytes Absolute 0 01 0 00 - 1 22 Thousand/uL    Eosinophils Absolute 0 00 0 00 - 0 40 Thousand/uL    Basophils Absolute 0 00 0 00 - 0 10 Thousand/uL    Total Counted 100     RBC Morphology Normal     Platelet Estimate Borderline (A) Adequate   Hemoglobin A1c w/EAG Estimation (Orders if not completed within the last 90 days)    Collection Time: 04/15/21 11:41 AM   Result Value Ref Range    Hemoglobin A1C 6 8 (H) Normal 3 8-5 6%; PreDiabetic 5 7-6 4%;  Diabetic >=6 5%; Glycemic control for adults with diabetes <7 0% %     mg/dl   ECG 12 lead    Collection Time: 04/15/21  2:18 PM   Result Value Ref Range    Ventricular Rate 75 BPM    Atrial Rate 75 BPM    CO Interval 166 ms    QRSD Interval 80 ms    QT Interval 438 ms    QTC Interval 489 ms    P Axis 56 degrees    QRS Axis 45 degrees    T Wave Axis 57 degrees   Troponin I repeat in 3 hrs    Collection Time: 04/15/21  2:21 PM   Result Value Ref Range    Troponin I <0 02 <=0 04 ng/mL   Urine Macroscopic, POC    Collection Time: 04/15/21  3:14 PM   Result Value Ref Range    Color, UA Yellow     Clarity, UA Clear     pH, UA 5 0 4 5 - 8 0    Leukocytes, UA Negative Negative    Nitrite, UA Negative Negative    Protein, UA 30 (1+) (A) Negative mg/dl    Glucose, UA Negative Negative mg/dl    Ketones, UA Negative Negative mg/dl    Urobilinogen, UA 0 2 0 2, 1 0 E U /dl E U /dl    Bilirubin, UA Negative Negative    Blood, UA Trace (A) Negative    Specific Gravity, UA 1 020 1 003 - 1 030   Urine Microscopic    Collection Time: 04/15/21  3:14 PM   Result Value Ref Range    RBC, UA None Seen None Seen, 2-4 /hpf    WBC, UA 2-4 None Seen, 2-4 /hpf    Epithelial Cells Occasional None Seen, Occasional /hpf    Bacteria, UA Moderate (A) None Seen, Occasional /hpf    Hyaline Casts, UA 2-4 (A) (none) /lpf   Troponin I repeat in 6 hrs    Collection Time: 04/15/21  5:08 PM   Result Value Ref Range    Troponin I <0 02 <=0 04 ng/mL   COVID19, Influenza A/B, RSV PCR, [facility]    Collection Time: 04/15/21  5:19 PM    Specimen: Nasopharyngeal Swab; Nares   Result Value Ref Range    SARS-CoV-2 Negative Negative    INFLUENZA A PCR Negative Negative    INFLUENZA B PCR Negative Negative    RSV PCR Negative Negative   ECG 12 lead    Collection Time: 04/15/21  5:22 PM   Result Value Ref Range    Ventricular Rate 84 BPM    Atrial Rate 84 BPM    OR Interval 162 ms    QRSD Interval 78 ms    QT Interval 422 ms    QTC Interval 498 ms    P Axis 65 degrees    QRS Axis 50 degrees    T Wave Axis 64 degrees   Fingerstick Glucose (POCT)    Collection Time: 04/15/21  6:29 PM   Result Value Ref Range    POC Glucose 118 65 - 140 mg/dl   Fingerstick Glucose (POCT)    Collection Time: 04/15/21  8:12 PM   Result Value Ref Range    POC Glucose 137 65 - 140 mg/dl   Fingerstick Glucose (POCT)    Collection Time: 04/16/21  7:20 AM   Result Value Ref Range    POC Glucose 121 65 - 140 mg/dl   AM Magnesium    Collection Time: 04/16/21  8:42 AM   Result Value Ref Range    Magnesium 1 7 1 6 - 2 6 mg/dL   AM Phosphorus    Collection Time: 04/16/21  8:42 AM   Result Value Ref Range    Phosphorus 4 5 (H) 2 3 - 4 1 mg/dL   AM CMP    Collection Time: 04/16/21  8:42 AM   Result Value Ref Range    Sodium 136 136 - 145 mmol/L    Potassium 3 5 3 5 - 5 3 mmol/L    Chloride 99 (L) 100 - 108 mmol/L    CO2 22 21 - 32 mmol/L    ANION GAP 15 (H) 4 - 13 mmol/L    BUN 99 (H) 5 - 25 mg/dL    Creatinine 4 71 (H) 0 60 - 1 30 mg/dL    Glucose 139 65 - 140 mg/dL    Calcium 7 0 (L) 8 3 - 10 1 mg/dL    Corrected Calcium 8 0 (L) 8 3 - 10 1 mg/dL    AST 11 5 - 45 U/L    ALT 30 12 - 78 U/L    Alkaline Phosphatase 69 46 - 116 U/L    Total Protein 6 8 6 4 - 8 2 g/dL Albumin 2 8 (L) 3 5 - 5 0 g/dL    Total Bilirubin 0 95 0 20 - 1 00 mg/dL    eGFR 12 ml/min/1 73sq m   AM CBC and Diff    Collection Time: 04/16/21  8:42 AM   Result Value Ref Range    WBC 0 76 (LL) 4 31 - 10 16 Thousand/uL    RBC 4 06 3 88 - 5 62 Million/uL    Hemoglobin 11 4 (L) 12 0 - 17 0 g/dL    Hematocrit 34 8 (L) 36 5 - 49 3 %    MCV 86 82 - 98 fL    MCH 28 1 26 8 - 34 3 pg    MCHC 32 8 31 4 - 37 4 g/dL    RDW 13 0 11 6 - 15 1 %    MPV 10 4 8 9 - 12 7 fL    Platelets 46 (LL) 612 - 390 Thousands/uL    nRBC 0 /100 WBCs   Fingerstick Glucose (POCT)    Collection Time: 04/16/21 11:31 AM   Result Value Ref Range    POC Glucose 150 (H) 65 - 140 mg/dl   Chloride, urine, random    Collection Time: 04/16/21  2:14 PM   Result Value Ref Range    Chloride, Ur 61 mmol/L   Creatinine, urine, random    Collection Time: 04/16/21  2:14 PM   Result Value Ref Range    Creatinine, Ur 71 9 mg/dL   Sodium, urine, random    Collection Time: 04/16/21  2:14 PM   Result Value Ref Range    Sodium, Ur 55        Procedure: Ct Chest Abdomen Pelvis Wo Contrast    Result Date: 4/15/2021  Narrative: CT CHEST, ABDOMEN AND PELVIS WITHOUT IV CONTRAST INDICATION:   chest pain, abdominal pain   + testicular cancer, chest pain   eval pain  COMPARISON:  CT 3/20/2021 TECHNIQUE: CT examination of the chest, abdomen and pelvis was performed without intravenous contrast   Axial, sagittal, and coronal 2D reformatted images were created from the source data and submitted for interpretation  Radiation dose length product (DLP) for this visit:  1496 mGy-cm   This examination, like all CT scans performed in the [facility], was performed utilizing techniques to minimize radiation dose exposure, including the use of iterative reconstruction and automated exposure control  Enteric contrast was not administered  FINDINGS: The study is somewhat limited due to lack of intravenous contrast  CHEST LUNGS:  Lungs are clear    There is no tracheal or endobronchial lesion  PLEURA:  Unremarkable  HEART/GREAT VESSELS:  Unremarkable for patient's age  MEDIASTINUM AND ANNA:  Unremarkable  CHEST WALL AND LOWER NECK:   Stable tiny 6 mm left thyroid nodule again noted, less conspicuous than on the prior exam, and not necessitating additional follow-up  ABDOMEN LIVER/BILIARY TREE:  Diffuse hepatic steatosis is present without focal abnormality GALLBLADDER:  No calcified gallstones  No pericholecystic inflammatory change  SPLEEN:  Unremarkable  PANCREAS:  Unremarkable  ADRENAL GLANDS:  Unremarkable  KIDNEYS/URETERS:  Unremarkable  No hydronephrosis  STOMACH AND BOWEL:  Small hiatal hernia is noted  No bowel wall thickening or intestinal obstruction is appreciated  APPENDIX:  No findings to suggest appendicitis  ABDOMINOPELVIC CAVITY:  There is been interim considerable improvement in previously identified abdominal and pelvic adenopathy  In particular, previously identified retroperitoneal adenopathy in the left para-aortic location has improved with residual enlarged lymph nodes measuring up to 1 0 cm in greatest short axis dimension (previously 2 3 cm), residual left renal hilar adenopathy measuring up to 1 8 cm in greatest short axis dimension (previously 2 0 cm), residual left common iliac adenopathy measuring up to 1 7 cm in greatest short axis dimension (previously 2 3 cm), and residual left external iliac adenopathy measuring up to 1 3 cm in greatest short axis dimension (previously 3 0 cm)  No new adenopathy is appreciated  VESSELS:  Unremarkable for patient's age  PELVIS REPRODUCTIVE ORGANS:  Mild prostatomegaly similar the prior exam  URINARY BLADDER:  Unremarkable  ABDOMINAL WALL/INGUINAL REGIONS:  Postoperative change in the left inguinal region is again noted likely related to previous orchiectomy  OSSEOUS STRUCTURES:  No acute fracture or destructive osseous lesion  Impression: 1  Postoperative change again noted reflecting previous left orchiectomy  2   Predominantly left-sided retroperitoneal abdominal and pelvic adenopathy again noted, significantly improved when compared to prior exam of 3/20/2021, presumably reflecting favorable therapeutic response  3   No acute abnormality identified in the chest, abdomen or pelvis  4   Additional findings as noted  Workstation performed: [id number]     Procedure: Ct Head Without Contrast    Result Date: 4/15/2021  Narrative: CT BRAIN - WITHOUT CONTRAST INDICATION:   Dizziness, non-specific dizziness   hx of cancer  R/O mets  History of left testicular seminoma COMPARISON:  None  TECHNIQUE:  CT examination of the brain was performed  In addition to axial images, sagittal and coronal 2D reformatted images were created and submitted for interpretation  Radiation dose length product (DLP) for this visit:  892 mGy-cm   This examination, like all CT scans performed in the [facility], was performed utilizing techniques to minimize radiation dose exposure, including the use of iterative reconstruction and automated exposure control  IMAGE QUALITY:  Diagnostic  FINDINGS: PARENCHYMA:  No intracranial mass, mass effect or midline shift  No CT signs of acute infarction  No acute parenchymal hemorrhage  VENTRICLES AND EXTRA-AXIAL SPACES:  Normal for the patient's age  VISUALIZED ORBITS AND PARANASAL SINUSES:  Opacification of a single anterior left ethmoid air cell incidentally noted with otherwise clear paranasal sinuses  Orbits appear unremarkable  CALVARIUM AND EXTRACRANIAL SOFT TISSUES:  Normal      Impression: No acute intracranial abnormality   Workstation performed: [id number]

## 2021-04-17 LAB
ANION GAP SERPL CALCULATED.3IONS-SCNC: 15 MMOL/L (ref 4–13)
BASOPHILS # BLD AUTO: 0 THOUSANDS/ΜL (ref 0–0.1)
BASOPHILS NFR BLD AUTO: 0 % (ref 0–1)
BUN SERPL-MCNC: 86 MG/DL (ref 5–25)
C DIFF TOX B TCDB STL QL NAA+PROBE: NEGATIVE
CALCIUM SERPL-MCNC: 7.4 MG/DL (ref 8.3–10.1)
CHLORIDE SERPL-SCNC: 99 MMOL/L (ref 100–108)
CO2 SERPL-SCNC: 21 MMOL/L (ref 21–32)
CREAT SERPL-MCNC: 4.13 MG/DL (ref 0.6–1.3)
EOSINOPHIL # BLD AUTO: 0.01 THOUSAND/ΜL (ref 0–0.61)
EOSINOPHIL NFR BLD AUTO: 1 % (ref 0–6)
ERYTHROCYTE [DISTWIDTH] IN BLOOD BY AUTOMATED COUNT: 13 % (ref 11.6–15.1)
GFR SERPL CREATININE-BSD FRML MDRD: 14 ML/MIN/1.73SQ M
GLUCOSE SERPL-MCNC: 115 MG/DL (ref 65–140)
GLUCOSE SERPL-MCNC: 120 MG/DL (ref 65–140)
GLUCOSE SERPL-MCNC: 135 MG/DL (ref 65–140)
GLUCOSE SERPL-MCNC: 138 MG/DL (ref 65–140)
GLUCOSE SERPL-MCNC: 144 MG/DL (ref 65–140)
HCT VFR BLD AUTO: 37.6 % (ref 36.5–49.3)
HGB BLD-MCNC: 11.5 G/DL (ref 12–17)
IMM GRANULOCYTES # BLD AUTO: 0.01 THOUSAND/UL (ref 0–0.2)
IMM GRANULOCYTES NFR BLD AUTO: 1 % (ref 0–2)
LYMPHOCYTES # BLD AUTO: 0.73 THOUSANDS/ΜL (ref 0.6–4.47)
LYMPHOCYTES NFR BLD AUTO: 90 % (ref 14–44)
MCH RBC QN AUTO: 27.2 PG (ref 26.8–34.3)
MCHC RBC AUTO-ENTMCNC: 30.6 G/DL (ref 31.4–37.4)
MCV RBC AUTO: 89 FL (ref 82–98)
MONOCYTES # BLD AUTO: 0.06 THOUSAND/ΜL (ref 0.17–1.22)
MONOCYTES NFR BLD AUTO: 7 % (ref 4–12)
NEUTROPHILS # BLD AUTO: 0.01 THOUSANDS/ΜL (ref 1.85–7.62)
NEUTS SEG NFR BLD AUTO: 1 % (ref 43–75)
NRBC BLD AUTO-RTO: 0 /100 WBCS
PLATELET # BLD AUTO: 19 THOUSANDS/UL (ref 149–390)
PMV BLD AUTO: 11.1 FL (ref 8.9–12.7)
POTASSIUM SERPL-SCNC: 3.3 MMOL/L (ref 3.5–5.3)
RBC # BLD AUTO: 4.23 MILLION/UL (ref 3.88–5.62)
SODIUM SERPL-SCNC: 135 MMOL/L (ref 136–145)
WBC # BLD AUTO: 0.82 THOUSAND/UL (ref 4.31–10.16)

## 2021-04-17 PROCEDURE — 82948 REAGENT STRIP/BLOOD GLUCOSE: CPT

## 2021-04-17 PROCEDURE — 87493 C DIFF AMPLIFIED PROBE: CPT | Performed by: INTERNAL MEDICINE

## 2021-04-17 PROCEDURE — 85025 COMPLETE CBC W/AUTO DIFF WBC: CPT | Performed by: INTERNAL MEDICINE

## 2021-04-17 PROCEDURE — 99232 SBSQ HOSP IP/OBS MODERATE 35: CPT | Performed by: INTERNAL MEDICINE

## 2021-04-17 PROCEDURE — 80048 BASIC METABOLIC PNL TOTAL CA: CPT | Performed by: NURSE PRACTITIONER

## 2021-04-17 RX ORDER — DIPHENOXYLATE HYDROCHLORIDE AND ATROPINE SULFATE 2.5; .025 MG/1; MG/1
1 TABLET ORAL 2 TIMES DAILY PRN
Status: DISCONTINUED | OUTPATIENT
Start: 2021-04-17 | End: 2021-04-20 | Stop reason: HOSPADM

## 2021-04-17 RX ADMIN — PREDNISONE 1 MG: 1 TABLET ORAL at 09:22

## 2021-04-17 RX ADMIN — ONDANSETRON 4 MG: 2 INJECTION INTRAMUSCULAR; INTRAVENOUS at 21:51

## 2021-04-17 RX ADMIN — SODIUM CHLORIDE AND POTASSIUM CHLORIDE 100 ML/HR: .9; .15 SOLUTION INTRAVENOUS at 05:20

## 2021-04-17 RX ADMIN — HEPARIN SODIUM 5000 UNITS: 5000 INJECTION INTRAVENOUS; SUBCUTANEOUS at 05:40

## 2021-04-17 RX ADMIN — ONDANSETRON 4 MG: 2 INJECTION INTRAMUSCULAR; INTRAVENOUS at 17:42

## 2021-04-17 RX ADMIN — ATORVASTATIN CALCIUM 40 MG: 40 TABLET, FILM COATED ORAL at 17:41

## 2021-04-17 RX ADMIN — SODIUM CHLORIDE AND POTASSIUM CHLORIDE 100 ML/HR: .9; .15 SOLUTION INTRAVENOUS at 21:37

## 2021-04-17 RX ADMIN — HEPARIN SODIUM 5000 UNITS: 5000 INJECTION INTRAVENOUS; SUBCUTANEOUS at 21:48

## 2021-04-17 RX ADMIN — FAMOTIDINE 20 MG: 20 TABLET ORAL at 21:48

## 2021-04-17 RX ADMIN — ASPIRIN 81 MG: 81 TABLET ORAL at 09:22

## 2021-04-17 RX ADMIN — DIPHENOXYLATE HYDROCHLORIDE AND ATROPINE SULFATE 1 TABLET: 2.5; .025 TABLET ORAL at 17:41

## 2021-04-17 RX ADMIN — HEPARIN SODIUM 5000 UNITS: 5000 INJECTION INTRAVENOUS; SUBCUTANEOUS at 14:54

## 2021-04-17 RX ADMIN — FAMOTIDINE 20 MG: 20 TABLET ORAL at 09:22

## 2021-04-17 NOTE — PROGRESS NOTES
Progress Note - Maribel Smith 57 y o  male MRN: [id number]    Unit/Bed#: E5 -01 Encounter: [id number]    Assessment/Plan:    A KI   creatinine improved today to 4 1, etiology possible cisplatin with a pre renal component progressed to ATN, continue IV fluids and monitor with Nephrology    PMR   continue low-dose prednisone and rheumatology follow-up    SIMON   tolerating CPAP    Morbid obesity  would benefit from weight loss    Seminoma  completed 1 of 3 courses of chemotherapy, continue oncology follow-up    Dyslipidemia  continue statin for LDL control    Hypertension  controlled without medication    Pancytopenia  related to chemotherapy    Subjective:   Still feels tired and washed out, no chest pain or shortness of breath no nausea vomiting no fevers chills appetite is poor    Objective:     Vitals: Blood pressure 108/56, pulse 74, temperature 97 9 °F (36 6 °C), temperature source Temporal, resp  rate 18, weight 112 kg (247 lb 12 8 oz), SpO2 98 %  ,Body mass index is 37 13 kg/m²  Results from last 7 days   Lab Units 04/17/21  0618  04/15/21  1141   WBC Thousand/uL 0 82*   < > 1 26*   HEMOGLOBIN g/dL 11 5*   < > 12 6   HEMATOCRIT % 37 6   < > 37 4   PLATELETS Thousands/uL 19*   < > 103*   INR   --   --  1 32*    < > = values in this interval not displayed       Results from last 7 days   Lab Units 04/17/21  0618 04/16/21  0842   POTASSIUM mmol/L 3 3* 3 5   CHLORIDE mmol/L 99* 99*   CO2 mmol/L 21 22   BUN mg/dL 86* 99*   CREATININE mg/dL 4 13* 4 71*   CALCIUM mg/dL 7 4* 7 0*   ALK PHOS U/L  --  69   ALT U/L  --  30   AST U/L  --  11       Scheduled Meds:  Current Facility-Administered Medications   Medication Dose Route Frequency Provider Last Rate    acetaminophen  650 mg Oral Q6H PRN Yesi S Sophy, CRNP      aspirin  81 mg Oral Daily Yesi S Sophy, CRNP      atorvastatin  40 mg Oral Daily With [employer] S Sophy, CRNP      calcium carbonate  500 mg Oral TID PRN Garret Gutierrez PA-C      famotidine  20 mg Oral BID [facility], DO      heparin (porcine)  5,000 Units Subcutaneous Q8H [facility] Yesi S Sophy, CRNP      insulin lispro  2-12 Units Subcutaneous TID AC Yesi S Sophy, CRNP      insulin lispro  2-12 Units Subcutaneous HS Yesi S Sophy, CRNP      ondansetron  4 mg Intravenous Q4H PRN Abelardo Ferrell, DO      predniSONE  1 mg Oral Daily Yesi S Sophy, CRNP      sodium chloride 0 9 % with KCl 20 mEq/L  100 mL/hr Intravenous Continuous Bruno Link, CRNP 100 mL/hr (04/17/21 0520)       Continuous Infusions:  sodium chloride 0 9 % with KCl 20 mEq/L, 100 mL/hr, Last Rate: 100 mL/hr (04/17/21 0520)          Physical exam:  General appearance:  Alert no distress interaction appropriate  Head/Eyes:  Nonicteric pale PERRL EOMI  Neck:  Supple   Lungs: CTA bilateral no wheezing rhonchi or rales  Heart: normal S1 S2 regular  Abdomen:  Obese nontender with bowel sounds  Extremities: no edema  Skin: no rash    Invasive Devices     Peripheral Intravenous Line            Peripheral IV 04/15/21 Right Antecubital 2 days                  VTE Pharmacologic Prophylaxis:  Heparin    Counseling / Coordination of Care  Total floor / unit time spent today 30  minutes  Greater than 50% of total time was spent with the patient and / or family counseling and / or coordination of care    A description of the counseling / coordination of care:  Discussed with renal

## 2021-04-17 NOTE — PLAN OF CARE
Problem: Potential for Falls  Goal: Patient will remain free of falls  Description: INTERVENTIONS:  - Assess patient frequently for physical needs  -  Identify cognitive and physical deficits and behaviors that affect risk of falls  -  [place] fall precautions as indicated by assessment   - Educate patient/family on patient safety including physical limitations  - Instruct patient to call for assistance with activity based on assessment  - Modify environment to reduce risk of injury  - Consider OT/PT consult to assist with strengthening/mobility  Outcome: Progressing     Problem: Nutrition/Hydration-ADULT  Goal: Nutrient/Hydration intake appropriate for improving, restoring or maintaining nutritional needs  Description: Monitor and assess patient's nutrition/hydration status for malnutrition  Collaborate with interdisciplinary team and initiate plan and interventions as ordered  Monitor patient's weight and dietary intake as ordered or per policy  Utilize nutrition screening tool and intervene as necessary  Determine patient's food preferences and provide high-protein, high-caloric foods as appropriate       INTERVENTIONS:  - Monitor oral intake, urinary output, labs, and treatment plans  - Assess nutrition and hydration status and recommend course of action  - Evaluate amount of meals eaten  - Assist patient with eating if necessary   - Allow adequate time for meals  - Recommend/ encourage appropriate diets, oral nutritional supplements, and vitamin/mineral supplements  - Order, calculate, and assess calorie counts as needed  - Recommend, monitor, and adjust tube feedings and TPN/PPN based on assessed needs  - Assess need for intravenous fluids  - Provide specific nutrition/hydration education as appropriate  - Include patient/family/caregiver in decisions related to nutrition  Outcome: Progressing     Problem: PAIN - ADULT  Goal: Verbalizes/displays adequate comfort level or baseline comfort level  Description: Interventions:  - Encourage patient to monitor pain and request assistance  - Assess pain using appropriate pain scale  - Administer analgesics based on type and severity of pain and evaluate response  - Implement non-pharmacological measures as appropriate and evaluate response  - Consider cultural and social influences on pain and pain management  - Notify physician/advanced practitioner if interventions unsuccessful or patient reports new pain  Outcome: Progressing     Problem: INFECTION - ADULT  Goal: Absence or prevention of progression during hospitalization  Description: INTERVENTIONS:  - Assess and monitor for signs and symptoms of infection  - Monitor lab/diagnostic results  - Monitor all insertion sites, i e  indwelling lines, tubes, and drains  - Monitor endotracheal if appropriate and nasal secretions for changes in amount and color  - [place] appropriate cooling/warming therapies per order  - Administer medications as ordered  - Instruct and encourage patient and family to use good hand hygiene technique  - Identify and instruct in appropriate isolation precautions for identified infection/condition  Outcome: Progressing  Goal: Absence of fever/infection during neutropenic period  Description: INTERVENTIONS:  - Monitor WBC    Outcome: Progressing     Problem: SAFETY ADULT  Goal: Patient will remain free of falls  Description: INTERVENTIONS:  - Assess patient frequently for physical needs  -  Identify cognitive and physical deficits and behaviors that affect risk of falls    -  [place] fall precautions as indicated by assessment   - Educate patient/family on patient safety including physical limitations  - Instruct patient to call for assistance with activity based on assessment  - Modify environment to reduce risk of injury  - Consider OT/PT consult to assist with strengthening/mobility  Outcome: Progressing  Goal: Maintain or return to baseline ADL function  Description: INTERVENTIONS:  -  Assess patient's ability to carry out ADLs; assess patient's baseline for ADL function and identify physical deficits which impact ability to perform ADLs (bathing, care of mouth/teeth, toileting, grooming, dressing, etc )  - Assess/evaluate cause of self-care deficits   - Assess range of motion  - Assess patient's mobility; develop plan if impaired  - Assess patient's need for assistive devices and provide as appropriate  - Encourage maximum independence but intervene and supervise when necessary  - Involve family in performance of ADLs  - Assess for home care needs following discharge   - Consider OT consult to assist with ADL evaluation and planning for discharge  - Provide patient education as appropriate  Outcome: Progressing  Goal: Maintain or return mobility status to optimal level  Description: INTERVENTIONS:  - Assess patient's baseline mobility status (ambulation, transfers, stairs, etc )    - Identify cognitive and physical deficits and behaviors that affect mobility  - Identify mobility aids required to assist with transfers and/or ambulation (gait belt, sit-to-stand, lift, walker, cane, etc )  - [place] fall precautions as indicated by assessment  - Record patient progress and toleration of activity level on Mobility SBAR; progress patient to next Phase/Stage  - Instruct patient to call for assistance with activity based on assessment  - Consider rehabilitation consult to assist with strengthening/weightbearing, etc   Outcome: Progressing     Problem: DISCHARGE PLANNING  Goal: Discharge to home or other facility with appropriate resources  Description: INTERVENTIONS:  - Identify barriers to discharge w/patient and caregiver  - Arrange for needed discharge resources and transportation as appropriate  - Identify discharge learning needs (meds, wound care, etc )  - Arrange for interpretive services to assist at discharge as needed  - Refer to Case Management Department for coordinating discharge planning if the patient needs post-hospital services based on physician/advanced practitioner order or complex needs related to functional status, cognitive ability, or social support system  Outcome: Progressing     Problem: Knowledge Deficit  Goal: Patient/family/caregiver demonstrates understanding of disease process, treatment plan, medications, and discharge instructions  Description: Complete learning assessment and assess knowledge base    Interventions:  - Provide teaching at level of understanding  - Provide teaching via preferred learning methods  Outcome: Progressing     Problem: GENITOURINARY - ADULT  Goal: Maintains or returns to baseline urinary function  Description: INTERVENTIONS:  - Assess urinary function  - Encourage oral fluids to ensure adequate hydration if ordered  - Administer IV fluids as ordered to ensure adequate hydration  - Administer ordered medications as needed  - Offer frequent toileting  - Follow urinary retention protocol if ordered  Outcome: Progressing  Goal: Absence of urinary retention  Description: INTERVENTIONS:  - Assess patients ability to void and empty bladder  - Monitor I/O  - Bladder scan as needed  - Discuss with physician/AP medications to alleviate retention as needed  - Discuss catheterization for long term situations as appropriate  Outcome: Progressing  Goal: Urinary catheter remains patent  Description: INTERVENTIONS:  - Assess patency of urinary catheter  - If patient has a chronic maynard, consider changing catheter if non-functioning  - Follow guidelines for intermittent irrigation of non-functioning urinary catheter  Outcome: Progressing     Problem: METABOLIC, FLUID AND ELECTROLYTES - ADULT  Goal: Electrolytes maintained within normal limits  Description: INTERVENTIONS:  - Monitor labs and assess patient for signs and symptoms of electrolyte imbalances  - Administer electrolyte replacement as ordered  - Monitor response to electrolyte replacements, including repeat lab results as appropriate  - Instruct patient on fluid and nutrition as appropriate  Outcome: Progressing  Goal: Fluid balance maintained  Description: INTERVENTIONS:  - Monitor labs   - Monitor I/O and WT  - Instruct patient on fluid and nutrition as appropriate  - Assess for signs & symptoms of volume excess or deficit  Outcome: Progressing  Goal: Glucose maintained within target range  Description: INTERVENTIONS:  - Monitor Blood Glucose as ordered  - Assess for signs and symptoms of hyperglycemia and hypoglycemia  - Administer ordered medications to maintain glucose within target range  - Assess nutritional intake and initiate nutrition service referral as needed  Outcome: Progressing     Problem: Prexisting or High Potential for Compromised Skin Integrity  Goal: Skin integrity is maintained or improved  Description: INTERVENTIONS:  - Identify patients at risk for skin breakdown  - Assess and monitor skin integrity  - Assess and monitor nutrition and hydration status  - Monitor labs   - Assess for incontinence   - Turn and reposition patient  - Assist with mobility/ambulation  - Relieve pressure over bony prominences  - Avoid friction and shearing  - Provide appropriate hygiene as needed including keeping skin clean and dry  - Evaluate need for skin moisturizer/barrier cream  - Collaborate with interdisciplinary team   - Patient/family teaching  - Consider wound care consult   Outcome: Progressing

## 2021-04-17 NOTE — PROGRESS NOTES
NEPHROLOGY PROGRESS NOTE   Xiomy Smith 57 y o  male MRN: [id number]  Unit/Bed#: E5 -01 Encounter: [id number]      ASSESSMENT/PLAN:  1  Acute kidney injury, POA:  Possibly due to recent cisplatin based chemotherapy and could also have prerenal component the setting of diuretics, hemodynamic changes and poor oral intake which  Likely progressed to ATN  ·  creatinine 4 2 on admission which peaked at 4 7 yesterday and now trending down   ·  Creatinine today a little bit better down to 4 1  ·  baseline creatinine around 1-1 1  ·  CT: No hydronephrosis  ·  UA: Trace blood, +1 protein, 2-4 hyaline casts, moderate bacteria  ·   Continue to hold lisinopril and Lasix  2   testicular cancer with retroperitoneal abdominal adenopathy:  Recently started on chemotherapy with cisplatin and etoposide, status post 1st cycle on 04/09  3  hypertension:  4  Polymyalgia rheumatica: On low-dose prednisone  5  Pancytopenia due to recent chemotherapy   6  Anion gap metabolic acidosis: Due to acute renal failure   7   hypokalemia: Will replace    Plan Summary:    Continue NS +20meq  KCL at 100cc/h as he still has poor oral intake and diarrhea    Check am BMP     no current need for dialysis    SUBJECTIVE:   complains of having a lot of diarrhea and feeling very weak and tired  Appetite poor  No chest pain or shortness of breath      OBJECTIVE:  Current Weight: Weight - Scale: 112 kg (247 lb 12 8 oz)  Vitals:    04/17/21 0732   BP: 108/56   Pulse: 74   Resp: 18   Temp: 97 9 °F (36 6 °C)   SpO2: 98%       Intake/Output Summary (Last 24 hours) at 4/17/2021 1422  Last data filed at 4/17/2021 0900  Gross per 24 hour   Intake 720 ml   Output 300 ml   Net 420 ml       General:  No acute distress  Skin:  No rash  Eyes:  Sclerae anicteric  ENT:  Moist mucous membranes  Neck:  Trachea midline with no JVD  Chest:  Clear to auscultation bilaterally  CVS:  Regular rate and rhythm  Abdomen:  Soft, nontender, nondistended, obese  Extremities:  No edema  Neuro:  Awake and alert  Psych:  Appropriate affect      Medications:  Scheduled Meds:  Current Facility-Administered Medications   Medication Dose Route Frequency Provider Last Rate    acetaminophen  650 mg Oral Q6H PRN Yesi S Sophy, CRNP      aspirin  81 mg Oral Daily Yesi S Sophy, CRNP      atorvastatin  40 mg Oral Daily With Faraz Gibbs S Sophy, CRNP      calcium carbonate  500 mg Oral TID PRN Yaritza Silva PA-C      famotidine  20 mg Oral BID Mark Mcgarry DO      heparin (porcine)  5,000 Units Subcutaneous Q8H [facility] Yesi S Sophy, CRNP      ondansetron  4 mg Intravenous Q4H PRN Abelardo [place], DO      predniSONE  1 mg Oral Daily Yesi S Sophy, CRNP      sodium chloride 0 9 % with KCl 20 mEq/L  100 mL/hr Intravenous Continuous PEREZ Sam 100 mL/hr (04/17/21 0520)       PRN Meds:   acetaminophen    calcium carbonate    ondansetron    Continuous Infusions:sodium chloride 0 9 % with KCl 20 mEq/L, 100 mL/hr, Last Rate: 100 mL/hr (04/17/21 0520)        Laboratory Results:  Results from last 7 days   Lab Units 04/17/21  0618 04/16/21  0842 04/15/21  1141   WBC Thousand/uL 0 82* 0 76* 1 26*   HEMOGLOBIN g/dL 11 5* 11 4* 12 6   HEMATOCRIT % 37 6 34 8* 37 4   PLATELETS Thousands/uL 19* 46* 103*   SODIUM mmol/L 135* 136 138   POTASSIUM mmol/L 3 3* 3 5 3 7   CHLORIDE mmol/L 99* 99* 98*   CO2 mmol/L 21 22 22   BUN mg/dL 86* 99* 100*   CREATININE mg/dL 4 13* 4 71* 4 29*   CALCIUM mg/dL 7 4* 7 0* 7 6*   MAGNESIUM mg/dL  --  1 7 1 8   PHOSPHORUS mg/dL  --  4 5*  --

## 2021-04-17 NOTE — PLAN OF CARE
Problem: Potential for Falls  Goal: Patient will remain free of falls  Description: INTERVENTIONS:  - Assess patient frequently for physical needs  -  Identify cognitive and physical deficits and behaviors that affect risk of falls  -  [place] fall precautions as indicated by assessment   - Educate patient/family on patient safety including physical limitations  - Instruct patient to call for assistance with activity based on assessment  - Modify environment to reduce risk of injury  - Consider OT/PT consult to assist with strengthening/mobility  Outcome: Progressing     Problem: Nutrition/Hydration-ADULT  Goal: Nutrient/Hydration intake appropriate for improving, restoring or maintaining nutritional needs  Description: Monitor and assess patient's nutrition/hydration status for malnutrition  Collaborate with interdisciplinary team and initiate plan and interventions as ordered  Monitor patient's weight and dietary intake as ordered or per policy  Utilize nutrition screening tool and intervene as necessary  Determine patient's food preferences and provide high-protein, high-caloric foods as appropriate       INTERVENTIONS:  - Monitor oral intake, urinary output, labs, and treatment plans  - Assess nutrition and hydration status and recommend course of action  - Evaluate amount of meals eaten  - Assist patient with eating if necessary   - Allow adequate time for meals  - Recommend/ encourage appropriate diets, oral nutritional supplements, and vitamin/mineral supplements  - Order, calculate, and assess calorie counts as needed  - Recommend, monitor, and adjust tube feedings and TPN/PPN based on assessed needs  - Assess need for intravenous fluids  - Provide specific nutrition/hydration education as appropriate  - Include patient/family/caregiver in decisions related to nutrition  Outcome: Progressing     Problem: PAIN - ADULT  Goal: Verbalizes/displays adequate comfort level or baseline comfort level  Description: Interventions:  - Encourage patient to monitor pain and request assistance  - Assess pain using appropriate pain scale  - Administer analgesics based on type and severity of pain and evaluate response  - Implement non-pharmacological measures as appropriate and evaluate response  - Consider cultural and social influences on pain and pain management  - Notify physician/advanced practitioner if interventions unsuccessful or patient reports new pain  Outcome: Progressing     Problem: INFECTION - ADULT  Goal: Absence or prevention of progression during hospitalization  Description: INTERVENTIONS:  - Assess and monitor for signs and symptoms of infection  - Monitor lab/diagnostic results  - Monitor all insertion sites, i e  indwelling lines, tubes, and drains  - Monitor endotracheal if appropriate and nasal secretions for changes in amount and color  - Ama appropriate cooling/warming therapies per order  - Administer medications as ordered  - Instruct and encourage patient and family to use good hand hygiene technique  - Identify and instruct in appropriate isolation precautions for identified infection/condition  Outcome: Progressing  Goal: Absence of fever/infection during neutropenic period  Description: INTERVENTIONS:  - Monitor WBC    Outcome: Progressing     Problem: SAFETY ADULT  Goal: Patient will remain free of falls  Description: INTERVENTIONS:  - Assess patient frequently for physical needs  -  Identify cognitive and physical deficits and behaviors that affect risk of falls    -  [place] fall precautions as indicated by assessment   - Educate patient/family on patient safety including physical limitations  - Instruct patient to call for assistance with activity based on assessment  - Modify environment to reduce risk of injury  - Consider OT/PT consult to assist with strengthening/mobility  Outcome: Progressing  Goal: Maintain or return to baseline ADL function  Description: INTERVENTIONS:  -  Assess patient's ability to carry out ADLs; assess patient's baseline for ADL function and identify physical deficits which impact ability to perform ADLs (bathing, care of mouth/teeth, toileting, grooming, dressing, etc )  - Assess/evaluate cause of self-care deficits   - Assess range of motion  - Assess patient's mobility; develop plan if impaired  - Assess patient's need for assistive devices and provide as appropriate  - Encourage maximum independence but intervene and supervise when necessary  - Involve family in performance of ADLs  - Assess for home care needs following discharge   - Consider OT consult to assist with ADL evaluation and planning for discharge  - Provide patient education as appropriate  Outcome: Progressing  Goal: Maintain or return mobility status to optimal level  Description: INTERVENTIONS:  - Assess patient's baseline mobility status (ambulation, transfers, stairs, etc )    - Identify cognitive and physical deficits and behaviors that affect mobility  - Identify mobility aids required to assist with transfers and/or ambulation (gait belt, sit-to-stand, lift, walker, cane, etc )  - [place] fall precautions as indicated by assessment  - Record patient progress and toleration of activity level on Mobility SBAR; progress patient to next Phase/Stage  - Instruct patient to call for assistance with activity based on assessment  - Consider rehabilitation consult to assist with strengthening/weightbearing, etc   Outcome: Progressing     Problem: DISCHARGE PLANNING  Goal: Discharge to home or other facility with appropriate resources  Description: INTERVENTIONS:  - Identify barriers to discharge w/patient and caregiver  - Arrange for needed discharge resources and transportation as appropriate  - Identify discharge learning needs (meds, wound care, etc )  - Arrange for interpretive services to assist at discharge as needed  - Refer to Case Management Department for coordinating discharge planning if the patient needs post-hospital services based on physician/advanced practitioner order or complex needs related to functional status, cognitive ability, or social support system  Outcome: Progressing     Problem: Knowledge Deficit  Goal: Patient/family/caregiver demonstrates understanding of disease process, treatment plan, medications, and discharge instructions  Description: Complete learning assessment and assess knowledge base    Interventions:  - Provide teaching at level of understanding  - Provide teaching via preferred learning methods  Outcome: Progressing     Problem: GENITOURINARY - ADULT  Goal: Maintains or returns to baseline urinary function  Description: INTERVENTIONS:  - Assess urinary function  - Encourage oral fluids to ensure adequate hydration if ordered  - Administer IV fluids as ordered to ensure adequate hydration  - Administer ordered medications as needed  - Offer frequent toileting  - Follow urinary retention protocol if ordered  Outcome: Progressing  Goal: Absence of urinary retention  Description: INTERVENTIONS:  - Assess patients ability to void and empty bladder  - Monitor I/O  - Bladder scan as needed  - Discuss with physician/AP medications to alleviate retention as needed  - Discuss catheterization for long term situations as appropriate  Outcome: Progressing  Goal: Urinary catheter remains patent  Description: INTERVENTIONS:  - Assess patency of urinary catheter  - If patient has a chronic maynard, consider changing catheter if non-functioning  - Follow guidelines for intermittent irrigation of non-functioning urinary catheter  Outcome: Progressing     Problem: METABOLIC, FLUID AND ELECTROLYTES - ADULT  Goal: Electrolytes maintained within normal limits  Description: INTERVENTIONS:  - Monitor labs and assess patient for signs and symptoms of electrolyte imbalances  - Administer electrolyte replacement as ordered  - Monitor response to electrolyte replacements, including repeat lab results as appropriate  - Instruct patient on fluid and nutrition as appropriate  Outcome: Progressing  Goal: Fluid balance maintained  Description: INTERVENTIONS:  - Monitor labs   - Monitor I/O and WT  - Instruct patient on fluid and nutrition as appropriate  - Assess for signs & symptoms of volume excess or deficit  Outcome: Progressing  Goal: Glucose maintained within target range  Description: INTERVENTIONS:  - Monitor Blood Glucose as ordered  - Assess for signs and symptoms of hyperglycemia and hypoglycemia  - Administer ordered medications to maintain glucose within target range  - Assess nutritional intake and initiate nutrition service referral as needed  Outcome: Progressing     Problem: Prexisting or High Potential for Compromised Skin Integrity  Goal: Skin integrity is maintained or improved  Description: INTERVENTIONS:  - Identify patients at risk for skin breakdown  - Assess and monitor skin integrity  - Assess and monitor nutrition and hydration status  - Monitor labs   - Assess for incontinence   - Turn and reposition patient  - Assist with mobility/ambulation  - Relieve pressure over bony prominences  - Avoid friction and shearing  - Provide appropriate hygiene as needed including keeping skin clean and dry  - Evaluate need for skin moisturizer/barrier cream  - Collaborate with interdisciplinary team   - Patient/family teaching  - Consider wound care consult   Outcome: Progressing

## 2021-04-18 LAB
ANION GAP SERPL CALCULATED.3IONS-SCNC: 13 MMOL/L (ref 4–13)
BUN SERPL-MCNC: 62 MG/DL (ref 5–25)
CALCIUM SERPL-MCNC: 7.4 MG/DL (ref 8.3–10.1)
CHLORIDE SERPL-SCNC: 104 MMOL/L (ref 100–108)
CO2 SERPL-SCNC: 20 MMOL/L (ref 21–32)
CREAT SERPL-MCNC: 3.32 MG/DL (ref 0.6–1.3)
ERYTHROCYTE [DISTWIDTH] IN BLOOD BY AUTOMATED COUNT: 13 % (ref 11.6–15.1)
GFR SERPL CREATININE-BSD FRML MDRD: 19 ML/MIN/1.73SQ M
GLUCOSE SERPL-MCNC: 114 MG/DL (ref 65–140)
GLUCOSE SERPL-MCNC: 125 MG/DL (ref 65–140)
GLUCOSE SERPL-MCNC: 129 MG/DL (ref 65–140)
GLUCOSE SERPL-MCNC: 97 MG/DL (ref 65–140)
HCT VFR BLD AUTO: 29.3 % (ref 36.5–49.3)
HGB BLD-MCNC: 9.7 G/DL (ref 12–17)
MCH RBC QN AUTO: 27.8 PG (ref 26.8–34.3)
MCHC RBC AUTO-ENTMCNC: 33.1 G/DL (ref 31.4–37.4)
MCV RBC AUTO: 84 FL (ref 82–98)
NRBC BLD AUTO-RTO: 0 /100 WBCS
PLATELET # BLD AUTO: 9 THOUSANDS/UL (ref 149–390)
PMV BLD AUTO: 12.1 FL (ref 8.9–12.7)
POTASSIUM SERPL-SCNC: 3.6 MMOL/L (ref 3.5–5.3)
RBC # BLD AUTO: 3.49 MILLION/UL (ref 3.88–5.62)
SODIUM SERPL-SCNC: 137 MMOL/L (ref 136–145)
WBC # BLD AUTO: 1.53 THOUSAND/UL (ref 4.31–10.16)

## 2021-04-18 PROCEDURE — 82948 REAGENT STRIP/BLOOD GLUCOSE: CPT

## 2021-04-18 PROCEDURE — 85025 COMPLETE CBC W/AUTO DIFF WBC: CPT | Performed by: INTERNAL MEDICINE

## 2021-04-18 PROCEDURE — 99232 SBSQ HOSP IP/OBS MODERATE 35: CPT | Performed by: INTERNAL MEDICINE

## 2021-04-18 PROCEDURE — 80048 BASIC METABOLIC PNL TOTAL CA: CPT | Performed by: INTERNAL MEDICINE

## 2021-04-18 RX ADMIN — FAMOTIDINE 20 MG: 20 TABLET ORAL at 08:42

## 2021-04-18 RX ADMIN — ATORVASTATIN CALCIUM 40 MG: 40 TABLET, FILM COATED ORAL at 16:10

## 2021-04-18 RX ADMIN — SODIUM CHLORIDE AND POTASSIUM CHLORIDE 100 ML/HR: .9; .15 SOLUTION INTRAVENOUS at 10:00

## 2021-04-18 RX ADMIN — HEPARIN SODIUM 5000 UNITS: 5000 INJECTION INTRAVENOUS; SUBCUTANEOUS at 06:35

## 2021-04-18 RX ADMIN — ASPIRIN 81 MG: 81 TABLET ORAL at 08:42

## 2021-04-18 RX ADMIN — DIPHENOXYLATE HYDROCHLORIDE AND ATROPINE SULFATE 1 TABLET: 2.5; .025 TABLET ORAL at 08:42

## 2021-04-18 RX ADMIN — ACETAMINOPHEN 650 MG: 325 TABLET, FILM COATED ORAL at 18:27

## 2021-04-18 RX ADMIN — PREDNISONE 1 MG: 1 TABLET ORAL at 08:42

## 2021-04-18 NOTE — PROGRESS NOTES
Progress Note - Xiomy Smith 57 y o  male MRN: [id number]    Unit/Bed#: E5 -01 Encounter: [id number]    Assessment/Plan:    JULIO C   appears related to ATN, now improving creatinine 3 3, holding lisinopril and Lasix with gentle IV fluids    Seminoma  completed 1 course of chemotherapy next course planned for 4/921    Pancytopenia  related to chemotherapy continue to monitor patient reports no active bleeding or fevers chills     PMR   control with low-dose steroid    SIMON   tolerating CPAP    Morbid obesity would benefit from weight loss    Hypertension  controlled without medication    Subjective:   Little more energy today, appetite improving, no chest pain or shortness of breath no nausea vomiting no fevers chills      Objective:     Vitals: Blood pressure 111/52, pulse 73, temperature 98 3 °F (36 8 °C), temperature source Temporal, resp  rate 19, weight 113 kg (250 lb 3 6 oz), SpO2 99 %  ,Body mass index is 37 49 kg/m²  Results from last 7 days   Lab Units 04/18/21  0612  04/15/21  1141   WBC Thousand/uL 1 53*   < > 1 26*   HEMOGLOBIN g/dL 9 7*   < > 12 6   HEMATOCRIT % 29 3*   < > 37 4   PLATELETS Thousands/uL 9*   < > 103*   INR   --   --  1 32*    < > = values in this interval not displayed  Results from last 7 days   Lab Units 04/18/21  0612  04/16/21  0842   POTASSIUM mmol/L 3 6   < > 3 5   CHLORIDE mmol/L 104   < > 99*   CO2 mmol/L 20*   < > 22   BUN mg/dL 62*   < > 99*   CREATININE mg/dL 3 32*   < > 4 71*   CALCIUM mg/dL 7 4*   < > 7 0*   ALK PHOS U/L  --   --  69   ALT U/L  --   --  30   AST U/L  --   --  11    < > = values in this interval not displayed         Scheduled Meds:  Current Facility-Administered Medications   Medication Dose Route Frequency Provider Last Rate    acetaminophen  650 mg Oral Q6H PRN Yesi S Sophy, CRNP      aspirin  81 mg Oral Daily Yesi S Sophy, CRNP      atorvastatin  40 mg Oral Daily With [employer] S Sophy, CRNP      calcium carbonate  500 mg Oral TID PRN Osmany Jake, PA-C      diphenoxylate-atropine  1 tablet Oral BID PRN Chica Dewey, DO      famotidine  20 mg Oral BID Chica Dewey, DO      heparin (porcine)  5,000 Units Subcutaneous Q8H [facility] Yesi S Sophy, CRNP      ondansetron  4 mg Intravenous Q4H PRN Bernida Heller, DO      predniSONE  1 mg Oral Daily Yesi S Sophy, CRNP      sodium chloride 0 9 % with KCl 20 mEq/L  50 mL/hr Intravenous Continuous REFUGIO CarranzaC 100 mL/hr (04/18/21 1000)       Continuous Infusions:  sodium chloride 0 9 % with KCl 20 mEq/L, 50 mL/hr, Last Rate: 100 mL/hr (04/18/21 1000)          Physical exam:  General appearance:  Alert no distress interaction appropriate  Head/Eyes:  Nonicteric pale PERRL EOMI  Neck:  Supple  Lungs: CTA bilateral no wheezing rhonchi or rales  Heart: normal S1 S2 regular  Abdomen:  Obese nontender with bowel sounds  Extremities: no edema  Skin: no rash    Invasive Devices     Peripheral Intravenous Line            Peripheral IV 04/15/21 Right Antecubital 3 days                  Counseling / Coordination of Care  Total floor / unit time spent today 30  minutes  Greater than 50% of total time was spent with the patient and / or family counseling and / or coordination of care    A description of the counseling / coordination of care:

## 2021-04-18 NOTE — PROGRESS NOTES
NEPHROLOGY PROGRESS NOTE   Kinga Smith 57 y o  male MRN: [id number]  Unit/Bed#: E5 -01 Encounter: [id number]      ASSESSMENT/PLAN:  1  Acute kidney injury, POA:  Possibly due to recent cisplatin based chemotherapy and could also have prerenal component the setting of diuretics, hemodynamic changes and poor oral intake which  Likely progressed to ATN  ·  creatinine 4 2 on admission which peaked at 4 7 but now improving down to 3 3 today   ·  baseline creatinine around 1-1 1  ·  CT: No hydronephrosis  ·  UA: Trace blood, +1 protein, 2-4 hyaline casts, moderate bacteria  ·   Continue to hold lisinopril and Lasix  · Decrease rate of IVF to 50cc/h   2  Testicular cancer with retroperitoneal abdominal adenopathy:  Recently started on chemotherapy with cisplatin and etoposide, status post 1st cycle on 04/09  3  Hypertension: BP acceptable   4  Polymyalgia rheumatica: On low-dose prednisone  5  Pancytopenia due to recent chemotherapy   6  Hypokalemia: improved to 3 6 today     Plan Summary:    Currently on  NS +20meq  KCL at 100cc/h  Will decrease rate to 50cc/h and if creatinine still improving will d/c IVF tomorrow    Check am BMP       SUBJECTIVE:  Still having some diarrhea  Appetite and oral intake poor  No chest pain or shortness of breath currently       OBJECTIVE:  Current Weight: Weight - Scale: 113 kg (250 lb 3 6 oz)  Vitals:    04/18/21 0804   BP: 111/52   Pulse: 73   Resp: 19   Temp: 98 3 °F (36 8 °C)   SpO2: 99%       Intake/Output Summary (Last 24 hours) at 4/18/2021 1040  Last data filed at 4/18/2021 0809  Gross per 24 hour   Intake 480 ml   Output 1100 ml   Net -620 ml       General:  No acute distress  Skin:  No rash  Eyes:  Sclerae anicteric  ENT:  Moist mucous membranes  Neck:  Trachea midline with no JVD  Chest:  Clear to auscultation bilaterally  CVS:  Regular rate and rhythm  Abdomen:  Soft, nontender, obese  Extremities:  No edema  Neuro:  Awake and alert  Psych:  Appropriate affect      Medications:  Scheduled Meds:  Current Facility-Administered Medications   Medication Dose Route Frequency Provider Last Rate    acetaminophen  650 mg Oral Q6H PRN Yesi S Sophy, CRNP      aspirin  81 mg Oral Daily Yesi S Sophy, CRNP      atorvastatin  40 mg Oral Daily With [employer] S Sophy, CRNP      calcium carbonate  500 mg Oral TID PRN Matilda Garcia PA-C      diphenoxylate-atropine  1 tablet Oral BID PRN [place], DO      famotidine  20 mg Oral BID Hardy [place], DO      heparin (porcine)  5,000 Units Subcutaneous Q8H [address] Yesi S Sophy, CRNP      ondansetron  4 mg Intravenous Q4H PRN Abelardo Huff, DO      predniSONE  1 mg Oral Daily Yesi S Sophy, CRNP      sodium chloride 0 9 % with KCl 20 mEq/L  100 mL/hr Intravenous Continuous George Franc, CRNP 100 mL/hr (04/18/21 1000)       PRN Meds:   acetaminophen    calcium carbonate    diphenoxylate-atropine    ondansetron    Continuous Infusions:sodium chloride 0 9 % with KCl 20 mEq/L, 100 mL/hr, Last Rate: 100 mL/hr (04/18/21 1000)        Laboratory Results:  Results from last 7 days   Lab Units 04/18/21  0612 04/17/21  0618 04/16/21  0842 04/15/21  1141   WBC Thousand/uL 1 53* 0 82* 0 76* 1 26*   HEMOGLOBIN g/dL 9 7* 11 5* 11 4* 12 6   HEMATOCRIT % 29 3* 37 6 34 8* 37 4   PLATELETS Thousands/uL 9* 19* 46* 103*   SODIUM mmol/L 137 135* 136 138   POTASSIUM mmol/L 3 6 3 3* 3 5 3 7   CHLORIDE mmol/L 104 99* 99* 98*   CO2 mmol/L 20* 21 22 22   BUN mg/dL 62* 86* 99* 100*   CREATININE mg/dL 3 32* 4 13* 4 71* 4 29*   CALCIUM mg/dL 7 4* 7 4* 7 0* 7 6*   MAGNESIUM mg/dL  --   --  1 7 1 8   PHOSPHORUS mg/dL  --   --  4 5*  --

## 2021-04-18 NOTE — PLAN OF CARE
Problem: Potential for Falls  Goal: Patient will remain free of falls  Description: INTERVENTIONS:  - Assess patient frequently for physical needs  -  Identify cognitive and physical deficits and behaviors that affect risk of falls  -  [place] fall precautions as indicated by assessment   - Educate patient/family on patient safety including physical limitations  - Instruct patient to call for assistance with activity based on assessment  - Modify environment to reduce risk of injury  - Consider OT/PT consult to assist with strengthening/mobility  Outcome: Progressing     Problem: Nutrition/Hydration-ADULT  Goal: Nutrient/Hydration intake appropriate for improving, restoring or maintaining nutritional needs  Description: Monitor and assess patient's nutrition/hydration status for malnutrition  Collaborate with interdisciplinary team and initiate plan and interventions as ordered  Monitor patient's weight and dietary intake as ordered or per policy  Utilize nutrition screening tool and intervene as necessary  Determine patient's food preferences and provide high-protein, high-caloric foods as appropriate       INTERVENTIONS:  - Monitor oral intake, urinary output, labs, and treatment plans  - Assess nutrition and hydration status and recommend course of action  - Evaluate amount of meals eaten  - Assist patient with eating if necessary   - Allow adequate time for meals  - Recommend/ encourage appropriate diets, oral nutritional supplements, and vitamin/mineral supplements  - Order, calculate, and assess calorie counts as needed  - Recommend, monitor, and adjust tube feedings and TPN/PPN based on assessed needs  - Assess need for intravenous fluids  - Provide specific nutrition/hydration education as appropriate  - Include patient/family/caregiver in decisions related to nutrition  Outcome: Progressing     Problem: PAIN - ADULT  Goal: Verbalizes/displays adequate comfort level or baseline comfort level  Description: Interventions:  - Encourage patient to monitor pain and request assistance  - Assess pain using appropriate pain scale  - Administer analgesics based on type and severity of pain and evaluate response  - Implement non-pharmacological measures as appropriate and evaluate response  - Consider cultural and social influences on pain and pain management  - Notify physician/advanced practitioner if interventions unsuccessful or patient reports new pain  Outcome: Progressing     Problem: INFECTION - ADULT  Goal: Absence or prevention of progression during hospitalization  Description: INTERVENTIONS:  - Assess and monitor for signs and symptoms of infection  - Monitor lab/diagnostic results  - Monitor all insertion sites, i e  indwelling lines, tubes, and drains  - Monitor endotracheal if appropriate and nasal secretions for changes in amount and color  - [place] appropriate cooling/warming therapies per order  - Administer medications as ordered  - Instruct and encourage patient and family to use good hand hygiene technique  - Identify and instruct in appropriate isolation precautions for identified infection/condition  Outcome: Progressing  Goal: Absence of fever/infection during neutropenic period  Description: INTERVENTIONS:  - Monitor WBC    Outcome: Progressing     Problem: SAFETY ADULT  Goal: Patient will remain free of falls  Description: INTERVENTIONS:  - Assess patient frequently for physical needs  -  Identify cognitive and physical deficits and behaviors that affect risk of falls    -  [place] fall precautions as indicated by assessment   - Educate patient/family on patient safety including physical limitations  - Instruct patient to call for assistance with activity based on assessment  - Modify environment to reduce risk of injury  - Consider OT/PT consult to assist with strengthening/mobility  Outcome: Progressing  Goal: Maintain or return to baseline ADL function  Description: INTERVENTIONS:  -  Assess patient's ability to carry out ADLs; assess patient's baseline for ADL function and identify physical deficits which impact ability to perform ADLs (bathing, care of mouth/teeth, toileting, grooming, dressing, etc )  - Assess/evaluate cause of self-care deficits   - Assess range of motion  - Assess patient's mobility; develop plan if impaired  - Assess patient's need for assistive devices and provide as appropriate  - Encourage maximum independence but intervene and supervise when necessary  - Involve family in performance of ADLs  - Assess for home care needs following discharge   - Consider OT consult to assist with ADL evaluation and planning for discharge  - Provide patient education as appropriate  Outcome: Progressing  Goal: Maintain or return mobility status to optimal level  Description: INTERVENTIONS:  - Assess patient's baseline mobility status (ambulation, transfers, stairs, etc )    - Identify cognitive and physical deficits and behaviors that affect mobility  - Identify mobility aids required to assist with transfers and/or ambulation (gait belt, sit-to-stand, lift, walker, cane, etc )  - [place] fall precautions as indicated by assessment  - Record patient progress and toleration of activity level on Mobility SBAR; progress patient to next Phase/Stage  - Instruct patient to call for assistance with activity based on assessment  - Consider rehabilitation consult to assist with strengthening/weightbearing, etc   Outcome: Progressing     Problem: DISCHARGE PLANNING  Goal: Discharge to home or other facility with appropriate resources  Description: INTERVENTIONS:  - Identify barriers to discharge w/patient and caregiver  - Arrange for needed discharge resources and transportation as appropriate  - Identify discharge learning needs (meds, wound care, etc )  - Arrange for interpretive services to assist at discharge as needed  - Refer to Case Management Department for coordinating discharge planning if the patient needs post-hospital services based on physician/advanced practitioner order or complex needs related to functional status, cognitive ability, or social support system  Outcome: Progressing     Problem: Knowledge Deficit  Goal: Patient/family/caregiver demonstrates understanding of disease process, treatment plan, medications, and discharge instructions  Description: Complete learning assessment and assess knowledge base    Interventions:  - Provide teaching at level of understanding  - Provide teaching via preferred learning methods  Outcome: Progressing     Problem: GENITOURINARY - ADULT  Goal: Maintains or returns to baseline urinary function  Description: INTERVENTIONS:  - Assess urinary function  - Encourage oral fluids to ensure adequate hydration if ordered  - Administer IV fluids as ordered to ensure adequate hydration  - Administer ordered medications as needed  - Offer frequent toileting  - Follow urinary retention protocol if ordered  Outcome: Progressing  Goal: Absence of urinary retention  Description: INTERVENTIONS:  - Assess patients ability to void and empty bladder  - Monitor I/O  - Bladder scan as needed  - Discuss with physician/AP medications to alleviate retention as needed  - Discuss catheterization for long term situations as appropriate  Outcome: Progressing  Goal: Urinary catheter remains patent  Description: INTERVENTIONS:  - Assess patency of urinary catheter  - If patient has a chronic maynard, consider changing catheter if non-functioning  - Follow guidelines for intermittent irrigation of non-functioning urinary catheter  Outcome: Progressing     Problem: METABOLIC, FLUID AND ELECTROLYTES - ADULT  Goal: Electrolytes maintained within normal limits  Description: INTERVENTIONS:  - Monitor labs and assess patient for signs and symptoms of electrolyte imbalances  - Administer electrolyte replacement as ordered  - Monitor response to electrolyte replacements, including repeat lab results as appropriate  - Instruct patient on fluid and nutrition as appropriate  Outcome: Progressing  Goal: Fluid balance maintained  Description: INTERVENTIONS:  - Monitor labs   - Monitor I/O and WT  - Instruct patient on fluid and nutrition as appropriate  - Assess for signs & symptoms of volume excess or deficit  Outcome: Progressing  Goal: Glucose maintained within target range  Description: INTERVENTIONS:  - Monitor Blood Glucose as ordered  - Assess for signs and symptoms of hyperglycemia and hypoglycemia  - Administer ordered medications to maintain glucose within target range  - Assess nutritional intake and initiate nutrition service referral as needed  Outcome: Progressing     Problem: Prexisting or High Potential for Compromised Skin Integrity  Goal: Skin integrity is maintained or improved  Description: INTERVENTIONS:  - Identify patients at risk for skin breakdown  - Assess and monitor skin integrity  - Assess and monitor nutrition and hydration status  - Monitor labs   - Assess for incontinence   - Turn and reposition patient  - Assist with mobility/ambulation  - Relieve pressure over bony prominences  - Avoid friction and shearing  - Provide appropriate hygiene as needed including keeping skin clean and dry  - Evaluate need for skin moisturizer/barrier cream  - Collaborate with interdisciplinary team   - Patient/family teaching  - Consider wound care consult   Outcome: Progressing

## 2021-04-19 LAB
ANION GAP SERPL CALCULATED.3IONS-SCNC: 13 MMOL/L (ref 4–13)
BASOPHILS # BLD MANUAL: 0 THOUSAND/UL (ref 0–0.1)
BASOPHILS NFR MAR MANUAL: 0 % (ref 0–1)
BLASTS NFR BLD MANUAL: 3 %
BUN SERPL-MCNC: 50 MG/DL (ref 5–25)
CALCIUM SERPL-MCNC: 8 MG/DL (ref 8.3–10.1)
CHLORIDE SERPL-SCNC: 104 MMOL/L (ref 100–108)
CO2 SERPL-SCNC: 19 MMOL/L (ref 21–32)
CREAT SERPL-MCNC: 2.77 MG/DL (ref 0.6–1.3)
EOSINOPHIL # BLD MANUAL: 0.05 THOUSAND/UL (ref 0–0.4)
EOSINOPHIL NFR BLD MANUAL: 1 % (ref 0–6)
ERYTHROCYTE [DISTWIDTH] IN BLOOD BY AUTOMATED COUNT: 13 % (ref 11.6–15.1)
GFR SERPL CREATININE-BSD FRML MDRD: 23 ML/MIN/1.73SQ M
GIANT PLATELETS BLD QL SMEAR: PRESENT
GLUCOSE SERPL-MCNC: 118 MG/DL (ref 65–140)
GLUCOSE SERPL-MCNC: 89 MG/DL (ref 65–140)
GLUCOSE SERPL-MCNC: 98 MG/DL (ref 65–140)
HCT VFR BLD AUTO: 32.2 % (ref 36.5–49.3)
HGB BLD-MCNC: 10.6 G/DL (ref 12–17)
LYMPHOCYTES # BLD AUTO: 2.32 THOUSAND/UL (ref 0.6–4.47)
LYMPHOCYTES # BLD AUTO: 43 % (ref 14–44)
MCH RBC QN AUTO: 28 PG (ref 26.8–34.3)
MCHC RBC AUTO-ENTMCNC: 32.9 G/DL (ref 31.4–37.4)
MCV RBC AUTO: 85 FL (ref 82–98)
METAMYELOCYTES NFR BLD MANUAL: 7 % (ref 0–1)
MONOCYTES # BLD AUTO: 1.13 THOUSAND/UL (ref 0–1.22)
MONOCYTES NFR BLD: 21 % (ref 4–12)
MYELOCYTES NFR BLD MANUAL: 3 % (ref 0–1)
NEUTROPHILS # BLD MANUAL: 1.02 THOUSAND/UL (ref 1.85–7.62)
NEUTS BAND NFR BLD MANUAL: 10 % (ref 0–8)
NEUTS SEG NFR BLD AUTO: 9 % (ref 43–75)
NRBC BLD AUTO-RTO: 0 /100 WBCS
PLATELET # BLD AUTO: 20 THOUSANDS/UL (ref 149–390)
PLATELET BLD QL SMEAR: ABNORMAL
PMV BLD AUTO: 12.5 FL (ref 8.9–12.7)
POTASSIUM SERPL-SCNC: 5 MMOL/L (ref 3.5–5.3)
RBC # BLD AUTO: 3.79 MILLION/UL (ref 3.88–5.62)
RBC MORPH BLD: NORMAL
SODIUM SERPL-SCNC: 136 MMOL/L (ref 136–145)
TOTAL CELLS COUNTED SPEC: 100
VARIANT LYMPHS # BLD AUTO: 3 %
WBC # BLD AUTO: 5.39 THOUSAND/UL (ref 4.31–10.16)

## 2021-04-19 PROCEDURE — 85027 COMPLETE CBC AUTOMATED: CPT | Performed by: INTERNAL MEDICINE

## 2021-04-19 PROCEDURE — 99232 SBSQ HOSP IP/OBS MODERATE 35: CPT | Performed by: INTERNAL MEDICINE

## 2021-04-19 PROCEDURE — 80048 BASIC METABOLIC PNL TOTAL CA: CPT | Performed by: INTERNAL MEDICINE

## 2021-04-19 PROCEDURE — 85007 BL SMEAR W/DIFF WBC COUNT: CPT | Performed by: INTERNAL MEDICINE

## 2021-04-19 PROCEDURE — 82948 REAGENT STRIP/BLOOD GLUCOSE: CPT

## 2021-04-19 RX ORDER — SODIUM CHLORIDE 9 MG/ML
60 INJECTION, SOLUTION INTRAVENOUS CONTINUOUS
Status: DISCONTINUED | OUTPATIENT
Start: 2021-04-19 | End: 2021-04-20 | Stop reason: HOSPADM

## 2021-04-19 RX ORDER — HYDROMORPHONE HCL IN WATER/PF 6 MG/30 ML
0.2 PATIENT CONTROLLED ANALGESIA SYRINGE INTRAVENOUS ONCE
Status: COMPLETED | OUTPATIENT
Start: 2021-04-19 | End: 2021-04-19

## 2021-04-19 RX ORDER — OXYCODONE HYDROCHLORIDE 5 MG/1
5 TABLET ORAL EVERY 6 HOURS PRN
Status: DISCONTINUED | OUTPATIENT
Start: 2021-04-19 | End: 2021-04-20 | Stop reason: HOSPADM

## 2021-04-19 RX ADMIN — PREDNISONE 1 MG: 1 TABLET ORAL at 09:10

## 2021-04-19 RX ADMIN — HYDROMORPHONE HYDROCHLORIDE 0.2 MG: 0.2 INJECTION, SOLUTION INTRAMUSCULAR; INTRAVENOUS; SUBCUTANEOUS at 00:37

## 2021-04-19 RX ADMIN — SODIUM CHLORIDE AND POTASSIUM CHLORIDE 50 ML/HR: .9; .15 SOLUTION INTRAVENOUS at 05:45

## 2021-04-19 RX ADMIN — SODIUM CHLORIDE 60 ML/HR: 0.9 INJECTION, SOLUTION INTRAVENOUS at 15:12

## 2021-04-19 RX ADMIN — OXYCODONE HYDROCHLORIDE 5 MG: 5 TABLET ORAL at 05:49

## 2021-04-19 RX ADMIN — ATORVASTATIN CALCIUM 40 MG: 40 TABLET, FILM COATED ORAL at 16:20

## 2021-04-19 RX ADMIN — ASPIRIN 81 MG: 81 TABLET ORAL at 09:10

## 2021-04-19 RX ADMIN — FAMOTIDINE 20 MG: 20 TABLET ORAL at 21:23

## 2021-04-19 RX ADMIN — OXYCODONE HYDROCHLORIDE 5 MG: 5 TABLET ORAL at 21:23

## 2021-04-19 RX ADMIN — FAMOTIDINE 20 MG: 20 TABLET ORAL at 09:10

## 2021-04-19 RX ADMIN — FAMOTIDINE 20 MG: 20 TABLET ORAL at 00:04

## 2021-04-19 NOTE — PLAN OF CARE
Problem: Potential for Falls  Goal: Patient will remain free of falls  Description: INTERVENTIONS:  - Assess patient frequently for physical needs  -  Identify cognitive and physical deficits and behaviors that affect risk of falls  -  [place] fall precautions as indicated by assessment   - Educate patient/family on patient safety including physical limitations  - Instruct patient to call for assistance with activity based on assessment  - Modify environment to reduce risk of injury  - Consider OT/PT consult to assist with strengthening/mobility  Outcome: Progressing     Problem: Nutrition/Hydration-ADULT  Goal: Nutrient/Hydration intake appropriate for improving, restoring or maintaining nutritional needs  Description: Monitor and assess patient's nutrition/hydration status for malnutrition  Collaborate with interdisciplinary team and initiate plan and interventions as ordered  Monitor patient's weight and dietary intake as ordered or per policy  Utilize nutrition screening tool and intervene as necessary  Determine patient's food preferences and provide high-protein, high-caloric foods as appropriate       INTERVENTIONS:  - Monitor oral intake, urinary output, labs, and treatment plans  - Assess nutrition and hydration status and recommend course of action  - Evaluate amount of meals eaten  - Assist patient with eating if necessary   - Allow adequate time for meals  - Recommend/ encourage appropriate diets, oral nutritional supplements, and vitamin/mineral supplements  - Order, calculate, and assess calorie counts as needed  - Recommend, monitor, and adjust tube feedings and TPN/PPN based on assessed needs  - Assess need for intravenous fluids  - Provide specific nutrition/hydration education as appropriate  - Include patient/family/caregiver in decisions related to nutrition  Outcome: Progressing     Problem: PAIN - ADULT  Goal: Verbalizes/displays adequate comfort level or baseline comfort level  Description: Interventions:  - Encourage patient to monitor pain and request assistance  - Assess pain using appropriate pain scale  - Administer analgesics based on type and severity of pain and evaluate response  - Implement non-pharmacological measures as appropriate and evaluate response  - Consider cultural and social influences on pain and pain management  - Notify physician/advanced practitioner if interventions unsuccessful or patient reports new pain  Outcome: Progressing     Problem: INFECTION - ADULT  Goal: Absence or prevention of progression during hospitalization  Description: INTERVENTIONS:  - Assess and monitor for signs and symptoms of infection  - Monitor lab/diagnostic results  - Monitor all insertion sites, i e  indwelling lines, tubes, and drains  - Monitor endotracheal if appropriate and nasal secretions for changes in amount and color  - [place] appropriate cooling/warming therapies per order  - Administer medications as ordered  - Instruct and encourage patient and family to use good hand hygiene technique  - Identify and instruct in appropriate isolation precautions for identified infection/condition  Outcome: Progressing  Goal: Absence of fever/infection during neutropenic period  Description: INTERVENTIONS:  - Monitor WBC    Outcome: Progressing     Problem: SAFETY ADULT  Goal: Patient will remain free of falls  Description: INTERVENTIONS:  - Assess patient frequently for physical needs  -  Identify cognitive and physical deficits and behaviors that affect risk of falls    -  [place] fall precautions as indicated by assessment   - Educate patient/family on patient safety including physical limitations  - Instruct patient to call for assistance with activity based on assessment  - Modify environment to reduce risk of injury  - Consider OT/PT consult to assist with strengthening/mobility  Outcome: Progressing  Goal: Maintain or return to baseline ADL function  Description: INTERVENTIONS:  -  Assess patient's ability to carry out ADLs; assess patient's baseline for ADL function and identify physical deficits which impact ability to perform ADLs (bathing, care of mouth/teeth, toileting, grooming, dressing, etc )  - Assess/evaluate cause of self-care deficits   - Assess range of motion  - Assess patient's mobility; develop plan if impaired  - Assess patient's need for assistive devices and provide as appropriate  - Encourage maximum independence but intervene and supervise when necessary  - Involve family in performance of ADLs  - Assess for home care needs following discharge   - Consider OT consult to assist with ADL evaluation and planning for discharge  - Provide patient education as appropriate  Outcome: Progressing  Goal: Maintain or return mobility status to optimal level  Description: INTERVENTIONS:  - Assess patient's baseline mobility status (ambulation, transfers, stairs, etc )    - Identify cognitive and physical deficits and behaviors that affect mobility  - Identify mobility aids required to assist with transfers and/or ambulation (gait belt, sit-to-stand, lift, walker, cane, etc )  - [place] fall precautions as indicated by assessment  - Record patient progress and toleration of activity level on Mobility SBAR; progress patient to next Phase/Stage  - Instruct patient to call for assistance with activity based on assessment  - Consider rehabilitation consult to assist with strengthening/weightbearing, etc   Outcome: Progressing     Problem: DISCHARGE PLANNING  Goal: Discharge to home or other facility with appropriate resources  Description: INTERVENTIONS:  - Identify barriers to discharge w/patient and caregiver  - Arrange for needed discharge resources and transportation as appropriate  - Identify discharge learning needs (meds, wound care, etc )  - Arrange for interpretive services to assist at discharge as needed  - Refer to Case Management Department for coordinating discharge planning if the patient needs post-hospital services based on physician/advanced practitioner order or complex needs related to functional status, cognitive ability, or social support system  Outcome: Progressing     Problem: Knowledge Deficit  Goal: Patient/family/caregiver demonstrates understanding of disease process, treatment plan, medications, and discharge instructions  Description: Complete learning assessment and assess knowledge base    Interventions:  - Provide teaching at level of understanding  - Provide teaching via preferred learning methods  Outcome: Progressing     Problem: GENITOURINARY - ADULT  Goal: Maintains or returns to baseline urinary function  Description: INTERVENTIONS:  - Assess urinary function  - Encourage oral fluids to ensure adequate hydration if ordered  - Administer IV fluids as ordered to ensure adequate hydration  - Administer ordered medications as needed  - Offer frequent toileting  - Follow urinary retention protocol if ordered  Outcome: Progressing  Goal: Absence of urinary retention  Description: INTERVENTIONS:  - Assess patients ability to void and empty bladder  - Monitor I/O  - Bladder scan as needed  - Discuss with physician/AP medications to alleviate retention as needed  - Discuss catheterization for long term situations as appropriate  Outcome: Progressing  Goal: Urinary catheter remains patent  Description: INTERVENTIONS:  - Assess patency of urinary catheter  - If patient has a chronic maynard, consider changing catheter if non-functioning  - Follow guidelines for intermittent irrigation of non-functioning urinary catheter  Outcome: Progressing     Problem: METABOLIC, FLUID AND ELECTROLYTES - ADULT  Goal: Electrolytes maintained within normal limits  Description: INTERVENTIONS:  - Monitor labs and assess patient for signs and symptoms of electrolyte imbalances  - Administer electrolyte replacement as ordered  - Monitor response to electrolyte replacements, including repeat lab results as appropriate  - Instruct patient on fluid and nutrition as appropriate  Outcome: Progressing  Goal: Fluid balance maintained  Description: INTERVENTIONS:  - Monitor labs   - Monitor I/O and WT  - Instruct patient on fluid and nutrition as appropriate  - Assess for signs & symptoms of volume excess or deficit  Outcome: Progressing  Goal: Glucose maintained within target range  Description: INTERVENTIONS:  - Monitor Blood Glucose as ordered  - Assess for signs and symptoms of hyperglycemia and hypoglycemia  - Administer ordered medications to maintain glucose within target range  - Assess nutritional intake and initiate nutrition service referral as needed  Outcome: Progressing     Problem: Prexisting or High Potential for Compromised Skin Integrity  Goal: Skin integrity is maintained or improved  Description: INTERVENTIONS:  - Identify patients at risk for skin breakdown  - Assess and monitor skin integrity  - Assess and monitor nutrition and hydration status  - Monitor labs   - Assess for incontinence   - Turn and reposition patient  - Assist with mobility/ambulation  - Relieve pressure over bony prominences  - Avoid friction and shearing  - Provide appropriate hygiene as needed including keeping skin clean and dry  - Evaluate need for skin moisturizer/barrier cream  - Collaborate with interdisciplinary team   - Patient/family teaching  - Consider wound care consult   Outcome: Progressing

## 2021-04-19 NOTE — RESPIRATORY THERAPY NOTE
Pt continues to decline cpap recommended he bring home unit it to improve compliance, pt instructed to call nsg/rt should he reconsider

## 2021-04-19 NOTE — PLAN OF CARE
Problem: Potential for Falls  Goal: Patient will remain free of falls  Description: INTERVENTIONS:  - Assess patient frequently for physical needs  -  Identify cognitive and physical deficits and behaviors that affect risk of falls  -  [place] fall precautions as indicated by assessment   - Educate patient/family on patient safety including physical limitations  - Instruct patient to call for assistance with activity based on assessment  - Modify environment to reduce risk of injury  - Consider OT/PT consult to assist with strengthening/mobility  Outcome: Progressing     Problem: Nutrition/Hydration-ADULT  Goal: Nutrient/Hydration intake appropriate for improving, restoring or maintaining nutritional needs  Description: Monitor and assess patient's nutrition/hydration status for malnutrition  Collaborate with interdisciplinary team and initiate plan and interventions as ordered  Monitor patient's weight and dietary intake as ordered or per policy  Utilize nutrition screening tool and intervene as necessary  Determine patient's food preferences and provide high-protein, high-caloric foods as appropriate       INTERVENTIONS:  - Monitor oral intake, urinary output, labs, and treatment plans  - Assess nutrition and hydration status and recommend course of action  - Evaluate amount of meals eaten  - Assist patient with eating if necessary   - Allow adequate time for meals  - Recommend/ encourage appropriate diets, oral nutritional supplements, and vitamin/mineral supplements  - Order, calculate, and assess calorie counts as needed  - Recommend, monitor, and adjust tube feedings and TPN/PPN based on assessed needs  - Assess need for intravenous fluids  - Provide specific nutrition/hydration education as appropriate  - Include patient/family/caregiver in decisions related to nutrition  Outcome: Progressing     Problem: PAIN - ADULT  Goal: Verbalizes/displays adequate comfort level or baseline comfort level  Description: Interventions:  - Encourage patient to monitor pain and request assistance  - Assess pain using appropriate pain scale  - Administer analgesics based on type and severity of pain and evaluate response  - Implement non-pharmacological measures as appropriate and evaluate response  - Consider cultural and social influences on pain and pain management  - Notify physician/advanced practitioner if interventions unsuccessful or patient reports new pain  Outcome: Progressing     Problem: INFECTION - ADULT  Goal: Absence or prevention of progression during hospitalization  Description: INTERVENTIONS:  - Assess and monitor for signs and symptoms of infection  - Monitor lab/diagnostic results  - Monitor all insertion sites, i e  indwelling lines, tubes, and drains  - Monitor endotracheal if appropriate and nasal secretions for changes in amount and color  - [place] appropriate cooling/warming therapies per order  - Administer medications as ordered  - Instruct and encourage patient and family to use good hand hygiene technique  - Identify and instruct in appropriate isolation precautions for identified infection/condition  Outcome: Progressing  Goal: Absence of fever/infection during neutropenic period  Description: INTERVENTIONS:  - Monitor WBC    Outcome: Progressing     Problem: SAFETY ADULT  Goal: Patient will remain free of falls  Description: INTERVENTIONS:  - Assess patient frequently for physical needs  -  Identify cognitive and physical deficits and behaviors that affect risk of falls    -  [place] fall precautions as indicated by assessment   - Educate patient/family on patient safety including physical limitations  - Instruct patient to call for assistance with activity based on assessment  - Modify environment to reduce risk of injury  - Consider OT/PT consult to assist with strengthening/mobility  Outcome: Progressing  Goal: Maintain or return to baseline ADL function  Description: INTERVENTIONS:  -  Assess patient's ability to carry out ADLs; assess patient's baseline for ADL function and identify physical deficits which impact ability to perform ADLs (bathing, care of mouth/teeth, toileting, grooming, dressing, etc )  - Assess/evaluate cause of self-care deficits   - Assess range of motion  - Assess patient's mobility; develop plan if impaired  - Assess patient's need for assistive devices and provide as appropriate  - Encourage maximum independence but intervene and supervise when necessary  - Involve family in performance of ADLs  - Assess for home care needs following discharge   - Consider OT consult to assist with ADL evaluation and planning for discharge  - Provide patient education as appropriate  Outcome: Progressing  Goal: Maintain or return mobility status to optimal level  Description: INTERVENTIONS:  - Assess patient's baseline mobility status (ambulation, transfers, stairs, etc )    - Identify cognitive and physical deficits and behaviors that affect mobility  - Identify mobility aids required to assist with transfers and/or ambulation (gait belt, sit-to-stand, lift, walker, cane, etc )  - [place] fall precautions as indicated by assessment  - Record patient progress and toleration of activity level on Mobility SBAR; progress patient to next Phase/Stage  - Instruct patient to call for assistance with activity based on assessment  - Consider rehabilitation consult to assist with strengthening/weightbearing, etc   Outcome: Progressing     Problem: DISCHARGE PLANNING  Goal: Discharge to home or other facility with appropriate resources  Description: INTERVENTIONS:  - Identify barriers to discharge w/patient and caregiver  - Arrange for needed discharge resources and transportation as appropriate  - Identify discharge learning needs (meds, wound care, etc )  - Arrange for interpretive services to assist at discharge as needed  - Refer to Case Management Department for coordinating discharge planning if the patient needs post-hospital services based on physician/advanced practitioner order or complex needs related to functional status, cognitive ability, or social support system  Outcome: Progressing     Problem: Knowledge Deficit  Goal: Patient/family/caregiver demonstrates understanding of disease process, treatment plan, medications, and discharge instructions  Description: Complete learning assessment and assess knowledge base    Interventions:  - Provide teaching at level of understanding  - Provide teaching via preferred learning methods  Outcome: Progressing     Problem: GENITOURINARY - ADULT  Goal: Maintains or returns to baseline urinary function  Description: INTERVENTIONS:  - Assess urinary function  - Encourage oral fluids to ensure adequate hydration if ordered  - Administer IV fluids as ordered to ensure adequate hydration  - Administer ordered medications as needed  - Offer frequent toileting  - Follow urinary retention protocol if ordered  Outcome: Progressing  Goal: Absence of urinary retention  Description: INTERVENTIONS:  - Assess patients ability to void and empty bladder  - Monitor I/O  - Bladder scan as needed  - Discuss with physician/AP medications to alleviate retention as needed  - Discuss catheterization for long term situations as appropriate  Outcome: Progressing  Goal: Urinary catheter remains patent  Description: INTERVENTIONS:  - Assess patency of urinary catheter  - If patient has a chronic maynard, consider changing catheter if non-functioning  - Follow guidelines for intermittent irrigation of non-functioning urinary catheter  Outcome: Progressing     Problem: METABOLIC, FLUID AND ELECTROLYTES - ADULT  Goal: Electrolytes maintained within normal limits  Description: INTERVENTIONS:  - Monitor labs and assess patient for signs and symptoms of electrolyte imbalances  - Administer electrolyte replacement as ordered  - Monitor response to electrolyte replacements, including repeat lab results as appropriate  - Instruct patient on fluid and nutrition as appropriate  Outcome: Progressing  Goal: Fluid balance maintained  Description: INTERVENTIONS:  - Monitor labs   - Monitor I/O and WT  - Instruct patient on fluid and nutrition as appropriate  - Assess for signs & symptoms of volume excess or deficit  Outcome: Progressing  Goal: Glucose maintained within target range  Description: INTERVENTIONS:  - Monitor Blood Glucose as ordered  - Assess for signs and symptoms of hyperglycemia and hypoglycemia  - Administer ordered medications to maintain glucose within target range  - Assess nutritional intake and initiate nutrition service referral as needed  Outcome: Progressing     Problem: Prexisting or High Potential for Compromised Skin Integrity  Goal: Skin integrity is maintained or improved  Description: INTERVENTIONS:  - Identify patients at risk for skin breakdown  - Assess and monitor skin integrity  - Assess and monitor nutrition and hydration status  - Monitor labs   - Assess for incontinence   - Turn and reposition patient  - Assist with mobility/ambulation  - Relieve pressure over bony prominences  - Avoid friction and shearing  - Provide appropriate hygiene as needed including keeping skin clean and dry  - Evaluate need for skin moisturizer/barrier cream  - Collaborate with interdisciplinary team   - Patient/family teaching  - Consider wound care consult   Outcome: Progressing

## 2021-04-19 NOTE — PROGRESS NOTES
NEPHROLOGY PROGRESS NOTE   Jax Smith 57 y o  male MRN: [id number]  Unit/Bed#: E5 -01 Encounter: [id number]  Reason for Consult: JULIO C    ASSESSMENT AND PLAN:  JULIO C POA, baseline creatinine 1 to 1 1  -JULIO C suspect in the setting of cisplatin related nephrotoxicity, component of prerenal, use of diuretics, component of ATN  -peak creatinine 4 7 now continue to improve to 2 7 today  -UA this admission shows 1+ proteinuria, no RBCs, 2 to 4 hyaline cast   -BMP in a m , avoid nephrotoxins or NSAIDs   -currently on IV normal saline at 60 mL/hour, continue for today    Borderline hyperkalemia, serum potassium 5 0, continue to monitor  -this is moderately hemolyzed specimen  Low bicarb, bicarb level slowly worsening and dropping 19 0 today   -monitor with improving renal function   -if worsening further, check VBG and possible bicarb supplementation  Testicular cancer with retroperitoneal adenopathy, status post 1st cycle of chemotherapy on 4/9/21  Polymyalgia rheumatica on a low-dose prednisone    Pancytopenia, slowly improving, management as per primary team    Discussed above plan in detail with primary team    SUBJECTIVE:  Patient seen and examined at bedside   No chest pain, shortness of breath, nausea, vomiting, abdominal pain    OBJECTIVE:  Current Weight: Weight - Scale: 113 kg (250 lb 3 6 oz)  Vitals:    04/19/21 0935   BP: 114/55   Pulse: 63   Resp: 18   Temp: 97 9 °F (36 6 °C)   SpO2: 96%       Intake/Output Summary (Last 24 hours) at 4/19/2021 1434  Last data filed at 4/19/2021 0900  Gross per 24 hour   Intake 240 ml   Output 2350 ml   Net -2110 ml     Wt Readings from Last 3 Encounters:   04/18/21 113 kg (250 lb 3 6 oz)   04/09/21 126 kg (277 lb 5 4 oz)   04/08/21 127 kg (279 lb)     Temp Readings from Last 3 Encounters:   04/19/21 97 9 °F (36 6 °C)   04/09/21 (!) 97 °F (36 1 °C) (Tympanic)   04/08/21 97 9 °F (36 6 °C) (Temporal)     BP Readings from Last 3 Encounters:   04/19/21 114/55 04/09/21 159/77   04/08/21 151/73     Pulse Readings from Last 3 Encounters:   04/19/21 63   04/09/21 56   04/08/21 55        Physical Examination:  General:  Sitting in bed, no acute distress   Eyes:  Mild conjunctival pallor present  ENT:  External examination of ears and nose unremarkable  Neck:  No obvious lymphadenopathy appreciated  Respiratory:  Bilateral air entry present  CVS:  S1, S2 present  GI:  Soft, nondistended  CNS:  Active alert oriented x3  Skin:  No new rash in legs  Musculoskeletal:  No obvious new gross deformity noted    Medications:    Current Facility-Administered Medications:     acetaminophen (TYLENOL) tablet 650 mg, 650 mg, Oral, Q6H PRN, Yesi S Sophy, CRNP, 650 mg at 04/18/21 1827    aspirin (ECOTRIN LOW STRENGTH) EC tablet 81 mg, 81 mg, Oral, Daily, Yesi S Sophy, CRNP, 81 mg at 04/19/21 0910    atorvastatin (LIPITOR) tablet 40 mg, 40 mg, Oral, Daily With Werner ARVIZU Sophy, CRNP, 40 mg at 04/18/21 1610    calcium carbonate (TUMS) chewable tablet 500 mg, 500 mg, Oral, TID PRN, Wander Helms PA-C, 500 mg at 04/16/21 0056    diphenoxylate-atropine (LOMOTIL) 2 5-0 025 mg per tablet 1 tablet, 1 tablet, Oral, BID PRN, [facility], 1 tablet at 04/18/21 0842    famotidine (PEPCID) tablet 20 mg, 20 mg, Oral, BID, [facility], 20 mg at 04/19/21 0910    ondansetron (ZOFRAN) injection 4 mg, 4 mg, Intravenous, Q4H PRN, Abelardo Farias DO, 4 mg at 04/17/21 2151    oxyCODONE (ROXICODONE) IR tablet 5 mg, 5 mg, Oral, Q6H PRN, Karson Benjamin, ELIENP, 5 mg at 04/19/21 0549    predniSONE tablet 1 mg, 1 mg, Oral, Daily, Yesi S Sophy, CRNP, 1 mg at 04/19/21 0910    sodium chloride 0 9 % infusion, 60 mL/hr, Intravenous, Continuous, [facility]    Laboratory Results:  Results from last 7 days   Lab Units 04/19/21  0605 04/18/21  0612 04/17/21  0618 04/16/21  0842 04/15/21  1141   WBC Thousand/uL 5 39 1 53* 0 82* 0 76* 1 26*   HEMOGLOBIN g/dL 10 6* 9 7* 11 5* 11 4* 12 6 HEMATOCRIT % 32 2* 29 3* 37 6 34 8* 37 4   PLATELETS Thousands/uL 20* 9* 19* 46* 103*   SODIUM mmol/L 136 137 135* 136 138   POTASSIUM mmol/L 5 0 3 6 3 3* 3 5 3 7   CHLORIDE mmol/L 104 104 99* 99* 98*   CO2 mmol/L 19* 20* 21 22 22   BUN mg/dL 50* 62* 86* 99* 100*   CREATININE mg/dL 2 77* 3 32* 4 13* 4 71* 4 29*   CALCIUM mg/dL 8 0* 7 4* 7 4* 7 0* 7 6*   MAGNESIUM mg/dL  --   --   --  1 7 1 8   PHOSPHORUS mg/dL  --   --   --  4 5*  --        CT chest abdomen pelvis wo contrast   Final Result by Marielena Pelaez MD (04/15 7710)         1  Postoperative change again noted reflecting previous left orchiectomy  2   Predominantly left-sided retroperitoneal abdominal and pelvic adenopathy again noted, significantly improved when compared to prior exam of 3/20/2021, presumably reflecting favorable therapeutic response  3   No acute abnormality identified in the chest, abdomen or pelvis  4   Additional findings as noted  Workstation performed: [id number]         CT head without contrast   Final Result by Marielena Pelaez MD (04/15 7356)      No acute intracranial abnormality  Workstation performed: [id number]             Portions of the record may have been created with voice recognition software  Occasional wrong word or "sound a like" substitutions may have occurred due to the inherent limitations of voice recognition software  Read the chart carefully and recognize, using context, where substitutions have occurred

## 2021-04-19 NOTE — PROGRESS NOTES
Progress Note - Leanne Smith 57 y o  male MRN: [id number]    Unit/Bed#: E5 -01 Encounter: [id number]    Assessment/Plan:    A COREY    ATJESS, continues to improve creatinine 2 77 today, continue gentle IV fluids and monitor with Nephrology    Seminoma   outpatient chemotherapy ongoing    Pancytopenia   related to chemotherapy, continue to monitor    PMR    controlled with low-dose steroid    SIMON    CPAP with sleep    Hypertension   well controlled without medication    Morbid obesity   would benefit from weight loss    Subjective:   Still decreased energy, appetite improved, no chest pain or shortness of breath no nausea vomiting no fevers chills    Objective:     Vitals: Blood pressure 114/55, pulse 63, temperature 97 9 °F (36 6 °C), resp  rate 18, weight 113 kg (250 lb 3 6 oz), SpO2 96 %  ,Body mass index is 37 49 kg/m²  Results from last 7 days   Lab Units 04/19/21  0605  04/15/21  1141   WBC Thousand/uL 5 39   < > 1 26*   HEMOGLOBIN g/dL 10 6*   < > 12 6   HEMATOCRIT % 32 2*   < > 37 4   PLATELETS Thousands/uL 20*   < > 103*   INR   --   --  1 32*    < > = values in this interval not displayed  Results from last 7 days   Lab Units 04/19/21  0605  04/16/21  0842   POTASSIUM mmol/L 5 0   < > 3 5   CHLORIDE mmol/L 104   < > 99*   CO2 mmol/L 19*   < > 22   BUN mg/dL 50*   < > 99*   CREATININE mg/dL 2 77*   < > 4 71*   CALCIUM mg/dL 8 0*   < > 7 0*   ALK PHOS U/L  --   --  69   ALT U/L  --   --  30   AST U/L  --   --  11    < > = values in this interval not displayed         Scheduled Meds:  Current Facility-Administered Medications   Medication Dose Route Frequency Provider Last Rate    acetaminophen  650 mg Oral Q6H PRN Yesi S Sophy, CRNP      aspirin  81 mg Oral Daily Yesi S Sophy, CRNP      atorvastatin  40 mg Oral Daily With [employer] S Sophy, CRNP      calcium carbonate  500 mg Oral TID PRN Gabriella Reyes PA-C      diphenoxylate-atropine  1 tablet Oral BID PRN Bismark Ovalles DO      famotidine  20 mg Oral BID Dev Menendez,       ondansetron  4 mg Intravenous Q4H PRN Gabriela Wylie, DO      oxyCODONE  5 mg Oral Q6H PRN PEREZ Solomon      predniSONE  1 mg Oral Daily Yesi S Sophy, CRNP      sodium chloride 0 9 % with KCl 20 mEq/L  50 mL/hr Intravenous Continuous Cammy TASH Jacome 50 mL/hr (04/19/21 0545)       Continuous Infusions:  sodium chloride 0 9 % with KCl 20 mEq/L, 50 mL/hr, Last Rate: 50 mL/hr (04/19/21 0545)          Physical exam:  General appearance:  Alert no distress interaction appropriate  Head/Eyes:  Nonicteric pale PERRL EOMI  Neck:  Supple  Lungs: CTA bilateral no wheezing rhonchi or rales  Heart: normal S1 S2 regular  Abdomen:  Obese nontender with bowel sounds  Extremities: no edema  Skin: no rash    Invasive Devices     Peripheral Intravenous Line            Peripheral IV 04/15/21 Right Antecubital 3 days    Peripheral IV 04/19/21 Left;Ventral (anterior) Hand less than 1 day                  Counseling / Coordination of Care  Total floor / unit time spent today 30  minutes  Greater than 50% of total time was spent with the patient and / or family counseling and / or coordination of care    A description of the counseling / coordination of care:

## 2021-04-20 VITALS
TEMPERATURE: 97.9 F | WEIGHT: 250.22 LBS | BODY MASS INDEX: 37.49 KG/M2 | DIASTOLIC BLOOD PRESSURE: 59 MMHG | OXYGEN SATURATION: 97 % | SYSTOLIC BLOOD PRESSURE: 123 MMHG | RESPIRATION RATE: 18 BRPM | HEART RATE: 61 BPM

## 2021-04-20 LAB
ANION GAP SERPL CALCULATED.3IONS-SCNC: 10 MMOL/L (ref 4–13)
BUN SERPL-MCNC: 37 MG/DL (ref 5–25)
CALCIUM SERPL-MCNC: 8.2 MG/DL (ref 8.3–10.1)
CHLORIDE SERPL-SCNC: 108 MMOL/L (ref 100–108)
CO2 SERPL-SCNC: 24 MMOL/L (ref 21–32)
CREAT SERPL-MCNC: 2.55 MG/DL (ref 0.6–1.3)
ERYTHROCYTE [DISTWIDTH] IN BLOOD BY AUTOMATED COUNT: 13.2 % (ref 11.6–15.1)
GFR SERPL CREATININE-BSD FRML MDRD: 26 ML/MIN/1.73SQ M
GLUCOSE SERPL-MCNC: 100 MG/DL (ref 65–140)
GLUCOSE SERPL-MCNC: 106 MG/DL (ref 65–140)
GLUCOSE SERPL-MCNC: 95 MG/DL (ref 65–140)
HCT VFR BLD AUTO: 31.5 % (ref 36.5–49.3)
HGB BLD-MCNC: 10.1 G/DL (ref 12–17)
MCH RBC QN AUTO: 27.8 PG (ref 26.8–34.3)
MCHC RBC AUTO-ENTMCNC: 32.1 G/DL (ref 31.4–37.4)
MCV RBC AUTO: 87 FL (ref 82–98)
NRBC BLD AUTO-RTO: 0 /100 WBCS
PLATELET # BLD AUTO: 18 THOUSANDS/UL (ref 149–390)
PMV BLD AUTO: 11.5 FL (ref 8.9–12.7)
POTASSIUM SERPL-SCNC: 3.7 MMOL/L (ref 3.5–5.3)
RBC # BLD AUTO: 3.63 MILLION/UL (ref 3.88–5.62)
SODIUM SERPL-SCNC: 142 MMOL/L (ref 136–145)
WBC # BLD AUTO: 13.55 THOUSAND/UL (ref 4.31–10.16)

## 2021-04-20 PROCEDURE — 85027 COMPLETE CBC AUTOMATED: CPT | Performed by: INTERNAL MEDICINE

## 2021-04-20 PROCEDURE — 82948 REAGENT STRIP/BLOOD GLUCOSE: CPT

## 2021-04-20 PROCEDURE — 80048 BASIC METABOLIC PNL TOTAL CA: CPT | Performed by: INTERNAL MEDICINE

## 2021-04-20 PROCEDURE — 99232 SBSQ HOSP IP/OBS MODERATE 35: CPT | Performed by: NURSE PRACTITIONER

## 2021-04-20 PROCEDURE — 99239 HOSP IP/OBS DSCHRG MGMT >30: CPT | Performed by: INTERNAL MEDICINE

## 2021-04-20 RX ADMIN — SODIUM CHLORIDE 60 ML/HR: 0.9 INJECTION, SOLUTION INTRAVENOUS at 07:42

## 2021-04-20 RX ADMIN — FAMOTIDINE 20 MG: 20 TABLET ORAL at 09:15

## 2021-04-20 RX ADMIN — ASPIRIN 81 MG: 81 TABLET ORAL at 09:15

## 2021-04-20 RX ADMIN — PREDNISONE 1 MG: 1 TABLET ORAL at 09:15

## 2021-04-20 NOTE — PROGRESS NOTES
NEPHROLOGY PROGRESS NOTE   Dea Smith 57 y o  male MRN: [id number]  Unit/Bed#: E5 -01 Encounter: [id number]      ASSESSMENT/PLAN:  Acute kidney injury (POA):  - etiology suspect in the setting of cisplatin related nephrotoxicity, prerenal component, use of diuretics, progression to ATN  - upon review of medical records, baseline creatinine 1 0-1 1 mg/dL  - creatinine 4 29 mg/dL upon admission 4/15, peak creatinine 4 71 mg/dL on 4/16   - most recent creatinine 2 55 mg/dL today, continues to improve  - continued on normal saline at 60 mL/hour, will discontinue  - continue to hold outpatient Ace inhibitor and diuretic    - UA, +1 protein, no rbc's, 2-4 hyaline casts  - imaging, CT scan revealed no hydronephrosis  - avoid NSAIDs, nephrotoxic agents, IV contrast   - adjust medications to appropriate GFR  - monitor volume status with intake/output  Electrolytes, acid/base:  - most recent potassium 3 7, stable  - most recent bicarbonate 24 with anion gap of 10, improved from prior with improving renal function  - will continue to monitor with repeat lab studies  Hypertension:  - blood pressure overall stable, noted some fluctuation  - currently off antihypertensive medications, continue to hold outpatient ACE-inhibitor diuretic for now  - optimize hemodynamics, avoid hypotension and fluctuations of blood pressure  Other medical problems:  Testicular cancer with retroperitoneal adenopathy, status post 1st cycle of chemotherapy on 4/9/21   - follow- up with outpatient Hematology/Oncology  Polymyalgia rheumatica, continue low-dose prednisone  Pancytopenia, due to recent chemotherapy, continue to monitor  Disposition:  Message has been sent to office for follow-up upon discharge  Patient will have repeat lab studies completed on Friday  Recommend to continue to hold lisinopril and diuretic for now  SUBJECTIVE:  Patient resting comfortably in bed    Patient with shortness of breath or chest pain  Patient states he is trying to eat and drink more      OBJECTIVE:  Current Weight: Weight - Scale: 113 kg (250 lb 3 6 oz)  Vitals:    04/20/21 0835   BP: 123/59   Pulse: 61   Resp: 18   Temp: 97 9 °F (36 6 °C)   SpO2: 97%       Intake/Output Summary (Last 24 hours) at 4/20/2021 4106  Last data filed at 4/20/2021 8524  Gross per 24 hour   Intake 1490 ml   Output 650 ml   Net 840 ml       General:  No acute distress  Skin:  Warm, no rash  Eyes:  Sclerae anicteric  ENT:  Moist lips and mucous membranes  Neck:  Supple trachea midline  Chest:  Clear breath sounds bilaterally   CVS:  Regular rate regular rhythm  Abdomen:  Soft, nontender, normoactive bowel sounds  Extremities:  No overt edema   Neuro:  Awake and interactive  Psych:  Appropriate affect      Medications:  Scheduled Meds:  Current Facility-Administered Medications   Medication Dose Route Frequency Provider Last Rate    acetaminophen  650 mg Oral Q6H PRN Yesi S Sophy, CRNP      aspirin  81 mg Oral Daily Yesi S Sophy, CRNP      atorvastatin  40 mg Oral Daily With [employer] S Sophy, CRNP      calcium carbonate  500 mg Oral TID PRN Fredo Brock PA-C      diphenoxylate-atropine  1 tablet Oral BID PRN Galdino Fergusson, DO      famotidine  20 mg Oral BID Galdino Ferrobertson, DO      ondansetron  4 mg Intravenous Q4H PRN Arvil Issac, DO      oxyCODONE  5 mg Oral Q6H PRN Gary Elias, ELIENP      predniSONE  1 mg Oral Daily Yesi S Sophy, CRNP      sodium chloride  60 mL/hr Intravenous Continuous Galdino Fung, DO 60 mL/hr (04/20/21 8478)       PRN Meds:   acetaminophen    calcium carbonate    diphenoxylate-atropine    ondansetron    oxyCODONE    Continuous Infusions:sodium chloride, 60 mL/hr, Last Rate: 60 mL/hr (04/20/21 0742)        Laboratory Results:  Results from last 7 days   Lab Units 04/20/21  0621 04/19/21  0605 04/18/21  0612 04/17/21  0618 04/16/21  0842 04/15/21  1141   WBC Thousand/uL  --  5 39 1 53* 0 82* 0 76* 1 26* HEMOGLOBIN g/dL  --  10 6* 9 7* 11 5* 11 4* 12 6   HEMATOCRIT %  --  32 2* 29 3* 37 6 34 8* 37 4   PLATELETS Thousands/uL  --  20* 9* 19* 46* 103*   SODIUM mmol/L 142 136 137 135* 136 138   POTASSIUM mmol/L 3 7 5 0 3 6 3 3* 3 5 3 7   CHLORIDE mmol/L 108 104 104 99* 99* 98*   CO2 mmol/L 24 19* 20* 21 22 22   BUN mg/dL 37* 50* 62* 86* 99* 100*   CREATININE mg/dL 2 55* 2 77* 3 32* 4 13* 4 71* 4 29*   CALCIUM mg/dL 8 2* 8 0* 7 4* 7 4* 7 0* 7 6*   MAGNESIUM mg/dL  --   --   --   --  1 7 1 8   PHOSPHORUS mg/dL  --   --   --   --  4 5*  --

## 2021-04-20 NOTE — DISCHARGE INSTRUCTIONS
Acute Kidney Injury, Ambulatory Care   GENERAL INFORMATION:   Acute kidney injury  happens when your kidneys suddenly stop working correctly  Normally, the kidneys turn fluid, chemicals, and waste from your blood into urine  In acute kidney injury, your kidneys can no longer do this  In most cases, it is temporary, but it may become a chronic kidney condition  Common symptoms include the following:   · Decreased urination or dark-colored urine    · Swelling in your arms, legs, or feet     · Abdominal or low back pain    · Vomiting, diarrhea, or loss of appetite    · Fatigue     · Skin rash  Seek immediate care for the following symptoms:   · Heart beating faster than normal for you    · Sudden chest pain or trouble breathing    · Seizure  Treatment for acute kidney injury:  Treatment depends upon the cause of your acute kidney injury and how severe it is  Medicines may be given to increase blood flow to your kidneys and protect your kidneys  You may also need medicine to decrease inflammation in your kidneys  You may be given IV fluids to replenish fluids and help your heart pump blood  Dialysis may be needed to remove chemicals and waste from your blood when your kidneys cannot  Manage acute kidney injury:   · Manage other health conditions  Care for your diabetes, high blood pressure, or heart disease  These conditions increase your risk for acute kidney injury  · Talk to your healthcare provider before you take over-the-counter-medicine  NSAIDs, stomach medicine, or laxatives may harm your kidneys and increase your risk for acute kidney injury  Follow up with your healthcare provider as directed:  Write down your questions so you remember to ask them during your visits  CARE AGREEMENT:   You have the right to help plan your care  Learn about your health condition and how it may be treated  Discuss treatment options with your caregivers to decide what care you want to receive   You always have the right to refuse treatment  The above information is an  only  It is not intended as medical advice for individual conditions or treatments  Talk to your doctor, nurse or pharmacist before following any medical regimen to see if it is safe and effective for you  © 2014 [address] is for End User's use only and may not be sold, redistributed or otherwise used for commercial purposes  All illustrations and images included in CareNotes® are the copyrighted property of [employer] D [employer]  or [employer] Tamay[employer]

## 2021-04-20 NOTE — ASSESSMENT & PLAN NOTE
· Chest discomfort midsternal, not reproducible  · No history of cardiac disease  · EKG on admission bradycardic with nonspecific ST changes  · Initial troponin negative, continue to trend  · Suspected from uremia

## 2021-04-20 NOTE — ASSESSMENT & PLAN NOTE
· POA with generalized weakness, abdominal pain, nausea  · CR 4 29, increased from 1 13 only 2 weeks ago  · Over the past 2 weeks patient completed his 1st round of chemotherapy and due to volume overload was initiated on furosemide (reports he gained 22 pounds)    Renal function improved  Will order repeat BMP in 1 week  Recent Labs     04/18/21  0612 04/19/21  0605 04/20/21  0621   CREATININE 3 32* 2 77* 2 55*

## 2021-04-20 NOTE — DISCHARGE INSTR - AVS FIRST PAGE
Dear Jenna Wilson,     It was our pleasure to care for you here at [facility]  It is our hope that we were always able to exceed the expected standards for your care during your stay  You were hospitalized due to acute kidney injury  You were cared for on the 5th floor by Zachary Arcos DO with the [facility] [place] [facility] who covers for your primary care physician (PCP), Jackie Osuna DO, while you were hospitalized  If you have any questions or concerns related to this hospitalization, you may contact us at [phone]  For follow up as well as any medication refills, we recommend that you follow up with your primary care physician  A registered nurse will reach out to you by phone within a few days after your discharge to answer any additional questions that you may have after going home  However, at this time we provide for you here, the most important instructions / recommendations at discharge:     · Notable Medication Adjustments -   · Discontinue metformin, start Januvia  · Discontinue ACE-inhibitor - lisinopril  ·   · Testing Required after Discharge -   · BMP in 1 week with your primary care provider and Nephrology  · Important follow up information -   · Please discussed her renal function with your oncologist to discuss her chemotherapy  · Other Instructions -   · Please hydrate adequately  · You still remain neutropenic, do not eating any fresh fruits or vegetables  Please stay away from anyone that may be sick with any contagious disease  · Please review this entire after visit summary as additional general instructions including medication list, appointments, activity, diet, any pertinent wound care, and other additional recommendations from your care team that may be provided for you        Sincerely,     Zachary Arcos DO and Nurse Pavel Patterson

## 2021-04-20 NOTE — PLAN OF CARE
Problem: Potential for Falls  Goal: Patient will remain free of falls  Description: INTERVENTIONS:  - Assess patient frequently for physical needs  -  Identify cognitive and physical deficits and behaviors that affect risk of falls  -  [place] fall precautions as indicated by assessment   - Educate patient/family on patient safety including physical limitations  - Instruct patient to call for assistance with activity based on assessment  - Modify environment to reduce risk of injury  - Consider OT/PT consult to assist with strengthening/mobility  Outcome: Progressing     Problem: Nutrition/Hydration-ADULT  Goal: Nutrient/Hydration intake appropriate for improving, restoring or maintaining nutritional needs  Description: Monitor and assess patient's nutrition/hydration status for malnutrition  Collaborate with interdisciplinary team and initiate plan and interventions as ordered  Monitor patient's weight and dietary intake as ordered or per policy  Utilize nutrition screening tool and intervene as necessary  Determine patient's food preferences and provide high-protein, high-caloric foods as appropriate       INTERVENTIONS:  - Monitor oral intake, urinary output, labs, and treatment plans  - Assess nutrition and hydration status and recommend course of action  - Evaluate amount of meals eaten  - Assist patient with eating if necessary   - Allow adequate time for meals  - Recommend/ encourage appropriate diets, oral nutritional supplements, and vitamin/mineral supplements  - Order, calculate, and assess calorie counts as needed  - Recommend, monitor, and adjust tube feedings and TPN/PPN based on assessed needs  - Assess need for intravenous fluids  - Provide specific nutrition/hydration education as appropriate  - Include patient/family/caregiver in decisions related to nutrition  Outcome: Progressing     Problem: PAIN - ADULT  Goal: Verbalizes/displays adequate comfort level or baseline comfort level  Description: Interventions:  - Encourage patient to monitor pain and request assistance  - Assess pain using appropriate pain scale  - Administer analgesics based on type and severity of pain and evaluate response  - Implement non-pharmacological measures as appropriate and evaluate response  - Consider cultural and social influences on pain and pain management  - Notify physician/advanced practitioner if interventions unsuccessful or patient reports new pain  Outcome: Progressing     Problem: INFECTION - ADULT  Goal: Absence or prevention of progression during hospitalization  Description: INTERVENTIONS:  - Assess and monitor for signs and symptoms of infection  - Monitor lab/diagnostic results  - Monitor all insertion sites, i e  indwelling lines, tubes, and drains  - Monitor endotracheal if appropriate and nasal secretions for changes in amount and color  - [place] appropriate cooling/warming therapies per order  - Administer medications as ordered  - Instruct and encourage patient and family to use good hand hygiene technique  - Identify and instruct in appropriate isolation precautions for identified infection/condition  Outcome: Progressing  Goal: Absence of fever/infection during neutropenic period  Description: INTERVENTIONS:  - Monitor WBC    Outcome: Progressing     Problem: SAFETY ADULT  Goal: Patient will remain free of falls  Description: INTERVENTIONS:  - Assess patient frequently for physical needs  -  Identify cognitive and physical deficits and behaviors that affect risk of falls    -  [place] fall precautions as indicated by assessment   - Educate patient/family on patient safety including physical limitations  - Instruct patient to call for assistance with activity based on assessment  - Modify environment to reduce risk of injury  - Consider OT/PT consult to assist with strengthening/mobility  Outcome: Progressing  Goal: Maintain or return to baseline ADL function  Description: INTERVENTIONS:  -  Assess patient's ability to carry out ADLs; assess patient's baseline for ADL function and identify physical deficits which impact ability to perform ADLs (bathing, care of mouth/teeth, toileting, grooming, dressing, etc )  - Assess/evaluate cause of self-care deficits   - Assess range of motion  - Assess patient's mobility; develop plan if impaired  - Assess patient's need for assistive devices and provide as appropriate  - Encourage maximum independence but intervene and supervise when necessary  - Involve family in performance of ADLs  - Assess for home care needs following discharge   - Consider OT consult to assist with ADL evaluation and planning for discharge  - Provide patient education as appropriate  Outcome: Progressing  Goal: Maintain or return mobility status to optimal level  Description: INTERVENTIONS:  - Assess patient's baseline mobility status (ambulation, transfers, stairs, etc )    - Identify cognitive and physical deficits and behaviors that affect mobility  - Identify mobility aids required to assist with transfers and/or ambulation (gait belt, sit-to-stand, lift, walker, cane, etc )  - [place] fall precautions as indicated by assessment  - Record patient progress and toleration of activity level on Mobility SBAR; progress patient to next Phase/Stage  - Instruct patient to call for assistance with activity based on assessment  - Consider rehabilitation consult to assist with strengthening/weightbearing, etc   Outcome: Progressing     Problem: DISCHARGE PLANNING  Goal: Discharge to home or other facility with appropriate resources  Description: INTERVENTIONS:  - Identify barriers to discharge w/patient and caregiver  - Arrange for needed discharge resources and transportation as appropriate  - Identify discharge learning needs (meds, wound care, etc )  - Arrange for interpretive services to assist at discharge as needed  - Refer to Case Management Department for coordinating discharge planning if the patient needs post-hospital services based on physician/advanced practitioner order or complex needs related to functional status, cognitive ability, or social support system  Outcome: Progressing     Problem: Knowledge Deficit  Goal: Patient/family/caregiver demonstrates understanding of disease process, treatment plan, medications, and discharge instructions  Description: Complete learning assessment and assess knowledge base    Interventions:  - Provide teaching at level of understanding  - Provide teaching via preferred learning methods  Outcome: Progressing     Problem: GENITOURINARY - ADULT  Goal: Maintains or returns to baseline urinary function  Description: INTERVENTIONS:  - Assess urinary function  - Encourage oral fluids to ensure adequate hydration if ordered  - Administer IV fluids as ordered to ensure adequate hydration  - Administer ordered medications as needed  - Offer frequent toileting  - Follow urinary retention protocol if ordered  Outcome: Progressing  Goal: Absence of urinary retention  Description: INTERVENTIONS:  - Assess patients ability to void and empty bladder  - Monitor I/O  - Bladder scan as needed  - Discuss with physician/AP medications to alleviate retention as needed  - Discuss catheterization for long term situations as appropriate  Outcome: Progressing  Goal: Urinary catheter remains patent  Description: INTERVENTIONS:  - Assess patency of urinary catheter  - If patient has a chronic maynard, consider changing catheter if non-functioning  - Follow guidelines for intermittent irrigation of non-functioning urinary catheter  Outcome: Progressing     Problem: METABOLIC, FLUID AND ELECTROLYTES - ADULT  Goal: Electrolytes maintained within normal limits  Description: INTERVENTIONS:  - Monitor labs and assess patient for signs and symptoms of electrolyte imbalances  - Administer electrolyte replacement as ordered  - Monitor response to electrolyte replacements, including repeat lab results as appropriate  - Instruct patient on fluid and nutrition as appropriate  Outcome: Progressing  Goal: Fluid balance maintained  Description: INTERVENTIONS:  - Monitor labs   - Monitor I/O and WT  - Instruct patient on fluid and nutrition as appropriate  - Assess for signs & symptoms of volume excess or deficit  Outcome: Progressing  Goal: Glucose maintained within target range  Description: INTERVENTIONS:  - Monitor Blood Glucose as ordered  - Assess for signs and symptoms of hyperglycemia and hypoglycemia  - Administer ordered medications to maintain glucose within target range  - Assess nutritional intake and initiate nutrition service referral as needed  Outcome: Progressing     Problem: Prexisting or High Potential for Compromised Skin Integrity  Goal: Skin integrity is maintained or improved  Description: INTERVENTIONS:  - Identify patients at risk for skin breakdown  - Assess and monitor skin integrity  - Assess and monitor nutrition and hydration status  - Monitor labs   - Assess for incontinence   - Turn and reposition patient  - Assist with mobility/ambulation  - Relieve pressure over bony prominences  - Avoid friction and shearing  - Provide appropriate hygiene as needed including keeping skin clean and dry  - Evaluate need for skin moisturizer/barrier cream  - Collaborate with interdisciplinary team   - Patient/family teaching  - Consider wound care consult   Outcome: Progressing

## 2021-04-20 NOTE — DISCHARGE SUMMARY
[address] [facility]  Discharge- Brian Guerrero 1960, 57 y o  male MRN: [id number]  Unit/Bed#: E5 -01 Encounter: [id number]  Primary Care Provider: Farheen Ybarra DO   Date and time admitted to hospital: 4/15/2021 10:54 AM  Admitting Provider:  DO Dang  Discharge Provider:  Julia Cevallos DO  Admission Date: 4/15/2021       Discharge Date: 04/20/21   LOS: 5  Primary Care Physician at Discharge: Farheen Ybarra, Brodie Leon:  Brian Guerrero is a 57 y o  male who presented abdominal pain, nausea and the setting of acute renal failure as well as uremia  The patient had recent received chemotherapy and had been restarted on furosemide for weight gain  At the time of admission the patient was found to have acute kidney injury present on admission  He was evaluated in consultation by the Oncology and Nephrology Service  His renal function improved with intravenous diuresis, and was felt to likely represent a prerenal state      The patient will be discharged with planned outpatient follow-up with nephrology as well as his oncologist     At the time of discharge the patient was tolerating oral diet, he was without any acute complaints and was medically cleared for discharge    All questions were answered the patient's satisfaction, and he was in agreement with discharge plan      DISCHARGE DIAGNOSES  ARF (acute renal failure) with tubular necrosis ([address] )  Assessment & Plan  Likely secondary to metformin, chemotherapy and diuretic use  Resolved  Outpatient follow-up    Chest discomfort  Assessment & Plan  · Chest discomfort midsternal, not reproducible  · No history of cardiac disease  · EKG on admission bradycardic with nonspecific ST changes  · Initial troponin negative, continue to trend  · Suspected from uremia    Steroid-induced hyperglycemia  Assessment & Plan  · Will discontinue metformin and place on Januvia alone given renal function    Polymyalgia rheumatica syndrome ([facility] )  Assessment & Plan  · Follows with Rheumatology  · Currently on prednisone taper, 1 mg daily for month of April then will decrease to 0 5 mg in May  · Continue steroid taper  · Outpatient follow-up    SIMON on CPAP  Assessment & Plan  · CPAP ordered    Chemotherapy-induced neutropenia (HCC)  Assessment & Plan  · Completed chemotherapy regimen 4/8  · WBC 1 26 with ANC 0 01  · Thrombocytopenia 103  · Continue to trend blood count  · Neutropenia precautions    Seminoma of descended left testis [facility])  Assessment & Plan  · Status post left inguinal orchiectomy on 02/19/2021, showing pure seminoma primary   · Completed 1/3 rounds of chemotherapy on 04/08   · Due to initiate next round on  Monday  · Outpatient heme Onc follow-up    Obesity  Assessment & Plan  · BMI 41 5  · Lifestyle interventions encouraged    GERD (gastroesophageal reflux disease)  Assessment & Plan  · Dexilant home regimen on hold  · Continue Pepcid 20 mg renally dosed    Essential hypertension  Assessment & Plan  · History hypertension  · Blood pressure appropriate off of antihypertensive regimen    Benign prostatic hyperplasia with nocturia  Assessment & Plan  Severe resume home medication    * JULIO C (acute kidney injury) ([facility] )  Assessment & Plan  · POA with generalized weakness, abdominal pain, nausea  · CR 4 29, increased from 1 13 only 2 weeks ago  · Over the past 2 weeks patient completed his 1st round of chemotherapy and due to volume overload was initiated on furosemide (reports he gained 22 pounds)    Renal function improved  Will order repeat BMP in 1 week  Recent Labs     04/18/21  0612 04/19/21  0605 04/20/21  0621   CREATININE 3 32* 2 77* 2 55*           CONSULTING PROVIDERS   IP CONSULT TO NEPHROLOGY  IP CONSULT TO ONCOLOGY  IP CONSULT TO CASE MANAGEMENT    PROCEDURES PERFORMED  * No surgery found *    RADIOLOGY RESULTS  Ct Chest Abdomen Pelvis Wo Contrast    Result Date: 4/15/2021  Narrative: CT CHEST, ABDOMEN AND PELVIS WITHOUT IV CONTRAST INDICATION:   chest pain, abdominal pain   + testicular cancer, chest pain   eval pain  COMPARISON:  CT 3/20/2021 TECHNIQUE: CT examination of the chest, abdomen and pelvis was performed without intravenous contrast   Axial, sagittal, and coronal 2D reformatted images were created from the source data and submitted for interpretation  Radiation dose length product (DLP) for this visit:  1496 mGy-cm   This examination, like all CT scans performed in the [facility], was performed utilizing techniques to minimize radiation dose exposure, including the use of iterative reconstruction and automated exposure control  Enteric contrast was not administered  FINDINGS: The study is somewhat limited due to lack of intravenous contrast  CHEST LUNGS:  Lungs are clear  There is no tracheal or endobronchial lesion  PLEURA:  Unremarkable  HEART/GREAT VESSELS:  Unremarkable for patient's age  MEDIASTINUM AND ANNA:  Unremarkable  CHEST WALL AND LOWER NECK:   Stable tiny 6 mm left thyroid nodule again noted, less conspicuous than on the prior exam, and not necessitating additional follow-up  ABDOMEN LIVER/BILIARY TREE:  Diffuse hepatic steatosis is present without focal abnormality GALLBLADDER:  No calcified gallstones  No pericholecystic inflammatory change  SPLEEN:  Unremarkable  PANCREAS:  Unremarkable  ADRENAL GLANDS:  Unremarkable  KIDNEYS/URETERS:  Unremarkable  No hydronephrosis  STOMACH AND BOWEL:  Small hiatal hernia is noted  No bowel wall thickening or intestinal obstruction is appreciated  APPENDIX:  No findings to suggest appendicitis  ABDOMINOPELVIC CAVITY:  There is been interim considerable improvement in previously identified abdominal and pelvic adenopathy    In particular, previously identified retroperitoneal adenopathy in the left para-aortic location has improved with residual enlarged lymph nodes measuring up to 1 0 cm in greatest short axis dimension (previously 2 3 cm), residual left renal hilar adenopathy measuring up to 1 8 cm in greatest short axis dimension (previously 2 0 cm), residual left common iliac adenopathy measuring up to 1 7 cm in greatest short axis dimension (previously 2 3 cm), and residual left external iliac adenopathy measuring up to 1 3 cm in greatest short axis dimension (previously 3 0 cm)  No new adenopathy is appreciated  VESSELS:  Unremarkable for patient's age  PELVIS REPRODUCTIVE ORGANS:  Mild prostatomegaly similar the prior exam  URINARY BLADDER:  Unremarkable  ABDOMINAL WALL/INGUINAL REGIONS:  Postoperative change in the left inguinal region is again noted likely related to previous orchiectomy  OSSEOUS STRUCTURES:  No acute fracture or destructive osseous lesion  Impression: 1  Postoperative change again noted reflecting previous left orchiectomy  2   Predominantly left-sided retroperitoneal abdominal and pelvic adenopathy again noted, significantly improved when compared to prior exam of 3/20/2021, presumably reflecting favorable therapeutic response  3   No acute abnormality identified in the chest, abdomen or pelvis  4   Additional findings as noted  Workstation performed: [id number]     Ct Head Without Contrast    Result Date: 4/15/2021  Narrative: CT BRAIN - WITHOUT CONTRAST INDICATION:   Dizziness, non-specific dizziness   hx of cancer  R/O mets  History of left testicular seminoma COMPARISON:  None  TECHNIQUE:  CT examination of the brain was performed  In addition to axial images, sagittal and coronal 2D reformatted images were created and submitted for interpretation  Radiation dose length product (DLP) for this visit:  892 mGy-cm   This examination, like all CT scans performed in the [facility], was performed utilizing techniques to minimize radiation dose exposure, including the use of iterative reconstruction and automated exposure control  IMAGE QUALITY:  Diagnostic   FINDINGS: PARENCHYMA:  No intracranial mass, mass effect or midline shift  No CT signs of acute infarction  No acute parenchymal hemorrhage  VENTRICLES AND EXTRA-AXIAL SPACES:  Normal for the patient's age  VISUALIZED ORBITS AND PARANASAL SINUSES:  Opacification of a single anterior left ethmoid air cell incidentally noted with otherwise clear paranasal sinuses  Orbits appear unremarkable  CALVARIUM AND EXTRACRANIAL SOFT TISSUES:  Normal      Impression: No acute intracranial abnormality   Workstation performed: [id number]       LABS  Results from last 7 days   Lab Units 04/20/21  0621 04/19/21  0605 04/18/21  0612 04/17/21  0618 04/16/21  0842 04/15/21  1141   WBC Thousand/uL 13 55* 5 39 1 53* 0 82* 0 76* 1 26*   HEMOGLOBIN g/dL 10 1* 10 6* 9 7* 11 5* 11 4* 12 6   HEMATOCRIT % 31 5* 32 2* 29 3* 37 6 34 8* 37 4   MCV fL 87 85 84 89 86 83   TOTAL NEUT ABS Thousand/uL  --  1 02*  --   --   --  0 01*   BANDS PCT %  --  10*  --   --   --   --    PLATELETS Thousands/uL 18* 20* 9* 19* 46* 103*   INR   --   --   --   --   --  1 32*     Results from last 7 days   Lab Units 04/20/21 0621 04/19/21 0605 04/18/21  0612 04/17/21  0618 04/16/21  0842 04/15/21  1141   SODIUM mmol/L 142 136 137 135* 136 138   POTASSIUM mmol/L 3 7 5 0 3 6 3 3* 3 5 3 7   CHLORIDE mmol/L 108 104 104 99* 99* 98*   CO2 mmol/L 24 19* 20* 21 22 22   BUN mg/dL 37* 50* 62* 86* 99* 100*   CREATININE mg/dL 2 55* 2 77* 3 32* 4 13* 4 71* 4 29*   CALCIUM mg/dL 8 2* 8 0* 7 4* 7 4* 7 0* 7 6*   ALBUMIN g/dL  --   --   --   --  2 8* 3 2*   TOTAL BILIRUBIN mg/dL  --   --   --   --  0 95 1 12*   ALK PHOS U/L  --   --   --   --  69 82   ALT U/L  --   --   --   --  30 41   AST U/L  --   --   --   --  11 15   EGFR ml/min/1 73sq m 26 23 19 14 12 14   GLUCOSE RANDOM mg/dL 95 89 97 120 139 140     Results from last 7 days   Lab Units 04/15/21  1708 04/15/21  1421 04/15/21  1141   CK TOTAL U/L  --   --  35*   TROPONIN I ng/mL <0 02 <0 02 <0 02     Results from last 7 days   Lab Units 04/15/21  1141   NT-PRO BNP pg/mL 66          Results from last 7 days   Lab Units 04/20/21  1126 04/20/21  0718 04/19/21  2121 04/19/21  1639 04/18/21  2104 04/18/21  1134 04/18/21  0806 04/17/21  2125 04/17/21  1535 04/17/21  1053   POC GLUCOSE mg/dl 100 106 98 118 125 129 114 135 138 144*     Results from last 7 days   Lab Units 04/15/21  1141   HEMOGLOBIN A1C % 6 8*     Results from last 7 days   Lab Units 04/15/21  1141   TSH 3RD GENERATON uIU/mL 0 753               Cultures:   Results from last 7 days   Lab Units 04/15/21  1514   COLOR UA  Yellow   CLARITY UA  Clear   SPEC GRAV UA  1 020   PH UA  5 0   LEUKOCYTES UA  Negative   NITRITE UA  Negative   GLUCOSE UA mg/dl Negative   KETONES UA mg/dl Negative   BILIRUBIN UA  Negative   BLOOD UA  Trace*      Results from last 7 days   Lab Units 04/15/21  1514   RBC UA /hpf None Seen   WBC UA /hpf 2-4   EPITHELIAL CELLS WET PREP /hpf Occasional   BACTERIA UA /hpf Moderate*      Results from last 7 days   Lab Units 04/17/21  0412 04/15/21  1719   INFLUENZA A PCR   --  Negative   C DIFF TOXIN B BY PCR  Negative  --        PHYSICAL EXAM:  Vitals:   Blood Pressure: 123/59 (04/20/21 0835)  Pulse: 61 (04/20/21 0835)  Temperature: 97 9 °F (36 6 °C) (04/20/21 0835)  Temp Source: Oral (04/20/21 0835)  Respirations: 18 (04/20/21 0835)  Weight - Scale: 113 kg (250 lb 3 6 oz) (04/18/21 0600)  SpO2: 97 % (04/20/21 0835)      General: well appearing, no acute distress  HEENT: atraumatic, PERRLA, moist mucosa, normal pharynx, normal tonsils and adenoids, normal tongue, no fluid in sinuses  Neck: Trachea midline, no carotid bruit, no masses  Respiratory: normal chest wall expansion, CTA B, no r/r/w, no rubs  Cardiovascular: RRR, no m/r/g, Normal S1 and S2  Abdomen: Soft, non-tender, non-distended, normal bowel sounds in all quadrants, no hepatosplenomegaly, no tympany  Rectal: deferred  Musculoskeletal: normal ROM in upper and lower extremities  Integumentary: warm, dry, and pink, with no rash, purpura, or petechia  Heme/Lymph: no lymphadenopathy, no bruises  Neurological: Cranial Nerves II-XII grossly intact, no tics, normal sensation to pressure and light touch  Psychiatric: cooperative with normal mood, affect, and cognition      Discharge Disposition: Home/Self Care      Test Results Pending at Discharge:           Medications   · Summary of Medication Adjustments made as a result of this hospitalization:  Discontinue lisinopril, had start Januvia, discontinue furosemide  · Medication Dosing Tapers - Please refer to Discharge Medication List for details on any medication dosing tapers (if applicable to patient)  · Discharge Medication List: See after visit summary for reconciled discharge medications  Diet restrictions:         Diet Orders   (From admission, onward)             Start     Ordered    04/16/21 1123  Diet Cardiovascular; Cardiac; Consistent Carbohydrate Diet Level 1 (4 carb servings/60 grams CHO/meal)  Diet effective now     Question Answer Comment   Diet Type Cardiovascular    Cardiac Cardiac    Other Restriction(s): Consistent Carbohydrate Diet Level 1 (4 carb servings/60 grams CHO/meal)    RD to adjust diet per protocol? Yes        04/16/21 1122              Activity restrictions: No strenuous activity  Discharge Condition: good    Outpatient Follow-Up and Discharge Instructions  See after visit summary section titled Discharge Instructions for information provided to patient and family  Code Status: Level 3 - DNAR and DNI  Discharge Statement   I spent 35 minutes discharging the patient  This time was spent on the day of discharge  Greater than 50% of total time was spent with the patient and / or family counseling and / or coordination of care  ** Please Note: This note was completed in part utilizing M-Modal Fluency Direct Software    Grammatical errors, random word insertions, spelling mistakes, and incomplete sentences may be an occasional consequence of this system secondary to software limitations, ambient noise, and hardware issues  If you have any questions or concerns about the content, text, or information contained within the body of this dictation, please contact the provider for clarification  **

## 2021-04-21 ENCOUNTER — APPOINTMENT (OUTPATIENT)
Dept: LAB | Facility: CLINIC | Age: 61
End: 2021-04-21
Payer: COMMERCIAL

## 2021-04-21 ENCOUNTER — TELEPHONE (OUTPATIENT)
Dept: OTHER | Facility: OTHER | Age: 61
End: 2021-04-21

## 2021-04-21 DIAGNOSIS — N17.9 AKI (ACUTE KIDNEY INJURY) (HCC): Primary | ICD-10-CM

## 2021-04-21 LAB
ALBUMIN SERPL BCP-MCNC: 2.5 G/DL (ref 3.5–5)
ALP SERPL-CCNC: 113 U/L (ref 46–116)
ALT SERPL W P-5'-P-CCNC: 20 U/L (ref 12–78)
ANION GAP SERPL CALCULATED.3IONS-SCNC: 6 MMOL/L (ref 4–13)
ANISOCYTOSIS BLD QL SMEAR: PRESENT
AST SERPL W P-5'-P-CCNC: 14 U/L (ref 5–45)
BILIRUB SERPL-MCNC: 0.26 MG/DL (ref 0.2–1)
BUN SERPL-MCNC: 29 MG/DL (ref 5–25)
CALCIUM ALBUM COR SERPL-MCNC: 9.7 MG/DL (ref 8.3–10.1)
CALCIUM SERPL-MCNC: 8.5 MG/DL (ref 8.3–10.1)
CHLORIDE SERPL-SCNC: 114 MMOL/L (ref 100–108)
CO2 SERPL-SCNC: 23 MMOL/L (ref 21–32)
CREAT SERPL-MCNC: 2.3 MG/DL (ref 0.6–1.3)
ERYTHROCYTE [DISTWIDTH] IN BLOOD BY AUTOMATED COUNT: 13.2 % (ref 11.6–15.1)
GFR SERPL CREATININE-BSD FRML MDRD: 29 ML/MIN/1.73SQ M
GLUCOSE P FAST SERPL-MCNC: 109 MG/DL (ref 65–99)
HCT VFR BLD AUTO: 32.8 % (ref 36.5–49.3)
HGB BLD-MCNC: 10.6 G/DL (ref 12–17)
LDH SERPL-CCNC: 373 U/L (ref 81–234)
LYMPHOCYTES # BLD AUTO: 26 %
LYMPHOCYTES # BLD AUTO: 6.73 THOUSAND/UL (ref 0.6–4.47)
MCH RBC QN AUTO: 27.6 PG (ref 26.8–34.3)
MCHC RBC AUTO-ENTMCNC: 32.3 G/DL (ref 31.4–37.4)
MCV RBC AUTO: 85 FL (ref 82–98)
METAMYELOCYTES NFR BLD MANUAL: 7 % (ref 0–1)
MONOCYTES # BLD AUTO: 2.33 THOUSAND/UL (ref 0–1.22)
MONOCYTES NFR BLD AUTO: 9 % (ref 4–12)
MYELOCYTES NFR BLD MANUAL: 5 % (ref 0–1)
NEUTS BAND NFR BLD MANUAL: 17 % (ref 0–8)
NEUTS SEG # BLD: 12.94 THOUSAND/UL (ref 1.81–6.82)
NEUTS SEG NFR BLD AUTO: 33 %
NRBC BLD AUTO-RTO: 0 /100 WBCS
PLATELET # BLD AUTO: 33 THOUSANDS/UL (ref 149–390)
PLATELET BLD QL SMEAR: ABNORMAL
PMV BLD AUTO: 12.2 FL (ref 8.9–12.7)
POTASSIUM SERPL-SCNC: 3.7 MMOL/L (ref 3.5–5.3)
PROT SERPL-MCNC: 7 G/DL (ref 6.4–8.2)
RBC # BLD AUTO: 3.84 MILLION/UL (ref 3.88–5.62)
SODIUM SERPL-SCNC: 143 MMOL/L (ref 136–145)
TOTAL CELLS COUNTED SPEC: 100
VARIANT LYMPHS # BLD AUTO: 3 % (ref 0–0)
WBC # BLD AUTO: 25.87 THOUSAND/UL (ref 4.31–10.16)

## 2021-04-21 PROCEDURE — 85007 BL SMEAR W/DIFF WBC COUNT: CPT

## 2021-04-21 PROCEDURE — 85027 COMPLETE CBC AUTOMATED: CPT

## 2021-04-21 PROCEDURE — 36415 COLL VENOUS BLD VENIPUNCTURE: CPT

## 2021-04-21 PROCEDURE — 83615 LACTATE (LD) (LDH) ENZYME: CPT

## 2021-04-21 PROCEDURE — 80053 COMPREHEN METABOLIC PANEL: CPT

## 2021-04-21 NOTE — TELEPHONE ENCOUNTER
Lab Result: 33 platelet    Date/Time Drawn: 04/21 @1820   Ordering Provider: Arcenio Moody Name: Padmini Currie       The following critical/stat result was read back to the lab as stated above and [employer] to the on-call provider  [phone]/ Lili/ [facility] lab/ PT   Dustin Salazar 57 13 4022/ Critical value : Platelet count of 33

## 2021-04-22 ENCOUNTER — OFFICE VISIT (OUTPATIENT)
Dept: HEMATOLOGY ONCOLOGY | Facility: CLINIC | Age: 61
End: 2021-04-22
Payer: COMMERCIAL

## 2021-04-22 VITALS
BODY MASS INDEX: 36.88 KG/M2 | WEIGHT: 249 LBS | TEMPERATURE: 98.2 F | SYSTOLIC BLOOD PRESSURE: 168 MMHG | HEIGHT: 69 IN | DIASTOLIC BLOOD PRESSURE: 82 MMHG | RESPIRATION RATE: 18 BRPM | HEART RATE: 76 BPM | OXYGEN SATURATION: 98 %

## 2021-04-22 DIAGNOSIS — C62.12 SEMINOMA OF DESCENDED LEFT TESTIS (HCC): Primary | ICD-10-CM

## 2021-04-22 PROCEDURE — 99214 OFFICE O/P EST MOD 30 MIN: CPT | Performed by: INTERNAL MEDICINE

## 2021-04-22 NOTE — PROGRESS NOTES
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 with external right iliac lymphadenopathy measuring up to 3 cm and left para-aortic lymphadenopathy measuring up to 2 3 cm (pT2, N2, S1) with persistent elevation of LDH less than 1 5 normal upper limit     Repeat alpha fetoprotein, hCG are normal, repeat  ( )    Received 1st cycle of etoposide/ cisplatin with anti emetics  Protocol, admitted to the hospital after 10 days of therapy with nausea, weakness, was found to have acute renal insufficiency with creatinine of 4, , received IV fluid with gradual improvement, he also developed neutropenia and thrombocytopenia grade 4 requiring platelet transfusion    Currently he is recovering with creatinine 2 2     CT scan showed significant response of chemotherapy in the abdominal lymphadenopathy     At this time I will hold chemotherapy for 2 weeks until recovery of the renal functions     If we decide to proceed with 2nd cycle of chemotherapy, will reduce the dose of both etoposide and cisplatin by 25% and will do the cycles as inpatient to monitor dehydration and control chemotherapy-induced nausea and vomiting            Labs and imaging studies are reviewed by ordering provider once results are available  If there are findings that need immediate attention, you will be contacted when results available  Discussing results and the implication on your healthcare is best discussed in person at your follow-up visit         HPI:    25-year-old  male with history of prostate cancer, nephrolithiasis, benign prostatic hypertrophy, polymyalgia, hypertension, sleep apnea, obesity, GERD, hiatal hernia, gout           His prostate cancers diagnosed on September 2019 with single core with 4 mm stretch of prostate cancer with very low risk group he is on active surveillance with PSA 4 9 on March 2020 followed by Urology, was found to have enlarged of the left testicle area     Ultrasound of the scrotum on 10/24/2020 showed left testicle measuring 7 2 x 3 9 x 5 1 cm with lobulated contour and diffuse heterogeneous echotexture     Repeat ultrasound on 01/20/2021 showed 9 1 x 4 9 x 6 8 cm increase in size compared to prior ultrasound     LDH was elevated at 376 ( upper 250) normal hCG, alpha fetoprotein     Status post left inguinal orchiectomy on 02/19/2021, showing pure seminoma primary measuring 8 2 cm, abuts and invades into but not through tunica albuginea, invades hilar fat and spermatic cord soft tissue with lymphovascular invasion, spermatic cord is not involved by the tumor confirmed by 2nd opinion at [facility] [place]   [place] scan of the chest abdomen and pelvis on 03/20/2021 showed enlarged left iliac and left para-aortic lymph nodes for example left para-aortic lymph node measuring 2 3 cm, left external iliac lymph node measuring 30 mm no evidence of metastatic disease in the chest     He has pain at the left orchiectomy site on and off most likely responding to acetaminophen from healing     He reported 12 lb weight loss after the surgery denies any headache blurred vision nausea vomiting diarrhea constipation dysuria hematuria melena hematochezia night sweats low-grade fever tingling or numbness    Was diagnosed with 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 with external iliac lymphadenopathy measuring up to 3 cm and left para-aortic lymphadenopathy measuring up to 2 3 cm ( pT2, N2, S1) and persistent elevation of LDH however less than 1 5 normal upper limit 358, he was treated according to the NCCN guideline with 4 cycle of etoposide/ cisplatin, cycle 1   On 04/05/2021    After 10 days he was admitted to the hospital with acute renal failure, dehydration, nausea, vomiting, abdominal pain, weakness, creatinine 4 29,  anion gap 18, WBC 1 26, platelets 441     He had sampson WBC of 0 76 with recovery later on     Evaluated by Nephrology, started on IV fluid with improvement of creatinine at the time of the discharge creatinine 2 2 he received platelet transfusion with sampson of 9  Interval History:        Previous Treatment:         Test Results:    Imaging: Ct Chest Abdomen Pelvis Wo Contrast    Result Date: 4/15/2021  Narrative: CT CHEST, ABDOMEN AND PELVIS WITHOUT IV CONTRAST INDICATION:   chest pain, abdominal pain   + testicular cancer, chest pain   eval pain  COMPARISON:  CT 3/20/2021 TECHNIQUE: CT examination of the chest, abdomen and pelvis was performed without intravenous contrast   Axial, sagittal, and coronal 2D reformatted images were created from the source data and submitted for interpretation  Radiation dose length product (DLP) for this visit:  1496 mGy-cm   This examination, like all CT scans performed in the [facility], was performed utilizing techniques to minimize radiation dose exposure, including the use of iterative reconstruction and automated exposure control  Enteric contrast was not administered  FINDINGS: The study is somewhat limited due to lack of intravenous contrast  CHEST LUNGS:  Lungs are clear  There is no tracheal or endobronchial lesion  PLEURA:  Unremarkable  HEART/GREAT VESSELS:  Unremarkable for patient's age  MEDIASTINUM AND ANNA:  Unremarkable  CHEST WALL AND LOWER NECK:   Stable tiny 6 mm left thyroid nodule again noted, less conspicuous than on the prior exam, and not necessitating additional follow-up  ABDOMEN LIVER/BILIARY TREE:  Diffuse hepatic steatosis is present without focal abnormality GALLBLADDER:  No calcified gallstones  No pericholecystic inflammatory change  SPLEEN:  Unremarkable  PANCREAS:  Unremarkable  ADRENAL GLANDS:  Unremarkable  KIDNEYS/URETERS:  Unremarkable  No hydronephrosis  STOMACH AND BOWEL:  Small hiatal hernia is noted    No bowel wall thickening or intestinal obstruction is appreciated  APPENDIX:  No findings to suggest appendicitis  ABDOMINOPELVIC CAVITY:  There is been interim considerable improvement in previously identified abdominal and pelvic adenopathy  In particular, previously identified retroperitoneal adenopathy in the left para-aortic location has improved with residual enlarged lymph nodes measuring up to 1 0 cm in greatest short axis dimension (previously 2 3 cm), residual left renal hilar adenopathy measuring up to 1 8 cm in greatest short axis dimension (previously 2 0 cm), residual left common iliac adenopathy measuring up to 1 7 cm in greatest short axis dimension (previously 2 3 cm), and residual left external iliac adenopathy measuring up to 1 3 cm in greatest short axis dimension (previously 3 0 cm)  No new adenopathy is appreciated  VESSELS:  Unremarkable for patient's age  PELVIS REPRODUCTIVE ORGANS:  Mild prostatomegaly similar the prior exam  URINARY BLADDER:  Unremarkable  ABDOMINAL WALL/INGUINAL REGIONS:  Postoperative change in the left inguinal region is again noted likely related to previous orchiectomy  OSSEOUS STRUCTURES:  No acute fracture or destructive osseous lesion  Impression: 1  Postoperative change again noted reflecting previous left orchiectomy  2   Predominantly left-sided retroperitoneal abdominal and pelvic adenopathy again noted, significantly improved when compared to prior exam of 3/20/2021, presumably reflecting favorable therapeutic response  3   No acute abnormality identified in the chest, abdomen or pelvis  4   Additional findings as noted  Workstation performed: [id number]     Ct Head Without Contrast    Result Date: 4/15/2021  Narrative: CT BRAIN - WITHOUT CONTRAST INDICATION:   Dizziness, non-specific dizziness   hx of cancer  R/O mets  History of left testicular seminoma COMPARISON:  None  TECHNIQUE:  CT examination of the brain was performed    In addition to axial images, sagittal and coronal 2D reformatted images were created and submitted for interpretation  Radiation dose length product (DLP) for this visit:  892 mGy-cm   This examination, like all CT scans performed in the [facility], was performed utilizing techniques to minimize radiation dose exposure, including the use of iterative reconstruction and automated exposure control  IMAGE QUALITY:  Diagnostic  FINDINGS: PARENCHYMA:  No intracranial mass, mass effect or midline shift  No CT signs of acute infarction  No acute parenchymal hemorrhage  VENTRICLES AND EXTRA-AXIAL SPACES:  Normal for the patient's age  VISUALIZED ORBITS AND PARANASAL SINUSES:  Opacification of a single anterior left ethmoid air cell incidentally noted with otherwise clear paranasal sinuses  Orbits appear unremarkable  CALVARIUM AND EXTRACRANIAL SOFT TISSUES:  Normal      Impression: No acute intracranial abnormality  Workstation performed: [id number]       Labs:   Lab Results   Component Value Date    WBC 25 87 (H) 04/21/2021    HGB 10 6 (L) 04/21/2021    HCT 32 8 (L) 04/21/2021    MCV 85 04/21/2021    PLT 33 (LL) 04/21/2021     Lab Results   Component Value Date    K 3 7 04/21/2021     (H) 04/21/2021    CO2 23 04/21/2021    BUN 29 (H) 04/21/2021    CREATININE 2 30 (H) 04/21/2021    GLUF 109 (H) 04/21/2021    CALCIUM 8 5 04/21/2021    CORRECTEDCA 9 7 04/21/2021    AST 14 04/21/2021    ALT 20 04/21/2021    ALKPHOS 113 04/21/2021    EGFR 29 04/21/2021       No results found for: IRON, TIBC, FERRITIN    No results found for: [id number]      ROS: Review of Systems   Constitutional: Positive for fatigue  Negative for appetite change, chills, diaphoresis, fever and unexpected weight change  HENT:   Negative for hearing loss, lump/mass, mouth sores, nosebleeds, sore throat, trouble swallowing and voice change  Eyes: Negative  Negative for eye problems and icterus  Respiratory: Negative  Negative for chest tightness, cough, hemoptysis and shortness of breath  Cardiovascular: Positive for leg swelling  Negative for chest pain  Gastrointestinal: Negative for abdominal distention, abdominal pain, blood in stool, constipation, diarrhea and nausea  Endocrine: Negative  Genitourinary: Negative for dysuria, frequency, hematuria and pelvic pain  Musculoskeletal: Negative  Negative for arthralgias, back pain, flank pain, gait problem, myalgias and neck stiffness  Skin: Negative for itching and rash  Neurological: Negative for dizziness, gait problem, headaches, light-headedness, numbness and speech difficulty  Hematological: Negative for adenopathy  Does not bruise/bleed easily  Psychiatric/Behavioral: Negative for confusion, decreased concentration, depression and sleep disturbance  The patient is not nervous/anxious  Current Medications: Reviewed  Allergies: Reviewed  PMH/FH/SH:  Reviewed      Physical Exam:    Body surface area is 2 26 meters squared  Wt Readings from Last 3 Encounters:   04/22/21 113 kg (249 lb)   04/18/21 113 kg (250 lb 3 6 oz)   04/09/21 126 kg (277 lb 5 4 oz)        Temp Readings from Last 3 Encounters:   04/22/21 98 2 °F (36 8 °C)   04/20/21 97 9 °F (36 6 °C) (Oral)   04/09/21 (!) 97 °F (36 1 °C) (Tympanic)        BP Readings from Last 3 Encounters:   04/22/21 168/82   04/20/21 123/59   04/09/21 159/77         Pulse Readings from Last 3 Encounters:   04/22/21 76   04/20/21 61   04/09/21 56        Physical Exam  Vitals signs reviewed  Constitutional:       General: He is not in acute distress  Appearance: He is well-developed  He is not diaphoretic  HENT:      Head: Normocephalic and atraumatic  Eyes:      Conjunctiva/sclera: Conjunctivae normal    Neck:      Musculoskeletal: Normal range of motion and neck supple  Trachea: No tracheal deviation  Cardiovascular:      Rate and Rhythm: Normal rate and regular rhythm  Heart sounds: No murmur  No friction rub  No gallop      Pulmonary:      Effort: Pulmonary effort is normal  No respiratory distress  Breath sounds: Normal breath sounds  No wheezing or rales  Chest:      Chest wall: No tenderness  Abdominal:      General: There is no distension  Palpations: Abdomen is soft  Tenderness: There is no abdominal tenderness  Musculoskeletal:      Right lower leg: No edema  Left lower leg: No edema  Lymphadenopathy:      Cervical: No cervical adenopathy  Skin:     General: Skin is warm and dry  Coloration: Skin is not pale  Findings: No erythema  Neurological:      Mental Status: He is alert and oriented to person, place, and time  Psychiatric:         Behavior: Behavior normal          Thought Content: Thought content normal          Judgment: Judgment normal          ECO    Goals and Barriers:  Current Goal: Minimize effects of disease  Barriers: None  Patient's Capacity to Self Care:  Patient is able to self care      Code Status: [unfilled]

## 2021-04-23 DIAGNOSIS — C62.92 SEMINOMA OF TESTIS, STAGE 3, LEFT (HCC): Primary | ICD-10-CM

## 2021-04-23 DIAGNOSIS — Z29.8 CHEMOPREVENTION: ICD-10-CM

## 2021-04-23 DIAGNOSIS — D70.1 CHEMOTHERAPY-INDUCED NEUTROPENIA (HCC): ICD-10-CM

## 2021-04-23 DIAGNOSIS — T45.1X5A CHEMOTHERAPY-INDUCED NEUTROPENIA (HCC): ICD-10-CM

## 2021-04-26 ENCOUNTER — HOSPITAL ENCOUNTER (OUTPATIENT)
Dept: INFUSION CENTER | Facility: HOSPITAL | Age: 61
End: 2021-04-26
Attending: INTERNAL MEDICINE

## 2021-04-26 ENCOUNTER — HOSPITAL ENCOUNTER (OUTPATIENT)
Dept: INFUSION CENTER | Facility: HOSPITAL | Age: 61
End: 2021-04-26

## 2021-04-28 ENCOUNTER — HOSPITAL ENCOUNTER (OUTPATIENT)
Dept: INFUSION CENTER | Facility: CLINIC | Age: 61
End: 2021-04-28

## 2021-04-30 ENCOUNTER — APPOINTMENT (OUTPATIENT)
Dept: LAB | Facility: CLINIC | Age: 61
End: 2021-04-30
Payer: COMMERCIAL

## 2021-04-30 ENCOUNTER — TRANSCRIBE ORDERS (OUTPATIENT)
Dept: ADMINISTRATIVE | Facility: HOSPITAL | Age: 61
End: 2021-04-30

## 2021-05-07 ENCOUNTER — APPOINTMENT (OUTPATIENT)
Dept: LAB | Facility: CLINIC | Age: 61
End: 2021-05-07
Payer: COMMERCIAL

## 2021-05-07 DIAGNOSIS — D70.1 CHEMOTHERAPY-INDUCED NEUTROPENIA (HCC): ICD-10-CM

## 2021-05-07 DIAGNOSIS — C62.92 SEMINOMA OF TESTIS, STAGE 3, LEFT (HCC): ICD-10-CM

## 2021-05-07 DIAGNOSIS — T45.1X5A CHEMOTHERAPY-INDUCED NEUTROPENIA (HCC): ICD-10-CM

## 2021-05-07 DIAGNOSIS — Z29.8 CHEMOPREVENTION: ICD-10-CM

## 2021-05-07 LAB
ALBUMIN SERPL BCP-MCNC: 3.2 G/DL (ref 3.5–5)
ALP SERPL-CCNC: 75 U/L (ref 46–116)
ALT SERPL W P-5'-P-CCNC: 25 U/L (ref 12–78)
ANION GAP SERPL CALCULATED.3IONS-SCNC: 7 MMOL/L (ref 4–13)
AST SERPL W P-5'-P-CCNC: 25 U/L (ref 5–45)
BASOPHILS # BLD AUTO: 0.18 THOUSANDS/ΜL (ref 0–0.1)
BASOPHILS NFR BLD AUTO: 2 % (ref 0–1)
BILIRUB SERPL-MCNC: 0.5 MG/DL (ref 0.2–1)
BUN SERPL-MCNC: 25 MG/DL (ref 5–25)
CALCIUM ALBUM COR SERPL-MCNC: 10.5 MG/DL (ref 8.3–10.1)
CALCIUM SERPL-MCNC: 9.9 MG/DL (ref 8.3–10.1)
CHLORIDE SERPL-SCNC: 107 MMOL/L (ref 100–108)
CO2 SERPL-SCNC: 23 MMOL/L (ref 21–32)
CREAT SERPL-MCNC: 1.73 MG/DL (ref 0.6–1.3)
EOSINOPHIL # BLD AUTO: 0.02 THOUSAND/ΜL (ref 0–0.61)
EOSINOPHIL NFR BLD AUTO: 0 % (ref 0–6)
ERYTHROCYTE [DISTWIDTH] IN BLOOD BY AUTOMATED COUNT: 14.9 % (ref 11.6–15.1)
GFR SERPL CREATININE-BSD FRML MDRD: 41 ML/MIN/1.73SQ M
GLUCOSE P FAST SERPL-MCNC: 92 MG/DL (ref 65–99)
HCT VFR BLD AUTO: 32.2 % (ref 36.5–49.3)
HGB BLD-MCNC: 10.2 G/DL (ref 12–17)
IMM GRANULOCYTES # BLD AUTO: 0.09 THOUSAND/UL (ref 0–0.2)
IMM GRANULOCYTES NFR BLD AUTO: 1 % (ref 0–2)
LDH SERPL-CCNC: 234 U/L (ref 81–234)
LYMPHOCYTES # BLD AUTO: 3.04 THOUSANDS/ΜL (ref 0.6–4.47)
LYMPHOCYTES NFR BLD AUTO: 31 % (ref 14–44)
MCH RBC QN AUTO: 28.2 PG (ref 26.8–34.3)
MCHC RBC AUTO-ENTMCNC: 31.7 G/DL (ref 31.4–37.4)
MCV RBC AUTO: 89 FL (ref 82–98)
MONOCYTES # BLD AUTO: 1.32 THOUSAND/ΜL (ref 0.17–1.22)
MONOCYTES NFR BLD AUTO: 13 % (ref 4–12)
NEUTROPHILS # BLD AUTO: 5.2 THOUSANDS/ΜL (ref 1.85–7.62)
NEUTS SEG NFR BLD AUTO: 53 % (ref 43–75)
NRBC BLD AUTO-RTO: 0 /100 WBCS
PLATELET # BLD AUTO: 518 THOUSANDS/UL (ref 149–390)
PMV BLD AUTO: 10.3 FL (ref 8.9–12.7)
POTASSIUM SERPL-SCNC: 4.6 MMOL/L (ref 3.5–5.3)
PROT SERPL-MCNC: 8.2 G/DL (ref 6.4–8.2)
RBC # BLD AUTO: 3.62 MILLION/UL (ref 3.88–5.62)
SODIUM SERPL-SCNC: 137 MMOL/L (ref 136–145)
WBC # BLD AUTO: 9.85 THOUSAND/UL (ref 4.31–10.16)

## 2021-05-07 PROCEDURE — 83615 LACTATE (LD) (LDH) ENZYME: CPT

## 2021-05-07 PROCEDURE — 36415 COLL VENOUS BLD VENIPUNCTURE: CPT

## 2021-05-07 PROCEDURE — 85025 COMPLETE CBC W/AUTO DIFF WBC: CPT

## 2021-05-07 PROCEDURE — 80053 COMPREHEN METABOLIC PANEL: CPT

## 2021-05-10 ENCOUNTER — TELEPHONE (OUTPATIENT)
Dept: HEMATOLOGY ONCOLOGY | Facility: CLINIC | Age: 61
End: 2021-05-10

## 2021-05-10 NOTE — TELEPHONE ENCOUNTER
Appointment Confirmation     Appointment with  Ping Gonsales   Appointment date & time 5-12-21 @ 1:20pm   Location [facility]

## 2021-05-12 ENCOUNTER — OFFICE VISIT (OUTPATIENT)
Dept: HEMATOLOGY ONCOLOGY | Facility: CLINIC | Age: 61
End: 2021-05-12
Payer: COMMERCIAL

## 2021-05-12 VITALS
DIASTOLIC BLOOD PRESSURE: 78 MMHG | SYSTOLIC BLOOD PRESSURE: 140 MMHG | BODY MASS INDEX: 37.62 KG/M2 | TEMPERATURE: 98.4 F | HEIGHT: 68 IN | OXYGEN SATURATION: 96 % | RESPIRATION RATE: 18 BRPM | WEIGHT: 248.2 LBS | HEART RATE: 85 BPM

## 2021-05-12 DIAGNOSIS — Z29.8 CHEMOPREVENTION: ICD-10-CM

## 2021-05-12 DIAGNOSIS — T45.1X5A CHEMOTHERAPY-INDUCED NEUTROPENIA (HCC): ICD-10-CM

## 2021-05-12 DIAGNOSIS — C62.92 SEMINOMA OF TESTIS, STAGE 3, LEFT (HCC): Primary | ICD-10-CM

## 2021-05-12 DIAGNOSIS — D70.1 CHEMOTHERAPY-INDUCED NEUTROPENIA (HCC): ICD-10-CM

## 2021-05-12 PROCEDURE — 99214 OFFICE O/P EST MOD 30 MIN: CPT | Performed by: INTERNAL MEDICINE

## 2021-05-12 NOTE — PROGRESS NOTES
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 with external right iliac lymphadenopathy measuring up to 3 cm and left para-aortic lymphadenopathy measuring up to 2 3 cm (pT2, N2, S1) with persistent elevation of LDH less than 1 5 normal upper limit     Repeat alpha fetoprotein, hCG are normal, repeat  ( )     Received 1st cycle of etoposide/ cisplatin with anti emetics  Protocol, admitted to the hospital after 10 days of therapy with nausea, weakness, was found to have acute renal insufficiency with creatinine of 4, , received IV fluid with gradual improvement, he also developed neutropenia and thrombocytopenia grade 4 requiring platelet transfusion     Currently he is recovering with creatinine 2 2      CT scan showed significant response of chemotherapy in the abdominal lymphadenopathy    Creatinine 1 73     After long discussion, we decided to discontinue cisplatin, will add carboplatin AUC 5 on day 1, continue etoposide 100 milligram/meter squared for 5 days followed by pegfilgrastim    Emend, Zofran, dexamethasone for chemotherapy-induced nausea and vomiting     Compazine at home         Labs and imaging studies are reviewed by ordering provider once results are available  If there are findings that need immediate attention, you will be contacted when results available  Discussing results and the implication on your healthcare is best discussed in person at your follow-up visit         HPI:    Bing Tse is a 40-year-old  male seen initially 4/1/21 regarding stage II B (pT2, 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, S1) left-sided pure seminoma     He has a history of prostate cancer, nephrolithiasis, benign prostatic hypertrophy, polymyalgia, hypertension, sleep apnea, obesity, GERD, hiatal hernia, gout        His prostate cancer was diagnosed on September 2019 with single core with 4 mm focus of prostate cancer with very low risk group  He is on active surveillance with PSA 4 9 on March 2020 followed by Urology  He was found to have enlargement of the left testicle        Ultrasound of the scrotum on 10/24/2020 showed left testicle measuring 7 2 x 3 9 x 5 1 cm with lobulated contour and diffuse heterogeneous echotexture      Repeat ultrasound on 01/20/2021 showed 9 1 x 4 9 x 6 8 cm increase in size compared to prior ultrasound     LDH was elevated at 376 ( ) normal hCG, alpha fetoprotein     Status post left inguinal orchiectomy on 02/19/2021, showing pure seminoma primary measuring 8 2 cm, abuts and invades into but not through tunica albuginea, invades hilar fat and spermatic cord soft tissue with lymphovascular invasion, spermatic cord is not involved by the tumor confirmed by 2nd opinion at [facility]       CT scan of the chest abdomen and pelvis on 03/20/2021 showed enlarged left iliac and left para-aortic lymph nodes for example left para-aortic lymph node measuring 2 3 cm, left external iliac lymph node measuring 30 mm no evidence of metastatic disease in the chest  He had pain at the left orchiectomy site on and off most likely responding to acetaminophen from healing      He reported 12 lb weight loss after the surgery         4/1/21:  cr 1 13, hemoglobin 1 3 3, MCV of 89, white blood cell count 7 75, 51% neutrophils, 33% lymphocytes, 12% monocytes, platelets 094     4 cycles of  Etoposide/ cisplatin per the NCCN guidelines recommended  For chemotherapy-induced neutropenia, pegfilgrastim recommended      The chemotherapy was complicated with dehydration, acute kidney injury requiring admission to the hospital    His creatinine improved to 1 73    CT scan in the hospital showed significant reduction in seminoma with decrease of the abdominal lymphadenopathy    Interval History:        Previous Treatment:         Test Results:    Imaging: Ct Chest Abdomen Pelvis Wo Contrast    Result Date: 4/15/2021  Narrative: CT CHEST, ABDOMEN AND PELVIS WITHOUT IV CONTRAST INDICATION:   chest pain, abdominal pain   + testicular cancer, chest pain   eval pain  COMPARISON:  CT 3/20/2021 TECHNIQUE: CT examination of the chest, abdomen and pelvis was performed without intravenous contrast   Axial, sagittal, and coronal 2D reformatted images were created from the source data and submitted for interpretation  Radiation dose length product (DLP) for this visit:  1496 mGy-cm   This examination, like all CT scans performed in the [facility], was performed utilizing techniques to minimize radiation dose exposure, including the use of iterative reconstruction and automated exposure control  Enteric contrast was not administered  FINDINGS: The study is somewhat limited due to lack of intravenous contrast  CHEST LUNGS:  Lungs are clear  There is no tracheal or endobronchial lesion  PLEURA:  Unremarkable  HEART/GREAT VESSELS:  Unremarkable for patient's age  MEDIASTINUM AND ANNA:  Unremarkable  CHEST WALL AND LOWER NECK:   Stable tiny 6 mm left thyroid nodule again noted, less conspicuous than on the prior exam, and not necessitating additional follow-up  ABDOMEN LIVER/BILIARY TREE:  Diffuse hepatic steatosis is present without focal abnormality GALLBLADDER:  No calcified gallstones  No pericholecystic inflammatory change  SPLEEN:  Unremarkable  PANCREAS:  Unremarkable  ADRENAL GLANDS:  Unremarkable  KIDNEYS/URETERS:  Unremarkable  No hydronephrosis  STOMACH AND BOWEL:  Small hiatal hernia is noted  No bowel wall thickening or intestinal obstruction is appreciated  APPENDIX:  No findings to suggest appendicitis  ABDOMINOPELVIC CAVITY:  There is been interim considerable improvement in previously identified abdominal and pelvic adenopathy    In particular, previously identified retroperitoneal adenopathy in the left para-aortic location has improved with residual enlarged lymph nodes measuring up to 1 0 cm in greatest short axis dimension (previously 2 3 cm), residual left renal hilar adenopathy measuring up to 1 8 cm in greatest short axis dimension (previously 2 0 cm), residual left common iliac adenopathy measuring up to 1 7 cm in greatest short axis dimension (previously 2 3 cm), and residual left external iliac adenopathy measuring up to 1 3 cm in greatest short axis dimension (previously 3 0 cm)  No new adenopathy is appreciated  VESSELS:  Unremarkable for patient's age  PELVIS REPRODUCTIVE ORGANS:  Mild prostatomegaly similar the prior exam  URINARY BLADDER:  Unremarkable  ABDOMINAL WALL/INGUINAL REGIONS:  Postoperative change in the left inguinal region is again noted likely related to previous orchiectomy  OSSEOUS STRUCTURES:  No acute fracture or destructive osseous lesion  Impression: 1  Postoperative change again noted reflecting previous left orchiectomy  2   Predominantly left-sided retroperitoneal abdominal and pelvic adenopathy again noted, significantly improved when compared to prior exam of 3/20/2021, presumably reflecting favorable therapeutic response  3   No acute abnormality identified in the chest, abdomen or pelvis  4   Additional findings as noted  Workstation performed: [id number]     Ct Head Without Contrast    Result Date: 4/15/2021  Narrative: CT BRAIN - WITHOUT CONTRAST INDICATION:   Dizziness, non-specific dizziness   hx of cancer  R/O mets  History of left testicular seminoma COMPARISON:  None  TECHNIQUE:  CT examination of the brain was performed  In addition to axial images, sagittal and coronal 2D reformatted images were created and submitted for interpretation  Radiation dose length product (DLP) for this visit:  892 mGy-cm     This examination, like all CT scans performed in the [facility], was performed utilizing techniques to minimize radiation dose exposure, including the use of iterative reconstruction and automated exposure control  IMAGE QUALITY:  Diagnostic  FINDINGS: PARENCHYMA:  No intracranial mass, mass effect or midline shift  No CT signs of acute infarction  No acute parenchymal hemorrhage  VENTRICLES AND EXTRA-AXIAL SPACES:  Normal for the patient's age  VISUALIZED ORBITS AND PARANASAL SINUSES:  Opacification of a single anterior left ethmoid air cell incidentally noted with otherwise clear paranasal sinuses  Orbits appear unremarkable  CALVARIUM AND EXTRACRANIAL SOFT TISSUES:  Normal      Impression: No acute intracranial abnormality  Workstation performed: [id number]       Labs:   Lab Results   Component Value Date    WBC 9 85 05/07/2021    HGB 10 2 (L) 05/07/2021    HCT 32 2 (L) 05/07/2021    MCV 89 05/07/2021     (H) 05/07/2021     Lab Results   Component Value Date    K 4 6 05/07/2021     05/07/2021    CO2 23 05/07/2021    BUN 25 05/07/2021    CREATININE 1 73 (H) 05/07/2021    GLUF 92 05/07/2021    CALCIUM 9 9 05/07/2021    CORRECTEDCA 10 5 (H) 05/07/2021    AST 25 05/07/2021    ALT 25 05/07/2021    ALKPHOS 75 05/07/2021    EGFR 41 05/07/2021       No results found for: IRON, TIBC, FERRITIN    No results found for: [id number]      ROS: Review of Systems   Constitutional: Negative  Negative for appetite change, chills, diaphoresis, fatigue, fever and unexpected weight change  HENT:   Negative for hearing loss, lump/mass, mouth sores, nosebleeds, sore throat, trouble swallowing and voice change  Eyes: Negative  Negative for eye problems and icterus  Respiratory: Negative  Negative for chest tightness, cough, hemoptysis and shortness of breath  Cardiovascular: Negative for chest pain and leg swelling  Gastrointestinal: Negative for abdominal distention, abdominal pain, blood in stool, constipation, diarrhea and nausea  Endocrine: Negative  Genitourinary: Negative for dysuria, frequency, hematuria and pelvic pain  Musculoskeletal: Negative  Negative for arthralgias, back pain, flank pain, gait problem, myalgias and neck stiffness  Skin: Negative for itching and rash  Neurological: Negative for dizziness, gait problem, headaches, light-headedness, numbness and speech difficulty  Hematological: Negative for adenopathy  Does not bruise/bleed easily  Psychiatric/Behavioral: Negative for confusion, decreased concentration, depression and sleep disturbance  The patient is not nervous/anxious  Current Medications: Reviewed  Allergies: Reviewed  PMH/FH/SH:  Reviewed      Physical Exam:    Body surface area is 2 24 meters squared  Wt Readings from Last 3 Encounters:   05/12/21 113 kg (248 lb 3 2 oz)   04/22/21 113 kg (249 lb)   04/18/21 113 kg (250 lb 3 6 oz)        Temp Readings from Last 3 Encounters:   05/12/21 98 4 °F (36 9 °C) (Tympanic)   04/22/21 98 2 °F (36 8 °C)   04/20/21 97 9 °F (36 6 °C) (Oral)        BP Readings from Last 3 Encounters:   05/12/21 140/78   04/22/21 168/82   04/20/21 123/59         Pulse Readings from Last 3 Encounters:   05/12/21 85   04/22/21 76   04/20/21 61        Physical Exam  Vitals signs reviewed  Constitutional:       General: He is not in acute distress  Appearance: He is well-developed  He is not diaphoretic  HENT:      Head: Normocephalic and atraumatic  Eyes:      Conjunctiva/sclera: Conjunctivae normal    Neck:      Musculoskeletal: Normal range of motion and neck supple  Trachea: No tracheal deviation  Cardiovascular:      Rate and Rhythm: Normal rate and regular rhythm  Heart sounds: No murmur  No friction rub  No gallop  Pulmonary:      Effort: Pulmonary effort is normal  No respiratory distress  Breath sounds: Normal breath sounds  No wheezing or rales  Chest:      Chest wall: No tenderness  Abdominal:      General: There is no distension  Palpations: Abdomen is soft  Tenderness: There is no abdominal tenderness     Musculoskeletal:      Right lower leg: No edema  Left lower leg: No edema  Lymphadenopathy:      Cervical: No cervical adenopathy  Skin:     General: Skin is warm and dry  Coloration: Skin is not pale  Findings: No erythema  Neurological:      Mental Status: He is alert and oriented to person, place, and time  Psychiatric:         Behavior: Behavior normal          Thought Content: Thought content normal          Judgment: Judgment normal          ECO    Goals and Barriers:  Current Goal: Minimize effects of disease  Barriers: None  Patient's Capacity to Self Care:  Patient is able to self care      Code Status: @[facility]@

## 2021-05-13 ENCOUNTER — APPOINTMENT (OUTPATIENT)
Dept: LAB | Facility: CLINIC | Age: 61
End: 2021-05-13
Payer: COMMERCIAL

## 2021-05-13 ENCOUNTER — TRANSCRIBE ORDERS (OUTPATIENT)
Dept: ADMINISTRATIVE | Facility: HOSPITAL | Age: 61
End: 2021-05-13

## 2021-05-13 DIAGNOSIS — T45.1X5A CHEMOTHERAPY-INDUCED NEUTROPENIA (HCC): ICD-10-CM

## 2021-05-13 DIAGNOSIS — D70.1 CHEMOTHERAPY-INDUCED NEUTROPENIA (HCC): ICD-10-CM

## 2021-05-13 DIAGNOSIS — C62.92 SEMINOMA OF TESTIS, STAGE 3, LEFT (HCC): ICD-10-CM

## 2021-05-13 DIAGNOSIS — Z29.8 CHEMOPREVENTION: ICD-10-CM

## 2021-05-13 DIAGNOSIS — N17.9 ACUTE RENAL FAILURE, UNSPECIFIED ACUTE RENAL FAILURE TYPE (HCC): ICD-10-CM

## 2021-05-13 DIAGNOSIS — N17.9 ACUTE RENAL FAILURE, UNSPECIFIED ACUTE RENAL FAILURE TYPE (HCC): Primary | ICD-10-CM

## 2021-05-13 LAB
ALBUMIN SERPL BCP-MCNC: 3.1 G/DL (ref 3.5–5)
ALBUMIN SERPL BCP-MCNC: 3.2 G/DL (ref 3.5–5)
ALP SERPL-CCNC: 79 U/L (ref 46–116)
ALT SERPL W P-5'-P-CCNC: 22 U/L (ref 12–78)
ANION GAP SERPL CALCULATED.3IONS-SCNC: 6 MMOL/L (ref 4–13)
ANION GAP SERPL CALCULATED.3IONS-SCNC: 7 MMOL/L (ref 4–13)
AST SERPL W P-5'-P-CCNC: 18 U/L (ref 5–45)
BASOPHILS # BLD AUTO: 0.16 THOUSANDS/ΜL (ref 0–0.1)
BASOPHILS NFR BLD AUTO: 2 % (ref 0–1)
BILIRUB SERPL-MCNC: 0.41 MG/DL (ref 0.2–1)
BUN SERPL-MCNC: 19 MG/DL (ref 5–25)
BUN SERPL-MCNC: 19 MG/DL (ref 5–25)
CALCIUM ALBUM COR SERPL-MCNC: 10 MG/DL (ref 8.3–10.1)
CALCIUM ALBUM COR SERPL-MCNC: 9.8 MG/DL (ref 8.3–10.1)
CALCIUM SERPL-MCNC: 9.1 MG/DL (ref 8.3–10.1)
CALCIUM SERPL-MCNC: 9.4 MG/DL (ref 8.3–10.1)
CHLORIDE SERPL-SCNC: 108 MMOL/L (ref 100–108)
CHLORIDE SERPL-SCNC: 111 MMOL/L (ref 100–108)
CO2 SERPL-SCNC: 23 MMOL/L (ref 21–32)
CO2 SERPL-SCNC: 26 MMOL/L (ref 21–32)
CREAT SERPL-MCNC: 1.47 MG/DL (ref 0.6–1.3)
CREAT SERPL-MCNC: 1.48 MG/DL (ref 0.6–1.3)
EOSINOPHIL # BLD AUTO: 0.17 THOUSAND/ΜL (ref 0–0.61)
EOSINOPHIL NFR BLD AUTO: 2 % (ref 0–6)
ERYTHROCYTE [DISTWIDTH] IN BLOOD BY AUTOMATED COUNT: 16.5 % (ref 11.6–15.1)
GFR SERPL CREATININE-BSD FRML MDRD: 50 ML/MIN/1.73SQ M
GFR SERPL CREATININE-BSD FRML MDRD: 50 ML/MIN/1.73SQ M
GLUCOSE P FAST SERPL-MCNC: 167 MG/DL (ref 65–99)
GLUCOSE P FAST SERPL-MCNC: 171 MG/DL (ref 65–99)
HCT VFR BLD AUTO: 32 % (ref 36.5–49.3)
HGB BLD-MCNC: 10.1 G/DL (ref 12–17)
IMM GRANULOCYTES # BLD AUTO: 0.11 THOUSAND/UL (ref 0–0.2)
IMM GRANULOCYTES NFR BLD AUTO: 1 % (ref 0–2)
LYMPHOCYTES # BLD AUTO: 2.75 THOUSANDS/ΜL (ref 0.6–4.47)
LYMPHOCYTES NFR BLD AUTO: 27 % (ref 14–44)
MCH RBC QN AUTO: 28.1 PG (ref 26.8–34.3)
MCHC RBC AUTO-ENTMCNC: 31.6 G/DL (ref 31.4–37.4)
MCV RBC AUTO: 89 FL (ref 82–98)
MONOCYTES # BLD AUTO: 1 THOUSAND/ΜL (ref 0.17–1.22)
MONOCYTES NFR BLD AUTO: 10 % (ref 4–12)
NEUTROPHILS # BLD AUTO: 5.96 THOUSANDS/ΜL (ref 1.85–7.62)
NEUTS SEG NFR BLD AUTO: 58 % (ref 43–75)
NRBC BLD AUTO-RTO: 0 /100 WBCS
PHOSPHATE SERPL-MCNC: 2.5 MG/DL (ref 2.3–4.1)
PLATELET # BLD AUTO: 391 THOUSANDS/UL (ref 149–390)
PMV BLD AUTO: 10.2 FL (ref 8.9–12.7)
POTASSIUM SERPL-SCNC: 4.3 MMOL/L (ref 3.5–5.3)
POTASSIUM SERPL-SCNC: 4.4 MMOL/L (ref 3.5–5.3)
PROT SERPL-MCNC: 7.7 G/DL (ref 6.4–8.2)
RBC # BLD AUTO: 3.59 MILLION/UL (ref 3.88–5.62)
SODIUM SERPL-SCNC: 140 MMOL/L (ref 136–145)
SODIUM SERPL-SCNC: 141 MMOL/L (ref 136–145)
WBC # BLD AUTO: 10.15 THOUSAND/UL (ref 4.31–10.16)

## 2021-05-13 PROCEDURE — 80053 COMPREHEN METABOLIC PANEL: CPT

## 2021-05-13 PROCEDURE — 85025 COMPLETE CBC W/AUTO DIFF WBC: CPT

## 2021-05-13 PROCEDURE — 36415 COLL VENOUS BLD VENIPUNCTURE: CPT

## 2021-05-13 PROCEDURE — 80069 RENAL FUNCTION PANEL: CPT

## 2021-05-13 RX ORDER — SODIUM CHLORIDE 9 MG/ML
20 INJECTION, SOLUTION INTRAVENOUS ONCE
Status: CANCELLED | OUTPATIENT
Start: 2021-05-20

## 2021-05-13 RX ORDER — SODIUM CHLORIDE 9 MG/ML
20 INJECTION, SOLUTION INTRAVENOUS ONCE
Status: CANCELLED | OUTPATIENT
Start: 2021-05-21

## 2021-05-13 RX ORDER — SODIUM CHLORIDE 9 MG/ML
20 INJECTION, SOLUTION INTRAVENOUS ONCE
Status: CANCELLED | OUTPATIENT
Start: 2021-05-19

## 2021-05-13 RX ORDER — SODIUM CHLORIDE 9 MG/ML
20 INJECTION, SOLUTION INTRAVENOUS ONCE
Status: CANCELLED | OUTPATIENT
Start: 2021-05-18

## 2021-05-13 RX ORDER — SODIUM CHLORIDE 9 MG/ML
20 INJECTION, SOLUTION INTRAVENOUS ONCE
Status: CANCELLED | OUTPATIENT
Start: 2021-05-17

## 2021-05-14 ENCOUNTER — OFFICE VISIT (OUTPATIENT)
Dept: UROLOGY | Facility: MEDICAL CENTER | Age: 61
End: 2021-05-14

## 2021-05-14 VITALS
HEIGHT: 69 IN | HEART RATE: 61 BPM | BODY MASS INDEX: 36.88 KG/M2 | WEIGHT: 249 LBS | SYSTOLIC BLOOD PRESSURE: 150 MMHG | DIASTOLIC BLOOD PRESSURE: 76 MMHG

## 2021-05-14 DIAGNOSIS — Z12.5 SPECIAL SCREENING FOR MALIGNANT NEOPLASM OF PROSTATE: Primary | ICD-10-CM

## 2021-05-14 DIAGNOSIS — C62.12 SEMINOMA OF DESCENDED LEFT TESTIS (HCC): ICD-10-CM

## 2021-05-14 DIAGNOSIS — C61 PROSTATE CANCER (HCC): ICD-10-CM

## 2021-05-14 PROCEDURE — 99024 POSTOP FOLLOW-UP VISIT: CPT | Performed by: UROLOGY

## 2021-05-14 NOTE — PROGRESS NOTES
Assessment/Plan:    Seminoma of descended left testis [facility])  The patient is having a difficult time with his chemotherapy  He is responding, however  This is being managed by the Hematology-oncology service  Prostate cancer [facility])  The patient is on active surveillance  Return for re-evaluation following completion of and and recovery from chemotherapy  Diagnoses and all orders for this visit:    Special screening for malignant neoplasm of prostate  -     PSA, Total Screen; Future    Seminoma of descended left testis [facility])    Prostate cancer ([facility] [address] )          Subjective:      Patient ID: Maryclare Collet is a 57 y o  male  HPI  Testicular cancer: The patient is having a difficult time with his chemotherapy and side effects  Pt will have 4 cycles, 1 down and 3 to go  Chemo is being changed to help pt tolerate it better  He should be done the first week in July  Hospitalized from April 15-20 for about a week for dehydration and JULIO C  Prostate cancer on active surveillance:   The patient's biopsy on September 24, 2019 showed a single core with a 4 mm stretch of prostate cancer   Per NCCN guidelines version 2 2019, the patient fell into the very low risk group   Per guidelines on page prostate 4, he fit comfortably into the category for active surveillance  The patient is due for his regular prostate cancer evaluation now however he is having a difficult time with the chemotherapy for his stage II B testicular cancer  He notes nocturia x 2  Pt has been pushing fluids for his chemo  He denies other significant urinary symptoms  He denies gross hematuria, urinary tract infections or incontinence  He is taking neither medications nor supplements for his symptoms  PSA:  [  0   Lab Value Date/Time    PSA 4 9 (H) 03/25/2020 0740    PSA 5 5 (H) 07/26/2019 1925    PSA 7 7 (H) 06/19/2019 1302   ]      The patient declined digital rectal exam today because he just does not feel well    He will return in September after completing chemotherapy for a thorough annual prostate evaluation  The following portions of the patient's history were reviewed and updated as appropriate: allergies, current medications, past family history, past medical history, past social history, past surgical history and problem list     Review of Systems    Constitutional: Negative for activity change and fatigue  Respiratory: Negative for shortness of breath and wheezing     Cardiovascular: Negative for chest pain         Hypertension    Gastrointestinal: Negative for abdominal pain  Endocrine:        Non-insulin dependent diabetes   Hemoglobin A1c's run in the low sevens   Taking metformin to counteract the Prednisone he is taking for polymyalgia rheumatica   He is tapering off it    Genitourinary: Negative for difficulty urinating, dysuria, frequency, hematuria and urgency          Known punctate renal stones bilaterally    Musculoskeletal: Negative for back pain and gait problem         Polymyalgia rheumatica    Skin: Negative     Allergic/Immunologic: Negative     Neurological: Negative     Psychiatric/Behavioral: Negative    Objective:      /76   Pulse 61   Ht 5' 8 5" (1 74 m)   Wt 113 kg (249 lb)   BMI 37 31 kg/m²          Physical Exam  Constitutional:       Appearance: He is well-developed  HENT:      Head: Normocephalic and atraumatic  Neck:      Musculoskeletal: Normal range of motion  Pulmonary:      Effort: Pulmonary effort is normal    Musculoskeletal: Normal range of motion  Skin:     General: Skin is warm and dry  Neurological:      Mental Status: He is alert and oriented to person, place, and time  Psychiatric:         Behavior: Behavior normal          Thought Content:  Thought content normal          Judgment: Judgment normal

## 2021-05-17 ENCOUNTER — HOSPITAL ENCOUNTER (OUTPATIENT)
Dept: INFUSION CENTER | Facility: CLINIC | Age: 61
Discharge: HOME | End: 2021-05-17
Payer: COMMERCIAL

## 2021-05-17 VITALS
RESPIRATION RATE: 18 BRPM | HEART RATE: 78 BPM | WEIGHT: 252.43 LBS | DIASTOLIC BLOOD PRESSURE: 77 MMHG | BODY MASS INDEX: 38.26 KG/M2 | TEMPERATURE: 98.6 F | SYSTOLIC BLOOD PRESSURE: 147 MMHG | HEIGHT: 68 IN

## 2021-05-17 DIAGNOSIS — C62.92 SEMINOMA OF TESTIS, STAGE 3, LEFT (HCC): ICD-10-CM

## 2021-05-17 DIAGNOSIS — D70.1 CHEMOTHERAPY-INDUCED NEUTROPENIA (HCC): Primary | ICD-10-CM

## 2021-05-17 DIAGNOSIS — Z29.8 CHEMOPREVENTION: Primary | ICD-10-CM

## 2021-05-17 DIAGNOSIS — T45.1X5A CHEMOTHERAPY-INDUCED NEUTROPENIA (HCC): Primary | ICD-10-CM

## 2021-05-17 DIAGNOSIS — D70.1 CHEMOTHERAPY-INDUCED NEUTROPENIA (HCC): ICD-10-CM

## 2021-05-17 DIAGNOSIS — T45.1X5A CHEMOTHERAPY-INDUCED NEUTROPENIA (HCC): ICD-10-CM

## 2021-05-17 PROCEDURE — 96413 CHEMO IV INFUSION 1 HR: CPT

## 2021-05-17 PROCEDURE — 96367 TX/PROPH/DG ADDL SEQ IV INF: CPT

## 2021-05-17 PROCEDURE — 96417 CHEMO IV INFUS EACH ADDL SEQ: CPT

## 2021-05-17 RX ORDER — SODIUM CHLORIDE 9 MG/ML
20 INJECTION, SOLUTION INTRAVENOUS ONCE
Status: COMPLETED | OUTPATIENT
Start: 2021-05-17 | End: 2021-05-17

## 2021-05-17 RX ADMIN — FOSAPREPITANT 150 MG: 150 INJECTION, POWDER, LYOPHILIZED, FOR SOLUTION INTRAVENOUS at 12:22

## 2021-05-17 RX ADMIN — DEXAMETHASONE SODIUM PHOSPHATE: 10 INJECTION, SOLUTION INTRAMUSCULAR; INTRAVENOUS at 11:57

## 2021-05-17 RX ADMIN — CARBOPLATIN 440.5 MG: 10 INJECTION, SOLUTION INTRAVENOUS at 14:31

## 2021-05-17 RX ADMIN — SODIUM CHLORIDE 20 ML/HR: 0.9 INJECTION, SOLUTION INTRAVENOUS at 11:57

## 2021-05-17 RX ADMIN — SODIUM CHLORIDE 224 MG: 0.9 INJECTION, SOLUTION INTRAVENOUS at 13:15

## 2021-05-17 NOTE — PLAN OF CARE
Problem: Potential for Falls  Goal: Patient will remain free of falls  Description: INTERVENTIONS:  - Assess patient frequently for physical needs  -  Identify cognitive and physical deficits and behaviors that affect risk of falls    -  [place] fall precautions as indicated by assessment   - Educate patient/family on patient safety including physical limitations  - Instruct patient to call for assistance with activity based on assessment  - Modify environment to reduce risk of injury  - Consider OT/PT consult to assist with strengthening/mobility  Outcome: Progressing

## 2021-05-17 NOTE — PROGRESS NOTES
Pt tolerated treatment today without incident  Pt was provided with AVS and is aware of his appt tomorrow  Pt requested to keep peripheral IV in place for tomorrow

## 2021-05-18 ENCOUNTER — HOSPITAL ENCOUNTER (OUTPATIENT)
Dept: INFUSION CENTER | Facility: CLINIC | Age: 61
Discharge: HOME | End: 2021-05-18
Payer: COMMERCIAL

## 2021-05-18 VITALS
TEMPERATURE: 98.1 F | DIASTOLIC BLOOD PRESSURE: 73 MMHG | RESPIRATION RATE: 18 BRPM | WEIGHT: 252.43 LBS | HEIGHT: 68 IN | BODY MASS INDEX: 38.26 KG/M2 | HEART RATE: 69 BPM | SYSTOLIC BLOOD PRESSURE: 152 MMHG

## 2021-05-18 DIAGNOSIS — T45.1X5A CHEMOTHERAPY-INDUCED NEUTROPENIA (HCC): ICD-10-CM

## 2021-05-18 DIAGNOSIS — D70.1 CHEMOTHERAPY-INDUCED NEUTROPENIA (HCC): ICD-10-CM

## 2021-05-18 DIAGNOSIS — C62.92 SEMINOMA OF TESTIS, STAGE 3, LEFT (HCC): ICD-10-CM

## 2021-05-18 DIAGNOSIS — Z29.8 CHEMOPREVENTION: Primary | ICD-10-CM

## 2021-05-18 PROCEDURE — 96367 TX/PROPH/DG ADDL SEQ IV INF: CPT

## 2021-05-18 PROCEDURE — 96413 CHEMO IV INFUSION 1 HR: CPT

## 2021-05-18 RX ORDER — SODIUM CHLORIDE 9 MG/ML
20 INJECTION, SOLUTION INTRAVENOUS ONCE
Status: COMPLETED | OUTPATIENT
Start: 2021-05-18 | End: 2021-05-18

## 2021-05-18 RX ADMIN — SODIUM CHLORIDE 224 MG: 0.9 INJECTION, SOLUTION INTRAVENOUS at 13:36

## 2021-05-18 RX ADMIN — SODIUM CHLORIDE 20 ML/HR: 0.9 INJECTION, SOLUTION INTRAVENOUS at 13:08

## 2021-05-18 RX ADMIN — DEXAMETHASONE SODIUM PHOSPHATE: 10 INJECTION, SOLUTION INTRAMUSCULAR; INTRAVENOUS at 13:08

## 2021-05-18 NOTE — PLAN OF CARE
Problem: Potential for Falls  Goal: Patient will remain free of falls  Description: INTERVENTIONS:  - Assess patient frequently for physical needs  -  Identify cognitive and physical deficits and behaviors that affect risk of falls    -  [place] fall precautions as indicated by assessment   - Educate patient/family on patient safety including physical limitations  - Instruct patient to call for assistance with activity based on assessment  - Modify environment to reduce risk of injury  - Consider OT/PT consult to assist with strengthening/mobility  Outcome: Progressing

## 2021-05-18 NOTE — PROGRESS NOTES
Pt tolerated treatment today without incident  Pt declined AVS but is aware of appt time for tomorrow

## 2021-05-19 ENCOUNTER — HOSPITAL ENCOUNTER (OUTPATIENT)
Dept: INFUSION CENTER | Facility: CLINIC | Age: 61
Discharge: HOME | End: 2021-05-19
Payer: COMMERCIAL

## 2021-05-19 VITALS
TEMPERATURE: 98.1 F | WEIGHT: 258.38 LBS | HEART RATE: 57 BPM | RESPIRATION RATE: 18 BRPM | BODY MASS INDEX: 39.16 KG/M2 | HEIGHT: 68 IN | SYSTOLIC BLOOD PRESSURE: 133 MMHG | DIASTOLIC BLOOD PRESSURE: 74 MMHG

## 2021-05-19 DIAGNOSIS — D70.1 CHEMOTHERAPY-INDUCED NEUTROPENIA (HCC): ICD-10-CM

## 2021-05-19 DIAGNOSIS — T45.1X5A CHEMOTHERAPY-INDUCED NEUTROPENIA (HCC): ICD-10-CM

## 2021-05-19 DIAGNOSIS — C62.12 SEMINOMA OF DESCENDED LEFT TESTIS (HCC): ICD-10-CM

## 2021-05-19 DIAGNOSIS — C62.12 SEMINOMA OF DESCENDED LEFT TESTIS (HCC): Primary | ICD-10-CM

## 2021-05-19 DIAGNOSIS — Z29.8 CHEMOPREVENTION: Primary | ICD-10-CM

## 2021-05-19 DIAGNOSIS — C62.92 SEMINOMA OF TESTIS, STAGE 3, LEFT (HCC): ICD-10-CM

## 2021-05-19 LAB
ALBUMIN SERPL BCP-MCNC: 3 G/DL (ref 3.5–5.7)
ALP SERPL-CCNC: 62 U/L (ref 46–116)
ALT SERPL W P-5'-P-CCNC: 16 U/L (ref 12–78)
ANION GAP SERPL CALCULATED.3IONS-SCNC: 11 MMOL/L (ref 4–13)
AST SERPL W P-5'-P-CCNC: 21 U/L (ref 5–45)
BILIRUB SERPL-MCNC: 0.7 MG/DL (ref 0.2–1)
BUN SERPL-MCNC: 26 MG/DL (ref 5–25)
CALCIUM ALBUM COR SERPL-MCNC: 9.8 MG/DL (ref 8.3–10.1)
CALCIUM SERPL-MCNC: 9 MG/DL (ref 8.3–10.1)
CHLORIDE SERPL-SCNC: 110 MMOL/L (ref 98–108)
CO2 SERPL-SCNC: 23 MMOL/L (ref 21–32)
CREAT SERPL-MCNC: 1.4 MG/DL (ref 0.6–1.3)
GFR SERPL CREATININE-BSD FRML MDRD: 53 ML/MIN/1.73SQ M
GLUCOSE SERPL-MCNC: 116 MG/DL (ref 65–140)
NT-PROBNP SERPL-MCNC: 1978 PG/ML
POTASSIUM SERPL-SCNC: 5.1 MMOL/L (ref 3.5–5.3)
PROT SERPL-MCNC: 6.9 G/DL (ref 6.4–8.2)
SODIUM SERPL-SCNC: 144 MMOL/L (ref 136–145)

## 2021-05-19 PROCEDURE — 96367 TX/PROPH/DG ADDL SEQ IV INF: CPT

## 2021-05-19 PROCEDURE — 96413 CHEMO IV INFUSION 1 HR: CPT

## 2021-05-19 PROCEDURE — 80053 COMPREHEN METABOLIC PANEL: CPT | Performed by: INTERNAL MEDICINE

## 2021-05-19 PROCEDURE — 83880 ASSAY OF NATRIURETIC PEPTIDE: CPT | Performed by: INTERNAL MEDICINE

## 2021-05-19 RX ORDER — SODIUM CHLORIDE 9 MG/ML
20 INJECTION, SOLUTION INTRAVENOUS ONCE
Status: COMPLETED | OUTPATIENT
Start: 2021-05-19 | End: 2021-05-19

## 2021-05-19 RX ADMIN — SODIUM CHLORIDE 20 ML/HR: 0.9 INJECTION, SOLUTION INTRAVENOUS at 13:53

## 2021-05-19 RX ADMIN — DEXAMETHASONE SODIUM PHOSPHATE: 10 INJECTION, SOLUTION INTRAMUSCULAR; INTRAVENOUS at 13:53

## 2021-05-19 RX ADMIN — SODIUM CHLORIDE 224 MG: 0.9 INJECTION, SOLUTION INTRAVENOUS at 14:19

## 2021-05-19 NOTE — PROGRESS NOTES
Patient arrived to the unit and denied infection  Patient was a difficult stick  A second nurse was brought in for an IV  Patient did gain about 6 lbs since Monday  Informed Dr Kera Man nurse  Additional labs were drawn  Patient tolerated his treatment well without adverse affects and is aware to return tomorrow

## 2021-05-20 ENCOUNTER — HOSPITAL ENCOUNTER (OUTPATIENT)
Dept: INFUSION CENTER | Facility: CLINIC | Age: 61
Discharge: HOME | End: 2021-05-20
Payer: COMMERCIAL

## 2021-05-20 ENCOUNTER — PATIENT OUTREACH (OUTPATIENT)
Dept: CASE MANAGEMENT | Facility: HOSPITAL | Age: 61
End: 2021-05-20

## 2021-05-20 VITALS
SYSTOLIC BLOOD PRESSURE: 145 MMHG | HEIGHT: 68 IN | DIASTOLIC BLOOD PRESSURE: 73 MMHG | TEMPERATURE: 99 F | RESPIRATION RATE: 18 BRPM | WEIGHT: 258.38 LBS | HEART RATE: 62 BPM | BODY MASS INDEX: 39.16 KG/M2

## 2021-05-20 DIAGNOSIS — D70.1 CHEMOTHERAPY-INDUCED NEUTROPENIA (HCC): ICD-10-CM

## 2021-05-20 DIAGNOSIS — C62.92 SEMINOMA OF TESTIS, STAGE 3, LEFT (HCC): ICD-10-CM

## 2021-05-20 DIAGNOSIS — Z29.8 CHEMOPREVENTION: Primary | ICD-10-CM

## 2021-05-20 DIAGNOSIS — T45.1X5A CHEMOTHERAPY-INDUCED NEUTROPENIA (HCC): ICD-10-CM

## 2021-05-20 PROCEDURE — 96413 CHEMO IV INFUSION 1 HR: CPT

## 2021-05-20 PROCEDURE — 96367 TX/PROPH/DG ADDL SEQ IV INF: CPT

## 2021-05-20 RX ORDER — SODIUM CHLORIDE 9 MG/ML
20 INJECTION, SOLUTION INTRAVENOUS ONCE
Status: COMPLETED | OUTPATIENT
Start: 2021-05-20 | End: 2021-05-20

## 2021-05-20 RX ADMIN — SODIUM CHLORIDE 224 MG: 0.9 INJECTION, SOLUTION INTRAVENOUS at 13:54

## 2021-05-20 RX ADMIN — DEXAMETHASONE SODIUM PHOSPHATE: 10 INJECTION, SOLUTION INTRAMUSCULAR; INTRAVENOUS at 13:26

## 2021-05-20 RX ADMIN — SODIUM CHLORIDE 20 ML/HR: 0.9 INJECTION, SOLUTION INTRAVENOUS at 13:26

## 2021-05-20 NOTE — PROGRESS NOTES
Met with Jose Bowles today during infusion  He stated he had a hospitalization for about 1 week after his first cycle  His doctors adjusted his chemotherapy and he hopes he is able to tolerate this cycle easier  Jose Bowles was approved for SSI and received his first check last week  He is happy he has this coverage while he is out of work  He shared he has full intentions of returning to his job when he is better and projects this to be around Labor Day  No other concerns related to coping or financial stressors at this time  LSW will follow up in a few weeks

## 2021-05-21 ENCOUNTER — OFFICE VISIT (OUTPATIENT)
Dept: NEPHROLOGY | Facility: CLINIC | Age: 61
End: 2021-05-21
Payer: COMMERCIAL

## 2021-05-21 ENCOUNTER — HOSPITAL ENCOUNTER (OUTPATIENT)
Dept: INFUSION CENTER | Facility: CLINIC | Age: 61
Discharge: HOME | End: 2021-05-21
Payer: COMMERCIAL

## 2021-05-21 VITALS
RESPIRATION RATE: 16 BRPM | TEMPERATURE: 97.7 F | HEIGHT: 68 IN | SYSTOLIC BLOOD PRESSURE: 163 MMHG | WEIGHT: 258.82 LBS | BODY MASS INDEX: 39.23 KG/M2 | HEART RATE: 59 BPM | DIASTOLIC BLOOD PRESSURE: 83 MMHG

## 2021-05-21 VITALS
HEIGHT: 68 IN | SYSTOLIC BLOOD PRESSURE: 138 MMHG | DIASTOLIC BLOOD PRESSURE: 78 MMHG | WEIGHT: 259 LBS | BODY MASS INDEX: 39.25 KG/M2 | HEART RATE: 68 BPM | RESPIRATION RATE: 16 BRPM

## 2021-05-21 DIAGNOSIS — N17.9 AKI (ACUTE KIDNEY INJURY) (HCC): ICD-10-CM

## 2021-05-21 DIAGNOSIS — I10 ESSENTIAL HYPERTENSION: ICD-10-CM

## 2021-05-21 DIAGNOSIS — Z29.8 CHEMOPREVENTION: Primary | ICD-10-CM

## 2021-05-21 DIAGNOSIS — D70.1 CHEMOTHERAPY-INDUCED NEUTROPENIA (HCC): ICD-10-CM

## 2021-05-21 DIAGNOSIS — C61 PROSTATE CANCER (HCC): ICD-10-CM

## 2021-05-21 DIAGNOSIS — Z99.89 OSA ON CPAP: ICD-10-CM

## 2021-05-21 DIAGNOSIS — M35.3 POLYMYALGIA RHEUMATICA SYNDROME (HCC): ICD-10-CM

## 2021-05-21 DIAGNOSIS — G47.33 OSA ON CPAP: ICD-10-CM

## 2021-05-21 DIAGNOSIS — C62.92 SEMINOMA OF TESTIS, STAGE 3, LEFT (HCC): ICD-10-CM

## 2021-05-21 DIAGNOSIS — T45.1X5A CHEMOTHERAPY-INDUCED NEUTROPENIA (HCC): ICD-10-CM

## 2021-05-21 DIAGNOSIS — N20.0 CALCULUS OF KIDNEY: ICD-10-CM

## 2021-05-21 DIAGNOSIS — K21.9 GASTROESOPHAGEAL REFLUX DISEASE, UNSPECIFIED WHETHER ESOPHAGITIS PRESENT: ICD-10-CM

## 2021-05-21 DIAGNOSIS — N18.31 STAGE 3A CHRONIC KIDNEY DISEASE (HCC): Primary | ICD-10-CM

## 2021-05-21 PROCEDURE — 96413 CHEMO IV INFUSION 1 HR: CPT

## 2021-05-21 PROCEDURE — 96375 TX/PRO/DX INJ NEW DRUG ADDON: CPT

## 2021-05-21 PROCEDURE — 99214 OFFICE O/P EST MOD 30 MIN: CPT | Performed by: NURSE PRACTITIONER

## 2021-05-21 PROCEDURE — 96377 APPLICATON ON-BODY INJECTOR: CPT

## 2021-05-21 RX ORDER — SODIUM CHLORIDE 9 MG/ML
20 INJECTION, SOLUTION INTRAVENOUS ONCE
Status: COMPLETED | OUTPATIENT
Start: 2021-05-21 | End: 2021-05-21

## 2021-05-21 RX ADMIN — SODIUM CHLORIDE 224 MG: 0.9 INJECTION, SOLUTION INTRAVENOUS at 11:47

## 2021-05-21 RX ADMIN — SODIUM CHLORIDE 20 ML/HR: 0.9 INJECTION, SOLUTION INTRAVENOUS at 11:15

## 2021-05-21 RX ADMIN — PEGFILGRASTIM 6 MG: KIT SUBCUTANEOUS at 13:00

## 2021-05-21 RX ADMIN — DEXAMETHASONE SODIUM PHOSPHATE: 10 INJECTION, SOLUTION INTRAMUSCULAR; INTRAVENOUS at 11:32

## 2021-05-21 NOTE — PROGRESS NOTES
OFFICE FOLLOW UP - Nephrology   Rubia Smith 57 y o  male MRN: [id number]       ASSESSMENT and PLAN:  Diagnoses and all orders for this visit:    Stage 3a chronic kidney disease ([place] [address] )  -     Basic metabolic panel; Future  -     CBC; Future  -     Urinalysis with microscopic; Future  -     Protein / creatinine ratio, urine; Future  -     Phosphorus; Future  -     Magnesium; Future  -     PTH, intact; Future    Gastroesophageal reflux disease, unspecified whether esophagitis present    SIMON on CPAP    Essential hypertension    JULIO C (acute kidney injury) ([place] [address] )  -     Basic metabolic panel; Future  -     Ambulatory referral to Nephrology    Calculus of kidney    Prostate cancer ([place] [address] )    Polymyalgia rheumatica syndrome ([place] [address] )      Recent acute kidney injury on suspected CKD IIIA:  Suspected secondary to chemotherapy agent, previously on cisplatin, as well as prerenal component with diuretic use  Previous baseline creatinine 1 0-1 1  Peak creatinine was 4 29 on 04/15  Creatinine was 2 5 on day of admission  ACE-inhibitor and  Diuretic were held at discharge  UA while hospitalized showed +1 protein, 2-4 hyaline casts, negative for blood  Most recent renal imaging negative for hydro  -  Most recent creatinine:  1 4   -  May need higher baseline around 1 4   -  Electrolytes remains stable with sodium of 144 potassium of 5 1   -  Recommend to continue to avoid nephrotoxic agents including ibuprofen, Motrin, Advil, Aleve, and IV contrast   - continue to hydrate with water  - check labs and urine studies prior to next appointment  Hypertension:  ACE-inhibitor and diuretic held at discharge due to elevated creatinine  BP today 138/78  -  Currently off of medications  -  Goal systolic blood pressure less than 140/90   -  Recommend low-salt diet  -  May take diuretic as needed  - Please check BP daily, call office if > 140/90 mmHg  Will old off on starting ACE-I at this time due to chemo   If remains elevated, will start amlodipine daily  BPH:  Currently denies any urinary symptoms  Following with Dr Pro Reza  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  Testicle with external right iliac lymphadenopathy and left para-aortic lymphadenopathy  Following with Oncology  Also following with Urology  Plan for 4 cycles of treatment with completion in the 1st week in July  -  Completed 2nd treatment today  -  Receiving treatment with etoposide and carboplatin  GERD: continues on Dexilant  Obesity:    Recommend continued weight loss efforts  Other: SIMON on CPAP, polymyalgia rheumatica on low-dose prednisone until the end of the month, pancytopenia secondary to chemotherapy  Patient will follow up in 3-4 months  with available provider  Age related screening: completed in [id number], unsure  Your primary caregiver may do yearly screening for colorectal cancer  It is recommended in all men and women over 48years old  You may have screening earlier if you have colon disease or a family history of colorectal cancer  HPI: Lucien Paris is a 57 y o  male  with past medical history of testicular cancer, polymyalgia rheumatica, SIMON on CPAP, chemotherapy-induced neutropenia, GERD, obesity, hypertension,  And BPH, who is here for acute kidney injury hospital follow-up  Per record review, the patient was hospitalized from 04/15 to 4/20 and was found to have acute kidney injury secondary to dehydration  His creatinine slowly improved with hydration  He previously was receiving chemotherapy with cisplatin and etoposide  His cisplatin was discontinued and he is currently on carboplatin  Patient reports he is doing well  He received his 2nd course of chemotherapy today  He denies any recent illness or hospitalizations since April  He denies any fevers or chills  He denies chest pain or shortness of breath  He denies nausea, vomiting, diarrhea, or changes to his urination    He has improved appetite and is drinking approximately 1 gal of water per day  He denies the use of any NSAIDs  He does have some lower extremity edema  He does not check his blood pressure daily at but we discussed this today  He is following a low-salt diet  ROS:   A complete review of systems was done  Pertinent positives and negatives as noted in the HPI, otherwise the review of systems is negative  Allergies: Patient has no known allergies  Medications:   Current Outpatient Medications:     allopurinol (ZYLOPRIM) 100 mg tablet, allopurinol 100 mg tablet, Disp: , Rfl:     ASPIRIN EC PO, Take 81 mg by mouth, Disp: , Rfl:     dexlansoprazole (DEXILANT) 60 MG capsule, Dexilant 60 mg capsule, delayed release, Disp: , Rfl:     PREDNISONE PO, Take 1 mg by mouth daily 1mg daily in April  0 5mg daily in May, Disp: , Rfl:     Cholecalciferol (VITAMIN D3) 2000 units capsule, Take 1 capsule by mouth daily, Disp: , Rfl:     rosuvastatin (CRESTOR) 20 MG tablet, Take 20 mg by mouth daily at bedtime , Disp: , Rfl:     sitaGLIPtin (JANUVIA) 50 mg tablet, Take 1 tablet (50 mg total) by mouth daily, Disp: 30 tablet, Rfl: 0  No current facility-administered medications for this visit       Facility-Administered Medications Ordered in Other Visits:     pegfilgrastim (NEULASTA ONPRO) subcutaneous injection kit 6 mg, 6 mg, Subcutaneous, Once, Libia Franklin MD    Past Medical History:   Diagnosis Date    BPH without obstruction/lower urinary tract symptoms     Cancer ([facility] [address] )     prostate    CPAP (continuous positive airway pressure) dependence     Elevated PSA     GERD (gastroesophageal reflux disease)     Gout     Hyperlipidemia     Hypertension     Kidney stone     Obese abdomen     Obesity     Polymyalgia (HCC)     Prostate cancer (HCC)     Rash     under both arms and in between legs - family doctor aware - pt uses Desitin    Sleep apnea     Testicular carcinoma ([facility] [address] )     Urinary frequency     Urinary urgency     Wears glasses      Past Surgical History:   Procedure Laterality Date    CARPAL TUNNEL RELEASE Bilateral 07/2020    COLONOSCOPY      CYSTOSCOPY W/ LASER LITHOTRIPSY  2001    CYSTOSTOMY W/ STENT INSERTION  2011    ESOPHAGOGASTRODUODENOSCOPY      EXTRACORPOREAL SHOCK WAVE LITHOTRIPSY Right 2011    HERNIA REPAIR      KNEE ARTHROSCOPY Left     CA CYSTO/URETERO W/LITHOTRIPSY &INDWELL STENT INSRT Right 4/29/2018    Procedure: CYSTOSCOPY URETEROSCOPY, RETROGRADE PYELOGRAM AND INSERTION STENT URETERAL;  Surgeon: Geraldine Inman MD;  [facility];  Service: Urology    CA REMOVAL TESTIS,RADICAL Left 2/19/2021    Procedure: Radical  ORCHIECTOMY;  Surgeon: Walter Banegas MD;  [facility];  Service: Urology     Family History   Problem Relation Age of Onset    Nephrolithiasis Father     Alzheimer's disease Mother       reports that he has never smoked  He has never used smokeless tobacco  He reports that he does not drink alcohol or use drugs  Physical Exam:   Vitals:    05/21/21 1338   BP: 138/78   BP Location: Left arm   Patient Position: Sitting   Cuff Size: Large   Pulse: 68   Resp: 16   Weight: 117 kg (259 lb)   Height: 5' 7 99" (1 727 m)     Body mass index is 39 39 kg/m²      General: no acute distress   Eyes: conjunctivae pink, anicteric sclerae  ENT: mucous membranes moist  Neck: supple, no JVD  Chest: clear to auscultation bilaterally with no wheezes, rale or rhochi  CVS: regular rate and rhythm   Abdomen: soft, non-tender, non-distended, obese  Extremities: B/L lower extremity edema   Skin: no rash  Neuro: awake and alert       Lab Results:  Results for orders placed or performed during the hospital encounter of 05/19/21   Comprehensive metabolic panel   Result Value Ref Range    Sodium 144 136 - 145 mmol/L    Potassium 5 1 3 5 - 5 3 mmol/L    Chloride 110 (H) 98 - 108 mmol/L    CO2 23 21 - 32 mmol/L    ANION GAP 11 4 - 13 mmol/L    BUN 26 (H) 5 - 25 mg/dL Creatinine 1 40 (H) 0 60 - 1 30 mg/dL    Glucose 116 65 - 140 mg/dL    Calcium 9 0 8 3 - 10 1 mg/dL    Corrected Calcium 9 8 8 3 - 10 1 mg/dL    AST 21 5 - 45 U/L    ALT 16 12 - 78 U/L    Alkaline Phosphatase 62 46 - 116 U/L    Total Protein 6 9 6 4 - 8 2 g/dL    Albumin 3 0 (L) 3 5 - 5 7 g/dL    Total Bilirubin 0 70 0 20 - 1 00 mg/dL    eGFR 53 ml/min/1 73sq m   NT-BNP PRO   Result Value Ref Range    NT-proBNP 1,978 (H) <125 pg/mL       Results from last 7 days   Lab Units 05/19/21  1350   POTASSIUM mmol/L 5 1   CHLORIDE mmol/L 110*   CO2 mmol/L 23   BUN mg/dL 26*   CREATININE mg/dL 1 40*   CALCIUM mg/dL 9 0         Portions of the record may have been created with voice recognition software  Occasional wrong word or "sound a like" substitutions may have occurred due to the inherent limitations of voice recognition software  Read the chart carefully and recognize, using context, where substitutions have occurred  If you have any questions, please contact the dictating provider

## 2021-05-21 NOTE — PROGRESS NOTES
Pt  Denied new symptoms or concerns today  Wt remains stable  Pt  Tolerated  Etoposide today without adverse event  Neulasta ONPRO placed on CELIA  Pt  Understands process and states his daughter will remove device after injection is complete  Future appointments reviewed  AVS declined

## 2021-05-21 NOTE — PATIENT INSTRUCTIONS
We are seeing you here in the office today for follow-up on your kidneys  Your most recent creatinine was 1 4  Your kidneys remained stable  Please continue to avoid any NSAIDs which include Motrin, ibuprofen, Advil, and Aleve  Please notify the office if you require imaging with IV contrast   Please continue to hydrate with water  We will check urine studies and lab work prior to next appointment  Your blood pressure today was 138/78  You are currently not taking any blood pressure medications  You may take your diuretic which is Lasix as needed if your noticing increased swelling in her legs  If you have to take this more than 3 days in a row, please call the office  Please continue a low-salt diet

## 2021-05-21 NOTE — PLAN OF CARE
Problem: Potential for Falls  Goal: Patient will remain free of falls  Description: INTERVENTIONS:  - Assess patient frequently for physical needs  -  Identify cognitive and physical deficits and behaviors that affect risk of falls    -  Olmitz fall precautions as indicated by assessment   - Educate patient/family on patient safety including physical limitations  - Instruct patient to call for assistance with activity based on assessment  - Modify environment to reduce risk of injury  - Consider OT/PT consult to assist with strengthening/mobility  Outcome: Progressing

## 2021-05-23 NOTE — ASSESSMENT & PLAN NOTE
The patient is on active surveillance  Return for re-evaluation following completion of and and recovery from chemotherapy

## 2021-05-23 NOTE — ASSESSMENT & PLAN NOTE
The patient is having a difficult time with his chemotherapy  He is responding, however  This is being managed by the Hematology-oncology service

## 2021-05-29 ENCOUNTER — APPOINTMENT (EMERGENCY)
Dept: RADIOLOGY | Facility: HOSPITAL | Age: 61
DRG: 313 | End: 2021-05-29
Payer: COMMERCIAL

## 2021-05-29 ENCOUNTER — HOSPITAL ENCOUNTER (INPATIENT)
Facility: HOSPITAL | Age: 61
LOS: 1 days | Discharge: HOME | DRG: 313 | End: 2021-05-31
Attending: EMERGENCY MEDICINE | Admitting: INTERNAL MEDICINE
Payer: COMMERCIAL

## 2021-05-29 DIAGNOSIS — D64.9 ANEMIA: ICD-10-CM

## 2021-05-29 DIAGNOSIS — D69.6 THROMBOCYTOPENIA (HCC): ICD-10-CM

## 2021-05-29 DIAGNOSIS — R93.2 ABNORMAL CT SCAN, GALLBLADDER: ICD-10-CM

## 2021-05-29 DIAGNOSIS — N18.9 CKD (CHRONIC KIDNEY DISEASE): ICD-10-CM

## 2021-05-29 DIAGNOSIS — M54.50 ACUTE BILATERAL LOW BACK PAIN WITHOUT SCIATICA: Primary | ICD-10-CM

## 2021-05-29 DIAGNOSIS — R07.9 CHEST PAIN: ICD-10-CM

## 2021-05-29 DIAGNOSIS — C62.90 TESTICULAR CANCER (HCC): ICD-10-CM

## 2021-05-29 LAB
ALBUMIN SERPL BCP-MCNC: 2.8 G/DL (ref 3.5–5)
ALP SERPL-CCNC: 69 U/L (ref 46–116)
ALT SERPL W P-5'-P-CCNC: 18 U/L (ref 12–78)
ANION GAP SERPL CALCULATED.3IONS-SCNC: 14 MMOL/L (ref 4–13)
AST SERPL W P-5'-P-CCNC: 18 U/L (ref 5–45)
BACTERIA UR QL AUTO: ABNORMAL /HPF
BASOPHILS # BLD MANUAL: 0.03 THOUSAND/UL (ref 0–0.1)
BASOPHILS NFR MAR MANUAL: 1 % (ref 0–1)
BILIRUB SERPL-MCNC: 0.42 MG/DL (ref 0.2–1)
BILIRUB UR QL STRIP: NEGATIVE
BUN SERPL-MCNC: 22 MG/DL (ref 5–25)
CALCIUM ALBUM COR SERPL-MCNC: 8.8 MG/DL (ref 8.3–10.1)
CALCIUM SERPL-MCNC: 7.8 MG/DL (ref 8.3–10.1)
CHLORIDE SERPL-SCNC: 103 MMOL/L (ref 100–108)
CLARITY UR: CLEAR
CLARITY, POC: CLEAR
CO2 SERPL-SCNC: 20 MMOL/L (ref 21–32)
COLOR UR: YELLOW
COLOR, POC: YELLOW
CREAT SERPL-MCNC: 1.5 MG/DL (ref 0.6–1.3)
EOSINOPHIL # BLD MANUAL: 0.03 THOUSAND/UL (ref 0–0.4)
EOSINOPHIL NFR BLD MANUAL: 1 % (ref 0–6)
ERYTHROCYTE [DISTWIDTH] IN BLOOD BY AUTOMATED COUNT: 15.6 % (ref 11.6–15.1)
GFR SERPL CREATININE-BSD FRML MDRD: 49 ML/MIN/1.73SQ M
GLUCOSE SERPL-MCNC: 146 MG/DL (ref 65–140)
GLUCOSE UR STRIP-MCNC: NEGATIVE MG/DL
HCT VFR BLD AUTO: 24.9 % (ref 36.5–49.3)
HGB BLD-MCNC: 8.3 G/DL (ref 12–17)
HGB UR QL STRIP.AUTO: ABNORMAL
KETONES UR STRIP-MCNC: NEGATIVE MG/DL
LEUKOCYTE ESTERASE UR QL STRIP: NEGATIVE
LYMPHOCYTES # BLD AUTO: 1.65 THOUSAND/UL (ref 0.6–4.47)
LYMPHOCYTES # BLD AUTO: 53 % (ref 14–44)
MCH RBC QN AUTO: 29.2 PG (ref 26.8–34.3)
MCHC RBC AUTO-ENTMCNC: 33.3 G/DL (ref 31.4–37.4)
MCV RBC AUTO: 88 FL (ref 82–98)
METAMYELOCYTES NFR BLD MANUAL: 2 % (ref 0–1)
MONOCYTES # BLD AUTO: 0.19 THOUSAND/UL (ref 0–1.22)
MONOCYTES NFR BLD: 6 % (ref 4–12)
MYELOCYTES NFR BLD MANUAL: 3 % (ref 0–1)
NEUTROPHILS # BLD MANUAL: 1.06 THOUSAND/UL (ref 1.85–7.62)
NEUTS BAND NFR BLD MANUAL: 2 % (ref 0–8)
NEUTS SEG NFR BLD AUTO: 32 % (ref 43–75)
NITRITE UR QL STRIP: NEGATIVE
NON-SQ EPI CELLS URNS QL MICRO: ABNORMAL /HPF
NRBC BLD AUTO-RTO: 0 /100 WBCS
PH UR STRIP.AUTO: 5 [PH] (ref 4.5–8)
PLATELET # BLD AUTO: 17 THOUSANDS/UL (ref 149–390)
PLATELET BLD QL SMEAR: ABNORMAL
PMV BLD AUTO: 11.5 FL (ref 8.9–12.7)
POTASSIUM SERPL-SCNC: 3.9 MMOL/L (ref 3.5–5.3)
PROT SERPL-MCNC: 7 G/DL (ref 6.4–8.2)
PROT UR STRIP-MCNC: ABNORMAL MG/DL
RBC # BLD AUTO: 2.84 MILLION/UL (ref 3.88–5.62)
RBC #/AREA URNS AUTO: ABNORMAL /HPF
SODIUM SERPL-SCNC: 137 MMOL/L (ref 136–145)
SP GR UR STRIP.AUTO: 1.01 (ref 1–1.03)
TOTAL CELLS COUNTED SPEC: 100
TROPONIN I SERPL-MCNC: <0.02 NG/ML
UROBILINOGEN UR QL STRIP.AUTO: 0.2 E.U./DL
WBC # BLD AUTO: 3.11 THOUSAND/UL (ref 4.31–10.16)
WBC #/AREA URNS AUTO: ABNORMAL /HPF

## 2021-05-29 PROCEDURE — 93005 ELECTROCARDIOGRAM TRACING: CPT

## 2021-05-29 PROCEDURE — 99285 EMERGENCY DEPT VISIT HI MDM: CPT | Performed by: PHYSICIAN ASSISTANT

## 2021-05-29 PROCEDURE — 84484 ASSAY OF TROPONIN QUANT: CPT | Performed by: PHYSICIAN ASSISTANT

## 2021-05-29 PROCEDURE — 71045 X-RAY EXAM CHEST 1 VIEW: CPT

## 2021-05-29 PROCEDURE — 85027 COMPLETE CBC AUTOMATED: CPT | Performed by: PHYSICIAN ASSISTANT

## 2021-05-29 PROCEDURE — 99285 EMERGENCY DEPT VISIT HI MDM: CPT

## 2021-05-29 PROCEDURE — 36415 COLL VENOUS BLD VENIPUNCTURE: CPT | Performed by: PHYSICIAN ASSISTANT

## 2021-05-29 PROCEDURE — 81001 URINALYSIS AUTO W/SCOPE: CPT

## 2021-05-29 PROCEDURE — 80053 COMPREHEN METABOLIC PANEL: CPT | Performed by: PHYSICIAN ASSISTANT

## 2021-05-29 PROCEDURE — 85007 BL SMEAR W/DIFF WBC COUNT: CPT | Performed by: PHYSICIAN ASSISTANT

## 2021-05-30 ENCOUNTER — APPOINTMENT (EMERGENCY)
Dept: CT IMAGING | Facility: HOSPITAL | Age: 61
DRG: 313 | End: 2021-05-30
Payer: COMMERCIAL

## 2021-05-30 PROBLEM — D64.9 ANEMIA: Status: ACTIVE | Noted: 2021-05-30

## 2021-05-30 PROBLEM — N18.30 CKD (CHRONIC KIDNEY DISEASE) STAGE 3, GFR 30-59 ML/MIN (HCC): Status: ACTIVE | Noted: 2021-05-30

## 2021-05-30 PROBLEM — R07.9 CHEST PAIN: Status: ACTIVE | Noted: 2021-05-30

## 2021-05-30 PROBLEM — M54.9 BACK PAIN: Status: ACTIVE | Noted: 2021-05-30

## 2021-05-30 PROBLEM — E11.9 TYPE 2 DIABETES MELLITUS (HCC): Status: ACTIVE | Noted: 2021-05-30

## 2021-05-30 PROBLEM — N18.2 CKD (CHRONIC KIDNEY DISEASE) STAGE 2, GFR 60-89 ML/MIN: Status: ACTIVE | Noted: 2021-05-30

## 2021-05-30 PROBLEM — D69.6 THROMBOCYTOPENIA (HCC): Status: ACTIVE | Noted: 2021-05-30

## 2021-05-30 LAB
ABO GROUP BLD BPU: NORMAL
ABO GROUP BLD: NORMAL
ABO GROUP BLD: NORMAL
ATRIAL RATE: 82 BPM
ATRIAL RATE: 89 BPM
BLD GP AB SCN SERPL QL: NEGATIVE
BPU ID: NORMAL
GLUCOSE SERPL-MCNC: 100 MG/DL (ref 65–140)
GLUCOSE SERPL-MCNC: 101 MG/DL (ref 65–140)
GLUCOSE SERPL-MCNC: 114 MG/DL (ref 65–140)
GLUCOSE SERPL-MCNC: 123 MG/DL (ref 65–140)
P AXIS: 35 DEGREES
P AXIS: 62 DEGREES
PR INTERVAL: 146 MS
PR INTERVAL: 158 MS
QRS AXIS: -1 DEGREES
QRS AXIS: 57 DEGREES
QRSD INTERVAL: 80 MS
QRSD INTERVAL: 82 MS
QT INTERVAL: 350 MS
QT INTERVAL: 372 MS
QTC INTERVAL: 425 MS
QTC INTERVAL: 434 MS
RH BLD: POSITIVE
RH BLD: POSITIVE
SPECIMEN EXPIRATION DATE: NORMAL
T WAVE AXIS: 0 DEGREES
T WAVE AXIS: 57 DEGREES
TROPONIN I SERPL-MCNC: <0.02 NG/ML
TROPONIN I SERPL-MCNC: <0.02 NG/ML
UNIT DISPENSE STATUS: NORMAL
UNIT PRODUCT CODE: NORMAL
UNIT RH: NORMAL
VENTRICULAR RATE: 82 BPM
VENTRICULAR RATE: 89 BPM

## 2021-05-30 PROCEDURE — 30233R1 TRANSFUSION OF NONAUTOLOGOUS PLATELETS INTO PERIPHERAL VEIN, PERCUTANEOUS APPROACH: ICD-10-PCS | Performed by: INTERNAL MEDICINE

## 2021-05-30 PROCEDURE — 71250 CT THORAX DX C-: CPT

## 2021-05-30 PROCEDURE — 84484 ASSAY OF TROPONIN QUANT: CPT | Performed by: PHYSICIAN ASSISTANT

## 2021-05-30 PROCEDURE — 86900 BLOOD TYPING SEROLOGIC ABO: CPT | Performed by: PHYSICIAN ASSISTANT

## 2021-05-30 PROCEDURE — 74176 CT ABD & PELVIS W/O CONTRAST: CPT

## 2021-05-30 PROCEDURE — P9037 PLATE PHERES LEUKOREDU IRRAD: HCPCS

## 2021-05-30 PROCEDURE — 99223 1ST HOSP IP/OBS HIGH 75: CPT | Performed by: INTERNAL MEDICINE

## 2021-05-30 PROCEDURE — 36415 COLL VENOUS BLD VENIPUNCTURE: CPT | Performed by: PHYSICIAN ASSISTANT

## 2021-05-30 PROCEDURE — 93005 ELECTROCARDIOGRAM TRACING: CPT

## 2021-05-30 PROCEDURE — 86850 RBC ANTIBODY SCREEN: CPT | Performed by: PHYSICIAN ASSISTANT

## 2021-05-30 PROCEDURE — 93010 ELECTROCARDIOGRAM REPORT: CPT

## 2021-05-30 PROCEDURE — 86901 BLOOD TYPING SEROLOGIC RH(D): CPT | Performed by: PHYSICIAN ASSISTANT

## 2021-05-30 PROCEDURE — 82948 REAGENT STRIP/BLOOD GLUCOSE: CPT

## 2021-05-30 RX ORDER — PREDNISONE 1 MG/1
0.5 TABLET ORAL DAILY
Status: DISCONTINUED | OUTPATIENT
Start: 2021-05-30 | End: 2021-05-31 | Stop reason: HOSPADM

## 2021-05-30 RX ORDER — ONDANSETRON 2 MG/ML
4 INJECTION INTRAMUSCULAR; INTRAVENOUS EVERY 6 HOURS PRN
Status: DISCONTINUED | OUTPATIENT
Start: 2021-05-30 | End: 2021-05-31 | Stop reason: HOSPADM

## 2021-05-30 RX ORDER — SODIUM CHLORIDE 9 MG/ML
100 INJECTION, SOLUTION INTRAVENOUS CONTINUOUS
Status: DISCONTINUED | OUTPATIENT
Start: 2021-05-30 | End: 2021-05-30

## 2021-05-30 RX ORDER — ATORVASTATIN CALCIUM 40 MG/1
40 TABLET, FILM COATED ORAL
Status: DISCONTINUED | OUTPATIENT
Start: 2021-05-30 | End: 2021-05-31 | Stop reason: HOSPADM

## 2021-05-30 RX ORDER — ACETAMINOPHEN 325 MG/1
650 TABLET ORAL EVERY 6 HOURS PRN
Status: DISCONTINUED | OUTPATIENT
Start: 2021-05-30 | End: 2021-05-31 | Stop reason: HOSPADM

## 2021-05-30 RX ORDER — ALLOPURINOL 100 MG/1
100 TABLET ORAL DAILY
Status: DISCONTINUED | OUTPATIENT
Start: 2021-05-30 | End: 2021-05-31 | Stop reason: HOSPADM

## 2021-05-30 RX ORDER — MAGNESIUM HYDROXIDE/ALUMINUM HYDROXICE/SIMETHICONE 120; 1200; 1200 MG/30ML; MG/30ML; MG/30ML
30 SUSPENSION ORAL EVERY 6 HOURS PRN
Status: DISCONTINUED | OUTPATIENT
Start: 2021-05-30 | End: 2021-05-31 | Stop reason: HOSPADM

## 2021-05-30 RX ORDER — PANTOPRAZOLE SODIUM 40 MG/1
40 TABLET, DELAYED RELEASE ORAL
Status: DISCONTINUED | OUTPATIENT
Start: 2021-05-30 | End: 2021-05-31 | Stop reason: HOSPADM

## 2021-05-30 RX ADMIN — SODIUM CHLORIDE 100 ML/HR: 0.9 INJECTION, SOLUTION INTRAVENOUS at 03:41

## 2021-05-30 RX ADMIN — PREDNISONE 0.5 MG: 1 TABLET ORAL at 08:05

## 2021-05-30 RX ADMIN — ATORVASTATIN CALCIUM 40 MG: 40 TABLET, FILM COATED ORAL at 17:15

## 2021-05-30 RX ADMIN — ALLOPURINOL 100 MG: 100 TABLET ORAL at 08:05

## 2021-05-30 RX ADMIN — PANTOPRAZOLE SODIUM 40 MG: 40 TABLET, DELAYED RELEASE ORAL at 05:18

## 2021-05-30 NOTE — ASSESSMENT & PLAN NOTE
Currently being weaned off prednisone  Will be on 0 5 mg prednisone daily for the rest of May, d/c June 1st 2021

## 2021-05-30 NOTE — H&P
[facility]  H&P- Meri Felix 1960, 57 y o  male MRN: [id number]  Unit/Bed#: Rajesh De Anda [address] 218-02 Encounter: [id number]  Primary Care Provider: Micki Domínguez DO   Date and time admitted to hospital: 5/29/2021 10:09 PM    * Chest pain  Assessment & Plan  Presents with acute onset substernal sharp chest pain, "right were my hiatal hernia is"  Does not radiate anywhere  Present with minimal exertion including walking and standing up, relieved by rest   Denies nausea, vomiting, diaphoresis, shortness of breath, lightheadedness, syncope  VS stable  Initially tachycardic  ACS risk factors:  HTN, HLD, T2DM, obesity, Age  Troponin <0 02  ECG- NSR, HR 89  No signs of ischemia  CT chest- No acute cardiopulmonary findings  Questionable sludge and/or cholelithiasis without evidence of cholecystitis  Likely GERD versus ACS versus PE ( CTA not obtained due to contrast dye nephrotoxicity)  JOSEFINA score 2, HEART score 4    Plan:  - V/Q scan ordered  Pt is at increased risk given cancer and age  - Trend troponin w/ ECG x 3  - Did not receive ASA  Will defer as pt's platelets are 17  Hematology consult placed  Back pain  Assessment & Plan  Reports lower lumbar back pain the onset of chest pain  Located paraspinal bilaterally, does not radiate  Exacerbated with standing and movement, relieved via rest   CT lumbar spine- No acute findings  No fx  Likely sacroiliitis vs spinal cord compression ( less likely given no findings on CT and b/l nature of pain)  Pain management with tylenol   Avoid NSAIDs given kidney function and high-bleeding risk  Will consider MRI if pain does not improve on its own    Thrombocytopenia [facility])  Assessment & Plan  Lab Results   Component Value Date    PLT 17 (LL) 05/29/2021     (H) 05/13/2021     (H) 05/07/2021   Platelets 17, previously 391 on 5/13/21  Likely due to chemotherapy  Will transfuse 1 unit platelets  Holding DVT prophylaxis given high bleed risk from thrombocytopenia  Will resume DVT prophylaxis once platelets >45,008  Follow CBC in a m  Anemia  Assessment & Plan  Lab Results   Component Value Date    HGB 8 3 (L) 05/29/2021    HGB 10 1 (L) 05/13/2021    HGB 10 2 (L) 05/07/2021   Baseline hemoglobin around 10 0  Currently 8 3  Likely secondary to chemotherapy  Denies hematochezia, melena, other bleeding  Follow-up CBC in a m  Will consider transfusion for hemoglobin <7 0  Hematology consulted  Appreciate recs      CKD (chronic kidney disease) stage 3, GFR 30-59 ml/min [facility])  Assessment & Plan  Lab Results   Component Value Date    EGFR 49 05/29/2021    EGFR 53 05/19/2021    EGFR 50 05/13/2021    CREATININE 1 50 (H) 05/29/2021    CREATININE 1 40 (H) 05/19/2021    CREATININE 1 48 (H) 05/13/2021   Per nephrology note, new baseline is likely 1 4  Currently 1 5  Continue to avoid nephrotoxins, avoid hypotension  IV hydration overnight  Follow-up BMP in the morning    Seminoma of testis, stage 3, left [facility])  Assessment & Plan  Left Testicle seminoma with external right iliac lymphadenopathy and left para-aortic lymphadenopathy  Following with Oncology  Also following with Urology  1 week of chemo, 2 weeks off, repeat x 4  Completed 2nd round of chemo last week, next round to start on 06/07/21  Receiving treatment with etoposide and carboplatin  Type 2 diabetes mellitus ([facility] [address] )  Assessment & Plan  Lab Results   Component Value Date    HGBA1C 6 8 (H) 04/15/2021       No results for input(s): POCGLU in the last 72 hours  Blood Sugar Average: Last 72 hrs:   home regimen:  Januvia 50 mg daily  Will hold oral anti hyperglycemics  Will start sliding scale insulin  Accu-Cheks with meals and q h s    Diabetic diet    Polymyalgia rheumatica syndrome ([facility] [address] )  Assessment & Plan  Currently being weaned off prednisone  Will be on 0 5 mg prednisone daily for the rest of May, d/c June 1st 2021    GERD (gastroesophageal reflux disease)  Assessment & Plan  Continue Protonix      VTE Prophylaxis: Pharmacologic VTE Prophylaxis contraindicated due to Severe thrombocytopenia  / sequential compression device   Code Status:  Level 1 full code  POLST: There is no POLST form on file for this patient (pre-hospital)  Discussion with family:  Patient    Anticipated Length of Stay:  Patient will be admitted on an Inpatient basis with an anticipated length of stay of  greater than 2 midnights  Justification for Hospital Stay:  Thrombocytopenia and anemia    Total Time for Visit, including Counseling / Coordination of Care: 45 minutes  Greater than 50% of this total time spent on direct patient counseling and coordination of care  Chief Complaint:   Chest pain    History of Present Illness:    Rocco Neal is a 57 y o  male with PMH HTN, HLD, CKD, T2DM, obesity, seminoma of left testis on chemotherapy, SIMON, PMR who presents with chest pain and back pain x1 day  Patient reports symptoms started a 2:00 a m  on 05/29/2021  Pain is described as sharp, substernal "right over my hiatal hernia", nonradiating  Pain is exacerbated with standing and walking, alleviated through rest   Also complains of lower lumbar back pain which started at the same time as the chest pain  Also exacerbated with standing and exercise, alleviated to rest   Denies shortness of breath, nausea, vomiting, diaphoresis, lightheadedness cough, fever, changes in urinary or bowel habits  On arrival to the ED, vital signs stable  Lab work is significant for elevated creatinine of 1 5 which appears at baseline, anemia 8 3 which is down from 10 on 5/13/21, platelets 17 which is down from 400 on 5/13/21  CT chest showed no acute cardiopulmonary disease, cholelithiasis without evidence of cholecystitis  Lumbar CT showed no acute pathology, no fractures  Review of Systems:    Review of Systems   Constitutional: Negative for appetite change, chills, fatigue and fever     HENT: Negative for ear pain, sore throat and trouble swallowing  Eyes: Negative for visual disturbance  Respiratory: Negative for cough, chest tightness, shortness of breath and wheezing  Cardiovascular: Positive for chest pain  Negative for palpitations and leg swelling  Gastrointestinal: Negative for abdominal distention, abdominal pain, diarrhea, nausea and vomiting  Endocrine: Negative  Genitourinary: Negative for dysuria  Musculoskeletal: Positive for back pain (Lumbar)  Negative for gait problem and myalgias  Skin: Negative for pallor  Allergic/Immunologic: Negative for immunocompromised state  Neurological: Negative for dizziness, syncope, light-headedness, numbness and headaches         Past Medical and Surgical History:     Past Medical History:   Diagnosis Date    BPH without obstruction/lower urinary tract symptoms     Cancer (HCC)     prostate    CPAP (continuous positive airway pressure) dependence     Elevated PSA     GERD (gastroesophageal reflux disease)     Gout     Hyperlipidemia     Hypertension     Kidney stone     Obese abdomen     Obesity     Polymyalgia (HCC)     Prostate cancer (HCC)     Rash     under both arms and in between legs - family doctor aware - pt uses Desitin    Sleep apnea     Testicular carcinoma ([facility] [address] )     Urinary frequency     Urinary urgency     Wears glasses        Past Surgical History:   Procedure Laterality Date    CARPAL TUNNEL RELEASE Bilateral 07/2020    COLONOSCOPY      CYSTOSCOPY W/ LASER LITHOTRIPSY  2001    CYSTOSTOMY W/ STENT INSERTION  2011    ESOPHAGOGASTRODUODENOSCOPY      EXTRACORPOREAL SHOCK WAVE LITHOTRIPSY Right 2011    HERNIA REPAIR      KNEE ARTHROSCOPY Left     [place] CYSTO/URETERO W/LITHOTRIPSY &INDWELL STENT INSRT Right 4/29/2018    Procedure: CYSTOSCOPY URETEROSCOPY, RETROGRADE PYELOGRAM AND INSERTION STENT URETERAL;  Surgeon: Lucero Felix MD;  [facility];  Service: Urology    [place] REMOVAL TESTIS,RADICAL Left 2/19/2021    Procedure: Radical  ORCHIECTOMY;  Surgeon: Tk Patel MD;  [facility];  Service: Urology       Meds/Allergies:    Prior to Admission medications    Medication Sig Start Date End Date Taking? Authorizing Provider   allopurinol (ZYLOPRIM) 100 mg tablet allopurinol 100 mg tablet   Yes Historical Provider, MD   Cholecalciferol (VITAMIN D3) 2000 units capsule Take 1 capsule by mouth daily 10/2/19 5/29/21 Yes Historical Provider, MD   dexlansoprazole (DEXILANT) 60 MG capsule Dexilant 60 mg capsule, delayed release   Yes Historical Provider, MD   PREDNISONE PO Take 1 mg by mouth daily 1mg daily in April  0 5mg daily in May   Yes Historical Provider, MD   rosuvastatin (CRESTOR) 20 MG tablet Take 20 mg by mouth daily at bedtime  2/28/18 5/29/21 Yes Historical Provider, MD   sitaGLIPtin (JANUVIA) 50 mg tablet Take 1 tablet (50 mg total) by mouth daily 4/20/21 5/29/21 Yes Abelardo Farias DO   ASPIRIN EC PO Take 81 mg by mouth    Historical Provider, MD     I have reviewed home medications with patient personally      Allergies: No Known Allergies    Social History:     Marital Status: /Civil Union   Occupation:  Noncontributory  Patient Pre-hospital Living Situation:  Home  Patient Pre-hospital Level of Mobility:  Independent  Patient Pre-hospital Diet Restrictions:  Diabetic  Substance Use History:   Social History     Substance and Sexual Activity   Alcohol Use No     Social History     Tobacco Use   Smoking Status Never Smoker   Smokeless Tobacco Never Used     Social History     Substance and Sexual Activity   Drug Use No       Family History:    non-contributory    Physical Exam:     Vitals:   Blood Pressure: 131/66 (05/30/21 0209)  Pulse: 88 (05/30/21 0209)  Temperature: 99 4 °F (37 4 °C) (05/30/21 0209)  Temp Source: Oral (05/29/21 2136)  Respirations: 18 (05/30/21 0209)  Weight - Scale: 112 kg (247 lb 3 2 oz) (05/29/21 2134)  SpO2: 97 % (05/30/21 0209)    Physical Exam  Vitals signs and nursing note reviewed  Constitutional:       Appearance: Normal appearance  HENT:      Head: Normocephalic and atraumatic  Mouth/Throat:      Mouth: Mucous membranes are moist       Pharynx: Oropharynx is clear  No oropharyngeal exudate  Eyes:      Extraocular Movements: Extraocular movements intact  Cardiovascular:      Rate and Rhythm: Normal rate and regular rhythm  Pulses: Normal pulses  Heart sounds: Normal heart sounds  No murmur  No friction rub  No gallop  Pulmonary:      Effort: Pulmonary effort is normal  No respiratory distress  Breath sounds: Normal breath sounds  No stridor  No wheezing or rales  Abdominal:      General: Abdomen is flat  Bowel sounds are normal  There is no distension  Palpations: Abdomen is soft  Tenderness: There is no abdominal tenderness  Musculoskeletal:         General: Tenderness (Lower back paraspinal) present  Right lower leg: No edema  Left lower leg: No edema  Skin:     General: Skin is warm and dry  Neurological:      General: No focal deficit present  Mental Status: He is alert and oriented to person, place, and time  Additional Data:     Lab Results: I have personally reviewed pertinent reports  Results from last 7 days   Lab Units 05/29/21  2303   WBC Thousand/uL 3 11*   HEMOGLOBIN g/dL 8 3*   HEMATOCRIT % 24 9*   PLATELETS Thousands/uL 17*   BANDS PCT % 2   LYMPHO PCT % 53*   MONO PCT % 6   EOS PCT % 1     Results from last 7 days   Lab Units 05/29/21  2303   SODIUM mmol/L 137   POTASSIUM mmol/L 3 9   CHLORIDE mmol/L 103   CO2 mmol/L 20*   BUN mg/dL 22   CREATININE mg/dL 1 50*   ANION GAP mmol/L 14*   CALCIUM mg/dL 7 8*   ALBUMIN g/dL 2 8*   TOTAL BILIRUBIN mg/dL 0 42   ALK PHOS U/L 69   ALT U/L 18   AST U/L 18   GLUCOSE RANDOM mg/dL 146*                       Imaging: I have personally reviewed pertinent reports        XR chest 1 view portable    (Results Pending)   CT chest abdomen pelvis wo contrast    (Results Pending)   CT recon only lumbar spine    (Results Pending)       EKG, Pathology, and Other Studies Reviewed on Admission:   · EKG- NSR, HR 89  No signs of ischemia  · CT chest  · CT lumbar spine    Allscripts / Epic Records Reviewed: Yes     ** Please Note: This note has been constructed using a voice recognition system   **

## 2021-05-30 NOTE — NURSING NOTE
Per Dr Viridiana Caicedo, patient a Level 3 DNI/DNR, as deemed in previous admission; RN made Dr Viridiana Caicedo aware that Epic still shows Full Code  Patient ordered and given 1 unit of platelets during night shift  Further platelet needs to be decided by hematology      Shannan Burns RN 5/30/2021 12:23 PM

## 2021-05-30 NOTE — ASSESSMENT & PLAN NOTE
· Thrombocytopenia secondary to chemotherapy    Platelets have improved during this hospitalization    Results from last 7 days   Lab Units 05/31/21  0608 05/29/21  2303   PLATELETS Thousands/uL 66* 17*

## 2021-05-30 NOTE — ED NOTES
Patient transported to [facility], [address] [place] [address]  05/30/21 3185

## 2021-05-30 NOTE — PLAN OF CARE
Problem: Potential for Falls  Goal: Patient will remain free of falls  Description: INTERVENTIONS:  - Assess patient frequently for physical needs  -  Identify cognitive and physical deficits and behaviors that affect risk of falls    -  [place] fall precautions as indicated by assessment   - Educate patient/family on patient safety including physical limitations  - Instruct patient to call for assistance with activity based on assessment  - Modify environment to reduce risk of injury  - Consider OT/PT consult to assist with strengthening/mobility  Outcome: Progressing     Problem: PAIN - ADULT  Goal: Verbalizes/displays adequate comfort level or baseline comfort level  Description: Interventions:  - Encourage patient to monitor pain and request assistance  - Assess pain using appropriate pain scale  - Administer analgesics based on type and severity of pain and evaluate response  - Implement non-pharmacological measures as appropriate and evaluate response  - Consider cultural and social influences on pain and pain management  - Notify physician/advanced practitioner if interventions unsuccessful or patient reports new pain  Outcome: Progressing     Problem: INFECTION - ADULT  Goal: Absence or prevention of progression during hospitalization  Description: INTERVENTIONS:  - Assess and monitor for signs and symptoms of infection  - Monitor lab/diagnostic results  - Monitor all insertion sites, i e  indwelling lines, tubes, and drains  - Monitor endotracheal if appropriate and nasal secretions for changes in amount and color  - [place] appropriate cooling/warming therapies per order  - Administer medications as ordered  - Instruct and encourage patient and family to use good hand hygiene technique  - Identify and instruct in appropriate isolation precautions for identified infection/condition  Outcome: Progressing  Goal: Absence of fever/infection during neutropenic period  Description: INTERVENTIONS:  - Monitor WBC    Outcome: Progressing     Problem: SAFETY ADULT  Goal: Patient will remain free of falls  Description: INTERVENTIONS:  - Assess patient frequently for physical needs  -  Identify cognitive and physical deficits and behaviors that affect risk of falls    -  [place] fall precautions as indicated by assessment   - Educate patient/family on patient safety including physical limitations  - Instruct patient to call for assistance with activity based on assessment  - Modify environment to reduce risk of injury  - Consider OT/PT consult to assist with strengthening/mobility  Outcome: Progressing  Goal: Maintain or return to baseline ADL function  Description: INTERVENTIONS:  -  Assess patient's ability to carry out ADLs; assess patient's baseline for ADL function and identify physical deficits which impact ability to perform ADLs (bathing, care of mouth/teeth, toileting, grooming, dressing, etc )  - Assess/evaluate cause of self-care deficits   - Assess range of motion  - Assess patient's mobility; develop plan if impaired  - Assess patient's need for assistive devices and provide as appropriate  - Encourage maximum independence but intervene and supervise when necessary  - Involve family in performance of ADLs  - Assess for home care needs following discharge   - Consider OT consult to assist with ADL evaluation and planning for discharge  - Provide patient education as appropriate  Outcome: Progressing  Goal: Maintain or return mobility status to optimal level  Description: INTERVENTIONS:  - Assess patient's baseline mobility status (ambulation, transfers, stairs, etc )    - Identify cognitive and physical deficits and behaviors that affect mobility  - Identify mobility aids required to assist with transfers and/or ambulation (gait belt, sit-to-stand, lift, walker, cane, etc )  - [place] fall precautions as indicated by assessment  - Record patient progress and toleration of activity level on Mobility SBAR; progress patient to next Phase/Stage  - Instruct patient to call for assistance with activity based on assessment  - Consider rehabilitation consult to assist with strengthening/weightbearing, etc   Outcome: Progressing     Problem: DISCHARGE PLANNING  Goal: Discharge to home or other facility with appropriate resources  Description: INTERVENTIONS:  - Identify barriers to discharge w/patient and caregiver  - Arrange for needed discharge resources and transportation as appropriate  - Identify discharge learning needs (meds, wound care, etc )  - Arrange for interpretive services to assist at discharge as needed  - Refer to Case Management Department for coordinating discharge planning if the patient needs post-hospital services based on physician/advanced practitioner order or complex needs related to functional status, cognitive ability, or social support system  Outcome: Progressing     Problem: Knowledge Deficit  Goal: Patient/family/caregiver demonstrates understanding of disease process, treatment plan, medications, and discharge instructions  Description: Complete learning assessment and assess knowledge base    Interventions:  - Provide teaching at level of understanding  - Provide teaching via preferred learning methods  Outcome: Progressing     Problem: METABOLIC, FLUID AND ELECTROLYTES - ADULT  Goal: Electrolytes maintained within normal limits  Description: INTERVENTIONS:  - Monitor labs and assess patient for signs and symptoms of electrolyte imbalances  - Administer electrolyte replacement as ordered  - Monitor response to electrolyte replacements, including repeat lab results as appropriate  - Instruct patient on fluid and nutrition as appropriate  Outcome: Progressing  Goal: Fluid balance maintained  Description: INTERVENTIONS:  - Monitor labs   - Monitor I/O and WT  - Instruct patient on fluid and nutrition as appropriate  - Assess for signs & symptoms of volume excess or deficit  Outcome: Progressing Problem: HEMATOLOGIC - ADULT  Goal: Maintains hematologic stability  Description: INTERVENTIONS  - Assess for signs and symptoms of bleeding or hemorrhage  - Monitor labs  - Administer supportive blood products/factors as ordered and appropriate  Outcome: Progressing     Problem: MUSCULOSKELETAL - ADULT  Goal: Maintain or return mobility to safest level of function  Description: INTERVENTIONS:  - Assess patient's ability to carry out ADLs; assess patient's baseline for ADL function and identify physical deficits which impact ability to perform ADLs (bathing, care of mouth/teeth, toileting, grooming, dressing, etc )  - Assess/evaluate cause of self-care deficits   - Assess range of motion  - Assess patient's mobility  - Assess patient's need for assistive devices and provide as appropriate  - Encourage maximum independence but intervene and supervise when necessary  - Involve family in performance of ADLs  - Assess for home care needs following discharge   - Consider OT consult to assist with ADL evaluation and planning for discharge  - Provide patient education as appropriate  Outcome: Progressing  Goal: Maintain proper alignment of affected body part  Description: INTERVENTIONS:  - Support, maintain and protect limb and body alignment  - Provide patient/ family with appropriate education  Outcome: Progressing     Problem: Prexisting or High Potential for Compromised Skin Integrity  Goal: Skin integrity is maintained or improved  Description: INTERVENTIONS:  - Identify patients at risk for skin breakdown  - Assess and monitor skin integrity  - Assess and monitor nutrition and hydration status  - Monitor labs   - Assess for incontinence   - Turn and reposition patient  - Assist with mobility/ambulation  - Relieve pressure over bony prominences  - Avoid friction and shearing  - Provide appropriate hygiene as needed including keeping skin clean and dry  - Evaluate need for skin moisturizer/barrier cream  - Collaborate with interdisciplinary team   - Patient/family teaching  - Consider wound care consult   Outcome: Progressing

## 2021-05-30 NOTE — ED PROVIDER NOTES
History  Chief Complaint   Patient presents with    Chest Pain     Pt c/o chest pain and lower back pain started last night   Back Pain     78-year-old male with history of prostate cancer, GERD, hyperlipidemia, hypertension, kidney stones, polymyalgia, testicular cancer (most recent chemo 5/21) presents emergency department for evaluation of substernal, nonradiating chest pain, as well as lower back pain  Patient reports the symptoms started last night, he denies the chest pain radiating to his lower back, or any radiation of the lower back pain  He denies any dyspnea, abdominal pain, n/v/d, fever, cough  Patient denies any history of IV drug abuse, bladder, bowel dysfunction, numbness, tingling, weakness to lower extremities  He denies any falls, MVA, trauma  He reports that both the chest and back pain are worse with going from sitting to standing  History provided by:  Medical records, patient and spouse   used: No    Chest Pain  Pain location:  Substernal area  Pain quality: pressure    Pain radiates to:  Does not radiate  Pain radiates to the back: no    Pain severity:  Mild  Onset quality:  Gradual  Duration:  1 day  Timing:  Intermittent  Progression:  Unchanged  Chronicity:  New  Context: movement    Context: not breathing and no trauma    Relieved by:  Nothing  Worsened by:  Exertion and certain positions  Associated symptoms: back pain    Associated symptoms: no abdominal pain, no altered mental status, no anorexia, no cough, no diaphoresis, no dizziness, no lower extremity edema, no nausea, no numbness, no shortness of breath, not vomiting and no weakness    Risk factors: high cholesterol, hypertension and male sex    Risk factors: no coronary artery disease, no diabetes mellitus and no prior DVT/PE        Prior to Admission Medications   Prescriptions Last Dose Informant Patient Reported? Taking?    ASPIRIN EC PO Not Taking at Unknown time Self Yes No   Sig: Take 81 mg by mouth   Cholecalciferol (VITAMIN D3) 2000 units capsule  Self Yes Yes   Sig: Take 1 capsule by mouth daily   PREDNISONE PO  Self Yes Yes   Sig: Take 1 mg by mouth daily 1mg daily in April   0 5mg daily in May   allopurinol (ZYLOPRIM) 100 mg tablet  Self Yes Yes   Sig: allopurinol 100 mg tablet   dexlansoprazole (DEXILANT) 60 MG capsule  Self Yes Yes   Sig: Dexilant 60 mg capsule, delayed release   rosuvastatin (CRESTOR) 20 MG tablet  Self Yes Yes   Sig: Take 20 mg by mouth daily at bedtime    sitaGLIPtin (JANUVIA) 50 mg tablet  Self No Yes   Sig: Take 1 tablet (50 mg total) by mouth daily      Facility-Administered Medications: None       Past Medical History:   Diagnosis Date    BPH without obstruction/lower urinary tract symptoms     Cancer (HCC)     prostate    CPAP (continuous positive airway pressure) dependence     Elevated PSA     GERD (gastroesophageal reflux disease)     Gout     Hyperlipidemia     Hypertension     Kidney stone     Obese abdomen     Obesity     Polymyalgia (HCC)     Prostate cancer ([address] )     Rash     under both arms and in between legs - family doctor aware - pt uses Desitin    Sleep apnea     Testicular carcinoma ([address] )     Urinary frequency     Urinary urgency     Wears glasses        Past Surgical History:   Procedure Laterality Date    CARPAL TUNNEL RELEASE Bilateral 07/2020    COLONOSCOPY      CYSTOSCOPY W/ LASER LITHOTRIPSY  2001    CYSTOSTOMY W/ STENT INSERTION  2011    ESOPHAGOGASTRODUODENOSCOPY      EXTRACORPOREAL SHOCK WAVE LITHOTRIPSY Right 2011    HERNIA REPAIR      KNEE ARTHROSCOPY Left     GA CYSTO/URETERO W/LITHOTRIPSY &INDWELL STENT INSRT Right 4/29/2018    Procedure: CYSTOSCOPY URETEROSCOPY, RETROGRADE PYELOGRAM AND INSERTION STENT URETERAL;  Surgeon: Jayesh Morgan MD;  Location: [facility];  Service: Urology    GA REMOVAL TESTIS,RADICAL Left 2/19/2021    Procedure: Radical  ORCHIECTOMY;  Surgeon: Misty Gonsalez MD; Location: [facility];  Service: Urology       Family History   Problem Relation Age of Onset    Nephrolithiasis Father     Alzheimer's disease Mother      I have reviewed and agree with the history as documented  E-Cigarette/Vaping    E-Cigarette Use Never User      E-Cigarette/Vaping Substances     Social History     Tobacco Use    Smoking status: Never Smoker    Smokeless tobacco: Never Used   Substance Use Topics    Alcohol use: No    Drug use: No       Review of Systems   Constitutional: Negative for diaphoresis  Respiratory: Negative for cough and shortness of breath  Cardiovascular: Positive for chest pain  Gastrointestinal: Negative for abdominal pain, anorexia, nausea and vomiting  Genitourinary: Negative for dysuria, hematuria and urgency  Musculoskeletal: Positive for back pain  Neurological: Negative for dizziness, weakness and numbness  All other systems reviewed and are negative  Physical Exam  Physical Exam  Vitals signs and nursing note reviewed  Constitutional:       General: He is not in acute distress  Appearance: He is well-developed  He is not ill-appearing or toxic-appearing  HENT:      Head: Normocephalic and atraumatic  Right Ear: Hearing and external ear normal       Left Ear: Hearing and external ear normal    Eyes:      Conjunctiva/sclera: Conjunctivae normal    Neck:      Musculoskeletal: Full passive range of motion without pain and neck supple  Vascular: No JVD  Trachea: No tracheal deviation  Cardiovascular:      Rate and Rhythm: Normal rate and regular rhythm  Pulses:           Dorsalis pedis pulses are 2+ on the right side and 2+ on the left side  Heart sounds: Normal heart sounds, S1 normal and S2 normal  No murmur  Pulmonary:      Effort: Pulmonary effort is normal  No tachypnea, prolonged expiration or respiratory distress  Breath sounds: Normal breath sounds   No decreased breath sounds, wheezing, rhonchi or rales    Chest:      Chest wall: No swelling, tenderness or crepitus  Abdominal:      General: Bowel sounds are normal       Tenderness: There is no abdominal tenderness  There is no right CVA tenderness, left CVA tenderness, guarding or rebound  Negative signs include Fuentes's sign  Musculoskeletal:      Lumbar back: He exhibits tenderness  He exhibits no bony tenderness, no swelling, no pain and no spasm  Back:       Right lower leg: No edema  Left lower leg: No edema  Comments: No pain along bilateral lower extremities, no swelling, erythema  Skin:     General: Skin is warm and dry  Findings: No rash or wound  Neurological:      General: No focal deficit present  Mental Status: He is alert and oriented to person, place, and time  GCS: GCS eye subscore is 4  GCS verbal subscore is 5  GCS motor subscore is 6  Sensory: Sensation is intact        Gait: Gait normal    Psychiatric:         Mood and Affect: Mood normal          Speech: Speech normal          Vital Signs  ED Triage Vitals   Temperature Pulse Respirations Blood Pressure SpO2   05/29/21 2136 05/29/21 2136 05/29/21 2136 05/29/21 2137 05/29/21 2136   98 4 °F (36 9 °C) 103 18 169/74 98 %      Temp Source Heart Rate Source Patient Position - Orthostatic VS BP Location FiO2 (%)   05/29/21 2136 05/29/21 2136 05/29/21 2137 05/29/21 2137 --   Oral Monitor Sitting Right arm       Pain Score       --                  Vitals:    05/29/21 2136 05/29/21 2137 05/29/21 2214 05/30/21 0033   BP:  169/74 143/65 130/59   Pulse: 103  89 84   Patient Position - Orthostatic VS:  Sitting Lying Lying         Visual Acuity  Visual Acuity      Most Recent Value   L Pupil Size (mm)  3   R Pupil Size (mm)  3          ED Medications  Medications - No data to display    Diagnostic Studies  Results Reviewed     Procedure Component Value Units Date/Time    Troponin I [[id number]]  (Normal) Collected: 05/29/21 2303    Lab Status: Final result Specimen: Blood from Arm, Left Updated: 05/29/21 2358     Troponin I <0 02 ng/mL     Manual Differential(PHLEBS Do Not Order) [[id number]]  (Abnormal) Collected: 05/29/21 2303    Lab Status: Final result Specimen: Blood from Arm, Left Updated: 05/29/21 2347     Segmented % 32 %      Bands % 2 %      Lymphocytes % 53 %      Monocytes % 6 %      Eosinophils, % 1 %      Basophils % 1 %      Metamyelocytes% 2 %      Myelocytes % 3 %      Absolute Neutrophils 1 06 Thousand/uL      Lymphocytes Absolute 1 65 Thousand/uL      Monocytes Absolute 0 19 Thousand/uL      Eosinophils Absolute 0 03 Thousand/uL      Basophils Absolute 0 03 Thousand/uL      Total Counted 100     Platelet Estimate Decreased    Urine Microscopic [[id number]]  (Abnormal) Collected: 05/29/21 2310    Lab Status: Final result Specimen: Urine, Clean Catch Updated: 05/29/21 2347     RBC, UA None Seen /hpf      WBC, UA 1-2 /hpf      Epithelial Cells Occasional /hpf      Bacteria, UA Occasional /hpf     CBC and differential [[id number]]  (Abnormal) Collected: 05/29/21 2303    Lab Status: Final result Specimen: Blood from Arm, Left Updated: 05/29/21 2347     WBC 3 11 Thousand/uL      RBC 2 84 Million/uL      Hemoglobin 8 3 g/dL      Hematocrit 24 9 %      MCV 88 fL      MCH 29 2 pg      MCHC 33 3 g/dL      RDW 15 6 %      MPV 11 5 fL      Platelets 17 Thousands/uL      nRBC 0 /100 WBCs     Comprehensive metabolic panel [[id number]]  (Abnormal) Collected: 05/29/21 2303    Lab Status: Final result Specimen: Blood from Arm, Left Updated: 05/29/21 2322     Sodium 137 mmol/L      Potassium 3 9 mmol/L      Chloride 103 mmol/L      CO2 20 mmol/L      ANION GAP 14 mmol/L      BUN 22 mg/dL      Creatinine 1 50 mg/dL      Glucose 146 mg/dL      Calcium 7 8 mg/dL      Corrected Calcium 8 8 mg/dL      AST 18 U/L      ALT 18 U/L      Alkaline Phosphatase 69 U/L      Total Protein 7 0 g/dL      Albumin 2 8 g/dL      Total Bilirubin 0 42 mg/dL      eGFR 49 ml/min/1 73sq m Narrative:      National Kidney Disease Foundation guidelines for Chronic Kidney Disease (CKD):     Stage 1 with normal or high GFR (GFR > 90 mL/min/1 73 square meters)    Stage 2 Mild CKD (GFR = 60-89 mL/min/1 73 square meters)    Stage 3A Moderate CKD (GFR = 45-59 mL/min/1 73 square meters)    Stage 3B Moderate CKD (GFR = 30-44 mL/min/1 73 square meters)    Stage 4 Severe CKD (GFR = 15-29 mL/min/1 73 square meters)    Stage 5 End Stage CKD (GFR <15 mL/min/1 73 square meters)  Note: GFR calculation is accurate only with a steady state creatinine    POCT urinalysis dipstick [[id number]]  (Abnormal) Resulted: 05/29/21 2311    Lab Status: Final result Specimen: Urine Updated: 05/29/21 2313     Color, UA yellow     Clarity, UA clear    Urine Macroscopic, POC [[id number]]  (Abnormal) Collected: 05/29/21 2310    Lab Status: Final result Specimen: Urine Updated: 05/29/21 2311     Color, UA Yellow     Clarity, UA Clear     pH, UA 5 0     Leukocytes, UA Negative     Nitrite, UA Negative     Protein, UA 30 (1+) mg/dl      Glucose, UA Negative mg/dl      Ketones, UA Negative mg/dl      Urobilinogen, UA 0 2 E U /dl      Bilirubin, UA Negative     Blood, UA Trace     Specific Lyndon, UA 1 015    Narrative:      [facility] RESULT                 XR chest 1 view portable    (Results Pending)   CT chest abdomen pelvis wo contrast    (Results Pending)   CT recon only lumbar spine    (Results Pending)              Procedures  ECG 12 Lead Documentation Only    Date/Time: 5/29/2021 10:31 PM  Performed by: Antonieta Maddox PA-C  Authorized by: Antonieta Maddox PA-C     Indications / Diagnosis:  Chest pain  ECG reviewed by me, the ED Provider: yes (also Dr Rossy Muir)    Patient location:  ED  Rate:     ECG rate:  89    ECG rate assessment: normal    Rhythm:     Rhythm: sinus rhythm    QRS:     QRS axis:  Normal  Conduction:     Conduction: normal    ST segments:     ST segments:  Normal  T waves:     T waves: normal ED Course  ED Course as of May 30 0110   Sat May 29, 2021   2232 89 bpm; No ST elevation, depression, no T wave inversion  No prior EKG   ECG 12 lead   2258 Pulse: 103   2258 Respirations: 18   2258 SpO2: 98 %   2311 Leukocytes, UA: Negative   2312 Nitrite, UA: Negative   2312 Blood, UA(!): Trace   2312 Bilirubin, UA: Negative   2330 Sodium: 137   2330 Potassium: 3 9   2330 1 4 10 days ago; 1 4 baseline per nephro   Creatinine(!): 1 50   2330 Calcium(!): 7 8   2348 391 2 weeks ago   Platelet Count(!!): 17   2348 10 1 2 weeks ago   Hemoglobin(!): 8 3   2349 Abs Neutrophils(!): 1 06   2349 WBC(!): 3 11   2350 WBC, UA(!): 1-2   Sun May 30, 2021   0000 Troponin I: <0 02   0100 Impression from [facility]:   No acute findings in chest    Mild bladder wall thickening, nonspecific finding  Questionable sludge and/or cholelithiasis without evidence cholecystitis  No acute findings of CT lumbar spine w/o contrast   CT chest abdomen pelvis wo contrast   [address] [place] text to [facility] Allen [facility]; Dr Emma Connor service; bridging orders placed  HEART Risk Score      Most Recent Value   Heart Score Risk Calculator   History  0 Filed at: 05/30/2021 0001   ECG  0 Filed at: 05/30/2021 0001   Age  1 Filed at: 05/30/2021 0001   Risk Factors  2 Filed at: 05/30/2021 0001   Troponin  0 Filed at: 05/30/2021 0001   HEART Score  3 Filed at: 05/30/2021 0001                      SBIRT 20yo+      Most Recent Value   SBIRT (25 yo +)   In order to provide better care to our patients, we are screening all of our patients for alcohol and drug use  Would it be okay to ask you these screening questions? Yes Filed at: 05/29/2021 2214   Initial Alcohol Screen: US AUDIT-C    1  How often do you have a drink containing alcohol?  0 Filed at: 05/29/2021 2214   2  How many drinks containing alcohol do you have on a typical day you are drinking? 0 Filed at: 05/29/2021 2214   3a  Male UNDER 65:  How often do you have five or more drinks on one occasion? 0 Filed at: 05/29/2021 2214   3b  FEMALE Any Age, or MALE 65+: How often do you have 4 or more drinks on one occassion? 0 Filed at: 05/29/2021 2214   Audit-C Score  0 Filed at: 05/29/2021 2214   PERLA: How many times in the past year have you    Used an illegal drug or used a prescription medication for non-medical reasons? Never Filed at: 05/29/2021 2214                    MDM  Number of Diagnoses or Management Options  Abnormal CT scan, gallbladder:   Acute bilateral low back pain without sciatica:   Anemia:   Chest pain:   CKD (chronic kidney disease):   Testicular cancer [facility] Thrombocytopenia [facility]   Diagnosis management comments: 58-year-old male with history of prostate cancer, GERD, hyperlipidemia, hypertension, kidney stones, polymyalgia, testicular cancer (most recent chemo 5/21) presents emergency department for evaluation of substernal, nonradiating chest pain, as well as lower back pain  EKG without ischemia, troponin negative  Heart score 3  Patient initially tachycardic and given active cancer treatment, patient would be at risk for PE   CT PE study not done in ED due to CKD stage 3  Labs significant for thrombocytopenia, will need platelet transfusion  Creatinine 1 5, slightly increased over baseline 1 4 per nephrology  CT of the chest normal, CT of the abdomen pelvis, with mild bladder wall thickening, nonspecific finding  Questionable gallbladder sludge/cholelithiasis, without evidence of acute cholecystitis  No acute findings the reconstruction of the lumbar spine  Patient to be admitted  Patient in agreement with plan           Amount and/or Complexity of Data Reviewed  Clinical lab tests: ordered and reviewed  Tests in the radiology section of CPT®: ordered        Disposition  Final diagnoses:   Acute bilateral low back pain without sciatica   Thrombocytopenia (HCC)   Anemia   CKD (chronic kidney disease)   Chest pain Testicular cancer ([facility] )   Abnormal CT scan, gallbladder     Time reflects when diagnosis was documented in both MDM as applicable and the Disposition within this note     Time User Action Codes Description Comment    5/29/2021 11:55 PM Buelah Abt Add [M54 5] Acute bilateral low back pain without sciatica     5/29/2021 11:55 PM [address] [D69 6] [facility]ocytopenia [facility] )     5/30/2021 12:36 AM Buelah Abt Add [D64 9] Anemia     5/30/2021 12:36 AM Buelah Abt Add [N18 9] CKD (chronic kidney disease)     5/30/2021 12:37 AM Buelah Abt Add [R07 9] Chest pain     5/30/2021 12:37 AM Buelah Abt Add [C62 90] Testicular cancer ([facility] )     5/30/2021  1:03 AM Buelah Abt Add [R93 2] Abnormal CT scan, gallbladder       ED Disposition     ED Disposition Condition Date/Time Comment    Admit Stable Sun May 30, 2021  1:08 AM Case was discussed with Eloisa Orozco PA-C and the patient's admission status was agreed to be Admission Status: inpatient status to the service of Dr Fredrik Spatz   Follow-up Information    None         Patient's Medications   Discharge Prescriptions    No medications on file     No discharge procedures on file      PDMP Review       Value Time User    PDMP Reviewed  Yes 2/19/2021  9:50 AM Elida Bray MD          ED Provider  Electronically Signed by           Saturnino Avendano PA-C  05/30/21 0110

## 2021-05-30 NOTE — ASSESSMENT & PLAN NOTE
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

## 2021-05-30 NOTE — ASSESSMENT & PLAN NOTE
Reports lower lumbar back pain the onset of chest pain  Located paraspinal bilaterally, does not radiate  Exacerbated with standing and movement, relieved via rest   CT lumbar spine- No acute findings  No fx  Likely sacroiliitis vs spinal cord compression ( less likely given no findings on CT and b/l nature of pain)  Pain management with tylenol   Avoid NSAIDs given kidney function and high-bleeding risk  Will consider MRI if pain does not improve on its own

## 2021-05-30 NOTE — ASSESSMENT & PLAN NOTE
Presents with acute onset substernal sharp chest pain, "right were my hiatal hernia is"  Does not radiate anywhere  Present with minimal exertion including walking and standing up, relieved by rest   Denies nausea, vomiting, diaphoresis, shortness of breath, lightheadedness, syncope  VS stable  Initially tachycardic  ACS risk factors:  HTN, HLD, T2DM, obesity, Age  Troponin <0 02  ECG- NSR, HR 89  No signs of ischemia  CT chest- No acute cardiopulmonary findings  Questionable sludge and/or cholelithiasis without evidence of cholecystitis  Likely GERD versus ACS versus PE ( CTA not obtained due to contrast dye nephrotoxicity)  JOSEFINA score 2, HEART score 4    Plan:  - V/Q scan ordered  Pt is at increased risk given cancer and age  - Trend troponin w/ ECG x 3  - Did not receive ASA  Will defer as pt's platelets are 17  Hematology consult placed

## 2021-05-30 NOTE — ASSESSMENT & PLAN NOTE
Lab Results   Component Value Date    PLT 17 (LL) 05/29/2021     (H) 05/13/2021     (H) 05/07/2021   Platelets 17, previously 391 on 5/13/21  Likely due to chemotherapy  Will transfuse 1 unit platelets  Holding DVT prophylaxis given high bleed risk from thrombocytopenia  Will resume DVT prophylaxis once platelets >73,973  Follow CBC in a m

## 2021-05-30 NOTE — ASSESSMENT & PLAN NOTE
Lab Results   Component Value Date    EGFR 49 05/29/2021    EGFR 53 05/19/2021    EGFR 50 05/13/2021    CREATININE 1 50 (H) 05/29/2021    CREATININE 1 40 (H) 05/19/2021    CREATININE 1 48 (H) 05/13/2021   Per nephrology note, new baseline is likely 1 4    Currently 1 5  Continue to avoid nephrotoxins, avoid hypotension  IV hydration overnight  Follow-up BMP in the morning

## 2021-05-30 NOTE — PLAN OF CARE
Problem: Potential for Falls  Goal: Patient will remain free of falls  Description: INTERVENTIONS:  - Assess patient frequently for physical needs  -  Identify cognitive and physical deficits and behaviors that affect risk of falls    -  [place] fall precautions as indicated by assessment   - Educate patient/family on patient safety including physical limitations  - Instruct patient to call for assistance with activity based on assessment  - Modify environment to reduce risk of injury  - Consider OT/PT consult to assist with strengthening/mobility  Outcome: Progressing     Problem: PAIN - ADULT  Goal: Verbalizes/displays adequate comfort level or baseline comfort level  Description: Interventions:  - Encourage patient to monitor pain and request assistance  - Assess pain using appropriate pain scale  - Administer analgesics based on type and severity of pain and evaluate response  - Implement non-pharmacological measures as appropriate and evaluate response  - Consider cultural and social influences on pain and pain management  - Notify physician/advanced practitioner if interventions unsuccessful or patient reports new pain  Outcome: Progressing     Problem: INFECTION - ADULT  Goal: Absence or prevention of progression during hospitalization  Description: INTERVENTIONS:  - Assess and monitor for signs and symptoms of infection  - Monitor lab/diagnostic results  - Monitor all insertion sites, i e  indwelling lines, tubes, and drains  - Monitor endotracheal if appropriate and nasal secretions for changes in amount and color  - [place] appropriate cooling/warming therapies per order  - Administer medications as ordered  - Instruct and encourage patient and family to use good hand hygiene technique  - Identify and instruct in appropriate isolation precautions for identified infection/condition  Outcome: Progressing  Goal: Absence of fever/infection during neutropenic period  Description: INTERVENTIONS:  - Monitor WBC    Outcome: Progressing     Problem: SAFETY ADULT  Goal: Patient will remain free of falls  Description: INTERVENTIONS:  - Assess patient frequently for physical needs  -  Identify cognitive and physical deficits and behaviors that affect risk of falls    -  [place] fall precautions as indicated by assessment   - Educate patient/family on patient safety including physical limitations  - Instruct patient to call for assistance with activity based on assessment  - Modify environment to reduce risk of injury  - Consider OT/PT consult to assist with strengthening/mobility  Outcome: Progressing  Goal: Maintain or return to baseline ADL function  Description: INTERVENTIONS:  -  Assess patient's ability to carry out ADLs; assess patient's baseline for ADL function and identify physical deficits which impact ability to perform ADLs (bathing, care of mouth/teeth, toileting, grooming, dressing, etc )  - Assess/evaluate cause of self-care deficits   - Assess range of motion  - Assess patient's mobility; develop plan if impaired  - Assess patient's need for assistive devices and provide as appropriate  - Encourage maximum independence but intervene and supervise when necessary  - Involve family in performance of ADLs  - Assess for home care needs following discharge   - Consider OT consult to assist with ADL evaluation and planning for discharge  - Provide patient education as appropriate  Outcome: Progressing  Goal: Maintain or return mobility status to optimal level  Description: INTERVENTIONS:  - Assess patient's baseline mobility status (ambulation, transfers, stairs, etc )    - Identify cognitive and physical deficits and behaviors that affect mobility  - Identify mobility aids required to assist with transfers and/or ambulation (gait belt, sit-to-stand, lift, walker, cane, etc )  - [place] fall precautions as indicated by assessment  - Record patient progress and toleration of activity level on Mobility SBAR; progress patient to next Phase/Stage  - Instruct patient to call for assistance with activity based on assessment  - Consider rehabilitation consult to assist with strengthening/weightbearing, etc   Outcome: Progressing     Problem: DISCHARGE PLANNING  Goal: Discharge to home or other facility with appropriate resources  Description: INTERVENTIONS:  - Identify barriers to discharge w/patient and caregiver  - Arrange for needed discharge resources and transportation as appropriate  - Identify discharge learning needs (meds, wound care, etc )  - Arrange for interpretive services to assist at discharge as needed  - Refer to Case Management Department for coordinating discharge planning if the patient needs post-hospital services based on physician/advanced practitioner order or complex needs related to functional status, cognitive ability, or social support system  Outcome: Progressing     Problem: Knowledge Deficit  Goal: Patient/family/caregiver demonstrates understanding of disease process, treatment plan, medications, and discharge instructions  Description: Complete learning assessment and assess knowledge base    Interventions:  - Provide teaching at level of understanding  - Provide teaching via preferred learning methods  Outcome: Progressing     Problem: METABOLIC, FLUID AND ELECTROLYTES - ADULT  Goal: Electrolytes maintained within normal limits  Description: INTERVENTIONS:  - Monitor labs and assess patient for signs and symptoms of electrolyte imbalances  - Administer electrolyte replacement as ordered  - Monitor response to electrolyte replacements, including repeat lab results as appropriate  - Instruct patient on fluid and nutrition as appropriate  Outcome: Progressing  Goal: Fluid balance maintained  Description: INTERVENTIONS:  - Monitor labs   - Monitor I/O and WT  - Instruct patient on fluid and nutrition as appropriate  - Assess for signs & symptoms of volume excess or deficit  Outcome: Progressing Problem: HEMATOLOGIC - ADULT  Goal: Maintains hematologic stability  Description: INTERVENTIONS  - Assess for signs and symptoms of bleeding or hemorrhage  - Monitor labs  - Administer supportive blood products/factors as ordered and appropriate  Outcome: Progressing     Problem: MUSCULOSKELETAL - ADULT  Goal: Maintain or return mobility to safest level of function  Description: INTERVENTIONS:  - Assess patient's ability to carry out ADLs; assess patient's baseline for ADL function and identify physical deficits which impact ability to perform ADLs (bathing, care of mouth/teeth, toileting, grooming, dressing, etc )  - Assess/evaluate cause of self-care deficits   - Assess range of motion  - Assess patient's mobility  - Assess patient's need for assistive devices and provide as appropriate  - Encourage maximum independence but intervene and supervise when necessary  - Involve family in performance of ADLs  - Assess for home care needs following discharge   - Consider OT consult to assist with ADL evaluation and planning for discharge  - Provide patient education as appropriate  Outcome: Progressing  Goal: Maintain proper alignment of affected body part  Description: INTERVENTIONS:  - Support, maintain and protect limb and body alignment  - Provide patient/ family with appropriate education  Outcome: Progressing

## 2021-05-30 NOTE — ASSESSMENT & PLAN NOTE
· CKD 3 stable    Results from last 7 days   Lab Units 05/31/21  0608 05/29/21  2303   BUN mg/dL 22 22   CREATININE mg/dL 1 45* 1 50*   EGFR ml/min/1 73sq m 51 14

## 2021-05-30 NOTE — ASSESSMENT & PLAN NOTE
· Atypical chest pain appears more GI related or recent chemotherapy  · No events on telemetry and cardiac enzymes negative    · No further recurrence during this hospitalization    Results from last 7 days   Lab Units 05/30/21  0641 05/30/21  0348 05/29/21  2303   TROPONIN I ng/mL <0 02 <0 02 <0 02

## 2021-05-30 NOTE — ASSESSMENT & PLAN NOTE
Lab Results   Component Value Date    HGB 8 3 (L) 05/29/2021    HGB 10 1 (L) 05/13/2021    HGB 10 2 (L) 05/07/2021   Baseline hemoglobin around 10 0  Currently 8 3  Likely secondary to chemotherapy  Denies hematochezia, melena, other bleeding  Follow-up CBC in a m  Will consider transfusion for hemoglobin <7 0  Hematology consulted    Appreciate recs

## 2021-05-30 NOTE — ASSESSMENT & PLAN NOTE
Lab Results   Component Value Date    HGBA1C 6 8 (H) 04/15/2021       No results for input(s): POCGLU in the last 72 hours  Blood Sugar Average: Last 72 hrs:   home regimen:  Januvia 50 mg daily  Will hold oral anti hyperglycemics  Will start sliding scale insulin  Accu-Cheks with meals and q h s    Diabetic diet

## 2021-05-30 NOTE — ASSESSMENT & PLAN NOTE
Lab Results   Component Value Date    HGBA1C 6 8 (H) 04/15/2021     Recent Labs     05/30/21  0822 05/30/21  1123 05/30/21  1653 05/30/21 2053   POCGLU 100 123 101 114     · Glucose stable continue Januvia after discharge

## 2021-05-31 VITALS
WEIGHT: 247.2 LBS | OXYGEN SATURATION: 95 % | SYSTOLIC BLOOD PRESSURE: 125 MMHG | BODY MASS INDEX: 37.6 KG/M2 | RESPIRATION RATE: 20 BRPM | HEART RATE: 80 BPM | TEMPERATURE: 98.1 F | DIASTOLIC BLOOD PRESSURE: 66 MMHG

## 2021-05-31 LAB
ABO GROUP BLD BPU: NORMAL
ANION GAP SERPL CALCULATED.3IONS-SCNC: 12 MMOL/L (ref 4–13)
BPU ID: NORMAL
BUN SERPL-MCNC: 22 MG/DL (ref 5–25)
CALCIUM SERPL-MCNC: 7.9 MG/DL (ref 8.3–10.1)
CHLORIDE SERPL-SCNC: 105 MMOL/L (ref 100–108)
CO2 SERPL-SCNC: 24 MMOL/L (ref 21–32)
CREAT SERPL-MCNC: 1.45 MG/DL (ref 0.6–1.3)
ERYTHROCYTE [DISTWIDTH] IN BLOOD BY AUTOMATED COUNT: 16.1 % (ref 11.6–15.1)
GFR SERPL CREATININE-BSD FRML MDRD: 51 ML/MIN/1.73SQ M
GLUCOSE SERPL-MCNC: 101 MG/DL (ref 65–140)
HCT VFR BLD AUTO: 24.6 % (ref 36.5–49.3)
HGB BLD-MCNC: 8.5 G/DL (ref 12–17)
MCH RBC QN AUTO: 30 PG (ref 26.8–34.3)
MCHC RBC AUTO-ENTMCNC: 34.6 G/DL (ref 31.4–37.4)
MCV RBC AUTO: 87 FL (ref 82–98)
PLATELET # BLD AUTO: 66 THOUSANDS/UL (ref 149–390)
PMV BLD AUTO: 10.8 FL (ref 8.9–12.7)
POTASSIUM SERPL-SCNC: 3.6 MMOL/L (ref 3.5–5.3)
RBC # BLD AUTO: 2.83 MILLION/UL (ref 3.88–5.62)
SODIUM SERPL-SCNC: 141 MMOL/L (ref 136–145)
UNIT DISPENSE STATUS: NORMAL
UNIT PRODUCT CODE: NORMAL
UNIT RH: NORMAL
WBC # BLD AUTO: 9.72 THOUSAND/UL (ref 4.31–10.16)

## 2021-05-31 PROCEDURE — 85027 COMPLETE CBC AUTOMATED: CPT | Performed by: PHYSICIAN ASSISTANT

## 2021-05-31 PROCEDURE — 80048 BASIC METABOLIC PNL TOTAL CA: CPT | Performed by: PHYSICIAN ASSISTANT

## 2021-05-31 PROCEDURE — 99239 HOSP IP/OBS DSCHRG MGMT >30: CPT | Performed by: INTERNAL MEDICINE

## 2021-05-31 PROCEDURE — 99223 1ST HOSP IP/OBS HIGH 75: CPT | Performed by: INTERNAL MEDICINE

## 2021-05-31 RX ADMIN — ALLOPURINOL 100 MG: 100 TABLET ORAL at 09:21

## 2021-05-31 RX ADMIN — PANTOPRAZOLE SODIUM 40 MG: 40 TABLET, DELAYED RELEASE ORAL at 06:24

## 2021-05-31 RX ADMIN — PREDNISONE 0.5 MG: 1 TABLET ORAL at 09:21

## 2021-05-31 NOTE — CONSULTS
Patient: Yuliya Mcdaniel  Patient MRN: [id number]  Service date: 5/31/2021  Attending Physician:       CHIEF COMPLAIN  Chief Complaint   Patient presents with    Chest Pain     Pt c/o chest pain and lower back pain started last night   Back Pain       Heme / Oncology history:  Yuliya Mcdaniel is a 57 y o  male     1, Stage II left seminoma  -  Status post orchiectomy, receiving adjuvant chemotherapy, cycle 1 cisplatin plus etoposide, afterwards developed significant JULIO C  -  cycle 2  Received carboplatin plus etoposide from 05/17 to 5/1+ Neulasta             ASSESSMENT/PLAN:  Yuliya Mcdaniel is a 57 y o  male with:    1)  seminoma  2)   Anemia -  Moderate, hemoglobin 8 5,  MCV 87,  Has been trending down since chemotherapy started  3)   Thrombocytopenia -   Platelet was 17  On 87/86, received transfusion, platelets 66  On 96/20 ,  Likely due to chemotherapy  4)    CKD      -  Cytopenias likely due to chemotherapy side effect, no need further workup or transfusion at this point  Continue monitor,  No need G-CSF, or Epogen      -   CT scan showed a good partial response to chemotherapy  -  Overall from hematology oncology standpoint, there is no further workup, patient can be discharged and continue management as outpatient   After discharge patient will need weekly labs  His next  Oncology follow-up appointment was scheduled on 06/03,  Next cycle of chemotherapy was scheduled on 06/07  minutes were spent face to face with patient with greater than 50% of the time spent in counseling or coordination of care including discussions of treatment instructions  All of the patient's questions were answered to their satisfactory during this discussion         Jordan Chan MD PhD  Hematology / Oncology          HISTORY OF PRESENT ILLNESS:  Yuliya Mcdaniel is a 57 y o  male who As above summarized seminoma, and history of CKD, chronic back pain, presents  No chest pain, patient was found having cytopenia, hematology was consulted for comanagement     Patient reported overall has been doing okay reason 2nd cycle compared to 1st 1  Given her chest pain not have subsided  Patient reported no fever, no dyspnea, hemoptysis, no bleeding  Mild nausea vomiting tolerable  Patient nonsmoker  PROBLEM LIST:  Patient Active Problem List   Diagnosis    Calculus of kidney    Elevated PSA    Benign prostatic hyperplasia with nocturia    Prostate cancer ([facility] [address] [facility]    Hydrocele in adult    Mass of left testis    Essential hypertension    GERD (gastroesophageal reflux disease)    CPAP (continuous positive airway pressure) dependence    Obesity    Seminoma of descended left testis ([facility] [address] [facility]    Seminoma of testis, stage 3, left (HCC)    Chemoprevention    Chemotherapy-induced neutropenia (HCC)    JULIO C (acute kidney injury) ([address] )    SIMON on CPAP    Polymyalgia rheumatica syndrome (HCC)    Steroid-induced hyperglycemia    Chest discomfort    ARF (acute renal failure) with tubular necrosis (HCC)    Stage 3a chronic kidney disease (HCC)    CKD (chronic kidney disease) stage 3, GFR 30-59 ml/min (HCC)    Chest pain    Back pain    Thrombocytopenia (HCC)    Anemia    Type 2 diabetes mellitus ([facility] [address] )                     PAST MEDICAL HISTORY:   has a past medical history of BPH without obstruction/lower urinary tract symptoms, Cancer ([address] ), CPAP (continuous positive airway pressure) dependence, Elevated PSA, GERD (gastroesophageal reflux disease), Gout, Hyperlipidemia, Hypertension, Kidney stone, Obese abdomen, Obesity, Polymyalgia ([address] ), Prostate cancer ([address] ), Rash, Sleep apnea, Testicular carcinoma ([address] ), Urinary frequency, Urinary urgency, and Wears glasses  PAST SURGICAL HISTORY:   has a past surgical history that includes Hernia repair; Cystoscopy w/ laser lithotripsy (2001); Cystostomy w/ stent insertion (2011);  Esophagogastroduodenoscopy; pr cysto/uretero w/lithotripsy &indwell stent insrt (Right, 4/29/2018); Extracorporeal shock wave lithotripsy (Right, 2011); Carpal tunnel release (Bilateral, 07/2020); Knee arthroscopy (Left); Colonoscopy; and pr removal testis,radical (Left, 2/19/2021)  CURRENT MEDICATIONS  Scheduled Meds:  Current Facility-Administered Medications   Medication Dose Route Frequency Provider Last Rate    acetaminophen  650 mg Oral Q6H PRN Floydene TASH Henderson      allopurinol  100 mg Oral Daily FloydAlexis Falk      aluminum-magnesium hydroxide-simethicone  30 mL Oral Q6H PRN Floydene TASH Henderson      atorvastatin  40 mg Oral Daily With eyeQ [place]      ondansetron  4 mg Intravenous Q6H PRN Flochristopherene TASH Henderson      pantoprazole  40 mg Oral Early Morning Alexis Fitzgerald      predniSONE  0 5 mg Oral Daily Gypsy Aakash, DO       Continuous Infusions:   PRN Meds:   acetaminophen    aluminum-magnesium hydroxide-simethicone    ondansetron    SOCIAL HISTORY:   reports that he has never smoked  He has never used smokeless tobacco  He reports that he does not drink alcohol or use drugs  FAMILY HISTORY:  family history includes Alzheimer's disease in his mother; Nephrolithiasis in his father  ALLERGIES:  has No Known Allergies  REVIEW OF SYSTEMS:  Please note that a 14-point review of systems was performed to include Constitutional, HEENT, Respiratory, CVS, GI, , Musculoskeletal, Integumentary, Neurologic, Rheumatologic, Endocrinologic, Psychiatric, Lymphatic, and Hematologic/Oncologic systems were reviewed and are negative unless otherwise stated in HPI  Positive and negative findings pertinent to this evaluation are incorporated into the history of present illness  PHYSICAL EXAMINATION:  Vital Signs: /66 (BP Location: Right arm)   Pulse 80   Temp 98 1 °F (36 7 °C) (Oral)   Resp 20   Wt 112 kg (247 lb 3 2 oz)   SpO2 95%   BMI 37 60 kg/m²   Body surface area is 2 24 meters squared     Ht Readings from Last 3 Encounters:   05/21/21 5' 7 99" (1 727 m)   05/21/21 5' 7 99" (1 727 m)   05/20/21 5' 7 99" (1 727 m)       Wt Readings from Last 3 Encounters:   05/29/21 112 kg (247 lb 3 2 oz)   05/21/21 117 kg (258 lb 13 1 oz)   05/21/21 117 kg (259 lb)        Temp Readings from Last 3 Encounters:   05/31/21 98 1 °F (36 7 °C) (Oral)   05/21/21 97 7 °F (36 5 °C) (Tympanic)   05/20/21 99 °F (37 2 °C) (Tympanic)        BP Readings from Last 3 Encounters:   05/31/21 125/66   05/21/21 163/83   05/21/21 138/78         Pulse Readings from Last 3 Encounters:   05/31/21 80   05/21/21 59   05/21/21 68        appears pale, Status post left orchiectomy, otherwise no major findings       Constitutional: Alert and oriented    HEENT: Anicteric, PERRLA  Chest: Decreased breathing sound bilaterally, No wheezes/rales/rhonchi  CVS: Regular rhythm  Normal rate  Abdomen: Soft, nontender, nondistended  No palpable organomegaly  Extremities: No cyanosis/clubbing/edema  Integumentary: No obvious rashes or bruises  Musculoskeletal: No obvious bony or joint deformities  Psychiatric: Appropriate affect and mood  Lymph Node Survey: No palpable preauricular, submandibular, cervical, supraclavicular, axillary, epitrochlear or inguinal lymphadenopathy      LABS:  Results from last 7 days   Lab Units 05/30/21  0641 05/30/21  0348 05/29/21  2303   TROPONIN I ng/mL <0 02 <0 02 <0 02     CBC with diff:   Results from last 7 days   Lab Units 05/31/21  0608 05/29/21  2303   WBC Thousand/uL 9 72 3 11*   HEMOGLOBIN g/dL 8 5* 8 3*   HEMATOCRIT % 24 6* 24 9*   MCV fL 87 88   PLATELETS Thousands/uL 66* 17*   MCH pg 30 0 29 2   MCHC g/dL 34 6 33 3   RDW % 16 1* 15 6*   MPV fL 10 8 11 5   NRBC AUTO /100 WBCs  --  0       CMP:  Results from last 7 days   Lab Units 05/31/21  0608 05/29/21  2303   POTASSIUM mmol/L 3 6 3 9   CHLORIDE mmol/L 105 103   CO2 mmol/L 24 20*   BUN mg/dL 22 22   CREATININE mg/dL 1 45* 1 50*   CALCIUM mg/dL 7 9* 7 8*   AST U/L  --  18 ALT U/L  --  18   ALK PHOS U/L  --  69   EGFR ml/min/1 73sq m 51 52                 Invalid input(s): TNI,  PCT              IMAGING:  CT chest abdomen pelvis wo contrast   Final Result      No acute traumatic injury to the chest, abdomen or pelvis  Findings consistent with left orchiectomy with resolving retroperitoneal adenopathy  Recommend correlation with urinalysis to exclude cystitis  Mild bladder wall thickening may be secondary to underdistention  I concur with the original preliminary report provided by [employer]  Workstation performed: [id number]         CT recon only lumbar spine   Final Result      Multilevel lumbar spondylosis as described above with disc space narrowing and foraminal stenosis most pronounced at L5 upon S1 where there is also left subarticular and lateral recess recess encroachment due to a left paracentral disc herniation  Moderate right greater than left foraminal stenosis  Correlate for left S1 radiculitis  Consider follow-up MR imaging if additional soft tissue characterization of the spinal canal and foraminal contents is clinically warranted  I concur with the original preliminary be Gregorio Ma which stated that there was no acute osseous abnormality  Workstation performed: [id number]         XR chest 1 view portable   Final Result      No acute cardiopulmonary disease                 Workstation performed: [id number]

## 2021-05-31 NOTE — PLAN OF CARE
Problem: Potential for Falls  Goal: Patient will remain free of falls  Description: INTERVENTIONS:  - Assess patient frequently for physical needs  -  Identify cognitive and physical deficits and behaviors that affect risk of falls    -  [place] fall precautions as indicated by assessment   - Educate patient/family on patient safety including physical limitations  - Instruct patient to call for assistance with activity based on assessment  - Modify environment to reduce risk of injury  - Consider OT/PT consult to assist with strengthening/mobility  Outcome: Progressing     Problem: PAIN - ADULT  Goal: Verbalizes/displays adequate comfort level or baseline comfort level  Description: Interventions:  - Encourage patient to monitor pain and request assistance  - Assess pain using appropriate pain scale  - Administer analgesics based on type and severity of pain and evaluate response  - Implement non-pharmacological measures as appropriate and evaluate response  - Consider cultural and social influences on pain and pain management  - Notify physician/advanced practitioner if interventions unsuccessful or patient reports new pain  Outcome: Progressing     Problem: INFECTION - ADULT  Goal: Absence or prevention of progression during hospitalization  Description: INTERVENTIONS:  - Assess and monitor for signs and symptoms of infection  - Monitor lab/diagnostic results  - Monitor all insertion sites, i e  indwelling lines, tubes, and drains  - Monitor endotracheal if appropriate and nasal secretions for changes in amount and color  - [place] appropriate cooling/warming therapies per order  - Administer medications as ordered  - Instruct and encourage patient and family to use good hand hygiene technique  - Identify and instruct in appropriate isolation precautions for identified infection/condition  Outcome: Progressing  Goal: Absence of fever/infection during neutropenic period  Description: INTERVENTIONS:  - Monitor WBC    Outcome: Progressing     Problem: SAFETY ADULT  Goal: Patient will remain free of falls  Description: INTERVENTIONS:  - Assess patient frequently for physical needs  -  Identify cognitive and physical deficits and behaviors that affect risk of falls    -  [place] fall precautions as indicated by assessment   - Educate patient/family on patient safety including physical limitations  - Instruct patient to call for assistance with activity based on assessment  - Modify environment to reduce risk of injury  - Consider OT/PT consult to assist with strengthening/mobility  Outcome: Progressing  Goal: Maintain or return to baseline ADL function  Description: INTERVENTIONS:  -  Assess patient's ability to carry out ADLs; assess patient's baseline for ADL function and identify physical deficits which impact ability to perform ADLs (bathing, care of mouth/teeth, toileting, grooming, dressing, etc )  - Assess/evaluate cause of self-care deficits   - Assess range of motion  - Assess patient's mobility; develop plan if impaired  - Assess patient's need for assistive devices and provide as appropriate  - Encourage maximum independence but intervene and supervise when necessary  - Involve family in performance of ADLs  - Assess for home care needs following discharge   - Consider OT consult to assist with ADL evaluation and planning for discharge  - Provide patient education as appropriate  Outcome: Progressing  Goal: Maintain or return mobility status to optimal level  Description: INTERVENTIONS:  - Assess patient's baseline mobility status (ambulation, transfers, stairs, etc )    - Identify cognitive and physical deficits and behaviors that affect mobility  - Identify mobility aids required to assist with transfers and/or ambulation (gait belt, sit-to-stand, lift, walker, cane, etc )  - [place] fall precautions as indicated by assessment  - Record patient progress and toleration of activity level on Mobility SBAR; progress patient to next Phase/Stage  - Instruct patient to call for assistance with activity based on assessment  - Consider rehabilitation consult to assist with strengthening/weightbearing, etc   Outcome: Progressing     Problem: DISCHARGE PLANNING  Goal: Discharge to home or other facility with appropriate resources  Description: INTERVENTIONS:  - Identify barriers to discharge w/patient and caregiver  - Arrange for needed discharge resources and transportation as appropriate  - Identify discharge learning needs (meds, wound care, etc )  - Arrange for interpretive services to assist at discharge as needed  - Refer to Case Management Department for coordinating discharge planning if the patient needs post-hospital services based on physician/advanced practitioner order or complex needs related to functional status, cognitive ability, or social support system  Outcome: Progressing     Problem: Knowledge Deficit  Goal: Patient/family/caregiver demonstrates understanding of disease process, treatment plan, medications, and discharge instructions  Description: Complete learning assessment and assess knowledge base    Interventions:  - Provide teaching at level of understanding  - Provide teaching via preferred learning methods  Outcome: Progressing     Problem: METABOLIC, FLUID AND ELECTROLYTES - ADULT  Goal: Electrolytes maintained within normal limits  Description: INTERVENTIONS:  - Monitor labs and assess patient for signs and symptoms of electrolyte imbalances  - Administer electrolyte replacement as ordered  - Monitor response to electrolyte replacements, including repeat lab results as appropriate  - Instruct patient on fluid and nutrition as appropriate  Outcome: Progressing  Goal: Fluid balance maintained  Description: INTERVENTIONS:  - Monitor labs   - Monitor I/O and WT  - Instruct patient on fluid and nutrition as appropriate  - Assess for signs & symptoms of volume excess or deficit  Outcome: Progressing Problem: HEMATOLOGIC - ADULT  Goal: Maintains hematologic stability  Description: INTERVENTIONS  - Assess for signs and symptoms of bleeding or hemorrhage  - Monitor labs  - Administer supportive blood products/factors as ordered and appropriate  Outcome: Progressing     Problem: MUSCULOSKELETAL - ADULT  Goal: Maintain or return mobility to safest level of function  Description: INTERVENTIONS:  - Assess patient's ability to carry out ADLs; assess patient's baseline for ADL function and identify physical deficits which impact ability to perform ADLs (bathing, care of mouth/teeth, toileting, grooming, dressing, etc )  - Assess/evaluate cause of self-care deficits   - Assess range of motion  - Assess patient's mobility  - Assess patient's need for assistive devices and provide as appropriate  - Encourage maximum independence but intervene and supervise when necessary  - Involve family in performance of ADLs  - Assess for home care needs following discharge   - Consider OT consult to assist with ADL evaluation and planning for discharge  - Provide patient education as appropriate  Outcome: Progressing  Goal: Maintain proper alignment of affected body part  Description: INTERVENTIONS:  - Support, maintain and protect limb and body alignment  - Provide patient/ family with appropriate education  Outcome: Progressing     Problem: Prexisting or High Potential for Compromised Skin Integrity  Goal: Skin integrity is maintained or improved  Description: INTERVENTIONS:  - Identify patients at risk for skin breakdown  - Assess and monitor skin integrity  - Assess and monitor nutrition and hydration status  - Monitor labs   - Assess for incontinence   - Turn and reposition patient  - Assist with mobility/ambulation  - Relieve pressure over bony prominences  - Avoid friction and shearing  - Provide appropriate hygiene as needed including keeping skin clean and dry  - Evaluate need for skin moisturizer/barrier cream  - Collaborate with interdisciplinary team   - Patient/family teaching  - Consider wound care consult   Outcome: Progressing     Problem: Nutrition/Hydration-ADULT  Goal: Nutrient/Hydration intake appropriate for improving, restoring or maintaining nutritional needs  Description: Monitor and assess patient's nutrition/hydration status for malnutrition  Collaborate with interdisciplinary team and initiate plan and interventions as ordered  Monitor patient's weight and dietary intake as ordered or per policy  Utilize nutrition screening tool and intervene as necessary  Determine patient's food preferences and provide high-protein, high-caloric foods as appropriate       INTERVENTIONS:  - Monitor oral intake, urinary output, labs, and treatment plans  - Assess nutrition and hydration status and recommend course of action  - Evaluate amount of meals eaten  - Assist patient with eating if necessary   - Allow adequate time for meals  - Recommend/ encourage appropriate diets, oral nutritional supplements, and vitamin/mineral supplements  - Order, calculate, and assess calorie counts as needed  - Recommend, monitor, and adjust tube feedings and TPN/PPN based on assessed needs  - Assess need for intravenous fluids  - Provide specific nutrition/hydration education as appropriate  - Include patient/family/caregiver in decisions related to nutrition  Outcome: Progressing

## 2021-05-31 NOTE — PHYSICAL THERAPY NOTE
Physical Therapy Screen    Patient Name: Lexa Corona    VNDKL'Q Date: 5/31/2021     Problem List  Principal Problem:    Chest pain  Active Problems:    GERD (gastroesophageal reflux disease)    Seminoma of testis, stage 3, left (HCC)    Polymyalgia rheumatica syndrome (HCC)    CKD (chronic kidney disease) stage 3, GFR 30-59 ml/min (HCC)    Back pain    Thrombocytopenia (HCC)    Anemia    Type 2 diabetes mellitus ([facility] [address] )       Past Medical History  Past Medical History:   Diagnosis Date    BPH without obstruction/lower urinary tract symptoms     Cancer (HCC)     prostate    CPAP (continuous positive airway pressure) dependence     Elevated PSA     GERD (gastroesophageal reflux disease)     Gout     Hyperlipidemia     Hypertension     Kidney stone     Obese abdomen     Obesity     Polymyalgia ([facility] [address] [facility]     Prostate cancer ([facility] [address] )     Rash     under both arms and in between legs - family doctor aware - pt uses [facility]    Sleep apnea     Testicular carcinoma ([facility] [address] )     Urinary frequency     Urinary urgency     Wears glasses         Past Surgical History  Past Surgical History:   Procedure Laterality Date    CARPAL TUNNEL RELEASE Bilateral 07/2020    COLONOSCOPY      CYSTOSCOPY W/ LASER LITHOTRIPSY  2001    CYSTOSTOMY W/ STENT INSERTION  2011    ESOPHAGOGASTRODUODENOSCOPY      EXTRACORPOREAL SHOCK WAVE LITHOTRIPSY Right 2011    HERNIA REPAIR      KNEE ARTHROSCOPY Left     [place] CYSTO/URETERO W/LITHOTRIPSY &INDWELL STENT INSRT Right 4/29/2018    Procedure: CYSTOSCOPY URETEROSCOPY, RETROGRADE PYELOGRAM AND INSERTION STENT URETERAL;  Surgeon: Matthew Aleman MD;  Location: [facility];  Service: Urology    [place] REMOVAL TESTIS,RADICAL Left 2/19/2021    Procedure: Radical  ORCHIECTOMY;  Surgeon: Addie Copeland MD;  Location: [facility];  Service: Urology        ASSESSMENT:  PT consult received  Pt seen for PT SCREEN only   RN reported that pt is independent & medically cleared for D/C today  Spoke to pt & pt states no concerns about going home & states that he is functioning at his baseline  No c/o pain noted during visit  Will D/C PT  Please advise PT when needs change  Nsg notified       Mary Angel, PT

## 2021-05-31 NOTE — PLAN OF CARE
Problem: Potential for Falls  Goal: Patient will remain free of falls  Description: INTERVENTIONS:  - Assess patient frequently for physical needs  -  Identify cognitive and physical deficits and behaviors that affect risk of falls    -  [place] fall precautions as indicated by assessment   - Educate patient/family on patient safety including physical limitations  - Instruct patient to call for assistance with activity based on assessment  - Modify environment to reduce risk of injury  - Consider OT/PT consult to assist with strengthening/mobility  Outcome: Completed     Problem: PAIN - ADULT  Goal: Verbalizes/displays adequate comfort level or baseline comfort level  Description: Interventions:  - Encourage patient to monitor pain and request assistance  - Assess pain using appropriate pain scale  - Administer analgesics based on type and severity of pain and evaluate response  - Implement non-pharmacological measures as appropriate and evaluate response  - Consider cultural and social influences on pain and pain management  - Notify physician/advanced practitioner if interventions unsuccessful or patient reports new pain  Outcome: Completed     Problem: INFECTION - ADULT  Goal: Absence or prevention of progression during hospitalization  Description: INTERVENTIONS:  - Assess and monitor for signs and symptoms of infection  - Monitor lab/diagnostic results  - Monitor all insertion sites, i e  indwelling lines, tubes, and drains  - Monitor endotracheal if appropriate and nasal secretions for changes in amount and color  - [place] appropriate cooling/warming therapies per order  - Administer medications as ordered  - Instruct and encourage patient and family to use good hand hygiene technique  - Identify and instruct in appropriate isolation precautions for identified infection/condition  Outcome: Completed  Goal: Absence of fever/infection during neutropenic period  Description: INTERVENTIONS:  - Monitor WBC    Outcome: Completed     Problem: SAFETY ADULT  Goal: Patient will remain free of falls  Description: INTERVENTIONS:  - Assess patient frequently for physical needs  -  Identify cognitive and physical deficits and behaviors that affect risk of falls    -  [place] fall precautions as indicated by assessment   - Educate patient/family on patient safety including physical limitations  - Instruct patient to call for assistance with activity based on assessment  - Modify environment to reduce risk of injury  - Consider OT/PT consult to assist with strengthening/mobility  Outcome: Completed  Goal: Maintain or return to baseline ADL function  Description: INTERVENTIONS:  -  Assess patient's ability to carry out ADLs; assess patient's baseline for ADL function and identify physical deficits which impact ability to perform ADLs (bathing, care of mouth/teeth, toileting, grooming, dressing, etc )  - Assess/evaluate cause of self-care deficits   - Assess range of motion  - Assess patient's mobility; develop plan if impaired  - Assess patient's need for assistive devices and provide as appropriate  - Encourage maximum independence but intervene and supervise when necessary  - Involve family in performance of ADLs  - Assess for home care needs following discharge   - Consider OT consult to assist with ADL evaluation and planning for discharge  - Provide patient education as appropriate  Outcome: Completed  Goal: Maintain or return mobility status to optimal level  Description: INTERVENTIONS:  - Assess patient's baseline mobility status (ambulation, transfers, stairs, etc )    - Identify cognitive and physical deficits and behaviors that affect mobility  - Identify mobility aids required to assist with transfers and/or ambulation (gait belt, sit-to-stand, lift, walker, cane, etc )  - [place] fall precautions as indicated by assessment  - Record patient progress and toleration of activity level on Mobility SBAR; progress patient to next Phase/Stage  - Instruct patient to call for assistance with activity based on assessment  - Consider rehabilitation consult to assist with strengthening/weightbearing, etc   Outcome: Completed     Problem: DISCHARGE PLANNING  Goal: Discharge to home or other facility with appropriate resources  Description: INTERVENTIONS:  - Identify barriers to discharge w/patient and caregiver  - Arrange for needed discharge resources and transportation as appropriate  - Identify discharge learning needs (meds, wound care, etc )  - Arrange for interpretive services to assist at discharge as needed  - Refer to Case Management Department for coordinating discharge planning if the patient needs post-hospital services based on physician/advanced practitioner order or complex needs related to functional status, cognitive ability, or social support system  Outcome: Completed     Problem: Knowledge Deficit  Goal: Patient/family/caregiver demonstrates understanding of disease process, treatment plan, medications, and discharge instructions  Description: Complete learning assessment and assess knowledge base    Interventions:  - Provide teaching at level of understanding  - Provide teaching via preferred learning methods  Outcome: Completed     Problem: METABOLIC, FLUID AND ELECTROLYTES - ADULT  Goal: Electrolytes maintained within normal limits  Description: INTERVENTIONS:  - Monitor labs and assess patient for signs and symptoms of electrolyte imbalances  - Administer electrolyte replacement as ordered  - Monitor response to electrolyte replacements, including repeat lab results as appropriate  - Instruct patient on fluid and nutrition as appropriate  Outcome: Completed  Goal: Fluid balance maintained  Description: INTERVENTIONS:  - Monitor labs   - Monitor I/O and WT  - Instruct patient on fluid and nutrition as appropriate  - Assess for signs & symptoms of volume excess or deficit  Outcome: Completed     Problem: HEMATOLOGIC - ADULT  Goal: Maintains hematologic stability  Description: INTERVENTIONS  - Assess for signs and symptoms of bleeding or hemorrhage  - Monitor labs  - Administer supportive blood products/factors as ordered and appropriate  Outcome: Completed     Problem: MUSCULOSKELETAL - ADULT  Goal: Maintain or return mobility to safest level of function  Description: INTERVENTIONS:  - Assess patient's ability to carry out ADLs; assess patient's baseline for ADL function and identify physical deficits which impact ability to perform ADLs (bathing, care of mouth/teeth, toileting, grooming, dressing, etc )  - Assess/evaluate cause of self-care deficits   - Assess range of motion  - Assess patient's mobility  - Assess patient's need for assistive devices and provide as appropriate  - Encourage maximum independence but intervene and supervise when necessary  - Involve family in performance of ADLs  - Assess for home care needs following discharge   - Consider OT consult to assist with ADL evaluation and planning for discharge  - Provide patient education as appropriate  Outcome: Completed  Goal: Maintain proper alignment of affected body part  Description: INTERVENTIONS:  - Support, maintain and protect limb and body alignment  - Provide patient/ family with appropriate education  Outcome: Completed     Problem: Prexisting or High Potential for Compromised Skin Integrity  Goal: Skin integrity is maintained or improved  Description: INTERVENTIONS:  - Identify patients at risk for skin breakdown  - Assess and monitor skin integrity  - Assess and monitor nutrition and hydration status  - Monitor labs   - Assess for incontinence   - Turn and reposition patient  - Assist with mobility/ambulation  - Relieve pressure over bony prominences  - Avoid friction and shearing  - Provide appropriate hygiene as needed including keeping skin clean and dry  - Evaluate need for skin moisturizer/barrier cream  - Collaborate with interdisciplinary team   - Patient/family teaching  - Consider wound care consult   Outcome: Completed     Problem: Nutrition/Hydration-ADULT  Goal: Nutrient/Hydration intake appropriate for improving, restoring or maintaining nutritional needs  Description: Monitor and assess patient's nutrition/hydration status for malnutrition  Collaborate with interdisciplinary team and initiate plan and interventions as ordered  Monitor patient's weight and dietary intake as ordered or per policy  Utilize nutrition screening tool and intervene as necessary  Determine patient's food preferences and provide high-protein, high-caloric foods as appropriate       INTERVENTIONS:  - Monitor oral intake, urinary output, labs, and treatment plans  - Assess nutrition and hydration status and recommend course of action  - Evaluate amount of meals eaten  - Assist patient with eating if necessary   - Allow adequate time for meals  - Recommend/ encourage appropriate diets, oral nutritional supplements, and vitamin/mineral supplements  - Order, calculate, and assess calorie counts as needed  - Recommend, monitor, and adjust tube feedings and TPN/PPN based on assessed needs  - Assess need for intravenous fluids  - Provide specific nutrition/hydration education as appropriate  - Include patient/family/caregiver in decisions related to nutrition  Outcome: Completed

## 2021-05-31 NOTE — DISCHARGE SUMMARY
[address] [facility]  Discharge- Mari Owens 1960, 57 y o  male MRN: [id number]  Unit/Bed#: [place] 68 2 [facility] 87 [id number] Encounter: [id number]  Primary Care Provider: Nori Coffman DO   Date and time admitted to hospital: 5/29/2021 10:09 PM    Admitting Provider:  Jamie Gonzalez DO  Discharge Provider:  Annalisa Maurice DO  Admission Date: 5/29/2021       Discharge Date: 05/31/21   LOS: 1  Primary Care Physician at Discharge: Nori Coffman, Brodie Leon:  Mari Owens is a 57 y o  male with a history of seminoma PMR and diabetes who presented the hospital with chest pain  He was woken at 2:00 a m  pain was described as a discomfort not heavy or sharp  He came to the emergency department where he was noted to have thrombocytopenia and given risk factors admitted  He did not have any further symptoms during hospitalization  His cardiac enzymes were negative and there are no events on telemetry  He did have thrombocytopenia and was seen by hematology but already has improved  This is likely secondary to chemotherapy  He is being discharged home in good condition and should follow-up with oncology as previously scheduled    Please see problem list listed below  DISCHARGE DIAGNOSES  * Chest pain  Assessment & Plan  · Atypical chest pain appears more GI related or recent chemotherapy  · No events on telemetry and cardiac enzymes negative    · No further recurrence during this hospitalization    Results from last 7 days   Lab Units 05/30/21  0641 05/30/21  0348 05/29/21  2303   TROPONIN I ng/mL <0 02 <0 02 <0 02       Type 2 diabetes mellitus [facility])  Assessment & Plan  Lab Results   Component Value Date    HGBA1C 6 8 (H) 04/15/2021     Recent Labs     05/30/21  0822 05/30/21  1123 05/30/21  1653 05/30/21 2053   POCGLU 100 123 101 114     · Glucose stable continue Januvia after discharge    Thrombocytopenia (HCC)  Assessment & Plan  · Thrombocytopenia secondary to chemotherapy  Platelets have improved during this hospitalization    Results from last 7 days   Lab Units 05/31/21  0608 05/29/21  2303   PLATELETS Thousands/uL 66* 17*       CKD (chronic kidney disease) stage 3, GFR 30-59 ml/min ([facility])  Assessment & Plan  · CKD 3 stable    Results from last 7 days   Lab Units 05/31/21  0608 05/29/21  2303   BUN mg/dL 22 22   CREATININE mg/dL 1 45* 1 50*   EGFR ml/min/1 73sq m 51 49       Polymyalgia rheumatica syndrome ([facility] [address] )  Assessment & Plan  · Patient being weaned off prednisone with last dose today 5/31/2021    Seminoma of testis, stage 3, left ([facility] [address] )  Assessment & Plan  · Seminoma currently on chemotherapy  Continue outpatient follow-up with oncology    GERD (gastroesophageal reflux disease)  Assessment & Plan  · Continue PPI after discharge    CONSULTING PROVIDERS   IP CONSULT TO HEMATOLOGY    PROCEDURES PERFORMED  * No surgery found *    RADIOLOGY RESULTS  Ct Chest Abdomen Pelvis Wo Contrast  Result Date: 5/30/2021  Impression: No acute traumatic injury to the chest, abdomen or pelvis  Findings consistent with left orchiectomy with resolving retroperitoneal adenopathy  Recommend correlation with urinalysis to exclude cystitis  Mild bladder wall thickening may be secondary to underdistention  I concur with the original preliminary report provided by [employer]  Workstation performed: [id number]     Xr Chest 1 View Portable  Result Date: 5/30/2021  Impression: No acute cardiopulmonary disease  Workstation performed: [id number]     Ct Recon Only Lumbar Spine  Result Date: 5/30/2021  Impression: Multilevel lumbar spondylosis as described above with disc space narrowing and foraminal stenosis most pronounced at L5 upon S1 where there is also left subarticular and lateral recess recess encroachment due to a left paracentral disc herniation  Moderate right greater than left foraminal stenosis  Correlate for left S1 radiculitis   Consider follow-up MR imaging if additional soft tissue characterization of the spinal canal and foraminal contents is clinically warranted  I concur with the original preliminary be [employer] which stated that there was no acute osseous abnormality  Workstation performed: [id number]       LABS  Results from last 7 days   Lab Units 05/31/21  0608 05/29/21  2303   WBC Thousand/uL 9 72 3 11*   HEMOGLOBIN g/dL 8 5* 8 3*   HEMATOCRIT % 24 6* 24 9*   MCV fL 87 88   TOTAL NEUT ABS Thousand/uL  --  1 06*   BANDS PCT %  --  2   PLATELETS Thousands/uL 66* 17*     Results from last 7 days   Lab Units 05/31/21  0608 05/29/21  2303   SODIUM mmol/L 141 137   POTASSIUM mmol/L 3 6 3 9   CHLORIDE mmol/L 105 103   CO2 mmol/L 24 20*   BUN mg/dL 22 22   CREATININE mg/dL 1 45* 1 50*   CALCIUM mg/dL 7 9* 7 8*   ALBUMIN g/dL  --  2 8*   TOTAL BILIRUBIN mg/dL  --  0 42   ALK PHOS U/L  --  69   ALT U/L  --  18   AST U/L  --  18   EGFR ml/min/1 73sq m 51 49   GLUCOSE RANDOM mg/dL 101 146*     Results from last 7 days   Lab Units 05/30/21  0641 05/30/21  0348 05/29/21  2303   TROPONIN I ng/mL <0 02 <0 02 <0 02              Results from last 7 days   Lab Units 05/30/21  2053 05/30/21  1653 05/30/21  1123 05/30/21  0822   POC GLUCOSE mg/dl 114 101 123 100       Cultures:   Results from last 7 days   Lab Units 05/29/21  2311 05/29/21  2310   COLOR UA  yellow Yellow   CLARITY UA  clear Clear   SPEC GRAV UA   --  1 015   PH UA   --  5 0   LEUKOCYTES UA   --  Negative   NITRITE UA   --  Negative   GLUCOSE UA mg/dl  --  Negative   KETONES UA mg/dl  --  Negative   BILIRUBIN UA   --  Negative   BLOOD UA   --  Trace*      Results from last 7 days   Lab Units 05/29/21  2310   RBC UA /hpf None Seen   WBC UA /hpf 1-2*   EPITHELIAL CELLS WET PREP /hpf Occasional   BACTERIA UA /hpf Occasional          DISCHARGE DAY VISIT AND PHYSICAL EXAM:  Subjective:  Patient seen and examined  No further chest pain or shortness of breath  Ambulating without difficulty      Vitals:   Blood Pressure: 125/66 (05/31/21 0745)  Pulse: 80 (05/31/21 0745)  Temperature: 98 1 °F (36 7 °C) (05/31/21 0745)  Temp Source: Oral (05/31/21 0745)  Respirations: 20 (05/31/21 0745)  Weight - Scale: 112 kg (247 lb 3 2 oz) (05/29/21 2134)  SpO2: 95 % (05/30/21 2050)    Physical Exam  Vitals signs reviewed  Constitutional:       General: He is not in acute distress  Cardiovascular:      Rate and Rhythm: Regular rhythm  Heart sounds: Normal heart sounds  Pulmonary:      Effort: Pulmonary effort is normal       Breath sounds: Decreased breath sounds present  No wheezing  Abdominal:      General: Bowel sounds are normal       Palpations: Abdomen is soft  Tenderness: There is no abdominal tenderness  There is no rebound  Musculoskeletal:         General: No swelling or tenderness  Skin:     General: Skin is warm and dry  Neurological:      General: No focal deficit present  Mental Status: He is alert and oriented to person, place, and time  Cranial Nerves: No cranial nerve deficit  Psychiatric:         Mood and Affect: Mood normal        Planned Re-admission:  No  Discharge Disposition: Home/Self Care    Test Results Pending at Discharge:   Incidental findings:     Medications   · Discharge Medication List: See after visit summary for reconciled discharge medications  Diet restrictions:  Diabetic diet   Activity restrictions: No strenuous activity  Discharge Condition: stable    Outpatient Follow-Up and Discharge Instructions  See after visit summary section titled Discharge Instructions for information provided to patient and family  Code Status: Level 1 - Full Code  Discharge Statement   I spent 35 minutes discharging the patient  This time was spent on the day of discharge  Greater than 50% of total time was spent with the patient and / or family counseling and / or coordination of care  ** Please Note: This note has been constructed using a voice recognition system   **

## 2021-06-01 LAB
ATRIAL RATE: 89 BPM
P AXIS: 48 DEGREES
PR INTERVAL: 150 MS
QRS AXIS: 2 DEGREES
QRSD INTERVAL: 78 MS
QT INTERVAL: 398 MS
QTC INTERVAL: 484 MS
T WAVE AXIS: -4 DEGREES
VENTRICULAR RATE: 89 BPM

## 2021-06-01 PROCEDURE — 93010 ELECTROCARDIOGRAM REPORT: CPT | Performed by: INTERNAL MEDICINE

## 2021-06-01 NOTE — UTILIZATION REVIEW
Initial Clinical Review    Admission: Date/Time/Statement:   Admission Orders (From admission, onward)     Ordered        05/30/21 0107  Inpatient Admission  Once                   Orders Placed This Encounter   Procedures    Inpatient Admission     Standing Status:   Standing     Number of Occurrences:   1     Order Specific Question:   Level of Care     Answer:   Med Surg [16]     Order Specific Question:   Estimated length of stay     Answer:   More than 2 Midnights     Order Specific Question:   Certification     Answer:   I certify that inpatient services are medically necessary for this patient for a duration of greater than two midnights  See H&P and MD Progress Notes for additional information about the patient's course of treatment  ED Arrival Information     Expected Arrival Acuity Means of Arrival Escorted By Service Admission Type    - 5/29/2021 21:30 Urgent Wheelchair Family Member Hospitalist Urgent    Arrival Complaint    chest pain, back pain        Chief Complaint   Patient presents with    Chest Pain     Pt c/o chest pain and lower back pain started last night   Back Pain       Initial Presentation:   57 y o  male with PMH HTN, HLD, CKD, T2DM, obesity, seminoma of left testis currently on chemotherapy, SIMON and polymyalgia rheumatica, presents to the ED from home with chest pain and back pain x1 day  Patient reports symptoms started a 2:00 a m  on 05/29/2021  Pain is described as sharp, substernal, non-radiating, exacerbated with standing and walking, alleviated through rest   Also complains of lower lumbar back pain which started at the same time as the chest pain, also exacerbated with standing and exercise, alleviated with rest  ED labs revealed creatinine of 1 5 (baseline 1 4), hemoglobin of 8 3 (was 10 on 5/13/21,  platelets 17 which is down from 400 on 5/13/21  CT chest showed no acute cardiopulmonary disease, cholelithiasis without evidence of cholecystitis    Lumbar CT showed no acute pathology, no fractures  Plan: Inpatient Med Surg admission for evaluation and treatment of chest pain, back pain, thrombocytopenia and anemia:  GERD vs  ACS vs  PE  (CTA not obtained due to contrast dye nephrotoxicity):  Trend troponin, obtain VQ scan, no aspirin due to low platelets, consult Hematology  Consider MRI if back pain does not improve  Transfuse one unit of platelets, follow CBC, hold DVT prophylaxis  IV fluids, BMP in a m      5/31 Oncology Medical:    Anemia -  Moderate, hemoglobin 8 5,  MCV 87,  has been trending down since chemotherapy started  Thrombocytopenia -   Platelet was 17  On 77/48, received transfusion, platelets 66  On 32/53, likely due to chemotherapy    Cytopenias likely due to chemotherapy side effect, no need further workup or transfusion at this point  Continue to monitor  5/31 Internal Medicine: Patient did not have any further symptoms during hospitaliation  Cardiac enzymes negative and no events on telemetry  Thrombocytopenia improved  Discharged to home/self care           ED Triage Vitals   Temperature Pulse Respirations Blood Pressure SpO2   05/29/21 2136 05/29/21 2136 05/29/21 2136 05/29/21 2137 05/29/21 2136   98 4 °F (36 9 °C) 103 18 169/74 98 %      Temp Source Heart Rate Source Patient Position - Orthostatic VS BP Location FiO2 (%)   05/29/21 2136 05/29/21 2136 05/29/21 2137 05/29/21 2137 --   Oral Monitor Sitting Right arm       Pain Score       05/30/21 0227       No Pain          Wt Readings from Last 1 Encounters:   05/29/21 112 kg (247 lb 3 2 oz)     Additional Vital Signs:       Date/Time  Temp  Pulse  Resp  BP  MAP (mmHg)  SpO2  O2 Device   05/31/21 07:45:29  98 1 °F (36 7 °C)  80  20  125/66  86  --  None (Room air)   05/30/21 20:50:55  98 6 °F (37 °C)  83  18  127/67  87  95 %  --   05/30/21 2050  --  --  --  --  --  --  None (Room air)   05/30/21 14:19:40  98 2 °F (36 8 °C)  80  20  123/67  86  97 %  --   05/30/21 08:19:43  98 6 °F (37 °C) 83  20  122/68  86  96 %  None (Room air)   05/30/21 06:56:57  99 7 °F (37 6 °C)  81  20  114/59  77  95 %  --   05/30/21 05:27:34  98 9 °F (37 2 °C)  81  20  119/58  78  95 %  --   05/30/21 05:22:26  98 9 °F (37 2 °C)  82  20  121/60  80  95 %  --   05/30/21 05:17:56  98 2 °F (36 8 °C)  84  20  123/62  82  96 %  --   05/30/21 05:07:40  99 1 °F (37 3 °C)  85  20  126/63  84  97 %  --   05/30/21 02:09:50  99 4 °F (37 4 °C)  88  18  131/66  88  97 %  None (Room air)   05/30/21 0033  --  84  16  130/59  --  100 %  None (Room air)   05/29/21 2214  --  89  16  143/65  --  98 %  None (Room air)           Pertinent Labs/Diagnostic Test Results:     5/30 CT CAP w/o contrast:   No acute traumatic injury to the chest, abdomen or pelvis  Findings consistent with left orchiectomy with resolving retroperitoneal adenopathy  Recommend correlation with urinalysis to exclude cystitis  Mild bladder wall thickening may be secondary to underdistention      5/30 CT lumbar spine:  Multilevel lumbar spondylosis as described above with disc space narrowing and foraminal stenosis most pronounced at L5 upon S1 where there is also left subarticular and lateral recess recess encroachment due to a left paracentral disc herniation  Moderate right greater than left foraminal stenosis  Correlate for left S1 radiculitis  Consider follow-up MR imaging if additional soft tissue characterization of the spinal canal and foraminal contents is clinically warranted  5/30 CXR:  No acute cardiopulmonary disease      5/30 EKG:      Normal sinus rhythm  Minimal voltage criteria for LVH, may be normal variant  Nonspecific T wave abnormality  Abnormal ECG          Results from last 7 days   Lab Units 05/31/21  0608 05/29/21  2303   WBC Thousand/uL 9 72 3 11*   HEMOGLOBIN g/dL 8 5* 8 3*   HEMATOCRIT % 24 6* 24 9*   PLATELETS Thousands/uL 66* 17*   BANDS PCT %  --  2         Results from last 7 days   Lab Units 05/31/21  0608 05/29/21  8227   SODIUM mmol/L 141 137   POTASSIUM mmol/L 3 6 3 9   CHLORIDE mmol/L 105 103   CO2 mmol/L 24 20*   ANION GAP mmol/L 12 14*   BUN mg/dL 22 22   CREATININE mg/dL 1 45* 1 50*   EGFR ml/min/1 73sq m 51 49   CALCIUM mg/dL 7 9* 7 8*     Results from last 7 days   Lab Units 05/29/21  2303   AST U/L 18   ALT U/L 18   ALK PHOS U/L 69   TOTAL PROTEIN g/dL 7 0   ALBUMIN g/dL 2 8*   TOTAL BILIRUBIN mg/dL 0 42     Results from last 7 days   Lab Units 05/30/21  2053 05/30/21  1653 05/30/21  1123 05/30/21  0822   POC GLUCOSE mg/dl 114 101 123 100     Results from last 7 days   Lab Units 05/31/21  0608 05/29/21  2303   GLUCOSE RANDOM mg/dL 101 146*         Results from last 7 days   Lab Units 05/30/21  0641 05/30/21  0348 05/29/21  2303   TROPONIN I ng/mL <0 02 <0 02 <0 02         Results from last 7 days   Lab Units 05/29/21  2311 05/29/21  2310   CLARITY UA  clear Clear   COLOR UA  yellow Yellow   SPEC GRAV UA   --  1 015   PH UA   --  5 0   GLUCOSE UA mg/dl  --  Negative   KETONES UA mg/dl  --  Negative   BLOOD UA   --  Trace*   PROTEIN UA mg/dl  --  30 (1+)*   NITRITE UA   --  Negative   BILIRUBIN UA   --  Negative   UROBILINOGEN UA E U /dl  --  0 2   LEUKOCYTES UA   --  Negative   WBC UA /hpf  --  1-2*   RBC UA /hpf  --  None Seen   BACTERIA UA /hpf  --  Occasional   EPITHELIAL CELLS WET PREP /hpf  --  Occasional               ED Treatment:   Medication Administration from 05/29/2021 2130 to 05/30/2021 0152     None        Past Medical History:   Diagnosis Date    BPH without obstruction/lower urinary tract symptoms     Cancer (HCC)     prostate    CPAP (continuous positive airway pressure) dependence     Elevated PSA     GERD (gastroesophageal reflux disease)     Gout     Hyperlipidemia     Hypertension     Kidney stone     Obese abdomen     Obesity     Polymyalgia (HCC)     Prostate cancer (HCC)     Rash     under both arms and in between legs - family doctor aware - pt uses Desitin    Sleep apnea     Testicular carcinoma ([facility] [address] )     Urinary frequency     Urinary urgency     Wears glasses      Present on Admission:   GERD (gastroesophageal reflux disease)   Polymyalgia rheumatica syndrome (HCC)   Seminoma of testis, stage 3, left (HCC)      Admitting Diagnosis: Testicular cancer (HCC) [C62 90]  Chest pain [R07 9]  Anemia [D64 9]  Thrombocytopenia (HCC) [D69 6]  CKD (chronic kidney disease) [N18 9]  Abnormal CT scan, gallbladder [R93 2]  Acute bilateral low back pain without sciatica [M54 5]  Age/Sex: 57 y o  male       Admission Orders:        Scheduled Medications:      Medication Dose Route Frequency    acetaminophen  650 mg Oral Q6H PRN    allopurinol  100 mg Oral Daily    aluminum-magnesium hydroxide-simethicone  30 mL Oral Q6H PRN    atorvastatin  40 mg Oral Daily With Dinner    ondansetron  4 mg Intravenous Q6H PRN    pantoprazole  40 mg Oral Early Morning    predniSONE  0 5 mg Oral Daily       sodium chloride 0 9 % infusion   Rate: 100 mL/hr Dose: 100 mL/hr  Freq: Continuous Route: IV  Last Dose: Stopped (05/30/21 1019)  Start: 05/30/21 0330 End: 05/30/21 0840          IP CONSULT TO HEMATOLOGY          Network Utilization Review Department  ATTENTION: Please call with any questions or concerns to [phone] and carefully listen to the prompts so that you are directed to the right person  All voicemails are confidential   [place] Doing all requests for admission clinical reviews, approved or denied determinations and any other requests to dedicated fax number below belonging to the campus where the patient is receiving treatment   List of dedicated fax numbers for the Facilities:  [address] DENIALS (Administrative/Medical Necessity) [phone]   [address] (Maternity/NICU/Pediatrics) [phone]   [address] [place] [address] [address] [facility] [id number] [place] [address] [address] [facility] [place] [address] [phone]

## 2021-06-02 ENCOUNTER — APPOINTMENT (OUTPATIENT)
Dept: LAB | Facility: CLINIC | Age: 61
End: 2021-06-02
Payer: COMMERCIAL

## 2021-06-02 LAB
ALBUMIN SERPL BCP-MCNC: 3.3 G/DL (ref 3.5–5)
ALP SERPL-CCNC: 95 U/L (ref 46–116)
ALT SERPL W P-5'-P-CCNC: 28 U/L (ref 12–78)
ANION GAP SERPL CALCULATED.3IONS-SCNC: 10 MMOL/L (ref 4–13)
ANISOCYTOSIS BLD QL SMEAR: PRESENT
AST SERPL W P-5'-P-CCNC: 21 U/L (ref 5–45)
BASOPHILS # BLD MANUAL: 0.21 THOUSAND/UL (ref 0–0.1)
BASOPHILS NFR MAR MANUAL: 1 % (ref 0–1)
BILIRUB SERPL-MCNC: 0.27 MG/DL (ref 0.2–1)
BUN SERPL-MCNC: 17 MG/DL (ref 5–25)
CALCIUM ALBUM COR SERPL-MCNC: 9.2 MG/DL (ref 8.3–10.1)
CALCIUM SERPL-MCNC: 8.6 MG/DL (ref 8.3–10.1)
CHLORIDE SERPL-SCNC: 110 MMOL/L (ref 100–108)
CO2 SERPL-SCNC: 23 MMOL/L (ref 21–32)
CREAT SERPL-MCNC: 1.37 MG/DL (ref 0.6–1.3)
EOSINOPHIL # BLD MANUAL: 0 THOUSAND/UL (ref 0–0.4)
EOSINOPHIL NFR BLD MANUAL: 0 % (ref 0–6)
ERYTHROCYTE [DISTWIDTH] IN BLOOD BY AUTOMATED COUNT: 16 % (ref 11.6–15.1)
GFR SERPL CREATININE-BSD FRML MDRD: 55 ML/MIN/1.73SQ M
GLUCOSE P FAST SERPL-MCNC: 114 MG/DL (ref 65–99)
HCT VFR BLD AUTO: 27 % (ref 36.5–49.3)
HGB BLD-MCNC: 8.8 G/DL (ref 12–17)
LYMPHOCYTES # BLD AUTO: 27 % (ref 14–44)
LYMPHOCYTES # BLD AUTO: 5.65 THOUSAND/UL (ref 0.6–4.47)
MCH RBC QN AUTO: 29.2 PG (ref 26.8–34.3)
MCHC RBC AUTO-ENTMCNC: 32.6 G/DL (ref 31.4–37.4)
MCV RBC AUTO: 90 FL (ref 82–98)
METAMYELOCYTES NFR BLD MANUAL: 4 % (ref 0–1)
MICROCYTES BLD QL AUTO: PRESENT
MONOCYTES # BLD AUTO: 2.09 THOUSAND/UL (ref 0–1.22)
MONOCYTES NFR BLD: 10 % (ref 4–12)
MYELOCYTES NFR BLD MANUAL: 4 % (ref 0–1)
NEUTROPHILS # BLD MANUAL: 9.63 THOUSAND/UL (ref 1.85–7.62)
NEUTS BAND NFR BLD MANUAL: 6 % (ref 0–8)
NEUTS SEG NFR BLD AUTO: 40 % (ref 43–75)
NRBC BLD AUTO-RTO: 0 /100 WBCS
PLATELET # BLD AUTO: 108 THOUSANDS/UL (ref 149–390)
PLATELET BLD QL SMEAR: ABNORMAL
PMV BLD AUTO: 10.7 FL (ref 8.9–12.7)
POLYCHROMASIA BLD QL SMEAR: PRESENT
POTASSIUM SERPL-SCNC: 3.7 MMOL/L (ref 3.5–5.3)
PROT SERPL-MCNC: 7.4 G/DL (ref 6.4–8.2)
RBC # BLD AUTO: 3.01 MILLION/UL (ref 3.88–5.62)
RBC MORPH BLD: PRESENT
SMUDGE CELLS BLD QL SMEAR: PRESENT
SODIUM SERPL-SCNC: 143 MMOL/L (ref 136–145)
TOXIC GRANULES BLD QL SMEAR: PRESENT
VARIANT LYMPHS # BLD AUTO: 8 %
WBC # BLD AUTO: 20.93 THOUSAND/UL (ref 4.31–10.16)

## 2021-06-02 PROCEDURE — 85007 BL SMEAR W/DIFF WBC COUNT: CPT | Performed by: INTERNAL MEDICINE

## 2021-06-02 PROCEDURE — 80053 COMPREHEN METABOLIC PANEL: CPT | Performed by: INTERNAL MEDICINE

## 2021-06-02 PROCEDURE — 85027 COMPLETE CBC AUTOMATED: CPT | Performed by: INTERNAL MEDICINE

## 2021-06-02 PROCEDURE — 36415 COLL VENOUS BLD VENIPUNCTURE: CPT | Performed by: INTERNAL MEDICINE

## 2021-06-03 ENCOUNTER — OFFICE VISIT (OUTPATIENT)
Dept: HEMATOLOGY ONCOLOGY | Facility: CLINIC | Age: 61
End: 2021-06-03
Payer: COMMERCIAL

## 2021-06-03 VITALS
HEART RATE: 74 BPM | SYSTOLIC BLOOD PRESSURE: 142 MMHG | TEMPERATURE: 98.1 F | BODY MASS INDEX: 39.27 KG/M2 | RESPIRATION RATE: 16 BRPM | HEIGHT: 67 IN | OXYGEN SATURATION: 97 % | DIASTOLIC BLOOD PRESSURE: 62 MMHG | WEIGHT: 250.2 LBS

## 2021-06-03 DIAGNOSIS — C62.92 SEMINOMA OF TESTIS, STAGE 3, LEFT (HCC): Primary | ICD-10-CM

## 2021-06-03 DIAGNOSIS — T45.1X5A CHEMOTHERAPY-INDUCED NEUTROPENIA (HCC): ICD-10-CM

## 2021-06-03 DIAGNOSIS — D64.81 ANEMIA DUE TO ANTINEOPLASTIC CHEMOTHERAPY: ICD-10-CM

## 2021-06-03 DIAGNOSIS — D69.6 THROMBOCYTOPENIA (HCC): ICD-10-CM

## 2021-06-03 DIAGNOSIS — D70.1 CHEMOTHERAPY-INDUCED NEUTROPENIA (HCC): ICD-10-CM

## 2021-06-03 DIAGNOSIS — Z29.8 CHEMOPREVENTION: ICD-10-CM

## 2021-06-03 DIAGNOSIS — N17.9 AKI (ACUTE KIDNEY INJURY) (HCC): Primary | ICD-10-CM

## 2021-06-03 DIAGNOSIS — T45.1X5A ANEMIA DUE TO ANTINEOPLASTIC CHEMOTHERAPY: ICD-10-CM

## 2021-06-03 PROBLEM — R59.0 LYMPHADENOPATHY, RETROPERITONEAL: Status: ACTIVE | Noted: 2021-06-03

## 2021-06-03 PROCEDURE — 99215 OFFICE O/P EST HI 40 MIN: CPT | Performed by: PHYSICIAN ASSISTANT

## 2021-06-03 RX ORDER — SODIUM CHLORIDE 9 MG/ML
20 INJECTION, SOLUTION INTRAVENOUS ONCE
Status: CANCELLED | OUTPATIENT
Start: 2021-06-14

## 2021-06-03 RX ORDER — SODIUM CHLORIDE 9 MG/ML
20 INJECTION, SOLUTION INTRAVENOUS ONCE
Status: CANCELLED | OUTPATIENT
Start: 2021-06-16

## 2021-06-03 RX ORDER — SODIUM CHLORIDE 9 MG/ML
20 INJECTION, SOLUTION INTRAVENOUS ONCE
Status: CANCELLED | OUTPATIENT
Start: 2021-06-15

## 2021-06-03 RX ORDER — SODIUM CHLORIDE 9 MG/ML
20 INJECTION, SOLUTION INTRAVENOUS ONCE
Status: CANCELLED | OUTPATIENT
Start: 2021-06-04

## 2021-06-03 RX ORDER — SODIUM CHLORIDE 9 MG/ML
20 INJECTION, SOLUTION INTRAVENOUS ONCE
Status: CANCELLED | OUTPATIENT
Start: 2021-06-17

## 2021-06-03 RX ORDER — SODIUM CHLORIDE 9 MG/ML
20 INJECTION, SOLUTION INTRAVENOUS ONCE
Status: CANCELLED | OUTPATIENT
Start: 2021-06-18

## 2021-06-03 NOTE — PROGRESS NOTES
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 with external right iliac lymphadenopathy measuring up to 3 cm and left para-aortic lymphadenopathy measuring up to 2 3 cm (pT2, N2, S1) with persistent elevation of LDH less than 1 5 normal upper limit     Repeat alpha fetoprotein, hCG are normal, repeat  ( )     Received 1st cycle of etoposide/ cisplatin with anti emetics   He was admitted to the hospital after 10 days of therapy with nausea, weakness  He was found to have acute renal insufficiency with creatinine of 4,   He received IV fluid with gradual improvement  He also developed neutropenia and thrombocytopenia grade 4 requiring platelet transfusion        CT scan C/A/P 4/15/21 showed significant response of chemotherapy in the abdominal lymphadenopathy  Creatinine improved to 1 73 5/7/21     After long discussion,  Cisplatin was discontinued  Carboplatin  AUC 5 on day 1  was added  Etoposide 100 milligram/meter squared for 5 days followed by pegfilgrastim with Emend, Zofran, dexamethasone for chemotherapy-induced nausea and vomiting were continued        Compazine at home     Admitted May 29th through May 31st secondary to chest pain  Cardiac etiology ruled out  He does have degenerative changes and disc herniation of the lumbar spine  CT scans chest abdomen pelvis 5/31/2021 showed continued improvement of retroperitoneal lymphadenopathy      Next cycle of chemotherapy due 6/7/2021    2  Chemotherapy induced anemia  As this  Is curative intent, Aranesp not indicated  We did discuss possibility of blood transfusion  Signed informed consent obtained  Will check stat CBC Monday morning prior to chemotherapy and may arrange for transfusion        Extending interval chemotherapy to 4 weeks could be considered             HPI:    Guicho Lara is a 59-year-old  male seen initially 4/1/21 regarding stage II B (pT2, 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, S1) left-sided pure seminoma     He has a history of prostate cancer, nephrolithiasis, benign prostatic hypertrophy, polymyalgia, hypertension, sleep apnea, obesity, GERD, hiatal hernia, gout       His prostate cancer was diagnosed on September 2019 with single core with 4 mm focus of prostate cancer with very low risk group   He is on active surveillance with PSA 4 9 on March 2020 followed by Urology       He was found to have enlargement of the left testicle       Ultrasound of the scrotum on 10/24/2020 showed left testicle measuring 7 2 x 3 9 x 5 1 cm with lobulated contour and diffuse heterogeneous echotexture     Repeat ultrasound on 01/20/2021 showed 9 1 x 4 9 x 6 8 cm increase in size compared to prior ultrasound     LDH was elevated at 376 ( ) normal hCG, alpha fetoprotein     Status post left inguinal orchiectomy on 02/19/2021, showing pure seminoma primary measuring 8 2 cm, abuts and invades into but not through tunica albuginea, invades hilar fat and spermatic cord soft tissue with lymphovascular invasion, spermatic cord is not involved by the tumor [place] [facility] [place]       CT scan of the chest abdomen and pelvis on 03/20/2021 showed enlarged left iliac and left para-aortic lymph nodes for example left para-aortic lymph node measuring 2 3 cm, left external iliac lymph node measuring 30 mm no evidence of metastatic disease in the chest  He had pain at the left orchiectomy site on and off most likely responding to acetaminophen from healing      He reported 12 lb weight loss after the surgery       4/1/21:  cr 1 13, hemoglobin 1 3 3, MCV of 89, white blood cell count 7 75, 51% neutrophils, 33% lymphocytes, 12% monocytes, platelets 136     4 cycles of  Etoposide/ cisplatin per the NCCN guidelines recommended    For chemotherapy-induced neutropenia, pegfilgrastim recommended      The chemotherapy was complicated with dehydration, acute kidney injury requiring admission to the hospital 4/15-4/20/21  His creatinine at time of discharge was 2 3  It  improved to 1 73 5/7/21     CT scan C/A/P  4/15/21 in the hospital showed significant reduction in seminoma with decrease of the abdominal lymphadenopathy  Interval History:   Admitted May 29 through 5/31/2021 when he presented with   acute midsternal chest pain  Cardiac enzymes were negative  No events on telemetry  No recurrence  He does have a known hiatal hernia  He did have thrombocytopenia  With platelet count down to 17 5/29/2021 and improved to 66 5/31/2021 and has continued to improve  He felt fatigued the week after chemotherapy but less so compared to when he received cisplatin    6/2/21: hemoglobin 8 8, , MCV of 90, white blood cell count 20 93, platelet count 514  Creatinine 1 37  LDH up to 391    CT C/A/P 5/31/21 compared to 4/15/21 scan:   Resolving left para-aortic retroperitoneal adenopathy  The largest remaining lymph nodes in the left para-aortic space measures 8 mm and 1 4 cm in their long axis  The size of these lymph nodes has decreased in the interval     The left iliac chain pathologic adenopathy has resolved      CT Lumbar Spine 5/31/21:  Multilevel lumbar spondylosis as described above with disc space narrowing and foraminal stenosis most pronounced at L5 upon S1 where there is also left subarticular and lateral recess recess encroachment due to a left paracentral disc herniation  Moderate right greater than left foraminal stenosis  Correlate for left S1 radiculitis        Test Results:        Labs:   Lab Results   Component Value Date    HGB 8 8 (L) 06/02/2021    HCT 27 0 (L) 06/02/2021    MCV 90 06/02/2021     (L) 06/02/2021    WBC 20 93 (H) 06/02/2021    NRBC 0 06/02/2021    BANDSPCT 6 06/02/2021    ATYLMPCT 8 (H) 06/02/2021    BLASTSPCT 3 (H) 04/19/2021     Lab Results   Component Value Date    K 3 7 06/02/2021     (H) 06/02/2021    CO2 23 06/02/2021    BUN 17 06/02/2021    CREATININE 1 37 (H) 06/02/2021    GLUF 114 (H) 06/02/2021    CALCIUM 8 6 06/02/2021    CORRECTEDCA 9 2 06/02/2021    AST 21 06/02/2021    ALT 28 06/02/2021    ALKPHOS 95 06/02/2021    EGFR 55 06/02/2021       Imaging: Ct Chest Abdomen Pelvis Wo Contrast    Result Date: 5/30/2021  Narrative: CT CHEST, ABDOMEN AND PELVIS WITHOUT IV CONTRAST INDICATION:   chest, back pain, hx testicular CA  COMPARISON:  CT chest, abdomen and pelvis study from April 15, 2021  TECHNIQUE: CT examination of the chest, abdomen and pelvis was performed without intravenous contrast   Axial, sagittal, and coronal 2D reformatted images were created from the source data and submitted for interpretation  Radiation dose length product (DLP) for this visit:  1164 mGy-cm   This examination, like all CT scans performed in the [facility], was performed utilizing techniques to minimize radiation dose exposure, including the use of iterative reconstruction and automated exposure control  Enteric contrast was administered  FINDINGS: CHEST LUNGS:  Lungs are clear  There is no tracheal or endobronchial lesion  PLEURA:  Unremarkable  HEART/GREAT VESSELS:  Unremarkable for patient's age  MEDIASTINUM AND ANNA:  Unremarkable  CHEST WALL AND LOWER NECK:   Unremarkable  ABDOMEN LIVER/BILIARY TREE:  Unremarkable  GALLBLADDER:  Small amount of layering hyperdensity in the gallbladder may represent sludge  SPLEEN:  Unremarkable  PANCREAS:  Unremarkable  ADRENAL GLANDS:  Unremarkable  KIDNEYS/URETERS:  Mild bilateral perinephric stranding  No obstructive uropathy  STOMACH AND BOWEL:  Unremarkable  APPENDIX:  No findings to suggest appendicitis  ABDOMINOPELVIC CAVITY:  Resolving left para-aortic retroperitoneal adenopathy    The largest remaining lymph nodes in the left para-aortic space measures 8 mm and 1 4 cm in their long axis  The size of these lymph nodes has decreased in the interval   The left iliac chain pathologic adenopathy has resolved  VESSELS:  Unremarkable for patient's age  PELVIS REPRODUCTIVE ORGANS:  Stable postoperative changes in the left groin consistent with of left orchiectomy  URINARY BLADDER:  Mild bladder wall thickening, possibly due to under distention  ABDOMINAL WALL/INGUINAL REGIONS:  Stable appearance of the umbilical hernia repair  OSSEOUS STRUCTURES:  Mild lower lumbar degenerative changes  No suspicious lytic or blastic lesion  Impression: No acute traumatic injury to the chest, abdomen or pelvis  Findings consistent with left orchiectomy with resolving retroperitoneal adenopathy  Recommend correlation with urinalysis to exclude cystitis  Mild bladder wall thickening may be secondary to underdistention  I concur with the original preliminary report provided by [employer]  Workstation performed: [id number]     Xr Chest 1 View Portable    Result Date: 5/30/2021  Narrative: CHEST INDICATION:   chest pain  COMPARISON:  Chest CT from 5/30/2021  EXAM PERFORMED/VIEWS:  XR CHEST PORTABLE  FINDINGS: Cardiomediastinal silhouette normal  Lungs clear  No effusion or pneumothorax  Opacity along the inferior right lateral chest wall corresponds with benign extrapleural fat on CT  Osseous structures normal for age  Impression: No acute cardiopulmonary disease  Workstation performed: [id number]     Ct Recon Only Lumbar Spine    Result Date: 5/30/2021  Narrative: CT LUMBAR SPINE INDICATION:   low back pain; active CA treatment  COMPARISON:  CT chest, abdomen and pelvis study of April 15, 2021  TECHNIQUE: Axial CT examination of the lumbar spine was obtained utilizing reconstructed images from CT of the chest, abdomen and pelvis performed the same day  Images were reformatted in the sagittal and coronal planes   This examination, like all CT scans performed in the [facility], was performed utilizing techniques to minimize radiation dose exposure, including the use of iterative reconstruction and automated exposure control  FINDINGS: ALIGNMENT: Grade 1 anterolisthesis of L4 and L5  Minimal retrolisthesis of L5 on S1  VERTEBRAL BODIES: No fracture  No acute osseous abnormality  DEGENERATIVE CHANGES: Marked disc space narrowing at L5 upon S1 with endplate sclerosis and marginal osteophytosis  L2-L3: Mild disc bulge, slightly eccentric to the left with mild bilateral L2-3 facet arthropathy  L3-L4: Circumferential disc bulge with suggestion of a central protrusion  Mild spinal stenosis  Moderate L3-4 facet arthropathy  Patent neural foramina  L4-L5: Disc bulge with a central protrusion  Severe right and moderate left facet arthropathy  Mild bilateral foraminal stenosis and mild spinal stenosis  L5-S1: Left paracentral herniation with left subarticular and lateral recess recess encroachment  Moderate right and mild to moderate left facet arthropathy  Endplate osteophytic ridging and facet arthropathy contribute to the bilateral foraminal stenosis which is mild on the left and moderate on the right  PREVERTEBRAL AND PARASPINAL SOFT TISSUES: No prevertebral or paravertebral soft tissue edema  Left retroperitoneal adenopathy, please see the concomitant CT chest abdomen and pelvis report  Miscellaneous: Bilateral SI joint osteoarthritis with bridging osteophytosis at the anterior superior margins  Impression: Multilevel lumbar spondylosis as described above with disc space narrowing and foraminal stenosis most pronounced at L5 upon S1 where there is also left subarticular and lateral recess recess encroachment due to a left paracentral disc herniation  Moderate right greater than left foraminal stenosis  Correlate for left S1 radiculitis  Consider follow-up MR imaging if additional soft tissue characterization of the spinal canal and foraminal contents is clinically warranted   I concur with the original preliminary be Kimmy Wilson which stated that there was no acute osseous abnormality  Workstation performed: [id number]           ROS:  As mentioned in HPI & Interval History otherwise 14 point ROS negative  Allergies: No Known Allergies  Current Medications: Reviewed  PMH/FH/SH:  Reviewed      Physical Exam:    There is no height or weight on file to calculate BSA  Ht Readings from Last 3 Encounters:   05/21/21 5' 7 99" (1 727 m)   05/21/21 5' 7 99" (1 727 m)   05/20/21 5' 7 99" (1 727 m)        Wt Readings from Last 3 Encounters:   05/29/21 112 kg (247 lb 3 2 oz)   05/21/21 117 kg (258 lb 13 1 oz)   05/21/21 117 kg (259 lb)        Temp Readings from Last 3 Encounters:   05/31/21 98 1 °F (36 7 °C) (Oral)   05/21/21 97 7 °F (36 5 °C) (Tympanic)   05/20/21 99 °F (37 2 °C) (Tympanic)        BP Readings from Last 3 Encounters:   05/31/21 125/66   05/21/21 163/83   05/21/21 138/78           Physical Exam  Vitals signs reviewed  Constitutional:       General: He is not in acute distress  Appearance: He is well-developed  He is not diaphoretic  HENT:      Head: Normocephalic and atraumatic  Eyes:      Conjunctiva/sclera: Conjunctivae normal    Neck:      Musculoskeletal: Normal range of motion and neck supple  Trachea: No tracheal deviation  Cardiovascular:      Rate and Rhythm: Normal rate and regular rhythm  Heart sounds: No murmur  No friction rub  No gallop  Pulmonary:      Effort: Pulmonary effort is normal  No respiratory distress  Breath sounds: Normal breath sounds  No wheezing or rales  Chest:      Chest wall: No tenderness  Abdominal:      General: There is no distension  Palpations: Abdomen is soft  Tenderness: There is no abdominal tenderness  Lymphadenopathy:      Cervical: No cervical adenopathy  Skin:     General: Skin is warm and dry  Coloration: Skin is not pale  Findings: No erythema     Neurological:      Mental Status: He is alert and oriented to person, place, and time  Psychiatric:         Behavior: Behavior normal          Thought Content:  Thought content normal          Judgment: Judgment normal          ECO      Emergency Contacts:    One [facility]ella Koroma, [phone],

## 2021-06-07 ENCOUNTER — PATIENT OUTREACH (OUTPATIENT)
Dept: CASE MANAGEMENT | Facility: HOSPITAL | Age: 61
End: 2021-06-07

## 2021-06-07 ENCOUNTER — HOSPITAL ENCOUNTER (OUTPATIENT)
Dept: INFUSION CENTER | Facility: CLINIC | Age: 61
Discharge: HOME | End: 2021-06-07
Payer: COMMERCIAL

## 2021-06-07 VITALS
DIASTOLIC BLOOD PRESSURE: 76 MMHG | RESPIRATION RATE: 18 BRPM | HEART RATE: 80 BPM | TEMPERATURE: 99 F | SYSTOLIC BLOOD PRESSURE: 148 MMHG

## 2021-06-07 DIAGNOSIS — C62.12 SEMINOMA OF DESCENDED LEFT TESTIS (HCC): ICD-10-CM

## 2021-06-07 DIAGNOSIS — N18.31 ANEMIA DUE TO STAGE 3A CHRONIC KIDNEY DISEASE (HCC): ICD-10-CM

## 2021-06-07 DIAGNOSIS — N18.31 STAGE 3A CHRONIC KIDNEY DISEASE (HCC): ICD-10-CM

## 2021-06-07 DIAGNOSIS — D63.1 ANEMIA DUE TO STAGE 3A CHRONIC KIDNEY DISEASE (HCC): ICD-10-CM

## 2021-06-07 DIAGNOSIS — N18.31 STAGE 3A CHRONIC KIDNEY DISEASE (HCC): Primary | ICD-10-CM

## 2021-06-07 DIAGNOSIS — T45.1X5A CHEMOTHERAPY-INDUCED NEUTROPENIA (HCC): ICD-10-CM

## 2021-06-07 DIAGNOSIS — C62.92 SEMINOMA OF TESTIS, STAGE 3, LEFT (HCC): ICD-10-CM

## 2021-06-07 DIAGNOSIS — Z29.8 CHEMOPREVENTION: ICD-10-CM

## 2021-06-07 DIAGNOSIS — D70.1 CHEMOTHERAPY-INDUCED NEUTROPENIA (HCC): ICD-10-CM

## 2021-06-07 DIAGNOSIS — Z29.8 CHEMOPREVENTION: Primary | ICD-10-CM

## 2021-06-07 LAB
ALBUMIN SERPL BCP-MCNC: 3 G/DL (ref 3.5–5.7)
ALP SERPL-CCNC: 74 U/L (ref 46–116)
ALT SERPL W P-5'-P-CCNC: 15 U/L (ref 12–78)
ANION GAP SERPL CALCULATED.3IONS-SCNC: 9 MMOL/L (ref 4–13)
ANISOCYTOSIS BLD QL SMEAR: PRESENT
AST SERPL W P-5'-P-CCNC: 27 U/L (ref 5–45)
BASOPHILS # BLD MANUAL: 0 THOUSAND/UL (ref 0–0.1)
BASOPHILS NFR MAR MANUAL: 0 % (ref 0–1)
BILIRUB SERPL-MCNC: 0.5 MG/DL (ref 0.2–1)
BUN SERPL-MCNC: 13 MG/DL (ref 5–25)
CALCIUM ALBUM COR SERPL-MCNC: 9.6 MG/DL (ref 8.3–10.1)
CALCIUM SERPL-MCNC: 8.8 MG/DL (ref 8.3–10.1)
CHLORIDE SERPL-SCNC: 110 MMOL/L (ref 98–108)
CO2 SERPL-SCNC: 25 MMOL/L (ref 21–32)
CREAT SERPL-MCNC: 1.2 MG/DL (ref 0.6–1.3)
EOSINOPHIL # BLD MANUAL: 0.13 THOUSAND/UL (ref 0–0.4)
EOSINOPHIL NFR BLD MANUAL: 1 % (ref 0–6)
ERYTHROCYTE [DISTWIDTH] IN BLOOD BY AUTOMATED COUNT: 16.2 % (ref 11.6–15.1)
GFR SERPL CREATININE-BSD FRML MDRD: 64 ML/MIN/1.73SQ M
GLUCOSE SERPL-MCNC: 180 MG/DL (ref 65–140)
HCT VFR BLD AUTO: 23.8 % (ref 36.5–49.3)
HGB BLD-MCNC: 7.8 G/DL (ref 12–17)
LYMPHOCYTES # BLD AUTO: 2.57 THOUSAND/UL (ref 0.6–4.47)
LYMPHOCYTES # BLD AUTO: 20 % (ref 14–44)
MCH RBC QN AUTO: 29.2 PG (ref 26.8–34.3)
MCHC RBC AUTO-ENTMCNC: 32.8 G/DL (ref 31.4–37.4)
MCV RBC AUTO: 89 FL (ref 82–98)
MONOCYTES # BLD AUTO: 1.16 THOUSAND/UL (ref 0–1.22)
MONOCYTES NFR BLD: 9 % (ref 4–12)
MYELOCYTES NFR BLD MANUAL: 5 % (ref 0–1)
NEUTROPHILS # BLD MANUAL: 8.35 THOUSAND/UL (ref 1.85–7.62)
NEUTS BAND NFR BLD MANUAL: 4 % (ref 0–8)
NEUTS SEG NFR BLD AUTO: 61 % (ref 43–75)
OVALOCYTES BLD QL SMEAR: PRESENT
PLATELET # BLD AUTO: 361 THOUSANDS/UL (ref 149–390)
PLATELET BLD QL SMEAR: ADEQUATE
PMV BLD AUTO: 9.4 FL (ref 8.9–12.7)
POTASSIUM SERPL-SCNC: 4 MMOL/L (ref 3.5–5.3)
PROT SERPL-MCNC: 6.6 G/DL (ref 6.4–8.2)
RBC # BLD AUTO: 2.67 MILLION/UL (ref 3.88–5.62)
SODIUM SERPL-SCNC: 144 MMOL/L (ref 136–145)
TOTAL CELLS COUNTED SPEC: 100
TOXIC GRANULES BLD QL SMEAR: PRESENT
WBC # BLD AUTO: 12.84 THOUSAND/UL (ref 4.31–10.16)

## 2021-06-07 PROCEDURE — 85027 COMPLETE CBC AUTOMATED: CPT | Performed by: INTERNAL MEDICINE

## 2021-06-07 PROCEDURE — 85007 BL SMEAR W/DIFF WBC COUNT: CPT | Performed by: INTERNAL MEDICINE

## 2021-06-07 PROCEDURE — 96372 THER/PROPH/DIAG INJ SC/IM: CPT

## 2021-06-07 PROCEDURE — 80053 COMPREHEN METABOLIC PANEL: CPT | Performed by: INTERNAL MEDICINE

## 2021-06-07 RX ADMIN — DARBEPOETIN ALFA 200 MCG: 200 INJECTION, SOLUTION INTRAVENOUS; SUBCUTANEOUS at 10:55

## 2021-06-07 NOTE — PROGRESS NOTES
Oncology LSW met with pt today during infusion treatment  Anabella Shrestha required another short hospital stay after his second cycle of chemo  Thankfully he stated he was only in for 3 days and felt better after DC  He is hopeful he will tolerate this 3rd cycle and not require any ED or hospital visits  His daughter is back to driving and will return to work this week  His grandson will be attending summer school and Anabella Shrestha will be in charge of getting him up and to the bus, as his daughter works night shift until 0730  His son has been helping to mow their 2 acre property and remains very supportive in addition to Faisal's wife and daughter  Anabella Shrestha shared feeling "bored" at home and is anxious to return to work  He reported he told his boss and co-workers he wants to be back in September  He also shared his understanding of his limitations right now, and doesn't try to push himself too hard  LSW offered active and supportive listening  Offered to see him again at his next treatment in a few weeks and he is agreeable

## 2021-06-07 NOTE — PROGRESS NOTES
Pt arrived to unit without complaint  Repeat CBC and CMP drawn  Hgb 7 8  Spoke with Joshua Huston  RN via teams  Per Dr Sarai Bland treatment deferred by one week  Per Dr Sarai Bland pt will receive Aranesp today and have a repeat CBC in one week  Pt made aware of changes  Pt asked if it was ok to get a repeat CBC Friday 6/11/21  Per Dr Sarai Bland pt ok to have labs drawn on 6/11/21  Pt tolerated Arnanesp injection in left arm without incident  AVS provided  Pt aware to return Monday for treatment  Pt left unit in stable condition

## 2021-06-09 ENCOUNTER — HOSPITAL ENCOUNTER (OUTPATIENT)
Dept: INFUSION CENTER | Facility: CLINIC | Age: 61
End: 2021-06-09

## 2021-06-10 ENCOUNTER — HOSPITAL ENCOUNTER (OUTPATIENT)
Dept: INFUSION CENTER | Facility: CLINIC | Age: 61
End: 2021-06-10

## 2021-06-11 ENCOUNTER — APPOINTMENT (OUTPATIENT)
Dept: LAB | Facility: CLINIC | Age: 61
End: 2021-06-11
Payer: COMMERCIAL

## 2021-06-14 ENCOUNTER — HOSPITAL ENCOUNTER (OUTPATIENT)
Dept: INFUSION CENTER | Facility: CLINIC | Age: 61
Discharge: HOME | End: 2021-06-14
Payer: COMMERCIAL

## 2021-06-14 VITALS
HEART RATE: 76 BPM | DIASTOLIC BLOOD PRESSURE: 78 MMHG | WEIGHT: 255.62 LBS | SYSTOLIC BLOOD PRESSURE: 144 MMHG | RESPIRATION RATE: 18 BRPM | BODY MASS INDEX: 40.12 KG/M2 | HEIGHT: 67 IN | TEMPERATURE: 98.5 F

## 2021-06-14 DIAGNOSIS — D70.1 CHEMOTHERAPY-INDUCED NEUTROPENIA (HCC): ICD-10-CM

## 2021-06-14 DIAGNOSIS — Z29.8 CHEMOPREVENTION: Primary | ICD-10-CM

## 2021-06-14 DIAGNOSIS — C62.92 SEMINOMA OF TESTIS, STAGE 3, LEFT (HCC): ICD-10-CM

## 2021-06-14 DIAGNOSIS — T45.1X5A CHEMOTHERAPY-INDUCED NEUTROPENIA (HCC): ICD-10-CM

## 2021-06-14 PROCEDURE — 96413 CHEMO IV INFUSION 1 HR: CPT

## 2021-06-14 PROCEDURE — 96367 TX/PROPH/DG ADDL SEQ IV INF: CPT

## 2021-06-14 PROCEDURE — 96417 CHEMO IV INFUS EACH ADDL SEQ: CPT

## 2021-06-14 RX ORDER — SODIUM CHLORIDE 9 MG/ML
20 INJECTION, SOLUTION INTRAVENOUS ONCE
Status: COMPLETED | OUTPATIENT
Start: 2021-06-14 | End: 2021-06-14

## 2021-06-14 RX ADMIN — DEXAMETHASONE SODIUM PHOSPHATE: 10 INJECTION, SOLUTION INTRAMUSCULAR; INTRAVENOUS at 10:05

## 2021-06-14 RX ADMIN — FOSAPREPITANT 150 MG: 150 INJECTION, POWDER, LYOPHILIZED, FOR SOLUTION INTRAVENOUS at 10:33

## 2021-06-14 RX ADMIN — CARBOPLATIN 466 MG: 10 INJECTION, SOLUTION INTRAVENOUS at 12:19

## 2021-06-14 RX ADMIN — SODIUM CHLORIDE 224 MG: 0.9 INJECTION, SOLUTION INTRAVENOUS at 11:07

## 2021-06-14 RX ADMIN — SODIUM CHLORIDE 20 ML/HR: 0.9 INJECTION, SOLUTION INTRAVENOUS at 10:04

## 2021-06-14 NOTE — PROGRESS NOTES
Patient tolerated treatment without complication  AVS provided, patient left unit in stable condition

## 2021-06-15 ENCOUNTER — HOSPITAL ENCOUNTER (OUTPATIENT)
Dept: INFUSION CENTER | Facility: CLINIC | Age: 61
Discharge: HOME | End: 2021-06-15
Payer: COMMERCIAL

## 2021-06-15 VITALS
WEIGHT: 262.13 LBS | BODY MASS INDEX: 41.14 KG/M2 | HEART RATE: 80 BPM | HEIGHT: 67 IN | SYSTOLIC BLOOD PRESSURE: 178 MMHG | RESPIRATION RATE: 18 BRPM | DIASTOLIC BLOOD PRESSURE: 74 MMHG | TEMPERATURE: 97.9 F

## 2021-06-15 DIAGNOSIS — Z29.8 CHEMOPREVENTION: Primary | ICD-10-CM

## 2021-06-15 DIAGNOSIS — C62.92 SEMINOMA OF TESTIS, STAGE 3, LEFT (HCC): ICD-10-CM

## 2021-06-15 DIAGNOSIS — D70.1 CHEMOTHERAPY-INDUCED NEUTROPENIA (HCC): ICD-10-CM

## 2021-06-15 DIAGNOSIS — T45.1X5A CHEMOTHERAPY-INDUCED NEUTROPENIA (HCC): ICD-10-CM

## 2021-06-15 PROCEDURE — 96413 CHEMO IV INFUSION 1 HR: CPT

## 2021-06-15 PROCEDURE — 96367 TX/PROPH/DG ADDL SEQ IV INF: CPT

## 2021-06-15 RX ORDER — SODIUM CHLORIDE 9 MG/ML
20 INJECTION, SOLUTION INTRAVENOUS ONCE
Status: COMPLETED | OUTPATIENT
Start: 2021-06-15 | End: 2021-06-15

## 2021-06-15 RX ADMIN — SODIUM CHLORIDE 20 ML/HR: 0.9 INJECTION, SOLUTION INTRAVENOUS at 08:45

## 2021-06-15 RX ADMIN — DEXAMETHASONE SODIUM PHOSPHATE: 10 INJECTION, SOLUTION INTRAMUSCULAR; INTRAVENOUS at 08:45

## 2021-06-15 RX ADMIN — SODIUM CHLORIDE 224 MG: 0.9 INJECTION, SOLUTION INTRAVENOUS at 10:30

## 2021-06-15 NOTE — PROGRESS NOTES
Patient arrived to the infusion center for day 2 of chemotherapy infusion  Significant weight increase noted from yesterday to today  Dr Rosalina Lowry made aware via Nicola Holt, RN  Okay to proceed with treatment as ordered  Pt instructed to start lasix 20mg daily and Dr Rosalina Lowry will reassess at next office visit on 6/25  Pt tolerated treatment with no complications  Pt is aware to return tomorrow as scheduled   Declined AVS

## 2021-06-15 NOTE — PLAN OF CARE
Problem: Potential for Falls  Goal: Patient will remain free of falls  Description: INTERVENTIONS:  - Educate patient/family on patient safety including physical limitations  - Instruct patient to call for assistance with activity   - Consult OT/PT to assist with strengthening/mobility   - Keep Call bell within reach  - Keep bed low and locked with side rails adjusted as appropriate  - Keep care items and personal belongings within reach  - Initiate and maintain comfort rounds  - Make Fall Risk Sign visible to staff  - Offer Toileting every  Hours, in advance of need  - Initiate/Maintain alarm  - Obtain necessary fall risk management equipment:   - Apply yellow socks and bracelet for high fall risk patients  - Consider moving patient to room near nurses station  Outcome: Progressing

## 2021-06-16 ENCOUNTER — HOSPITAL ENCOUNTER (OUTPATIENT)
Dept: INFUSION CENTER | Facility: CLINIC | Age: 61
Discharge: HOME | End: 2021-06-16
Payer: COMMERCIAL

## 2021-06-16 VITALS
HEART RATE: 73 BPM | HEIGHT: 67 IN | TEMPERATURE: 98.7 F | SYSTOLIC BLOOD PRESSURE: 147 MMHG | BODY MASS INDEX: 41.14 KG/M2 | DIASTOLIC BLOOD PRESSURE: 70 MMHG | RESPIRATION RATE: 18 BRPM | WEIGHT: 262.13 LBS

## 2021-06-16 DIAGNOSIS — D70.1 CHEMOTHERAPY-INDUCED NEUTROPENIA (HCC): ICD-10-CM

## 2021-06-16 DIAGNOSIS — Z29.8 CHEMOPREVENTION: Primary | ICD-10-CM

## 2021-06-16 DIAGNOSIS — T45.1X5A CHEMOTHERAPY-INDUCED NEUTROPENIA (HCC): ICD-10-CM

## 2021-06-16 DIAGNOSIS — C62.92 SEMINOMA OF TESTIS, STAGE 3, LEFT (HCC): ICD-10-CM

## 2021-06-16 PROCEDURE — 96413 CHEMO IV INFUSION 1 HR: CPT

## 2021-06-16 PROCEDURE — 96367 TX/PROPH/DG ADDL SEQ IV INF: CPT

## 2021-06-16 RX ORDER — SODIUM CHLORIDE 9 MG/ML
20 INJECTION, SOLUTION INTRAVENOUS ONCE
Status: COMPLETED | OUTPATIENT
Start: 2021-06-16 | End: 2021-06-16

## 2021-06-16 RX ORDER — FUROSEMIDE 20 MG/1
20 TABLET ORAL DAILY
COMMUNITY
End: 2021-06-29

## 2021-06-16 RX ADMIN — SODIUM CHLORIDE 224 MG: 0.9 INJECTION, SOLUTION INTRAVENOUS at 09:19

## 2021-06-16 RX ADMIN — DEXAMETHASONE SODIUM PHOSPHATE: 10 INJECTION, SOLUTION INTRAMUSCULAR; INTRAVENOUS at 08:50

## 2021-06-16 RX ADMIN — SODIUM CHLORIDE 20 ML/HR: 0.9 INJECTION, SOLUTION INTRAVENOUS at 08:48

## 2021-06-16 NOTE — PLAN OF CARE
Problem: Potential for Falls  Goal: Patient will remain free of falls  Description: INTERVENTIONS:  - Educate patient/family on patient safety including physical limitations  - Instruct patient to call for assistance with activity   - Keep Call bell within reach  - Keep bed low and locked with side rails adjusted as appropriate  - Keep care items and personal belongings within reach  - Initiate and maintain comfort rounds  - Make Fall Risk Sign visible to staff  - Consider moving patient to room near nurses station  Outcome: Progressing

## 2021-06-16 NOTE — PROGRESS NOTES
Pt arrived to unit without complaint  Pt tolerated treatment without incident  AVS declined, but pt aware to return tomorrow for appt  Pt left unit in stable condition

## 2021-06-17 ENCOUNTER — HOSPITAL ENCOUNTER (OUTPATIENT)
Dept: INFUSION CENTER | Facility: CLINIC | Age: 61
Discharge: HOME | End: 2021-06-17
Payer: COMMERCIAL

## 2021-06-17 VITALS
SYSTOLIC BLOOD PRESSURE: 145 MMHG | TEMPERATURE: 97.8 F | WEIGHT: 262.79 LBS | RESPIRATION RATE: 18 BRPM | DIASTOLIC BLOOD PRESSURE: 73 MMHG | BODY MASS INDEX: 41.25 KG/M2 | HEART RATE: 61 BPM | HEIGHT: 67 IN

## 2021-06-17 DIAGNOSIS — D70.1 CHEMOTHERAPY-INDUCED NEUTROPENIA (HCC): ICD-10-CM

## 2021-06-17 DIAGNOSIS — T45.1X5A CHEMOTHERAPY-INDUCED NEUTROPENIA (HCC): ICD-10-CM

## 2021-06-17 DIAGNOSIS — Z29.8 CHEMOPREVENTION: Primary | ICD-10-CM

## 2021-06-17 DIAGNOSIS — C62.92 SEMINOMA OF TESTIS, STAGE 3, LEFT (HCC): ICD-10-CM

## 2021-06-17 PROCEDURE — 96413 CHEMO IV INFUSION 1 HR: CPT

## 2021-06-17 PROCEDURE — 96367 TX/PROPH/DG ADDL SEQ IV INF: CPT

## 2021-06-17 RX ORDER — SODIUM CHLORIDE 9 MG/ML
20 INJECTION, SOLUTION INTRAVENOUS ONCE
Status: COMPLETED | OUTPATIENT
Start: 2021-06-17 | End: 2021-06-17

## 2021-06-17 RX ADMIN — SODIUM CHLORIDE 20 ML/HR: 0.9 INJECTION, SOLUTION INTRAVENOUS at 08:30

## 2021-06-17 RX ADMIN — SODIUM CHLORIDE 224 MG: 0.9 INJECTION, SOLUTION INTRAVENOUS at 09:04

## 2021-06-17 RX ADMIN — DEXAMETHASONE SODIUM PHOSPHATE: 10 INJECTION, SOLUTION INTRAMUSCULAR; INTRAVENOUS at 08:33

## 2021-06-18 ENCOUNTER — HOSPITAL ENCOUNTER (OUTPATIENT)
Dept: INFUSION CENTER | Facility: CLINIC | Age: 61
Discharge: HOME | End: 2021-06-18
Payer: COMMERCIAL

## 2021-06-18 VITALS
SYSTOLIC BLOOD PRESSURE: 132 MMHG | DIASTOLIC BLOOD PRESSURE: 74 MMHG | HEART RATE: 63 BPM | TEMPERATURE: 97.6 F | RESPIRATION RATE: 18 BRPM | BODY MASS INDEX: 40.24 KG/M2 | HEIGHT: 67 IN | WEIGHT: 256.39 LBS

## 2021-06-18 DIAGNOSIS — C62.92 SEMINOMA OF TESTIS, STAGE 3, LEFT (HCC): ICD-10-CM

## 2021-06-18 DIAGNOSIS — Z29.8 CHEMOPREVENTION: Primary | ICD-10-CM

## 2021-06-18 DIAGNOSIS — T45.1X5A CHEMOTHERAPY-INDUCED NEUTROPENIA (HCC): ICD-10-CM

## 2021-06-18 DIAGNOSIS — D70.1 CHEMOTHERAPY-INDUCED NEUTROPENIA (HCC): ICD-10-CM

## 2021-06-18 PROCEDURE — 96367 TX/PROPH/DG ADDL SEQ IV INF: CPT

## 2021-06-18 PROCEDURE — 96413 CHEMO IV INFUSION 1 HR: CPT

## 2021-06-18 PROCEDURE — 96377 APPLICATON ON-BODY INJECTOR: CPT

## 2021-06-18 RX ORDER — SODIUM CHLORIDE 9 MG/ML
20 INJECTION, SOLUTION INTRAVENOUS ONCE
Status: COMPLETED | OUTPATIENT
Start: 2021-06-18 | End: 2021-06-18

## 2021-06-18 RX ADMIN — DEXAMETHASONE SODIUM PHOSPHATE: 10 INJECTION, SOLUTION INTRAMUSCULAR; INTRAVENOUS at 08:31

## 2021-06-18 RX ADMIN — PEGFILGRASTIM 6 MG: KIT SUBCUTANEOUS at 10:27

## 2021-06-18 RX ADMIN — SODIUM CHLORIDE 224 MG: 0.9 INJECTION, SOLUTION INTRAVENOUS at 09:10

## 2021-06-18 RX ADMIN — SODIUM CHLORIDE 20 ML/HR: 0.9 INJECTION, SOLUTION INTRAVENOUS at 08:25

## 2021-06-18 NOTE — PROGRESS NOTES
Patient tolerated treatment without incident  Neulasta onpro applied to [place] as ordered  Patient aware of time it will infuse on 6/19/2021  Patient denies any further questions in regards to neulasta onpro  Aware of next appointment   AVS given to patient

## 2021-06-18 NOTE — PROGRESS NOTES
Patient arrived on unit for Day #5 of cycle  Patient stated he had nausea last evening and took antiemetic which was effective  Denies any present nausea  Cleared for treatment today  Treatment initiated

## 2021-06-24 ENCOUNTER — APPOINTMENT (EMERGENCY)
Dept: CT IMAGING | Facility: HOSPITAL | Age: 61
DRG: 810 | End: 2021-06-24
Payer: COMMERCIAL

## 2021-06-24 ENCOUNTER — TELEPHONE (OUTPATIENT)
Dept: OTHER | Facility: OTHER | Age: 61
End: 2021-06-24

## 2021-06-24 ENCOUNTER — APPOINTMENT (OUTPATIENT)
Dept: LAB | Facility: CLINIC | Age: 61
DRG: 810 | End: 2021-06-24
Payer: COMMERCIAL

## 2021-06-24 ENCOUNTER — APPOINTMENT (EMERGENCY)
Dept: RADIOLOGY | Facility: HOSPITAL | Age: 61
DRG: 810 | End: 2021-06-24
Payer: COMMERCIAL

## 2021-06-24 ENCOUNTER — HOSPITAL ENCOUNTER (INPATIENT)
Facility: HOSPITAL | Age: 61
LOS: 3 days | Discharge: HOME HEALTH | DRG: 810 | End: 2021-06-27
Attending: EMERGENCY MEDICINE | Admitting: INTERNAL MEDICINE
Payer: COMMERCIAL

## 2021-06-24 ENCOUNTER — TELEPHONE (OUTPATIENT)
Dept: HEMATOLOGY ONCOLOGY | Facility: CLINIC | Age: 61
End: 2021-06-24

## 2021-06-24 ENCOUNTER — APPOINTMENT (INPATIENT)
Dept: CT IMAGING | Facility: HOSPITAL | Age: 61
DRG: 810 | End: 2021-06-24
Payer: COMMERCIAL

## 2021-06-24 DIAGNOSIS — K31.89 GASTRIC DISTENTION: ICD-10-CM

## 2021-06-24 DIAGNOSIS — D70.9 FEBRILE NEUTROPENIA (HCC): Primary | ICD-10-CM

## 2021-06-24 DIAGNOSIS — D61.818 PANCYTOPENIA (HCC): ICD-10-CM

## 2021-06-24 DIAGNOSIS — R26.2 AMBULATORY DYSFUNCTION: ICD-10-CM

## 2021-06-24 DIAGNOSIS — A41.9 SEPSIS WITH ACUTE ORGAN DYSFUNCTION WITHOUT SEPTIC SHOCK, DUE TO UNSPECIFIED ORGANISM, UNSPECIFIED TYPE (HCC): ICD-10-CM

## 2021-06-24 DIAGNOSIS — R65.20 SEPSIS WITH ACUTE ORGAN DYSFUNCTION WITHOUT SEPTIC SHOCK, DUE TO UNSPECIFIED ORGANISM, UNSPECIFIED TYPE (HCC): ICD-10-CM

## 2021-06-24 DIAGNOSIS — M54.9 BACK PAIN: ICD-10-CM

## 2021-06-24 DIAGNOSIS — C62.92 SEMINOMA OF TESTIS, STAGE 3, LEFT (HCC): ICD-10-CM

## 2021-06-24 DIAGNOSIS — R50.81 FEBRILE NEUTROPENIA (HCC): Primary | ICD-10-CM

## 2021-06-24 LAB
ALBUMIN SERPL BCP-MCNC: 3.2 G/DL (ref 3.5–5)
ALBUMIN SERPL BCP-MCNC: 3.3 G/DL (ref 3.5–5)
ALP SERPL-CCNC: 66 U/L (ref 46–116)
ALP SERPL-CCNC: 69 U/L (ref 46–116)
ALT SERPL W P-5'-P-CCNC: 18 U/L (ref 12–78)
ALT SERPL W P-5'-P-CCNC: 22 U/L (ref 12–78)
ANION GAP SERPL CALCULATED.3IONS-SCNC: 8 MMOL/L (ref 4–13)
ANION GAP SERPL CALCULATED.3IONS-SCNC: 9 MMOL/L (ref 4–13)
ANISOCYTOSIS BLD QL SMEAR: PRESENT
ANISOCYTOSIS BLD QL SMEAR: PRESENT
APTT PPP: 20 SECONDS (ref 23–37)
AST SERPL W P-5'-P-CCNC: 10 U/L (ref 5–45)
AST SERPL W P-5'-P-CCNC: 12 U/L (ref 5–45)
BACTERIA UR QL AUTO: ABNORMAL /HPF
BASOPHILS # BLD AUTO: 0 THOUSANDS/ΜL (ref 0–0.1)
BASOPHILS # BLD MANUAL: 0 THOUSAND/UL (ref 0–0.1)
BASOPHILS # BLD MANUAL: 0.02 THOUSAND/UL (ref 0–0.1)
BASOPHILS NFR BLD AUTO: 0 % (ref 0–1)
BASOPHILS NFR MAR MANUAL: 0 % (ref 0–1)
BASOPHILS NFR MAR MANUAL: 1 % (ref 0–1)
BILIRUB SERPL-MCNC: 0.87 MG/DL (ref 0.2–1)
BILIRUB SERPL-MCNC: 1.05 MG/DL (ref 0.2–1)
BILIRUB UR QL STRIP: NEGATIVE
BUN SERPL-MCNC: 31 MG/DL (ref 5–25)
BUN SERPL-MCNC: 32 MG/DL (ref 5–25)
CALCIUM ALBUM COR SERPL-MCNC: 8.3 MG/DL (ref 8.3–10.1)
CALCIUM ALBUM COR SERPL-MCNC: 8.9 MG/DL (ref 8.3–10.1)
CALCIUM SERPL-MCNC: 7.7 MG/DL (ref 8.3–10.1)
CALCIUM SERPL-MCNC: 8.3 MG/DL (ref 8.3–10.1)
CHLORIDE SERPL-SCNC: 105 MMOL/L (ref 100–108)
CHLORIDE SERPL-SCNC: 99 MMOL/L (ref 100–108)
CLARITY UR: CLEAR
CO2 SERPL-SCNC: 21 MMOL/L (ref 21–32)
CO2 SERPL-SCNC: 27 MMOL/L (ref 21–32)
COLOR UR: YELLOW
CREAT SERPL-MCNC: 1.33 MG/DL (ref 0.6–1.3)
CREAT SERPL-MCNC: 1.36 MG/DL (ref 0.6–1.3)
D DIMER PPP FEU-MCNC: 1.17 UG/ML FEU
EOSINOPHIL # BLD AUTO: 0.01 THOUSAND/ΜL (ref 0–0.61)
EOSINOPHIL # BLD MANUAL: 0.01 THOUSAND/UL (ref 0–0.4)
EOSINOPHIL # BLD MANUAL: 0.03 THOUSAND/UL (ref 0–0.4)
EOSINOPHIL NFR BLD AUTO: 1 % (ref 0–6)
EOSINOPHIL NFR BLD MANUAL: 1 % (ref 0–6)
EOSINOPHIL NFR BLD MANUAL: 2 % (ref 0–6)
ERYTHROCYTE [DISTWIDTH] IN BLOOD BY AUTOMATED COUNT: 18 % (ref 11.6–15.1)
ERYTHROCYTE [DISTWIDTH] IN BLOOD BY AUTOMATED COUNT: 18.8 % (ref 11.6–15.1)
GFR SERPL CREATININE-BSD FRML MDRD: 55 ML/MIN/1.73SQ M
GFR SERPL CREATININE-BSD FRML MDRD: 57 ML/MIN/1.73SQ M
GLUCOSE P FAST SERPL-MCNC: 140 MG/DL (ref 65–99)
GLUCOSE SERPL-MCNC: 135 MG/DL (ref 65–140)
GLUCOSE UR STRIP-MCNC: NEGATIVE MG/DL
HCT VFR BLD AUTO: 24.5 % (ref 36.5–49.3)
HCT VFR BLD AUTO: 27.2 % (ref 36.5–49.3)
HGB BLD-MCNC: 8.1 G/DL (ref 12–17)
HGB BLD-MCNC: 8.7 G/DL (ref 12–17)
HGB UR QL STRIP.AUTO: NEGATIVE
IMM GRANULOCYTES # BLD AUTO: 0 THOUSAND/UL (ref 0–0.2)
IMM GRANULOCYTES NFR BLD AUTO: 0 % (ref 0–2)
INR PPP: 1.32 (ref 0.84–1.19)
KETONES UR STRIP-MCNC: NEGATIVE MG/DL
LACTATE SERPL-SCNC: 1.6 MMOL/L (ref 0.5–2)
LACTATE SERPL-SCNC: 2.4 MMOL/L (ref 0.5–2)
LDH SERPL-CCNC: 142 U/L (ref 81–234)
LEUKOCYTE ESTERASE UR QL STRIP: NEGATIVE
LYMPHOCYTES # BLD AUTO: 0.83 THOUSAND/UL (ref 0.6–4.47)
LYMPHOCYTES # BLD AUTO: 0.86 THOUSANDS/ΜL (ref 0.6–4.47)
LYMPHOCYTES # BLD AUTO: 1.18 THOUSAND/UL (ref 0.6–4.47)
LYMPHOCYTES # BLD AUTO: 69 % (ref 14–44)
LYMPHOCYTES # BLD AUTO: 92 % (ref 14–44)
LYMPHOCYTES NFR BLD AUTO: 96 % (ref 14–44)
MCH RBC QN AUTO: 31.1 PG (ref 26.8–34.3)
MCH RBC QN AUTO: 31.6 PG (ref 26.8–34.3)
MCHC RBC AUTO-ENTMCNC: 32 G/DL (ref 31.4–37.4)
MCHC RBC AUTO-ENTMCNC: 33.1 G/DL (ref 31.4–37.4)
MCV RBC AUTO: 96 FL (ref 82–98)
MCV RBC AUTO: 97 FL (ref 82–98)
MONOCYTES # BLD AUTO: 0 THOUSAND/UL (ref 0–1.22)
MONOCYTES # BLD AUTO: 0.02 THOUSAND/UL (ref 0–1.22)
MONOCYTES # BLD AUTO: 0.03 THOUSAND/ΜL (ref 0.17–1.22)
MONOCYTES NFR BLD AUTO: 3 % (ref 4–12)
MONOCYTES NFR BLD: 0 % (ref 4–12)
MONOCYTES NFR BLD: 1 % (ref 4–12)
NEUTROPHILS # BLD AUTO: 0 THOUSANDS/ΜL (ref 1.85–7.62)
NEUTROPHILS # BLD MANUAL: 0 THOUSAND/UL (ref 1.85–7.62)
NEUTROPHILS # BLD MANUAL: 0.02 THOUSAND/UL (ref 1.85–7.62)
NEUTS SEG NFR BLD AUTO: 0 % (ref 43–75)
NEUTS SEG NFR BLD AUTO: 0 % (ref 43–75)
NEUTS SEG NFR BLD AUTO: 2 % (ref 43–75)
NITRITE UR QL STRIP: NEGATIVE
NON-SQ EPI CELLS URNS QL MICRO: ABNORMAL /HPF
NRBC BLD AUTO-RTO: 0 /100 WBCS
NRBC BLD AUTO-RTO: 0 /100 WBCS
NT-PROBNP SERPL-MCNC: 84 PG/ML
PH UR STRIP.AUTO: 5.5 [PH] (ref 4.5–8)
PLATELET # BLD AUTO: 55 THOUSANDS/UL (ref 149–390)
PLATELET # BLD AUTO: 57 THOUSANDS/UL (ref 149–390)
PLATELET BLD QL SMEAR: ABNORMAL
PLATELET BLD QL SMEAR: ABNORMAL
PMV BLD AUTO: 10.8 FL (ref 8.9–12.7)
PMV BLD AUTO: 10.9 FL (ref 8.9–12.7)
POLYCHROMASIA BLD QL SMEAR: PRESENT
POTASSIUM SERPL-SCNC: 3.8 MMOL/L (ref 3.5–5.3)
POTASSIUM SERPL-SCNC: 4.3 MMOL/L (ref 3.5–5.3)
PROT SERPL-MCNC: 7.3 G/DL (ref 6.4–8.2)
PROT SERPL-MCNC: 7.5 G/DL (ref 6.4–8.2)
PROT UR STRIP-MCNC: ABNORMAL MG/DL
PROTHROMBIN TIME: 16.1 SECONDS (ref 11.6–14.5)
RBC # BLD AUTO: 2.56 MILLION/UL (ref 3.88–5.62)
RBC # BLD AUTO: 2.8 MILLION/UL (ref 3.88–5.62)
RBC #/AREA URNS AUTO: ABNORMAL /HPF
RBC MORPH BLD: PRESENT
RBC MORPH BLD: PRESENT
SARS-COV-2 RNA RESP QL NAA+PROBE: NEGATIVE
SMUDGE CELLS BLD QL SMEAR: PRESENT
SODIUM SERPL-SCNC: 134 MMOL/L (ref 136–145)
SODIUM SERPL-SCNC: 135 MMOL/L (ref 136–145)
SP GR UR STRIP.AUTO: 1.02 (ref 1–1.03)
TOTAL CELLS COUNTED SPEC: 100
TROPONIN I SERPL-MCNC: <0.02 NG/ML
UROBILINOGEN UR QL STRIP.AUTO: 0.2 E.U./DL
VARIANT LYMPHS # BLD AUTO: 27 %
VARIANT LYMPHS # BLD AUTO: 5 %
WBC # BLD AUTO: 0.9 THOUSAND/UL (ref 4.31–10.16)
WBC # BLD AUTO: 1.71 THOUSAND/UL (ref 4.31–10.16)
WBC #/AREA URNS AUTO: ABNORMAL /HPF

## 2021-06-24 PROCEDURE — 83615 LACTATE (LD) (LDH) ENZYME: CPT

## 2021-06-24 PROCEDURE — 99497 ADVNCD CARE PLAN 30 MIN: CPT | Performed by: INTERNAL MEDICINE

## 2021-06-24 PROCEDURE — U0003 INFECTIOUS AGENT DETECTION BY NUCLEIC ACID (DNA OR RNA); SEVERE ACUTE RESPIRATORY SYNDROME CORONAVIRUS 2 (SARS-COV-2) (CORONAVIRUS DISEASE [COVID-19]), AMPLIFIED PROBE TECHNIQUE, MAKING USE OF HIGH THROUGHPUT TECHNOLOGIES AS DESCRIBED BY CMS-2020-01-R: HCPCS | Performed by: EMERGENCY MEDICINE

## 2021-06-24 PROCEDURE — 85007 BL SMEAR W/DIFF WBC COUNT: CPT

## 2021-06-24 PROCEDURE — 99291 CRITICAL CARE FIRST HOUR: CPT | Performed by: EMERGENCY MEDICINE

## 2021-06-24 PROCEDURE — 85730 THROMBOPLASTIN TIME PARTIAL: CPT | Performed by: EMERGENCY MEDICINE

## 2021-06-24 PROCEDURE — 83605 ASSAY OF LACTIC ACID: CPT | Performed by: EMERGENCY MEDICINE

## 2021-06-24 PROCEDURE — 80053 COMPREHEN METABOLIC PANEL: CPT

## 2021-06-24 PROCEDURE — 99291 CRITICAL CARE FIRST HOUR: CPT

## 2021-06-24 PROCEDURE — 87040 BLOOD CULTURE FOR BACTERIA: CPT | Performed by: EMERGENCY MEDICINE

## 2021-06-24 PROCEDURE — G1004 CDSM NDSC: HCPCS

## 2021-06-24 PROCEDURE — 93005 ELECTROCARDIOGRAM TRACING: CPT

## 2021-06-24 PROCEDURE — 99223 1ST HOSP IP/OBS HIGH 75: CPT | Performed by: INTERNAL MEDICINE

## 2021-06-24 PROCEDURE — 71275 CT ANGIOGRAPHY CHEST: CPT

## 2021-06-24 PROCEDURE — 83880 ASSAY OF NATRIURETIC PEPTIDE: CPT | Performed by: EMERGENCY MEDICINE

## 2021-06-24 PROCEDURE — 85027 COMPLETE CBC AUTOMATED: CPT | Performed by: EMERGENCY MEDICINE

## 2021-06-24 PROCEDURE — 85610 PROTHROMBIN TIME: CPT | Performed by: EMERGENCY MEDICINE

## 2021-06-24 PROCEDURE — 96361 HYDRATE IV INFUSION ADD-ON: CPT

## 2021-06-24 PROCEDURE — 85007 BL SMEAR W/DIFF WBC COUNT: CPT | Performed by: EMERGENCY MEDICINE

## 2021-06-24 PROCEDURE — 96374 THER/PROPH/DIAG INJ IV PUSH: CPT

## 2021-06-24 PROCEDURE — U0005 INFEC AGEN DETEC AMPLI PROBE: HCPCS | Performed by: EMERGENCY MEDICINE

## 2021-06-24 PROCEDURE — 85027 COMPLETE CBC AUTOMATED: CPT

## 2021-06-24 PROCEDURE — 81001 URINALYSIS AUTO W/SCOPE: CPT

## 2021-06-24 PROCEDURE — NC001 PR NO CHARGE: Performed by: SURGERY

## 2021-06-24 PROCEDURE — 80053 COMPREHEN METABOLIC PANEL: CPT | Performed by: EMERGENCY MEDICINE

## 2021-06-24 PROCEDURE — 84484 ASSAY OF TROPONIN QUANT: CPT | Performed by: EMERGENCY MEDICINE

## 2021-06-24 PROCEDURE — 85379 FIBRIN DEGRADATION QUANT: CPT | Performed by: EMERGENCY MEDICINE

## 2021-06-24 PROCEDURE — 74176 CT ABD & PELVIS W/O CONTRAST: CPT

## 2021-06-24 PROCEDURE — 85025 COMPLETE CBC W/AUTO DIFF WBC: CPT | Performed by: EMERGENCY MEDICINE

## 2021-06-24 PROCEDURE — 36415 COLL VENOUS BLD VENIPUNCTURE: CPT

## 2021-06-24 PROCEDURE — 71045 X-RAY EXAM CHEST 1 VIEW: CPT

## 2021-06-24 RX ORDER — ACETAMINOPHEN 325 MG/1
650 TABLET ORAL EVERY 6 HOURS PRN
Status: DISCONTINUED | OUTPATIENT
Start: 2021-06-24 | End: 2021-06-27 | Stop reason: HOSPADM

## 2021-06-24 RX ORDER — ALLOPURINOL 100 MG/1
100 TABLET ORAL DAILY
Status: DISCONTINUED | OUTPATIENT
Start: 2021-06-25 | End: 2021-06-27 | Stop reason: HOSPADM

## 2021-06-24 RX ORDER — ONDANSETRON 2 MG/ML
4 INJECTION INTRAMUSCULAR; INTRAVENOUS ONCE
Status: COMPLETED | OUTPATIENT
Start: 2021-06-24 | End: 2021-06-24

## 2021-06-24 RX ORDER — ONDANSETRON 2 MG/ML
4 INJECTION INTRAMUSCULAR; INTRAVENOUS EVERY 6 HOURS PRN
Status: DISCONTINUED | OUTPATIENT
Start: 2021-06-24 | End: 2021-06-27 | Stop reason: HOSPADM

## 2021-06-24 RX ORDER — PANTOPRAZOLE SODIUM 40 MG/1
40 TABLET, DELAYED RELEASE ORAL
Status: DISCONTINUED | OUTPATIENT
Start: 2021-06-25 | End: 2021-06-27 | Stop reason: HOSPADM

## 2021-06-24 RX ORDER — ATORVASTATIN CALCIUM 40 MG/1
40 TABLET, FILM COATED ORAL
Status: DISCONTINUED | OUTPATIENT
Start: 2021-06-24 | End: 2021-06-27 | Stop reason: HOSPADM

## 2021-06-24 RX ADMIN — ONDANSETRON 4 MG: 2 INJECTION INTRAMUSCULAR; INTRAVENOUS at 14:29

## 2021-06-24 RX ADMIN — ONDANSETRON 4 MG: 2 INJECTION INTRAMUSCULAR; INTRAVENOUS at 16:40

## 2021-06-24 RX ADMIN — CEFEPIME HYDROCHLORIDE 2000 MG: 2 INJECTION, POWDER, FOR SOLUTION INTRAVENOUS at 17:09

## 2021-06-24 RX ADMIN — ATORVASTATIN CALCIUM 40 MG: 40 TABLET, FILM COATED ORAL at 18:16

## 2021-06-24 RX ADMIN — SODIUM CHLORIDE 500 ML: 0.9 INJECTION, SOLUTION INTRAVENOUS at 16:40

## 2021-06-24 RX ADMIN — IOHEXOL 100 ML: 350 INJECTION, SOLUTION INTRAVENOUS at 15:31

## 2021-06-24 RX ADMIN — SODIUM CHLORIDE 500 ML: 0.9 INJECTION, SOLUTION INTRAVENOUS at 15:05

## 2021-06-24 NOTE — ASSESSMENT & PLAN NOTE
· Presents with low-grade temperature, generalized weakness in the setting of neutropenia  Last received chemotherapy 6/18  Follows with Dr Ana Santana  · ANC 0 00  · Given Neupogen 480 mcg x 1 this AM   · Patient has mild folliculitis of right upper thigh but no other localizing complaints or evidence of infection  · Continue IV cefepime 2 g q 8 hours  · Follow up on blood cultures  · Hematology consultation appreciated

## 2021-06-24 NOTE — ASSESSMENT & PLAN NOTE
· Status post orchiectomy currently on chemotherapy  · Stage II B (pT2, pN2, S1) left-sided pure seminoma  · Follows with Dr Marya Rebolledo

## 2021-06-24 NOTE — ASSESSMENT & PLAN NOTE
· Status post orchiectomy currently on chemotherapy  · Stage II B (pT2, pN2, S1) left-sided pure seminoma  · Follows with Dr Esperanza العلي

## 2021-06-24 NOTE — ASSESSMENT & PLAN NOTE
· Presents with low-grade temperature, generalized weakness in the setting of neutropenia  Last received chemotherapy 6/18  Follows with Dr Paige Parks  · ANC 0 00  · Will give Neupogen 480 mcg x1  · Patient has mild folliculitis of right upper thigh but no other localizing complaints or evidence of infection  · Start IV cefepime 2 g q 8 hours  · Follow up on blood cultures  · ID consultation  · Hematology consultation

## 2021-06-24 NOTE — ASSESSMENT & PLAN NOTE
· Hemoglobin at baseline  · Diminished white blood cell count in the setting of chemotherapy  · Platelet count 55    · Monitor cell counts  · Granix x1

## 2021-06-24 NOTE — ACP (ADVANCE CARE PLANNING)
Serious Illness Conversation    1  What is your understanding now of where you are with your illness? Prognostic Understanding: appropriate understanding of prognosis     2  How much information about what is likely to be ahead with your illness would you like to have? Information: patient wants to be fully informed     3  What did you (clinician) communicate to the patient? Prognostic Communication: Uncertain - It can be difficult to predict what will happen with your illness  I hope you will continue to live well for a long time but Im worried that you could get sick quickly, and I think it is important to prepare for that possibility  4  If your health situation worsens, what are your most important goals? Goals: be physically comfortable, be at home     5  What are the biggest fears and worries about the future and your health? Fears/Worries: emotional concerns, loss of control, being a physical burden, being dependent     6  What abilities are so critical to your life that you cannot imagine living without them? Unacceptable Function: not being able to care for myself, including toileting and feeding     7  What gives you strength as you think about the future with your illness? My grandchildren     6  If you become sicker, how much are you willing to go through for the possibility of gaining more time? 9  How much does your proxy and family know about your priorities and wishes? Discussion: extensive discussion with family about goals and wishes     Erik Lewis heard you say that being comfortable is really important to you  Keeping that in mind, and what we know about your illness, I recommend that we continue aggressive treatment  This will help us make sure that your treatment plans reflect whats important to you  How does this plan sound to you? I will do everything I can to help you through this    Patient verbalized understanding of the plan     I have spent 30 minutes speaking with my patient on advanced care planning today or during this visit     Advanced directives  Five Wishes: Patient does not have Five Wishes- would not like information

## 2021-06-24 NOTE — CASE MANAGEMENT
CM reviewed prior admission/OP documentation- no social issues noted at this time with complaints seemingly medically based  Please contact this CM should any specific social issues be of concern

## 2021-06-24 NOTE — ASSESSMENT & PLAN NOTE
· Hemoglobin 6 9 today  · Diminished white blood cell count in the setting of chemotherapy  · Platelet count 23 today  · Granix x1  · Hematology following, defer on transfusion this AM  Await repeat CBC tomorrow

## 2021-06-24 NOTE — ED PROVIDER NOTES
History  Chief Complaint   Patient presents with    Difficulty Walking     Pt states everytime he stands up he feels like he is going to fall; nasuea has increased also  Was sent by Dr Trisha Aaron   Nausea     54-year-old male with history of diabetes, hypertension, testicular cancer currently on chemotherapy with last treatment June 14th through June 18th presents to the emergency department with generalized weakness and shortness of breath that started yesterday  Patient's daughter states every time he gets chemo within 4 to 5 days he has to come to the hospital for various reasons  Patient states he usually is not short of breath and this is not a usual side effect of his chemotherapy  He states he can not even stand up or ambulate secondary to the generalized weakness and shortness of breath  He has had no fevers or chills  No chest pain  No cough  No vomiting or diarrhea  He has had some nausea  History provided by:  Patient and relative   used: No    Shortness of Breath  Severity:  Unable to specify  Onset quality:  Gradual  Duration:  2 days  Timing:  Constant  Progression:  Unchanged  Chronicity:  New  Context: activity    Context comment:  Standing  Relieved by:  Nothing  Worsened by: Activity (standing)  Ineffective treatments:  None tried  Associated symptoms: no abdominal pain, no chest pain, no claudication, no cough, no diaphoresis, no ear pain, no fever, no headaches, no hemoptysis, no neck pain, no PND, no rash, no syncope, no vomiting and no wheezing    Risk factors: hx of cancer    Risk factors: no tobacco use        Prior to Admission Medications   Prescriptions Last Dose Informant Patient Reported? Taking?    Cholecalciferol (VITAMIN D3) 2000 units capsule  Self Yes No   Sig: Take 1 capsule by mouth daily   allopurinol (ZYLOPRIM) 100 mg tablet  Self Yes No   Sig: allopurinol 100 mg tablet   dexlansoprazole (DEXILANT) 60 MG capsule  Self Yes No   Sig: Dexilant 60 mg capsule, delayed release   furosemide (LASIX) 20 mg tablet  Self Yes No   Sig: Take 20 mg by mouth daily   rosuvastatin (CRESTOR) 20 MG tablet  Self Yes No   Sig: Take 20 mg by mouth daily at bedtime    sitaGLIPtin (JANUVIA) 50 mg tablet  Self No No   Sig: Take 1 tablet (50 mg total) by mouth daily      Facility-Administered Medications: None       Past Medical History:   Diagnosis Date    BPH without obstruction/lower urinary tract symptoms     Cancer (HCC)     prostate    CPAP (continuous positive airway pressure) dependence     Elevated PSA     GERD (gastroesophageal reflux disease)     Gout     Hyperlipidemia     Hypertension     Kidney stone     Obese abdomen     Obesity     Polymyalgia ([address]     [facility] [address] [facility]     Rash     under both arms and in between legs - family doctor aware - pt uses Desitin    Sleep apnea     Testicular carcinoma [facility] [address] [facility]     Urinary frequency     Urinary urgency     Wears glasses        Past Surgical History:   Procedure Laterality Date    CARPAL TUNNEL RELEASE Bilateral 07/2020    COLONOSCOPY      CYSTOSCOPY W/ LASER LITHOTRIPSY  2001    CYSTOSTOMY W/ STENT INSERTION  2011    ESOPHAGOGASTRODUODENOSCOPY      EXTRACORPOREAL SHOCK WAVE LITHOTRIPSY Right 2011    HERNIA REPAIR      KNEE ARTHROSCOPY Left     RI CYSTO/URETERO W/LITHOTRIPSY &INDWELL STENT INSRT Right 4/29/2018    Procedure: CYSTOSCOPY URETEROSCOPY, RETROGRADE PYELOGRAM AND INSERTION STENT URETERAL;  Surgeon: Emily Loja MD;  [facility];  Service: Urology    RI REMOVAL TESTIS,RADICAL Left 2/19/2021    Procedure: Radical  ORCHIECTOMY;  Surgeon: Esperanza Yang MD;  [facility];  Service: Urology       Family History   Problem Relation Age of Onset    Nephrolithiasis Father     Alzheimer's disease Mother      I have reviewed and agree with the history as documented      E-Cigarette/Vaping    E-Cigarette Use Never User      E-Cigarette/Vaping Substances     Social History     Tobacco Use    Smoking status: Never Smoker    Smokeless tobacco: Never Used   Vaping Use    Vaping Use: Never used   Substance Use Topics    Alcohol use: No    Drug use: No       Review of Systems   Constitutional: Negative  Negative for activity change, appetite change, chills, diaphoresis and fever  HENT: Negative  Negative for ear pain  Eyes: Negative  Respiratory: Positive for shortness of breath  Negative for cough, hemoptysis and wheezing  Cardiovascular: Negative  Negative for chest pain, claudication, syncope and PND  Gastrointestinal: Negative  Negative for abdominal pain and vomiting  Genitourinary: Negative  Musculoskeletal: Negative for neck pain  Skin: Negative  Negative for rash  Allergic/Immunologic: Negative  Neurological: Positive for weakness  Negative for numbness and headaches  Hematological: Negative  Psychiatric/Behavioral: Negative  All other systems reviewed and are negative  Physical Exam  Physical Exam  Vitals and nursing note reviewed  Constitutional:       General: He is awake  He is not in acute distress  Appearance: He is well-developed and overweight  He is not ill-appearing, toxic-appearing or diaphoretic  HENT:      Head: Normocephalic and atraumatic  Right Ear: External ear normal       Left Ear: External ear normal       Nose: Nose normal       Mouth/Throat:      Mouth: Mucous membranes are moist    Eyes:      General: No scleral icterus  Conjunctiva/sclera: Conjunctivae normal       Pupils: Pupils are equal, round, and reactive to light  Neck:      Thyroid: No thyromegaly  Vascular: No JVD  Cardiovascular:      Rate and Rhythm: Regular rhythm  Tachycardia present  Heart sounds: Normal heart sounds  No murmur heard  No gallop  Pulmonary:      Effort: Pulmonary effort is normal  No respiratory distress  Breath sounds: Normal breath sounds  No stridor  No wheezing, rhonchi or rales  Chest:      Chest wall: No tenderness  Abdominal:      General: Bowel sounds are normal  There is no distension  Palpations: Abdomen is soft  There is no mass  Tenderness: There is no abdominal tenderness  Hernia: No hernia is present  Musculoskeletal:         General: No tenderness or deformity  Normal range of motion  Cervical back: Normal range of motion and neck supple  Right lower leg: No edema  Left lower leg: No edema  Lymphadenopathy:      Cervical: No cervical adenopathy  Skin:     General: Skin is warm and dry  Coloration: Skin is pale  Skin is not jaundiced  Findings: No bruising, erythema, lesion or rash  Neurological:      General: No focal deficit present  Mental Status: He is alert and oriented to person, place, and time  Motor: No weakness  Deep Tendon Reflexes: Reflexes are normal and symmetric  Psychiatric:         Mood and Affect: Mood normal          Behavior: Behavior is cooperative           Vital Signs  ED Triage Vitals [06/24/21 1311]   Temperature Pulse Respirations Blood Pressure SpO2   100 2 °F (37 9 °C) 102 22 161/70 100 %      Temp Source Heart Rate Source Patient Position - Orthostatic VS BP Location FiO2 (%)   Oral Monitor Sitting Right arm --      Pain Score       --           Vitals:    06/24/21 1311   BP: 161/70   Pulse: 102   Patient Position - Orthostatic VS: Sitting         Visual Acuity      ED Medications  Medications - No data to display    Diagnostic Studies  Results Reviewed     Procedure Component Value Units Date/Time    CBC and differential [[id number]]     Lab Status: No result Specimen: Blood     Protime-INR [[id number]]     Lab Status: No result Specimen: Blood     APTT [[id number]]     Lab Status: No result Specimen: Blood     Comprehensive metabolic panel [[id number]]     Lab Status: No result Specimen: Blood     Troponin I [[id number]]     Lab Status: No result Specimen: Blood     NT-BNP PRO [[id number]] Lab Status: No result Specimen: Blood     Novel Coronavirus (Covid-19),PCR [facility] - 2 Hour Stat [[id number]]     Lab Status: No result Specimen: Nares from Nose                  XR chest 1 view portable    (Results Pending)              Procedures  ECG 12 Lead Documentation Only    Date/Time: 6/24/2021 2:23 PM  Performed by: Deann Whiteside DO  Authorized by: Deann Whiteside DO     Indications / Diagnosis:  Short of breath  ECG reviewed by me, the ED Provider: yes    Patient location:  ED  Previous ECG:     Previous ECG:  Compared to current    Comparison ECG info:  5/30/21    Similarity:  No change  Interpretation:     Interpretation: abnormal    Rate:     ECG rate:  96    ECG rate assessment: normal    Rhythm:     Rhythm: sinus rhythm    Ectopy:     Ectopy: none    Conduction:     Conduction: normal    ST segments:     ST segments:  Normal  T waves:     T waves: inverted      Inverted:  III    CriticalCare Time  Performed by: Deann Whiteside DO  Authorized by: Deann Whiteside DO     Critical care provider statement:     Critical care time (minutes):  30    Critical care time was exclusive of:  Separately billable procedures and treating other patients and teaching time    Critical care was necessary to treat or prevent imminent or life-threatening deterioration of the following conditions:  Sepsis    Critical care was time spent personally by me on the following activities:  Blood draw for specimens, obtaining history from patient or surrogate, development of treatment plan with patient or surrogate, discussions with consultants, evaluation of patient's response to treatment, examination of patient, interpretation of cardiac output measurements, ordering and performing treatments and interventions, ordering and review of laboratory studies, ordering and review of radiographic studies, re-evaluation of patient's condition and review of old charts    I assumed direction of critical care for this patient from another provider in my specialty: no               ED Course  ED Course as of Jun 24 1550   Thu Jun 24, 2021   9211 Patient and patient's spouse updated regarding results so far                                              MDM  Number of Diagnoses or Management Options  Diagnosis management comments: 78-year-old male with history of testicular cancer currently undergoing chemotherapy every 2 weeks  His last chemo treatment was between the days of June 14th and June 18th  States over the last day he has had increasing shortness of breath especially with exertion or even standing up  He complains of generalized weakness and nausea  He has had no fevers or chills  No vomiting or diarrhea  No chest pain  On exam he is alert no acute distress  His lungs are clear  He does appear pale  Will do cardiac workup, chest x-ray rule out pneumonia, CHF  Will add D-dimer for the possibility of PE given his history of cancer  Will rule out COVID  Patient has not received his vaccination       Amount and/or Complexity of Data Reviewed  Clinical lab tests: ordered and reviewed  Tests in the radiology section of CPT®: ordered and reviewed  Review and summarize past medical records: yes  Independent visualization of images, tracings, or specimens: yes        Disposition  Final diagnoses:   None     ED Disposition     None      Follow-up Information    None         Patient's Medications   Discharge Prescriptions    No medications on file     No discharge procedures on file      PDMP Review       Value Time User    PDMP Reviewed  Yes 2/19/2021  9:50 AM Addie Copeland MD          ED Provider  Electronically Signed by           Jessica Thurston DO  06/24/21 Dennys Pepe,   06/24/21 [id number]

## 2021-06-24 NOTE — ASSESSMENT & PLAN NOTE
· As noted on CTA chest, with abdominal distention and pain  · General surgery consultation    · Obtain CT abdomen pelvis

## 2021-06-24 NOTE — ASSESSMENT & PLAN NOTE
Lab Results   Component Value Date    EGFR 55 06/24/2021    EGFR 57 06/24/2021    EGFR 62 06/11/2021    CREATININE 1 36 (H) 06/24/2021    CREATININE 1 33 (H) 06/24/2021    CREATININE 1 24 06/11/2021     · Creatinine stable  · Continue Lasix p r n

## 2021-06-24 NOTE — TELEPHONE ENCOUNTER
Call from patient  Patient has increased fatigue,nausea and lightheadedness upon arising and with activity since last chemotherapy treatment  Patient had chemotherapy last week (6/14-6/18) with DEVYN Wu for testicular CA    Patient has appt with Dr Chase Madden tomorrow  PA at last visit had discussed changing interval of chemotherapy to every 4 weeks  Patient does have pending lab results  in system    Patient is staying hydrated and voiding clear danni urine     B/P sitting 136/64; standing   and faint and patient sat down because he could not tolerate standing  Patient is audibly weak and SOB on phone after standing    Daughter was taking B/P manually, daughter will take patient to ED    Will update Dr Jeff Villalobos RNs

## 2021-06-24 NOTE — TELEPHONE ENCOUNTER
Patient daughter Micky Dow called to report the patient had gone to the [facility] unsure of he will be admitted  Any questions her mom  Angela Vishnu can be reached at  [phone]

## 2021-06-24 NOTE — H&P
[address] [facility]  H&P- jeff [facility] 1960, 57 y o  male MRN: [id number]  Unit/Bed#: E2 -01 Encounter: [id number]  Primary Care Provider: Jackie Osuna DO   Date and time admitted to hospital: 6/24/2021  1:13 PM    * Febrile neutropenia (HCC)  Assessment & Plan  · Presents with low-grade temperature, generalized weakness in the setting of neutropenia  Last received chemotherapy 6/18  Follows with Dr Joshua Partida  · ANC 0 00  · Will give Neupogen 480 mcg x1  · Patient has mild folliculitis of right upper thigh but no other localizing complaints or evidence of infection  · Start IV cefepime 2 g q 8 hours  · Follow up on blood cultures  · Hematology consultation  Gastric distention  Assessment & Plan  · As noted on CTA chest, with abdominal distention and pain  · General surgery consultation  · Obtain CT abdomen pelvis    Pancytopenia (HCC)  Assessment & Plan  · Hemoglobin at baseline  · Diminished white blood cell count in the setting of chemotherapy  · Platelet count 55  · Monitor cell counts  · Granix x1    CKD (chronic kidney disease) stage 3, GFR 30-59 ml/min [facility])  Assessment & Plan  Lab Results   Component Value Date    EGFR 55 06/24/2021    EGFR 57 06/24/2021    EGFR 62 06/11/2021    CREATININE 1 36 (H) 06/24/2021    CREATININE 1 33 (H) 06/24/2021    CREATININE 1 24 06/11/2021     · Creatinine stable  · Continue Lasix p r n  SIMON on CPAP  Assessment & Plan  · CPAP QHS    Seminoma of testis, stage 3, left (HCC)  Assessment & Plan  · Status post orchiectomy currently on chemotherapy  · Stage II B (pT2, 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, S1) left-sided pure seminoma  · Follows with Dr Joshua Partida  Essential hypertension  Assessment & Plan  · Blood pressure stable  VTE Prophylaxis: Pharmacologic VTE Prophylaxis contraindicated due to thrombocytopenia  / sequential compression device   Code Status: DNR DNI  POLST: POLST form is not discussed and not completed at this time    Discussion with family: patient and wife    Anticipated Length of Stay:  Patient will be admitted on an Inpatient basis with an anticipated length of stay of > 2 midnights  Justification for Hospital Stay: febrile neutropenia     Total Time for Visit, including Counseling / Coordination of Care: 70 minutes  Greater than 50% of this total time spent on direct patient counseling and coordination of care  Chief Complaint:   Weakness, fever    History of Present Illness:    Charlie Del Toro is a 57 y o  male who presents with complaints of generalized weakness, nausea, abdominal distention, fever  Patient finished chemotherapy June 18th and ever since has felt run down with fatigue, weakness  Has had some abdominal pain and distension noted by the patient's wife  Denies shortness of breath, cough  Some nausea but no vomiting, change in stool  No sick contacts  Is unvaccinated for COVID-19  Denies hematuria, dysuria, urinary frequency  Review of Systems:    Review of Systems   Constitutional: Positive for fatigue and fever  Negative for activity change, appetite change and chills  HENT: Negative for congestion, rhinorrhea, sinus pressure and sore throat  Eyes: Negative for photophobia, pain and visual disturbance  Respiratory: Negative for cough, shortness of breath and wheezing  Cardiovascular: Negative for chest pain, palpitations and leg swelling  Gastrointestinal: Positive for nausea  Negative for abdominal distention, abdominal pain, constipation, diarrhea and vomiting  Endocrine: Negative for cold intolerance, heat intolerance, polydipsia and polyuria  Genitourinary: Negative for difficulty urinating, dysuria, flank pain, frequency and hematuria  Musculoskeletal: Negative for arthralgias, back pain and joint swelling  Skin: Negative for color change, pallor and rash  Allergic/Immunologic: Negative  Neurological: Positive for weakness   Negative for dizziness, syncope, light-headedness and headaches  Hematological: Negative  Psychiatric/Behavioral: Negative  Past Medical and Surgical History:     Past Medical History:   Diagnosis Date    BPH without obstruction/lower urinary tract symptoms     Cancer (HCC)     prostate    CPAP (continuous positive airway pressure) dependence     Elevated PSA     GERD (gastroesophageal reflux disease)     Gout     Hyperlipidemia     Hypertension     Kidney stone     Obese abdomen     Obesity     Polymyalgia (HCC)     Prostate cancer (HCC)     Rash     under both arms and in between legs - family doctor aware - pt uses Desitin    Sleep apnea     Testicular carcinoma ([address] )     Urinary frequency     Urinary urgency     Wears glasses        Past Surgical History:   Procedure Laterality Date    CARPAL TUNNEL RELEASE Bilateral 07/2020    COLONOSCOPY      CYSTOSCOPY W/ LASER LITHOTRIPSY  2001    CYSTOSTOMY W/ STENT INSERTION  2011    ESOPHAGOGASTRODUODENOSCOPY      EXTRACORPOREAL SHOCK WAVE LITHOTRIPSY Right 2011    HERNIA REPAIR      KNEE ARTHROSCOPY Left     DE CYSTO/URETERO W/LITHOTRIPSY &INDWELL STENT INSRT Right 4/29/2018    Procedure: CYSTOSCOPY URETEROSCOPY, RETROGRADE PYELOGRAM AND INSERTION STENT URETERAL;  Surgeon: Diaz Riley MD;  [facility];  Service: Urology    DE REMOVAL TESTIS,RADICAL Left 2/19/2021    Procedure: Radical  ORCHIECTOMY;  Surgeon: Michelle Azul MD;  [facility];  Service: Urology       Meds/Allergies:    Prior to Admission medications    Medication Sig Start Date End Date Taking?  Authorizing Provider   allopurinol (ZYLOPRIM) 100 mg tablet allopurinol 100 mg tablet    Historical Provider, MD   Cholecalciferol (VITAMIN D3) 2000 units capsule Take 1 capsule by mouth daily 10/2/19 6/3/21  Historical Provider, MD   dexlansoprazole (DEXILANT) 60 MG capsule Dexilant 60 mg capsule, delayed release    Historical Provider, MD   furosemide (LASIX) 20 mg tablet Take 20 mg by mouth daily Historical Provider, MD   rosuvastatin (CRESTOR) 20 MG tablet Take 20 mg by mouth daily at bedtime  2/28/18 6/3/21  Historical Provider, MD   sitaGLIPtin (JANUVIA) 50 mg tablet Take 1 tablet (50 mg total) by mouth daily 4/20/21 6/3/21  Caty Cr DO     I have reviewed home medications with patient personally  Allergies: No Known Allergies    Social History:     Marital Status: /Civil Union   Occupation: non-contributory  Patient Pre-hospital Living Situation: home  Patient Pre-hospital Level of Mobility: full  Patient Pre-hospital Diet Restrictions: low carb  Substance Use History:   Social History     Substance and Sexual Activity   Alcohol Use No     Social History     Tobacco Use   Smoking Status Never Smoker   Smokeless Tobacco Never Used     Social History     Substance and Sexual Activity   Drug Use No       Family History:    Family History   Problem Relation Age of Onset    Nephrolithiasis Father     Alzheimer's disease Mother        Physical Exam:     Vitals:   Blood Pressure: 144/68 (06/24/21 1800)  Pulse: 92 (06/24/21 1800)  Temperature: 98 9 °F (37 2 °C) (06/24/21 1800)  Temp Source: Temporal (06/24/21 1800)  Respirations: 16 (06/24/21 1800)  Weight - Scale: 116 kg (255 lb) (06/24/21 1311)  SpO2: 100 % (06/24/21 1800)    Physical Exam  Vitals and nursing note reviewed  Constitutional:       General: He is not in acute distress  Appearance: Normal appearance  HENT:      Head: Normocephalic  Mouth/Throat:      Mouth: Mucous membranes are moist    Eyes:      Pupils: Pupils are equal, round, and reactive to light  Cardiovascular:      Rate and Rhythm: Normal rate and regular rhythm  Heart sounds: No murmur heard  Pulmonary:      Effort: Pulmonary effort is normal  No respiratory distress  Breath sounds: Normal breath sounds  No wheezing, rhonchi or rales  Abdominal:      General: Bowel sounds are normal  There is distension  Palpations: Abdomen is soft  Tenderness: There is no abdominal tenderness  There is no guarding  Musculoskeletal:         General: No deformity  Cervical back: Normal range of motion  Right lower leg: No edema  Left lower leg: No edema  Skin:     Capillary Refill: Capillary refill takes less than 2 seconds  Coloration: Skin is pale  Findings: Erythema (right upper thigh folliculitis) present  Neurological:      General: No focal deficit present  Mental Status: He is alert and oriented to person, place, and time  Mental status is at baseline  Additional Data:     Lab Results: I have personally reviewed pertinent reports  Results from last 7 days   Lab Units 06/24/21  1405   WBC Thousand/uL 0 90*   HEMOGLOBIN g/dL 8 1*   HEMATOCRIT % 24 5*   PLATELETS Thousands/uL 55*   NEUTROS PCT % 0*   LYMPHS PCT % 96*   LYMPHO PCT % 92*   MONOS PCT % 3*   MONO PCT % 0*   EOS PCT % 1  1     Results from last 7 days   Lab Units 06/24/21  1405   SODIUM mmol/L 134*   POTASSIUM mmol/L 3 8   CHLORIDE mmol/L 99*   CO2 mmol/L 27   BUN mg/dL 32*   CREATININE mg/dL 1 36*   ANION GAP mmol/L 8   CALCIUM mg/dL 7 7*   ALBUMIN g/dL 3 3*   TOTAL BILIRUBIN mg/dL 0 87   ALK PHOS U/L 66   ALT U/L 22   AST U/L 10   GLUCOSE RANDOM mg/dL 135     Results from last 7 days   Lab Units 06/24/21  1405   INR  1 32*             Results from last 7 days   Lab Units 06/24/21  1702 06/24/21  1502   LACTIC ACID mmol/L 1 6 2 4*       Imaging: I have personally reviewed pertinent reports  CTA ED chest PE study   Final Result by Tanisha Rivera MD (06/24 1619)      No pulmonary embolus  Lungs clear  Severe gastric distention              Workstation performed: [id number]         XR chest 1 view portable   ED Interpretation by Jessica Thurston DO (06/24 1430)   nad      CT abdomen pelvis wo contrast    (Results Pending)       EKG, Pathology, and Other Studies Reviewed on Admission:   · Prior pertinent studies and records reviewed in  Corporation  [facility]scripts / [facility] Records Reviewed: Yes     ** Please Note: This note has been constructed using a voice recognition system   **

## 2021-06-24 NOTE — CONSULTS
Consultation - General Surgery  Radha Smith 57 y o  male MRN: [id number]  Unit/Bed#: E2 -01 Encounter: [id number]        Assessment/Plan     Assessment:  61M w/ testicular cancer on chemotherapy admitted for febrile neutropenia, consulted to surgery for Gastric Distention seen on CTA chest    - Patient is clinically un-obstructed however since no abdominal CT was obtained, need this to evaluate the bowels  - Passing flatus and having bowel movements  - No emesis    Plan:  Care per primary  Hold off on diet until CT Abdomen is performed  If no obstruction, ok to advance diet as tolerated    No acute surgical intervention at this time  Will follow up CT Abdomen Pelvis  History of Present Illness     HPI:  Kadeem Mott is a 57 y o  male who presents with complaints of generalized weakness, nausea and fever  Patient has past medical history significant for testicular cancer for which he is on chemotherapy with his last round on June 18th  Ever since this he has felt run down and fatigued with weakness  Patient stated he did have some abdominal pain and his wife notices distension in the emergency department however not at home  The patient is passing flatus currently and had a large bowel movement yesterday  He denies any emesis  He denies any shortness of breath or chest pain  Patient's wife stated that she noticed his abdomen being distended after he came to the hospital typically in the past as well after he obtains fluids       Review of Systems   Constitutional: Positive for fatigue and fever  HENT: Negative  Eyes: Negative  Respiratory: Negative  Cardiovascular: Negative  Gastrointestinal: Positive for nausea  Endocrine: Negative  Genitourinary: Negative  Musculoskeletal: Negative  Skin: Negative  Allergic/Immunologic: Positive for immunocompromised state  Neurological: Positive for weakness  Hematological: Negative  Psychiatric/Behavioral: Negative  Historical Information   Past Medical History:   Diagnosis Date    BPH without obstruction/lower urinary tract symptoms     Cancer (HCC)     prostate    CPAP (continuous positive airway pressure) dependence     Elevated PSA     GERD (gastroesophageal reflux disease)     Gout     Hyperlipidemia     Hypertension     Kidney stone     Obese abdomen     Obesity     Polymyalgia (HCC)     Prostate cancer (HCC)     Rash     under both arms and in between legs - family doctor aware - pt uses Desitin    Sleep apnea     Testicular carcinoma ([facility] [address] )     Urinary frequency     Urinary urgency     Wears glasses      Past Surgical History:   Procedure Laterality Date    CARPAL TUNNEL RELEASE Bilateral 07/2020    COLONOSCOPY      CYSTOSCOPY W/ LASER LITHOTRIPSY  2001    CYSTOSTOMY W/ STENT INSERTION  2011    ESOPHAGOGASTRODUODENOSCOPY      EXTRACORPOREAL SHOCK WAVE LITHOTRIPSY Right 2011    HERNIA REPAIR      KNEE ARTHROSCOPY Left     IN CYSTO/URETERO W/LITHOTRIPSY &INDWELL STENT INSRT Right 4/29/2018    Procedure: CYSTOSCOPY URETEROSCOPY, RETROGRADE PYELOGRAM AND INSERTION STENT URETERAL;  Surgeon: Marie Sorto MD;  [facility];  Service: Urology    IN REMOVAL TESTIS,RADICAL Left 2/19/2021    Procedure: Radical  ORCHIECTOMY;  Surgeon: Hailey Em MD;  [facility];  Service: Urology     Social History   Social History     Substance and Sexual Activity   Alcohol Use No     Social History     Substance and Sexual Activity   Drug Use No     Social History     Tobacco Use   Smoking Status Never Smoker   Smokeless Tobacco Never Used     Family History:   Family History   Problem Relation Age of Onset    Nephrolithiasis Father     Alzheimer's disease Mother        Meds/Allergies   PTA meds:   Prior to Admission Medications   Prescriptions Last Dose Informant Patient Reported? Taking?    Cholecalciferol (VITAMIN D3) 2000 units capsule  Self Yes No   Sig: Take 1 capsule by mouth daily   allopurinol (ZYLOPRIM) 100 mg tablet 6/23/2021 at Unknown time Self Yes Yes   Sig: allopurinol 100 mg tablet   dexlansoprazole (DEXILANT) 60 MG capsule 6/23/2021 at Unknown time Self Yes Yes   Sig: Dexilant 60 mg capsule, delayed release   furosemide (LASIX) 20 mg tablet 6/23/2021 at Unknown time Self Yes Yes   Sig: Take 20 mg by mouth daily   rosuvastatin (CRESTOR) 20 MG tablet  Self Yes No   Sig: Take 20 mg by mouth daily at bedtime    sitaGLIPtin (JANUVIA) 50 mg tablet  Self No No   Sig: Take 1 tablet (50 mg total) by mouth daily      Facility-Administered Medications: None     No Known Allergies    Objective   First Vitals:   Blood Pressure: 161/70 (06/24/21 1311)  Pulse: 102 (06/24/21 1311)  Temperature: 100 2 °F (37 9 °C) (06/24/21 1311)  Temp Source: Oral (06/24/21 1311)  Respirations: 22 (06/24/21 1311)  Weight - Scale: 116 kg (255 lb) (06/24/21 1311)  SpO2: 100 % (06/24/21 1311)    Current Vitals:   Blood Pressure: 144/68 (06/24/21 1800)  Pulse: 92 (06/24/21 1800)  Temperature: 98 9 °F (37 2 °C) (06/24/21 1800)  Temp Source: Temporal (06/24/21 1800)  Respirations: 16 (06/24/21 1800)  Weight - Scale: 116 kg (255 lb) (06/24/21 1311)  SpO2: 100 % (06/24/21 1800)      Intake/Output Summary (Last 24 hours) at 6/24/2021 1848  Last data filed at 6/24/2021 1702  Gross per 24 hour   Intake 1000 ml   Output --   Net 1000 ml       Invasive Devices     Peripheral Intravenous Line            Peripheral IV 06/24/21 Distal;Left;Upper;Ventral (anterior) Arm <1 day                Physical Exam  Constitutional:       General: He is not in acute distress  Appearance: He is ill-appearing  HENT:      Head: Normocephalic  Nose: Nose normal       Mouth/Throat:      Mouth: Mucous membranes are dry  Eyes:      General: No scleral icterus  Cardiovascular:      Rate and Rhythm: Normal rate  Pulmonary:      Effort: Pulmonary effort is normal    Abdominal:      General: There is distension  Palpations: Abdomen is soft  Tenderness: There is no abdominal tenderness  There is no guarding  Musculoskeletal:         General: Normal range of motion  Cervical back: Neck supple  Skin:     General: Skin is warm  Neurological:      General: No focal deficit present  Mental Status: He is alert and oriented to person, place, and time  Psychiatric:         Mood and Affect: Mood normal          Lab Results:   I have personally reviewed pertinent lab results  , CBC:   Lab Results   Component Value Date    WBC 0 90 (LL) 06/24/2021    HGB 8 1 (L) 06/24/2021    HCT 24 5 (L) 06/24/2021    MCV 96 06/24/2021    PLT 55 (L) 06/24/2021    MCH 31 6 06/24/2021    MCHC 33 1 06/24/2021    RDW 18 0 (H) 06/24/2021    MPV 10 8 06/24/2021    NRBC 0 06/24/2021   , CMP:   Lab Results   Component Value Date    SODIUM 134 (L) 06/24/2021    K 3 8 06/24/2021    CL 99 (L) 06/24/2021    CO2 27 06/24/2021    BUN 32 (H) 06/24/2021    CREATININE 1 36 (H) 06/24/2021    CALCIUM 7 7 (L) 06/24/2021    AST 10 06/24/2021    ALT 22 06/24/2021    ALKPHOS 66 06/24/2021    EGFR 55 06/24/2021     Imaging: I have personally reviewed pertinent reports  and I have personally reviewed pertinent films in PACS  EKG, Pathology, and Other Studies: I have personally reviewed pertinent reports     and I have personally reviewed pertinent films in PACS    Code Status: Level 3 - DNAR and DNI  Advance Directive and Living Will:      Power of :    POLST:

## 2021-06-24 NOTE — ASSESSMENT & PLAN NOTE
· As noted on CTA chest, with abdominal distention and pain  · CT abdomen with no SBO, conservative treatment with small meals and no carbonation  · General surgery consultation appreciated

## 2021-06-24 NOTE — TELEPHONE ENCOUNTER
Lab Result: WBC 1 71   Date/Time Drawn: 6/24/21 8:14am   Ordering Provider: Ramona Arrington   Lab Personnel's Name: Caroline Jack       The following critical/stat result was read back to the lab as stated above and [employer] to the on-call provider

## 2021-06-24 NOTE — PLAN OF CARE
Problem: Potential for Falls  Goal: Patient will remain free of falls  Description: INTERVENTIONS:  - Educate patient/family on patient safety including physical limitations  - Instruct patient to call for assistance with activity   - Consult OT/PT to assist with strengthening/mobility   - Keep Call bell within reach  - Keep bed low and locked with side rails adjusted as appropriate  - Keep care items and personal belongings within reach  - Initiate and maintain comfort rounds  - Make Fall Risk Sign visible to staff  - Offer Toileting every Hours, in advance of need  - Initiate/Maintain alarm  - Obtain necessary fall risk management equipmen  - Apply yellow socks and bracelet for high fall risk patients  - Consider moving patient to room near nurses station  Outcome: Progressing     Problem: PAIN - ADULT  Goal: Verbalizes/displays adequate comfort level or baseline comfort level  Description: Interventions:  - Encourage patient to monitor pain and request assistance  - Assess pain using appropriate pain scale  - Administer analgesics based on type and severity of pain and evaluate response  - Implement non-pharmacological measures as appropriate and evaluate response  - Consider cultural and social influences on pain and pain management  - Notify physician/advanced practitioner if interventions unsuccessful or patient reports new pain  Outcome: Progressing     Problem: INFECTION - ADULT  Goal: Absence or prevention of progression during hospitalization  Description: INTERVENTIONS:  - Assess and monitor for signs and symptoms of infection  - Monitor lab/diagnostic results  - Monitor all insertion sites, i e  indwelling lines, tubes, and drains  - Monitor endotracheal if appropriate and nasal secretions for changes in amount and color  - [place] appropriate cooling/warming therapies per order  - Administer medications as ordered  - Instruct and encourage patient and family to use good hand hygiene technique  - Identify and instruct in appropriate isolation precautions for identified infection/condition  Outcome: Progressing  Goal: Absence of fever/infection during neutropenic period  Description: INTERVENTIONS:  - Monitor WBC    Outcome: Progressing     Problem: SAFETY ADULT  Goal: Patient will remain free of falls  Description: INTERVENTIONS:  - Educate patient/family on patient safety including physical limitations  - Instruct patient to call for assistance with activity   - Consult OT/PT to assist with strengthening/mobility   - Keep Call bell within reach  - Keep bed low and locked with side rails adjusted as appropriate  - Keep care items and personal belongings within reach  - Initiate and maintain comfort rounds  - Make Fall Risk Sign visible to staff  - Offer Toileting larissa  Hours, in advance of need  - Initiate/Maintainalarm  - Obtain necessary fall risk management equipment:  - Apply yellow socks and bracelet for high fall risk patients  - Consider moving patient to room near nurses station  Outcome: Progressing  Goal: Maintain or return to baseline ADL function  Description: INTERVENTIONS:  -  Assess patient's ability to carry out ADLs; assess patient's baseline for ADL function and identify physical deficits which impact ability to perform ADLs (bathing, care of mouth/teeth, toileting, grooming, dressing, etc )  - Assess/evaluate cause of self-care deficits   - Assess range of motion  - Assess patient's mobility; develop plan if impaired  - Assess patient's need for assistive devices and provide as appropriate  - Encourage maximum independence but intervene and supervise when necessary  - Involve family in performance of ADLs  - Assess for home care needs following discharge   - Consider OT consult to assist with ADL evaluation and planning for discharge  - Provide patient education as appropriate  Outcome: Progressing  Goal: Maintains/Returns to pre admission functional level  Description: INTERVENTIONS:  - Perform BMAT or MOVE assessment daily    - Set and communicate daily mobility goal to care team and patient/family/caregiver  - Collaborate with rehabilitation services on mobility goals if consulted  - Perform Range of Motion times a day  - Reposition patient everyhours  - Dangle patient times a day  - Stand patient times a day  - Ambulate patient times a day  - Out of bed to chair times a day   - Out of bed for saranya times a day  - Out of bed for toileting  - Record patient progress and toleration of activity level   Outcome: Progressing     Problem: DISCHARGE PLANNING  Goal: Discharge to home or other facility with appropriate resources  Description: INTERVENTIONS:  - Identify barriers to discharge w/patient and caregiver  - Arrange for needed discharge resources and transportation as appropriate  - Identify discharge learning needs (meds, wound care, etc )  - Arrange for interpretive services to assist at discharge as needed  - Refer to Case Management Department for coordinating discharge planning if the patient needs post-hospital services based on physician/advanced practitioner order or complex needs related to functional status, cognitive ability, or social support system  Outcome: Progressing     Problem: Knowledge Deficit  Goal: Patient/family/caregiver demonstrates understanding of disease process, treatment plan, medications, and discharge instructions  Description: Complete learning assessment and assess knowledge base    Interventions:  - Provide teaching at level of understanding  - Provide teaching via preferred learning methods  Outcome: Progressing

## 2021-06-25 ENCOUNTER — TELEPHONE (OUTPATIENT)
Dept: HEMATOLOGY ONCOLOGY | Facility: CLINIC | Age: 61
End: 2021-06-25

## 2021-06-25 LAB
ABO GROUP BLD: NORMAL
ALBUMIN SERPL BCP-MCNC: 2.8 G/DL (ref 3.5–5)
ALP SERPL-CCNC: 56 U/L (ref 46–116)
ALT SERPL W P-5'-P-CCNC: 17 U/L (ref 12–78)
ANION GAP SERPL CALCULATED.3IONS-SCNC: 12 MMOL/L (ref 4–13)
AST SERPL W P-5'-P-CCNC: 8 U/L (ref 5–45)
ATRIAL RATE: 96 BPM
BILIRUB SERPL-MCNC: 0.89 MG/DL (ref 0.2–1)
BLD GP AB SCN SERPL QL: NEGATIVE
BUN SERPL-MCNC: 30 MG/DL (ref 5–25)
CALCIUM ALBUM COR SERPL-MCNC: 8.4 MG/DL (ref 8.3–10.1)
CALCIUM SERPL-MCNC: 7.4 MG/DL (ref 8.3–10.1)
CHLORIDE SERPL-SCNC: 104 MMOL/L (ref 100–108)
CO2 SERPL-SCNC: 21 MMOL/L (ref 21–32)
CREAT SERPL-MCNC: 1.36 MG/DL (ref 0.6–1.3)
ERYTHROCYTE [DISTWIDTH] IN BLOOD BY AUTOMATED COUNT: 18 % (ref 11.6–15.1)
GFR SERPL CREATININE-BSD FRML MDRD: 55 ML/MIN/1.73SQ M
GLUCOSE SERPL-MCNC: 117 MG/DL (ref 65–140)
HCT VFR BLD AUTO: 20.8 % (ref 36.5–49.3)
HCT VFR BLD AUTO: 22.3 % (ref 36.5–49.3)
HGB BLD-MCNC: 6.9 G/DL (ref 12–17)
HGB BLD-MCNC: 7.4 G/DL (ref 12–17)
MCH RBC QN AUTO: 31.2 PG (ref 26.8–34.3)
MCHC RBC AUTO-ENTMCNC: 33.2 G/DL (ref 31.4–37.4)
MCV RBC AUTO: 94 FL (ref 82–98)
NRBC BLD AUTO-RTO: 0 /100 WBCS
P AXIS: 36 DEGREES
PLATELET # BLD AUTO: 23 THOUSANDS/UL (ref 149–390)
PMV BLD AUTO: 9.3 FL (ref 8.9–12.7)
POTASSIUM SERPL-SCNC: 3.7 MMOL/L (ref 3.5–5.3)
PR INTERVAL: 136 MS
PROT SERPL-MCNC: 6.4 G/DL (ref 6.4–8.2)
QRS AXIS: 2 DEGREES
QRSD INTERVAL: 86 MS
QT INTERVAL: 318 MS
QTC INTERVAL: 401 MS
RBC # BLD AUTO: 2.21 MILLION/UL (ref 3.88–5.62)
RH BLD: POSITIVE
SODIUM SERPL-SCNC: 137 MMOL/L (ref 136–145)
SPECIMEN EXPIRATION DATE: NORMAL
T WAVE AXIS: -24 DEGREES
VENTRICULAR RATE: 96 BPM
WBC # BLD AUTO: 1.61 THOUSAND/UL (ref 4.31–10.16)

## 2021-06-25 PROCEDURE — 30233R1 TRANSFUSION OF NONAUTOLOGOUS PLATELETS INTO PERIPHERAL VEIN, PERCUTANEOUS APPROACH: ICD-10-PCS | Performed by: INTERNAL MEDICINE

## 2021-06-25 PROCEDURE — P9040 RBC LEUKOREDUCED IRRADIATED: HCPCS

## 2021-06-25 PROCEDURE — 80053 COMPREHEN METABOLIC PANEL: CPT | Performed by: PHYSICIAN ASSISTANT

## 2021-06-25 PROCEDURE — 86900 BLOOD TYPING SEROLOGIC ABO: CPT | Performed by: INTERNAL MEDICINE

## 2021-06-25 PROCEDURE — 99232 SBSQ HOSP IP/OBS MODERATE 35: CPT | Performed by: INTERNAL MEDICINE

## 2021-06-25 PROCEDURE — 99223 1ST HOSP IP/OBS HIGH 75: CPT | Performed by: INTERNAL MEDICINE

## 2021-06-25 PROCEDURE — 85027 COMPLETE CBC AUTOMATED: CPT | Performed by: PHYSICIAN ASSISTANT

## 2021-06-25 PROCEDURE — 85014 HEMATOCRIT: CPT | Performed by: INTERNAL MEDICINE

## 2021-06-25 PROCEDURE — 86850 RBC ANTIBODY SCREEN: CPT | Performed by: INTERNAL MEDICINE

## 2021-06-25 PROCEDURE — 86901 BLOOD TYPING SEROLOGIC RH(D): CPT | Performed by: INTERNAL MEDICINE

## 2021-06-25 PROCEDURE — 85018 HEMOGLOBIN: CPT | Performed by: INTERNAL MEDICINE

## 2021-06-25 PROCEDURE — 86923 COMPATIBILITY TEST ELECTRIC: CPT

## 2021-06-25 PROCEDURE — NC001 PR NO CHARGE: Performed by: SURGERY

## 2021-06-25 PROCEDURE — 97166 OT EVAL MOD COMPLEX 45 MIN: CPT

## 2021-06-25 PROCEDURE — 93010 ELECTROCARDIOGRAM REPORT: CPT | Performed by: INTERNAL MEDICINE

## 2021-06-25 PROCEDURE — 97163 PT EVAL HIGH COMPLEX 45 MIN: CPT | Performed by: PHYSICAL THERAPIST

## 2021-06-25 RX ORDER — DIPHENHYDRAMINE HCL 25 MG
25 TABLET ORAL ONCE
Status: COMPLETED | OUTPATIENT
Start: 2021-06-25 | End: 2021-06-25

## 2021-06-25 RX ORDER — ACETAMINOPHEN 325 MG/1
650 TABLET ORAL ONCE
Status: COMPLETED | OUTPATIENT
Start: 2021-06-25 | End: 2021-06-25

## 2021-06-25 RX ADMIN — TBO-FILGRASTIM 480 MCG: 480 INJECTION, SOLUTION SUBCUTANEOUS at 10:50

## 2021-06-25 RX ADMIN — DIPHENHYDRAMINE HCL 25 MG: 25 TABLET, COATED ORAL at 16:47

## 2021-06-25 RX ADMIN — CEFEPIME HYDROCHLORIDE 2000 MG: 2 INJECTION, POWDER, FOR SOLUTION INTRAVENOUS at 02:55

## 2021-06-25 RX ADMIN — CEFEPIME HYDROCHLORIDE 2000 MG: 2 INJECTION, POWDER, FOR SOLUTION INTRAVENOUS at 10:49

## 2021-06-25 RX ADMIN — ALLOPURINOL 100 MG: 100 TABLET ORAL at 08:33

## 2021-06-25 RX ADMIN — ACETAMINOPHEN 650 MG: 325 TABLET, FILM COATED ORAL at 16:43

## 2021-06-25 RX ADMIN — ATORVASTATIN CALCIUM 40 MG: 40 TABLET, FILM COATED ORAL at 16:47

## 2021-06-25 RX ADMIN — PANTOPRAZOLE SODIUM 40 MG: 40 TABLET, DELAYED RELEASE ORAL at 05:37

## 2021-06-25 RX ADMIN — CEFEPIME HYDROCHLORIDE 2000 MG: 2 INJECTION, POWDER, FOR SOLUTION INTRAVENOUS at 20:06

## 2021-06-25 NOTE — PLAN OF CARE
Problem: PHYSICAL THERAPY ADULT  Goal: Performs mobility at highest level of function for planned discharge setting  See evaluation for individualized goals  Description: Treatment/Interventions: Functional transfer training, LE strengthening/ROM, Therapeutic exercise, Endurance training, Patient/family training, Equipment eval/education, Gait training, Compensatory technique education, Spoke to nursing, OT          See flowsheet documentation for full assessment, interventions and recommendations  Note: Prognosis: Good  Problem List: Decreased strength, Decreased endurance, Impaired balance, Decreased mobility, Obesity  Assessment: pt was admitted with nausea, dizziness and leg weakness  pt dx  with pancytopenia, neutropenic fever, sepsis, gastric diestension r/o ileus  pt referred to PT  pt was living at home with wife  generally indep without device but staying on first floor due to weakness from chemo  pt had episode of bilat weakness, estimates he can walk about 1 block  pt  demonstrated moderate functoinal limitations due to recent involvement with chemo, recent debility and current deficits in strength, balance, gait stability and sequencing, activity toleance due to fatigue and anemia, self care  pt was able to amb 20' in room with min assist  pt advised to use rw for now due to strength and balance deficits combined with very low platelets   pt will need skilled PT, anticipate that pt can return home  may need home PT but will be determined when pt is closer to discharge  Barriers to Discharge: Inaccessible home environment        PT Discharge Recommendation: Home with home health rehabilitation     PT - OK to Discharge: No    See flowsheet documentation for full assessment

## 2021-06-25 NOTE — PROGRESS NOTES
[address] [place] [address]  Progress Note - Sophia Byrne 1960, 57 y o  male MRN: [id number]  Unit/Bed#: E2 -01 Encounter: [id number]  Primary Care Provider: Elina Mccollum DO   Date and time admitted to hospital: 6/24/2021  1:13 PM    * Febrile neutropenia (HCC)  Assessment & Plan  · Presents with low-grade temperature, generalized weakness in the setting of neutropenia  Last received chemotherapy 6/18  Follows with Dr Fercho Weaver  · ANC 0 00  · Given Neupogen 480 mcg x 1 this AM   · Patient has mild folliculitis of right upper thigh but no other localizing complaints or evidence of infection  · Continue IV cefepime 2 g q 8 hours  · Follow up on blood cultures  · Hematology consultation appreciated  Gastric distention  Assessment & Plan  · As noted on CTA chest, with abdominal distention and pain  · CT abdomen with no SBO, conservative treatment with small meals and no carbonation  · General surgery consultation appreciated  Pancytopenia ([address] )  Assessment & Plan  · Hemoglobin 6 9 today  · Diminished white blood cell count in the setting of chemotherapy  · Platelet count 23 today  · Granix x1  · Hematology following, defer on transfusion this AM  Await repeat CBC tomorrow  CKD (chronic kidney disease) stage 3, GFR 30-59 ml/min [facility])  Assessment & Plan  Lab Results   Component Value Date    EGFR 55 06/24/2021    EGFR 57 06/24/2021    EGFR 62 06/11/2021    CREATININE 1 36 (H) 06/24/2021    CREATININE 1 33 (H) 06/24/2021    CREATININE 1 24 06/11/2021     · Creatinine stable  · Continue Lasix p r n  SIMON on CPAP  Assessment & Plan  · CPAP QHS    Seminoma of testis, stage 3, left (HCC)  Assessment & Plan  · Status post orchiectomy currently on chemotherapy  · Stage II B (pT2, 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, S1) left-sided pure seminoma  · Follows with Dr Fercho Weaver  Essential hypertension  Assessment & Plan  · Blood pressure stable        VTE Pharmacologic Prophylaxis:   Pharmacologic: Pharmacologic VTE Prophylaxis contraindicated due to thrombocytopenia  Mechanical VTE Prophylaxis in Place: Yes    Patient Centered Rounds: I have performed bedside rounds with nursing staff today  Discussions with Specialists or Other Care Team Provider: CM, nursing    Education and Discussions with Family / Patient: patient at bedside, called wife     Time Spent for Care: 30 minutes  More than 50% of total time spent on counseling and coordination of care as described above  Current Length of Stay: 1 day(s)    Current Patient Status: Inpatient   Certification Statement: The patient will continue to require additional inpatient hospital stay due to thrombocytopenia, neutopenic fever on IV abx    Discharge Plan: 48-72 hours    Code Status: Level 3 - DNAR and DNI      Subjective:   Low-grade fever overnight however no fevers as morning  Patient reports feeling better since when he arrived to Emergency Department  Abdomen appears less distended this morning  Had a bowel movement this morning  No other events per nursing  Granix was not given last night as ordered, will give this morning  Objective:     Vitals:   Temp (24hrs), Av 2 °F (37 3 °C), Min:98 °F (36 7 °C), Max:100 2 °F (37 9 °C)    Temp:  [98 °F (36 7 °C)-100 2 °F (37 9 °C)] 98 °F (36 7 °C)  HR:  [] 73  Resp:  [14-22] 18  BP: (107-161)/(51-78) 107/53  SpO2:  [99 %-100 %] 99 %  Body mass index is 39 93 kg/m²  Input and Output Summary (last 24 hours): Intake/Output Summary (Last 24 hours) at 2021 1055  Last data filed at 2021 1702  Gross per 24 hour   Intake 1000 ml   Output --   Net 1000 ml       Physical Exam:     Physical Exam  Vitals and nursing note reviewed  Constitutional:       General: He is not in acute distress  Appearance: Normal appearance  He is obese  HENT:      Head: Normocephalic  Mouth/Throat:      Mouth: Mucous membranes are moist    Eyes:      Pupils: Pupils are equal, round, and reactive to light  Cardiovascular:      Rate and Rhythm: Normal rate and regular rhythm  Heart sounds: No murmur heard  Pulmonary:      Effort: Pulmonary effort is normal  No respiratory distress  Breath sounds: Normal breath sounds  No wheezing, rhonchi or rales  Abdominal:      General: Bowel sounds are normal  There is no distension  Palpations: Abdomen is soft  Tenderness: There is no abdominal tenderness  There is no guarding  Musculoskeletal:         General: No deformity  Cervical back: Normal range of motion  Right lower leg: No edema  Left lower leg: No edema  Skin:     Capillary Refill: Capillary refill takes less than 2 seconds  Coloration: Skin is pale  Neurological:      General: No focal deficit present  Mental Status: He is alert and oriented to person, place, and time  Mental status is at baseline  Additional Data:     Labs:    Results from last 7 days   Lab Units 06/25/21  0534 06/24/21  1405   WBC Thousand/uL 1 61* 0 90*   HEMOGLOBIN g/dL 6 9* 8 1*   HEMATOCRIT % 20 8* 24 5*   PLATELETS Thousands/uL 23* 55*   NEUTROS PCT %  --  0*   LYMPHS PCT %  --  96*   LYMPHO PCT %  --  92*   MONOS PCT %  --  3*   MONO PCT %  --  0*   EOS PCT %  --  1  1     Results from last 7 days   Lab Units 06/25/21  0534   SODIUM mmol/L 137   POTASSIUM mmol/L 3 7   CHLORIDE mmol/L 104   CO2 mmol/L 21   BUN mg/dL 30*   CREATININE mg/dL 1 36*   ANION GAP mmol/L 12   CALCIUM mg/dL 7 4*   ALBUMIN g/dL 2 8*   TOTAL BILIRUBIN mg/dL 0 89   ALK PHOS U/L 56   ALT U/L 17   AST U/L 8   GLUCOSE RANDOM mg/dL 117     Results from last 7 days   Lab Units 06/24/21  1405   INR  1 32*             Results from last 7 days   Lab Units 06/24/21  1702 06/24/21  1502   LACTIC ACID mmol/L 1 6 2 4*           * I Have Reviewed All Lab Data Listed Above  * Additional Pertinent Lab Tests Reviewed:  All Labs Within Last 24 Hours Reviewed    Imaging:    Imaging Reports Reviewed Today Include: CT a/p  Imaging Personally Reviewed by Myself Includes:  none    Recent Cultures (last 7 days):     Results from last 7 days   Lab Units 06/24/21  1618 06/24/21  1605   BLOOD CULTURE  Received in Microbiology Lab  Culture in Progress  Received in Microbiology Lab  Culture in Progress  Last 24 Hours Medication List:   Current Facility-Administered Medications   Medication Dose Route Frequency Provider Last Rate    acetaminophen  650 mg Oral Q6H PRN Alvarez Crisp, PA-C      allopurinol  100 mg Oral Daily Alvarez Crisp, PA-C      atorvastatin  40 mg Oral Daily With Roney PA-C      cefepime  2,000 mg Intravenous Q8H Alvarez Crisp, PA-C 2,000 mg (06/25/21 1049)    ondansetron  4 mg Intravenous Q6H PRN Alvarez Crisp, PA-C      pantoprazole  40 mg Oral Early Morning Alvarez Crisp, PA-C          Today, Patient Was Seen By: Marya Simental PA-C    ** Please Note: Dictation voice to text software may have been used in the creation of this document   **

## 2021-06-25 NOTE — PLAN OF CARE
Problem: Potential for Falls  Goal: Patient will remain free of falls  Description: INTERVENTIONS:  - Educate patient/family on patient safety including physical limitations  - Instruct patient to call for assistance with activity   - Consult OT/PT to assist with strengthening/mobility   - Keep Call bell within reach  - Keep bed low and locked with side rails adjusted as appropriate  - Keep care items and personal belongings within reach  - Initiate and maintain comfort rounds  - Make Fall Risk Sign visible to staff  - Offer Toileting every 2 Hours, in advance of need  - Apply yellow socks and bracelet for high fall risk patients  - Consider moving patient to room near nurses station  Outcome: Progressing     Problem: PAIN - ADULT  Goal: Verbalizes/displays adequate comfort level or baseline comfort level  Description: Interventions:  - Encourage patient to monitor pain and request assistance  - Assess pain using appropriate pain scale  - Administer analgesics based on type and severity of pain and evaluate response  - Implement non-pharmacological measures as appropriate and evaluate response  - Consider cultural and social influences on pain and pain management  - Notify physician/advanced practitioner if interventions unsuccessful or patient reports new pain  Outcome: Progressing     Problem: INFECTION - ADULT  Goal: Absence or prevention of progression during hospitalization  Description: INTERVENTIONS:  - Assess and monitor for signs and symptoms of infection  - Monitor lab/diagnostic results  - Monitor all insertion sites, i e  indwelling lines, tubes, and drains  - Monitor endotracheal if appropriate and nasal secretions for changes in amount and color  - [place] appropriate cooling/warming therapies per order  - Administer medications as ordered  - Instruct and encourage patient and family to use good hand hygiene technique  - Identify and instruct in appropriate isolation precautions for identified infection/condition  Outcome: Progressing  Goal: Absence of fever/infection during neutropenic period  Description: INTERVENTIONS:  - Monitor WBC    Outcome: Progressing     Problem: SAFETY ADULT  Goal: Patient will remain free of falls  Description: INTERVENTIONS:  - Educate patient/family on patient safety including physical limitations  - Instruct patient to call for assistance with activity   - Consult OT/PT to assist with strengthening/mobility   - Keep Call bell within reach  - Keep bed low and locked with side rails adjusted as appropriate  - Keep care items and personal belongings within reach  - Initiate and maintain comfort rounds  - Make Fall Risk Sign visible to staff  - Offer Toileting every 2 Hours, in advance of need  - Apply yellow socks and bracelet for high fall risk patients  - Consider moving patient to room near nurses station  Outcome: Progressing  Goal: Maintain or return to baseline ADL function  Description: INTERVENTIONS:  -  Assess patient's ability to carry out ADLs; assess patient's baseline for ADL function and identify physical deficits which impact ability to perform ADLs (bathing, care of mouth/teeth, toileting, grooming, dressing, etc )  - Assess/evaluate cause of self-care deficits   - Assess range of motion  - Assess patient's mobility; develop plan if impaired  - Assess patient's need for assistive devices and provide as appropriate  - Encourage maximum independence but intervene and supervise when necessary  - Involve family in performance of ADLs  - Assess for home care needs following discharge   - Consider OT consult to assist with ADL evaluation and planning for discharge  - Provide patient education as appropriate  Outcome: Progressing  Goal: Maintains/Returns to pre admission functional level  Description: INTERVENTIONS:  - Perform BMAT or MOVE assessment daily    - Set and communicate daily mobility goal to care team and patient/family/caregiver     - Collaborate with rehabilitation services on mobility goals if consulted  - Perform Range of Motion 2 times a day  - Reposition patient every 2 hours  - Dangle patient 2 times a day  - Stand patient 2 times a day  - Ambulate patient 2 times a day  - Out of bed to chair 2 times a day   - Out of bed for meals 2 times a day  - Out of bed for toileting  - Record patient progress and toleration of activity level   Outcome: Progressing     Problem: DISCHARGE PLANNING  Goal: Discharge to home or other facility with appropriate resources  Description: INTERVENTIONS:  - Identify barriers to discharge w/patient and caregiver  - Arrange for needed discharge resources and transportation as appropriate  - Identify discharge learning needs (meds, wound care, etc )  - Arrange for interpretive services to assist at discharge as needed  - Refer to Case Management Department for coordinating discharge planning if the patient needs post-hospital services based on physician/advanced practitioner order or complex needs related to functional status, cognitive ability, or social support system  Outcome: Progressing     Problem: Knowledge Deficit  Goal: Patient/family/caregiver demonstrates understanding of disease process, treatment plan, medications, and discharge instructions  Description: Complete learning assessment and assess knowledge base  Interventions:  - Provide teaching at level of understanding  - Provide teaching via preferred learning methods  Outcome: Progressing     Problem: Nutrition/Hydration-ADULT  Goal: Nutrient/Hydration intake appropriate for improving, restoring or maintaining nutritional needs  Description: Monitor and assess patient's nutrition/hydration status for malnutrition  Collaborate with interdisciplinary team and initiate plan and interventions as ordered  Monitor patient's weight and dietary intake as ordered or per policy  Utilize nutrition screening tool and intervene as necessary   Determine patient's food preferences and provide high-protein, high-caloric foods as appropriate  INTERVENTIONS:  - Monitor oral intake, urinary output, labs, and treatment plans  - Assess nutrition and hydration status and recommend course of action  - Evaluate amount of meals eaten  - Assist patient with eating if necessary   - Allow adequate time for meals  - Recommend/ encourage appropriate diets, oral nutritional supplements, and vitamin/mineral supplements  - Order, calculate, and assess calorie counts as needed  - Recommend, monitor, and adjust tube feedings and TPN/PPN based on assessed needs  - Assess need for intravenous fluids  - Provide specific nutrition/hydration education as appropriate  - Include patient/family/caregiver in decisions related to nutrition  Outcome: Progressing     Problem: MOBILITY - ADULT  Goal: Maintain or return to baseline ADL function  Description: INTERVENTIONS:  -  Assess patient's ability to carry out ADLs; assess patient's baseline for ADL function and identify physical deficits which impact ability to perform ADLs (bathing, care of mouth/teeth, toileting, grooming, dressing, etc )  - Assess/evaluate cause of self-care deficits   - Assess range of motion  - Assess patient's mobility; develop plan if impaired  - Assess patient's need for assistive devices and provide as appropriate  - Encourage maximum independence but intervene and supervise when necessary  - Involve family in performance of ADLs  - Assess for home care needs following discharge   - Consider OT consult to assist with ADL evaluation and planning for discharge  - Provide patient education as appropriate  Outcome: Progressing  Goal: Maintains/Returns to pre admission functional level  Description: INTERVENTIONS:  - Perform BMAT or MOVE assessment daily    - Set and communicate daily mobility goal to care team and patient/family/caregiver     - Collaborate with rehabilitation services on mobility goals if consulted  - Perform Range of Motion 2 times a day  - Reposition patient every 2 hours    - Dangle patient 2 times a day  - Stand patient 2 times a day  - Ambulate patient 2 times a day  - Out of bed to chair 2 times a day   - Out of bed for meals 2 times a day  - Out of bed for toileting  - Record patient progress and toleration of activity level   Outcome: Progressing

## 2021-06-25 NOTE — PHYSICAL THERAPY NOTE
Physical Therapy Evaluation      Patient Active Problem List   Diagnosis    Calculus of kidney    Elevated PSA    Benign prostatic hyperplasia with nocturia    Prostate cancer ([address]    Hydrocele in adult    Mass of left testis    Essential hypertension    GERD (gastroesophageal reflux disease)    CPAP (continuous positive airway pressure) dependence    Obesity    Seminoma of descended left testis (HCC)    Seminoma of testis, stage 3, left (HCC)    Chemoprevention    Chemotherapy-induced neutropenia (HCC)    JULIO C (acute kidney injury) ([address] )    SIMON on CPAP    Polymyalgia rheumatica syndrome (HCC)    Steroid-induced hyperglycemia    Chest discomfort    ARF (acute renal failure) with tubular necrosis (HCC)    Stage 3a chronic kidney disease (HCC)    CKD (chronic kidney disease) stage 3, GFR 30-59 ml/min (HCC)    Chest pain    Back pain    Thrombocytopenia (HCC)    Anemia    Type 2 diabetes mellitus (HCC)    Lymphadenopathy, retroperitoneal    Febrile neutropenia (HCC)    Pancytopenia (HCC)    Gastric distention       Past Medical History:   Diagnosis Date    BPH without obstruction/lower urinary tract symptoms     Cancer (HCC)     prostate    CPAP (continuous positive airway pressure) dependence     Elevated PSA     GERD (gastroesophageal reflux disease)     Gout     Hyperlipidemia     Hypertension     Kidney stone     Obese abdomen     Obesity     Polymyalgia ([address]     Prostate cancer ([address] )     Rash     under both arms and in between legs - family doctor aware - pt uses Desitin    Sleep apnea     Testicular carcinoma ([address] )     Urinary frequency     Urinary urgency     Wears glasses        Past Surgical History:   Procedure Laterality Date    CARPAL TUNNEL RELEASE Bilateral 07/2020    COLONOSCOPY      CYSTOSCOPY W/ LASER LITHOTRIPSY  2001    CYSTOSTOMY W/ STENT INSERTION  2011    ESOPHAGOGASTRODUODENOSCOPY      EXTRACORPOREAL SHOCK WAVE LITHOTRIPSY Right 2011    HERNIA REPAIR      KNEE ARTHROSCOPY Left     [place] CYSTO/URETERO W/LITHOTRIPSY &INDWELL STENT INSRT Right 4/29/2018    Procedure: CYSTOSCOPY URETEROSCOPY, RETROGRADE PYELOGRAM AND INSERTION STENT URETERAL;  Surgeon: Martita Haji MD;  [facility];  Service: Urology    [place] REMOVAL TESTIS,RADICAL Left 2/19/2021    Procedure: Radical  ORCHIECTOMY;  Surgeon: Nohelia Brown MD;  [facility];  Service: Urology      06/25/21 0947   PT Last Visit   PT Visit Date 06/25/21   Note Type   Note type Evaluation   Pain Assessment   Pain Assessment Tool Pain Assessment not indicated - pt denies pain   Home Living   Type of [address] Two level;Performs ADLs on one level  (no stairs to enter)   Xoinka shower seat;Grab bars in shower;Grab bars around toilet;Commode  (bench in upstairs bathroom, toilet bars 1st floor)   Home Equipment Walker;Cane;Crutches   Prior Function   Level of [place] Independent with ADLs and functional mobility   Lives With Spouse   Receives Help From Family   ADL Assistance Independent   IADLs Independent   Falls in the last 6 months 0   Restrictions/Precautions   Other Precautions Fall Risk;Immunosuppressed   Cognition   Overall Cognitive Status WFL   Orientation Level Oriented X4   RUE Assessment   RUE Assessment WFL   LUE Assessment   LUE Assessment WFL   RLE Assessment   RLE Assessment X  (grossly 4/5)   LLE Assessment   LLE Assessment X  (grossly 4/5)   Coordination   Movements are Fluid and Coordinated 1   Sensation WFL   Bed Mobility   Rolling L 7  Independent   Supine to Sit 5  Supervision   Transfers   Sit to Stand 4  Minimal assistance   Additional items Assist x 1;Bedrails; Increased time required;Verbal cues   Stand to Sit 4  Minimal assistance   Additional items Assist x 1;Bedrails; Increased time required;Verbal cues   Ambulation/Elevation   Gait pattern Improper Weight shift;Decreased foot clearance; Wide M[place]; Excessively slow; Short stride   Gait Assistance 4  Minimal assist   Additional items Assist x 1;Verbal cues; Tactile cues   Assistive Device   (arm in arm)   Distance 25'   Balance   Static Sitting Good   Dynamic Sitting Fair +   Static Standing Fair +   Dynamic Standing Fair   Ambulatory Fair   Endurance Deficit   Endurance Deficit Yes   Endurance Deficit Description weakness, fatigue   Activity Tolerance   Activity Tolerance Treatment limited secondary to medical complications (Comment)   Medical Staff Made Aware OT   Nurse Made Aware yes   Assessment   Prognosis Good   Problem List Decreased strength;Decreased endurance; Impaired balance;Decreased mobility;Obesity   Assessment pt was admitted with nausea, dizziness and leg weakness  pt dx  with pancytopenia, neutropenic fever, sepsis, gastric diestension r/o ileus  pt referred to PT  pt was living at home with wife  generally indep without device but staying on first floor due to weakness from chemo  pt had episode of bilat weakness, estimates he can walk about 1 block  pt  demonstrated moderate functoinal limitations due to recent involvement with chemo, recent debility and current deficits in strength, balance, gait stability and sequencing, activity toleance due to fatigue and anemia, self care  pt was able to amb 20' in room with min assist  pt advised to use rw for now due to strength and balance deficits combined with very low platelets   pt will need skilled PT, anticipate that pt can return home  may need home PT but will be determined when pt is closer to discharge  Barriers to Discharge Inaccessible home environment   Goals   Patient Goals get strength back and walk without device  STG Expiration Date 07/02/21   Short Term Goal #1 indep with bed mobility, transfers, amb without least restrictive device for > 200'  demonstrate good safety practices     Plan   Treatment/Interventions Functional transfer training;LE strengthening/ROM; Therapeutic exercise; Endurance training;Patient/family training;Equipment eval/education;Gait training; Compensatory technique education;Spoke to nursing;OT   PT Frequency   (3-5x/week)   Recommendation   PT Discharge Recommendation Home with home health rehabilitation   PT - OK to Discharge No   AM-PAC Basic Mobility Inpatient   Turning in Bed Without Bedrails 4   Lying on Back to Sitting on Edge of Flat Bed 3   Moving Bed to Chair 3   Standing Up From Chair 3   Walk in Room 3   Climb 3-5 Stairs 2   Basic Mobility Inpatient Raw Score 18   Basic Mobility Standardized Score 41 05   History: co - morbidities, fall risk, use of assistive device, assist for iadl's,   Exam: impairments in locomotion, musculoskeletal, balance, hematologic  Clinical: unstable/unpredictable  Complexity:high        Fco Rosales, PT

## 2021-06-25 NOTE — PROGRESS NOTES
Progress Note - General Surgery   Anny Smith 57 y o  male MRN: [id number]  Unit/Bed#: E2 -01 Encounter: [id number]    Assessment:  61M w/ testicular cancer on chemotherapy admitted for febrile neutropenia, consulted to surgery for Gastric Distention seen on CTA chest  F/u CT AP showed gastric distention with duodenal lipoma however no obstruction    - Not clinically obstructed, passing flatus and having bowel movements     Plan:  Diet as tolerated  No acute surgical intervention    Surgery to sign off at this time  Please reach out if any questions arise    Subjective/Objective   Chief Complaint:     Subjective: No acute events  Had bowel movement this  Morning and last night after CT scan  Passing large amounts of flatus and tolerated his diet    Objective:     Blood pressure 108/51, pulse 86, temperature 99 6 °F (37 6 °C), temperature source Temporal, resp  rate 18, weight 116 kg (255 lb), SpO2 99 %  ,Body mass index is 39 93 kg/m²  Intake/Output Summary (Last 24 hours) at 6/25/2021 0701  Last data filed at 6/24/2021 1702  Gross per 24 hour   Intake 1000 ml   Output --   Net 1000 ml       Invasive Devices     Peripheral Intravenous Line            Peripheral IV 06/24/21 Distal;Left;Upper;Ventral (anterior) Arm <1 day                Physical Exam: General: AAOx3  Head: normocephalic, atraumatic  Neck: supple, trachea midline  Respiratory: BS b/l  Abdomen: Soft, mild distention, abdomen improved this morning from yesterday, no rebound/guarding  Heart: RRR, S1s2  Ext: Warm no cyanosis       Lab, Imaging and other studies:  I have personally reviewed pertinent lab results    , CBC:   Lab Results   Component Value Date    WBC 1 61 (LL) 06/25/2021    HGB 6 9 (LL) 06/25/2021    HCT 20 8 (L) 06/25/2021    MCV 94 06/25/2021    PLT 23 (LL) 06/25/2021    MCH 31 2 06/25/2021    MCHC 33 2 06/25/2021    RDW 18 0 (H) 06/25/2021    MPV 9 3 06/25/2021    NRBC 0 06/25/2021   , CMP:   Lab Results   Component Value Date    SODIUM 137 06/25/2021    K 3 7 06/25/2021     06/25/2021    CO2 21 06/25/2021    BUN 30 (H) 06/25/2021    CREATININE 1 36 (H) 06/25/2021    CALCIUM 7 4 (L) 06/25/2021    AST 8 06/25/2021    ALT 17 06/25/2021    ALKPHOS 56 06/25/2021    EGFR 55 06/25/2021     VTE Pharmacologic Prophylaxis: Heparin  VTE Mechanical Prophylaxis: sequential compression device

## 2021-06-25 NOTE — OCCUPATIONAL THERAPY NOTE
Occupational Therapy Evaluation(time=0925-0971)     Patient Name: Francisco Carrillo  [id number] Date: 6/25/2021  Problem List  Principal Problem:    Febrile neutropenia ([facility] [address] )  Active Problems:    Essential hypertension    Seminoma of testis, stage 3, left (HCC)    SIMON on CPAP    CKD (chronic kidney disease) stage 3, GFR 30-59 ml/min (HCC)    Pancytopenia (HCC)    Gastric distention    Past Medical History  Past Medical History:   Diagnosis Date    BPH without obstruction/lower urinary tract symptoms     Cancer (HCC)     prostate    CPAP (continuous positive airway pressure) dependence     Elevated PSA     GERD (gastroesophageal reflux disease)     Gout     Hyperlipidemia     Hypertension     Kidney stone     Obese abdomen     Obesity     Polymyalgia ([address] )     Prostate cancer ([facility] [address] )     Rash     under both arms and in between legs - family doctor aware - pt uses Desitin    Sleep apnea     Testicular carcinoma ([facility] [address] )     Urinary frequency     Urinary urgency     Wears glasses      Past Surgical History  Past Surgical History:   Procedure Laterality Date    CARPAL TUNNEL RELEASE Bilateral 07/2020    COLONOSCOPY      CYSTOSCOPY W/ LASER LITHOTRIPSY  2001    CYSTOSTOMY W/ STENT INSERTION  2011    ESOPHAGOGASTRODUODENOSCOPY      EXTRACORPOREAL SHOCK WAVE LITHOTRIPSY Right 2011    HERNIA REPAIR      KNEE ARTHROSCOPY Left     [place] CYSTO/URETERO W/LITHOTRIPSY &INDWELL STENT INSRT Right 4/29/2018    Procedure: CYSTOSCOPY URETEROSCOPY, RETROGRADE PYELOGRAM AND INSERTION STENT URETERAL;  Surgeon: Prescott Leyden, MD;  [facility];  Service: Urology    [place] REMOVAL TESTIS,RADICAL Left 2/19/2021    Procedure: Radical  ORCHIECTOMY;  Surgeon: Shadia Mclaughlin MD;  [facility];  Service: Urology             06/25/21 7939   Note Type   Note type Evaluation   Restrictions/Precautions   Weight Bearing Precautions Per Order No   Other Precautions Fall Risk   Pain Assessment   Pain Assessment Tool Pain Assessment not indicated - pt denies pain   Pain Score No Pain   Home Living   Type of Home House   Home Layout Two level   Bathroom Shower/Tub Walk-in shower   Bathroom Toilet Standard   Bathroom Equipment Built-in shower seat   Home Equipment Walker;Cane   Prior Function   Lives With Spouse;Daughter  (grandchildren)   ADL Assistance Independent   Falls in the last 6 months 0   Lifestyle   Autonomy PTA pt states independence with all aspects of his ADLs, transfers, ambulation--device; neg falls, +, neg home alone   Reciprocal Relationships supportive family   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    Intrinsic Gratification yardwork   Psychosocial   Psychosocial (WDL) WDL   Subjective   Subjective "I've been walking to the bathroom "   ADL   Where Assessed Edge of bed   Eating Assistance 6  Modified independent   Grooming Assistance 6  Modified Independent   UB Bathing Assistance 6  Modified Independent   LB Bathing Assistance 6  Modified Independent   [address] 6  Modified independent   [address] 6  Modified independent   Bed Mobility   Rolling R 7  Independent   Rolling L 7  Independent   Transfers   Sit to Stand 4  Minimal assistance   Additional items Assist x 1; Increased time required;Verbal cues   Stand to Sit 4  Minimal assistance   Additional items Assist x 1; Increased time required;Verbal cues   Functional Mobility   Functional Mobility 5  Supervision   Additional items Rolling walker   Balance   Static Sitting Good   Dynamic Sitting Fair +   Static Standing Fair +   Dynamic Standing Fair   Activity Tolerance   Activity Tolerance Patient limited by fatigue   Medical Staff Made Aware nsg, P T     RUE Assessment   RUE Assessment WFL   RUE Strength   RUE Overall Strength Within Functional Limits - strength 5/5   LUE Assessment   LUE Assessment WFL   LUE Strength   LUE Overall Strength Within Functional Limits - strength 5/5   Hand Function   Gross Motor Coordination Functional   Fine Motor Coordination Functional   Sensation   Light Touch No apparent deficits   Proprioception   Proprioception No apparent deficits   Vision-Basic Assessment   Current Vision   (glasses)   Vision - Complex Assessment   Acuity Able to read clock/calendar on wall without difficulty   Perception   Inattention/Neglect Appears intact   Cognition   Overall Cognitive Status WFL   Arousal/Participation Alert   Attention Within functional limits   Orientation Level Oriented X4   Memory Within functional limits   Following Commands Follows all commands and directions without difficulty   Assessment   Limitation Decreased endurance   Prognosis Good   Assessment Pt is a 60y/o male admitted to the hospital 2* symptoms of generalized weakness, nausea, abdominal distention  Pt noted with febrile neutropenia  Pt with PMH testicular Ca--s/p chemo(last tx 6/18), prostate Ca, polymyalgia, gout, s/p L testis removal(2/21)  PTA pt states independence with all aspects of his ADLs, transfers, ambulation--device; neg falls, +, neg home alone  During initial eval, pt demonstrated slight deficits with his functional balance, functional mobility, and activity tolerance  Pt was able to demonstrate good ADL status and states no concerns about going directly home  Pt would benefit from a restorative program on the unit to improve his overall endurance, balance, and mobility  Therapist reviewed energy conservation techniques with good carrryover noted  Acute OT tx not indicated at this time 2* limited ADL deficits      Goals   Patient Goals "to get stronger"   Plan   OT Frequency Eval only   Recommendation   OT Discharge Recommendation   (continue P T  )   OT - OK to Discharge Yes   AM-PAC Daily Activity Inpatient   Lower Body Dressing 4   Bathing 4   Toileting 4   Upper Body Dressing 4   Grooming 4   Eating 4   Daily Activity Raw Score 24   Daily Activity Standardized Score (Calc for Raw Score >=11) 57 54 AM-PAC Applied Cognition Inpatient   Following a Speech/Presentation 4   Understanding Ordinary Conversation 4   Taking Medications 4   Remembering Where Things Are Placed or Put Away 4   Remembering List of 4-5 Errands 4   Taking Care of Complicated Tasks 4   Applied Cognition Raw Score 24   Applied Cognition Standardized Score 62 21   Niraj Rojas, OT

## 2021-06-25 NOTE — UTILIZATION REVIEW
Initial Clinical Review    Admission: Date/Time/Statement:   Admission Orders (From admission, onward)     Ordered        06/24/21 1637  Inpatient Admission  Once                   Orders Placed This Encounter   Procedures    Inpatient Admission     Standing Status:   Standing     Number of Occurrences:   1     Order Specific Question:   Level of Care     Answer:   Med Surg [16]     Order Specific Question:   Bed request comments     Answer:   Neutropenic precaution     Order Specific Question:   Estimated length of stay     Answer:   More than 2 Midnights     Order Specific Question:   Certification     Answer:   I certify that inpatient services are medically necessary for this patient for a duration of greater than two midnights  See H&P and MD Progress Notes for additional information about the patient's course of treatment  ED Arrival Information     Expected Arrival Acuity    - 6/24/2021 13:05 Emergent         Means of arrival Escorted by Service Admission type    Wheelchair Family Member General Medicine Emergency         Arrival complaint    [address]        Chief Complaint   Patient presents with    Difficulty Walking     Pt states everytime he stands up he feels like he is going to fall; nasuea has increased also  Was sent by Dr Sofi Martinez   Nausea       Initial Presentation: 56 yo immunocompromised male w/hx bph, testicular and prostate ca s/p chemo , gerd, htn, sleep apnea to ED from home admitted as inpatient due to sepsis,  febrile neutropenia and possible ileus  Presented with generalized weakness, nausea, abd distension, fever, and fatigue ever since finishing chemo on 6/18  Exam reveals distended abd, pale skin, R upper thigh erythema/folliculitis  Wbc  9; ANC 0   PE study showed severe gastric distension  IV antbx, neupogen, infectious workup in progress  Oncology consulted  Unclear source of neutropenia  Surgery also consulted  Pancytopenia is from chemo      6/24 per surgery: clinically unobstructed, passing flatus, having bm's  Keep NPO until CT abd performed, need complete imaging to r/o distal obstruction  Date: 6/25   Day 2: low grade temp overnight, feeling better than he did at arrival  abd is less distended, soft and nontender   had a bm  Wbc increased to 1 61  Giving granix today, transfusing prbc's due to significant anemia-hgb drop to 6 9  Remains pale  Continue IV antbx until cultures are back  Per surgery: imaging showed duodenal lipoma, no obstruction  Recommend 6 small meals, no carbonation  Per oncology: stage II seminoma  hgb dropped, likely from dilution  Transfuse as needed  Relative lymphocytosis reflects absolute neutropenia, 2nd to chemo  Continue hydration, antbx, waiting on blood cultures       ED Triage Vitals   Temperature Pulse Respirations Blood Pressure SpO2   06/24/21 1311 06/24/21 1311 06/24/21 1311 06/24/21 1311 06/24/21 1311   100 2 °F (37 9 °C) 102 22 161/70 100 %      Temp Source Heart Rate Source Patient Position - Orthostatic VS BP Location FiO2 (%)   06/24/21 1311 06/24/21 1311 06/24/21 1311 06/24/21 1311 --   Oral Monitor Sitting Right arm       Pain Score       06/24/21 1800       No Pain          Wt Readings from Last 1 Encounters:   06/24/21 116 kg (255 lb)     Additional Vital Signs:   Date/Time  Temp  Pulse  Resp  BP  MAP (mmHg)  SpO2  Calculated FIO2 (%) - Nasal Cannula  Nasal Cannula O2 Flow Rate (L/min)  O2 Device  Patient Position - Orthostatic VS   06/25/21 1707  97 8 °F (36 6 °C)  76  18  127/55  --  --  --  --  --  --   06/25/21 1648  97 4 °F (36 3 °C)Abnormal   75  19  153/63  --  --  --  --  --  --   06/25/21 1500  98 2 °F (36 8 °C)  75  18  112/55  --  100 %  26  1 5 L/min  Nasal cannula  Lying   06/25/21 0713  98 °F (36 7 °C)  73  18  107/53  76  99 %  26  1 5 L/min  Nasal cannula  Lying   06/24/21 2300  99 6 °F (37 6 °C)  86  18  108/51  --  99 %  --  --  None (Room air)  Lying   06/24/21 1800  98 9 °F (37 2 °C)  92  16 144/68  --  100 %  --  --  None (Room air)  Lying   06/24/21 1742  --  92  16  132/58  --  99 %  --  --  None (Room air)  Lying   06/24/21 1622  --  89  16  118/56  --  100 %  --  --  None (Room air)  Lying   06/24/21 1448  --  95  14  154/78  --  100 %  --  --  None (Room air)  Lying       Pertinent Labs/Diagnostic Test Results:   CTA ED chest PE study   Final Result by Caitlin Isabel MD (06/24 1619)       No pulmonary embolus        Lungs clear        Severe gastric distention  6/24 PCXR: nothing acute  6/24 CT a&p: 1  Fluid distention of the visualized distal esophagus, gas and fluid distending the stomach, fluid within nondistended small bowel  The findings may represent an ileus  2   Hepatomegaly  3   Duodenal lipoma      6/24 EKG Normal sinus rhythm  Voltage criteria for left ventricular hypertrophy  Nonspecific ST and T wave abnormality    Results from last 7 days   Lab Units 06/24/21  1405   SARS-COV-2  Negative     Results from last 7 days   Lab Units 06/25/21  0534 06/24/21  1405 06/24/21  0814   WBC Thousand/uL 1 61* 0 90* 1 71*   HEMOGLOBIN g/dL 6 9* 8 1* 8 7*   HEMATOCRIT % 20 8* 24 5* 27 2*   PLATELETS Thousands/uL 23* 55* 57*   NEUTROS ABS Thousands/µL  --  0 00*  --          Results from last 7 days   Lab Units 06/25/21  0534 06/24/21  1405 06/24/21  0814   SODIUM mmol/L 137 134* 135*   POTASSIUM mmol/L 3 7 3 8 4 3   CHLORIDE mmol/L 104 99* 105   CO2 mmol/L 21 27 21   ANION GAP mmol/L 12 8 9   BUN mg/dL 30* 32* 31*   CREATININE mg/dL 1 36* 1 36* 1 33*   EGFR ml/min/1 73sq m 55 55 57   CALCIUM mg/dL 7 4* 7 7* 8 3     Results from last 7 days   Lab Units 06/25/21  0534 06/24/21  1405 06/24/21  0814   AST U/L 8 10 12   ALT U/L 17 22 18   ALK PHOS U/L 56 66 69   TOTAL PROTEIN g/dL 6 4 7 3 7 5   ALBUMIN g/dL 2 8* 3 3* 3 2*   TOTAL BILIRUBIN mg/dL 0 89 0 87 1 05*         Results from last 7 days   Lab Units 06/25/21  0534 06/24/21  1405   GLUCOSE RANDOM mg/dL 117 135     Results from last 7 days Lab Units 06/24/21  1405   TROPONIN I ng/mL <0 02     Results from last 7 days   Lab Units 06/24/21  1405   D-DIMER QUANTITATIVE ug/ml FEU 1 17*     Results from last 7 days   Lab Units 06/24/21  1405   PROTIME seconds 16 1*   INR  1 32*   PTT seconds 20*             Results from last 7 days   Lab Units 06/24/21  1702 06/24/21  1502   LACTIC ACID mmol/L 1 6 2 4*             Results from last 7 days   Lab Units 06/24/21  1405   NT-PRO BNP pg/mL 84     Results from last 7 days   Lab Units 06/24/21  1627   CLARITY UA  Clear   COLOR UA  Yellow   SPEC GRAV UA  1 020   PH UA  5 5   GLUCOSE UA mg/dl Negative   KETONES UA mg/dl Negative   BLOOD UA  Negative   PROTEIN UA mg/dl Trace*   NITRITE UA  Negative   BILIRUBIN UA  Negative   UROBILINOGEN UA E U /dl 0 2   LEUKOCYTES UA  Negative   WBC UA /hpf 0-1*   RBC UA /hpf 0-1*   BACTERIA UA /hpf Occasional   EPITHELIAL CELLS WET PREP /hpf Occasional       Results from last 7 days   Lab Units 06/24/21  1618 06/24/21  1605   BLOOD CULTURE  Received in Microbiology Lab  Culture in Progress  Received in Microbiology Lab  Culture in Progress       Results from last 7 days   Lab Units 06/24/21  1405   TOTAL COUNTED  100           ED Treatment:   Medication Administration from 06/24/2021 1159 to 06/24/2021 1755       Date/Time Order Dose Route Action     06/24/2021 1429 ondansetron (ZOFRAN) injection 4 mg 4 mg Intravenous Given     06/24/2021 1505 sodium chloride 0 9 % bolus 500 mL 500 mL Intravenous New Bag     06/24/2021 1531 iohexol (OMNIPAQUE) 350 MG/ML injection (SINGLE-DOSE) 100 mL 100 mL Intravenous Given     06/24/2021 1640 sodium chloride 0 9 % bolus 500 mL 500 mL Intravenous New Bag     06/24/2021 1640 ondansetron (ZOFRAN) injection 4 mg 4 mg Intravenous Given     06/24/2021 1709 cefepime (MAXIPIME) 2 g/50 mL dextrose IVPB 2,000 mg Intravenous New Bag        Past Medical History:   Diagnosis Date    BPH without obstruction/lower urinary tract symptoms     Cancer ([facility] ) prostate    CPAP (continuous positive airway pressure) dependence     Elevated PSA     GERD (gastroesophageal reflux disease)     Gout     Hyperlipidemia     Hypertension     Kidney stone     Obese abdomen     Obesity     Polymyalgia (HCC)     Prostate cancer (HCC)     Rash     under both arms and in between legs - family doctor aware - pt uses Desitin    Sleep apnea     Testicular carcinoma ([facility] )     Urinary frequency     Urinary urgency     Wears glasses      Present on Admission:   CKD (chronic kidney disease) stage 3, GFR 30-59 ml/min (HCC)   Seminoma of testis, stage 3, left (HCC)   Febrile neutropenia (HCC)   Pancytopenia (HCC)   Essential hypertension   Gastric distention      Admitting Diagnosis: Dizziness [R42]  Nausea [R11 0]  Febrile neutropenia (HCC) [D70 9, R50 81]  Gastric distention [K31 89]  Sepsis with acute organ dysfunction without septic shock, due to unspecified organism, unspecified type ([facility] ) [A41 9, R65 20]  Age/Sex: 57 y o  male  Admission Orders:  Scheduled Medications:  allopurinol, 100 mg, Oral, Daily  atorvastatin, 40 mg, Oral, Daily With Dinner  cefepime, 2,000 mg, Intravenous, Q8H  pantoprazole, 40 mg, Oral, Early Morning    PO benadryl x 1 6/25  tbo-filgrastim (GRANIX) subcutaneous injection 480 mcg   Dose: 480 mcg  Freq: Once Route: SC  Start: 06/24/21 1730 End: 06/25/21 1050    Continuous IV Infusions:none   PRN Meds:  acetaminophen, 650 mg, Oral, Q6H PRN  ondansetron, 4 mg, Intravenous, Q6H PRN    SCD  House diet    IP CONSULT TO ONCOLOGY  IP CONSULT TO ACUTE CARE SURGERY    Network Utilization Review Department  ATTENTION: Please call with any questions or concerns to [phone] and carefully listen to the prompts so that you are directed to the right person   All voicemails are confidential   [facility] all requests for admission clinical reviews, approved or denied determinations and any other requests to dedicated fax number below belonging to the campus where the patient is receiving treatment   List of dedicated fax numbers for the Facilities:  [address] [place] [address] DENIALS (Administrative/Medical Necessity) [phone]   [address] (Maternity/NICU/Pediatrics) [phone] [address] Violeta Johnson [phone] [place] [address] [address]ny [address] [facility] [phone]

## 2021-06-25 NOTE — CONSULTS
Consultation - Kinga Sumit Smith 57 y o  male MRN: [id number]    Unit/Bed#: E2 -01 Encounter: [id number]      Assessment/Plan     Assessment:  In summary, this is a 21-year-old male history of stage II seminoma  He has been treated with  16 and carboplatin most recently  CT scan shows resolution of previous retroperitoneal lymphadenopathy  He has been admitted with febrile pancytopenia  Treated with supportive measures, hydration, antibiotics  Blood cultures pending  Hemoglobin 6 9, platelet count 23  No bleeding symptoms  Patient states that dyspnea has actually improved  I suspect hemoglobin drop was probably related to dilutional process  Transfuse as needed  Relative lymphocytosis is reflective of absolute neutropenia, secondary to chemotherapy  Observation is favored  I reviewed the above with the patient  He voiced understanding  We will review with primary oncology team   Plan:  See above  History of Present Illness     HPI: Julio Connors is a 57y o  year old male who presents with Patient has history of early stage prostate cancer September 2019  He was found to have a mass in the left testicle  February 19, 2021 underwent left orchiectomy which showed pure seminoma  Retroperitoneal lymph nodes positive  April 5th patient started treatment with  16 cisplatin  Treatment was complicated by renal dysfunction, dehydration  May 29th he was admitted to the hospital with chest pain  Cardiac workup negative  No clear cause determined  Paste on renal dysfunction cisplatin was discontinued, carboplatin AUC 5 day 1 instituted with continuing etoposide  Patient was treated from June 14th through June 18th +Neulasta  He is now admitted with generalized weakness, nausea, fever  On admission WBC 1 7, hemoglobin 8 7, platelet count 57 absolute neutrophils 0 0   27% atypical lymphs    CMP showed creatinine 1 3, bilirubin 1 05, albumin 3 2, otherwise normal   CT Abdo pelvis showed possible upper GI ileus, hepatomegaly  No evidence of lymphadenopathy  Inpatient consult to Oncology  Consult performed by: Elijah Leon DO  Consult ordered by: Antonia Apple PA-C          Review of Systems   Constitutional: Positive for appetite change and fatigue  Negative for chills and fever  HENT: Negative for nosebleeds  Eyes: Negative for discharge  Respiratory: Positive for shortness of breath  Negative for cough  Cardiovascular: Negative for chest pain  Gastrointestinal: Negative for abdominal pain, constipation and diarrhea  Endocrine: Negative for polydipsia  Genitourinary: Negative for hematuria  Musculoskeletal: Negative for arthralgias  Skin: Negative for color change  Allergic/Immunologic: Negative for immunocompromised state  Neurological: Positive for weakness  Negative for dizziness and headaches  Hematological: Negative for adenopathy  Psychiatric/Behavioral: Negative for agitation         Historical Information   Past Medical History:   Diagnosis Date    BPH without obstruction/lower urinary tract symptoms     Cancer (HCC)     prostate    CPAP (continuous positive airway pressure) dependence     Elevated PSA     GERD (gastroesophageal reflux disease)     Gout     Hyperlipidemia     Hypertension     Kidney stone     Obese abdomen     Obesity     Polymyalgia (HCC)     Prostate cancer (HCC)     Rash     under both arms and in between legs - family doctor aware - pt uses Desitin    Sleep apnea     Testicular carcinoma ([facility] )     Urinary frequency     Urinary urgency     Wears glasses      Past Surgical History:   Procedure Laterality Date    CARPAL TUNNEL RELEASE Bilateral 07/2020    COLONOSCOPY      CYSTOSCOPY W/ LASER LITHOTRIPSY  2001    CYSTOSTOMY W/ STENT INSERTION  2011    ESOPHAGOGASTRODUODENOSCOPY      EXTRACORPOREAL SHOCK WAVE LITHOTRIPSY Right 2011    HERNIA REPAIR      KNEE ARTHROSCOPY Left     LA CYSTO/URETERO W/LITHOTRIPSY &INDWELL STENT INSRT Right 4/29/2018    Procedure: CYSTOSCOPY URETEROSCOPY, RETROGRADE PYELOGRAM AND INSERTION STENT URETERAL;  Surgeon: Anita Vargas MD;  Location: [facility];  Service: Urology    NC REMOVAL TESTIS,RADICAL Left 2/19/2021    Procedure: Radical  ORCHIECTOMY;  Surgeon: Palma Magallanes MD;  Location: [facility];  Service: Urology     Social History   Social History     Substance and Sexual Activity   Alcohol Use No     Social History     Substance and Sexual Activity   Drug Use No     Social History     Tobacco Use   Smoking Status Never Smoker   Smokeless Tobacco Never Used     E-Cigarette/Vaping    E-Cigarette Use Never User      E-Cigarette/Vaping Substances    Nicotine No     THC No     CBD No     Flavoring No     Other No     Unknown No       Family History:   Family History   Problem Relation Age of Onset    Nephrolithiasis Father     Alzheimer's disease Mother        Meds/Allergies   all current active meds have been reviewed  No Known Allergies    Objective   Vitals: Blood pressure 107/53, pulse 73, temperature 98 °F (36 7 °C), temperature source Temporal, resp  rate 18, weight 116 kg (255 lb), SpO2 99 %  Intake/Output Summary (Last 24 hours) at 6/25/2021 0849  Last data filed at 6/24/2021 1702  Gross per 24 hour   Intake 1000 ml   Output --   Net 1000 ml     Invasive Devices     Peripheral Intravenous Line            Peripheral IV 06/24/21 Distal;Left;Upper;Ventral (anterior) Arm <1 day                Physical Exam  Constitutional:       Appearance: He is well-developed  Comments: Alopecia  HENT:      Head: Normocephalic  Eyes:      Pupils: Pupils are equal, round, and reactive to light  Cardiovascular:      Rate and Rhythm: Regular rhythm  Pulmonary:      Breath sounds: Normal breath sounds  Abdominal:      Palpations: Abdomen is soft  Musculoskeletal:      Cervical back: Normal range of motion  Lymphadenopathy:      Cervical: No cervical adenopathy  Skin:     General: Skin is warm  Neurological:      Mental Status: He is alert  Lab Results: I have personally reviewed pertinent reports  Imaging Studies: I have personally reviewed pertinent reports  EKG, Pathology, and Other Studies: I have personally reviewed pertinent reports  Code Status: Level 3 - DNAR and DNI  Advance Directive and Living Will:      Power of :    POLST:      Counseling / Coordination of Care  Total floor / unit time spent today 40 minutes  Greater than 50% of total time was spent with the patient and / or family counseling and / or coordination of care  A description of the counseling / coordination of care: see note

## 2021-06-25 NOTE — TELEPHONE ENCOUNTER
Appointment Cancellation Or Reschedule     Person calling in  Patient    Provider Dr Paige Parks   Office Visit Date and Time  06/25/2021 @11am    Office Visit Location [facility]   Did patient want to reschedule their office appointment? If so, when was it scheduled to? Not at this time    Is this patient calling to reschedule an infusion appointment? no   Is this patient a Chemo patient? yes   When is their next infusion visit? 07/06/2021   Reason for Cancellation or Reschedule admitted into the [facility]      If the patient is a treatment patient, please route this to the office nurse  If the patient is not on treatment, please route to the office MA

## 2021-06-26 LAB
ABO GROUP BLD BPU: NORMAL
ANION GAP SERPL CALCULATED.3IONS-SCNC: 12 MMOL/L (ref 4–13)
ANISOCYTOSIS BLD QL SMEAR: PRESENT
BASOPHILS # BLD MANUAL: 0.02 THOUSAND/UL (ref 0–0.1)
BASOPHILS NFR MAR MANUAL: 1 % (ref 0–1)
BPU ID: NORMAL
BUN SERPL-MCNC: 30 MG/DL (ref 5–25)
CALCIUM SERPL-MCNC: 7.4 MG/DL (ref 8.3–10.1)
CHLORIDE SERPL-SCNC: 104 MMOL/L (ref 100–108)
CO2 SERPL-SCNC: 20 MMOL/L (ref 21–32)
CREAT SERPL-MCNC: 1.37 MG/DL (ref 0.6–1.3)
CROSSMATCH: NORMAL
EOSINOPHIL # BLD MANUAL: 0 THOUSAND/UL (ref 0–0.4)
EOSINOPHIL NFR BLD MANUAL: 0 % (ref 0–6)
ERYTHROCYTE [DISTWIDTH] IN BLOOD BY AUTOMATED COUNT: 17.9 % (ref 11.6–15.1)
GFR SERPL CREATININE-BSD FRML MDRD: 55 ML/MIN/1.73SQ M
GLUCOSE SERPL-MCNC: 100 MG/DL (ref 65–140)
HCT VFR BLD AUTO: 22.5 % (ref 36.5–49.3)
HGB BLD-MCNC: 7.4 G/DL (ref 12–17)
LYMPHOCYTES # BLD AUTO: 1.55 THOUSAND/UL (ref 0.6–4.47)
LYMPHOCYTES # BLD AUTO: 88 % (ref 14–44)
MCH RBC QN AUTO: 30.8 PG (ref 26.8–34.3)
MCHC RBC AUTO-ENTMCNC: 32.9 G/DL (ref 31.4–37.4)
MCV RBC AUTO: 94 FL (ref 82–98)
MONOCYTES # BLD AUTO: 0.12 THOUSAND/UL (ref 0–1.22)
MONOCYTES NFR BLD: 7 % (ref 4–12)
NEUTROPHILS # BLD MANUAL: 0.02 THOUSAND/UL (ref 1.85–7.62)
NEUTS SEG NFR BLD AUTO: 1 % (ref 43–75)
NRBC BLD AUTO-RTO: 0 /100 WBCS
PLATELET # BLD AUTO: 16 THOUSANDS/UL (ref 149–390)
PLATELET BLD QL SMEAR: ABNORMAL
PMV BLD AUTO: 11.6 FL (ref 8.9–12.7)
POTASSIUM SERPL-SCNC: 3.7 MMOL/L (ref 3.5–5.3)
PROCALCITONIN SERPL-MCNC: 0.23 NG/ML
RBC # BLD AUTO: 2.4 MILLION/UL (ref 3.88–5.62)
SODIUM SERPL-SCNC: 136 MMOL/L (ref 136–145)
TOTAL CELLS COUNTED SPEC: 100
UNIT DISPENSE STATUS: NORMAL
UNIT PRODUCT CODE: NORMAL
UNIT RH: NORMAL
VARIANT LYMPHS # BLD AUTO: 3 %
WBC # BLD AUTO: 1.76 THOUSAND/UL (ref 4.31–10.16)

## 2021-06-26 PROCEDURE — 99232 SBSQ HOSP IP/OBS MODERATE 35: CPT | Performed by: PHYSICIAN ASSISTANT

## 2021-06-26 PROCEDURE — 84145 PROCALCITONIN (PCT): CPT | Performed by: PHYSICIAN ASSISTANT

## 2021-06-26 PROCEDURE — 85007 BL SMEAR W/DIFF WBC COUNT: CPT | Performed by: PHYSICIAN ASSISTANT

## 2021-06-26 PROCEDURE — 85027 COMPLETE CBC AUTOMATED: CPT | Performed by: PHYSICIAN ASSISTANT

## 2021-06-26 PROCEDURE — 80048 BASIC METABOLIC PNL TOTAL CA: CPT | Performed by: PHYSICIAN ASSISTANT

## 2021-06-26 RX ORDER — TRAMADOL HYDROCHLORIDE 50 MG/1
50 TABLET ORAL EVERY 6 HOURS PRN
Status: DISCONTINUED | OUTPATIENT
Start: 2021-06-26 | End: 2021-06-27 | Stop reason: HOSPADM

## 2021-06-26 RX ORDER — LIDOCAINE 50 MG/G
1 PATCH TOPICAL DAILY
Status: DISCONTINUED | OUTPATIENT
Start: 2021-06-26 | End: 2021-06-27 | Stop reason: HOSPADM

## 2021-06-26 RX ADMIN — ALLOPURINOL 100 MG: 100 TABLET ORAL at 09:59

## 2021-06-26 RX ADMIN — TRAMADOL HYDROCHLORIDE 50 MG: 50 TABLET, FILM COATED ORAL at 22:11

## 2021-06-26 RX ADMIN — CEFEPIME HYDROCHLORIDE 2000 MG: 2 INJECTION, POWDER, FOR SOLUTION INTRAVENOUS at 12:13

## 2021-06-26 RX ADMIN — PANTOPRAZOLE SODIUM 40 MG: 40 TABLET, DELAYED RELEASE ORAL at 06:10

## 2021-06-26 RX ADMIN — CEFEPIME HYDROCHLORIDE 2000 MG: 2 INJECTION, POWDER, FOR SOLUTION INTRAVENOUS at 19:26

## 2021-06-26 RX ADMIN — CEFEPIME HYDROCHLORIDE 2000 MG: 2 INJECTION, POWDER, FOR SOLUTION INTRAVENOUS at 04:44

## 2021-06-26 RX ADMIN — ATORVASTATIN CALCIUM 40 MG: 40 TABLET, FILM COATED ORAL at 17:24

## 2021-06-26 RX ADMIN — LIDOCAINE 1 PATCH: 50 PATCH TOPICAL at 18:07

## 2021-06-26 RX ADMIN — ACETAMINOPHEN 650 MG: 325 TABLET, FILM COATED ORAL at 05:00

## 2021-06-26 RX ADMIN — ACETAMINOPHEN 650 MG: 325 TABLET, FILM COATED ORAL at 23:23

## 2021-06-26 RX ADMIN — ACETAMINOPHEN 650 MG: 325 TABLET, FILM COATED ORAL at 17:24

## 2021-06-26 NOTE — PROGRESS NOTES
[address] [place] [address]  Progress Note - Janina Santos 1960, 57 y o  male MRN: [id number]  Unit/Bed#: E2 -01 Encounter: [id number]  Primary Care Provider: Gabby Nails DO   Date and time admitted to hospital: 6/24/2021  1:13 PM    * Febrile neutropenia (HCC)  Assessment & Plan  · Presents with low-grade temperature, generalized weakness in the setting of neutropenia  Last received chemotherapy 6/18  Follows with Dr Natalie Sam  · ANC 0 00 on admission, today 0 02  · Given Neupogen 480 mcg x 1 6/25  · Patient has mild folliculitis of right upper thigh but no other localizing complaints or evidence of infection  · Continue IV cefepime 2 g q 8 hours  · BC x2 negative x 24 hr  · Hematology consultation appreciated  Gastric distention  Assessment & Plan  · As noted on CTA chest, with abdominal distention and pain  · CT abdomen with no SBO, conservative treatment with small meals and no carbonation  · General surgery consultation appreciated  Pancytopenia ([address] )  Assessment & Plan  · Hemoglobin 7 4 today  · Received 1 unit PRBC 6/25  · Diminished white blood cell count in the setting of chemotherapy  · Platelet count 16 today  · No evidence of active bleeding, transfuse for platelet count less than 10    · Granix x1 given 6/25  · Trend CBC with differential    CKD (chronic kidney disease) stage 3, GFR 30-59 ml/min [facility])  Assessment & Plan  Lab Results   Component Value Date    EGFR 55 06/26/2021    EGFR 55 06/25/2021    EGFR 55 06/24/2021    CREATININE 1 37 (H) 06/26/2021    CREATININE 1 36 (H) 06/25/2021    CREATININE 1 36 (H) 06/24/2021     · Creatinine stable  · Continue Lasix p r n  SIMON on CPAP  Assessment & Plan  · CPAP QHS    Seminoma of testis, stage 3, left (HCC)  Assessment & Plan  · Status post orchiectomy currently on chemotherapy  · Stage II B (pT2, pN2, S1) left-sided pure seminoma  · Follows with Dr Natalie Sam      Essential hypertension  Assessment & Plan  · Blood pressure stable  VTE Pharmacologic Prophylaxis:   Pharmacologic: Pharmacologic VTE Prophylaxis contraindicated due to thrombocytopenia  Mechanical VTE Prophylaxis in Place: Yes    Patient Centered Rounds: I have performed bedside rounds with nursing staff today  Discussions with Specialists or Other Care Team Provider: CM, nursing     Education and Discussions with Family / Patient: patient at bedside     Time Spent for Care: 30 minutes  More than 50% of total time spent on counseling and coordination of care as described above  Current Length of Stay: 2 day(s)    Current Patient Status: Inpatient   Certification Statement: The patient will continue to require additional inpatient hospital stay due to thrmobocytopenia, febrile neutropenia    Discharge Plan: pending improvement in cell lines  Code Status: Level 3 - DNAR and DNI      Subjective:   Patient examined at bedside reporting that he feels better than yesterday  Has been ambulating to and from the bathroom  No blood in his bowel movements  No evidence of active bleeding  No chills, fevers  No events per nursing  Has had good appetite    Objective:     Vitals:   Temp (24hrs), Av 7 °F (36 5 °C), Min:97 3 °F (36 3 °C), Max:98 2 °F (36 8 °C)    Temp:  [97 3 °F (36 3 °C)-98 2 °F (36 8 °C)] 97 4 °F (36 3 °C)  HR:  [73-77] 73  Resp:  [18-20] 18  BP: (106-153)/(54-63) 106/58  SpO2:  [97 %-100 %] 99 %  Body mass index is 39 93 kg/m²  Input and Output Summary (last 24 hours): Intake/Output Summary (Last 24 hours) at 2021 1055  Last data filed at 2021 1935  Gross per 24 hour   Intake 337 5 ml   Output --   Net 337 5 ml       Physical Exam:     Physical Exam  Vitals and nursing note reviewed  Constitutional:       General: He is not in acute distress  Appearance: Normal appearance  HENT:      Head: Normocephalic        Mouth/Throat:      Mouth: Mucous membranes are moist    Eyes:      Pupils: Pupils are equal, round, and reactive to light  Cardiovascular:      Rate and Rhythm: Normal rate and regular rhythm  Heart sounds: No murmur heard  Pulmonary:      Effort: Pulmonary effort is normal  No respiratory distress  Breath sounds: Normal breath sounds  No wheezing, rhonchi or rales  Abdominal:      General: Bowel sounds are normal  There is no distension  Palpations: Abdomen is soft  Tenderness: There is no abdominal tenderness  There is no guarding  Musculoskeletal:         General: No deformity  Cervical back: Normal range of motion  Right lower leg: No edema  Left lower leg: No edema  Skin:     Capillary Refill: Capillary refill takes less than 2 seconds  Coloration: Skin is pale  Neurological:      General: No focal deficit present  Mental Status: He is alert and oriented to person, place, and time  Mental status is at baseline  Additional Data:     Labs:    Results from last 7 days   Lab Units 06/26/21  0429 06/24/21  1405   WBC Thousand/uL 1 76* 0 90*   HEMOGLOBIN g/dL 7 4* 8 1*   HEMATOCRIT % 22 5* 24 5*   PLATELETS Thousands/uL 16* 55*   NEUTROS PCT %  --  0*   LYMPHS PCT %  --  96*   LYMPHO PCT % 88* 92*   MONOS PCT %  --  3*   MONO PCT % 7 0*   EOS PCT % 0 1  1     Results from last 7 days   Lab Units 06/26/21  0429 06/25/21  0534   SODIUM mmol/L 136 137   POTASSIUM mmol/L 3 7 3 7   CHLORIDE mmol/L 104 104   CO2 mmol/L 20* 21   BUN mg/dL 30* 30*   CREATININE mg/dL 1 37* 1 36*   ANION GAP mmol/L 12 12   CALCIUM mg/dL 7 4* 7 4*   ALBUMIN g/dL  --  2 8*   TOTAL BILIRUBIN mg/dL  --  0 89   ALK PHOS U/L  --  56   ALT U/L  --  17   AST U/L  --  8   GLUCOSE RANDOM mg/dL 100 117     Results from last 7 days   Lab Units 06/24/21  1405   INR  1 32*             Results from last 7 days   Lab Units 06/24/21  1702 06/24/21  1502   LACTIC ACID mmol/L 1 6 2 4*           * I Have Reviewed All Lab Data Listed Above  * Additional Pertinent Lab Tests Reviewed:  All Labs Within Last 24 Hours Reviewed    Imaging:    Imaging Reports Reviewed Today Include: none  Imaging Personally Reviewed by Myself Includes:  none    Recent Cultures (last 7 days):     Results from last 7 days   Lab Units 06/24/21  1618 06/24/21  1605   BLOOD CULTURE  No Growth at 24 hrs  No Growth at 24 hrs  Last 24 Hours Medication List:   Current Facility-Administered Medications   Medication Dose Route Frequency Provider Last Rate    acetaminophen  650 mg Oral Q6H PRN Carmen Ratel, PA-C      allopurinol  100 mg Oral Daily Carmen Ratel, PA-C      atorvastatin  40 mg Oral Daily With Roney PA-C      cefepime  2,000 mg Intravenous Q8H Carmen Ratel, PA-C 2,000 mg (06/26/21 0444)    ondansetron  4 mg Intravenous Q6H PRN Carmen Ratel, PA-C      pantoprazole  40 mg Oral Early Morning Carmen Ratel, PA-C          Today, Patient Was Seen By: Usman Cruz PA-C    ** Please Note: Dictation voice to text software may have been used in the creation of this document   **

## 2021-06-26 NOTE — ASSESSMENT & PLAN NOTE
· Hemoglobin 7 4 today  · Received 1 unit PRBC 6/25  · Diminished white blood cell count in the setting of chemotherapy    · Platelet count 16 today  · No evidence of active bleeding, transfuse for platelet count less than 10    · Granix x1 given 6/25  · Trend CBC with differential

## 2021-06-26 NOTE — PLAN OF CARE
Problem: Potential for Falls  Goal: Patient will remain free of falls  Description: INTERVENTIONS:  - Educate patient/family on patient safety including physical limitations  - Instruct patient to call for assistance with activity   - Consult OT/PT to assist with strengthening/mobility   - Keep Call bell within reach  - Keep bed low and locked with side rails adjusted as appropriate  - Keep care items and personal belongings within reach  - Initiate and maintain comfort rounds  - Make Fall Risk Sign visible to staff  - Apply yellow socks and bracelet for high fall risk patients  - Consider moving patient to room near nurses station  Outcome: Progressing        Problem: INFECTION - ADULT  Goal: Absence or prevention of progression during hospitalization  Description: INTERVENTIONS:  - Assess and monitor for signs and symptoms of infection  - Monitor lab/diagnostic results  - Monitor all insertion sites, i e  indwelling lines, tubes, and drains  - Administer medications as ordered  - Instruct and encourage patient and family to use good hand hygiene technique  - Identify and instruct in appropriate isolation precautions for identified infection/condition  Outcome: Progressing  Goal: Absence of fever/infection during neutropenic period  Description: INTERVENTIONS:  - Monitor WBC    Outcome: Progressing     Goal: Maintains/Returns to pre admission functional level  Description: INTERVENTIONS:  - Perform BMAT or MOVE assessment daily    - Set and communicate daily mobility goal to care team and patient/family/caregiver  - Collaborate with rehabilitation services on mobility goals if consulted  Outcome: Progressing     Problem: Knowledge Deficit  Goal: Patient/family/caregiver demonstrates understanding of disease process, treatment plan, medications, and discharge instructions  Description: Complete learning assessment and assess knowledge base    Interventions:  - Provide teaching at level of understanding  - Provide teaching via preferred learning methods  Outcome: Progressing     Problem: MOBILITY - ADULT  Goal: Maintain or return to baseline ADL function  Description: INTERVENTIONS:  -  Assess patient's ability to carry out ADLs; assess patient's baseline for ADL function and identify physical deficits which impact ability to perform ADLs (bathing, care of mouth/teeth, toileting, grooming, dressing, etc )  - Assess/evaluate cause of self-care deficits   - Assess range of motion  - Assess patient's mobility; develop plan if impaired  - Assess patient's need for assistive devices and provide as appropriate  - Encourage maximum independence but intervene and supervise when necessary  - Involve family in performance of ADLs  - Assess for home care needs following discharge   - Consider OT consult to assist with ADL evaluation and planning for discharge  - Provide patient education as appropriate  Outcome: Progressing

## 2021-06-26 NOTE — ASSESSMENT & PLAN NOTE
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

## 2021-06-26 NOTE — PLAN OF CARE
Problem: Potential for Falls  Goal: Patient will remain free of falls  Description: INTERVENTIONS:  - Educate patient/family on patient safety including physical limitations  - Instruct patient to call for assistance with activity   - Consult OT/PT to assist with strengthening/mobility   - Keep Call bell within reach  - Keep bed low and locked with side rails adjusted as appropriate  - Keep care items and personal belongings within reach  - Initiate and maintain comfort rounds  - Make Fall Risk Sign visible to staff  - Apply yellow socks and bracelet for high fall risk patients  - Consider moving patient to room near nurses station  Outcome: Progressing     Problem: PAIN - ADULT  Goal: Verbalizes/displays adequate comfort level or baseline comfort level  Description: Interventions:  - Encourage patient to monitor pain and request assistance  - Assess pain using appropriate pain scale  - Administer analgesics based on type and severity of pain and evaluate response  - Implement non-pharmacological measures as appropriate and evaluate response  - Consider cultural and social influences on pain and pain management  - Notify physician/advanced practitioner if interventions unsuccessful or patient reports new pain  Outcome: Progressing     Problem: INFECTION - ADULT  Goal: Absence or prevention of progression during hospitalization  Description: INTERVENTIONS:  - Assess and monitor for signs and symptoms of infection  - Monitor lab/diagnostic results  - Monitor all insertion sites, i e  indwelling lines, tubes, and drains  - Monitor endotracheal if appropriate and nasal secretions for changes in amount and color  - [place] appropriate cooling/warming therapies per order  - Administer medications as ordered  - Instruct and encourage patient and family to use good hand hygiene technique  - Identify and instruct in appropriate isolation precautions for identified infection/condition  Outcome: Progressing  Goal: Absence of fever/infection during neutropenic period  Description: INTERVENTIONS:  - Monitor WBC    Outcome: Progressing     Problem: SAFETY ADULT  Goal: Patient will remain free of falls  Description: INTERVENTIONS:  - Educate patient/family on patient safety including physical limitations  - Instruct patient to call for assistance with activity   - Consult OT/PT to assist with strengthening/mobility   - Keep Call bell within reach  - Keep bed low and locked with side rails adjusted as appropriate  - Keep care items and personal belongings within reach  - Initiate and maintain comfort rounds  - Make Fall Risk Sign visible to staff  - Apply yellow socks and bracelet for high fall risk patients  - Consider moving patient to room near nurses station  Outcome: Progressing  Goal: Maintain or return to baseline ADL function  Description: INTERVENTIONS:  -  Assess patient's ability to carry out ADLs; assess patient's baseline for ADL function and identify physical deficits which impact ability to perform ADLs (bathing, care of mouth/teeth, toileting, grooming, dressing, etc )  - Assess/evaluate cause of self-care deficits   - Assess range of motion  - Assess patient's mobility; develop plan if impaired  - Assess patient's need for assistive devices and provide as appropriate  - Encourage maximum independence but intervene and supervise when necessary  - Involve family in performance of ADLs  - Assess for home care needs following discharge   - Consider OT consult to assist with ADL evaluation and planning for discharge  - Provide patient education as appropriate  Outcome: Progressing  Goal: Maintains/Returns to pre admission functional level  Description: INTERVENTIONS:  - Perform BMAT or MOVE assessment daily    - Set and communicate daily mobility goal to care team and patient/family/caregiver     - Collaborate with rehabilitation services on mobility goals if consulted  - Out of bed for toileting  - Record patient progress and toleration of activity level   Outcome: Progressing     Problem: DISCHARGE PLANNING  Goal: Discharge to home or other facility with appropriate resources  Description: INTERVENTIONS:  - Identify barriers to discharge w/patient and caregiver  - Arrange for needed discharge resources and transportation as appropriate  - Identify discharge learning needs (meds, wound care, etc )  - Arrange for interpretive services to assist at discharge as needed  - Refer to Case Management Department for coordinating discharge planning if the patient needs post-hospital services based on physician/advanced practitioner order or complex needs related to functional status, cognitive ability, or social support system  Outcome: Progressing     Problem: Knowledge Deficit  Goal: Patient/family/caregiver demonstrates understanding of disease process, treatment plan, medications, and discharge instructions  Description: Complete learning assessment and assess knowledge base  Interventions:  - Provide teaching at level of understanding  - Provide teaching via preferred learning methods  Outcome: Progressing     Problem: Nutrition/Hydration-ADULT  Goal: Nutrient/Hydration intake appropriate for improving, restoring or maintaining nutritional needs  Description: Monitor and assess patient's nutrition/hydration status for malnutrition  Collaborate with interdisciplinary team and initiate plan and interventions as ordered  Monitor patient's weight and dietary intake as ordered or per policy  Utilize nutrition screening tool and intervene as necessary  Determine patient's food preferences and provide high-protein, high-caloric foods as appropriate       INTERVENTIONS:  - Monitor oral intake, urinary output, labs, and treatment plans  - Assess nutrition and hydration status and recommend course of action  - Evaluate amount of meals eaten  - Assist patient with eating if necessary   - Allow adequate time for meals  - Recommend/ encourage appropriate diets, oral nutritional supplements, and vitamin/mineral supplements  - Order, calculate, and assess calorie counts as needed  - Recommend, monitor, and adjust tube feedings and TPN/PPN based on assessed needs  - Assess need for intravenous fluids  - Provide specific nutrition/hydration education as appropriate  - Include patient/family/caregiver in decisions related to nutrition  Outcome: Progressing     Problem: MOBILITY - ADULT  Goal: Maintain or return to baseline ADL function  Description: INTERVENTIONS:  -  Assess patient's ability to carry out ADLs; assess patient's baseline for ADL function and identify physical deficits which impact ability to perform ADLs (bathing, care of mouth/teeth, toileting, grooming, dressing, etc )  - Assess/evaluate cause of self-care deficits   - Assess range of motion  - Assess patient's mobility; develop plan if impaired  - Assess patient's need for assistive devices and provide as appropriate  - Encourage maximum independence but intervene and supervise when necessary  - Involve family in performance of ADLs  - Assess for home care needs following discharge   - Consider OT consult to assist with ADL evaluation and planning for discharge  - Provide patient education as appropriate  Outcome: Progressing  Goal: Maintains/Returns to pre admission functional level  Description: INTERVENTIONS:  - Perform BMAT or MOVE assessment daily    - Set and communicate daily mobility goal to care team and patient/family/caregiver     - Collaborate with rehabilitation services on mobility goals if consulted  - Out of bed for toileting  - Record patient progress and toleration of activity level   Outcome: Progressing

## 2021-06-26 NOTE — ASSESSMENT & PLAN NOTE
· Presents with low-grade temperature, generalized weakness in the setting of neutropenia  Last received chemotherapy 6/18  Follows with Dr Sarai Bland  · ANC 0 00 on admission, today 0 02  · Given Neupogen 480 mcg x 1 6/25  · Patient has mild folliculitis of right upper thigh but no other localizing complaints or evidence of infection  · Continue IV cefepime 2 g q 8 hours  · BC x2 negative x 24 hr  · Hematology consultation appreciated

## 2021-06-26 NOTE — ASSESSMENT & PLAN NOTE
Lab Results   Component Value Date    EGFR 55 06/26/2021    EGFR 55 06/25/2021    EGFR 55 06/24/2021    CREATININE 1 37 (H) 06/26/2021    CREATININE 1 36 (H) 06/25/2021    CREATININE 1 36 (H) 06/24/2021     · Creatinine stable  · Continue Lasix p r n

## 2021-06-27 VITALS
RESPIRATION RATE: 18 BRPM | WEIGHT: 255 LBS | HEART RATE: 76 BPM | SYSTOLIC BLOOD PRESSURE: 122 MMHG | DIASTOLIC BLOOD PRESSURE: 57 MMHG | BODY MASS INDEX: 39.93 KG/M2 | OXYGEN SATURATION: 99 % | TEMPERATURE: 97.3 F

## 2021-06-27 PROBLEM — R50.81 FEBRILE NEUTROPENIA (HCC): Status: RESOLVED | Noted: 2021-06-24 | Resolved: 2021-06-27

## 2021-06-27 PROBLEM — D70.9 FEBRILE NEUTROPENIA (HCC): Status: RESOLVED | Noted: 2021-06-24 | Resolved: 2021-06-27

## 2021-06-27 LAB
ERYTHROCYTE [DISTWIDTH] IN BLOOD BY AUTOMATED COUNT: 17.3 % (ref 11.6–15.1)
HCT VFR BLD AUTO: 22.2 % (ref 36.5–49.3)
HGB BLD-MCNC: 7.5 G/DL (ref 12–17)
MCH RBC QN AUTO: 31.3 PG (ref 26.8–34.3)
MCHC RBC AUTO-ENTMCNC: 33.8 G/DL (ref 31.4–37.4)
MCV RBC AUTO: 93 FL (ref 82–98)
NRBC BLD AUTO-RTO: 0 /100 WBCS
PLATELET # BLD AUTO: 8 THOUSANDS/UL (ref 149–390)
PMV BLD AUTO: 10.9 FL (ref 8.9–12.7)
PROCALCITONIN SERPL-MCNC: 0.29 NG/ML
RBC # BLD AUTO: 2.4 MILLION/UL (ref 3.88–5.62)
WBC # BLD AUTO: 4.92 THOUSAND/UL (ref 4.31–10.16)

## 2021-06-27 PROCEDURE — P9037 PLATE PHERES LEUKOREDU IRRAD: HCPCS

## 2021-06-27 PROCEDURE — 30233N1 TRANSFUSION OF NONAUTOLOGOUS RED BLOOD CELLS INTO PERIPHERAL VEIN, PERCUTANEOUS APPROACH: ICD-10-PCS | Performed by: INTERNAL MEDICINE

## 2021-06-27 PROCEDURE — 99239 HOSP IP/OBS DSCHRG MGMT >30: CPT | Performed by: INTERNAL MEDICINE

## 2021-06-27 PROCEDURE — 84145 PROCALCITONIN (PCT): CPT | Performed by: PHYSICIAN ASSISTANT

## 2021-06-27 PROCEDURE — 85025 COMPLETE CBC W/AUTO DIFF WBC: CPT | Performed by: PHYSICIAN ASSISTANT

## 2021-06-27 RX ORDER — LIDOCAINE 50 MG/G
1 PATCH TOPICAL DAILY
Qty: 15 PATCH | Refills: 0 | Status: SHIPPED | OUTPATIENT
Start: 2021-06-28 | End: 2021-09-22

## 2021-06-27 RX ORDER — TRAMADOL HYDROCHLORIDE 50 MG/1
50 TABLET ORAL EVERY 6 HOURS PRN
Qty: 20 TABLET | Refills: 0 | Status: SHIPPED | OUTPATIENT
Start: 2021-06-27 | End: 2021-07-07

## 2021-06-27 RX ADMIN — TRAMADOL HYDROCHLORIDE 50 MG: 50 TABLET, FILM COATED ORAL at 05:40

## 2021-06-27 RX ADMIN — TRAMADOL HYDROCHLORIDE 50 MG: 50 TABLET, FILM COATED ORAL at 12:21

## 2021-06-27 RX ADMIN — PANTOPRAZOLE SODIUM 40 MG: 40 TABLET, DELAYED RELEASE ORAL at 05:40

## 2021-06-27 RX ADMIN — CEFEPIME HYDROCHLORIDE 2000 MG: 2 INJECTION, POWDER, FOR SOLUTION INTRAVENOUS at 05:40

## 2021-06-27 RX ADMIN — CEFEPIME HYDROCHLORIDE 2000 MG: 2 INJECTION, POWDER, FOR SOLUTION INTRAVENOUS at 12:21

## 2021-06-27 RX ADMIN — ACETAMINOPHEN 650 MG: 325 TABLET, FILM COATED ORAL at 08:59

## 2021-06-27 RX ADMIN — ALLOPURINOL 100 MG: 100 TABLET ORAL at 08:59

## 2021-06-27 NOTE — ASSESSMENT & PLAN NOTE
· Presents with low-grade temperature, generalized weakness in the setting of neutropenia  Last received chemotherapy 6/18  Follows with Dr Ryan Rico    · Neutropenia resolved  · Work up negative for infectious source  · Pt had been treated with cefepime  · Will d/c

## 2021-06-27 NOTE — PROGRESS NOTES
[address]  Progress Note - Lynda Ha 1960, 57 y o  male MRN: [id number]  Unit/Bed#: E2 -01 Encounter: [id number]  Primary Care Provider: Boston Reilly DO   Date and time admitted to hospital: 6/24/2021  1:13 PM    Gastric distention  Assessment & Plan  · As noted on CTA chest, with abdominal distention and pain  · CT abdomen with no SBO, conservative treatment with small meals and no carbonation  · General surgery consultation appreciated  Pancytopenia ([address] )  Assessment & Plan  · Hemoglobin 7 5 today  · Received 1 unit PRBC 6/25  · neutropenia resolved s/p granix  · Platelet count 8    · Will transfuse platelets  CKD (chronic kidney disease) stage 3, GFR 30-59 ml/min [facility])  Assessment & Plan  Lab Results   Component Value Date    EGFR 55 06/26/2021    EGFR 55 06/25/2021    EGFR 55 06/24/2021    CREATININE 1 37 (H) 06/26/2021    CREATININE 1 36 (H) 06/25/2021    CREATININE 1 36 (H) 06/24/2021     · Creatinine stable  · Continue Lasix p r n  SIMON on CPAP  Assessment & Plan  · CPAP QHS    Seminoma of testis, stage 3, left (HCC)  Assessment & Plan  · Status post orchiectomy currently on chemotherapy  · Stage II B (pT2, 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, S1) left-sided pure seminoma  · Follows with Dr Ivania Cowan  Essential hypertension  Assessment & Plan  · Blood pressure stable  * Febrile neutropenia (HCC)  Assessment & Plan  · Presents with low-grade temperature, generalized weakness in the setting of neutropenia  Last received chemotherapy 6/18  Follows with Dr Ivania Cowan  · Neutropenia resolved  · Work up negative for infectious source  · Pt had been treated with cefepime  · Will d/c      Discharge Plan / Estimated Discharge Date: d/c to home after platelets with hhpt    Code Status: Level 3 - DNAR and DNI      Subjective:   Pt seen and examined  Pt states he is doing okay  He can walk further with out being sob  He denies worsening abd  Problems   No f/c no cp no n/v/d Objective:     Vitals:   Temp (24hrs), Av 6 °F (36 4 °C), Min:97 3 °F (36 3 °C), Max:98 °F (36 7 °C)    Temp:  [97 3 °F (36 3 °C)-98 °F (36 7 °C)] 97 3 °F (36 3 °C)  HR:  [73-78] 73  Resp:  [18-20] 20  BP: (100-139)/(54-67) 117/59  SpO2:  [98 %-100 %] 100 %  Body mass index is 39 93 kg/m²  Input and Output Summary (last 24 hours):     No intake or output data in the 24 hours ending 21 1313    Physical Exam:   Physical Exam  Constitutional:       Appearance: Normal appearance  HENT:      Head: Normocephalic and atraumatic  Eyes:      Extraocular Movements: Extraocular movements intact  Pupils: Pupils are equal, round, and reactive to light  Cardiovascular:      Rate and Rhythm: Normal rate and regular rhythm  Heart sounds: No murmur heard  No friction rub  No gallop  Pulmonary:      Effort: Pulmonary effort is normal  No respiratory distress  Breath sounds: Normal breath sounds  No wheezing or rales  Abdominal:      General: Bowel sounds are normal  There is no distension  Tenderness: There is no abdominal tenderness  There is no guarding  Musculoskeletal:      Right lower leg: No edema  Left lower leg: No edema  Neurological:      Mental Status: He is alert and oriented to person, place, and time          Additional Data:     Labs:  Results from last 7 days   Lab Units 21  0603 21  0429 21  1405   WBC Thousand/uL 4 92 1 76* 0 90*   HEMOGLOBIN g/dL 7 5* 7 4* 8 1*   HEMATOCRIT % 22 2* 22 5* 24 5*   PLATELETS Thousands/uL 8* 16* 55*   NEUTROS PCT %  --   --  0*   LYMPHS PCT %  --   --  96*   LYMPHO PCT %  --  88* 92*   MONOS PCT %  --   --  3*   MONO PCT %  --  7 0*   EOS PCT %  --  0 1  1     Results from last 7 days   Lab Units 21  0429 21  0534   SODIUM mmol/L 136 137   POTASSIUM mmol/L 3 7 3 7   CHLORIDE mmol/L 104 104   CO2 mmol/L 20* 21   BUN mg/dL 30* 30*   CREATININE mg/dL 1 37* 1 36*   ANION GAP mmol/L 12 12   CALCIUM mg/dL 7 4* 7 4*   ALBUMIN g/dL  --  2 8*   TOTAL BILIRUBIN mg/dL  --  0 89   ALK PHOS U/L  --  56   ALT U/L  --  17   AST U/L  --  8   GLUCOSE RANDOM mg/dL 100 117     Results from last 7 days   Lab Units 06/24/21  1405   INR  1 32*             Results from last 7 days   Lab Units 06/27/21  0603 06/26/21  1207 06/24/21  1702 06/24/21  1502   LACTIC ACID mmol/L  --   --  1 6 2 4*   PROCALCITONIN ng/ml 0 29* 0 23  --   --        Recent Cultures (last 7 days):     Results from last 7 days   Lab Units 06/24/21  1618 06/24/21  1605   BLOOD CULTURE  No Growth at 48 hrs  No Growth at 48 hrs         Lines/Drains:  Invasive Devices     Peripheral Intravenous Line            Peripheral IV 06/24/21 Distal;Left;Upper;Ventral (anterior) Arm 2 days              Last 24 Hours Medication List:   Current Facility-Administered Medications   Medication Dose Route Frequency Provider Last Rate    acetaminophen  650 mg Oral Q6H PRN Ericka Gordillo PA-C      allopurinol  100 mg Oral Daily Ericka Gordillo PA-C      atorvastatin  40 mg Oral Daily With Roney PA-C      cefepime  2,000 mg Intravenous Q8H REFUGIO HarrisC 2,000 mg (06/27/21 1221)    lidocaine  1 patch Topical Daily Ericka Gordillo PA-C      ondansetron  4 mg Intravenous Q6H PRN Ericka Gordillo PA-C      pantoprazole  40 mg Oral Early Morning Ericka Gordillo PA-C      traMADol  50 mg Oral Q6H PRN Alicia Friend, PEREZ          Today, Patient Was Seen By: Linsey Manning DO

## 2021-06-27 NOTE — PLAN OF CARE
Problem: Potential for Falls  Goal: Patient will remain free of falls  Description: INTERVENTIONS:  - Educate patient/family on patient safety including physical limitations  - Instruct patient to call for assistance with activity   - Consult OT/PT to assist with strengthening/mobility   - Keep Call bell within reach  - Keep bed low and locked with side rails adjusted as appropriate  - Keep care items and personal belongings within reach  - Initiate and maintain comfort rounds  - Make Fall Risk Sign visible to staff  - Apply yellow socks and bracelet for high fall risk patients  - Consider moving patient to room near nurses station  Outcome: Progressing     Problem: PAIN - ADULT  Goal: Verbalizes/displays adequate comfort level or baseline comfort level  Description: Interventions:  - Encourage patient to monitor pain and request assistance  - Assess pain using appropriate pain scale  - Administer analgesics based on type and severity of pain and evaluate response  - Implement non-pharmacological measures as appropriate and evaluate response  - Consider cultural and social influences on pain and pain management  - Notify physician/advanced practitioner if interventions unsuccessful or patient reports new pain  Outcome: Progressing     Problem: INFECTION - ADULT  Goal: Absence or prevention of progression during hospitalization  Description: INTERVENTIONS:  - Assess and monitor for signs and symptoms of infection  - Monitor lab/diagnostic results  - Monitor all insertion sites, i e  indwelling lines, tubes, and drains  - Monitor endotracheal if appropriate and nasal secretions for changes in amount and color  - [place] appropriate cooling/warming therapies per order  - Administer medications as ordered  - Instruct and encourage patient and family to use good hand hygiene technique  - Identify and instruct in appropriate isolation precautions for identified infection/condition  Outcome: Progressing  Goal: Absence of fever/infection during neutropenic period  Description: INTERVENTIONS:  - Monitor WBC    Outcome: Progressing     Problem: SAFETY ADULT  Goal: Patient will remain free of falls  Description: INTERVENTIONS:  - Educate patient/family on patient safety including physical limitations  - Instruct patient to call for assistance with activity   - Consult OT/PT to assist with strengthening/mobility   - Keep Call bell within reach  - Keep bed low and locked with side rails adjusted as appropriate  - Keep care items and personal belongings within reach  - Initiate and maintain comfort rounds  - Make Fall Risk Sign visible to staff  - Apply yellow socks and bracelet for high fall risk patients  - Consider moving patient to room near nurses station  Outcome: Progressing  Goal: Maintain or return to baseline ADL function  Description: INTERVENTIONS:  -  Assess patient's ability to carry out ADLs; assess patient's baseline for ADL function and identify physical deficits which impact ability to perform ADLs (bathing, care of mouth/teeth, toileting, grooming, dressing, etc )  - Assess/evaluate cause of self-care deficits   - Assess range of motion  - Assess patient's mobility; develop plan if impaired  - Assess patient's need for assistive devices and provide as appropriate  - Encourage maximum independence but intervene and supervise when necessary  - Involve family in performance of ADLs  - Assess for home care needs following discharge   - Consider OT consult to assist with ADL evaluation and planning for discharge  - Provide patient education as appropriate  Outcome: Progressing  Goal: Maintains/Returns to pre admission functional level  Description: INTERVENTIONS:  - Perform BMAT or MOVE assessment daily    - Set and communicate daily mobility goal to care team and patient/family/caregiver     - Collaborate with rehabilitation services on mobility goals if consulted  - Out of bed for toileting  - Record patient progress and toleration of activity level   Outcome: Progressing     Problem: DISCHARGE PLANNING  Goal: Discharge to home or other facility with appropriate resources  Description: INTERVENTIONS:  - Identify barriers to discharge w/patient and caregiver  - Arrange for needed discharge resources and transportation as appropriate  - Identify discharge learning needs (meds, wound care, etc )  - Arrange for interpretive services to assist at discharge as needed  - Refer to Case Management Department for coordinating discharge planning if the patient needs post-hospital services based on physician/advanced practitioner order or complex needs related to functional status, cognitive ability, or social support system  Outcome: Progressing     Problem: Knowledge Deficit  Goal: Patient/family/caregiver demonstrates understanding of disease process, treatment plan, medications, and discharge instructions  Description: Complete learning assessment and assess knowledge base  Interventions:  - Provide teaching at level of understanding  - Provide teaching via preferred learning methods  Outcome: Progressing     Problem: Nutrition/Hydration-ADULT  Goal: Nutrient/Hydration intake appropriate for improving, restoring or maintaining nutritional needs  Description: Monitor and assess patient's nutrition/hydration status for malnutrition  Collaborate with interdisciplinary team and initiate plan and interventions as ordered  Monitor patient's weight and dietary intake as ordered or per policy  Utilize nutrition screening tool and intervene as necessary  Determine patient's food preferences and provide high-protein, high-caloric foods as appropriate       INTERVENTIONS:  - Monitor oral intake, urinary output, labs, and treatment plans  - Assess nutrition and hydration status and recommend course of action  - Evaluate amount of meals eaten  - Assist patient with eating if necessary   - Allow adequate time for meals  - Recommend/ encourage appropriate diets, oral nutritional supplements, and vitamin/mineral supplements  - Order, calculate, and assess calorie counts as needed  - Recommend, monitor, and adjust tube feedings and TPN/PPN based on assessed needs  - Assess need for intravenous fluids  - Provide specific nutrition/hydration education as appropriate  - Include patient/family/caregiver in decisions related to nutrition  Outcome: Progressing     Problem: MOBILITY - ADULT  Goal: Maintain or return to baseline ADL function  Description: INTERVENTIONS:  -  Assess patient's ability to carry out ADLs; assess patient's baseline for ADL function and identify physical deficits which impact ability to perform ADLs (bathing, care of mouth/teeth, toileting, grooming, dressing, etc )  - Assess/evaluate cause of self-care deficits   - Assess range of motion  - Assess patient's mobility; develop plan if impaired  - Assess patient's need for assistive devices and provide as appropriate  - Encourage maximum independence but intervene and supervise when necessary  - Involve family in performance of ADLs  - Assess for home care needs following discharge   - Consider OT consult to assist with ADL evaluation and planning for discharge  - Provide patient education as appropriate  Outcome: Progressing  Goal: Maintains/Returns to pre admission functional level  Description: INTERVENTIONS:  - Perform BMAT or MOVE assessment daily    - Set and communicate daily mobility goal to care team and patient/family/caregiver     - Collaborate with rehabilitation services on mobility goals if consulted  - Out of bed for toileting  - Record patient progress and toleration of activity level   Outcome: Progressing

## 2021-06-27 NOTE — ASSESSMENT & PLAN NOTE
· Hemoglobin 7 5 today  · Received 1 unit PRBC 6/25  · neutropenia resolved s/p granix  · Platelet count 8    · Will transfuse platelets

## 2021-06-27 NOTE — CASE MANAGEMENT
Medically cleared to d/c today  Requires HH PT/OT/SN  Placed referrals in [facility] unable accept d/t not in service area  [facility] accepted  Faxed AVS to [facility] agency  Updated AVS  No other d/c needs identified

## 2021-06-27 NOTE — DISCHARGE SUMMARY
Discharge Summary - [facility]    Patient Information: Shimon Smith 57 y o  male MRN: [id number]  Unit/Bed#: E2 -01 Encounter: [id number]    Discharging Physician / Practitioner: Caridad Vigil DO  PCP: Darryn Quinteros DO  Admission Date: 6/24/2021  Discharge Date: 06/27/21    Disposition:     Home with [facility] Services (Reminder: Complete face to face encounter)     Reason for Admission: febrile neutropenia    Discharge Diagnoses:     Principal Problem (Resolved):    Febrile neutropenia ([address] )  Active Problems:    Essential hypertension    Seminoma of testis, stage 3, left (HCC)    SIMON on CPAP    CKD (chronic kidney disease) stage 3, GFR 30-59 ml/min (HCC)    Pancytopenia (HCC)    Gastric distention      Consultations During Hospital Stay:  · Oncology   · General surgery     Procedures Performed:     · none    Significant Findings / Test Results:   CT abdomen pelvis wo contrast  Result Date: 6/24/2021  Impression: 1  Fluid distention of the visualized distal esophagus, gas and fluid distending the stomach, fluid within nondistended small bowel  The findings may represent an ileus  2   Hepatomegaly  3   Duodenal lipoma  XR chest 1 view portable  Result Date: 6/25/2021  Impression: No acute cardiopulmonary disease  CTA ED chest PE study  Result Date: 6/24/2021  Impression: No pulmonary embolus  Lungs clear  Severe gastric distention  Incidental Findings:   · none    Test Results Pending at Discharge (will require follow up):   · none     Outpatient Tests Requested:  Cbc with diff in 1 week     Hospital Course:     Harsha Sawyer is a 57 y o  male patient who originally presented to the hospital on 6/24/2021 due to febrile neutropenia  He has a dx of testicular cancer and is on chemotherapy  He was found to have an anc of 0 00  He was given granix and started on cefepime  His neutrophils resolved with this treatment and no infectious source was found      He did receive 1 unit of prbc during hospital stay and platelets  Pt was assessed by physical therapy and will have hhpt  He was discharged to home with follow up with Dr Ivania Cowan    Discharge Day Visit / Exam:     * Please refer to separate progress note for these details *    Discharge instructions/Information to patient and family:   See after visit summary for information provided to patient and family  Provisions for Follow-Up Care:  See after visit summary for information related to follow-up care and any pertinent home health orders  Discharge Statement:  I spent 33 minutes discharging the patient  This time was spent on the day of discharge  I had direct contact with the patient on the day of discharge  Greater than 50% of the total time was spent examining patient, answering all patient questions, arranging and discussing plan of care with patient as well as directly providing post-discharge instructions  Additional time then spent on discharge activities  Discharge Medications:  See after visit summary for reconciled discharge medications provided to patient and family

## 2021-06-27 NOTE — ASSESSMENT & PLAN NOTE
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

## 2021-06-28 ENCOUNTER — TELEPHONE (OUTPATIENT)
Dept: HEMATOLOGY ONCOLOGY | Facility: CLINIC | Age: 61
End: 2021-06-28

## 2021-06-28 LAB
ABO GROUP BLD BPU: NORMAL
BPU ID: NORMAL
UNIT DISPENSE STATUS: NORMAL
UNIT PRODUCT CODE: NORMAL
UNIT RH: NORMAL

## 2021-06-28 NOTE — TELEPHONE ENCOUNTER
Appointment Confirmation     Appointment with  Farzad Sabillon   Appointment date & time 6-29-21 @ 11:00am   Location [place]   Patient verbilized Understanding

## 2021-06-29 ENCOUNTER — OFFICE VISIT (OUTPATIENT)
Dept: HEMATOLOGY ONCOLOGY | Facility: CLINIC | Age: 61
End: 2021-06-29
Payer: COMMERCIAL

## 2021-06-29 VITALS
RESPIRATION RATE: 18 BRPM | TEMPERATURE: 97.8 F | SYSTOLIC BLOOD PRESSURE: 116 MMHG | HEART RATE: 99 BPM | OXYGEN SATURATION: 91 % | WEIGHT: 251.6 LBS | HEIGHT: 67 IN | BODY MASS INDEX: 39.49 KG/M2 | DIASTOLIC BLOOD PRESSURE: 74 MMHG

## 2021-06-29 DIAGNOSIS — C62.12 SEMINOMA OF DESCENDED LEFT TESTIS (HCC): Primary | ICD-10-CM

## 2021-06-29 PROCEDURE — 99214 OFFICE O/P EST MOD 30 MIN: CPT | Performed by: INTERNAL MEDICINE

## 2021-06-29 PROCEDURE — 1036F TOBACCO NON-USER: CPT | Performed by: INTERNAL MEDICINE

## 2021-06-29 PROCEDURE — 3008F BODY MASS INDEX DOCD: CPT | Performed by: INTERNAL MEDICINE

## 2021-06-29 NOTE — PROGRESS NOTES
Hematology Outpatient Follow - Up Note  Andres Smith 57 y o  male MRN: @ Encounter: [id number]        Date:  6/29/2021        Assessment/ Plan:    at least 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 with external right iliac lymphadenopathy measuring up to 3 cm and left para-aortic lymphadenopathy measuring up to 2 3 cm (pT2, N2, S1) with persistent elevation of LDH less than 1 5 normal upper limit     Repeat alpha fetoprotein, hCG are normal, repeat  ( )     Received 1st cycle of etoposide/ cisplatin with anti emetics   He was admitted to the hospital after 10 days of therapy with nausea, weakness  He was found to have acute renal insufficiency with creatinine of 4,   He received IV fluid with gradual improvement  He also developed neutropenia and thrombocytopenia grade 4 requiring platelet transfusion         CT scan C/A/P 4/15/21 showed significant response of chemotherapy in the abdominal lymphadenopathy  Creatinine improved to 1 73 5/7/21    Will change cisplatin to carboplatin AUC 5 on day 1 and etoposide 100 milligram/meter squared for 5 days, despite he ended up in the hospital with pancytopenia, requiring blood and platelet transfusion, ileus, weight loss, nausea, vomiting, loss of appetite with poor tolerance of therapy     CT scan showed no evidence of lymphadenopathy     We decided at this time not to proceed with any additional chemotherapy because poor tolerance and follow-up in 3 months with CBC, CMP,  HCG and CT scan of the chest abdomen and pelvis        Labs and imaging studies are reviewed by ordering provider once results are available  If there are findings that need immediate attention, you will be contacted when results available  Discussing results and the implication on your healthcare is best discussed in person at your follow-up visit         HPI:    Tasha Medina is a 58-year-old  male seen initially 4/1/21 regarding stage II B (pT2, pN2, S1) left-sided pure seminoma     He has a history of prostate cancer, nephrolithiasis, benign prostatic hypertrophy, polymyalgia, hypertension, sleep apnea, obesity, GERD, hiatal hernia, gout       His prostate cancer was diagnosed on September 2019 with single core with 4 mm focus of prostate cancer with very low risk group   He is on active surveillance with PSA 4 9 on March 2020 followed by Urology        He was found to have enlargement of the left testicle       Ultrasound of the scrotum on 10/24/2020 showed left testicle measuring 7 2 x 3 9 x 5 1 cm with lobulated contour and diffuse heterogeneous echotexture     Repeat ultrasound on 01/20/2021 showed 9 1 x 4 9 x 6 8 cm increase in size compared to prior ultrasound     LDH was elevated at 376 ( ) normal hCG, alpha fetoprotein     Status post left inguinal orchiectomy on 02/19/2021, showing pure seminoma primary measuring 8 2 cm, abuts and invades into but not through tunica albuginea, invades hilar fat and spermatic cord soft tissue with lymphovascular invasion, spermatic cord is not involved by the tumor confirmed by 2nd opinion at [facility]       CT scan of the chest abdomen and pelvis on 03/20/2021 showed enlarged left iliac and left para-aortic lymph nodes for example left para-aortic lymph node measuring 2 3 cm, left external iliac lymph node measuring 30 mm no evidence of metastatic disease in the chest  He had pain at the left orchiectomy site on and off most likely responding to acetaminophen from healing      He reported 12 lb weight loss after the surgery       4/1/21:  cr 1 13, hemoglobin 1 3 3, MCV of 89, white blood cell count 7 75, 51% neutrophils, 33% lymphocytes, 12% monocytes, platelets 337     4 cycles of  Etoposide/ cisplatin per the NCCN guidelines recommended    For chemotherapy-induced neutropenia, pegfilgrastim recommended      The chemotherapy was complicated with dehydration, acute kidney injury requiring admission to the hospital 4/15-4/20/21  His creatinine at time of discharge was 2 3  It  improved to 1 73 5/7/21     CT scan C/A/P  4/15/21 in the hospital showed significant reduction in seminoma with decrease of the abdominal lymphadenopathy  We changed cisplatin to carboplatin, despite that 2nd cycle was significant for pancytopenia, requiring admission to the hospital, ileus, CT scan showed no evidence of residual lymphadenopathy or masses in June 2021  Interval History:        Previous Treatment:         Test Results:    Imaging: CT abdomen pelvis wo contrast    Result Date: 6/24/2021  Narrative: CT ABDOMEN AND PELVIS WITHOUT IV CONTRAST INDICATION:   Abdominal distension gastric pain, distention on CTA  COMPARISON:  5/30/2021 TECHNIQUE:  CT examination of the abdomen and pelvis was performed without intravenous contrast   Axial, sagittal, and coronal 2D reformatted images were created from the source data and submitted for interpretation  Radiation dose length product (DLP) for this visit:  1110 mGy-cm   This examination, like all CT scans performed in the [facility], was performed utilizing techniques to minimize radiation dose exposure, including the use of iterative reconstruction and automated exposure control  Enteric contrast was not administered  FINDINGS: ABDOMEN LOWER CHEST:  No clinically significant abnormality identified in the visualized lower chest  LIVER/BILIARY TREE:  Liver is enlarged  No focal hepatic lesions are noted  Hepatic contours are within normal limits  No biliary dilatation  GALLBLADDER:  No calcified gallstones  No pericholecystic inflammatory change  SPLEEN:  Unremarkable  PANCREAS:  Unremarkable  ADRENAL GLANDS:  Unremarkable  KIDNEYS/URETERS:  Unremarkable  No hydronephrosis  There is contrast excretion from prior CTA of the chest  STOMACH AND BOWEL:  There is fluid distention of the visualized distal esophagus  There is fluid and gas distending the stomach    The small bowel are of normal caliber and demonstrates fluid  The colon is not abnormally distended  1 2 cm duodenal lipoma (series 2 image 36, series 601 image 97)  APPENDIX:  No findings to suggest appendicitis  ABDOMINOPELVIC CAVITY:  No ascites  No pneumoperitoneum  No lymphadenopathy  VESSELS:  Unremarkable for patient's age  PELVIS REPRODUCTIVE ORGANS:  Unremarkable for patient's age  URINARY BLADDER:  There is excreted contrast from previous administration  ABDOMINAL WALL/INGUINAL REGIONS:  Unremarkable  OSSEOUS STRUCTURES:  No acute fracture or destructive osseous lesion  Impression: 1  Fluid distention of the visualized distal esophagus, gas and fluid distending the stomach, fluid within nondistended small bowel  The findings may represent an ileus  2   Hepatomegaly  3   Duodenal lipoma  Workstation performed: [id number]     XR chest 1 view portable    Result Date: 6/25/2021  Narrative: CHEST INDICATION:   sob  COMPARISON:  5/29/2021 EXAM PERFORMED/VIEWS:  XR CHEST PORTABLE  2:20 PM FINDINGS: Cardiomediastinal silhouette appears unremarkable  The lungs are clear  No pneumothorax or pleural effusion  Osseous structures appear within normal limits for patient age  Impression: No acute cardiopulmonary disease  Workstation performed: [id number]     CTA ED chest PE study    Result Date: 6/24/2021  Narrative:   CTA - CHEST WITH IV CONTRAST - PULMONARY ANGIOGRAM INDICATION:   PE suspected, intermediate prob, positive D-dimer Shortness of breath/elevated D-dimer/history of active cancer  Testicular cancer, currently on chemotherapy, with generalized weakness and shortness of breath  Last treatment was June 14-18  COMPARISON: Chest CT from 5/30/2021 and 3/20/2021  TECHNIQUE: CT angiogram timed for optimal opacification of the pulmonary arteries  Axial, sagittal, and coronal 2D reformats created from source data  Coronal 3D MIP postprocessing on the acquisition scanner    Radiation dose length product (DLP):  521 mGy-cm   Radiation dose exposure minimized using iterative reconstruction and automated exposure control  IV Contrast:  100 mL of iohexol (OMNIPAQUE)  FINDINGS: PULMONARY ARTERIES:  No pulmonary embolus  LUNGS:  Lungs clear  AIRWAYS: No significant filling defects  PLEURA:  Unremarkable  HEART/GREAT VESSELS:  Normal heart size  Mild coronary artery calcification indicating atherosclerotic heart disease  MEDIASTINUM AND ANNA:  Unremarkable  CHEST WALL AND LOWER NECK: Subcentimeter left thyroid nodules  No follow-up is needed  UPPER ABDOMEN:  Severe gastric distention  OSSEOUS STRUCTURES:  Mild degenerative disease in the spine  Impression: No pulmonary embolus  Lungs clear  Severe gastric distention  Workstation performed: [id number]       Labs:   Lab Results   Component Value Date    WBC 4 92 06/27/2021    HGB 7 5 (L) 06/27/2021    HCT 22 2 (L) 06/27/2021    MCV 93 06/27/2021    PLT 8 (LL) 06/27/2021     Lab Results   Component Value Date    K 3 7 06/26/2021     06/26/2021    CO2 20 (L) 06/26/2021    BUN 30 (H) 06/26/2021    CREATININE 1 37 (H) 06/26/2021    GLUF 140 (H) 06/24/2021    CALCIUM 7 4 (L) 06/26/2021    CORRECTEDCA 8 4 06/25/2021    AST 8 06/25/2021    ALT 17 06/25/2021    ALKPHOS 56 06/25/2021    EGFR 55 06/26/2021       No results found for: IRON, TIBC, FERRITIN    No results found for: [id number]      ROS: Review of Systems   Constitutional: Positive for appetite change, fatigue and unexpected weight change  Negative for chills, diaphoresis and fever  HENT:   Negative for hearing loss, lump/mass, mouth sores, nosebleeds, sore throat, trouble swallowing and voice change  Eyes: Negative  Negative for eye problems and icterus  Respiratory: Negative  Negative for chest tightness, cough, hemoptysis and shortness of breath  Cardiovascular: Positive for leg swelling  Negative for chest pain  Gastrointestinal: Positive for abdominal pain   Negative for abdominal distention, blood in stool, constipation, diarrhea and nausea  Endocrine: Negative  Genitourinary: Negative for dysuria, frequency, hematuria and pelvic pain  Musculoskeletal: Negative  Negative for arthralgias, back pain, flank pain, gait problem, myalgias and neck stiffness  Skin: Negative for itching and rash  Neurological: Negative for dizziness, gait problem, headaches, light-headedness, numbness and speech difficulty  Hematological: Negative for adenopathy  Does not bruise/bleed easily  Psychiatric/Behavioral: Positive for confusion and sleep disturbance  Negative for decreased concentration and depression  The patient is nervous/anxious  Current Medications: Reviewed  Allergies: Reviewed  PMH/FH/SH:  Reviewed      Physical Exam:    Body surface area is 2 23 meters squared  Wt Readings from Last 3 Encounters:   06/29/21 114 kg (251 lb 9 6 oz)   06/24/21 116 kg (255 lb)   06/18/21 116 kg (256 lb 6 3 oz)        Temp Readings from Last 3 Encounters:   06/29/21 97 8 °F (36 6 °C) (Tympanic)   06/27/21 (!) 97 3 °F (36 3 °C)   06/18/21 97 6 °F (36 4 °C) (Temporal)        BP Readings from Last 3 Encounters:   06/29/21 116/74   06/27/21 122/57   06/18/21 132/74         Pulse Readings from Last 3 Encounters:   06/29/21 99   06/27/21 76   06/18/21 63        Physical Exam  Vitals reviewed  Constitutional:       General: He is not in acute distress  Appearance: He is well-developed  He is obese  He is not diaphoretic  HENT:      Head: Normocephalic and atraumatic  Eyes:      Conjunctiva/sclera: Conjunctivae normal    Neck:      Trachea: No tracheal deviation  Cardiovascular:      Rate and Rhythm: Normal rate and regular rhythm  Heart sounds: No murmur heard  No friction rub  No gallop  Pulmonary:      Effort: Pulmonary effort is normal  No respiratory distress  Breath sounds: Normal breath sounds  No wheezing or rales  Chest:      Chest wall: No tenderness     Abdominal: General: There is no distension  Palpations: Abdomen is soft  Tenderness: There is no abdominal tenderness  Musculoskeletal:      Cervical back: Normal range of motion and neck supple  Right lower leg: Edema present  Left lower leg: Edema present  Lymphadenopathy:      Cervical: No cervical adenopathy  Skin:     General: Skin is warm and dry  Coloration: Skin is not pale  Findings: No erythema  Neurological:      Mental Status: He is alert and oriented to person, place, and time  Psychiatric:         Behavior: Behavior normal          Thought Content: Thought content normal          Judgment: Judgment normal          ECO    Goals and Barriers:  Current Goal: Minimize effects of disease  Barriers: None  Patient's Capacity to Self Care:  Patient is able to self care      Code Status: [unfilled]

## 2021-06-30 LAB
BACTERIA BLD CULT: NORMAL
BACTERIA BLD CULT: NORMAL

## 2021-07-29 ENCOUNTER — OFFICE VISIT (OUTPATIENT)
Dept: FAMILY MEDICINE CLINIC | Facility: CLINIC | Age: 61
End: 2021-07-29
Payer: COMMERCIAL

## 2021-07-29 VITALS
HEIGHT: 67 IN | WEIGHT: 270 LBS | OXYGEN SATURATION: 98 % | TEMPERATURE: 97.9 F | DIASTOLIC BLOOD PRESSURE: 72 MMHG | HEART RATE: 85 BPM | SYSTOLIC BLOOD PRESSURE: 160 MMHG | BODY MASS INDEX: 42.38 KG/M2 | RESPIRATION RATE: 18 BRPM

## 2021-07-29 DIAGNOSIS — G47.33 OSA ON CPAP: ICD-10-CM

## 2021-07-29 DIAGNOSIS — Z99.89 OSA ON CPAP: ICD-10-CM

## 2021-07-29 DIAGNOSIS — C62.12 SEMINOMA OF DESCENDED LEFT TESTIS (HCC): ICD-10-CM

## 2021-07-29 DIAGNOSIS — R73.03 PRE-DIABETES: ICD-10-CM

## 2021-07-29 DIAGNOSIS — D61.818 PANCYTOPENIA (HCC): ICD-10-CM

## 2021-07-29 DIAGNOSIS — I10 ESSENTIAL HYPERTENSION: Primary | ICD-10-CM

## 2021-07-29 DIAGNOSIS — N17.9 AKI (ACUTE KIDNEY INJURY) (HCC): ICD-10-CM

## 2021-07-29 DIAGNOSIS — N17.0 ARF (ACUTE RENAL FAILURE) WITH TUBULAR NECROSIS (HCC): ICD-10-CM

## 2021-07-29 LAB — SL AMB POCT HEMOGLOBIN AIC: 5.6 (ref ?–6.5)

## 2021-07-29 PROCEDURE — 83036 HEMOGLOBIN GLYCOSYLATED A1C: CPT | Performed by: FAMILY MEDICINE

## 2021-07-29 PROCEDURE — 99203 OFFICE O/P NEW LOW 30 MIN: CPT | Performed by: FAMILY MEDICINE

## 2021-07-29 PROCEDURE — 3044F HG A1C LEVEL LT 7.0%: CPT | Performed by: INTERNAL MEDICINE

## 2021-07-29 PROCEDURE — 3725F SCREEN DEPRESSION PERFORMED: CPT | Performed by: FAMILY MEDICINE

## 2021-07-29 PROCEDURE — 3066F NEPHROPATHY DOC TX: CPT | Performed by: INTERNAL MEDICINE

## 2021-07-29 RX ORDER — ROSUVASTATIN CALCIUM 20 MG/1
20 TABLET, COATED ORAL
Qty: 30 TABLET | Refills: 40 | Status: SHIPPED | OUTPATIENT
Start: 2021-07-29 | End: 2024-11-27

## 2021-07-29 NOTE — ASSESSMENT & PLAN NOTE
He used to take lisinopril but this got discontinued when he was in the hospital      Will hold off on restarting as he is scheduled to see Nephrology in August

## 2021-07-29 NOTE — PROGRESS NOTES
BMI Counseling: Body mass index is 42 29 kg/m²  The BMI is above normal  Nutrition recommendations include decreasing portion sizes and encouraging healthy choices of fruits and vegetables  Exercise recommendations include exercising 3-5 times per week  No pharmacotherapy was ordered  Assessment/Plan:    GERD (gastroesophageal reflux disease)  Stable on Dexilant    SIMON on CPAP  He is stable on Cpap    Pancytopenia (HCC)   pancytopenia from chemo  Most recent WBC normal   Hgb 7 5 and plt 8    Seminoma of descended left testis [facility])  He will cont f/u with urology and hem-onc    ARF (acute renal failure) with tubular necrosis ([address] )  ARF from chemo  Recovered with creat 1 37    JULIO C (acute kidney injury) ([facility] [address] )  Creat 1 37 and GFR 55  He is scheduled for nephrology visit in august    Essential hypertension    He used to take lisinopril but this got discontinued when he was in the hospital      Will hold off on restarting as he is scheduled to see Nephrology in August        Diagnoses and all orders for this visit:    Essential hypertension  -     rosuvastatin (CRESTOR) 20 MG tablet; Take 1 tablet (20 mg total) by mouth daily at bedtime    SIMON on CPAP    Pancytopenia (HCC)    Seminoma of descended left testis (HCC)    ARF (acute renal failure) with tubular necrosis (HCC)    JULIO C (acute kidney injury) ([facility] [address] )    Other orders  -     POCT hemoglobin A1c          Subjective:      Patient ID: Antoine Martinez is a 57 y o  male  He is here to establish care  He has diagnosis of diabetes/pre-diabetes, diet controlled    PMH prostate ca and testicular ca  HL and gout  Unfortunately he had major SEs from chemo for testicular ca  He had pancytopenia and acute kidney failure  Thankfully he is starting to feel better and regain his strength  His oncologist wants him to hold off on the COVID vaccine until he has recovered fully from the chemo  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    He has been out of work since Feb        The following portions of the patient's history were reviewed and updated as appropriate: allergies, current medications, past family history, past medical history, past social history, past surgical history and problem list     Review of Systems   Constitutional: Positive for fatigue  Negative for activity change, chills and fever  HENT: Negative for congestion, ear pain, sinus pressure and sore throat  Eyes: Negative for pain and visual disturbance  Respiratory: Negative for cough, chest tightness, shortness of breath and wheezing  Cardiovascular: Negative for chest pain, palpitations and leg swelling  Gastrointestinal: Negative for abdominal pain, blood in stool, constipation, diarrhea, nausea and vomiting  Endocrine: Negative for polydipsia and polyuria  Genitourinary: Negative for difficulty urinating, dysuria, frequency and urgency  Musculoskeletal: Negative for arthralgias, joint swelling and myalgias  Skin: Negative for rash  Neurological: Negative for dizziness, weakness, numbness and headaches  Hematological: Negative for adenopathy  Does not bruise/bleed easily  Psychiatric/Behavioral: Negative for dysphoric mood  The patient is not nervous/anxious  Objective:      /72 (BP Location: Left arm, Patient Position: Sitting, Cuff Size: Large)   Pulse 85   Temp 97 9 °F (36 6 °C) (Tympanic)   Resp 18   Ht 5' 7" (1 702 m)   Wt 122 kg (270 lb)   SpO2 98%   BMI 42 29 kg/m²          Physical Exam  Constitutional:       Appearance: He is obese  HENT:      Head: Normocephalic and atraumatic  Mouth/Throat:      Mouth: Mucous membranes are moist    Cardiovascular:      Rate and Rhythm: Normal rate  Heart sounds: Normal heart sounds  Pulmonary:      Effort: Pulmonary effort is normal       Breath sounds: Normal breath sounds  Musculoskeletal:      Cervical back: Normal range of motion  Right lower leg: No edema        Left lower leg: No edema  Skin:     General: Skin is warm and dry  Neurological:      Mental Status: He is alert     Psychiatric:         Mood and Affect: Mood normal

## 2021-07-30 ENCOUNTER — TELEPHONE (OUTPATIENT)
Dept: FAMILY MEDICINE CLINIC | Facility: CLINIC | Age: 61
End: 2021-07-30

## 2021-07-30 ENCOUNTER — TELEPHONE (OUTPATIENT)
Dept: HEMATOLOGY ONCOLOGY | Facility: CLINIC | Age: 61
End: 2021-07-30

## 2021-07-30 NOTE — TELEPHONE ENCOUNTER
I called Dr Sydney Doshi office requesting diabetic eye exam results be faxed to us  Patient stated that he just had a recent eye exam for his diabetes  Thank you!

## 2021-07-30 NOTE — TELEPHONE ENCOUNTER
Patient would like to request a call back about his infusion appt scheduled on 09/02/21, he was under the impression he was done with treatment, please call back to clarify at [phone]

## 2021-08-04 ENCOUNTER — APPOINTMENT (OUTPATIENT)
Dept: LAB | Facility: CLINIC | Age: 61
End: 2021-08-04
Payer: COMMERCIAL

## 2021-08-04 DIAGNOSIS — N17.9 AKI (ACUTE KIDNEY INJURY) (HCC): ICD-10-CM

## 2021-08-04 DIAGNOSIS — N18.31 STAGE 3A CHRONIC KIDNEY DISEASE (HCC): ICD-10-CM

## 2021-08-04 LAB
ANION GAP SERPL CALCULATED.3IONS-SCNC: 5 MMOL/L (ref 4–13)
BUN SERPL-MCNC: 20 MG/DL (ref 5–25)
CALCIUM SERPL-MCNC: 9.1 MG/DL (ref 8.3–10.1)
CHLORIDE SERPL-SCNC: 109 MMOL/L (ref 100–108)
CO2 SERPL-SCNC: 26 MMOL/L (ref 21–32)
CREAT SERPL-MCNC: 1.35 MG/DL (ref 0.6–1.3)
CREAT UR-MCNC: 139 MG/DL
ERYTHROCYTE [DISTWIDTH] IN BLOOD BY AUTOMATED COUNT: 14.5 % (ref 11.6–15.1)
GFR SERPL CREATININE-BSD FRML MDRD: 56 ML/MIN/1.73SQ M
GLUCOSE P FAST SERPL-MCNC: 109 MG/DL (ref 65–99)
HCT VFR BLD AUTO: 32 % (ref 36.5–49.3)
HGB BLD-MCNC: 10.4 G/DL (ref 12–17)
MAGNESIUM SERPL-MCNC: 1.4 MG/DL (ref 1.6–2.6)
MCH RBC QN AUTO: 33 PG (ref 26.8–34.3)
MCHC RBC AUTO-ENTMCNC: 32.5 G/DL (ref 31.4–37.4)
MCV RBC AUTO: 102 FL (ref 82–98)
PHOSPHATE SERPL-MCNC: 3.2 MG/DL (ref 2.3–4.1)
PLATELET # BLD AUTO: 217 THOUSANDS/UL (ref 149–390)
PMV BLD AUTO: 10.3 FL (ref 8.9–12.7)
POTASSIUM SERPL-SCNC: 4.4 MMOL/L (ref 3.5–5.3)
PROT UR-MCNC: 12 MG/DL
PROT/CREAT UR: 0.09 MG/G{CREAT} (ref 0–0.1)
PTH-INTACT SERPL-MCNC: 66.9 PG/ML (ref 18.4–80.1)
RBC # BLD AUTO: 3.15 MILLION/UL (ref 3.88–5.62)
SODIUM SERPL-SCNC: 140 MMOL/L (ref 136–145)
WBC # BLD AUTO: 7.29 THOUSAND/UL (ref 4.31–10.16)

## 2021-08-04 PROCEDURE — 83735 ASSAY OF MAGNESIUM: CPT

## 2021-08-04 PROCEDURE — 80048 BASIC METABOLIC PNL TOTAL CA: CPT

## 2021-08-04 PROCEDURE — 83970 ASSAY OF PARATHORMONE: CPT

## 2021-08-04 PROCEDURE — 84156 ASSAY OF PROTEIN URINE: CPT

## 2021-08-04 PROCEDURE — 84100 ASSAY OF PHOSPHORUS: CPT

## 2021-08-04 PROCEDURE — 82570 ASSAY OF URINE CREATININE: CPT

## 2021-08-04 PROCEDURE — 85027 COMPLETE CBC AUTOMATED: CPT

## 2021-08-04 PROCEDURE — 36415 COLL VENOUS BLD VENIPUNCTURE: CPT

## 2021-08-04 PROCEDURE — 3061F NEG MICROALBUMINURIA REV: CPT | Performed by: INTERNAL MEDICINE

## 2021-08-06 ENCOUNTER — OFFICE VISIT (OUTPATIENT)
Dept: NEPHROLOGY | Facility: CLINIC | Age: 61
End: 2021-08-06
Payer: COMMERCIAL

## 2021-08-06 VITALS
BODY MASS INDEX: 41.91 KG/M2 | SYSTOLIC BLOOD PRESSURE: 144 MMHG | HEART RATE: 63 BPM | HEIGHT: 67 IN | RESPIRATION RATE: 18 BRPM | WEIGHT: 267 LBS | DIASTOLIC BLOOD PRESSURE: 64 MMHG

## 2021-08-06 DIAGNOSIS — C62.92 SEMINOMA OF TESTIS, STAGE 3, LEFT (HCC): ICD-10-CM

## 2021-08-06 DIAGNOSIS — Z87.442 HISTORY OF NEPHROLITHIASIS: ICD-10-CM

## 2021-08-06 DIAGNOSIS — N18.31 TYPE 2 DIABETES MELLITUS WITH STAGE 3A CHRONIC KIDNEY DISEASE, WITHOUT LONG-TERM CURRENT USE OF INSULIN (HCC): ICD-10-CM

## 2021-08-06 DIAGNOSIS — I10 ESSENTIAL HYPERTENSION: ICD-10-CM

## 2021-08-06 DIAGNOSIS — E11.22 TYPE 2 DIABETES MELLITUS WITH STAGE 3A CHRONIC KIDNEY DISEASE, WITHOUT LONG-TERM CURRENT USE OF INSULIN (HCC): ICD-10-CM

## 2021-08-06 DIAGNOSIS — N18.31 STAGE 3A CHRONIC KIDNEY DISEASE (HCC): Primary | ICD-10-CM

## 2021-08-06 DIAGNOSIS — E55.9 VITAMIN D DEFICIENCY: ICD-10-CM

## 2021-08-06 DIAGNOSIS — E78.5 HYPERLIPIDEMIA, UNSPECIFIED HYPERLIPIDEMIA TYPE: ICD-10-CM

## 2021-08-06 DIAGNOSIS — E83.42 HYPOMAGNESEMIA: ICD-10-CM

## 2021-08-06 PROCEDURE — 3008F BODY MASS INDEX DOCD: CPT | Performed by: INTERNAL MEDICINE

## 2021-08-06 PROCEDURE — 1036F TOBACCO NON-USER: CPT | Performed by: INTERNAL MEDICINE

## 2021-08-06 PROCEDURE — 99214 OFFICE O/P EST MOD 30 MIN: CPT | Performed by: INTERNAL MEDICINE

## 2021-08-06 RX ORDER — NIFEDIPINE 30 MG/1
30 TABLET, EXTENDED RELEASE ORAL DAILY
Qty: 90 TABLET | Refills: 4 | Status: SHIPPED | OUTPATIENT
Start: 2021-08-06 | End: 2022-06-17

## 2021-08-06 NOTE — PATIENT INSTRUCTIONS
- start procardia XL 30mg once a day  - start magnesium 400mg once a day  - check blood pressure twice a day for 2 weeks and call with updates  - get 24 hr urine test done and call 2 weeks after it to go over the results and follow the diet below    - Please call me in 10 days after having your blood work done to review the results if you do not hear back from me or my office, as I may have not received the results  - please remember to perform blood work prior to the next visit  - Please call if the blood pressure top number is greater than 150 or less than 110 consistently  - Please call if you are gaining more than 2lbs in 2 days for adjustment of water pills   ~ Please AVOID the following pain medications  LIST OF NSAIDS (NONSTEROIDAL ANTI-INFLAMMATORY DRUGS) AND VILLAGOMEZ-2 INHIBITORS    DIFLUNISAL (DOLOBID)  IBUPROFEN (MOTRIN, ADVIL)  FLURBIPROFEN (ANSAID)  KETOPROFEN (ORUDIS, ORUVAIL)  FENOPROFEN (NALFON)  NABUMETONE (RELAFEN)  PIROXICAM (FELDENE)  NAPROXEN (ALEVE, NAPROSYN, NAPRELAN, ANAPROX)  DICLOFENAC (VOLTAREN, CATAFLAM)  INDOMETHACIN (INDOCIN)  SULINDAC (CLINORIL)  TOLMETIN (TOLETIN)  ETODOLAC (LODINE)  MELOXICAM (MOBIC)  KETOROLAC (TORADOL)  OXAPROZIN (DAYPRO)  CELECOXIB (CELEBREX)    Phosphorus diet  Follow a low phosphorus diet      Avoid these higher phosphorus foods: Choose these lower phosphorus foods:   Milk, pudding or yogurt (from animals and from many soy varieties) Rice milk (unfortified), nondairy creamer (if it doesn't have terms in the ingredients list that contain the letters "phos")   Hard cheeses, ricotta or cottage cheese, fat-free cream cheese Regular and low-fat cream cheese   Ice cream or frozen yogurt Sherbet or frozen fruit pops   Soups made with higher phosphorus ingredients (milk, dried peas, beans, lentils) Soups made with lower phosphorus ingredients (broth- or water-based with other lower phosphorus ingredients)   Whole grains, including whole-grain breads, crackers, cereal, rice and pasta Refined grains, including white bread, crackers, cereals, rice and pasta   Quick breads, biscuits, cornbread, muffins, pancakes or waffles Homemade refined (white) dinner rolls, bagels or English muffins   Dried peas (split, black-eyed), beans (black, garbanzo, lima, kidney, navy, weinberg) or lentils Green peas (canned, frozen), green beans or wax beans   Organ meats, walleye, pollock or sardines Lean beef, pork, lamb, poultry or other fish   Nuts and seeds Popcorn   Peanut butter and other nut butters Jam, jelly or honey   Chocolate, including chocolate drinks Carob (chocolate-flavored) candy, hard candy or gumdrops   Yuliana and pepper-type sodas, flavored kincaid, bottled teas (if a term in the ingredients list contains the letters "phos") Lemon-lime soda, ginger ale or root beer, plain water     Follow a moderate potassium diet  Kidney Stone Prevention    Fluid Intake    A high fluid intake is one of the most important cornerstones of kidney stone dietary prevention  A sufficiently dilute urine will prevent the individual chemical components of stones from becoming concentrated enough to precipitate out of solution, keeping them instead in their dissolved state  A high urine output also may reduce stone from forming through "flushing" out of stone components and prevention of urine stagnation  In addition to stone benefits, increased water intake has been shown to have a multitude of other benefits, including improved alertness, better skin appearance, enhanced physical performance, reduced constipation and enhanced weight loss  The average daily urine output for normal healthy adults is 1 2 liters a day, ranging from 1 to 2 liters in most individuals and varying with body weight and gender   In stone formers, however, a higher daily urine output is required for stone prevention and achieving a daily volume of at least 2 0 to 2 5 liters a day can significantly reduce the recurrence of future stones  In a randomized study of stone formers who were given specific instructions to increase their fluid intake compared to stone formers told to not change their diet, those given specific fluid instructions achieved a high urine output of 2 6 liters a day versus 1 0 liters a day in those not given dietary instructions  Over a period of five years, the high fluid intake group was half as likely to form new stones as compared to the normal fluid intake group (Geo et al, J Urol 1996)  We recommend that most stone formers increase their daily fluid intake by one liter (an additional two 16 oz water bottles or two tall glasses a day) in order to achieve a urine output of 2 5 liters a day  (One liter = 4 2 8-oz glasses or 34 oz)  Alternatively, a 24 hour urine collection can be performed to guide fluid intake to achieve 2 5 liters of urine output in a specific patient  Type of Fluid Intake    The type of fluid intake is generally less important than the total intake  While drinking water is our preferred recommendation because it is inexpensive and contains no calories, for stone patients who do not enjoy drinking water, any beverage will be beneficial in reducing stone risk  Contrary to popular belief, studies have found that an increased intake of tea, coffee, and alcoholic beverage actually reduces the risk of stones, possibly because of an associated increase in urine output (Kiran et al, Am J of Epidemiology, 1996)  While tea contains high levels of oxalate, this does not appear to result in increased stone formation for the reasons discussed below in discussion on oxalate  Soda intake (including alicja) and milk intake also do not appear to increase the risk of stones  Citrus Fruit Juices    Citrus juices, including lemon juice and orange juice, contain citrate, which acts as a stone inhibitor for calcium based stones   Citrate seems to do this by binding calcium, making it unavailable to combine with oxalate or phosphate: a necessary first step in the formation of stones  Citrate also seems to make it more difficult for stones to grow once they've formed  Drinking citrus juice in the form of concentrated lemon juice mixed with water has been shown to effectively increase urinary citrate levels and reduce urinary calcium levels, both of which will reduce stone risk  Orange juice will similarly increase urinary citrate levels  However, orange juice appears to also increase urinary oxalate levels ( a stone promoter)  Other sources of citrate, including grapefruit juice, have had less research completed confirming their beneficial effects on urinary citrate levels  Therefore, lemon juice is typically favored over the other citrus juices as a natural method to increase urinary citrate levels  Many patients find drinking citrus juices to be an attractive alternative to pharmaceutical treatment with potassium citrate  We recommend that stone formers consider supplementing their daily fluid intake with a mixture of 60 ml of concentrated lemon juice in one liter of water to increase their urinary citrate levels  Salt    A high sodium intake increases the risk of stone formation by increasing calcium levels and decreasing citrate (a stone inhibitor) levels in urine  Additionally, high sodium intake will impair the ability of medications such as hydrochlorothiazide to effectively reduce calcium levels in urine  A study of stone formers who were kept on a strict diet with a maximum daily sodium intake of 50 mmol (1200 mg) in addition to a reduced protein diet demonstrated that the low sodium diet was effective in reducing stone recurrence by 50% as compared to the low calcium diet      We recommend that stone formers aim to follow the FDA's guideline of limiting salt intake to 2300 mg of sodium a day in the general population and 1500 mg of sodium a day in those with hypertension,  Americans, or middle aged and older adults  2300 mg is equivalent to about 1 teaspoon of table salt  The best way to determine the salt content of your food is to read the nutrition label  Processed foods tend to contain higher amounts of salt  Choose low sodium options whenever possible  · 1 cup of canned chicken noodle soup contains 870 mg of sodium  · A fried chicken drumstick contains 310 mg of sodium  · A serving of shrimp contains 240 mg of sodium  · 2 slices of white bread contains 200 mg of sodium  · 15 potato chips contain 180 mg of sodium  · 1 container of strawberry yogurt contains 85 mg of sodium  · 1 tomato contains 20 mg of sodium  · 1 apple contains 0 mg of sodium  In addition to lowering the risk of stones, a low sodium intake helps to control or prevent high blood pressure, which can lead to heart disease, stroke, heart failure, and kidney disease  Animal Protein    Animal protein in meat products increases the risk of stone by increasing calcium, oxalate, and uric acid levels in urine  All three of these changes increase the risk of stones  In studies comparing high meat eaters versus low meat eaters, high meat eaters were found to be at increased risk of forming stones  A randomized study of stone formers restricted to a low meat intake of 52 grams a day (equivalent to 8 oz of beef) in combination with sodium restriction found that the combination reduced stone recurrence by 50% compared to calcium restriction alone (Geo et al, [facility] 2002)  We recommend that most stone formers try to reduce their meat intake to 6 oz a day  This includes all types of meat: beef, pork, poultry, and seafood  The USDA has recommended a daily allowance of 5-6 oz of protein intake among adults  They also recommend choosing non-meat protein foods such as nuts and beans instead of meat sources  Protein from non-meat sources does not appear to increase the risk of stones      · A small steak contains about 3-4 oz of protein  · A quarter pound hamburger with cheese contains 4 oz of protein  · A chicken breast contains about 5 oz of protein, a chicken thigh about 2 5 oz, a chicken drumstick about 1 5 oz  · One 5 oz can of tuna contains 5 oz of protein  · 1 medium egg contains 1 oz of protein  Lowering your animal protein intake and eating more fruits and vegetables also benefits your overall health by limiting the amount of saturated fats and cholesterol in your diet  This helps to reduce your risk of cardiovascular disease  Oxalate    While oxalate plays an important role in the development of calcium oxalate stones, dietary restriction does not appear to be effective in reducing the risk of stones in the majority of patients  About 40% of urinary oxalate comes from dietary sources while the remainder is naturally made within the liver  Therefore, reducing oxalate dietary intake does not always have a significant impact on total urinary oxalate levels  Oxalate is found in many vegetable and fruits, including many healthy dietary choices often making it difficult to achieve a low oxalate diet  Because of these issues, oxalate avoidance is beneficial primarily in those individuals with specific abnormalities that lead to high oxalate urinary levels  We recommend that most stone formers should maintain a normal oxalate intake without the need for oxalate restriction  High oxalate intake should be avoided in individuals found to have high urinary oxalate levels on metabolic evaluation  Oxalate restriction may be beneficial in certain individuals with high urinary oxalate levels  Oxalate Rich Foods    Tea (black)  Spinach  Mustard Greens  Chard  Beets  Rhubarb  Okra  Berries  Chocolate  Nuts  Sweet Potatoes        Calcium    Kidney Stone formers often question whether to stop or reduce their calcium intake   Despite the fact that calcium is a major component of 75% of stones, excessive calcium intake is vary rarely the cause of stone formation  In fact, several studies have shown that restricting calcium intake in most stone formers actually increases the number of stones they develop  This appears to happen because when less calcium is ingested, it becomes easier for oxalate (which normally binds with calcium in the gut) to be absorbed  Higher levels of oxalate in the urine then lead to an increase in stone risk  Calcium obtained from dietary sources appears to be better than calcium from supplements in regards to lowering stone risk because supplements can actually increase your risk of stones slightly (by 17%) while dietary calcium intake is instead associated with lower stone risk  If you need to take supplements, taking them during meals appear to be better in terms of stone risk  We recommend that stone formers maintain a normal calcium intake, preferably from dietary sources  Female stone formers taking calcium supplementation to prevent osteoporosis experience a slightly increased risk of stones (17%) which needs to be balanced against their risk of osteoporosis  Things to do to reduce your blood pressure include working with all your physician to do the following:  ~ stop smoking if you smoke  ~ increase cardiovascular exercise like walking and swimming    ~ modify your diet to decrease fat and salt intake  ~ reduce your weight if you are overweight or obese   ~ increase the consumption of fruits, vegetables and whole grains  ~ decrease alcohol consumption if you consume alcohol    ~ try to minimize stress in your life with lifestyle modifications  ~ be compliant with your anti-hypertensive medications  ~ adjust your medications to help improve your vascular stiffness and decrease risks for heart attacks and strokes  Exercise to Lose Weight     Exercise and a healthy diet may help you lose weight  Your doctor may suggest specific exercises       EXERCISE IDEAS AND TIPS      Choose low-cost things you enjoy doing, such as walking, bicycling, or exercising to workout videos   Take stairs instead of the elevator   Walk during your lunch break   Park your car further away from work or school   Go to a gym or an exercise class   Start with 5 to 10 minutes of exercise each day  Build up to 30 minutes of exercise 4 to 6 days a week   Wear shoes with good support and comfortable clothes   Stretch before and after working out   Work out until you breathe harder and your heart beats faster   Drink extra water when you exercise   Do not do so much that you hurt yourself, feel dizzy, or get very short of breath  Exercises that burn about 150 calories:    Running 1 ½ miles in 15 minutes   Playing volleyball for 45 to 60 minutes   Washing and waxing a car for 45 to 60 minutes   Playing touch football for 45 minutes   Walking 1 ¾ miles in 35 minutes   Pushing a stroller 1 ½ miles in 30 minutes   Playing basketball for 30 minutes   Raking leaves for 30 minutes   Bicycling 5 miles in 30 minutes   Walking 2 miles in 30 minutes   Dancing for 30 minutes   Shoveling snow for 15 minutes   Swimming laps for 20 minutes   Walking up stairs for 15 minutes   Bicycling 4 miles in 15 minutes   Gardening for 30 to 45 minutes   Jumping rope for 15 minutes  Washing windows or floors for 45 to 60 minutes

## 2021-08-06 NOTE — PROGRESS NOTES
Nephrology Follow up Consultation  Rosibel Fermin 57 y o  male MRN: [id number]            BACKGROUND:  Rosibel Fermin is a 57 y o male who was referred by Junior Delgado MD for evaluation of Follow-up and Chronic Kidney Disease    ASSESSMENT / PLAN:   57 y o   male with pmh of multiple co-morbidities including  Hypertension, nephrolithiasis,  Polymyalgia rheumatica, testicular cancer presents to the office for routine follow-up  CKD stage III a:  -  after [facility] as well as Care everywhere patient has a baseline creatinine of 1-1 5mg/dL  Most recent labs show a Creatinine of 1 35 mg/dL on 08/04/2021  Renal function remains stable  -  Likely has underlying CKD secondary to age-related nephron loss plus episode of acute kidney injury non dialysis requiring due to chemotherapy with platinum compounds plus obesity related hyper filtration plus hypertensive nephrosclerosis   -  CT abdomen from 06/24/2021 showing no hydronephrosis  - Acid base and lytes stable  - Clinically the patient appears to be  euvolemic  - Recommend to avoid use of NSAIDs, nephrotoxins  Caution advised with regards to exposure to IV contrast dye    - Discussed with the patient in depth His renal status, including the possible etiologies for CKD  - Advised the patient that when  is GFR is close to 20mL/min then will start discussing about RRT(renal replacement therapy) options such as renal transplant, peritoneal dialysis and hemodialysis  - Informed the patient about the various options for Renal Replacement therapy  - Discussed with the patient how we need to work together to delay the progression of CKD with optimal BP control based on their age and co-morbidities, optimal BS control with HbA1c of <7% and trying to reduce proteinuria by the use of anti-proteinuric agents  Hypertension:  - Patient is on  No medications at this time will start patient on Procardia XL 30 mg p o  q day     -  Check blood pressures and call with updates in 2 weeks advised appropriate techniques of checking blood pressures  Prior to this patient was on lisinopril 10 mg p o  q day  At this time would hold off Ace inhibitors unless not able to get appropriate blood pressure control with Procardia , just to decrease risk for acute kidney injury   - Goal BP of < 140/90 based on age and comorbidities  - Instructed to follow low sodium (2gm)diet  Hemoglobin:  - Goal Hb of 10-12 g/dL  - Most recent labs suggestive of  10 4 grams/deciliter  -  Management per Oncology    CKD-MBD(Mineral Bone Disease) /vitamin-D deficiency:  - Based on patients CKD stage following is the goal of therapy  - Maintain calcium phosphorus product of < 55   - Stage 3 CKD - Goal Ca 8 5-10 mg/dL , goal Phos 2 7-4 6 mg/dL  , goal iPTH 30-70 pg/mL  - Patient is currently at goal   - Most recent intact PTH of 66 9 as of 08/04/2021  - Continue patient on  Vitamin-D 2000 units p o  q day  - Check intact PTH vitamin-D prior to next visit    Proteinuria:  - proteinuria most likely secondary to obesity related hyper filtration  - most recent protein creatinine ratio 90 mg/dL as of August 2021  - check protein creatinine ratio  - currently on therapy for proteinuria with     Lipids:  -   On Crestor  - goal LDL less than 70  - management per primary team     h/o nephrolithiasis:  - last stone 2019  - Discussed with the patient that the most common types of kidney stones are calcium oxalate stones  - Advised to follow a diet with increased fluid intake so that urine output is greater than 2-2 5L/day  - Advised patient to decrease salt, protein and oxalate intake  - Dietary handouts given  - Ordered for 24 hr litholink stone workup analysis     - advised patient to call 2 weeks after it is completed to review and discuss the results     hypomagnesemia:  -  Likely secondary to chemotherapy with cisplatin  -  Most recent magnesium level 1 4 mEq  -  Start magnesium oxide 400 mg p o  q day check blood work prior to next visit     testicular cancer:  - Management as per primary team  - Follow-up with Heme-Onc  - Last seen by Hematology on 06/29/2021 notes reviewed in epic  - Chemotherapy   Had been changed to carboplatin and etoposide, however due to poor tolerance it was decided to hold off on further chemotherapy  Continue close follow-up with Heme-Onc       Nutrition:  - Encouraged patient to follow a renal diet comprising of moderate potassium, low phosphorus and protein restriction to 0 8gm/kg  - Will check serum albumin with next blood work  Followup:  - Patient is to follow-up in 4 months, with lab work to be performed a few days prior to the next visit  Advised patient to call me in 10 days to review the results if they do not hear back from me, as I may have not received the results  Rachel Gay MD, [facility], 8/6/2021, 11:06 AM             SUBJECTIVE: 57 y o  Male presents to the office for routine follow-up had episode of acute kidney injury in April 2021 with peak creatinine 4 7 mg/dL on 04/16/2021  Presents to the visit with his spouse overall feels well has no complaints no recent hospitalization no NSAID use no issues with edema thankful for all the care information that he has gotten today reports he has had a history of kidney stones last episode was 2019 is interested and agreeable to getting 24 urine testing done  Never been on medications for it  Reports he does drink a lot of fluid  At this time no plan for chemotherapy it has been on hold due to his prior multiple episodes of acute kidney injury and issues with his CBC  He has follow-up CT scan with Oncology  Not checking blood pressures at home  Review of Systems   Constitutional: Negative for appetite change, chills, fatigue and fever  HENT: Negative for congestion, postnasal drip, rhinorrhea and sore throat  Respiratory: Negative for cough, shortness of breath and wheezing  Cardiovascular: Negative for leg swelling  Gastrointestinal: Negative for abdominal pain, constipation, diarrhea, nausea and vomiting  Genitourinary: Negative for difficulty urinating, dysuria and hematuria  Musculoskeletal: Negative for back pain  Skin: Negative for rash and wound  Neurological: Negative for dizziness, light-headedness and headaches  Psychiatric/Behavioral: Negative for agitation and confusion  All other systems reviewed and are negative        PAST MEDICAL HISTORY:  Past Medical History:   Diagnosis Date    BPH without obstruction/lower urinary tract symptoms     Cancer (HCC)     prostate    CPAP (continuous positive airway pressure) dependence     Elevated PSA     GERD (gastroesophageal reflux disease)     Gout     Hyperlipidemia     Hypertension     Kidney stone     Obese abdomen     Obesity     Polymyalgia (HCC)     Prostate cancer (HCC)     Rash     under both arms and in between legs - family doctor aware - pt uses Desitin    Sleep apnea     Testicular carcinoma ([facility] [address] )     Urinary frequency     Urinary urgency     Wears glasses        PROBLEM LIST    Patient Active Problem List   Diagnosis    Calculus of kidney    Elevated PSA    Benign prostatic hyperplasia with nocturia    Prostate cancer ([address] )    Hydrocele in adult    Mass of left testis    Essential hypertension    GERD (gastroesophageal reflux disease)    CPAP (continuous positive airway pressure) dependence    Obesity    Seminoma of descended left testis ([address]    Seminoma of testis, stage 3, left (HCC)    Chemoprevention    Chemotherapy-induced neutropenia (HCC)    JULIO C (acute kidney injury) ([address] )    SIMON on CPAP    Polymyalgia rheumatica syndrome (HCC)    Steroid-induced hyperglycemia    Chest discomfort    ARF (acute renal failure) with tubular necrosis (HCC)    Stage 3a chronic kidney disease (HCC)    CKD (chronic kidney disease) stage 3, GFR 30-59 ml/min (HCC)    Chest pain    Back pain    Thrombocytopenia (HCC)    Anemia    Type 2 diabetes mellitus (HCC)    Lymphadenopathy, retroperitoneal    Pancytopenia (HCC)    Gastric distention    Vitamin D deficiency    Hyperlipidemia    Hypomagnesemia    History of nephrolithiasis       PAST SURGICAL HISTORY:  Past Surgical History:   Procedure Laterality Date    CARPAL TUNNEL RELEASE Bilateral 07/2020    COLONOSCOPY      CYSTOSCOPY W/ LASER LITHOTRIPSY  2001    CYSTOSTOMY W/ STENT INSERTION  2011    ESOPHAGOGASTRODUODENOSCOPY      EXTRACORPOREAL SHOCK WAVE LITHOTRIPSY Right 2011    HERNIA REPAIR      KNEE ARTHROSCOPY Left     ID CYSTO/URETERO W/LITHOTRIPSY &INDWELL STENT INSRT Right 4/29/2018    Procedure: CYSTOSCOPY URETEROSCOPY, RETROGRADE PYELOGRAM AND INSERTION STENT URETERAL;  Surgeon: Stiven Ruelas MD;  [facility];  Service: Urology    [facility]    Procedure: Radical  ORCHIECTOMY;  Surgeon: Garima Quiroz MD;  [facility];  Service: Urology       SOCIAL HISTORY :   reports that he has never smoked  He has never used smokeless tobacco  He reports that he does not drink alcohol and does not use drugs  FAMILY HISTORY:  Family History   Problem Relation Age of Onset    Nephrolithiasis Father     Alzheimer's disease Mother        ALLERGIES:  No Known Allergies        PHYSICAL EXAM:  Vitals:    08/06/21 1001 08/06/21 1041   BP: 152/70 144/64   BP Location: Left arm    Patient Position: Sitting    Cuff Size: Large    Pulse: 63    Resp: 18    Weight: 121 kg (267 lb)    Height: 5' 7" (1 702 m)      Body mass index is 41 82 kg/m²  Physical Exam  Vitals reviewed  Constitutional:       General: He is not in acute distress  Appearance: Normal appearance  He is obese  He is not ill-appearing, toxic-appearing or diaphoretic  HENT:      Head: Normocephalic and atraumatic  Mouth/Throat:      Mouth: Mucous membranes are moist       Pharynx: Oropharynx is clear  No oropharyngeal exudate  Eyes:      General: No scleral icterus  Conjunctiva/sclera: Conjunctivae normal    Cardiovascular:      Rate and Rhythm: Normal rate  Heart sounds: Normal heart sounds  No friction rub  Pulmonary:      Effort: Pulmonary effort is normal  No respiratory distress  Breath sounds: Normal breath sounds  No wheezing  Abdominal:      General: There is no distension  Palpations: Abdomen is soft  There is no mass  Tenderness: There is no right CVA tenderness or left CVA tenderness  Musculoskeletal:         General: No swelling  Cervical back: Normal range of motion and neck supple  Skin:     General: Skin is warm  Coloration: Skin is not jaundiced  Neurological:      General: No focal deficit present  Mental Status: He is alert and oriented to person, place, and time  Psychiatric:         Mood and Affect: Mood normal          Behavior: Behavior normal          LABORATORY DATA:     Results from last 6 Months   Lab Units 08/04/21  0952 06/27/21  0603 06/26/21  0429 06/25/21  0534 05/19/21  1350 05/13/21  1055 04/16/21  0842 04/15/21  1141   WBC Thousand/uL 7 29 4 92 1 76* 1 61*   < >  --  0 76* 1 26*   HEMOGLOBIN g/dL 10 4* 7 5* 7 4* 6 9*   < >  --  11 4* 12 6   HEMATOCRIT % 32 0* 22 2* 22 5* 20 8*   < >  --  34 8* 37 4   PLATELETS Thousands/uL 217 8* 16* 23*   < >  --  46* 103*   POTASSIUM mmol/L 4 4  --  3 7 3 7  --  4 4 3 5 3 7   CHLORIDE mmol/L 109*  --  104 104  --  111* 99* 98*   CO2 mmol/L 26  --  20* 21  --  23 22 22   BUN mg/dL 20  --  30* 30*  --  19 99* 100*   CREATININE mg/dL 1 35*  --  1 37* 1 36*  --  1 48* 4 71* 4 29*   CALCIUM mg/dL 9 1  --  7 4* 7 4*  --  9 1 7 0* 7 6*   MAGNESIUM mg/dL 1 4*  --   --   --   --   --  1 7 1 8   PHOSPHORUS mg/dL 3 2  --   --   --   --  2 5 4 5*  --     < > = values in this interval not displayed          rest all reviewed    RADIOLOGY:  No orders to display     Rest all reviewed        MEDICATIONS:    Current Outpatient Medications:     allopurinol (ZYLOPRIM) 100 mg tablet, allopurinol 100 mg tablet, Disp: , Rfl:     Cholecalciferol (VITAMIN D3) 2000 units capsule, Take 1 capsule by mouth daily, Disp: , Rfl:     dexlansoprazole (DEXILANT) 60 MG capsule, Dexilant 60 mg capsule, delayed release, Disp: , Rfl:     rosuvastatin (CRESTOR) 20 MG tablet, Take 1 tablet (20 mg total) by mouth daily at bedtime, Disp: 30 tablet, Rfl: 40    lidocaine (LIDODERM) 5 %, Apply 1 patch topically daily Remove & Discard patch within 12 hours or as directed by MD (Patient not taking: Reported on 7/29/2021), Disp: 15 patch, Rfl: 0    magnesium oxide (MAG-OX) 400 mg, Take 1 tablet (400 mg total) by mouth once for 1 dose, Disp: 90 tablet, Rfl: 4    NIFEdipine (PROCARDIA XL) 30 mg 24 hr tablet, Take 1 tablet (30 mg total) by mouth daily, Disp: 90 tablet, Rfl: 4          Portions of the record may have been created with voice recognition software  Occasional wrong word or "sound a like" substitutions may have occurred due to the inherent limitations of voice recognition software  Read the chart carefully and recognize, using context, where substitutions have occurred  If you have any questions, please contact the dictating provider

## 2021-08-10 ENCOUNTER — PATIENT OUTREACH (OUTPATIENT)
Dept: CASE MANAGEMENT | Facility: HOSPITAL | Age: 61
End: 2021-08-10

## 2021-08-10 NOTE — PROGRESS NOTES
Oncology LSW reviewed updated chart notes and phoned Nasra Chang today for supportive outreach  LM on VM with return call information  Will continue to follow  Addendum:  Pt returned call today  He shared he was advised to stop chemotherapy secondary to the significant side effects that occurred  He is following with renal specialists now  Nasra Chang reported feeling very fatigued at times, but typically this happens when he "overdoes it "  He is trying to maintain his property with mowing, keeping up with weeds, etc  But has to stop frequently to take breaks  LSW explained his body had gone through a lot and not to push himself too much  Nasra Chang has an oncology follow up at the end of September  He is not going to return to work until possibly October  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

## 2021-08-30 ENCOUNTER — PATIENT OUTREACH (OUTPATIENT)
Dept: CASE MANAGEMENT | Facility: HOSPITAL | Age: 61
End: 2021-08-30

## 2021-08-30 NOTE — PROGRESS NOTES
Pt called LSW today with a question about returning to work  He's anticipating a return to work in October or November, but feels he can tolerate PT work, at around 20 hours/week  He asked if he can still collect SSI benefits while working PT   [place] explained sometimes this is allowed, however suggested he call [facility] office to clarify for a final determination  Will continue to plan on follow up call after his appointment with medical oncology next month  Pt appreciative of support

## 2021-09-01 ENCOUNTER — TELEPHONE (OUTPATIENT)
Dept: NEPHROLOGY | Facility: CLINIC | Age: 61
End: 2021-09-01

## 2021-09-01 NOTE — TELEPHONE ENCOUNTER
Called and spoke to patient with regards to most recent blood pressure readings that he had submitted from 08/07/2021 till 08/20/2021 SBP around 138-142  Advised patient to continue current regimen including Procardia  Advised patient if SBP consistently greater than 145-150 then to call me with updates and then we can maybe initiate an ACE-inhibitor or ARB    Order for 24 urine Litholink also sign today since he had not received it up till now

## 2021-09-14 ENCOUNTER — TRANSCRIBE ORDERS (OUTPATIENT)
Dept: LAB | Facility: CLINIC | Age: 61
End: 2021-09-14

## 2021-09-14 ENCOUNTER — APPOINTMENT (OUTPATIENT)
Dept: LAB | Facility: CLINIC | Age: 61
End: 2021-09-14
Payer: COMMERCIAL

## 2021-09-14 DIAGNOSIS — Z12.5 SPECIAL SCREENING FOR MALIGNANT NEOPLASM OF PROSTATE: ICD-10-CM

## 2021-09-14 DIAGNOSIS — C62.92 SEMINOMA OF TESTIS, STAGE 3, LEFT (HCC): ICD-10-CM

## 2021-09-14 LAB
HCG-TM SERPL-SCNC: <2 MLU/ML
PSA SERPL-MCNC: 4.1 NG/ML (ref 0–4)

## 2021-09-14 PROCEDURE — 84702 CHORIONIC GONADOTROPIN TEST: CPT

## 2021-09-14 PROCEDURE — G0103 PSA SCREENING: HCPCS

## 2021-09-15 ENCOUNTER — TELEPHONE (OUTPATIENT)
Dept: HEMATOLOGY ONCOLOGY | Facility: CLINIC | Age: 61
End: 2021-09-15

## 2021-09-15 NOTE — TELEPHONE ENCOUNTER
Per onc finance insurance will only cover CT abd/pelvis  Discussed with Dr María Elena Burton and order updated

## 2021-09-16 ENCOUNTER — HOSPITAL ENCOUNTER (OUTPATIENT)
Dept: CT IMAGING | Facility: HOSPITAL | Age: 61
Discharge: HOME | End: 2021-09-16
Attending: INTERNAL MEDICINE
Payer: COMMERCIAL

## 2021-09-16 DIAGNOSIS — C62.12 SEMINOMA OF DESCENDED LEFT TESTIS (HCC): ICD-10-CM

## 2021-09-16 PROCEDURE — 74176 CT ABD & PELVIS W/O CONTRAST: CPT

## 2021-09-20 ENCOUNTER — OFFICE VISIT (OUTPATIENT)
Dept: UROLOGY | Facility: MEDICAL CENTER | Age: 61
End: 2021-09-20
Payer: COMMERCIAL

## 2021-09-20 VITALS
HEIGHT: 67 IN | WEIGHT: 270 LBS | DIASTOLIC BLOOD PRESSURE: 78 MMHG | SYSTOLIC BLOOD PRESSURE: 184 MMHG | BODY MASS INDEX: 42.38 KG/M2

## 2021-09-20 DIAGNOSIS — C62.12 SEMINOMA OF DESCENDED LEFT TESTIS (HCC): ICD-10-CM

## 2021-09-20 DIAGNOSIS — C61 PROSTATE CANCER (HCC): Primary | ICD-10-CM

## 2021-09-20 PROCEDURE — 99214 OFFICE O/P EST MOD 30 MIN: CPT | Performed by: UROLOGY

## 2021-09-20 PROCEDURE — 81003 URINALYSIS AUTO W/O SCOPE: CPT | Performed by: UROLOGY

## 2021-09-20 NOTE — PROGRESS NOTES
Assessment/Plan:    Seminoma of descended left testis [facility])  The patient has completed his chemotherapy  His primarily under the care of his oncologist, Dr Miguel Gould  Prostate cancer ([facility] )  No evidence of progressive disease  Continue active surveillance  Diagnoses and all orders for this visit:    Prostate cancer ([facility] [address] )  -     POCT urine dip auto non-scope  -     PSA Total, Diagnostic; Future    Seminoma of descended left testis [facility])          Subjective:      Patient ID: Marialuisa Kerr is a 57 y o  male  HPI   Testicular cancer - pure seminoma:   After reviewing NCCN guidelines version 1 2021, based upon the patient's LDH which is elevated to 1 52 times the upper limit of normal and rising, he is at the low end of stage IIIB  Per Dr Miguel Gould ([facility]) the pt is at least 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 with external right iliac lymphadenopathy measuring up to 3 cm and left para-aortic lymphadenopathy measuring up to 2 3 cm (pT2, N2, S1) with persistent elevation of LDH  less than 1 5 normal upper limit of nl  Chemotherapy was recommended        Pt completed third and final course of chemo in June 2021  Chemo was very hard on pt  Pt was to receive 4 courses but he had severe side effects including severe weakness, dehydration and an increase in his creatinine as high as 4       Repeat alpha fetoprotein, hCG are normal, repeat LDH was 177 ( ) on September 14, 2021  Pt will see DR Miguel Gould in two days         Prostate cancer on active surveillance:   The patient's biopsy on September 24, 2019 showed a single core with a 4 mm stretch of prostate cancer   Per NCCN guidelines version 2 2019, the patient fell into the very low risk group   Per guidelines on page prostate 4, he fit comfortably into the category for active surveillance  He notes urinary urgency and nocturia x 3  He denies other significant urinary symptoms    He denies gross hematuria, urinary tract infections or incontinence  He is taking neither medications nor supplements for his symptoms  PSA:  [  0   Lab Value Date/Time    PSA 4 1 (H) 09/14/2021 1006    PSA 4 9 (H) 03/25/2020 0740    PSA 5 5 (H) 07/26/2019 6990    PSA 7 7 (H) 06/19/2019 1302   ]    AUA SYMPTOM SCORE      Most Recent Value   AUA SYMPTOM SCORE   How often have you had a sensation of not emptying your bladder completely after you finished urinating? 1   How often have you had to urinate again less than two hours after you finished urinating? 2   How often have you found you stopped and started again several times when you urinate? 1   How often have you found it difficult to postpone urination? 5   How often have you had a weak urinary stream?  2   How often have you had to push or strain to begin urination? 1   How many times did you most typically get up to urinate from the time you went to bed at night until the time you got up in the morning? 3   Quality of Life: If you were to spend the rest of your life with your urinary condition just the way it is now, how would you feel about that?  3   AUA SYMPTOM SCORE  15          The following portions of the patient's history were reviewed and updated as appropriate: allergies, current medications, past family history, past medical history, past social history, past surgical history and problem list     Review of Systems    Constitutional: Negative for activity change and fatigue  Respiratory: Negative for shortness of breath and wheezing     Cardiovascular: Negative for chest pain         Hypertension    Gastrointestinal: Negative for abdominal pain  Endocrine:        In the past, the patient has taken metformin to counteract the hyperglycemic affective prednisone which he took for polymyalgia rheumatica  His most recent hemoglobin A1c in July was 5 6%     Genitourinary: Negative for difficulty urinating, dysuria, frequency, hematuria and urgency          Known punctate renal stones bilaterally    Musculoskeletal: Negative for back pain and gait problem         Polymyalgia rheumatica    Skin: Negative     Allergic/Immunologic: Negative     Neurological: Negative     Psychiatric/Behavioral: Negative    Objective:      BP (!) 184/78   Ht 5' 7" (1 702 m)   Wt 122 kg (270 lb)   BMI 42 29 kg/m²          Physical Exam  Constitutional:       Appearance: He is well-developed  HENT:      Head: Normocephalic and atraumatic  Pulmonary:      Effort: Pulmonary effort is normal    Genitourinary:     Rectum: Normal       Comments: The prostate is 50 g, smooth, non-tender  Musculoskeletal:         General: Normal range of motion  Cervical back: Normal range of motion and neck supple  Skin:     General: Skin is warm and dry  Neurological:      Mental Status: He is alert and oriented to person, place, and time  Psychiatric:         Behavior: Behavior normal          Thought Content:  Thought content normal          Judgment: Judgment normal

## 2021-09-20 NOTE — LETTER
September 20, 2021     Jaquelin Flor, [address] [facility]  [address] [facility] [address]    Patient: Ariella Vu   YOB: 1960   Date of Visit: 9/20/2021       Dear Dr Malachi Song: Thank you for referring Milana Oconnell to me for evaluation  Below are my notes for this consultation  If you have questions, please do not hesitate to call me  I look forward to following your patient along with you  Sincerely,        Edna Cruz MD        CC: No Recipients  Edna Cruz MD  9/20/2021  8:16 PM  Incomplete  Assessment/Plan:    Seminoma of descended left testis [facility])  The patient has completed his chemotherapy  His primarily under the care of his oncologist, Dr Sarah Hyatt  Prostate cancer ([facility] [address] )  No evidence of progressive disease  Continue active surveillance  Diagnoses and all orders for this visit:    Prostate cancer ([facility] [address] )  -     POCT urine dip auto non-scope  -     PSA Total, Diagnostic; Future    Seminoma of descended left testis [facility])          Subjective:      Patient ID: Ariella Vu is a 57 y o  male  HPI   Testicular cancer - pure seminoma:   After reviewing NCCN guidelines version 1 2021, based upon the patient's LDH which is elevated to 1 52 times the upper limit of normal and rising, he is at the low end of stage IIIB  Per Dr Sarah Hyatt ([facility]) the pt is at least 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 with external right iliac lymphadenopathy measuring up to 3 cm and left para-aortic lymphadenopathy measuring up to 2 3 cm (pT2, N2, S1) with persistent elevation of LDH  less than 1 5 normal upper limit of nl  Chemotherapy was recommended        Pt completed third and final course of chemo in June 2021  Chemo was very hard on pt    Pt was to receive 4 courses but he had severe side effects including severe weakness, dehydration and an increase in his creatinine as high as 4       Repeat alpha fetoprotein, hCG are normal, repeat LDH was 177 ( ) on September 14, 2021  Pt will see DR Deepali Medrano in two days         Prostate cancer on active surveillance:   The patient's biopsy on September 24, 2019 showed a single core with a 4 mm stretch of prostate cancer   Per NCCN guidelines version 2 2019, the patient fell into the very low risk group   Per guidelines on page prostate 4, he fit comfortably into the category for active surveillance  He notes urinary urgency and nocturia x 3  He denies other significant urinary symptoms  He denies gross hematuria, urinary tract infections or incontinence  He is taking neither medications nor supplements for his symptoms  PSA:  [  0   Lab Value Date/Time    PSA 4 1 (H) 09/14/2021 1006    PSA 4 9 (H) 03/25/2020 0740    PSA 5 5 (H) 07/26/2019 4545    PSA 7 7 (H) 06/19/2019 1302   ]    AUA SYMPTOM SCORE      Most Recent Value   AUA SYMPTOM SCORE   How often have you had a sensation of not emptying your bladder completely after you finished urinating? 1   How often have you had to urinate again less than two hours after you finished urinating? 2   How often have you found you stopped and started again several times when you urinate? 1   How often have you found it difficult to postpone urination? 5   How often have you had a weak urinary stream?  2   How often have you had to push or strain to begin urination? 1   How many times did you most typically get up to urinate from the time you went to bed at night until the time you got up in the morning?   3   Quality of Life: If you were to spend the rest of your life with your urinary condition just the way it is now, how would you feel about that?  3   AUA SYMPTOM SCORE  15          The following portions of the patient's history were reviewed and updated as appropriate: allergies, current medications, past family history, past medical history, past social history, past surgical history and problem list     Review of Systems    Constitutional: Negative for activity change and fatigue  Respiratory: Negative for shortness of breath and wheezing     Cardiovascular: Negative for chest pain         Hypertension    Gastrointestinal: Negative for abdominal pain  Endocrine:        In the past, the patient has taken metformin to counteract the hyperglycemic affective prednisone which he took for polymyalgia rheumatica  His most recent hemoglobin A1c in July was 5 6%     Genitourinary: Negative for difficulty urinating, dysuria, frequency, hematuria and urgency          Known punctate renal stones bilaterally    Musculoskeletal: Negative for back pain and gait problem         Polymyalgia rheumatica    Skin: Negative     Allergic/Immunologic: Negative     Neurological: Negative     Psychiatric/Behavioral: Negative    Objective:      BP (!) 184/78   Ht 5' 7" (1 702 m)   Wt 122 kg (270 lb)   BMI 42 29 kg/m²          Physical Exam  Constitutional:       Appearance: He is well-developed  HENT:      Head: Normocephalic and atraumatic  Pulmonary:      Effort: Pulmonary effort is normal    Genitourinary:     Rectum: Normal       Comments: The prostate is 50 g, smooth, non-tender  Musculoskeletal:         General: Normal range of motion  Cervical back: Normal range of motion and neck supple  Skin:     General: Skin is warm and dry  Neurological:      Mental Status: He is alert and oriented to person, place, and time  Psychiatric:         Behavior: Behavior normal          Thought Content: Thought content normal          Judgment: Judgment normal            Phineas Boast, MD  9/20/2021  9:44 AM  Sign when Signing Visit  Assessment/Plan:    No problem-specific Assessment & Plan notes found for this encounter  Diagnoses and all orders for this visit:    Prostate cancer ([facility] [address] )  -     POCT urine dip auto non-scope          Subjective:      Patient ID: Zahida Connor is a 57 y o  male  HPI   Testicular cancer -pure seminoma:    The patient returns to discuss his metabolic workup  After reviewing NCCN guidelines version 1 2021, based upon the patient's LDH which is elevated to 1 52 times the upper limit of normal and rising, he is at the low end of stage IIIB  at least 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 with external right iliac lymphadenopathy measuring up to 3 cm and left para-aortic lymphadenopathy measuring up to 2 3 cm (pT2, N2, S1) with persistent elevation of LDH less than 1 5 normal upper limit of nl  Chemo was very hard on pt  He had severe weakness, dehydration and creatinine rise as high as 4  Pt completed third and final course of chemo in June 2021  Pt was to receive 4 courses but he had severe side effects       Repeat alpha fetoprotein, hCG are normal, repeat  ( )    Pt will see DR Aamir Ross in two days         Prostate cancer on active surveillance:   The patient's biopsy on September 24, 2019 showed a single core with a 4 mm stretch of prostate cancer   Per NCCN guidelines version 2 2019, the patient fell into the very low risk group   Per guidelines on page prostate 4, he fit comfortably into the category for active surveillance  He notes urinary urgency and nocturia x 3  He denies other significant urinary symptoms  He denies gross hematuria, urinary tract infections or incontinence  He is taking neither medications nor supplements for his symptoms  PSA:  [  0   Lab Value Date/Time    PSA 4 1 (H) 09/14/2021 1006    PSA 4 9 (H) 03/25/2020 0740    PSA 5 5 (H) 07/26/2019 2552    PSA 7 7 (H) 06/19/2019 1302   ]    AUA SYMPTOM SCORE      Most Recent Value   AUA SYMPTOM SCORE   How often have you had a sensation of not emptying your bladder completely after you finished urinating? 1   How often have you had to urinate again less than two hours after you finished urinating? 2   How often have you found you stopped and started again several times when you urinate?   1   How often have you found it difficult to postpone urination? 5   How often have you had a weak urinary stream?  2   How often have you had to push or strain to begin urination? 1   How many times did you most typically get up to urinate from the time you went to bed at night until the time you got up in the morning? 3   Quality of Life: If you were to spend the rest of your life with your urinary condition just the way it is now, how would you feel about that?  3   AUA SYMPTOM SCORE  15          The following portions of the patient's history were reviewed and updated as appropriate: allergies, current medications, past family history, past medical history, past social history, past surgical history and problem list     Review of Systems        Objective:      BP (!) 184/78   Ht 5' 7" (1 702 m)   Wt 122 kg (270 lb)   BMI 42 29 kg/m²          Physical Exam  Constitutional:       Appearance: He is well-developed  HENT:      Head: Normocephalic and atraumatic  Pulmonary:      Effort: Pulmonary effort is normal    Genitourinary:     Rectum: Normal       Comments: The prostate is 50 g, smooth, non-tender  Musculoskeletal:         General: Normal range of motion  Cervical back: Normal range of motion and neck supple  Skin:     General: Skin is warm and dry  Neurological:      Mental Status: He is alert and oriented to person, place, and time  Psychiatric:         Behavior: Behavior normal          Thought Content:  Thought content normal          Judgment: Judgment normal

## 2021-09-21 ENCOUNTER — TELEPHONE (OUTPATIENT)
Dept: HEMATOLOGY ONCOLOGY | Facility: CLINIC | Age: 61
End: 2021-09-21

## 2021-09-21 NOTE — ASSESSMENT & PLAN NOTE
The patient has completed his chemotherapy  His primarily under the care of his oncologist, Dr Andre Lenz

## 2021-09-21 NOTE — TELEPHONE ENCOUNTER
Spoke to KLEBER and informed him that I need Faisal's CT scan read prior to his appt tmr with Dr Kimberly Sprague  I provided him with Faisal's  and last name

## 2021-09-22 ENCOUNTER — OFFICE VISIT (OUTPATIENT)
Dept: HEMATOLOGY ONCOLOGY | Facility: CLINIC | Age: 61
End: 2021-09-22
Payer: COMMERCIAL

## 2021-09-22 VITALS
RESPIRATION RATE: 18 BRPM | TEMPERATURE: 97.5 F | DIASTOLIC BLOOD PRESSURE: 60 MMHG | HEIGHT: 67 IN | WEIGHT: 266.8 LBS | OXYGEN SATURATION: 98 % | SYSTOLIC BLOOD PRESSURE: 142 MMHG | HEART RATE: 87 BPM | BODY MASS INDEX: 41.88 KG/M2

## 2021-09-22 DIAGNOSIS — C62.12 SEMINOMA OF DESCENDED LEFT TESTIS (HCC): Primary | ICD-10-CM

## 2021-09-22 PROBLEM — N17.9 AKI (ACUTE KIDNEY INJURY) (HCC): Status: RESOLVED | Noted: 2021-04-15 | Resolved: 2021-09-22

## 2021-09-22 PROBLEM — N18.30 CKD (CHRONIC KIDNEY DISEASE) STAGE 3, GFR 30-59 ML/MIN (HCC): Status: RESOLVED | Noted: 2021-05-30 | Resolved: 2021-09-22

## 2021-09-22 PROCEDURE — 3008F BODY MASS INDEX DOCD: CPT | Performed by: INTERNAL MEDICINE

## 2021-09-22 PROCEDURE — 99214 OFFICE O/P EST MOD 30 MIN: CPT | Performed by: INTERNAL MEDICINE

## 2021-09-22 PROCEDURE — 1036F TOBACCO NON-USER: CPT | Performed by: INTERNAL MEDICINE

## 2021-09-22 NOTE — PROGRESS NOTES
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 with external right iliac lymphadenopathy measuring up to 3 cm and left para-aortic lymphadenopathy measuring up to 2 3 cm (pT2, N2, S1) with persistent elevation of LDH less than 1 5 normal upper limit     Repeat alpha fetoprotein, hCG are normal, repeat  ( )     Received 1st cycle of etoposide/ cisplatin with anti emetics   He was admitted to the hospital after 10 days of therapy with nausea, weakness   He was found to have acute renal insufficiency with creatinine of 4,    He received IV fluid with gradual improvement   He also developed neutropenia and thrombocytopenia grade 4 requiring platelet transfusion         CT scan C/A/P 4/15/21 showed significant response of chemotherapy in the abdominal lymphadenopathy    Creatinine improved to 1 73 5/7/21     Will change cisplatin to carboplatin AUC 5 on day 1 and etoposide 100 milligram/meter squared for 5 days, despite he ended up in the hospital with pancytopenia, requiring blood and platelet transfusion, ileus, weight loss, nausea, vomiting, loss of appetite with poor tolerance of therapy      CT scan showed no evidence of lymphadenopathy      We decided at this time not to proceed with any additional chemotherapy     CT scan of the abdomen and pelvis in September 2021 showed 1 1 cm para-aortic lymph nodes stable from before, no new lesions to suggest progression of disease, he has normal hCG as well as LDH, at this time will continue watchful observation and follow-up in 3 months with CT scan of the abdomen and pelvis without IV or oral contrast, CBC, CMP, LDH, hCG     Okay to go back to full-time work    He had a history of prostate cancer, followed by Urology no evidence of progressive of disease he is on watchful observation        Labs and imaging studies are reviewed by ordering provider once results are available  If there are findings that need immediate attention, you will be contacted when results available  Discussing results and the implication on your healthcare is best discussed in person at your follow-up visit  HPI:    Kenisha Lopez is a 14-year-old  male seen initially 4/1/21 regarding stage II B (pT2, pN2, S1) left-sided pure seminoma     He has a history of prostate cancer, nephrolithiasis, benign prostatic hypertrophy, polymyalgia, hypertension, sleep apnea, obesity, GERD, hiatal hernia, gout       His prostate cancer was diagnosed on September 2019 with single core with 4 mm focus of prostate cancer with very low risk group   He is on active surveillance with PSA 4 9 on March 2020 followed by Urology        He was found to have enlargement of the left testicle       Ultrasound of the scrotum on 10/24/2020 showed left testicle measuring 7 2 x 3 9 x 5 1 cm with lobulated contour and diffuse heterogeneous echotexture     Repeat ultrasound on 01/20/2021 showed 9 1 x 4 9 x 6 8 cm increase in size compared to prior ultrasound     LDH was elevated at 376 ( ) normal hCG, alpha fetoprotein     Status post left inguinal orchiectomy on 02/19/2021, showing pure seminoma primary measuring 8 2 cm, abuts and invades into but not through tunica albuginea, invades hilar fat and spermatic cord soft tissue with lymphovascular invasion, spermatic cord is not involved by the tumor confirmed by 2nd opinion at [facility]       CT scan of the chest abdomen and pelvis on 03/20/2021 showed enlarged left iliac and left para-aortic lymph nodes for example left para-aortic lymph node measuring 2 3 cm, left external iliac lymph node measuring 30 mm no evidence of metastatic disease in the chest  He had pain at the left orchiectomy site on and off most likely responding to acetaminophen from healing      He reported 12 lb weight loss after the surgery       4/1/21:  cr 1 13, hemoglobin 1 3 3, MCV of 89, white blood cell count 7 75, 51% neutrophils, 33% lymphocytes, 12% monocytes, platelets 710     4 cycles of  Etoposide/ cisplatin per the NCCN guidelines recommended    For chemotherapy-induced neutropenia, pegfilgrastim recommended      The chemotherapy was complicated with dehydration, acute kidney injury requiring admission to the hospital 4/15-4/20/21  [facility] creatinine at time of discharge was 2 3   It  improved to 1 73 5/7/21     CT scan C/A/P  4/15/21 in the hospital showed significant reduction in seminoma with decrease of the abdominal lymphadenopathy      We changed cisplatin to carboplatin, despite that 2nd cycle was significant for pancytopenia, requiring admission to the hospital, ileus, CT scan showed no evidence of residual lymphadenopathy or masses in June 2021  We decided not to proceed with any additional chemotherapy because of poor tolerance     After 3 months CT scan of the abdomen and pelvis in September 2021 showed 1 1 cm para-aortic lymph node no new lesions, he has normal hCG as well as LDH  Interval History:        Previous Treatment:         Test Results:    Imaging: CT abdomen pelvis wo contrast    Result Date: 9/21/2021  Narrative: CT ABDOMEN AND PELVIS WITHOUT IV CONTRAST INDICATION:   C62 12: Malignant neoplasm of descended left testis  COMPARISON:  Comparison primary made with the most recent abdomen and pelvic CT from 6/24/2021  An older chest, abdomen, pelvic CT from 5/30/2021 was also reviewed  TECHNIQUE:  CT examination of the abdomen and pelvis was performed without intravenous contrast   Axial, sagittal, and coronal 2D reformatted images were created from the source data and submitted for interpretation  Radiation dose length product (DLP) for this visit:  1122 27 mGy-cm     This examination, like all CT scans performed in the [facility], was performed utilizing techniques to minimize radiation dose exposure, including the use of iterative reconstruction and automated exposure control  Enteric contrast was not administered  FINDINGS: ABDOMEN LOWER CHEST:  No clinically significant abnormality identified in the visualized lower chest  LIVER/BILIARY TREE:  Unremarkable  GALLBLADDER:  No calcified gallstones  No pericholecystic inflammatory change  SPLEEN:  Unremarkable  PANCREAS:  Unremarkable  ADRENAL GLANDS:  Unremarkable  KIDNEYS/URETERS:  Unremarkable  No hydronephrosis  STOMACH AND BOWEL:  Again noted is a small lipoma in the transverse portion of the duodenum  Bowel loops otherwise unremarkable  APPENDIX:  No findings to suggest appendicitis  ABDOMINOPELVIC CAVITY:  There is an unchanged mildly enlarged 1 1 x 0 9 cm lower left para-aortic lymph node (series 2 image 49 ) Otherwise, no enlarged retroperitoneal nodes  There is no mesenteric adenopathy  No ascites  No free air  VESSELS:  Unremarkable for patient's age  PELVIS REPRODUCTIVE ORGANS:  Unremarkable for patient's age  URINARY BLADDER:  Unremarkable  ABDOMINAL WALL/INGUINAL REGIONS:  Unremarkable  OSSEOUS STRUCTURES:  No acute fracture or destructive osseous lesion  Impression: Stable mildly enlarged 1 1 x 0 9 cm lower left para-aortic lymph node (series 2 image 49 ) Otherwise, no enlarged retroperitoneal nodes  No other potential metastases elsewhere in the abdomen and pelvis   Workstation performed: [id number]       Labs:   Lab Results   Component Value Date    WBC 6 86 09/14/2021    HGB 11 3 (L) 09/14/2021    HCT 34 5 (L) 09/14/2021     (H) 09/14/2021     09/14/2021     Lab Results   Component Value Date    K 4 3 09/14/2021     (H) 09/14/2021    CO2 27 09/14/2021    BUN 18 09/14/2021    CREATININE 1 42 (H) 09/14/2021    GLUF 113 (H) 09/14/2021    CALCIUM 9 2 09/14/2021    CORRECTEDCA 9 7 09/14/2021    AST 24 09/14/2021    ALT 27 09/14/2021    ALKPHOS 78 09/14/2021    EGFR 53 09/14/2021       No results found for: IRON, TIBC, FERRITIN    No results found for: [id number]      ROS: Review of Systems   Constitutional: Negative  Negative for appetite change, chills, diaphoresis, fatigue, fever and unexpected weight change  HENT:   Negative for hearing loss, lump/mass, mouth sores, nosebleeds, sore throat, trouble swallowing and voice change  Eyes: Negative  Negative for eye problems and icterus  Respiratory: Negative  Negative for chest tightness, cough, hemoptysis and shortness of breath  Cardiovascular: Negative for chest pain and leg swelling  Gastrointestinal: Negative for abdominal distention, abdominal pain, blood in stool, constipation, diarrhea and nausea  Endocrine: Negative  Genitourinary: Negative for dysuria, frequency, hematuria and pelvic pain  Musculoskeletal: Negative  Negative for arthralgias, back pain, flank pain, gait problem, myalgias and neck stiffness  Skin: Negative for itching and rash  Neurological: Negative for dizziness, gait problem, headaches, light-headedness, numbness and speech difficulty  Hematological: Negative for adenopathy  Does not bruise/bleed easily  Psychiatric/Behavioral: Negative for confusion, decreased concentration, depression and sleep disturbance  The patient is not nervous/anxious  Current Medications: Reviewed  Allergies: Reviewed  PMH/FH/SH:  Reviewed      Physical Exam:    Body surface area is 2 29 meters squared  Wt Readings from Last 3 Encounters:   09/22/21 121 kg (266 lb 12 8 oz)   09/20/21 122 kg (270 lb)   08/06/21 121 kg (267 lb)        Temp Readings from Last 3 Encounters:   09/22/21 97 5 °F (36 4 °C) (Tympanic)   07/29/21 97 9 °F (36 6 °C) (Tympanic)   06/29/21 97 8 °F (36 6 °C) (Tympanic)        BP Readings from Last 3 Encounters:   09/22/21 142/60   09/20/21 (!) 184/78   08/06/21 144/64         Pulse Readings from Last 3 Encounters:   09/22/21 87   08/06/21 63   07/29/21 85        Physical Exam  Vitals reviewed  Constitutional:       General: He is not in acute distress  Appearance: He is well-developed  He is obese  He is not diaphoretic  HENT:      Head: Normocephalic and atraumatic  Eyes:      Conjunctiva/sclera: Conjunctivae normal    Neck:      Trachea: No tracheal deviation  Cardiovascular:      Rate and Rhythm: Normal rate and regular rhythm  Heart sounds: No murmur heard  No friction rub  No gallop  Pulmonary:      Effort: Pulmonary effort is normal  No respiratory distress  Breath sounds: Normal breath sounds  No wheezing or rales  Chest:      Chest wall: No tenderness  Abdominal:      General: There is no distension  Palpations: Abdomen is soft  Tenderness: There is no abdominal tenderness  Musculoskeletal:      Cervical back: Normal range of motion and neck supple  Right lower leg: No edema  Left lower leg: No edema  Lymphadenopathy:      Cervical: No cervical adenopathy  Skin:     General: Skin is warm and dry  Coloration: Skin is not pale  Findings: No erythema  Neurological:      Mental Status: He is alert and oriented to person, place, and time  Psychiatric:         Behavior: Behavior normal          Thought Content: Thought content normal          Judgment: Judgment normal          ECO  Goals and Barriers:  Current Goal: Minimize effects of disease  Barriers: None  Patient's Capacity to Self Care:  Patient is able to self care      Code Status: [unfilled]

## 2021-09-28 ENCOUNTER — TELEPHONE (OUTPATIENT)
Dept: HEMATOLOGY ONCOLOGY | Facility: CLINIC | Age: 61
End: 2021-09-28

## 2021-09-28 NOTE — TELEPHONE ENCOUNTER
Patient calling in to see if faxes were received this morning  He faxed forms this morning from [facility] to the fax# [phone]   Damon Pickard can be reached at [phone]

## 2021-09-28 NOTE — TELEPHONE ENCOUNTER
Spoke with patient advised we have not received his paper work  He stated he will come to the office and drop off

## 2021-10-01 ENCOUNTER — TELEPHONE (OUTPATIENT)
Dept: HEMATOLOGY ONCOLOGY | Facility: CLINIC | Age: 61
End: 2021-10-01

## 2021-10-15 ENCOUNTER — PATIENT OUTREACH (OUTPATIENT)
Dept: CASE MANAGEMENT | Facility: OTHER | Age: 61
End: 2021-10-15

## 2021-10-26 ENCOUNTER — OFFICE VISIT (OUTPATIENT)
Dept: FAMILY MEDICINE CLINIC | Facility: CLINIC | Age: 61
End: 2021-10-26
Payer: COMMERCIAL

## 2021-10-26 VITALS
RESPIRATION RATE: 20 BRPM | OXYGEN SATURATION: 98 % | BODY MASS INDEX: 43.38 KG/M2 | HEART RATE: 87 BPM | TEMPERATURE: 98.4 F | DIASTOLIC BLOOD PRESSURE: 72 MMHG | HEIGHT: 67 IN | WEIGHT: 276.4 LBS | SYSTOLIC BLOOD PRESSURE: 140 MMHG

## 2021-10-26 DIAGNOSIS — C61 PROSTATE CANCER (HCC): ICD-10-CM

## 2021-10-26 DIAGNOSIS — Z12.11 SCREENING FOR COLON CANCER: ICD-10-CM

## 2021-10-26 DIAGNOSIS — Z23 NEED FOR INFLUENZA VACCINATION: ICD-10-CM

## 2021-10-26 DIAGNOSIS — Z00.00 ANNUAL PHYSICAL EXAM: Primary | ICD-10-CM

## 2021-10-26 DIAGNOSIS — Z11.4 SCREENING FOR HIV (HUMAN IMMUNODEFICIENCY VIRUS): ICD-10-CM

## 2021-10-26 DIAGNOSIS — E78.5 HYPERLIPIDEMIA, UNSPECIFIED HYPERLIPIDEMIA TYPE: ICD-10-CM

## 2021-10-26 DIAGNOSIS — Z11.59 NEED FOR HEPATITIS C SCREENING TEST: ICD-10-CM

## 2021-10-26 PROBLEM — T45.1X5A CHEMOTHERAPY-INDUCED NEUTROPENIA (HCC): Status: RESOLVED | Noted: 2021-04-01 | Resolved: 2021-10-26

## 2021-10-26 PROBLEM — E11.9 TYPE 2 DIABETES MELLITUS (HCC): Status: RESOLVED | Noted: 2021-05-30 | Resolved: 2021-10-26

## 2021-10-26 PROBLEM — M35.3 POLYMYALGIA RHEUMATICA SYNDROME (HCC): Status: RESOLVED | Noted: 2019-11-19 | Resolved: 2021-10-26

## 2021-10-26 PROBLEM — D70.1 CHEMOTHERAPY-INDUCED NEUTROPENIA (HCC): Status: RESOLVED | Noted: 2021-04-01 | Resolved: 2021-10-26

## 2021-10-26 PROCEDURE — 3008F BODY MASS INDEX DOCD: CPT | Performed by: FAMILY MEDICINE

## 2021-10-26 PROCEDURE — 1036F TOBACCO NON-USER: CPT | Performed by: FAMILY MEDICINE

## 2021-10-26 PROCEDURE — 99396 PREV VISIT EST AGE 40-64: CPT | Performed by: FAMILY MEDICINE

## 2021-10-27 ENCOUNTER — TELEPHONE (OUTPATIENT)
Dept: ADMINISTRATIVE | Facility: OTHER | Age: 61
End: 2021-10-27

## 2021-11-15 ENCOUNTER — TELEPHONE (OUTPATIENT)
Dept: SURGICAL ONCOLOGY | Facility: CLINIC | Age: 61
End: 2021-11-15

## 2021-11-15 ENCOUNTER — APPOINTMENT (EMERGENCY)
Dept: RADIOLOGY | Facility: HOSPITAL | Age: 61
DRG: 177 | End: 2021-11-15
Payer: COMMERCIAL

## 2021-11-15 ENCOUNTER — HOSPITAL ENCOUNTER (INPATIENT)
Facility: HOSPITAL | Age: 61
LOS: 3 days | Discharge: HOME | DRG: 177 | End: 2021-11-20
Attending: EMERGENCY MEDICINE | Admitting: INTERNAL MEDICINE
Payer: COMMERCIAL

## 2021-11-15 DIAGNOSIS — R06.00 DYSPNEA: ICD-10-CM

## 2021-11-15 DIAGNOSIS — R94.31 ABNORMAL EKG: Primary | ICD-10-CM

## 2021-11-15 DIAGNOSIS — U07.1 COVID-19: ICD-10-CM

## 2021-11-15 PROBLEM — R10.30 LOWER ABDOMINAL PAIN: Status: ACTIVE | Noted: 2021-11-15

## 2021-11-15 LAB
2HR DELTA HS TROPONIN: 2 NG/L
ATRIAL RATE: 78 BPM
ATRIAL RATE: 85 BPM
BASOPHILS # BLD AUTO: 0.02 THOUSANDS/ΜL (ref 0–0.1)
BASOPHILS NFR BLD AUTO: 0 % (ref 0–1)
CARDIAC TROPONIN I PNL SERPL HS: 24 NG/L
CARDIAC TROPONIN I PNL SERPL HS: 26 NG/L
EOSINOPHIL # BLD AUTO: 0 THOUSAND/ΜL (ref 0–0.61)
EOSINOPHIL NFR BLD AUTO: 0 % (ref 0–6)
ERYTHROCYTE [DISTWIDTH] IN BLOOD BY AUTOMATED COUNT: 13 % (ref 11.6–15.1)
FLUAV RNA RESP QL NAA+PROBE: NEGATIVE
FLUBV RNA RESP QL NAA+PROBE: NEGATIVE
HCT VFR BLD AUTO: 37.1 % (ref 36.5–49.3)
HGB BLD-MCNC: 12.1 G/DL (ref 12–17)
IMM GRANULOCYTES # BLD AUTO: 0.01 THOUSAND/UL (ref 0–0.2)
IMM GRANULOCYTES NFR BLD AUTO: 0 % (ref 0–2)
LACTATE SERPL-SCNC: 1.4 MMOL/L (ref 0.5–2)
LYMPHOCYTES # BLD AUTO: 1.53 THOUSANDS/ΜL (ref 0.6–4.47)
LYMPHOCYTES NFR BLD AUTO: 28 % (ref 14–44)
MCH RBC QN AUTO: 30.6 PG (ref 26.8–34.3)
MCHC RBC AUTO-ENTMCNC: 32.6 G/DL (ref 31.4–37.4)
MCV RBC AUTO: 94 FL (ref 82–98)
MONOCYTES # BLD AUTO: 0.51 THOUSAND/ΜL (ref 0.17–1.22)
MONOCYTES NFR BLD AUTO: 9 % (ref 4–12)
NEUTROPHILS # BLD AUTO: 3.4 THOUSANDS/ΜL (ref 1.85–7.62)
NEUTS SEG NFR BLD AUTO: 63 % (ref 43–75)
NRBC BLD AUTO-RTO: 0 /100 WBCS
NT-PROBNP SERPL-MCNC: 229 PG/ML
P AXIS: 56 DEGREES
P AXIS: 61 DEGREES
PLATELET # BLD AUTO: 169 THOUSANDS/UL (ref 149–390)
PMV BLD AUTO: 9.3 FL (ref 8.9–12.7)
PR INTERVAL: 150 MS
PR INTERVAL: 160 MS
QRS AXIS: 36 DEGREES
QRS AXIS: 40 DEGREES
QRSD INTERVAL: 80 MS
QRSD INTERVAL: 82 MS
QT INTERVAL: 372 MS
QT INTERVAL: 376 MS
QTC INTERVAL: 428 MS
QTC INTERVAL: 442 MS
RBC # BLD AUTO: 3.96 MILLION/UL (ref 3.88–5.62)
RSV RNA RESP QL NAA+PROBE: NEGATIVE
SARS-COV-2 RNA RESP QL NAA+PROBE: POSITIVE
T WAVE AXIS: -68 DEGREES
T WAVE AXIS: -76 DEGREES
VENTRICULAR RATE: 78 BPM
VENTRICULAR RATE: 85 BPM
WBC # BLD AUTO: 5.47 THOUSAND/UL (ref 4.31–10.16)

## 2021-11-15 PROCEDURE — 85025 COMPLETE CBC W/AUTO DIFF WBC: CPT | Performed by: EMERGENCY MEDICINE

## 2021-11-15 PROCEDURE — 93005 ELECTROCARDIOGRAM TRACING: CPT

## 2021-11-15 PROCEDURE — 84484 ASSAY OF TROPONIN QUANT: CPT | Performed by: EMERGENCY MEDICINE

## 2021-11-15 PROCEDURE — 0241U HB NFCT DS VIR RESP RNA 4 TRGT: CPT | Performed by: EMERGENCY MEDICINE

## 2021-11-15 PROCEDURE — 99220 PR INITIAL OBSERVATION CARE/DAY 70 MINUTES: CPT | Performed by: INTERNAL MEDICINE

## 2021-11-15 PROCEDURE — 93010 ELECTROCARDIOGRAM REPORT: CPT | Performed by: INTERNAL MEDICINE

## 2021-11-15 PROCEDURE — 83605 ASSAY OF LACTIC ACID: CPT | Performed by: EMERGENCY MEDICINE

## 2021-11-15 PROCEDURE — 99285 EMERGENCY DEPT VISIT HI MDM: CPT | Performed by: EMERGENCY MEDICINE

## 2021-11-15 PROCEDURE — 36415 COLL VENOUS BLD VENIPUNCTURE: CPT | Performed by: EMERGENCY MEDICINE

## 2021-11-15 PROCEDURE — 83880 ASSAY OF NATRIURETIC PEPTIDE: CPT | Performed by: EMERGENCY MEDICINE

## 2021-11-15 PROCEDURE — 71045 X-RAY EXAM CHEST 1 VIEW: CPT

## 2021-11-15 PROCEDURE — 96360 HYDRATION IV INFUSION INIT: CPT

## 2021-11-15 PROCEDURE — 99285 EMERGENCY DEPT VISIT HI MDM: CPT

## 2021-11-15 RX ORDER — ALLOPURINOL 100 MG/1
100 TABLET ORAL DAILY
Status: DISCONTINUED | OUTPATIENT
Start: 2021-11-16 | End: 2021-11-20 | Stop reason: HOSPADM

## 2021-11-15 RX ORDER — ASPIRIN 81 MG/1
81 TABLET ORAL ONCE
Status: COMPLETED | OUTPATIENT
Start: 2021-11-15 | End: 2021-11-16

## 2021-11-15 RX ORDER — ACETAMINOPHEN 325 MG/1
650 TABLET ORAL ONCE
Status: COMPLETED | OUTPATIENT
Start: 2021-11-15 | End: 2021-11-15

## 2021-11-15 RX ORDER — PANTOPRAZOLE SODIUM 40 MG/1
40 TABLET, DELAYED RELEASE ORAL
Status: DISCONTINUED | OUTPATIENT
Start: 2021-11-16 | End: 2021-11-20 | Stop reason: HOSPADM

## 2021-11-15 RX ORDER — ATORVASTATIN CALCIUM 40 MG/1
40 TABLET, FILM COATED ORAL
Status: DISCONTINUED | OUTPATIENT
Start: 2021-11-16 | End: 2021-11-20 | Stop reason: HOSPADM

## 2021-11-15 RX ORDER — DEXAMETHASONE SODIUM PHOSPHATE 4 MG/ML
6 INJECTION, SOLUTION INTRA-ARTICULAR; INTRALESIONAL; INTRAMUSCULAR; INTRAVENOUS; SOFT TISSUE ONCE
Status: COMPLETED | OUTPATIENT
Start: 2021-11-15 | End: 2021-11-15

## 2021-11-15 RX ORDER — NIFEDIPINE 30 MG/1
30 TABLET, EXTENDED RELEASE ORAL DAILY
Status: DISCONTINUED | OUTPATIENT
Start: 2021-11-16 | End: 2021-11-20 | Stop reason: HOSPADM

## 2021-11-15 RX ORDER — SODIUM CHLORIDE 9 MG/ML
250 INJECTION, SOLUTION INTRAVENOUS CONTINUOUS
Status: DISCONTINUED | OUTPATIENT
Start: 2021-11-15 | End: 2021-11-16

## 2021-11-15 RX ADMIN — DEXAMETHASONE SODIUM PHOSPHATE 6 MG: 4 INJECTION, SOLUTION INTRAMUSCULAR; INTRAVENOUS at 22:08

## 2021-11-15 RX ADMIN — ACETAMINOPHEN 650 MG: 325 TABLET, FILM COATED ORAL at 18:38

## 2021-11-15 RX ADMIN — SODIUM CHLORIDE 250 ML/HR: 0.9 INJECTION, SOLUTION INTRAVENOUS at 20:45

## 2021-11-16 ENCOUNTER — APPOINTMENT (OUTPATIENT)
Dept: CT IMAGING | Facility: HOSPITAL | Age: 61
DRG: 177 | End: 2021-11-16
Payer: COMMERCIAL

## 2021-11-16 LAB
4HR DELTA HS TROPONIN: 4 NG/L
ANION GAP SERPL CALCULATED.3IONS-SCNC: 13 MMOL/L (ref 4–13)
BACTERIA UR QL AUTO: ABNORMAL /HPF
BILIRUB UR QL STRIP: NEGATIVE
BUN SERPL-MCNC: 30 MG/DL (ref 5–25)
CALCIUM SERPL-MCNC: 8.1 MG/DL (ref 8.3–10.1)
CARDIAC TROPONIN I PNL SERPL HS: 28 NG/L
CHLORIDE SERPL-SCNC: 101 MMOL/L (ref 100–108)
CLARITY UR: CLEAR
CO2 SERPL-SCNC: 24 MMOL/L (ref 21–32)
COLOR UR: YELLOW
CREAT SERPL-MCNC: 2.01 MG/DL (ref 0.6–1.3)
GFR SERPL CREATININE-BSD FRML MDRD: 35 ML/MIN/1.73SQ M
GLUCOSE SERPL-MCNC: 170 MG/DL (ref 65–140)
GLUCOSE UR STRIP-MCNC: NEGATIVE MG/DL
HGB UR QL STRIP.AUTO: NEGATIVE
KETONES UR STRIP-MCNC: NEGATIVE MG/DL
LEUKOCYTE ESTERASE UR QL STRIP: NEGATIVE
MAGNESIUM SERPL-MCNC: 1.7 MG/DL (ref 1.6–2.6)
MUCOUS THREADS UR QL AUTO: ABNORMAL
NITRITE UR QL STRIP: NEGATIVE
NON-SQ EPI CELLS URNS QL MICRO: ABNORMAL /HPF
PH UR STRIP.AUTO: 5 [PH] (ref 4.5–8)
POTASSIUM SERPL-SCNC: 3.8 MMOL/L (ref 3.5–5.3)
PROT UR STRIP-MCNC: ABNORMAL MG/DL
RBC #/AREA URNS AUTO: ABNORMAL /HPF
SODIUM SERPL-SCNC: 138 MMOL/L (ref 136–145)
SP GR UR STRIP.AUTO: 1.02 (ref 1–1.03)
TSH SERPL DL<=0.05 MIU/L-ACNC: 1.02 UIU/ML (ref 0.36–3.74)
UROBILINOGEN UR QL STRIP.AUTO: 0.2 E.U./DL
WBC #/AREA URNS AUTO: ABNORMAL /HPF

## 2021-11-16 PROCEDURE — 84443 ASSAY THYROID STIM HORMONE: CPT | Performed by: INTERNAL MEDICINE

## 2021-11-16 PROCEDURE — 83735 ASSAY OF MAGNESIUM: CPT | Performed by: INTERNAL MEDICINE

## 2021-11-16 PROCEDURE — 81001 URINALYSIS AUTO W/SCOPE: CPT

## 2021-11-16 PROCEDURE — 74176 CT ABD & PELVIS W/O CONTRAST: CPT

## 2021-11-16 PROCEDURE — G1004 CDSM NDSC: HCPCS

## 2021-11-16 PROCEDURE — 99205 OFFICE O/P NEW HI 60 MIN: CPT | Performed by: INTERNAL MEDICINE

## 2021-11-16 PROCEDURE — 36415 COLL VENOUS BLD VENIPUNCTURE: CPT | Performed by: EMERGENCY MEDICINE

## 2021-11-16 PROCEDURE — 99225 PR SBSQ OBSERVATION CARE/DAY 25 MINUTES: CPT | Performed by: INTERNAL MEDICINE

## 2021-11-16 PROCEDURE — 80048 BASIC METABOLIC PNL TOTAL CA: CPT | Performed by: INTERNAL MEDICINE

## 2021-11-16 PROCEDURE — 84484 ASSAY OF TROPONIN QUANT: CPT | Performed by: EMERGENCY MEDICINE

## 2021-11-16 RX ORDER — ONDANSETRON 2 MG/ML
4 INJECTION INTRAMUSCULAR; INTRAVENOUS EVERY 4 HOURS PRN
Status: DISCONTINUED | OUTPATIENT
Start: 2021-11-16 | End: 2021-11-20 | Stop reason: HOSPADM

## 2021-11-16 RX ORDER — ACETAMINOPHEN 325 MG/1
650 TABLET ORAL EVERY 6 HOURS PRN
Status: DISCONTINUED | OUTPATIENT
Start: 2021-11-16 | End: 2021-11-20 | Stop reason: HOSPADM

## 2021-11-16 RX ORDER — LANOLIN ALCOHOL/MO/W.PET/CERES
3 CREAM (GRAM) TOPICAL
Status: DISCONTINUED | OUTPATIENT
Start: 2021-11-16 | End: 2021-11-20 | Stop reason: HOSPADM

## 2021-11-16 RX ORDER — MAGNESIUM HYDROXIDE/ALUMINUM HYDROXICE/SIMETHICONE 120; 1200; 1200 MG/30ML; MG/30ML; MG/30ML
30 SUSPENSION ORAL EVERY 4 HOURS PRN
Status: DISCONTINUED | OUTPATIENT
Start: 2021-11-16 | End: 2021-11-20 | Stop reason: HOSPADM

## 2021-11-16 RX ADMIN — NIFEDIPINE 30 MG: 30 TABLET, FILM COATED, EXTENDED RELEASE ORAL at 08:59

## 2021-11-16 RX ADMIN — ENOXAPARIN SODIUM 40 MG: 40 INJECTION SUBCUTANEOUS at 08:59

## 2021-11-16 RX ADMIN — PANTOPRAZOLE SODIUM 40 MG: 40 TABLET, DELAYED RELEASE ORAL at 08:59

## 2021-11-16 RX ADMIN — SODIUM CHLORIDE 250 ML/HR: 0.9 INJECTION, SOLUTION INTRAVENOUS at 03:17

## 2021-11-16 RX ADMIN — ACETAMINOPHEN 650 MG: 325 TABLET, FILM COATED ORAL at 21:54

## 2021-11-16 RX ADMIN — ALLOPURINOL 100 MG: 100 TABLET ORAL at 08:59

## 2021-11-16 RX ADMIN — ASPIRIN 81 MG: 81 TABLET, COATED ORAL at 00:12

## 2021-11-16 RX ADMIN — ATORVASTATIN CALCIUM 40 MG: 40 TABLET, FILM COATED ORAL at 17:50

## 2021-11-17 ENCOUNTER — APPOINTMENT (OUTPATIENT)
Dept: NON INVASIVE DIAGNOSTICS | Facility: HOSPITAL | Age: 61
DRG: 177 | End: 2021-11-17
Payer: COMMERCIAL

## 2021-11-17 PROBLEM — D84.9 COVID-19 IN IMMUNOCOMPROMISED PATIENT (HCC): Status: ACTIVE | Noted: 2021-11-15

## 2021-11-17 LAB
ALBUMIN SERPL BCP-MCNC: 3.2 G/DL (ref 3.5–5)
ALP SERPL-CCNC: 58 U/L (ref 46–116)
ALT SERPL W P-5'-P-CCNC: 39 U/L (ref 12–78)
ANION GAP SERPL CALCULATED.3IONS-SCNC: 15 MMOL/L (ref 4–13)
AORTIC VALVE MEAN VELOCITY: 15 M/S
AST SERPL W P-5'-P-CCNC: 38 U/L (ref 5–45)
AV AREA BY CONTINUOUS VTI: 2.6 CM2
AV AREA PEAK VELOCITY: 2.5 CM2
AV LVOT MEAN GRADIENT: 5 MMHG
AV LVOT PEAK GRADIENT: 10 MMHG
AV MEAN GRADIENT: 11 MMHG
AV PEAK GRADIENT: 19 MMHG
AV VALVE AREA: 2.61 CM2
BILIRUB SERPL-MCNC: 0.71 MG/DL (ref 0.2–1)
BUN SERPL-MCNC: 35 MG/DL (ref 5–25)
CALCIUM ALBUM COR SERPL-MCNC: 8.6 MG/DL (ref 8.3–10.1)
CALCIUM SERPL-MCNC: 8 MG/DL (ref 8.3–10.1)
CHLORIDE SERPL-SCNC: 101 MMOL/L (ref 100–108)
CHOLEST SERPL-MCNC: 167 MG/DL (ref 50–200)
CO2 SERPL-SCNC: 21 MMOL/L (ref 21–32)
CREAT SERPL-MCNC: 1.95 MG/DL (ref 0.6–1.3)
CRP SERPL QL: 49.8 MG/L
DOP CALC AO VTI: 42.4 CM
DOP CALC LVOT AREA: 3.46 CM2
DOP CALC LVOT DIAMETER: 2.1 CM
DOP CALC LVOT PEAK VEL VTI: 31.92 CM
DOP CALC LVOT PEAK VEL: 1.59 M/S
DOP CALC LVOT STROKE INDEX: 49.1 ML/M2
DOP CALC LVOT STROKE VOLUME: 110.5 CM3
E WAVE DECELERATION TIME: 300 MS
ERYTHROCYTE [DISTWIDTH] IN BLOOD BY AUTOMATED COUNT: 13.2 % (ref 11.6–15.1)
GFR SERPL CREATININE-BSD FRML MDRD: 36 ML/MIN/1.73SQ M
GLUCOSE P FAST SERPL-MCNC: 129 MG/DL (ref 65–99)
GLUCOSE SERPL-MCNC: 129 MG/DL (ref 65–140)
HCT VFR BLD AUTO: 36.7 % (ref 36.5–49.3)
HDLC SERPL-MCNC: 29 MG/DL
HGB BLD-MCNC: 12 G/DL (ref 12–17)
LAAS-AP2: 27.4 CM2
LAAS-AP4: 21.5 CM2
LDLC SERPL CALC-MCNC: 110 MG/DL (ref 0–100)
MCH RBC QN AUTO: 30.6 PG (ref 26.8–34.3)
MCHC RBC AUTO-ENTMCNC: 32.7 G/DL (ref 31.4–37.4)
MCV RBC AUTO: 94 FL (ref 82–98)
MV PEAK A VEL: 0.8 M/S
MV PEAK E VEL: 66 CM/S
NONHDLC SERPL-MCNC: 138 MG/DL
PLATELET # BLD AUTO: 160 THOUSANDS/UL (ref 149–390)
PMV BLD AUTO: 9.9 FL (ref 8.9–12.7)
POTASSIUM SERPL-SCNC: 3.8 MMOL/L (ref 3.5–5.3)
PROT SERPL-MCNC: 7.6 G/DL (ref 6.4–8.2)
PV PEAK GRADIENT: 8 MMHG
RBC # BLD AUTO: 3.92 MILLION/UL (ref 3.88–5.62)
RIGHT ATRIAL 2D VOLUME: 73 ML
RIGHT ATRIUM AREA SYSTOLE A4C: 20.4 CM2
RIGHT VENTRICLE ID DIMENSION: 3.1 CM
SL CV LV EF: 65
SODIUM SERPL-SCNC: 137 MMOL/L (ref 136–145)
TRICUSPID VALVE PEAK REGURGITATION VELOCITY: 2.01 M/S
TRICUSPID VALVE S': 56 CM/S
TRIGL SERPL-MCNC: 138 MG/DL
TV PEAK GRADIENT: 16 MMHG
WBC # BLD AUTO: 7.1 THOUSAND/UL (ref 4.31–10.16)

## 2021-11-17 PROCEDURE — 93308 TTE F-UP OR LMTD: CPT

## 2021-11-17 PROCEDURE — 86140 C-REACTIVE PROTEIN: CPT | Performed by: INTERNAL MEDICINE

## 2021-11-17 PROCEDURE — XW033E5 INTRODUCTION OF REMDESIVIR ANTI-INFECTIVE INTO PERIPHERAL VEIN, PERCUTANEOUS APPROACH, NEW TECHNOLOGY GROUP 5: ICD-10-PCS | Performed by: INTERNAL MEDICINE

## 2021-11-17 PROCEDURE — 93321 DOPPLER ECHO F-UP/LMTD STD: CPT

## 2021-11-17 PROCEDURE — 99232 SBSQ HOSP IP/OBS MODERATE 35: CPT | Performed by: INTERNAL MEDICINE

## 2021-11-17 PROCEDURE — 80061 LIPID PANEL: CPT | Performed by: INTERNAL MEDICINE

## 2021-11-17 PROCEDURE — 85027 COMPLETE CBC AUTOMATED: CPT | Performed by: INTERNAL MEDICINE

## 2021-11-17 PROCEDURE — 93325 DOPPLER ECHO COLOR FLOW MAPG: CPT

## 2021-11-17 PROCEDURE — 80053 COMPREHEN METABOLIC PANEL: CPT | Performed by: INTERNAL MEDICINE

## 2021-11-17 RX ADMIN — ALLOPURINOL 100 MG: 100 TABLET ORAL at 08:49

## 2021-11-17 RX ADMIN — ACETAMINOPHEN 650 MG: 325 TABLET, FILM COATED ORAL at 05:00

## 2021-11-17 RX ADMIN — ACETAMINOPHEN 650 MG: 325 TABLET, FILM COATED ORAL at 12:24

## 2021-11-17 RX ADMIN — ENOXAPARIN SODIUM 40 MG: 40 INJECTION SUBCUTANEOUS at 08:49

## 2021-11-17 RX ADMIN — REMDESIVIR 200 MG: 100 INJECTION, POWDER, LYOPHILIZED, FOR SOLUTION INTRAVENOUS at 16:00

## 2021-11-17 RX ADMIN — PANTOPRAZOLE SODIUM 40 MG: 40 TABLET, DELAYED RELEASE ORAL at 08:49

## 2021-11-17 RX ADMIN — ACETAMINOPHEN 650 MG: 325 TABLET, FILM COATED ORAL at 21:28

## 2021-11-17 RX ADMIN — ATORVASTATIN CALCIUM 40 MG: 40 TABLET, FILM COATED ORAL at 16:00

## 2021-11-18 LAB
ALBUMIN SERPL BCP-MCNC: 2.9 G/DL (ref 3.5–5)
ALP SERPL-CCNC: 48 U/L (ref 46–116)
ALT SERPL W P-5'-P-CCNC: 34 U/L (ref 12–78)
ANION GAP SERPL CALCULATED.3IONS-SCNC: 11 MMOL/L (ref 4–13)
AST SERPL W P-5'-P-CCNC: 33 U/L (ref 5–45)
BILIRUB SERPL-MCNC: 0.75 MG/DL (ref 0.2–1)
BUN SERPL-MCNC: 27 MG/DL (ref 5–25)
CALCIUM ALBUM COR SERPL-MCNC: 8.9 MG/DL (ref 8.3–10.1)
CALCIUM SERPL-MCNC: 8 MG/DL (ref 8.3–10.1)
CHLORIDE SERPL-SCNC: 102 MMOL/L (ref 100–108)
CO2 SERPL-SCNC: 24 MMOL/L (ref 21–32)
CREAT SERPL-MCNC: 1.86 MG/DL (ref 0.6–1.3)
CRP SERPL QL: 95.8 MG/L
ERYTHROCYTE [DISTWIDTH] IN BLOOD BY AUTOMATED COUNT: 13.2 % (ref 11.6–15.1)
GFR SERPL CREATININE-BSD FRML MDRD: 38 ML/MIN/1.73SQ M
GLUCOSE SERPL-MCNC: 109 MG/DL (ref 65–140)
HCT VFR BLD AUTO: 34.2 % (ref 36.5–49.3)
HGB BLD-MCNC: 11.3 G/DL (ref 12–17)
MCH RBC QN AUTO: 31.2 PG (ref 26.8–34.3)
MCHC RBC AUTO-ENTMCNC: 33 G/DL (ref 31.4–37.4)
MCV RBC AUTO: 95 FL (ref 82–98)
PLATELET # BLD AUTO: 134 THOUSANDS/UL (ref 149–390)
PMV BLD AUTO: 10.2 FL (ref 8.9–12.7)
POTASSIUM SERPL-SCNC: 3.4 MMOL/L (ref 3.5–5.3)
PROT SERPL-MCNC: 7.1 G/DL (ref 6.4–8.2)
RBC # BLD AUTO: 3.62 MILLION/UL (ref 3.88–5.62)
SODIUM SERPL-SCNC: 137 MMOL/L (ref 136–145)
WBC # BLD AUTO: 5.21 THOUSAND/UL (ref 4.31–10.16)

## 2021-11-18 PROCEDURE — 80053 COMPREHEN METABOLIC PANEL: CPT | Performed by: INTERNAL MEDICINE

## 2021-11-18 PROCEDURE — 99232 SBSQ HOSP IP/OBS MODERATE 35: CPT | Performed by: INTERNAL MEDICINE

## 2021-11-18 PROCEDURE — 85027 COMPLETE CBC AUTOMATED: CPT | Performed by: INTERNAL MEDICINE

## 2021-11-18 PROCEDURE — 86140 C-REACTIVE PROTEIN: CPT | Performed by: INTERNAL MEDICINE

## 2021-11-18 RX ORDER — DEXAMETHASONE SODIUM PHOSPHATE 4 MG/ML
6 INJECTION, SOLUTION INTRA-ARTICULAR; INTRALESIONAL; INTRAMUSCULAR; INTRAVENOUS; SOFT TISSUE EVERY 24 HOURS
Status: DISCONTINUED | OUTPATIENT
Start: 2021-11-18 | End: 2021-11-20 | Stop reason: HOSPADM

## 2021-11-18 RX ADMIN — ACETAMINOPHEN 650 MG: 325 TABLET, FILM COATED ORAL at 11:17

## 2021-11-18 RX ADMIN — ALLOPURINOL 100 MG: 100 TABLET ORAL at 08:16

## 2021-11-18 RX ADMIN — DEXAMETHASONE SODIUM PHOSPHATE 6 MG: 4 INJECTION, SOLUTION INTRAMUSCULAR; INTRAVENOUS at 16:55

## 2021-11-18 RX ADMIN — NIFEDIPINE 30 MG: 30 TABLET, FILM COATED, EXTENDED RELEASE ORAL at 08:14

## 2021-11-18 RX ADMIN — ENOXAPARIN SODIUM 40 MG: 40 INJECTION SUBCUTANEOUS at 08:16

## 2021-11-18 RX ADMIN — REMDESIVIR 100 MG: 100 INJECTION, POWDER, LYOPHILIZED, FOR SOLUTION INTRAVENOUS at 12:54

## 2021-11-18 RX ADMIN — ACETAMINOPHEN 650 MG: 325 TABLET, FILM COATED ORAL at 05:13

## 2021-11-18 RX ADMIN — PANTOPRAZOLE SODIUM 40 MG: 40 TABLET, DELAYED RELEASE ORAL at 08:16

## 2021-11-18 RX ADMIN — ATORVASTATIN CALCIUM 40 MG: 40 TABLET, FILM COATED ORAL at 16:25

## 2021-11-19 LAB
ALBUMIN SERPL BCP-MCNC: 3.1 G/DL (ref 3.5–5)
ALP SERPL-CCNC: 55 U/L (ref 46–116)
ALT SERPL W P-5'-P-CCNC: 29 U/L (ref 12–78)
ANION GAP SERPL CALCULATED.3IONS-SCNC: 12 MMOL/L (ref 4–13)
AST SERPL W P-5'-P-CCNC: 31 U/L (ref 5–45)
BILIRUB SERPL-MCNC: 0.67 MG/DL (ref 0.2–1)
BUN SERPL-MCNC: 29 MG/DL (ref 5–25)
CALCIUM ALBUM COR SERPL-MCNC: 9.5 MG/DL (ref 8.3–10.1)
CALCIUM SERPL-MCNC: 8.8 MG/DL (ref 8.3–10.1)
CHLORIDE SERPL-SCNC: 101 MMOL/L (ref 100–108)
CO2 SERPL-SCNC: 24 MMOL/L (ref 21–32)
CREAT SERPL-MCNC: 1.67 MG/DL (ref 0.6–1.3)
CRP SERPL QL: 120.8 MG/L
D DIMER PPP FEU-MCNC: 0.82 UG/ML FEU
ERYTHROCYTE [DISTWIDTH] IN BLOOD BY AUTOMATED COUNT: 13 % (ref 11.6–15.1)
GFR SERPL CREATININE-BSD FRML MDRD: 43 ML/MIN/1.73SQ M
GLUCOSE SERPL-MCNC: 159 MG/DL (ref 65–140)
HCT VFR BLD AUTO: 35.6 % (ref 36.5–49.3)
HGB BLD-MCNC: 11.8 G/DL (ref 12–17)
MCH RBC QN AUTO: 30.9 PG (ref 26.8–34.3)
MCHC RBC AUTO-ENTMCNC: 33.1 G/DL (ref 31.4–37.4)
MCV RBC AUTO: 93 FL (ref 82–98)
PLATELET # BLD AUTO: 159 THOUSANDS/UL (ref 149–390)
PMV BLD AUTO: 10.2 FL (ref 8.9–12.7)
POTASSIUM SERPL-SCNC: 3.7 MMOL/L (ref 3.5–5.3)
PROT SERPL-MCNC: 7.8 G/DL (ref 6.4–8.2)
RBC # BLD AUTO: 3.82 MILLION/UL (ref 3.88–5.62)
SODIUM SERPL-SCNC: 137 MMOL/L (ref 136–145)
WBC # BLD AUTO: 3.87 THOUSAND/UL (ref 4.31–10.16)

## 2021-11-19 PROCEDURE — 85379 FIBRIN DEGRADATION QUANT: CPT | Performed by: INTERNAL MEDICINE

## 2021-11-19 PROCEDURE — 85027 COMPLETE CBC AUTOMATED: CPT | Performed by: INTERNAL MEDICINE

## 2021-11-19 PROCEDURE — 99232 SBSQ HOSP IP/OBS MODERATE 35: CPT | Performed by: INTERNAL MEDICINE

## 2021-11-19 PROCEDURE — 80053 COMPREHEN METABOLIC PANEL: CPT | Performed by: INTERNAL MEDICINE

## 2021-11-19 PROCEDURE — 86140 C-REACTIVE PROTEIN: CPT | Performed by: INTERNAL MEDICINE

## 2021-11-19 RX ORDER — BENZONATATE 100 MG/1
200 CAPSULE ORAL 3 TIMES DAILY
Status: DISCONTINUED | OUTPATIENT
Start: 2021-11-19 | End: 2021-11-20 | Stop reason: HOSPADM

## 2021-11-19 RX ORDER — GUAIFENESIN/DEXTROMETHORPHAN 100-10MG/5
10 SYRUP ORAL EVERY 4 HOURS PRN
Status: DISCONTINUED | OUTPATIENT
Start: 2021-11-19 | End: 2021-11-20 | Stop reason: HOSPADM

## 2021-11-19 RX ADMIN — ATORVASTATIN CALCIUM 40 MG: 40 TABLET, FILM COATED ORAL at 17:32

## 2021-11-19 RX ADMIN — ENOXAPARIN SODIUM 40 MG: 40 INJECTION SUBCUTANEOUS at 08:19

## 2021-11-19 RX ADMIN — REMDESIVIR 100 MG: 100 INJECTION, POWDER, LYOPHILIZED, FOR SOLUTION INTRAVENOUS at 12:34

## 2021-11-19 RX ADMIN — ALLOPURINOL 100 MG: 100 TABLET ORAL at 08:19

## 2021-11-19 RX ADMIN — DEXAMETHASONE SODIUM PHOSPHATE 6 MG: 4 INJECTION, SOLUTION INTRAMUSCULAR; INTRAVENOUS at 17:32

## 2021-11-19 RX ADMIN — BENZONATATE 200 MG: 100 CAPSULE ORAL at 19:18

## 2021-11-19 RX ADMIN — PANTOPRAZOLE SODIUM 40 MG: 40 TABLET, DELAYED RELEASE ORAL at 08:19

## 2021-11-19 RX ADMIN — NIFEDIPINE 30 MG: 30 TABLET, FILM COATED, EXTENDED RELEASE ORAL at 08:19

## 2021-11-20 VITALS
TEMPERATURE: 97.5 F | BODY MASS INDEX: 43.94 KG/M2 | OXYGEN SATURATION: 93 % | RESPIRATION RATE: 18 BRPM | WEIGHT: 279.98 LBS | HEART RATE: 65 BPM | SYSTOLIC BLOOD PRESSURE: 139 MMHG | DIASTOLIC BLOOD PRESSURE: 66 MMHG | HEIGHT: 67 IN

## 2021-11-20 LAB
ALBUMIN SERPL BCP-MCNC: 2.8 G/DL (ref 3.5–5)
ALP SERPL-CCNC: 55 U/L (ref 46–116)
ALT SERPL W P-5'-P-CCNC: 27 U/L (ref 12–78)
ANION GAP SERPL CALCULATED.3IONS-SCNC: 13 MMOL/L (ref 4–13)
AST SERPL W P-5'-P-CCNC: 30 U/L (ref 5–45)
BILIRUB SERPL-MCNC: 0.54 MG/DL (ref 0.2–1)
BUN SERPL-MCNC: 35 MG/DL (ref 5–25)
CALCIUM ALBUM COR SERPL-MCNC: 9.7 MG/DL (ref 8.3–10.1)
CALCIUM SERPL-MCNC: 8.7 MG/DL (ref 8.3–10.1)
CHLORIDE SERPL-SCNC: 105 MMOL/L (ref 100–108)
CO2 SERPL-SCNC: 21 MMOL/L (ref 21–32)
CREAT SERPL-MCNC: 1.5 MG/DL (ref 0.6–1.3)
CRP SERPL QL: 61 MG/L
ERYTHROCYTE [DISTWIDTH] IN BLOOD BY AUTOMATED COUNT: 12.9 % (ref 11.6–15.1)
GFR SERPL CREATININE-BSD FRML MDRD: 49 ML/MIN/1.73SQ M
GLUCOSE SERPL-MCNC: 184 MG/DL (ref 65–140)
HCT VFR BLD AUTO: 34 % (ref 36.5–49.3)
HGB BLD-MCNC: 11.2 G/DL (ref 12–17)
MCH RBC QN AUTO: 30.3 PG (ref 26.8–34.3)
MCHC RBC AUTO-ENTMCNC: 32.9 G/DL (ref 31.4–37.4)
MCV RBC AUTO: 92 FL (ref 82–98)
PLATELET # BLD AUTO: 193 THOUSANDS/UL (ref 149–390)
PMV BLD AUTO: 10.4 FL (ref 8.9–12.7)
POTASSIUM SERPL-SCNC: 3.7 MMOL/L (ref 3.5–5.3)
PROT SERPL-MCNC: 6.8 G/DL (ref 6.4–8.2)
RBC # BLD AUTO: 3.7 MILLION/UL (ref 3.88–5.62)
SODIUM SERPL-SCNC: 139 MMOL/L (ref 136–145)
WBC # BLD AUTO: 5.47 THOUSAND/UL (ref 4.31–10.16)

## 2021-11-20 PROCEDURE — 80053 COMPREHEN METABOLIC PANEL: CPT | Performed by: INTERNAL MEDICINE

## 2021-11-20 PROCEDURE — 99239 HOSP IP/OBS DSCHRG MGMT >30: CPT | Performed by: INTERNAL MEDICINE

## 2021-11-20 PROCEDURE — 86140 C-REACTIVE PROTEIN: CPT | Performed by: INTERNAL MEDICINE

## 2021-11-20 PROCEDURE — 85027 COMPLETE CBC AUTOMATED: CPT | Performed by: INTERNAL MEDICINE

## 2021-11-20 RX ORDER — ALBUTEROL SULFATE 90 UG/1
2 AEROSOL, METERED RESPIRATORY (INHALATION) EVERY 6 HOURS PRN
Qty: 8.5 G | Refills: 0 | Status: SHIPPED | OUTPATIENT
Start: 2021-11-20 | End: 2021-12-28

## 2021-11-20 RX ORDER — PREDNISONE 10 MG/1
TABLET ORAL
Qty: 30 TABLET | Refills: 0 | Status: SHIPPED | OUTPATIENT
Start: 2021-11-20 | End: 2021-12-28

## 2021-11-20 RX ADMIN — NIFEDIPINE 30 MG: 30 TABLET, FILM COATED, EXTENDED RELEASE ORAL at 08:48

## 2021-11-20 RX ADMIN — ENOXAPARIN SODIUM 40 MG: 40 INJECTION SUBCUTANEOUS at 08:47

## 2021-11-20 RX ADMIN — ALLOPURINOL 100 MG: 100 TABLET ORAL at 08:48

## 2021-11-20 RX ADMIN — REMDESIVIR 100 MG: 100 INJECTION, POWDER, LYOPHILIZED, FOR SOLUTION INTRAVENOUS at 13:10

## 2021-11-20 RX ADMIN — BENZONATATE 200 MG: 100 CAPSULE ORAL at 08:48

## 2021-11-20 RX ADMIN — DEXAMETHASONE SODIUM PHOSPHATE 6 MG: 4 INJECTION, SOLUTION INTRAMUSCULAR; INTRAVENOUS at 14:01

## 2021-11-20 RX ADMIN — PANTOPRAZOLE SODIUM 40 MG: 40 TABLET, DELAYED RELEASE ORAL at 08:48

## 2021-11-22 ENCOUNTER — TRANSITIONAL CARE MANAGEMENT (OUTPATIENT)
Dept: FAMILY MEDICINE CLINIC | Facility: CLINIC | Age: 61
End: 2021-11-22

## 2021-11-24 ENCOUNTER — TELEMEDICINE (OUTPATIENT)
Dept: FAMILY MEDICINE CLINIC | Facility: CLINIC | Age: 61
End: 2021-11-24
Payer: COMMERCIAL

## 2021-11-24 DIAGNOSIS — U07.1 COVID-19: Primary | ICD-10-CM

## 2021-11-24 PROCEDURE — 99213 OFFICE O/P EST LOW 20 MIN: CPT | Performed by: INTERNAL MEDICINE

## 2021-11-24 PROCEDURE — 1036F TOBACCO NON-USER: CPT | Performed by: INTERNAL MEDICINE

## 2021-11-26 ENCOUNTER — APPOINTMENT (OUTPATIENT)
Dept: LAB | Facility: CLINIC | Age: 61
End: 2021-11-26
Payer: COMMERCIAL

## 2021-11-26 DIAGNOSIS — N18.31 STAGE 3A CHRONIC KIDNEY DISEASE (HCC): ICD-10-CM

## 2021-11-26 DIAGNOSIS — C62.12 SEMINOMA OF DESCENDED LEFT TESTIS (HCC): ICD-10-CM

## 2021-11-26 DIAGNOSIS — N18.31 TYPE 2 DIABETES MELLITUS WITH STAGE 3A CHRONIC KIDNEY DISEASE, WITHOUT LONG-TERM CURRENT USE OF INSULIN (HCC): ICD-10-CM

## 2021-11-26 DIAGNOSIS — E83.42 HYPOMAGNESEMIA: ICD-10-CM

## 2021-11-26 DIAGNOSIS — I10 ESSENTIAL HYPERTENSION: ICD-10-CM

## 2021-11-26 DIAGNOSIS — E11.22 TYPE 2 DIABETES MELLITUS WITH STAGE 3A CHRONIC KIDNEY DISEASE, WITHOUT LONG-TERM CURRENT USE OF INSULIN (HCC): ICD-10-CM

## 2021-11-26 DIAGNOSIS — E55.9 VITAMIN D DEFICIENCY: ICD-10-CM

## 2021-11-26 LAB
25(OH)D3 SERPL-MCNC: 38.6 NG/ML (ref 30–100)
ALBUMIN SERPL BCP-MCNC: 3.2 G/DL (ref 3.5–5)
ANION GAP SERPL CALCULATED.3IONS-SCNC: 9 MMOL/L (ref 4–13)
BUN SERPL-MCNC: 49 MG/DL (ref 5–25)
CALCIUM ALBUM COR SERPL-MCNC: 10.2 MG/DL (ref 8.3–10.1)
CALCIUM SERPL-MCNC: 9.6 MG/DL (ref 8.3–10.1)
CHLORIDE SERPL-SCNC: 110 MMOL/L (ref 100–108)
CO2 SERPL-SCNC: 21 MMOL/L (ref 21–32)
CREAT SERPL-MCNC: 1.83 MG/DL (ref 0.6–1.3)
GFR SERPL CREATININE-BSD FRML MDRD: 39 ML/MIN/1.73SQ M
GLUCOSE SERPL-MCNC: 99 MG/DL (ref 65–140)
MAGNESIUM SERPL-MCNC: 2 MG/DL (ref 1.6–2.6)
PHOSPHATE SERPL-MCNC: 3.6 MG/DL (ref 2.3–4.1)
POTASSIUM SERPL-SCNC: 3.9 MMOL/L (ref 3.5–5.3)
PTH-INTACT SERPL-MCNC: 64.7 PG/ML (ref 18.4–80.1)
SODIUM SERPL-SCNC: 140 MMOL/L (ref 136–145)

## 2021-11-26 PROCEDURE — 83735 ASSAY OF MAGNESIUM: CPT

## 2021-11-26 PROCEDURE — 82306 VITAMIN D 25 HYDROXY: CPT

## 2021-11-26 PROCEDURE — 80069 RENAL FUNCTION PANEL: CPT

## 2021-11-26 PROCEDURE — 83970 ASSAY OF PARATHORMONE: CPT

## 2021-11-26 PROCEDURE — 3066F NEPHROPATHY DOC TX: CPT | Performed by: INTERNAL MEDICINE

## 2021-11-26 PROCEDURE — 36415 COLL VENOUS BLD VENIPUNCTURE: CPT

## 2021-11-29 ENCOUNTER — TELEPHONE (OUTPATIENT)
Dept: FAMILY MEDICINE CLINIC | Facility: CLINIC | Age: 61
End: 2021-11-29

## 2021-12-02 ENCOUNTER — PATIENT OUTREACH (OUTPATIENT)
Dept: CASE MANAGEMENT | Facility: OTHER | Age: 61
End: 2021-12-02

## 2021-12-03 ENCOUNTER — PATIENT OUTREACH (OUTPATIENT)
Dept: CASE MANAGEMENT | Facility: OTHER | Age: 61
End: 2021-12-03

## 2021-12-06 ENCOUNTER — OFFICE VISIT (OUTPATIENT)
Dept: NEPHROLOGY | Facility: CLINIC | Age: 61
End: 2021-12-06
Payer: COMMERCIAL

## 2021-12-06 VITALS
WEIGHT: 263 LBS | HEIGHT: 67 IN | HEART RATE: 109 BPM | BODY MASS INDEX: 41.28 KG/M2 | DIASTOLIC BLOOD PRESSURE: 68 MMHG | SYSTOLIC BLOOD PRESSURE: 142 MMHG

## 2021-12-06 DIAGNOSIS — E78.5 HYPERLIPIDEMIA, UNSPECIFIED HYPERLIPIDEMIA TYPE: ICD-10-CM

## 2021-12-06 DIAGNOSIS — E55.9 VITAMIN D DEFICIENCY: ICD-10-CM

## 2021-12-06 DIAGNOSIS — E83.42 HYPOMAGNESEMIA: ICD-10-CM

## 2021-12-06 DIAGNOSIS — N18.32 STAGE 3B CHRONIC KIDNEY DISEASE (HCC): Primary | ICD-10-CM

## 2021-12-06 PROBLEM — N18.31 STAGE 3A CHRONIC KIDNEY DISEASE (HCC): Status: RESOLVED | Noted: 2021-05-21 | Resolved: 2021-12-06

## 2021-12-06 PROCEDURE — 99214 OFFICE O/P EST MOD 30 MIN: CPT | Performed by: INTERNAL MEDICINE

## 2021-12-06 PROCEDURE — 1111F DSCHRG MED/CURRENT MED MERGE: CPT | Performed by: INTERNAL MEDICINE

## 2021-12-06 PROCEDURE — 3066F NEPHROPATHY DOC TX: CPT | Performed by: PHYSICIAN ASSISTANT

## 2021-12-16 ENCOUNTER — TELEPHONE (OUTPATIENT)
Dept: HEMATOLOGY ONCOLOGY | Facility: CLINIC | Age: 61
End: 2021-12-16

## 2021-12-22 ENCOUNTER — APPOINTMENT (OUTPATIENT)
Dept: LAB | Facility: CLINIC | Age: 61
End: 2021-12-22
Payer: COMMERCIAL

## 2021-12-22 LAB
ALBUMIN SERPL BCP-MCNC: 3.1 G/DL (ref 3.5–5)
ALP SERPL-CCNC: 76 U/L (ref 46–116)
ALT SERPL W P-5'-P-CCNC: 28 U/L (ref 12–78)
ANION GAP SERPL CALCULATED.3IONS-SCNC: 6 MMOL/L (ref 4–13)
AST SERPL W P-5'-P-CCNC: 24 U/L (ref 5–45)
BASOPHILS # BLD AUTO: 0.07 THOUSANDS/ΜL (ref 0–0.1)
BASOPHILS NFR BLD AUTO: 1 % (ref 0–1)
BILIRUB SERPL-MCNC: 0.57 MG/DL (ref 0.2–1)
BUN SERPL-MCNC: 18 MG/DL (ref 5–25)
CALCIUM ALBUM COR SERPL-MCNC: 9.8 MG/DL (ref 8.3–10.1)
CALCIUM SERPL-MCNC: 9.1 MG/DL (ref 8.3–10.1)
CHLORIDE SERPL-SCNC: 109 MMOL/L (ref 100–108)
CO2 SERPL-SCNC: 25 MMOL/L (ref 21–32)
CREAT SERPL-MCNC: 1.58 MG/DL (ref 0.6–1.3)
EOSINOPHIL # BLD AUTO: 0.15 THOUSAND/ΜL (ref 0–0.61)
EOSINOPHIL NFR BLD AUTO: 2 % (ref 0–6)
ERYTHROCYTE [DISTWIDTH] IN BLOOD BY AUTOMATED COUNT: 13.7 % (ref 11.6–15.1)
GFR SERPL CREATININE-BSD FRML MDRD: 46 ML/MIN/1.73SQ M
GLUCOSE P FAST SERPL-MCNC: 156 MG/DL (ref 65–99)
HCG-TM SERPL-SCNC: <2 MLU/ML
HCT VFR BLD AUTO: 33.3 % (ref 36.5–49.3)
HGB BLD-MCNC: 10.5 G/DL (ref 12–17)
IMM GRANULOCYTES # BLD AUTO: 0.08 THOUSAND/UL (ref 0–0.2)
IMM GRANULOCYTES NFR BLD AUTO: 1 % (ref 0–2)
LDH SERPL-CCNC: 300 U/L (ref 81–234)
LYMPHOCYTES # BLD AUTO: 3.91 THOUSANDS/ΜL (ref 0.6–4.47)
LYMPHOCYTES NFR BLD AUTO: 44 % (ref 14–44)
MCH RBC QN AUTO: 31.3 PG (ref 26.8–34.3)
MCHC RBC AUTO-ENTMCNC: 31.5 G/DL (ref 31.4–37.4)
MCV RBC AUTO: 99 FL (ref 82–98)
MONOCYTES # BLD AUTO: 0.74 THOUSAND/ΜL (ref 0.17–1.22)
MONOCYTES NFR BLD AUTO: 8 % (ref 4–12)
NEUTROPHILS # BLD AUTO: 3.97 THOUSANDS/ΜL (ref 1.85–7.62)
NEUTS SEG NFR BLD AUTO: 44 % (ref 43–75)
NRBC BLD AUTO-RTO: 0 /100 WBCS
PLATELET # BLD AUTO: 276 THOUSANDS/UL (ref 149–390)
PMV BLD AUTO: 9.6 FL (ref 8.9–12.7)
POTASSIUM SERPL-SCNC: 4 MMOL/L (ref 3.5–5.3)
PROT SERPL-MCNC: 7.4 G/DL (ref 6.4–8.2)
RBC # BLD AUTO: 3.36 MILLION/UL (ref 3.88–5.62)
SODIUM SERPL-SCNC: 140 MMOL/L (ref 136–145)
WBC # BLD AUTO: 8.92 THOUSAND/UL (ref 4.31–10.16)

## 2021-12-22 PROCEDURE — 84702 CHORIONIC GONADOTROPIN TEST: CPT

## 2021-12-22 PROCEDURE — 36415 COLL VENOUS BLD VENIPUNCTURE: CPT

## 2021-12-22 PROCEDURE — 80053 COMPREHEN METABOLIC PANEL: CPT

## 2021-12-22 PROCEDURE — 83615 LACTATE (LD) (LDH) ENZYME: CPT

## 2021-12-22 PROCEDURE — 85025 COMPLETE CBC W/AUTO DIFF WBC: CPT

## 2021-12-28 ENCOUNTER — OFFICE VISIT (OUTPATIENT)
Dept: HEMATOLOGY ONCOLOGY | Facility: CLINIC | Age: 61
End: 2021-12-28
Payer: COMMERCIAL

## 2021-12-28 VITALS
WEIGHT: 269.2 LBS | BODY MASS INDEX: 42.25 KG/M2 | HEIGHT: 67 IN | OXYGEN SATURATION: 96 % | SYSTOLIC BLOOD PRESSURE: 140 MMHG | TEMPERATURE: 98.1 F | DIASTOLIC BLOOD PRESSURE: 70 MMHG | RESPIRATION RATE: 18 BRPM | HEART RATE: 75 BPM

## 2021-12-28 DIAGNOSIS — R74.02 ELEVATED LDH: ICD-10-CM

## 2021-12-28 DIAGNOSIS — C62.92 SEMINOMA OF TESTIS, STAGE 3, LEFT (HCC): Primary | ICD-10-CM

## 2021-12-28 DIAGNOSIS — D63.1 ANEMIA DUE TO STAGE 3A CHRONIC KIDNEY DISEASE (HCC): ICD-10-CM

## 2021-12-28 DIAGNOSIS — N18.32 STAGE 3B CHRONIC KIDNEY DISEASE (HCC): ICD-10-CM

## 2021-12-28 DIAGNOSIS — N18.31 ANEMIA DUE TO STAGE 3A CHRONIC KIDNEY DISEASE (HCC): ICD-10-CM

## 2021-12-28 PROCEDURE — 3008F BODY MASS INDEX DOCD: CPT | Performed by: PHYSICIAN ASSISTANT

## 2021-12-28 PROCEDURE — 1036F TOBACCO NON-USER: CPT | Performed by: PHYSICIAN ASSISTANT

## 2021-12-28 PROCEDURE — 99215 OFFICE O/P EST HI 40 MIN: CPT | Performed by: PHYSICIAN ASSISTANT

## 2022-01-30 ENCOUNTER — APPOINTMENT (EMERGENCY)
Dept: CT IMAGING | Facility: HOSPITAL | Age: 62
End: 2022-01-30
Payer: COMMERCIAL

## 2022-01-30 ENCOUNTER — HOSPITAL ENCOUNTER (EMERGENCY)
Facility: HOSPITAL | Age: 62
Discharge: HOME | End: 2022-01-31
Attending: EMERGENCY MEDICINE | Admitting: EMERGENCY MEDICINE
Payer: COMMERCIAL

## 2022-01-30 VITALS
BODY MASS INDEX: 42.29 KG/M2 | TEMPERATURE: 98.3 F | DIASTOLIC BLOOD PRESSURE: 69 MMHG | HEART RATE: 89 BPM | SYSTOLIC BLOOD PRESSURE: 148 MMHG | RESPIRATION RATE: 18 BRPM | WEIGHT: 270 LBS | OXYGEN SATURATION: 99 %

## 2022-01-30 DIAGNOSIS — R10.9 RIGHT FLANK PAIN: Primary | ICD-10-CM

## 2022-01-30 LAB
ANION GAP SERPL CALCULATED.3IONS-SCNC: 10 MMOL/L (ref 4–13)
BASOPHILS # BLD AUTO: 0.07 THOUSANDS/ΜL (ref 0–0.1)
BASOPHILS NFR BLD AUTO: 1 % (ref 0–1)
BILIRUB UR QL STRIP: NEGATIVE
BUN SERPL-MCNC: 24 MG/DL (ref 5–25)
CALCIUM SERPL-MCNC: 9 MG/DL (ref 8.3–10.1)
CHLORIDE SERPL-SCNC: 103 MMOL/L (ref 100–108)
CLARITY UR: CLEAR
CO2 SERPL-SCNC: 26 MMOL/L (ref 21–32)
COLOR UR: YELLOW
CREAT SERPL-MCNC: 1.47 MG/DL (ref 0.6–1.3)
EOSINOPHIL # BLD AUTO: 0.23 THOUSAND/ΜL (ref 0–0.61)
EOSINOPHIL NFR BLD AUTO: 2 % (ref 0–6)
ERYTHROCYTE [DISTWIDTH] IN BLOOD BY AUTOMATED COUNT: 12.7 % (ref 11.6–15.1)
GFR SERPL CREATININE-BSD FRML MDRD: 50 ML/MIN/1.73SQ M
GLUCOSE SERPL-MCNC: 124 MG/DL (ref 65–140)
GLUCOSE UR STRIP-MCNC: NEGATIVE MG/DL
HCT VFR BLD AUTO: 35.8 % (ref 36.5–49.3)
HGB BLD-MCNC: 12 G/DL (ref 12–17)
HGB UR QL STRIP.AUTO: ABNORMAL
IMM GRANULOCYTES # BLD AUTO: 0.04 THOUSAND/UL (ref 0–0.2)
IMM GRANULOCYTES NFR BLD AUTO: 0 % (ref 0–2)
KETONES UR STRIP-MCNC: NEGATIVE MG/DL
LEUKOCYTE ESTERASE UR QL STRIP: NEGATIVE
LYMPHOCYTES # BLD AUTO: 4.13 THOUSANDS/ΜL (ref 0.6–4.47)
LYMPHOCYTES NFR BLD AUTO: 35 % (ref 14–44)
MCH RBC QN AUTO: 31.9 PG (ref 26.8–34.3)
MCHC RBC AUTO-ENTMCNC: 33.5 G/DL (ref 31.4–37.4)
MCV RBC AUTO: 95 FL (ref 82–98)
MONOCYTES # BLD AUTO: 0.93 THOUSAND/ΜL (ref 0.17–1.22)
MONOCYTES NFR BLD AUTO: 8 % (ref 4–12)
NEUTROPHILS # BLD AUTO: 6.46 THOUSANDS/ΜL (ref 1.85–7.62)
NEUTS SEG NFR BLD AUTO: 54 % (ref 43–75)
NITRITE UR QL STRIP: NEGATIVE
NRBC BLD AUTO-RTO: 0 /100 WBCS
PH UR STRIP.AUTO: 5.5 [PH] (ref 4.5–8)
PLATELET # BLD AUTO: 265 THOUSANDS/UL (ref 149–390)
PMV BLD AUTO: 9.1 FL (ref 8.9–12.7)
POTASSIUM SERPL-SCNC: 4.3 MMOL/L (ref 3.5–5.3)
PROT UR STRIP-MCNC: NEGATIVE MG/DL
RBC # BLD AUTO: 3.76 MILLION/UL (ref 3.88–5.62)
SODIUM SERPL-SCNC: 139 MMOL/L (ref 136–145)
SP GR UR STRIP.AUTO: 1.02 (ref 1–1.03)
UROBILINOGEN UR QL STRIP.AUTO: 0.2 E.U./DL
WBC # BLD AUTO: 11.86 THOUSAND/UL (ref 4.31–10.16)

## 2022-01-30 PROCEDURE — G1004 CDSM NDSC: HCPCS

## 2022-01-30 PROCEDURE — 80048 BASIC METABOLIC PNL TOTAL CA: CPT | Performed by: EMERGENCY MEDICINE

## 2022-01-30 PROCEDURE — 74176 CT ABD & PELVIS W/O CONTRAST: CPT

## 2022-01-30 PROCEDURE — 96361 HYDRATE IV INFUSION ADD-ON: CPT

## 2022-01-30 PROCEDURE — 99284 EMERGENCY DEPT VISIT MOD MDM: CPT

## 2022-01-30 PROCEDURE — 96374 THER/PROPH/DIAG INJ IV PUSH: CPT

## 2022-01-30 PROCEDURE — 36415 COLL VENOUS BLD VENIPUNCTURE: CPT | Performed by: EMERGENCY MEDICINE

## 2022-01-30 PROCEDURE — 81001 URINALYSIS AUTO W/SCOPE: CPT

## 2022-01-30 PROCEDURE — 85025 COMPLETE CBC W/AUTO DIFF WBC: CPT | Performed by: EMERGENCY MEDICINE

## 2022-01-30 RX ORDER — KETOROLAC TROMETHAMINE 30 MG/ML
15 INJECTION, SOLUTION INTRAMUSCULAR; INTRAVENOUS ONCE
Status: COMPLETED | OUTPATIENT
Start: 2022-01-30 | End: 2022-01-30

## 2022-01-30 RX ORDER — OXYCODONE HYDROCHLORIDE AND ACETAMINOPHEN 5; 325 MG/1; MG/1
1 TABLET ORAL ONCE
Status: COMPLETED | OUTPATIENT
Start: 2022-01-30 | End: 2022-01-30

## 2022-01-30 RX ADMIN — KETOROLAC TROMETHAMINE 15 MG: 30 INJECTION, SOLUTION INTRAMUSCULAR at 23:29

## 2022-01-30 RX ADMIN — SODIUM CHLORIDE 500 ML: 0.9 INJECTION, SOLUTION INTRAVENOUS at 23:29

## 2022-01-30 RX ADMIN — OXYCODONE HYDROCHLORIDE AND ACETAMINOPHEN 1 TABLET: 5; 325 TABLET ORAL at 23:31

## 2022-01-31 ENCOUNTER — APPOINTMENT (OUTPATIENT)
Dept: LAB | Facility: CLINIC | Age: 62
End: 2022-01-31
Payer: COMMERCIAL

## 2022-01-31 DIAGNOSIS — N18.31 ANEMIA DUE TO STAGE 3A CHRONIC KIDNEY DISEASE (HCC): ICD-10-CM

## 2022-01-31 DIAGNOSIS — C62.92 SEMINOMA OF TESTIS, STAGE 3, LEFT (HCC): ICD-10-CM

## 2022-01-31 DIAGNOSIS — D63.1 ANEMIA DUE TO STAGE 3A CHRONIC KIDNEY DISEASE (HCC): ICD-10-CM

## 2022-01-31 LAB
BACTERIA UR QL AUTO: ABNORMAL /HPF
FERRITIN SERPL-MCNC: 146 NG/ML (ref 8–388)
IRON SATN MFR SERPL: 20 % (ref 20–50)
IRON SERPL-MCNC: 69 UG/DL (ref 65–175)
NON-SQ EPI CELLS URNS QL MICRO: ABNORMAL /HPF
RBC #/AREA URNS AUTO: ABNORMAL /HPF
RETICS # AUTO: ABNORMAL 10*3/UL (ref 14356–105094)
RETICS # CALC: 1.9 % (ref 0.37–1.87)
TIBC SERPL-MCNC: 345 UG/DL (ref 250–450)
WBC #/AREA URNS AUTO: ABNORMAL /HPF

## 2022-01-31 PROCEDURE — 83550 IRON BINDING TEST: CPT

## 2022-01-31 PROCEDURE — 85045 AUTOMATED RETICULOCYTE COUNT: CPT

## 2022-01-31 PROCEDURE — 83540 ASSAY OF IRON: CPT

## 2022-01-31 PROCEDURE — 99284 EMERGENCY DEPT VISIT MOD MDM: CPT | Performed by: EMERGENCY MEDICINE

## 2022-01-31 PROCEDURE — 82728 ASSAY OF FERRITIN: CPT

## 2022-01-31 NOTE — ED PROVIDER NOTES
History  Chief Complaint   Patient presents with    Flank Pain     Patient reports he started tonight with right flank pain, h/o kidney stones  Feels similar  Pt is a 57year old male with a PMH of testicular cancer, kidney stones, CKD presenting with right flank pain x 12 hours  Pt states he had gradual onset of right flank and back pain that has been constant and non-radiating  States the pain did cause n/v and he was concerned he may be having a kidney stone again  He denies fevers, chills, sweats, chest pain, SOB, abdominal pain, urinary symptoms  No meds taken PTA  History provided by:  Patient   used: No    Flank Pain  Pain location:  R flank  Pain quality: sharp    Pain radiates to:  Does not radiate  Pain severity:  Severe  Onset quality:  Gradual  Duration:  12 hours  Timing:  Constant  Progression:  Worsening  Chronicity:  New  Context: not sick contacts, not suspicious food intake and not trauma    Relieved by:  Nothing  Worsened by:  Nothing  Ineffective treatments:  None tried  Associated symptoms: nausea and vomiting    Associated symptoms: no diarrhea, no dysuria and no hematuria    Risk factors: not elderly, has not had multiple surgeries, no NSAID use, not obese and no recent hospitalization        Prior to Admission Medications   Prescriptions Last Dose Informant Patient Reported? Taking?    Cholecalciferol (VITAMIN D3) 2000 units capsule 1/29/2022 at 2100 Self Yes Yes   Sig: Take 1 capsule by mouth daily   NIFEdipine (PROCARDIA XL) 30 mg 24 hr tablet 1/29/2022 at 2100 Self No Yes   Sig: Take 1 tablet (30 mg total) by mouth daily   allopurinol (ZYLOPRIM) 100 mg tablet 1/29/2022 at 2100 Self Yes Yes   Sig: allopurinol 100 mg tablet   dexlansoprazole (DEXILANT) 60 MG capsule 1/29/2022 at 2130 Self Yes Yes   Sig: Dexilant 60 mg capsule, delayed release   magnesium oxide (MAG-OX) 400 mg 1/29/2022 at 2130 Self No Yes   Sig: Take 1 tablet (400 mg total) by mouth once for 1 dose   rosuvastatin (CRESTOR) 20 MG tablet 1/29/2022 at 2100 Self No Yes   Sig: Take 1 tablet (20 mg total) by mouth daily at bedtime      Facility-Administered Medications: None       Past Medical History:   Diagnosis Date    BPH without obstruction/lower urinary tract symptoms     Cancer (HCC)     prostate     CPAP (continuous positive airway pressure) dependence     Elevated PSA     GERD (gastroesophageal reflux disease)     Gout     Hyperlipidemia     Hypertension     Kidney stone     Obese abdomen     Obesity     [facility]     Prostate cancer [facility]     Rash     under both arms and in between legs - family doctor aware - pt uses Desitin    Sleep apnea     Testicular carcinoma ([facility]     Urinary frequency     Urinary urgency     Wears glasses        Past Surgical History:   Procedure Laterality Date    CARPAL TUNNEL RELEASE Bilateral 07/2020    COLONOSCOPY      CYSTOSCOPY W/ LASER LITHOTRIPSY  2001    CYSTOSTOMY W/ STENT INSERTION  2011    ESOPHAGOGASTRODUODENOSCOPY      EXTRACORPOREAL SHOCK WAVE LITHOTRIPSY Right 2011    HERNIA REPAIR      KNEE ARTHROSCOPY Left     [place] CYSTO/URETERO W/LITHOTRIPSY &INDWELL STENT INSRT Right 4/29/2018    Procedure: CYSTOSCOPY URETEROSCOPY, RETROGRADE PYELOGRAM AND INSERTION STENT URETERAL;  Surgeon: Radu Ramirez MD;  [facility];  Service: Urology    [place] REMOVAL TESTIS,RADICAL Left 2/19/2021    Procedure: Radical  ORCHIECTOMY;  Surgeon: Jayesh Ariza MD;  [facility];  Service: Urology       Family History   Problem Relation Age of Onset    Nephrolithiasis Father     Alzheimer's disease Mother      I have reviewed and agree with the history as documented      E-Cigarette/Vaping    E-Cigarette Use Never User      E-Cigarette/Vaping Substances    Nicotine No     THC No     CBD No     Flavoring No     Other No     Unknown No      Social History     Tobacco Use    Smoking status: Never Smoker    Smokeless tobacco: Never Used   Vaping Use    Vaping Use: Never used   Substance Use Topics    Alcohol use: Not Currently    Drug use: No       Review of Systems   Constitutional: Negative  HENT: Negative  Respiratory: Negative  Cardiovascular: Negative  Gastrointestinal: Positive for nausea and vomiting  Negative for abdominal pain and diarrhea  Genitourinary: Positive for flank pain  Negative for decreased urine volume, difficulty urinating, dysuria, frequency, hematuria and urgency  Musculoskeletal: Positive for back pain  Neurological: Negative  All other systems reviewed and are negative  Physical Exam  Physical Exam  Constitutional:       General: He is not in acute distress  Appearance: He is well-developed  He is obese  He is not ill-appearing or diaphoretic  HENT:      Head: Normocephalic and atraumatic  Right Ear: External ear normal       Left Ear: External ear normal       Nose: Nose normal    Eyes:      General: No scleral icterus  Right eye: No discharge  Left eye: No discharge  Extraocular Movements: Extraocular movements intact  Conjunctiva/sclera: Conjunctivae normal    Cardiovascular:      Rate and Rhythm: Normal rate and regular rhythm  Heart sounds: Normal heart sounds  Pulmonary:      Effort: Pulmonary effort is normal       Breath sounds: Normal breath sounds  Abdominal:      General: Abdomen is flat  Bowel sounds are normal  There is no distension  Palpations: Abdomen is soft  Tenderness: There is no abdominal tenderness  There is right CVA tenderness  There is no left CVA tenderness  Musculoskeletal:         General: Normal range of motion  Cervical back: Normal range of motion and neck supple  Back:    Skin:     General: Skin is warm and dry  Neurological:      General: No focal deficit present  Mental Status: He is alert and oriented to person, place, and time  Mental status is at baseline     Psychiatric: Mood and Affect: Mood normal          Behavior: Behavior normal          Vital Signs  ED Triage Vitals [01/30/22 2250]   Temperature Pulse Respirations Blood Pressure SpO2   98 3 °F (36 8 °C) 89 18 148/69 99 %      Temp Source Heart Rate Source Patient Position - Orthostatic VS BP Location FiO2 (%)   Oral Monitor Sitting Right arm --      Pain Score       3           Vitals:    01/30/22 2250   BP: 148/69   Pulse: 89   Patient Position - Orthostatic VS: Sitting         Visual Acuity      ED Medications  Medications   ketorolac (TORADOL) injection 15 mg (15 mg Intravenous Given 1/30/22 2329)   sodium chloride 0 9 % bolus 500 mL (0 mL Intravenous Stopped 1/31/22 0040)   oxyCODONE-acetaminophen (PERCOCET) 5-325 mg per tablet 1 tablet (1 tablet Oral Given 1/30/22 2331)       Diagnostic Studies  Results Reviewed     Procedure Component Value Units Date/Time    Urine Microscopic [[id number]]  (Abnormal) Collected: 01/30/22 2351    Lab Status: Final result Specimen: Urine, Clean Catch Updated: 01/31/22 0054     RBC, UA 0-1 /hpf      WBC, UA 1-2 /hpf      Epithelial Cells Occasional /hpf      Bacteria, UA Occasional /hpf     Urine Macroscopic, POC [[id number]]  (Abnormal) Collected: 01/30/22 2351    Lab Status: Final result Specimen: Urine Updated: 01/30/22 2352     Color, UA Yellow     Clarity, UA Clear     pH, UA 5 5     Leukocytes, UA Negative     Nitrite, UA Negative     Protein, UA Negative mg/dl      Glucose, UA Negative mg/dl      Ketones, UA Negative mg/dl      Urobilinogen, UA 0 2 E U /dl      Bilirubin, UA Negative     Blood, UA Trace     Specific Usk, UA 1 025    Narrative:      CLINITEK RESULT    Basic metabolic panel [[id number]]  (Abnormal) Collected: 01/30/22 2328    Lab Status: Final result Specimen: Blood from Arm, Left Updated: 01/30/22 2348     Sodium 139 mmol/L      Potassium 4 3 mmol/L      Chloride 103 mmol/L      CO2 26 mmol/L      ANION GAP 10 mmol/L      BUN 24 mg/dL      Creatinine 1 47 mg/dL      Glucose 124 mg/dL      Calcium 9 0 mg/dL      eGFR 50 ml/min/1 73sq m     Narrative:      National Kidney Disease Foundation guidelines for Chronic Kidney Disease (CKD):     Stage 1 with normal or high GFR (GFR > 90 mL/min/1 73 square meters)    Stage 2 Mild CKD (GFR = 60-89 mL/min/1 73 square meters)    Stage 3A Moderate CKD (GFR = 45-59 mL/min/1 73 square meters)    Stage 3B Moderate CKD (GFR = 30-44 mL/min/1 73 square meters)    Stage 4 Severe CKD (GFR = 15-29 mL/min/1 73 square meters)    Stage 5 End Stage CKD (GFR <15 mL/min/1 73 square meters)  Note: GFR calculation is accurate only with a steady state creatinine    CBC and differential [[id number]]  (Abnormal) Collected: 01/30/22 2328    Lab Status: Final result Specimen: Blood from Arm, Left Updated: 01/30/22 2348     WBC 11 86 Thousand/uL      RBC 3 76 Million/uL      Hemoglobin 12 0 g/dL      Hematocrit 35 8 %      MCV 95 fL      MCH 31 9 pg      MCHC 33 5 g/dL      RDW 12 7 %      MPV 9 1 fL      Platelets 000 Thousands/uL      nRBC 0 /100 WBCs      Neutrophils Relative 54 %      Immat GRANS % 0 %      Lymphocytes Relative 35 %      Monocytes Relative 8 %      Eosinophils Relative 2 %      Basophils Relative 1 %      Neutrophils Absolute 6 46 Thousands/µL      Immature Grans Absolute 0 04 Thousand/uL      Lymphocytes Absolute 4 13 Thousands/µL      Monocytes Absolute 0 93 Thousand/µL      Eosinophils Absolute 0 23 Thousand/µL      Basophils Absolute 0 07 Thousands/µL                  CT renal stone study abdomen pelvis without contrast   Final Result by Augusto Bergman MD (01/31 0013)      No acute intra-abdominal abnormality  No hydronephrosis or hydroureter  Punctate nonobstructing left sided intrarenal calculi  Mildly distended air-filled stomach  No evidence of large or small bowel obstruction              Workstation performed: [id number]                    Procedures  Procedures         ED Course  ED Course as of 01/31/22 0103   Mon Jan 31, 2022   0023 No kidney stone noted on CT  Labs and urine also unremarkable  Likely MS pain at this time  Continue supportive care at home  Educated pt on return precautions  Stable for discharge  SBIRT 22yo+      Most Recent Value   SBIRT (24 yo +)    In order to provide better care to our patients, we are screening all of our patients for alcohol and drug use  Would it be okay to ask you these screening questions? Yes Filed at: 01/30/2022 2337   Initial Alcohol Screen: US AUDIT-C     1  How often do you have a drink containing alcohol? 0 Filed at: 01/30/2022 2337   2  How many drinks containing alcohol do you have on a typical day you are drinking? 0 Filed at: 01/30/2022 2337   3a  Male UNDER 65: How often do you have five or more drinks on one occasion? 0 Filed at: 01/30/2022 2337   3b  FEMALE Any Age, or MALE 65+: How often do you have 4 or more drinks on one occassion? 0 Filed at: 01/30/2022 2337   Audit-C Score 0 Filed at: 01/30/2022 2337   PERLA: How many times in the past year have you    Used an illegal drug or used a prescription medication for non-medical reasons?  Never Filed at: 01/30/2022 2337                    MDM  Number of Diagnoses or Management Options  Right flank pain: new and requires workup     Amount and/or Complexity of Data Reviewed  Clinical lab tests: ordered and reviewed  Tests in the radiology section of CPT®: ordered and reviewed  Tests in the medicine section of CPT®: ordered and reviewed  Independent visualization of images, tracings, or specimens: yes    Risk of Complications, Morbidity, and/or Mortality  Presenting problems: moderate  Management options: moderate    Patient Progress  Patient progress: stable      Disposition  Final diagnoses:   Right flank pain     Time reflects when diagnosis was documented in both MDM as applicable and the Disposition within this note     Time User Action Codes Description Comment 1/31/2022 12:25 AM Sarita YU Add [R10 9] Right flank pain       ED Disposition     ED Disposition Condition Date/Time Comment    Discharge Good Mon Jan 31, 2022 12:25 AM [address] discharge to home/self care  Follow-up Information     Follow up With Specialties Details Why Amberly Cullen MD Family Medicine Schedule an appointment as soon as possible for a visit  As needed [address] [address] [phone]            Discharge Medication List as of 1/31/2022 12:26 AM      CONTINUE these medications which have NOT CHANGED    Details   allopurinol (ZYLOPRIM) 100 mg tablet allopurinol 100 mg tablet, Historical Med      Cholecalciferol (VITAMIN D3) 2000 units capsule Take 1 capsule by mouth daily, Starting Wed 10/2/2019, Until Sun 1/30/2022, Historical Med      dexlansoprazole (DEXILANT) 60 MG capsule Dexilant 60 mg capsule, delayed release, Historical Med      magnesium oxide (MAG-OX) 400 mg Take 1 tablet (400 mg total) by mouth once for 1 dose, Starting Fri 8/6/2021, Normal      NIFEdipine (PROCARDIA XL) 30 mg 24 hr tablet Take 1 tablet (30 mg total) by mouth daily, Starting Fri 8/6/2021, Normal      rosuvastatin (CRESTOR) 20 MG tablet Take 1 tablet (20 mg total) by mouth daily at bedtime, Starting Thu 7/29/2021, Until Wed 11/27/2024, Normal             No discharge procedures on file      PDMP Review       Value Time User    PDMP Reviewed  Yes 2/19/2021  9:50 AM Addie Copeland MD          ED Provider  Electronically Signed by           Vira Stockton PA-C  01/31/22 1705

## 2022-02-09 ENCOUNTER — TELEMEDICINE (OUTPATIENT)
Dept: HEMATOLOGY ONCOLOGY | Facility: CLINIC | Age: 62
End: 2022-02-09
Payer: COMMERCIAL

## 2022-02-09 DIAGNOSIS — C62.92 SEMINOMA OF TESTIS, STAGE 3, LEFT (HCC): Primary | ICD-10-CM

## 2022-02-09 PROBLEM — Z29.89 CHEMOPREVENTION: Status: RESOLVED | Noted: 2021-04-01 | Resolved: 2022-02-09

## 2022-02-09 PROBLEM — R07.89 CHEST DISCOMFORT: Status: RESOLVED | Noted: 2021-04-15 | Resolved: 2022-02-09

## 2022-02-09 PROBLEM — R07.9 CHEST PAIN: Status: RESOLVED | Noted: 2021-05-30 | Resolved: 2022-02-09

## 2022-02-09 PROBLEM — Z29.8 CHEMOPREVENTION: Status: RESOLVED | Noted: 2021-04-01 | Resolved: 2022-02-09

## 2022-02-09 PROCEDURE — 99213 OFFICE O/P EST LOW 20 MIN: CPT | Performed by: INTERNAL MEDICINE

## 2022-02-09 NOTE — PROGRESS NOTES
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 with external right iliac lymphadenopathy measuring up to 3 cm and left para-aortic lymphadenopathy measuring up to 2 3 cm (pT2, N2, S1) with persistent elevation of LDH less than 1 5 normal upper limit     3/23/21 Repeat alpha fetoprotein, hCG are normal, repeat  ()     Received 1st and only cycle of etoposide/ cisplatin with anti emetics   He was admitted to the hospital after 10 days of therapy with nausea, weakness   He was found to have acute renal insufficiency with creatinine of 4,    He received IV fluid with gradual improvement  He also developed neutropenia and thrombocytopenia grade 4 requiring platelet transfusion      CT scan C/A/P 4/15/21 showed significant response of chemotherapy in the abdominal lymphadenopathy    Creatinine improved to 1 73 5/7/21     Cisplatin changed to carboplatin AUC 5 on day 1 and etoposide 100 milligram/meter squared for 5 days  Despite this, he ended up in the hospital with pancytopenia, requiring blood and platelet transfusion, ileus, weight loss, nausea, vomiting, loss of appetite with poor tolerance of therapy  Received 2 cycles 5/17/21 and 6/7/21        CT scan 6/24/21 showed no evidence of lymphadenopathy      CT scan in January 2022 showed no evidence of disease or lymphadenopathy, normal LDH, will continue watchful observation and follow-up in 4 months with CBC, CMP, alpha fetoprotein, hCG and LDH no need for imaging studies at this time     Easy bruisability he is not on NSAID, aspirin I recommend a physical visit the next time  Problem List Items Addressed This Visit        Genitourinary    Seminoma of testis, stage 3, left ([facility] [address] ) - Primary    Relevant Orders    Comprehensive metabolic panel    CBC and differential    LD,Blood    HCG, tumor marker    AFP tumor marker               Reason for visit is   Chief Complaint   Patient presents with    Virtual Regular Visit        Encounter provider Tushar Lopez MD    Provider located at [address] [address] [phone]      Recent Visits  No visits were found meeting these conditions  Showing recent visits within past 7 days and meeting all other requirements  Today's Visits  Date Type Provider Dept   02/09/22 Telemedicine Aurelio Burns  [address] today's visits and meeting all other requirements  Future Appointments  No visits were found meeting these conditions  Showing future appointments within next 150 days and meeting all other requirements       The patient was identified by name and date of birth  Brian Guerrero was informed that this is a telemedicine visit and that the visit is being conducted through [address] [place] [address] and patient was informed this is a secure, HIPAA-complaint platform  He agrees to proceed     My office door was closed  No one else was in the room  He acknowledged consent and understanding of privacy and security of the video platform  The patient has agreed to participate and understands they can discontinue the visit at any time  Patient is aware this is a billable service  Subjective  Brian Guerrero is a 60 y o  male with pure seminoma   HPI   Dg Schwarz is a 59-year-old  male seen initially 4/1/21 regarding stage II B (pT2, pN2, S1) left-sided pure seminoma     He has a history of prostate cancer, nephrolithiasis, benign prostatic hypertrophy, polymyalgia, hypertension, sleep apnea, obesity, GERD, hiatal hernia, gout       His prostate cancer was diagnosed on September 2019 with single core with 4 mm focus of prostate cancer with very low risk group   He is on active surveillance with PSA 4 9 on March 2020 followed by Urology        He was found to have enlargement of the left testicle       Ultrasound of the scrotum on 10/24/2020 showed left testicle measuring 7 2 x 3 9 x 5 1 cm with lobulated contour and diffuse heterogeneous echotexture     Repeat ultrasound on 01/20/2021 showed 9 1 x 4 9 x 6 8 cm increase in size compared to prior ultrasound     LDH was elevated at 376 () normal hCG, alpha fetoprotein     Status post left inguinal orchiectomy on 02/19/2021, showing pure seminoma primary measuring 8 2 cm, abuts and invades into but not through tunica albuginea, invades hilar fat and spermatic cord soft tissue with lymphovascular invasion, spermatic cord is not involved by the tumor confirmed by 2nd opinion at [facility]       CT scan of the chest abdomen and pelvis on 03/20/2021 showed enlarged left iliac and left para-aortic lymph nodes for example left para-aortic lymph node measuring 2 3 cm, left external iliac lymph node measuring 30 mm no evidence of metastatic disease in the chest  He had pain at the left orchiectomy site on and off most likely responding to acetaminophen from healing      He reported 12 lb weight loss after the surgery       4/1/21:  cr 1 13, hemoglobin 1 3 3, MCV of 89, white blood cell count 7 75, 51% neutrophils, 33% lymphocytes, 12% monocytes, platelets 575     4 cycles of  Etoposide/ cisplatin per the NCCN guidelines recommended    For chemotherapy-induced neutropenia, pegfilgrastim recommended      The chemotherapy was complicated with dehydration, acute kidney injury requiring admission to the hospital 4/15-4/20/21  [facility] creatinine at time of discharge was 2 3   It  improved to 1 73 5/7/21     CT scan C/A/P  4/15/21 in the hospital showed significant reduction in seminoma with decrease of the abdominal lymphadenopathy      We changed cisplatin to carboplatin, despite that 2nd cycle was significant for pancytopenia, requiring admission to the hospital, ileus, CT scan showed no evidence of residual lymphadenopathy or masses in June 2021  We decided not to proceed with any additional chemotherapy because of poor tolerance      After 3 months CT scan of the abdomen and pelvis in September 2021 showed 1 1 cm para-aortic lymph node no new lesions, he has normal hCG as well as LDH      Admitted 11/15-11/20/21  2nd to abdominal pain, fatigue, malaise  Tested positive for COVID  Noncontrast CT scan abdomen and pelvis 11/16/21 -  Scattered and ill-defined groundglass opacities in the visualized bilateral lungs correlates with the patient's history of COVID 19 infection  Fatty infiltration of the liver is suspected  In the setting of abdominal pain and/or elevated liver function tests, consider steatohepatitis    Cholelithiasis without discrete evidence of acute cholecystitis, bilateral nephrolithiasis, no hydronephrosis    Abdominal pain CT scan in January showed no evidence of malignancy or lymphadenopathy, normal LDH  Past Medical History:   Diagnosis Date    BPH without obstruction/lower urinary tract symptoms     Cancer (HCC)     prostate     CPAP (continuous positive airway pressure) dependence     Elevated PSA     GERD (gastroesophageal reflux disease)     Gout     Hyperlipidemia     Hypertension     Kidney stone     Obese abdomen     Obesity     Polymyalgia ([address]     Prostate cancer ([address]     Rash     under both arms and in between legs - family doctor aware - pt uses Desitin    Sleep apnea     Testicular carcinoma ([address] )     Urinary frequency     Urinary urgency     Wears glasses        Past Surgical History:   Procedure Laterality Date    CARPAL TUNNEL RELEASE Bilateral 07/2020    COLONOSCOPY      CYSTOSCOPY W/ LASER LITHOTRIPSY  2001    CYSTOSTOMY W/ STENT INSERTION  2011    ESOPHAGOGASTRODUODENOSCOPY      EXTRACORPOREAL SHOCK WAVE LITHOTRIPSY Right 2011    HERNIA REPAIR      KNEE ARTHROSCOPY Left     [place] CYSTO/URETERO W/LITHOTRIPSY &INDWELL STENT INSRT Right 4/29/2018    Procedure: CYSTOSCOPY URETEROSCOPY, RETROGRADE PYELOGRAM AND INSERTION STENT URETERAL;  Surgeon: Emily Loja MD;  [facility];  Service: Urology    [place] REMOVAL TESTIS,RADICAL Left 2/19/2021    Procedure: Radical  ORCHIECTOMY;  Surgeon: Esperanza Yang MD;  [facility];  Service: Urology       Current Outpatient Medications   Medication Sig Dispense Refill    allopurinol (ZYLOPRIM) 100 mg tablet allopurinol 100 mg tablet      Cholecalciferol (VITAMIN D3) 2000 units capsule Take 1 capsule by mouth daily      dexlansoprazole (DEXILANT) 60 MG capsule Dexilant 60 mg capsule, delayed release      magnesium oxide (MAG-OX) 400 mg Take 1 tablet (400 mg total) by mouth once for 1 dose 90 tablet 4    NIFEdipine (PROCARDIA XL) 30 mg 24 hr tablet Take 1 tablet (30 mg total) by mouth daily 90 tablet 4    rosuvastatin (CRESTOR) 20 MG tablet Take 1 tablet (20 mg total) by mouth daily at bedtime 30 tablet 40     No current facility-administered medications for this visit  No Known Allergies    Review of Systems   Constitutional: Negative for chills and fever  HENT: Negative for ear pain and sore throat  Eyes: Negative for pain and visual disturbance  Respiratory: Negative for cough and shortness of breath  Cardiovascular: Negative for chest pain and palpitations  Gastrointestinal: Negative for abdominal pain and vomiting  Genitourinary: Negative for dysuria and hematuria  Musculoskeletal: Positive for arthralgias  Negative for back pain  Skin: Negative for color change and rash  Neurological: Negative for seizures and syncope  Hematological: Bruises/bleeds easily  All other systems reviewed and are negative  Video Exam  Could not get it connect through the video  There were no vitals filed for this visit      Physical Exam     I spent 10 minutes directly with the patient during this visit    VIRTUAL VISIT [address] [place] [address] verbally agrees to participate in [employer]  Pt is aware that [employer] could be limited without vital signs or the ability to perform a full hands-on physical Elke Hives understands he or the provider may request at any time to terminate the video visit and request the patient to seek care or treatment in person

## 2022-03-15 ENCOUNTER — APPOINTMENT (OUTPATIENT)
Dept: LAB | Facility: CLINIC | Age: 62
End: 2022-03-15
Payer: COMMERCIAL

## 2022-03-15 DIAGNOSIS — Z11.59 NEED FOR HEPATITIS C SCREENING TEST: ICD-10-CM

## 2022-03-15 DIAGNOSIS — Z11.4 SCREENING FOR HIV (HUMAN IMMUNODEFICIENCY VIRUS): ICD-10-CM

## 2022-03-15 DIAGNOSIS — E78.5 HYPERLIPIDEMIA, UNSPECIFIED HYPERLIPIDEMIA TYPE: ICD-10-CM

## 2022-03-15 DIAGNOSIS — C61 PROSTATE CANCER (HCC): ICD-10-CM

## 2022-03-15 LAB
CHOLEST SERPL-MCNC: 209 MG/DL
HCV AB SER QL: NORMAL
HDLC SERPL-MCNC: 43 MG/DL
LDLC SERPL CALC-MCNC: 129 MG/DL (ref 0–100)
NONHDLC SERPL-MCNC: 166 MG/DL
PSA SERPL-MCNC: 5 NG/ML (ref 0–4)
TRIGL SERPL-MCNC: 187 MG/DL

## 2022-03-15 PROCEDURE — 84153 ASSAY OF PSA TOTAL: CPT

## 2022-03-15 PROCEDURE — 87389 HIV-1 AG W/HIV-1&-2 AB AG IA: CPT

## 2022-03-15 PROCEDURE — 80061 LIPID PANEL: CPT

## 2022-03-15 PROCEDURE — 86803 HEPATITIS C AB TEST: CPT

## 2022-03-15 PROCEDURE — 36415 COLL VENOUS BLD VENIPUNCTURE: CPT

## 2022-03-16 LAB — HIV 1+2 AB+HIV1 P24 AG SERPL QL IA: NORMAL

## 2022-03-28 ENCOUNTER — OFFICE VISIT (OUTPATIENT)
Dept: UROLOGY | Facility: MEDICAL CENTER | Age: 62
End: 2022-03-28
Payer: COMMERCIAL

## 2022-03-28 VITALS
BODY MASS INDEX: 41.28 KG/M2 | WEIGHT: 263 LBS | SYSTOLIC BLOOD PRESSURE: 134 MMHG | HEIGHT: 67 IN | DIASTOLIC BLOOD PRESSURE: 86 MMHG | HEART RATE: 68 BPM

## 2022-03-28 DIAGNOSIS — C61 PROSTATE CANCER (HCC): Primary | ICD-10-CM

## 2022-03-28 LAB
SL AMB  POCT GLUCOSE, UA: NORMAL
SL AMB LEUKOCYTE ESTERASE,UA: NORMAL
SL AMB POCT BILIRUBIN,UA: NORMAL
SL AMB POCT BLOOD,UA: NORMAL
SL AMB POCT CLARITY,UA: CLEAR
SL AMB POCT COLOR,UA: YELLOW
SL AMB POCT KETONES,UA: NORMAL
SL AMB POCT NITRITE,UA: NORMAL
SL AMB POCT PH,UA: 5.5
SL AMB POCT SPECIFIC GRAVITY,UA: 1.02
SL AMB POCT URINE PROTEIN: NORMAL
SL AMB POCT UROBILINOGEN: 0.2

## 2022-03-28 PROCEDURE — 1036F TOBACCO NON-USER: CPT | Performed by: UROLOGY

## 2022-03-28 PROCEDURE — 3061F NEG MICROALBUMINURIA REV: CPT | Performed by: UROLOGY

## 2022-03-28 PROCEDURE — 3008F BODY MASS INDEX DOCD: CPT | Performed by: UROLOGY

## 2022-03-28 PROCEDURE — 81002 URINALYSIS NONAUTO W/O SCOPE: CPT | Performed by: UROLOGY

## 2022-03-28 PROCEDURE — 99214 OFFICE O/P EST MOD 30 MIN: CPT | Performed by: UROLOGY

## 2022-04-04 ENCOUNTER — PATIENT OUTREACH (OUTPATIENT)
Dept: CASE MANAGEMENT | Facility: OTHER | Age: 62
End: 2022-04-04

## 2022-04-04 NOTE — PROGRESS NOTES
[facility]W placed supportive call to Geri today  He stated his PSA is up, but doesn't really know what that means and is taking things day by day  OSW noted per his providers they are monitoring his labs and following up with him every few months  He is working 2-3 days/week and stated he likes having something to do  His endurance continues to be below what it was before cancer, but he stated he has accepted this as his new normal     Offered supportive listening and offered additional support as needed  He stated he will reach out to [facility] if needed  Will close to ongoing support at this time, however can be available for any additional needs if they arise

## 2022-04-19 ENCOUNTER — RA CDI HCC (OUTPATIENT)
Dept: OTHER | Facility: HOSPITAL | Age: 62
End: 2022-04-19

## 2022-04-19 NOTE — PROGRESS NOTES
[facility] [address]  coding opportunities          Chart Reviewed number of suggestions sent to Provider: 2     N18 31 Stage 3a chronic kidney disease ([facility] [address] )   *last assessed in 2021     E66 01 : Morbid (severe) obesity due to excess calories ([facility] [address] )    *Per CMS/ICD 10 coding guidelines, to be used when BMI > 35 & <40 with one or more comorbidity  (BMI:41 19 with DM, HTN)     If this is correct, please document and assess at your next visit,4/26/2022    Patients Insurance        Commercial Insurance: [employer]

## 2022-04-25 ENCOUNTER — LAB (OUTPATIENT)
Dept: LAB | Facility: CLINIC | Age: 62
End: 2022-04-25
Payer: COMMERCIAL

## 2022-04-25 DIAGNOSIS — E55.9 VITAMIN D DEFICIENCY: ICD-10-CM

## 2022-04-25 DIAGNOSIS — C62.92 SEMINOMA OF TESTIS, STAGE 3, LEFT (HCC): ICD-10-CM

## 2022-04-25 DIAGNOSIS — N18.31 STAGE 3A CHRONIC KIDNEY DISEASE (HCC): ICD-10-CM

## 2022-04-25 DIAGNOSIS — E83.42 HYPOMAGNESEMIA: ICD-10-CM

## 2022-04-25 DIAGNOSIS — E78.5 HYPERLIPIDEMIA, UNSPECIFIED HYPERLIPIDEMIA TYPE: ICD-10-CM

## 2022-04-25 DIAGNOSIS — C62.12 SEMINOMA OF DESCENDED LEFT TESTIS (HCC): ICD-10-CM

## 2022-04-25 DIAGNOSIS — N18.32 STAGE 3B CHRONIC KIDNEY DISEASE (HCC): ICD-10-CM

## 2022-04-25 LAB
25(OH)D3 SERPL-MCNC: 35.7 NG/ML (ref 30–100)
AFP-TM SERPL-MCNC: 1.9 NG/ML (ref 0.5–8)
BACTERIA UR QL AUTO: NORMAL /HPF
BILIRUB UR QL STRIP: NEGATIVE
CLARITY UR: CLEAR
COLOR UR: NORMAL
CREAT UR-MCNC: 133 MG/DL
GLUCOSE UR STRIP-MCNC: NEGATIVE MG/DL
HCG-TM SERPL-SCNC: <2 MLU/ML
HGB UR QL STRIP.AUTO: NEGATIVE
KETONES UR STRIP-MCNC: NEGATIVE MG/DL
LEUKOCYTE ESTERASE UR QL STRIP: NEGATIVE
MAGNESIUM SERPL-MCNC: 1.7 MG/DL (ref 1.6–2.6)
NITRITE UR QL STRIP: NEGATIVE
NON-SQ EPI CELLS URNS QL MICRO: NORMAL /HPF
PH UR STRIP.AUTO: 5.5 [PH]
PHOSPHATE SERPL-MCNC: 3.3 MG/DL (ref 2.3–4.1)
PROT UR STRIP-MCNC: NEGATIVE MG/DL
PROT UR-MCNC: 13 MG/DL
PROT/CREAT UR: 0.1 MG/G{CREAT} (ref 0–0.1)
PTH-INTACT SERPL-MCNC: 48.9 PG/ML (ref 18.4–80.1)
RBC #/AREA URNS AUTO: NORMAL /HPF
SP GR UR STRIP.AUTO: 1.02 (ref 1–1.03)
UROBILINOGEN UR STRIP-ACNC: <2 MG/DL
WBC #/AREA URNS AUTO: NORMAL /HPF

## 2022-04-25 PROCEDURE — 84156 ASSAY OF PROTEIN URINE: CPT

## 2022-04-25 PROCEDURE — 83970 ASSAY OF PARATHORMONE: CPT

## 2022-04-25 PROCEDURE — 82105 ALPHA-FETOPROTEIN SERUM: CPT

## 2022-04-25 PROCEDURE — 82306 VITAMIN D 25 HYDROXY: CPT

## 2022-04-25 PROCEDURE — 3061F NEG MICROALBUMINURIA REV: CPT | Performed by: FAMILY MEDICINE

## 2022-04-25 PROCEDURE — 81001 URINALYSIS AUTO W/SCOPE: CPT

## 2022-04-25 PROCEDURE — 83735 ASSAY OF MAGNESIUM: CPT

## 2022-04-25 PROCEDURE — 84702 CHORIONIC GONADOTROPIN TEST: CPT

## 2022-04-25 PROCEDURE — 82570 ASSAY OF URINE CREATININE: CPT

## 2022-04-25 PROCEDURE — 84100 ASSAY OF PHOSPHORUS: CPT

## 2022-04-26 ENCOUNTER — TELEPHONE (OUTPATIENT)
Dept: NEPHROLOGY | Facility: CLINIC | Age: 62
End: 2022-04-26

## 2022-04-26 ENCOUNTER — OFFICE VISIT (OUTPATIENT)
Dept: FAMILY MEDICINE CLINIC | Facility: CLINIC | Age: 62
End: 2022-04-26

## 2022-04-26 VITALS
TEMPERATURE: 97.3 F | OXYGEN SATURATION: 98 % | RESPIRATION RATE: 18 BRPM | HEART RATE: 83 BPM | BODY MASS INDEX: 41.12 KG/M2 | DIASTOLIC BLOOD PRESSURE: 60 MMHG | SYSTOLIC BLOOD PRESSURE: 146 MMHG | HEIGHT: 67 IN | WEIGHT: 262 LBS

## 2022-04-26 DIAGNOSIS — K21.9 GASTROESOPHAGEAL REFLUX DISEASE, UNSPECIFIED WHETHER ESOPHAGITIS PRESENT: Primary | ICD-10-CM

## 2022-04-26 DIAGNOSIS — I10 ESSENTIAL HYPERTENSION: ICD-10-CM

## 2022-04-26 DIAGNOSIS — C62.12 SEMINOMA OF DESCENDED LEFT TESTIS (HCC): ICD-10-CM

## 2022-04-26 DIAGNOSIS — N64.4 NIPPLE PAIN: ICD-10-CM

## 2022-04-26 DIAGNOSIS — C61 PROSTATE CANCER (HCC): ICD-10-CM

## 2022-04-26 PROCEDURE — 3077F SYST BP >= 140 MM HG: CPT | Performed by: FAMILY MEDICINE

## 2022-04-26 PROCEDURE — 99214 OFFICE O/P EST MOD 30 MIN: CPT | Performed by: FAMILY MEDICINE

## 2022-04-26 PROCEDURE — 3008F BODY MASS INDEX DOCD: CPT | Performed by: FAMILY MEDICINE

## 2022-04-26 PROCEDURE — 3078F DIAST BP <80 MM HG: CPT | Performed by: FAMILY MEDICINE

## 2022-04-26 RX ORDER — DEXLANSOPRAZOLE 60 MG/1
60 CAPSULE, DELAYED RELEASE ORAL DAILY
Qty: 30 CAPSULE | Refills: 5 | Status: SHIPPED | OUTPATIENT
Start: 2022-04-26 | End: 2022-08-03 | Stop reason: ALTCHOICE

## 2022-04-26 NOTE — TELEPHONE ENCOUNTER
Spoke with patient and advised:  Please let the patient know that most recent lab work in terms of renal parameters are stable      Will discuss further at the upcoming visit, let me know if they have any questions or concerns      Thanks    Patient is scheduled for 06/02/22

## 2022-04-26 NOTE — PROGRESS NOTES
Assessment/Plan:    GERD (gastroesophageal reflux disease)  He is managed on Dexilant  Refill was given today  Essential hypertension  Mild systolic elevation on Procardia XL 24 mg daily  Will cont this regimen along with renal follow up  Prostate cancer [facility])  He will cont f/u with urology q 6 mos and oncology q 3-4 mos    Seminoma of descended left testis [facility])  He will cont oncology follow up q 3-4 mos and urology q 6 mos  Diagnoses and all orders for this visit:    Gastroesophageal reflux disease, unspecified whether esophagitis present    Essential hypertension    Prostate cancer ([address] )    Seminoma of descended left testis (HCC)    Nipple pain  Comments:  Possibly from rubbing as he has been doing more yd work recently  Recommended monitoring and also discussing with his oncologist           Subjective:      Patient ID: Izabella Zee is a 60 y o  male  He is here for follow up  He ran out of [employer] a couple days ago and is having more reflux  He has been following with Urology every 6 months  Most recent visit was end of March  He has history of testicular cancer but had terrible reaction to chemotherapy  Also with history [place] [facility] prostate cancer  Currently monitoring PSA q 6 mos  He did labs yesterday  Kidney function is stable  He has returned to work 3 days per week for 4 hours per day  He c/o right nipple pain past 2 mos  No problems on left side  The following portions of the patient's history were reviewed and updated as appropriate: allergies, current medications, past family history, past medical history, past social history, past surgical history and problem list     Review of Systems   Constitutional: Positive for fatigue  HENT: Positive for rhinorrhea  Respiratory: Positive for shortness of breath  Genitourinary: Negative for difficulty urinating  Musculoskeletal: Negative for arthralgias     Allergic/Immunologic: Positive for environmental allergies  Neurological: Negative for dizziness and headaches  Psychiatric/Behavioral: Negative for dysphoric mood  The patient is not nervous/anxious  Objective:      /60 (BP Location: Left arm, Patient Position: Sitting, Cuff Size: Large)   Pulse 83   Temp (!) 97 3 °F (36 3 °C) (Tympanic)   Resp 18   Ht 5' 7" (1 702 m)   Wt 119 kg (262 lb)   SpO2 98%   BMI 41 04 kg/m²          Physical Exam  Vitals and nursing note reviewed  Constitutional:       Appearance: He is obese  HENT:      Head: Normocephalic and atraumatic  Mouth/Throat:      Mouth: Mucous membranes are moist    Cardiovascular:      Rate and Rhythm: Normal rate and regular rhythm  Pulses: Normal pulses  Heart sounds: Normal heart sounds  Pulmonary:      Effort: Pulmonary effort is normal       Breath sounds: Normal breath sounds  Chest:      Comments: No breast masses  Tender right nipple  Skin:     General: Skin is warm and dry  Neurological:      Mental Status: He is alert     Psychiatric:         Mood and Affect: Mood normal          Behavior: Behavior normal

## 2022-04-26 NOTE — ASSESSMENT & PLAN NOTE
Mild systolic elevation on Procardia XL 24 mg daily  Will cont this regimen along with renal follow up

## 2022-04-29 ENCOUNTER — DOCUMENTATION (OUTPATIENT)
Dept: FAMILY MEDICINE CLINIC | Facility: CLINIC | Age: 62
End: 2022-04-29

## 2022-05-12 ENCOUNTER — OFFICE VISIT (OUTPATIENT)
Dept: HEMATOLOGY ONCOLOGY | Facility: CLINIC | Age: 62
End: 2022-05-12
Payer: COMMERCIAL

## 2022-05-12 VITALS
RESPIRATION RATE: 16 BRPM | DIASTOLIC BLOOD PRESSURE: 78 MMHG | BODY MASS INDEX: 42.75 KG/M2 | TEMPERATURE: 97.7 F | SYSTOLIC BLOOD PRESSURE: 138 MMHG | OXYGEN SATURATION: 97 % | HEART RATE: 58 BPM | HEIGHT: 67 IN | WEIGHT: 272.4 LBS

## 2022-05-12 DIAGNOSIS — C62.92 SEMINOMA OF TESTIS, STAGE 3, LEFT (HCC): Primary | ICD-10-CM

## 2022-05-12 PROCEDURE — 3075F SYST BP GE 130 - 139MM HG: CPT | Performed by: INTERNAL MEDICINE

## 2022-05-12 PROCEDURE — 3078F DIAST BP <80 MM HG: CPT | Performed by: INTERNAL MEDICINE

## 2022-05-12 PROCEDURE — 3008F BODY MASS INDEX DOCD: CPT | Performed by: INTERNAL MEDICINE

## 2022-05-12 PROCEDURE — 1036F TOBACCO NON-USER: CPT | Performed by: INTERNAL MEDICINE

## 2022-05-12 PROCEDURE — 99213 OFFICE O/P EST LOW 20 MIN: CPT | Performed by: INTERNAL MEDICINE

## 2022-05-12 NOTE — PROGRESS NOTES
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 with external right iliac lymphadenopathy measuring up to 3 cm and left para-aortic lymphadenopathy measuring up to 2 3 cm (pT2, N2, S1) with persistent elevation of LDH less than 1 5 normal upper limit     3/23/21 Repeat alpha fetoprotein, hCG are normal, repeat  ()     Received 1st and only cycle of etoposide/ cisplatin with anti emetics   He was admitted to the hospital after 10 days of therapy with nausea, weakness   He was found to have acute renal insufficiency with creatinine of 4,    He received IV fluid with gradual improvement  He also developed neutropenia and thrombocytopenia grade 4 requiring platelet transfusion      CT scan C/A/P 4/15/21 showed significant response of chemotherapy in the abdominal lymphadenopathy    Creatinine improved to 1 73 5/7/21     Cisplatin changed to carboplatin AUC 5 on day 1 and etoposide 100 milligram/meter squared for 5 days    Despite this, he ended up in the hospital with pancytopenia, requiring blood and platelet transfusion, ileus, weight loss, nausea, vomiting, loss of appetite with poor tolerance of therapy     Received 2 cycles 5/17/21 and 6/7/21        CT scan 6/24/21 showed no evidence of lymphadenopathy      CT scan in January 2022 showed no evidence of disease     Normal LDH, alpha fetoprotein, hCG     Follow-up in July 2022 with CT scan of the abdomen and pelvis without IV contrast, CBC, CMP, LDH, alpha fetoprotein, hCG    Fatigue and taking naps throughout the day most likely he has obstructive sleep apnea, he is not applying CPAP        Labs and imaging studies are reviewed by ordering provider once results are available  If there are findings that need immediate attention, you will be contacted when results available     Discussing results and the implication on your healthcare is best discussed in person at your follow-up visit  HPI:    Marianne Crespo is a 60 y o  male with pure seminoma          HPI   Pressley Bence is a 57-year-old  male seen initially 4/1/21 regarding stage II B (pT2, pN2, S1) left-sided pure seminoma     He has a history of prostate cancer, nephrolithiasis, benign prostatic hypertrophy, polymyalgia, hypertension, sleep apnea, obesity, GERD, hiatal hernia, gout       His prostate cancer was diagnosed on September 2019 with single core with 4 mm focus of prostate cancer with very low risk group   He is on active surveillance with PSA 4 9 on March 2020 followed by Urology        He was found to have enlargement of the left testicle       Ultrasound of the scrotum on 10/24/2020 showed left testicle measuring 7 2 x 3 9 x 5 1 cm with lobulated contour and diffuse heterogeneous echotexture     Repeat ultrasound on 01/20/2021 showed 9 1 x 4 9 x 6 8 cm increase in size compared to prior ultrasound     LDH was elevated at 376 () normal hCG, alpha fetoprotein     Status post left inguinal orchiectomy on 02/19/2021, showing pure seminoma primary measuring 8 2 cm, abuts and invades into but not through tunica albuginea, invades hilar fat and spermatic cord soft tissue with lymphovascular invasion, spermatic cord is not involved by the tumor confirmed by 2nd opinion at [facility]       CT scan of the chest abdomen and pelvis on 03/20/2021 showed enlarged left iliac and left para-aortic lymph nodes for example left para-aortic lymph node measuring 2 3 cm, left external iliac lymph node measuring 30 mm no evidence of metastatic disease in the chest  He had pain at the left orchiectomy site on and off most likely responding to acetaminophen from healing      He reported 12 lb weight loss after the surgery       4/1/21:  cr 1 13, hemoglobin 1 3 3, MCV of 89, white blood cell count 7 75, 51% neutrophils, 33% lymphocytes, 12% monocytes, platelets 697     4 cycles of  Etoposide/ cisplatin per the NCCN guidelines recommended    For chemotherapy-induced neutropenia, pegfilgrastim recommended      The chemotherapy was complicated with dehydration, acute kidney injury requiring admission to the hospital 4/15-4/20/21  [facility] creatinine at time of discharge was 2 3   It  improved to 1 73 5/7/21     CT scan C/A/P  4/15/21 in the hospital showed significant reduction in seminoma with decrease of the abdominal lymphadenopathy      We changed cisplatin to carboplatin, despite that 2nd cycle was significant for pancytopenia, requiring admission to the hospital, ileus, CT scan showed no evidence of residual lymphadenopathy or masses in June 2021  We decided not to proceed with any additional chemotherapy because of poor tolerance      After 3 months CT scan of the abdomen and pelvis in September 2021 showed 1 1 cm para-aortic lymph node no new lesions, he has normal hCG as well as LDH      Admitted 11/15-11/20/21  2nd to abdominal pain, fatigue, malaise   Tested positive for COVID  Noncontrast CT scan abdomen and pelvis 11/16/21 -  Scattered and ill-defined groundglass opacities in the visualized bilateral lungs correlates with the patient's history of COVID 19 infection  Fatty infiltration of the liver is suspected  In the setting of abdominal pain and/or elevated liver function tests, consider steatohepatitis   Cholelithiasis without discrete evidence of acute cholecystitis, bilateral nephrolithiasis, no hydronephrosis    CT scan of the abdomen and pelvis in January 2022 showed no evidence of malignancy or lymphadenopathy  Interval History:        Previous Treatment:         Test Results:    Imaging: No results found      Labs:   Lab Results   Component Value Date    WBC 10 59 (H) 04/25/2022    HGB 12 2 04/25/2022    HCT 38 4 04/25/2022    MCV 95 04/25/2022     04/25/2022     Lab Results   Component Value Date    K 4 1 04/25/2022     04/25/2022    CO2 25 04/25/2022    BUN 28 (H) 04/25/2022    CREATININE 1 54 (H) 04/25/2022    GLUF 114 (H) 04/25/2022    CALCIUM 9 8 04/25/2022    CORRECTEDCA 9 8 12/22/2021    AST 22 04/25/2022    ALT 29 04/25/2022    ALKPHOS 80 04/25/2022    EGFR 47 04/25/2022       Lab Results   Component Value Date    IRON 69 01/31/2022    TIBC 345 01/31/2022    FERRITIN 146 01/31/2022       No results found for: [id number]      ROS: Review of Systems   Constitutional: Positive for fatigue  Negative for appetite change, chills, diaphoresis, fever and unexpected weight change  HENT:   Negative for hearing loss, lump/mass, mouth sores, nosebleeds, sore throat, trouble swallowing and voice change  Eyes: Negative  Negative for eye problems and icterus  Respiratory: Negative  Negative for chest tightness, cough, hemoptysis and shortness of breath  Cardiovascular: Negative for chest pain and leg swelling  Gastrointestinal: Negative for abdominal distention, abdominal pain, blood in stool, constipation, diarrhea and nausea  Endocrine: Negative  Genitourinary: Negative for dysuria, frequency, hematuria and pelvic pain  Musculoskeletal: Negative  Negative for arthralgias, back pain, flank pain, gait problem, myalgias and neck stiffness  Skin: Negative for itching and rash  Neurological: Negative for dizziness, gait problem, headaches, light-headedness, numbness and speech difficulty  Hematological: Negative for adenopathy  Does not bruise/bleed easily  Psychiatric/Behavioral: Positive for sleep disturbance  Negative for confusion, decreased concentration and depression  The patient is not nervous/anxious  Current Medications: Reviewed  Allergies: Reviewed  PMH/FH/SH:  Reviewed      Physical Exam:    Body surface area is 2 31 meters squared      Wt Readings from Last 3 Encounters:   05/12/22 124 kg (272 lb 6 4 oz)   04/26/22 119 kg (262 lb)   03/28/22 119 kg (263 lb)        Temp Readings from Last 3 Encounters:   22 97 7 °F (36 5 °C) (Tympanic)   22 (!) 97 3 °F (36 3 °C) (Tympanic)   22 98 3 °F (36 8 °C) (Oral)        BP Readings from Last 3 Encounters:   22 138/78   22 146/60   22 134/86         Pulse Readings from Last 3 Encounters:   22 58   22 83   22 68        Physical Exam  Vitals reviewed  Constitutional:       General: He is not in acute distress  Appearance: He is well-developed  He is obese  He is not diaphoretic  HENT:      Head: Normocephalic and atraumatic  Eyes:      Conjunctiva/sclera: Conjunctivae normal    Neck:      Trachea: No tracheal deviation  Cardiovascular:      Rate and Rhythm: Normal rate and regular rhythm  Heart sounds: No murmur heard  No friction rub  No gallop  Pulmonary:      Effort: Pulmonary effort is normal  No respiratory distress  Breath sounds: Normal breath sounds  No wheezing or rales  Chest:      Chest wall: No tenderness  Abdominal:      General: There is no distension  Palpations: Abdomen is soft  Tenderness: There is no abdominal tenderness  Musculoskeletal:      Cervical back: Normal range of motion and neck supple  Right lower leg: Edema present  Left lower leg: Edema present  Lymphadenopathy:      Cervical: No cervical adenopathy  Skin:     General: Skin is warm and dry  Coloration: Skin is not pale  Findings: No erythema  Neurological:      Mental Status: He is alert and oriented to person, place, and time  Psychiatric:         Behavior: Behavior normal          Thought Content: Thought content normal          Judgment: Judgment normal          ECO    Goals and Barriers:  Current Goal: Minimize effects of disease  Barriers: None  Patient's Capacity to Self Care:  Patient is able to self care      Code Status: [unfilled]

## 2022-06-02 ENCOUNTER — OFFICE VISIT (OUTPATIENT)
Dept: NEPHROLOGY | Facility: CLINIC | Age: 62
End: 2022-06-02
Payer: COMMERCIAL

## 2022-06-02 VITALS
HEART RATE: 70 BPM | OXYGEN SATURATION: 96 % | BODY MASS INDEX: 43.32 KG/M2 | DIASTOLIC BLOOD PRESSURE: 62 MMHG | HEIGHT: 67 IN | SYSTOLIC BLOOD PRESSURE: 168 MMHG | WEIGHT: 276 LBS

## 2022-06-02 DIAGNOSIS — E55.9 VITAMIN D DEFICIENCY: ICD-10-CM

## 2022-06-02 DIAGNOSIS — E83.42 HYPOMAGNESEMIA: ICD-10-CM

## 2022-06-02 DIAGNOSIS — N18.31 STAGE 3A CHRONIC KIDNEY DISEASE (HCC): Primary | ICD-10-CM

## 2022-06-02 DIAGNOSIS — E66.01 MORBID (SEVERE) OBESITY DUE TO EXCESS CALORIES (HCC): ICD-10-CM

## 2022-06-02 DIAGNOSIS — D63.1 ANEMIA DUE TO STAGE 3A CHRONIC KIDNEY DISEASE (HCC): ICD-10-CM

## 2022-06-02 DIAGNOSIS — E78.5 HYPERLIPIDEMIA, UNSPECIFIED HYPERLIPIDEMIA TYPE: ICD-10-CM

## 2022-06-02 DIAGNOSIS — N18.31 ANEMIA DUE TO STAGE 3A CHRONIC KIDNEY DISEASE (HCC): ICD-10-CM

## 2022-06-02 DIAGNOSIS — I10 ESSENTIAL HYPERTENSION: ICD-10-CM

## 2022-06-02 DIAGNOSIS — C61 PROSTATE CANCER (HCC): ICD-10-CM

## 2022-06-02 PROCEDURE — 4010F ACE/ARB THERAPY RXD/TAKEN: CPT | Performed by: INTERNAL MEDICINE

## 2022-06-02 PROCEDURE — 99214 OFFICE O/P EST MOD 30 MIN: CPT | Performed by: INTERNAL MEDICINE

## 2022-06-02 PROCEDURE — 3066F NEPHROPATHY DOC TX: CPT | Performed by: INTERNAL MEDICINE

## 2022-06-02 RX ORDER — LOSARTAN POTASSIUM 50 MG/1
50 TABLET ORAL DAILY
Qty: 90 TABLET | Refills: 3 | Status: SHIPPED | OUTPATIENT
Start: 2022-06-02

## 2022-06-02 NOTE — PATIENT INSTRUCTIONS
- start losartan 50mg once a day  - get blood work in 1 month    - Please call me in 10 days after having your blood work done to review the results if you do not hear back from me or my office, as I may have not received the results  - please remember to perform blood work prior to the next visit  - Please call if the blood pressure top number is greater than 150 or less than 110 consistently  - Please call if you are gaining more than 2lbs in 2 days for adjustment of water pills   ~ Please AVOID the following pain medications  LIST OF NSAIDS (NONSTEROIDAL ANTI-INFLAMMATORY DRUGS) AND VILLAGOMEZ-2 INHIBITORS    DIFLUNISAL (DOLOBID)  IBUPROFEN (MOTRIN, ADVIL)  FLURBIPROFEN (ANSAID)  KETOPROFEN (ORUDIS, ORUVAIL)  FENOPROFEN (NALFON)  NABUMETONE (RELAFEN)  PIROXICAM (FELDENE)  NAPROXEN (ALEVE, NAPROSYN, NAPRELAN, ANAPROX)  DICLOFENAC (VOLTAREN, CATAFLAM)  INDOMETHACIN (INDOCIN)  SULINDAC (CLINORIL)  TOLMETIN (TOLETIN)  ETODOLAC (LODINE)  MELOXICAM (MOBIC)  KETOROLAC (TORADOL)  OXAPROZIN (DAYPRO)  CELECOXIB (CELEBREX)    Phosphorus diet  Follow a low phosphorus diet      Avoid these higher phosphorus foods: Choose these lower phosphorus foods:   Milk, pudding or yogurt (from animals and from many soy varieties) Rice milk (unfortified), nondairy creamer (if it doesn't have terms in the ingredients list that contain the letters "phos")   Hard cheeses, ricotta or cottage cheese, fat-free cream cheese Regular and low-fat cream cheese   Ice cream or frozen yogurt Sherbet or frozen fruit pops   Soups made with higher phosphorus ingredients (milk, dried peas, beans, lentils) Soups made with lower phosphorus ingredients (broth- or water-based with other lower phosphorus ingredients)   Whole grains, including whole-grain breads, crackers, cereal, rice and pasta Refined grains, including white bread, crackers, cereals, rice and pasta   Quick breads, biscuits, cornbread, muffins, pancakes or waffles Homemade refined (white) dinner rolls, bagels or English muffins   Dried peas (split, black-eyed), beans (black, garbanzo, lima, kidney, navy, weinberg) or lentils Green peas (canned, frozen), green beans or wax beans   Organ meats, walleye, pollock or sardines Lean beef, pork, lamb, poultry or other fish   Nuts and seeds Popcorn   Peanut butter and other nut butters Jam, jelly or honey   Chocolate, including chocolate drinks Carob (chocolate-flavored) candy, hard candy or gumdrops   Yuliana and pepper-type sodas, flavored kincaid, bottled teas (if a term in the ingredients list contains the letters "phos") Lemon-lime soda, ginger ale or root beer, plain water   Follow a moderate potassium diet  Things to do to reduce your blood pressure include working with all your physician to do the following:  ~ stop smoking if you smoke  ~ increase cardiovascular exercise like walking and swimming    ~ modify your diet to decrease fat and salt intake  ~ reduce your weight if you are overweight or obese   ~ increase the consumption of fruits, vegetables and whole grains  ~ decrease alcohol consumption if you consume alcohol    ~ try to minimize stress in your life with lifestyle modifications  ~ be compliant with your anti-hypertensive medications  ~ adjust your medications to help improve your vascular stiffness and decrease risks for heart attacks and strokes  Exercise to Lose Weight     Exercise and a healthy diet may help you lose weight  Your doctor may suggest specific exercises  EXERCISE IDEAS AND TIPS     Choose low-cost things you enjoy doing, such as walking, bicycling, or exercising to workout videos  Take stairs instead of the elevator  Walk during your lunch break  Park your car further away from work or school  Go to a gym or an exercise class  Start with 5 to 10 minutes of exercise each day  Build up to 30 minutes of exercise 4 to 6 days a week  Wear shoes with good support and comfortable clothes     Stretch before and after working out  Work out until you breathe harder and your heart beats faster  Drink extra water when you exercise  Do not do so much that you hurt yourself, feel dizzy, or get very short of breath  Exercises that burn about 150 calories:   Running 1 ½ miles in 15 minutes  Playing volleyball for 45 to 60 minutes  Washing and waxing a car for 45 to 60 minutes  Playing touch football for 45 minutes  Walking 1 ¾ miles in 35 minutes  Pushing a stroller 1 ½ miles in 30 minutes  Playing basketball for 30 minutes  Raking leaves for 30 minutes  Bicycling 5 miles in 30 minutes  Walking 2 miles in 30 minutes  Dancing for 30 minutes  Shoveling snow for 15 minutes  Swimming laps for 20 minutes  Walking up stairs for 15 minutes  Bicycling 4 miles in 15 minutes  Gardening for 30 to 45 minutes  Jumping rope for 15 minutes  Washing windows or floors for 45 to 60 minutes

## 2022-06-02 NOTE — PROGRESS NOTES
Nephrology Follow up Consultation  Kristen Nuno 60 y o  male MRN: [id number]            BACKGROUND:  Kristen Nuno is a 60 y o male who was referred by Rossi Arrieta MD for evaluation of Follow-up and Chronic Kidney Disease    ASSESSMENT / PLAN:   60 y o   male with pmh of multiple co-morbidities including  Hypertension, nephrolithiasis,  Polymyalgia rheumatica, testicular cancer presents to the office for routine follow-up  CKD stage III a/b:  -  after review of records In [facility] as well as Care everywhere patient had a baseline creatinine of 1-1 5mg/dL  Most recent labs show a baseline creatinine around 1 5-1 9 mg/dL after acute kidney injury episode of November 2021  Most recent labs show creatinine of 1 54 mg/dL on 04/25/2022  Renal function remains stable at baseline   -  Likely has underlying CKD secondary to age-related nephron loss plus episode of acute kidney injury non dialysis requiring due to chemotherapy with platinum compounds plus obesity related hyper filtration plus hypertensive nephrosclerosis   -  CT abdomen from 06/24/2021 showing no hydronephrosis  - Acid base and lytes stable  - Clinically the patient appears to be  euvolemic  - Recommend to avoid use of NSAIDs, nephrotoxins  Caution advised with regards to exposure to IV contrast dye    - Discussed with the patient in depth His renal status, including the possible etiologies for CKD  - Advised the patient that when  is GFR is close to 20mL/min then will start discussing about RRT(renal replacement therapy) options such as renal transplant, peritoneal dialysis and hemodialysis  - Informed the patient about the various options for Renal Replacement therapy  - Discussed with the patient how we need to work together to delay the progression of CKD with optimal BP control based on their age and co-morbidities, optimal BS control with HbA1c of <7% and trying to reduce proteinuria by the use of anti-proteinuric agents  - referral to CKD education/kidney smart placed on 12/06/2021, completed 12/22/2021    Hypertension:  - Patient is on Procardia XL 30 mg p o  q day  - start losartan 50 mg p o  q day check BMP in 1 month call with blood pressure updates  - if renal parameters stable then consider initiation of Ace inhibitors next visit  - Goal BP of < 140/90 based on age and comorbidities  - Instructed to follow low sodium (2gm)diet  Hemoglobin/anemia:  - Goal Hb of 10-12 g/dL  - Most recent labs suggestive of  12 2 grams/deciliter  -  Management per Oncology    CKD-MBD(Mineral Bone Disease) /vitamin-D deficiency:  - Based on patients CKD stage following is the goal of therapy  - Maintain calcium phosphorus product of < 55   - Stage 3 CKD - Goal Ca 8 5-10 mg/dL , goal Phos 2 7-4 6 mg/dL  , goal iPTH 30-70 pg/mL  - Patient is currently at goal   - Most recent intact PTH of 48 9 and vitamin-D of 35 7 as of 04/25/2022  - Continue patient on  Vitamin-D 2000 units p o  q day  - Check intact PTH vitamin-D prior to next visit    Proteinuria:  - proteinuria most likely secondary to obesity related hyper filtration  - most recent protein creatinine ratio 100 mg/dL as of 04/25/2022 stable  - check protein creatinine ratio  - currently on therapy for proteinuria with Procardia XL, vitamin-D    Lipids:  -   On Crestor  - goal LDL less than 70  - management per primary team     h/o nephrolithiasis:  - last stone 2019  - Discussed with the patient that the most common types of kidney stones are calcium oxalate stones  - Advised to follow a diet with increased fluid intake so that urine output is greater than 2-2 5L/day  - Advised patient to decrease salt, protein and oxalate intake  - Dietary handouts given  - had ordered 24 urine test in the past however logistically was not done  At this time patient wishes to hold off I respect his decision advised of dietary habits for now though       hypomagnesemia:  -  Likely secondary to chemotherapy with cisplatin  -  Most recent magnesium level 1 7 mEq  -  continue magnesium oxide 400 mg p o  q day check blood work prior to next visit     testicular cancer:  - Management as per primary team  - Follow-up with Heme-Onc  - Last seen by Hematology on 05/12/2022 notes reviewed in epic  - Chemotherapy   Had been changed to carboplatin and etoposide, however due to poor tolerance it was decided to hold off on further chemotherapy  Continue close follow-up with Heme-Onc  Nutrition/morbid obesity:  - Encouraged patient to follow a renal diet comprising of moderate potassium, low phosphorus and protein restriction to 0 8gm/kg  Advised of dietary habits and weight loss  - Will check serum albumin with next blood work  Followup:  - Patient is to follow-up in 6 months, with lab work to be performed in 1 month and then again in a few days prior to the next visit  Advised patient to call me in 10 days to review the results if they do not hear back from me, as I may have not received the results  Jeff Crawford MD, [facility], 6/2/2022, 9:47 AM             SUBJECTIVE: 60 y o  Male presents to the office for routine follow-up  Overall just feels tired no recent hospitalization no issues with edema not checking blood pressures at home no NSAID use thankful for all the care information that she has gotten today  Reports his blood pressure has been running elevated when he goes for checkups and even for 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  Agreeable to starting losartan  Following up with Hematology-Oncology  Is in the process of trying to find a new PCP is his current PCP is retiring  Review of Systems   Constitutional: Negative for appetite change, chills, fatigue and fever  HENT: Negative for congestion, postnasal drip, rhinorrhea and sore throat  Respiratory: Negative for cough, shortness of breath and wheezing  Cardiovascular: Negative for leg swelling     Gastrointestinal: Negative for abdominal pain, constipation, diarrhea, nausea and vomiting  Genitourinary: Negative for difficulty urinating and dysuria  Musculoskeletal: Negative for back pain  Skin: Negative for wound  Neurological: Negative for dizziness, light-headedness and headaches  Psychiatric/Behavioral: Negative for agitation and confusion  All other systems reviewed and are negative        PAST MEDICAL HISTORY:  Past Medical History:   Diagnosis Date    BPH without obstruction/lower urinary tract symptoms     Cancer (HCC)     prostate     CPAP (continuous positive airway pressure) dependence     Elevated PSA     GERD (gastroesophageal reflux disease)     Gout     Hyperlipidemia     Hypertension     Kidney stone     Obese abdomen     Obesity     Polymyalgia (HCC)     Prostate cancer (HCC)     Rash     under both arms and in between legs - family doctor aware - pt uses Desitin    Sleep apnea     Testicular carcinoma ([facility] [address]     Urinary frequency     Urinary urgency     Wears glasses        PROBLEM LIST    Patient Active Problem List   Diagnosis    Calculus of kidney    Elevated PSA    Benign prostatic hyperplasia with nocturia    Prostate cancer ([facility] [address] [facility]    Hydrocele in adult    Mass of left testis    Essential hypertension    GERD (gastroesophageal reflux disease)    CPAP (continuous positive airway pressure) dependence    Morbid (severe) obesity due to excess calories (HCC)    Seminoma of descended left testis (HCC)    Seminoma of testis, stage 3, left (HCC)    SIMON on CPAP    Steroid-induced hyperglycemia    CKD (chronic kidney disease) stage 3, GFR 30-59 ml/min (HCC)    Back pain    Thrombocytopenia (HCC)    Anemia    Lymphadenopathy, retroperitoneal    Pancytopenia (HCC)    Gastric distention    Vitamin D deficiency    Hyperlipidemia    Hypomagnesemia    History of nephrolithiasis    Abnormal EKG    Lower abdominal pain    COVID-19 in immunocompromised patient (HCC)    Elevated LDH       PAST SURGICAL HISTORY:  Past Surgical History:   Procedure Laterality Date    CARPAL TUNNEL RELEASE Bilateral 07/2020    COLONOSCOPY      CYSTOSCOPY W/ LASER LITHOTRIPSY  2001    CYSTOSTOMY W/ STENT INSERTION  2011    ESOPHAGOGASTRODUODENOSCOPY      EXTRACORPOREAL SHOCK WAVE LITHOTRIPSY Right 2011    HERNIA REPAIR      KNEE ARTHROSCOPY Left     OK CYSTO/URETERO W/LITHOTRIPSY &INDWELL STENT INSRT Right 4/29/2018    Procedure: CYSTOSCOPY URETEROSCOPY, RETROGRADE PYELOGRAM AND INSERTION STENT URETERAL;  Surgeon: José Gallagher MD;  Location: [facility];  Service: Urology    OK REMOVAL TESTIS,RADICAL Left 2/19/2021    Procedure: Radical  ORCHIECTOMY;  Surgeon: Sosa Birmingham MD;  Location: [facility];  Service: Urology       SOCIAL HISTORY :   reports that he has never smoked  He has never used smokeless tobacco  He reports previous alcohol use  He reports that he does not use drugs  FAMILY HISTORY:  Family History   Problem Relation Age of Onset    Nephrolithiasis Father     Alzheimer's disease Mother        ALLERGIES:  No Known Allergies        PHYSICAL EXAM:  Vitals:    06/02/22 0921 06/02/22 0933   BP: 170/70 168/62   BP Location: Left arm    Patient Position: Sitting    Cuff Size: Large    Pulse: 70    SpO2: 96%    Weight: 125 kg (276 lb)    Height: 5' 7" (1 702 m)      Body mass index is 43 23 kg/m²  Physical Exam  Vitals reviewed  Constitutional:       General: He is not in acute distress  Appearance: Normal appearance  He is obese  He is not ill-appearing, toxic-appearing or diaphoretic  HENT:      Head: Normocephalic and atraumatic  Mouth/Throat:      Mouth: Mucous membranes are moist       Pharynx: Oropharynx is clear  No oropharyngeal exudate  Eyes:      General: No scleral icterus  Conjunctiva/sclera: Conjunctivae normal    Cardiovascular:      Rate and Rhythm: Normal rate  Heart sounds: Normal heart sounds  No murmur heard  No friction rub     Pulmonary: Effort: Pulmonary effort is normal  No respiratory distress  Breath sounds: Normal breath sounds  No stridor  No wheezing  Abdominal:      General: There is no distension  Palpations: Abdomen is soft  There is no mass  Tenderness: There is no abdominal tenderness  There is no right CVA tenderness or left CVA tenderness  Musculoskeletal:         General: No swelling  Cervical back: Normal range of motion and neck supple  No rigidity  Skin:     General: Skin is warm  Coloration: Skin is not jaundiced  Neurological:      General: No focal deficit present  Mental Status: He is alert and oriented to person, place, and time  Mental status is at baseline     Psychiatric:         Mood and Affect: Mood normal          Behavior: Behavior normal          LABORATORY DATA:     Results from last 6 Months   Lab Units 04/25/22  0907 01/31/22  1021 01/30/22  2328   WBC Thousand/uL 10 59* 7 84 11 86*   HEMOGLOBIN g/dL 12 2 11 6* 12 0   HEMATOCRIT % 38 4 35 6* 35 8*   PLATELETS Thousands/uL 248 257 265   POTASSIUM mmol/L 4 1 4 2 4 3   CHLORIDE mmol/L 107 109* 103   CO2 mmol/L 25 27 26   BUN mg/dL 28* 25 24   CREATININE mg/dL 1 54* 1 52* 1 47*   CALCIUM mg/dL 9 8 9 3 9 0   MAGNESIUM mg/dL 1 7  --   --    PHOSPHORUS mg/dL 3 3  --   --    IRON ug/dL  --  69  --    FERRITIN ng/mL  --  146  --         rest all reviewed    RADIOLOGY:  No orders to display     Rest all reviewed        MEDICATIONS:    Current Outpatient Medications:     allopurinol (ZYLOPRIM) 100 mg tablet, allopurinol 100 mg tablet, Disp: , Rfl:     Cholecalciferol (VITAMIN D3) 2000 units capsule, Take 1 capsule by mouth daily, Disp: , Rfl:     dexlansoprazole (Dexilant) 60 MG capsule, Take 1 capsule (60 mg total) by mouth daily, Disp: 30 capsule, Rfl: 5    losartan (COZAAR) 50 mg tablet, Take 1 tablet (50 mg total) by mouth daily, Disp: 90 tablet, Rfl: 3    magnesium oxide (MAG-OX) 400 mg, Take 1 tablet (400 mg total) by mouth once for 1 dose, Disp: 90 tablet, Rfl: 4    NIFEdipine (PROCARDIA XL) 30 mg 24 hr tablet, Take 1 tablet (30 mg total) by mouth daily, Disp: 90 tablet, Rfl: 4    rosuvastatin (CRESTOR) 20 MG tablet, Take 1 tablet (20 mg total) by mouth daily at bedtime, Disp: 30 tablet, Rfl: 40          Portions of the record may have been created with voice recognition software  Occasional wrong word or "sound a like" substitutions may have occurred due to the inherent limitations of voice recognition software  Read the chart carefully and recognize, using context, where substitutions have occurred  If you have any questions, please contact the dictating provider

## 2022-06-06 ENCOUNTER — OFFICE VISIT (OUTPATIENT)
Dept: FAMILY MEDICINE CLINIC | Facility: CLINIC | Age: 62
End: 2022-06-06
Payer: COMMERCIAL

## 2022-06-06 VITALS
SYSTOLIC BLOOD PRESSURE: 145 MMHG | TEMPERATURE: 99.6 F | OXYGEN SATURATION: 95 % | WEIGHT: 267.6 LBS | HEIGHT: 67 IN | DIASTOLIC BLOOD PRESSURE: 60 MMHG | HEART RATE: 85 BPM | RESPIRATION RATE: 12 BRPM | BODY MASS INDEX: 42 KG/M2

## 2022-06-06 DIAGNOSIS — J21.9 BRONCHIOLITIS: Primary | ICD-10-CM

## 2022-06-06 PROCEDURE — 99214 OFFICE O/P EST MOD 30 MIN: CPT | Performed by: INTERNAL MEDICINE

## 2022-06-06 PROCEDURE — 87811 SARS-COV-2 COVID19 W/OPTIC: CPT | Performed by: INTERNAL MEDICINE

## 2022-06-06 RX ORDER — AZITHROMYCIN 250 MG/1
TABLET, FILM COATED ORAL
Qty: 6 TABLET | Refills: 0 | Status: SHIPPED | OUTPATIENT
Start: 2022-06-06 | End: 2022-06-11

## 2022-06-06 RX ORDER — BENZONATATE 200 MG/1
200 CAPSULE ORAL 3 TIMES DAILY PRN
Qty: 20 CAPSULE | Refills: 0 | Status: SHIPPED | OUTPATIENT
Start: 2022-06-06

## 2022-06-06 RX ORDER — AMOXICILLIN AND CLAVULANATE POTASSIUM 875; 125 MG/1; MG/1
1 TABLET, FILM COATED ORAL EVERY 12 HOURS SCHEDULED
Qty: 14 TABLET | Refills: 0 | Status: SHIPPED | OUTPATIENT
Start: 2022-06-06 | End: 2022-06-06 | Stop reason: ALTCHOICE

## 2022-06-06 NOTE — ASSESSMENT & PLAN NOTE
Clinically looks like viral bronchitis  He does not have any respiratory distress, very mild bilateral wheezes  Will start him on benzonatate 100 mg t i d  as needed and Z-Sebastian  He will let me know if no improvement in 48 hours  COVID-19 test in the office was negative

## 2022-06-06 NOTE — PROGRESS NOTES
Assessment/Plan:    Bronchiolitis  Clinically looks like viral bronchitis  He does not have any respiratory distress, very mild bilateral wheezes  Will start him on benzonatate 100 mg t i d  as needed and Z-Sebastian  He will let me know if no improvement in 48 hours  COVID-19 test in the office was negative  Diagnoses and all orders for this visit:    Bronchiolitis  -     benzonatate (TESSALON) 200 MG capsule; Take 1 capsule (200 mg total) by mouth 3 (three) times a day as needed for cough  -     Discontinue: amoxicillin-clavulanate (Augmentin) 875-125 mg per tablet; Take 1 tablet by mouth every 12 (twelve) hours for 7 days  -     azithromycin (Zithromax) 250 mg tablet; Take 2 tablets (500 mg total) by mouth daily for 1 day, THEN 1 tablet (250 mg total) daily for 4 days  Subjective:      Patient ID: Jovita Burger is a 60 y o  male  Patient came today with new complaints of a cough and feeling unwell starting last week on Tuesday  Sinusitis  Associated symptoms include chills, coughing and a sore throat  Pertinent negatives include no shortness of breath  The following portions of the patient's history were reviewed and updated as appropriate: allergies, current medications, past family history, past medical history, past social history, past surgical history, and problem list     Review of Systems   Constitutional: Positive for chills  Negative for appetite change, fatigue and fever  HENT: Positive for rhinorrhea, sinus pain and sore throat  Respiratory: Positive for cough  Negative for chest tightness and shortness of breath  Cardiovascular: Negative for chest pain  Gastrointestinal: Negative for diarrhea, nausea and vomiting  Musculoskeletal: Negative for arthralgias and myalgias           Objective:      /60 (BP Location: Left arm, Patient Position: Sitting, Cuff Size: Large)   Pulse 85   Temp 99 6 °F (37 6 °C)   Resp 12   Ht 5' 7" (1 702 m)   Wt 121 kg (267 lb 9 6 oz)   SpO2 95%   BMI 41 91 kg/m²     No Known Allergies       Current Outpatient Medications:     azithromycin (Zithromax) 250 mg tablet, Take 2 tablets (500 mg total) by mouth daily for 1 day, THEN 1 tablet (250 mg total) daily for 4 days  , Disp: 6 tablet, Rfl: 0    benzonatate (TESSALON) 200 MG capsule, Take 1 capsule (200 mg total) by mouth 3 (three) times a day as needed for cough, Disp: 20 capsule, Rfl: 0    allopurinol (ZYLOPRIM) 100 mg tablet, allopurinol 100 mg tablet, Disp: , Rfl:     Cholecalciferol (VITAMIN D3) 2000 units capsule, Take 1 capsule by mouth daily, Disp: , Rfl:     dexlansoprazole (Dexilant) 60 MG capsule, Take 1 capsule (60 mg total) by mouth daily, Disp: 30 capsule, Rfl: 5    losartan (COZAAR) 50 mg tablet, Take 1 tablet (50 mg total) by mouth daily, Disp: 90 tablet, Rfl: 3    magnesium oxide (MAG-OX) 400 mg, Take 1 tablet (400 mg total) by mouth once for 1 dose, Disp: 90 tablet, Rfl: 4    NIFEdipine (PROCARDIA XL) 30 mg 24 hr tablet, Take 1 tablet (30 mg total) by mouth daily, Disp: 90 tablet, Rfl: 4    rosuvastatin (CRESTOR) 20 MG tablet, Take 1 tablet (20 mg total) by mouth daily at bedtime, Disp: 30 tablet, Rfl: 40     There are no Patient Instructions on file for this visit  Physical Exam  Constitutional:       General: He is not in acute distress  Appearance: He is not ill-appearing or toxic-appearing  Cardiovascular:      Rate and Rhythm: Normal rate  Heart sounds: No murmur heard  No gallop  Pulmonary:      Effort: No respiratory distress  Breath sounds: Wheezing present  No rales  Neurological:      Mental Status: He is alert

## 2022-06-07 LAB
SARS-COV-2 AG UPPER RESP QL IA: NEGATIVE
VALID CONTROL: NORMAL

## 2022-06-13 ENCOUNTER — OFFICE VISIT (OUTPATIENT)
Dept: FAMILY MEDICINE CLINIC | Facility: CLINIC | Age: 62
End: 2022-06-13
Payer: COMMERCIAL

## 2022-06-13 ENCOUNTER — APPOINTMENT (OUTPATIENT)
Dept: RADIOLOGY | Facility: MEDICAL CENTER | Age: 62
End: 2022-06-13
Payer: COMMERCIAL

## 2022-06-13 VITALS
BODY MASS INDEX: 42.41 KG/M2 | HEIGHT: 67 IN | RESPIRATION RATE: 12 BRPM | OXYGEN SATURATION: 99 % | WEIGHT: 270.2 LBS | SYSTOLIC BLOOD PRESSURE: 188 MMHG | HEART RATE: 74 BPM | DIASTOLIC BLOOD PRESSURE: 64 MMHG

## 2022-06-13 DIAGNOSIS — I16.0 HYPERTENSIVE URGENCY: ICD-10-CM

## 2022-06-13 DIAGNOSIS — R05.9 COUGH: ICD-10-CM

## 2022-06-13 DIAGNOSIS — R05.9 COUGH: Primary | ICD-10-CM

## 2022-06-13 DIAGNOSIS — I47.1 SUPRAVENTRICULAR TACHYCARDIA (HCC): ICD-10-CM

## 2022-06-13 DIAGNOSIS — D84.9 IMMUNODEFICIENCY, UNSPECIFIED (HCC): ICD-10-CM

## 2022-06-13 PROBLEM — I47.10 SUPRAVENTRICULAR TACHYCARDIA: Status: ACTIVE | Noted: 2022-06-13

## 2022-06-13 PROCEDURE — 71046 X-RAY EXAM CHEST 2 VIEWS: CPT

## 2022-06-13 PROCEDURE — 99214 OFFICE O/P EST MOD 30 MIN: CPT | Performed by: INTERNAL MEDICINE

## 2022-06-13 RX ORDER — PROMETHAZINE HYDROCHLORIDE AND CODEINE PHOSPHATE 6.25; 1 MG/5ML; MG/5ML
5 SYRUP ORAL EVERY 6 HOURS PRN
Qty: 180 ML | Refills: 0 | Status: SHIPPED | OUTPATIENT
Start: 2022-06-13

## 2022-06-13 NOTE — ASSESSMENT & PLAN NOTE
Patient came today to the office with the blood pressure that was recheck by me of 210/80  He did not report any chest pain, palpitation, vision changes or headache  Dose of sublingual enalapril 10 mg was given  He will continue to take his nifedipine 30 mg daily in the morning and losartan 50 mg at night  He said that he did not take his losartan yesterday  He will follow-up with us in 3 days  Was discussed with the patient if any chest pain, palpitation, vision changes he needs to go to emergency room immediately

## 2022-06-13 NOTE — PROGRESS NOTES
Assessment/Plan:    Cough  Still complains of persistent cough, looks like benzonatate is not helping  He said that he was not taking any over-the-counter cough medicines  Will do x-ray of the chest to exclude any gross changes  Will use codeine syrup, side effects discussed  Clinically looks like postviral cough, discussed with the patient the may last for few weeks  Hypertensive urgency  Patient came today to the office with the blood pressure that was recheck by me of 210/80  He did not report any chest pain, palpitation, vision changes or headache  Dose of sublingual enalapril 10 mg was given  He will continue to take his nifedipine 30 mg daily in the morning and losartan 50 mg at night  He said that he did not take his losartan yesterday  He will follow-up with us in 3 days  Was discussed with the patient if any chest pain, palpitation, vision changes he needs to go to emergency room immediately  Diagnoses and all orders for this visit:    Cough  -     XR chest pa & lateral; Future  -     promethazine-codeine (PHENERGAN WITH CODEINE) 6 25-10 mg/5 mL syrup; Take 5 mL by mouth every 6 (six) hours as needed for cough    Supraventricular tachycardia (HCC)    Immunodeficiency, unspecified ([facility] [address] )    Hypertensive urgency          Subjective:      Patient ID: Tulio Moncada is a 60 y o  male  Patient came today with 2 uncontrolled problems such as cough and elevated blood pressure  The following portions of the patient's history were reviewed and updated as appropriate: allergies, current medications, past family history, past medical history, past social history, past surgical history, and problem list     Review of Systems   Constitutional: Positive for chills  Negative for appetite change, fatigue and fever  HENT: Positive for rhinorrhea  Negative for sinus pain and sore throat  Respiratory: Positive for cough  Negative for chest tightness and shortness of breath  Cardiovascular: Negative for chest pain  Gastrointestinal: Negative for diarrhea, nausea and vomiting  Musculoskeletal: Negative for arthralgias and myalgias  Neurological: Negative for headaches  Objective:      BP (!) 188/64 (BP Location: Left arm, Patient Position: Sitting, Cuff Size: Standard)   Pulse 74   Resp 12   Ht 5' 7" (1 702 m)   Wt 123 kg (270 lb 3 2 oz)   SpO2 99%   BMI 42 32 kg/m²     No Known Allergies       Current Outpatient Medications:     allopurinol (ZYLOPRIM) 100 mg tablet, allopurinol 100 mg tablet, Disp: , Rfl:     benzonatate (TESSALON) 200 MG capsule, Take 1 capsule (200 mg total) by mouth 3 (three) times a day as needed for cough, Disp: 20 capsule, Rfl: 0    dexlansoprazole (Dexilant) 60 MG capsule, Take 1 capsule (60 mg total) by mouth daily, Disp: 30 capsule, Rfl: 5    losartan (COZAAR) 50 mg tablet, Take 1 tablet (50 mg total) by mouth daily, Disp: 90 tablet, Rfl: 3    NIFEdipine (PROCARDIA XL) 30 mg 24 hr tablet, Take 1 tablet (30 mg total) by mouth daily, Disp: 90 tablet, Rfl: 4    promethazine-codeine (PHENERGAN WITH CODEINE) 6 25-10 mg/5 mL syrup, Take 5 mL by mouth every 6 (six) hours as needed for cough, Disp: 180 mL, Rfl: 0    rosuvastatin (CRESTOR) 20 MG tablet, Take 1 tablet (20 mg total) by mouth daily at bedtime, Disp: 30 tablet, Rfl: 40    Cholecalciferol (VITAMIN D3) 2000 units capsule, Take 1 capsule by mouth daily, Disp: , Rfl:     magnesium oxide (MAG-OX) 400 mg, Take 1 tablet (400 mg total) by mouth once for 1 dose, Disp: 90 tablet, Rfl: 4     Patient Instructions   Take your nifedipine in the morning and losartan at night every day  Physical Exam  Constitutional:       General: He is not in acute distress  Appearance: He is not ill-appearing or toxic-appearing  Cardiovascular:      Heart sounds: No murmur heard  No gallop  Pulmonary:      Effort: No respiratory distress  Breath sounds:  No wheezing or rales

## 2022-06-13 NOTE — ASSESSMENT & PLAN NOTE
Still complains of persistent cough, looks like benzonatate is not helping  He said that he was not taking any over-the-counter cough medicines  Will do x-ray of the chest to exclude any gross changes  Will use codeine syrup, side effects discussed  Clinically looks like postviral cough, discussed with the patient the may last for few weeks

## 2022-06-17 ENCOUNTER — OFFICE VISIT (OUTPATIENT)
Dept: FAMILY MEDICINE CLINIC | Facility: CLINIC | Age: 62
End: 2022-06-17
Payer: COMMERCIAL

## 2022-06-17 VITALS
HEIGHT: 67 IN | RESPIRATION RATE: 18 BRPM | HEART RATE: 68 BPM | SYSTOLIC BLOOD PRESSURE: 160 MMHG | BODY MASS INDEX: 42.22 KG/M2 | DIASTOLIC BLOOD PRESSURE: 80 MMHG | OXYGEN SATURATION: 96 % | WEIGHT: 269 LBS

## 2022-06-17 DIAGNOSIS — I10 ESSENTIAL HYPERTENSION: Primary | ICD-10-CM

## 2022-06-17 PROCEDURE — 3725F SCREEN DEPRESSION PERFORMED: CPT | Performed by: INTERNAL MEDICINE

## 2022-06-17 PROCEDURE — 99214 OFFICE O/P EST MOD 30 MIN: CPT | Performed by: INTERNAL MEDICINE

## 2022-06-17 RX ORDER — NIFEDIPINE 30 MG/1
30 TABLET, EXTENDED RELEASE ORAL DAILY
Qty: 90 TABLET | Refills: 0 | Status: SHIPPED | OUTPATIENT
Start: 2022-06-17 | End: 2022-06-24 | Stop reason: ALTCHOICE

## 2022-06-17 RX ORDER — NIFEDIPINE 60 MG/1
60 TABLET, EXTENDED RELEASE ORAL DAILY
Qty: 30 TABLET | Refills: 0 | Status: SHIPPED | OUTPATIENT
Start: 2022-06-17 | End: 2022-06-17

## 2022-06-17 NOTE — ASSESSMENT & PLAN NOTE
Blood pressure is still uncontrolled  Looks like patient was not taking his nifedipine 30 mg daily  He was only on losartan 50 mg  Discussed with him that he should restart his nifedipine 30 mg daily in the morning, continue losartan 50 mg at night  He will continue to take his blood pressures at home, he will follow-up with us in 7 days, ask him to bring me his blood pressure readings and his cuff for the next appointment

## 2022-06-17 NOTE — PATIENT INSTRUCTIONS
Continue nifedipine 30 mg which is Procardia take 1 pill daily in the morning  Continue new medication that was done by your kidney doctor with name losartan 50 mg take it at night  Try to check your blood pressure at home bring your numbers in 7 days and if possible bring your cuff with you

## 2022-06-17 NOTE — PROGRESS NOTES
Assessment/Plan:    Essential hypertension  Blood pressure is still uncontrolled  Looks like patient was not taking his nifedipine 30 mg daily  He was only on losartan 50 mg  Discussed with him that he should restart his nifedipine 30 mg daily in the morning, continue losartan 50 mg at night  He will continue to take his blood pressures at home, he will follow-up with us in 7 days, ask him to bring me his blood pressure readings and his cuff for the next appointment  Diagnoses and all orders for this visit:    Essential hypertension  -     Discontinue: NIFEdipine (PROCARDIA XL) 60 mg 24 hr tablet; Take 1 tablet (60 mg total) by mouth daily  -     NIFEdipine (PROCARDIA XL) 30 mg 24 hr tablet; Take 1 tablet (30 mg total) by mouth daily          Subjective:      Patient ID: Marianne Crespo is a 60 y o  male  Patient came today for follow-up on his blood pressures which are not controlled  The following portions of the patient's history were reviewed and updated as appropriate: allergies, current medications, past family history, past medical history, past social history, past surgical history, and problem list     Review of Systems   Constitutional: Negative for chills and fever  Respiratory: Negative for cough, shortness of breath and wheezing  Cardiovascular: Negative for chest pain, palpitations and leg swelling           Objective:      /80 (BP Location: Left arm, Patient Position: Sitting, Cuff Size: Large)   Pulse 68   Resp 18   Ht 5' 7" (1 702 m)   Wt 122 kg (269 lb)   SpO2 96%   BMI 42 13 kg/m²     No Known Allergies       Current Outpatient Medications:     allopurinol (ZYLOPRIM) 100 mg tablet, allopurinol 100 mg tablet, Disp: , Rfl:     benzonatate (TESSALON) 200 MG capsule, Take 1 capsule (200 mg total) by mouth 3 (three) times a day as needed for cough, Disp: 20 capsule, Rfl: 0    Cholecalciferol (VITAMIN D3) 2000 units capsule, Take 1 capsule by mouth daily, Disp: , Rfl:     dexlansoprazole (Dexilant) 60 MG capsule, Take 1 capsule (60 mg total) by mouth daily, Disp: 30 capsule, Rfl: 5    losartan (COZAAR) 50 mg tablet, Take 1 tablet (50 mg total) by mouth daily, Disp: 90 tablet, Rfl: 3    magnesium oxide (MAG-OX) 400 mg, Take 1 tablet (400 mg total) by mouth once for 1 dose, Disp: 90 tablet, Rfl: 4    NIFEdipine (PROCARDIA XL) 30 mg 24 hr tablet, Take 1 tablet (30 mg total) by mouth daily, Disp: 90 tablet, Rfl: 0    promethazine-codeine (PHENERGAN WITH CODEINE) 6 25-10 mg/5 mL syrup, Take 5 mL by mouth every 6 (six) hours as needed for cough, Disp: 180 mL, Rfl: 0    rosuvastatin (CRESTOR) 20 MG tablet, Take 1 tablet (20 mg total) by mouth daily at bedtime, Disp: 30 tablet, Rfl: 40     Patient Instructions   Continue nifedipine 30 mg which is Procardia take 1 pill daily in the morning  Continue new medication that was done by your kidney doctor with name losartan 50 mg take it at night  Try to check your blood pressure at home bring your numbers in 7 days and if possible bring your cuff with you  Physical Exam  Vitals reviewed  Constitutional:       General: He is not in acute distress  Appearance: He is not ill-appearing or toxic-appearing  HENT:      Head: Normocephalic  Cardiovascular:      Rate and Rhythm: Normal rate  Pulses: Normal pulses  Pulmonary:      Effort: Pulmonary effort is normal    Skin:     General: Skin is warm  Neurological:      General: No focal deficit present  Mental Status: He is alert     Psychiatric:         Mood and Affect: Mood normal          Behavior: Behavior normal

## 2022-06-24 ENCOUNTER — OFFICE VISIT (OUTPATIENT)
Dept: FAMILY MEDICINE CLINIC | Facility: CLINIC | Age: 62
End: 2022-06-24
Payer: COMMERCIAL

## 2022-06-24 VITALS
HEART RATE: 73 BPM | BODY MASS INDEX: 42.38 KG/M2 | OXYGEN SATURATION: 98 % | DIASTOLIC BLOOD PRESSURE: 68 MMHG | TEMPERATURE: 97 F | RESPIRATION RATE: 20 BRPM | SYSTOLIC BLOOD PRESSURE: 130 MMHG | HEIGHT: 67 IN | WEIGHT: 270 LBS

## 2022-06-24 DIAGNOSIS — I10 ESSENTIAL HYPERTENSION: Primary | ICD-10-CM

## 2022-06-24 DIAGNOSIS — N18.31 STAGE 3A CHRONIC KIDNEY DISEASE (HCC): ICD-10-CM

## 2022-06-24 PROCEDURE — 1036F TOBACCO NON-USER: CPT | Performed by: INTERNAL MEDICINE

## 2022-06-24 PROCEDURE — 3008F BODY MASS INDEX DOCD: CPT | Performed by: INTERNAL MEDICINE

## 2022-06-24 PROCEDURE — 3075F SYST BP GE 130 - 139MM HG: CPT | Performed by: INTERNAL MEDICINE

## 2022-06-24 PROCEDURE — 99214 OFFICE O/P EST MOD 30 MIN: CPT | Performed by: INTERNAL MEDICINE

## 2022-06-24 PROCEDURE — 3078F DIAST BP <80 MM HG: CPT | Performed by: INTERNAL MEDICINE

## 2022-06-24 RX ORDER — NIFEDIPINE 60 MG/1
60 TABLET, EXTENDED RELEASE ORAL DAILY
Qty: 90 TABLET | Refills: 0 | Status: SHIPPED | OUTPATIENT
Start: 2022-06-24

## 2022-06-24 NOTE — ASSESSMENT & PLAN NOTE
Currently his blood pressures are not completely controlled being on losartan 50 mg and nifedipine 30 mg daily  His blood pressure recheck today by me 145/70  At home his granddaughter who is nursing aide was checking his blood pressures manually and looks like his blood pressures were running around 150/70  Because of his CKD will keep the same dose of losartan  Will increase nifedipine from 30 mg up to 60 mg  He will update me with his blood pressure readings and heart rate in few weeks

## 2022-06-24 NOTE — PROGRESS NOTES
Assessment/Plan:    Essential hypertension  Currently his blood pressures are not completely controlled being on losartan 50 mg and nifedipine 30 mg daily  His blood pressure recheck today by me 145/70  At home his granddaughter who is nursing aide was checking his blood pressures manually and looks like his blood pressures were running around 150/70  Because of his CKD will keep the same dose of losartan  Will increase nifedipine from 30 mg up to 60 mg  He will update me with his blood pressure readings and heart rate in few weeks  Diagnoses and all orders for this visit:    Essential hypertension  -     NIFEdipine (PROCARDIA XL) 60 mg 24 hr tablet; Take 1 tablet (60 mg total) by mouth daily    Stage 3a chronic kidney disease ([facility] [address] )          Subjective:      Patient ID: Fede Caro is a 60 y o  male  Patient came today for follow-up on his blood pressures that are not controlled as for now  The following portions of the patient's history were reviewed and updated as appropriate: allergies, current medications, past family history, past medical history, past social history, past surgical history, and problem list     Review of Systems   Constitutional: Negative for chills and fever  Respiratory: Negative for shortness of breath and wheezing  Cardiovascular: Negative for chest pain, palpitations and leg swelling           Objective:      /68 (BP Location: Left arm, Patient Position: Sitting, Cuff Size: Large)   Pulse 73   Temp (!) 97 °F (36 1 °C) (Temporal)   Resp 20   Ht 5' 7" (1 702 m)   Wt 122 kg (270 lb)   SpO2 98%   BMI 42 29 kg/m²     No Known Allergies       Current Outpatient Medications:     NIFEdipine (PROCARDIA XL) 60 mg 24 hr tablet, Take 1 tablet (60 mg total) by mouth daily, Disp: 90 tablet, Rfl: 0    allopurinol (ZYLOPRIM) 100 mg tablet, allopurinol 100 mg tablet, Disp: , Rfl:     benzonatate (TESSALON) 200 MG capsule, Take 1 capsule (200 mg total) by mouth 3 (three) times a day as needed for cough, Disp: 20 capsule, Rfl: 0    Cholecalciferol (VITAMIN D3) 2000 units capsule, Take 1 capsule by mouth daily, Disp: , Rfl:     dexlansoprazole (Dexilant) 60 MG capsule, Take 1 capsule (60 mg total) by mouth daily, Disp: 30 capsule, Rfl: 5    losartan (COZAAR) 50 mg tablet, Take 1 tablet (50 mg total) by mouth daily, Disp: 90 tablet, Rfl: 3    magnesium oxide (MAG-OX) 400 mg, Take 1 tablet (400 mg total) by mouth once for 1 dose, Disp: 90 tablet, Rfl: 4    promethazine-codeine (PHENERGAN WITH CODEINE) 6 25-10 mg/5 mL syrup, Take 5 mL by mouth every 6 (six) hours as needed for cough, Disp: 180 mL, Rfl: 0    rosuvastatin (CRESTOR) 20 MG tablet, Take 1 tablet (20 mg total) by mouth daily at bedtime, Disp: 30 tablet, Rfl: 40     Patient Instructions   Will increase the dose of your nifedipine, as for now you can take 2 pills of 30 mg  When you will run out  the new prescription of 60 mg and take 1 pill  Continue losartan at night as is  Drop me your blood pressure readings in few weeks  Also ask your wife check your heart rate  Physical Exam  Vitals reviewed  Constitutional:       General: He is not in acute distress  Appearance: He is not toxic-appearing  HENT:      Head: Normocephalic  Cardiovascular:      Rate and Rhythm: Normal rate  Pulses: Normal pulses  Pulmonary:      Effort: Pulmonary effort is normal    Skin:     General: Skin is warm  Neurological:      General: No focal deficit present  Mental Status: He is alert     Psychiatric:         Mood and Affect: Mood normal          Behavior: Behavior normal

## 2022-06-24 NOTE — PATIENT INSTRUCTIONS
Will increase the dose of your nifedipine, as for now you can take 2 pills of 30 mg  When you will run out  the new prescription of 60 mg and take 1 pill  Continue losartan at night as is  Drop me your blood pressure readings in few weeks  Also ask your wife check your heart rate

## 2022-06-27 ENCOUNTER — APPOINTMENT (OUTPATIENT)
Dept: LAB | Facility: CLINIC | Age: 62
End: 2022-06-27
Payer: COMMERCIAL

## 2022-06-27 DIAGNOSIS — N18.31 STAGE 3A CHRONIC KIDNEY DISEASE (HCC): ICD-10-CM

## 2022-06-27 DIAGNOSIS — E83.42 HYPOMAGNESEMIA: ICD-10-CM

## 2022-06-27 DIAGNOSIS — E55.9 VITAMIN D DEFICIENCY: ICD-10-CM

## 2022-06-27 DIAGNOSIS — E66.01 MORBID (SEVERE) OBESITY DUE TO EXCESS CALORIES (HCC): ICD-10-CM

## 2022-06-27 DIAGNOSIS — I10 ESSENTIAL HYPERTENSION: ICD-10-CM

## 2022-06-27 LAB
ANION GAP SERPL CALCULATED.3IONS-SCNC: 6 MMOL/L (ref 4–13)
BUN SERPL-MCNC: 26 MG/DL (ref 5–25)
CALCIUM SERPL-MCNC: 9.3 MG/DL (ref 8.3–10.1)
CHLORIDE SERPL-SCNC: 109 MMOL/L (ref 100–108)
CO2 SERPL-SCNC: 26 MMOL/L (ref 21–32)
CREAT SERPL-MCNC: 1.47 MG/DL (ref 0.6–1.3)
GFR SERPL CREATININE-BSD FRML MDRD: 50 ML/MIN/1.73SQ M
GLUCOSE P FAST SERPL-MCNC: 108 MG/DL (ref 65–99)
POTASSIUM SERPL-SCNC: 4.2 MMOL/L (ref 3.5–5.3)
SODIUM SERPL-SCNC: 141 MMOL/L (ref 136–145)

## 2022-06-27 PROCEDURE — 36415 COLL VENOUS BLD VENIPUNCTURE: CPT

## 2022-06-27 PROCEDURE — 80048 BASIC METABOLIC PNL TOTAL CA: CPT

## 2022-06-29 ENCOUNTER — TELEPHONE (OUTPATIENT)
Dept: NEPHROLOGY | Facility: CLINIC | Age: 62
End: 2022-06-29

## 2022-06-29 NOTE — TELEPHONE ENCOUNTER
I spoke to [facility] about the lab work was stable and no changes to be made at this time     -ELAINA Heller     Observed by Sadaf Gates

## 2022-06-29 NOTE — TELEPHONE ENCOUNTER
----- Message from Swetha You PA-C sent at 6/28/2022  4:02 PM EDT -----  Please let patient know recent lab work was stable and no changes at this time

## 2022-07-22 ENCOUNTER — APPOINTMENT (OUTPATIENT)
Dept: LAB | Facility: CLINIC | Age: 62
End: 2022-07-22
Payer: COMMERCIAL

## 2022-07-22 DIAGNOSIS — C62.92 SEMINOMA OF TESTIS, STAGE 3, LEFT (HCC): ICD-10-CM

## 2022-07-22 LAB
AFP-TM SERPL-MCNC: 1.8 NG/ML (ref 0.5–8)
ALBUMIN SERPL BCP-MCNC: 3.6 G/DL (ref 3.5–5)
ALP SERPL-CCNC: 77 U/L (ref 46–116)
ALT SERPL W P-5'-P-CCNC: 21 U/L (ref 12–78)
ANION GAP SERPL CALCULATED.3IONS-SCNC: 7 MMOL/L (ref 4–13)
AST SERPL W P-5'-P-CCNC: 15 U/L (ref 5–45)
BASOPHILS # BLD AUTO: 0.06 THOUSANDS/ΜL (ref 0–0.1)
BASOPHILS NFR BLD AUTO: 1 % (ref 0–1)
BILIRUB SERPL-MCNC: 0.59 MG/DL (ref 0.2–1)
BUN SERPL-MCNC: 26 MG/DL (ref 5–25)
CALCIUM SERPL-MCNC: 9.3 MG/DL (ref 8.3–10.1)
CHLORIDE SERPL-SCNC: 112 MMOL/L (ref 96–108)
CO2 SERPL-SCNC: 23 MMOL/L (ref 21–32)
CREAT SERPL-MCNC: 1.57 MG/DL (ref 0.6–1.3)
EOSINOPHIL # BLD AUTO: 0.19 THOUSAND/ΜL (ref 0–0.61)
EOSINOPHIL NFR BLD AUTO: 2 % (ref 0–6)
ERYTHROCYTE [DISTWIDTH] IN BLOOD BY AUTOMATED COUNT: 13 % (ref 11.6–15.1)
GFR SERPL CREATININE-BSD FRML MDRD: 46 ML/MIN/1.73SQ M
GLUCOSE P FAST SERPL-MCNC: 116 MG/DL (ref 65–99)
HCG-TM SERPL-SCNC: <2 MLU/ML
HCT VFR BLD AUTO: 35.6 % (ref 36.5–49.3)
HGB BLD-MCNC: 11.8 G/DL (ref 12–17)
IMM GRANULOCYTES # BLD AUTO: 0.02 THOUSAND/UL (ref 0–0.2)
IMM GRANULOCYTES NFR BLD AUTO: 0 % (ref 0–2)
LDH SERPL-CCNC: 218 U/L (ref 81–234)
LYMPHOCYTES # BLD AUTO: 3.19 THOUSANDS/ΜL (ref 0.6–4.47)
LYMPHOCYTES NFR BLD AUTO: 39 % (ref 14–44)
MCH RBC QN AUTO: 31.1 PG (ref 26.8–34.3)
MCHC RBC AUTO-ENTMCNC: 33.1 G/DL (ref 31.4–37.4)
MCV RBC AUTO: 94 FL (ref 82–98)
MONOCYTES # BLD AUTO: 0.54 THOUSAND/ΜL (ref 0.17–1.22)
MONOCYTES NFR BLD AUTO: 7 % (ref 4–12)
NEUTROPHILS # BLD AUTO: 4.29 THOUSANDS/ΜL (ref 1.85–7.62)
NEUTS SEG NFR BLD AUTO: 51 % (ref 43–75)
NRBC BLD AUTO-RTO: 0 /100 WBCS
PLATELET # BLD AUTO: 248 THOUSANDS/UL (ref 149–390)
PMV BLD AUTO: 9.5 FL (ref 8.9–12.7)
POTASSIUM SERPL-SCNC: 3.8 MMOL/L (ref 3.5–5.3)
PROT SERPL-MCNC: 7.5 G/DL (ref 6.4–8.4)
RBC # BLD AUTO: 3.79 MILLION/UL (ref 3.88–5.62)
SODIUM SERPL-SCNC: 142 MMOL/L (ref 135–147)
WBC # BLD AUTO: 8.29 THOUSAND/UL (ref 4.31–10.16)

## 2022-07-22 PROCEDURE — 80053 COMPREHEN METABOLIC PANEL: CPT

## 2022-07-22 PROCEDURE — 82105 ALPHA-FETOPROTEIN SERUM: CPT

## 2022-07-22 PROCEDURE — 36415 COLL VENOUS BLD VENIPUNCTURE: CPT

## 2022-07-22 PROCEDURE — 84702 CHORIONIC GONADOTROPIN TEST: CPT

## 2022-07-22 PROCEDURE — 83615 LACTATE (LD) (LDH) ENZYME: CPT

## 2022-07-22 PROCEDURE — 85025 COMPLETE CBC W/AUTO DIFF WBC: CPT

## 2022-07-24 ENCOUNTER — OFFICE VISIT (OUTPATIENT)
Dept: URGENT CARE | Facility: MEDICAL CENTER | Age: 62
End: 2022-07-24
Payer: COMMERCIAL

## 2022-07-24 VITALS
RESPIRATION RATE: 20 BRPM | TEMPERATURE: 97.6 F | DIASTOLIC BLOOD PRESSURE: 65 MMHG | BODY MASS INDEX: 40.49 KG/M2 | OXYGEN SATURATION: 97 % | HEIGHT: 69 IN | WEIGHT: 273.37 LBS | HEART RATE: 76 BPM | SYSTOLIC BLOOD PRESSURE: 139 MMHG

## 2022-07-24 DIAGNOSIS — M54.2 CERVICALGIA: Primary | ICD-10-CM

## 2022-07-24 PROCEDURE — 99213 OFFICE O/P EST LOW 20 MIN: CPT | Performed by: PHYSICIAN ASSISTANT

## 2022-07-24 RX ORDER — METHOCARBAMOL 500 MG/1
500 TABLET, FILM COATED ORAL 3 TIMES DAILY
Qty: 30 TABLET | Refills: 0 | Status: SHIPPED | OUTPATIENT
Start: 2022-07-24 | End: 2022-08-03

## 2022-07-24 NOTE — PROGRESS NOTES
[facility]        NAME: Brionna Adame is a 60 y o  male  : 1960    MRN: [id number]  DATE: 2022  TIME: 11:40 AM    Assessment and Plan   Cervicalgia [M54 2]  1  Cervicalgia  methocarbamol (ROBAXIN) 500 mg tablet         Patient Instructions   Patient cannot take nonsteroidals secondary to kidney disease pain Moist heat, gentle stretching  Follow up with PCP in 3-5 days as needed    Chief Complaint     Chief Complaint   Patient presents with    Neck Pain     Patient woke Thursday with L sided neck pain  Pain is worse when he turns his head side to side He has used heat and hot water in the shower along with tylenol with no relief  He denies any injury         History of Present Illness       Neck Pain   This is a recurrent problem  The current episode started yesterday  The problem occurs constantly  The problem has been gradually worsening  The pain is associated with nothing  The pain is present in the left side  The quality of the pain is described as aching and cramping  The pain is at a severity of 6/10  The symptoms are aggravated by bending and twisting  The pain is same all the time  Pertinent negatives include no chest pain, fever, headaches, leg pain, numbness, pain with swallowing, paresis, photophobia, syncope, tingling, trouble swallowing, visual change, weakness or weight loss  He has tried acetaminophen for the symptoms  The treatment provided mild relief  Review of Systems   Review of Systems   Constitutional: Negative for fever and weight loss  HENT: Negative for trouble swallowing  Eyes: Negative for photophobia  Cardiovascular: Negative for chest pain and syncope  Musculoskeletal: Positive for neck pain  Neurological: Negative for tingling, weakness, numbness and headaches  All other systems reviewed and are negative          Current Medications       Current Outpatient Medications:     allopurinol (ZYLOPRIM) 100 mg tablet, allopurinol 100 mg tablet, Disp: , Rfl:     benzonatate (TESSALON) 200 MG capsule, Take 1 capsule (200 mg total) by mouth 3 (three) times a day as needed for cough, Disp: 20 capsule, Rfl: 0    Cholecalciferol (VITAMIN D3) 2000 units capsule, Take 1 capsule by mouth daily, Disp: , Rfl:     dexlansoprazole (Dexilant) 60 MG capsule, Take 1 capsule (60 mg total) by mouth daily, Disp: 30 capsule, Rfl: 5    losartan (COZAAR) 50 mg tablet, Take 1 tablet (50 mg total) by mouth daily, Disp: 90 tablet, Rfl: 3    methocarbamol (ROBAXIN) 500 mg tablet, Take 1 tablet (500 mg total) by mouth 3 (three) times a day for 10 days, Disp: 30 tablet, Rfl: 0    NIFEdipine (PROCARDIA XL) 60 mg 24 hr tablet, Take 1 tablet (60 mg total) by mouth daily, Disp: 90 tablet, Rfl: 0    rosuvastatin (CRESTOR) 20 MG tablet, Take 1 tablet (20 mg total) by mouth daily at bedtime, Disp: 30 tablet, Rfl: 40    magnesium oxide (MAG-OX) 400 mg, Take 1 tablet (400 mg total) by mouth once for 1 dose, Disp: 90 tablet, Rfl: 4    promethazine-codeine (PHENERGAN WITH CODEINE) 6 25-10 mg/5 mL syrup, Take 5 mL by mouth every 6 (six) hours as needed for cough, Disp: 180 mL, Rfl: 0    Current Allergies     Allergies as of 07/24/2022    (No Known Allergies)            The following portions of the patient's history were reviewed and updated as appropriate: allergies, current medications, past family history, past medical history, past social history, past surgical history and problem list      Past Medical History:   Diagnosis Date    BPH without obstruction/lower urinary tract symptoms     Cancer (HCC)     prostate     CPAP (continuous positive airway pressure) dependence     Elevated PSA     GERD (gastroesophageal reflux disease)     Gout     Hyperlipidemia     Hypertension     Kidney stone     Obese abdomen     Obesity     Polymyalgia ([address] )     Prostate cancer (HCC)     Rash     under both arms and in between legs - family doctor aware - pt uses Desitin    Sleep apnea     Testicular carcinoma (HCC)     Urinary frequency     Urinary urgency     Wears glasses        Past Surgical History:   Procedure Laterality Date    CARPAL TUNNEL RELEASE Bilateral 07/2020    COLONOSCOPY      CYSTOSCOPY W/ LASER LITHOTRIPSY  2001    CYSTOSTOMY W/ STENT INSERTION  2011    ESOPHAGOGASTRODUODENOSCOPY      EXTRACORPOREAL SHOCK WAVE LITHOTRIPSY Right 2011    HERNIA REPAIR      KNEE ARTHROSCOPY Left     ND CYSTO/URETERO W/LITHOTRIPSY &INDWELL STENT INSRT Right 4/29/2018    Procedure: CYSTOSCOPY URETEROSCOPY, RETROGRADE PYELOGRAM AND INSERTION STENT URETERAL;  Surgeon: Matthew Aleman MD;  Location: [facility];  Service: Urology    ND REMOVAL TESTIS,RADICAL Left 2/19/2021    Procedure: Radical  ORCHIECTOMY;  Surgeon: Addie Copeland MD;  Location: [facility];  Service: Urology       Family History   Problem Relation Age of Onset    Nephrolithiasis Father     Alzheimer's disease Mother          Medications have been verified  Objective   /65   Pulse 76   Temp 97 6 °F (36 4 °C)   Resp 20   Ht 5' 9" (1 753 m)   Wt 124 kg (273 lb 5 9 oz)   SpO2 97%   BMI 40 37 kg/m²   No LMP for male patient  Physical Exam     Physical Exam  Vitals and nursing note reviewed  Constitutional:       Appearance: He is obese  Cardiovascular:      Rate and Rhythm: Normal rate and regular rhythm  Pulses: Normal pulses  Heart sounds: Murmur heard  Pulmonary:      Effort: Pulmonary effort is normal       Breath sounds: Normal breath sounds  Neurological:      Mental Status: He is alert  2/6 systolic ejection murmur  Positive spasm between C3 and C7 on the left as well as supraspinatus and trapezius muscles  Decreased flexion of the neck to 30°, right lateral bending to 20° and left lateral bending to 40°, rotation is decreased by 15° bilaterally  Motor 5/5  DTRs 2/4  No sensory deficits

## 2022-07-25 ENCOUNTER — TELEPHONE (OUTPATIENT)
Dept: NEPHROLOGY | Facility: CLINIC | Age: 62
End: 2022-07-25

## 2022-07-25 NOTE — TELEPHONE ENCOUNTER
Spoke with Patient and schedule appointment for 11/07 with Bernardo Hidden in the [facility] location

## 2022-08-02 DIAGNOSIS — K21.9 GASTROESOPHAGEAL REFLUX DISEASE, UNSPECIFIED WHETHER ESOPHAGITIS PRESENT: Primary | ICD-10-CM

## 2022-08-02 DIAGNOSIS — M10.9 GOUT, UNSPECIFIED CAUSE, UNSPECIFIED CHRONICITY, UNSPECIFIED SITE: ICD-10-CM

## 2022-08-02 NOTE — TELEPHONE ENCOUNTER
Per chart review, patient was changed from omeprazole to dexlansoprazole in 2017 due to limited relief with omeprazole  Patient was on pantoprazole while inpatient due to hospital formulary preference; no trial on medication while outpatient  No documented trial with any of the other listed PPIs; previously on famotidine while in patient in 4/2021, no outpatient use documented  EGD in 2017 showed normal findings  Could consider pantoprazole 40mg daily or could also consider PPI taper due to no compelling indication for PPI over H2RA  PCP: please advise       Pharmacist Tracking Tool  Reason For Outreach: Embedded Pharmacist  Demographics:  Intervention Method: Chart Review  Type of Intervention: New  Topics Addressed: GI Disorders  Pharmacologic Interventions: Medication Conversion  Non-Pharmacologic Interventions: N/A  Time:  Direct Patient Care: 0 mins  Care Coordination: 10 mins  Recommendation Recipient: Provider  Outcome: Partially Accepted

## 2022-08-02 NOTE — TELEPHONE ENCOUNTER
Patient was recently issued a refill of Dexlansoprazole 60MG  capsules  Insurance requires prior authorization for this, but also is asking about formulary alternatives  I cannot find documentation about other medications that have been tried that may be covered, so I do not believe this PA will go through  Insurance is suggesting ESOMEPRAZOLE  , LANSOPRAZOLE  , OMEPRAZOLE  , PANTOPRAZOLE DR PONCE ABLET     Can medication be switched to one of these alternatives?

## 2022-08-03 RX ORDER — PANTOPRAZOLE SODIUM 40 MG/1
40 TABLET, DELAYED RELEASE ORAL
Qty: 90 TABLET | Refills: 1 | Status: SHIPPED | OUTPATIENT
Start: 2022-08-03

## 2022-08-03 RX ORDER — ALLOPURINOL 100 MG/1
200 TABLET ORAL DAILY
Qty: 90 TABLET | Refills: 3 | Status: SHIPPED | OUTPATIENT
Start: 2022-08-03

## 2022-08-03 NOTE — TELEPHONE ENCOUNTER
Called patient to review, voiced understanding  Pantoprazole order pended for PCP signature/concurrence  Also would like allopurinol sent in to pharmacy; previously prescribed by [facility] PCP but no longer seeing them  Was taking 100mg daily; pended for PCP signature/concurrence

## 2022-08-12 ENCOUNTER — HOSPITAL ENCOUNTER (OUTPATIENT)
Dept: CT IMAGING | Facility: HOSPITAL | Age: 62
Discharge: HOME | End: 2022-08-12
Attending: INTERNAL MEDICINE
Payer: COMMERCIAL

## 2022-08-12 DIAGNOSIS — C62.92 SEMINOMA OF TESTIS, STAGE 3, LEFT (HCC): ICD-10-CM

## 2022-08-12 PROCEDURE — 74176 CT ABD & PELVIS W/O CONTRAST: CPT

## 2022-08-12 PROCEDURE — G1004 CDSM NDSC: HCPCS

## 2022-08-23 ENCOUNTER — OFFICE VISIT (OUTPATIENT)
Dept: HEMATOLOGY ONCOLOGY | Facility: CLINIC | Age: 62
End: 2022-08-23
Payer: COMMERCIAL

## 2022-08-23 VITALS
BODY MASS INDEX: 40.29 KG/M2 | OXYGEN SATURATION: 98 % | TEMPERATURE: 98 F | SYSTOLIC BLOOD PRESSURE: 150 MMHG | HEART RATE: 71 BPM | HEIGHT: 69 IN | DIASTOLIC BLOOD PRESSURE: 80 MMHG | RESPIRATION RATE: 16 BRPM | WEIGHT: 272 LBS

## 2022-08-23 DIAGNOSIS — C62.92 SEMINOMA OF TESTIS, STAGE 3, LEFT (HCC): Primary | ICD-10-CM

## 2022-08-23 DIAGNOSIS — N18.31 STAGE 3A CHRONIC KIDNEY DISEASE (HCC): ICD-10-CM

## 2022-08-23 PROCEDURE — 3077F SYST BP >= 140 MM HG: CPT | Performed by: INTERNAL MEDICINE

## 2022-08-23 PROCEDURE — 99213 OFFICE O/P EST LOW 20 MIN: CPT | Performed by: INTERNAL MEDICINE

## 2022-08-23 PROCEDURE — 3079F DIAST BP 80-89 MM HG: CPT | Performed by: INTERNAL MEDICINE

## 2022-08-23 NOTE — PROGRESS NOTES
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 with external right iliac lymphadenopathy measuring up to 3 cm and left para-aortic lymphadenopathy measuring up to 2 3 cm (pT2, N2, S1) with persistent elevation of LDH less than 1 5 normal upper limit     3/23/21 Repeat alpha fetoprotein, hCG are normal, repeat  ()     Received 1st and only cycle of etoposide/ cisplatin with anti emetics   He was admitted to the hospital after 10 days of therapy with nausea, weakness   He was found to have acute renal insufficiency with creatinine of 4,    He received IV fluid with gradual improvement  He also developed neutropenia and thrombocytopenia grade 4 requiring platelet transfusion      CT scan C/A/P 4/15/21 showed significant response of chemotherapy in the abdominal lymphadenopathy    Creatinine improved to 1 73 5/7/21     Cisplatin changed to carboplatin AUC 5 on day 1 and etoposide 100 milligram/meter squared for 5 days    Despite this, he ended up in the hospital with pancytopenia, requiring blood and platelet transfusion, ileus, weight loss, nausea, vomiting, loss of appetite with poor tolerance of therapy     Received 2 cycles 5/17/21 and 6/7/21       Subsequent CT scan showed no evidence of disease in August 2022, normal hCG, alpha fetoprotein, LDH     Grade 2 chronic kidney disease induced anemia no need for Aranesp     Obstructive sleep apnea he is not applying CPAP      Labs and imaging studies are reviewed by ordering provider once results are available  If there are findings that need immediate attention, you will be contacted when results available  Discussing results and the implication on your healthcare is best discussed in person at your follow-up visit  HPI:    Larry Carmen a 60 y  o  male with pure Luke Peters is a 34-year-old  male seen initially 4/1/21 regarding stage II B (pT2, pN2, S1) left-sided pure seminoma     He has a history of prostate cancer, nephrolithiasis, benign prostatic hypertrophy, polymyalgia, hypertension, sleep apnea, obesity, GERD, hiatal hernia, gout       His prostate cancer was diagnosed on September 2019 with single core with 4 mm focus of prostate cancer with very low risk group   He is on active surveillance with PSA 4 9 on March 2020 followed by Urology        He was found to have enlargement of the left testicle       Ultrasound of the scrotum on 10/24/2020 showed left testicle measuring 7 2 x 3 9 x 5 1 cm with lobulated contour and diffuse heterogeneous echotexture     Repeat ultrasound on 01/20/2021 showed 9 1 x 4 9 x 6 8 cm increase in size compared to prior ultrasound     LDH was elevated at 376 () normal hCG, alpha fetoprotein     Status post left inguinal orchiectomy on 02/19/2021, showing pure seminoma primary measuring 8 2 cm, abuts and invades into but not through tunica albuginea, invades hilar fat and spermatic cord soft tissue with lymphovascular invasion, spermatic cord is not involved by the tumor confirmed [place] [facility] [place]       CT scan of the chest abdomen and pelvis on 03/20/2021 showed enlarged left iliac and left para-aortic lymph nodes for example left para-aortic lymph node measuring 2 3 cm, left external iliac lymph node measuring 30 mm no evidence of metastatic disease in the chest  He had pain at the left orchiectomy site on and off most likely responding to acetaminophen from healing      He reported 12 lb weight loss after the surgery       4/1/21:  cr 1 13, hemoglobin 1 3 3, MCV of 89, white blood cell count 7 75, 51% neutrophils, 33% lymphocytes, 12% monocytes, platelets 043     4 cycles of  Etoposide/ cisplatin per the NCCN guidelines recommended    For chemotherapy-induced neutropenia, pegfilgrastim recommended      The chemotherapy was complicated with dehydration, acute kidney injury requiring admission to the hospital 4/15-4/20/21  [facility] creatinine at time of discharge was 2 3   It  improved to 1 73 5/7/21     CT scan C/A/P  4/15/21 in the hospital showed significant reduction in seminoma with decrease of the abdominal lymphadenopathy      We changed cisplatin to carboplatin, despite that 2nd cycle was significant for pancytopenia, requiring admission to the hospital, ileus, CT scan showed no evidence of residual lymphadenopathy or masses in June 2021  We decided not to proceed with any additional chemotherapy because of poor tolerance      After 3 months CT scan of the abdomen and pelvis in September 2021 showed 1 1 cm para-aortic lymph node no new lesions, he has normal hCG as well as LDH      Admitted 11/15-11/20/21  2nd to abdominal pain, fatigue, malaise   Tested positive for COVID  Noncontrast CT scan abdomen and pelvis 11/16/21 -  Scattered and ill-defined groundglass opacities in the visualized bilateral lungs correlates with the patient's history of COVID 19 infection  Fatty infiltration of the liver is suspected  In the setting of abdominal pain and/or elevated liver function tests, consider steatohepatitis   Cholelithiasis without discrete evidence of acute cholecystitis, bilateral nephrolithiasis, no hydronephrosis     CT scan of the abdomen and pelvis in January 2022 showed no evidence of malignancy or lymphadenopathy    CT scan in August 2022 showed no evidence of disease normal hCG, LDH, alpha fetoprotein  Interval History:        Previous Treatment:         Test Results:    Imaging: CT abdomen pelvis wo contrast    Result Date: 8/17/2022  Narrative: CT ABDOMEN AND PELVIS WITHOUT IV CONTRAST INDICATION:   C62 92: Malignant neoplasm of left testis, unspecified whether descended or undescended  COMPARISON:  CT abdomen/pelvis 1/30/2022   TECHNIQUE:  CT examination of the abdomen and pelvis was performed without intravenous contrast  Axial, sagittal, and coronal 2D reformatted images were created from the source data and submitted for interpretation  Radiation dose length product (DLP) for this visit:  020 1558-663 mGy-cm   This examination, like all CT scans performed in the [facility], was performed utilizing techniques to minimize radiation dose exposure, including the use of iterative reconstruction and automated exposure control  Enteric contrast was not administered  FINDINGS: ABDOMEN LOWER CHEST:  No clinically significant abnormality identified in the visualized lower chest  LIVER/BILIARY TREE:  Unremarkable  GALLBLADDER:  There are gallstone(s) within the gallbladder, without pericholecystic inflammatory changes  SPLEEN:  Unremarkable  PANCREAS:  Unremarkable  ADRENAL GLANDS:  Unremarkable  KIDNEYS/URETERS:  Few punctate nonobstructing left renal calculi  No hydronephrosis  STOMACH AND BOWEL:  Unremarkable  APPENDIX:  No findings to suggest appendicitis  ABDOMINOPELVIC CAVITY:  No ascites  No pneumoperitoneum  No lymphadenopathy  Top normal right external iliac lymph node measuring 9 mm in short axis with normal fatty hilum, unchanged since 2018, and therefore almost certainly benign  VESSELS:  Atherosclerotic changes are present  No evidence of aneurysm  PELVIS REPRODUCTIVE ORGANS:  The prostate is enlarged  URINARY BLADDER:  Underdistended with possible mild diffuse wall thickening, likely related to chronic bladder outlet obstruction  ABDOMINAL WALL/INGUINAL REGIONS:  Small bilateral fat-containing inguinal hernias  OSSEOUS STRUCTURES:  No acute fracture or destructive osseous lesion  Spinal degenerative changes are noted  Stable scattered bone islands  Impression: No retroperitoneal lymphadenopathy or additional metastatic disease in the abdomen or pelvis   Workstation performed: [id number]       Labs:   Lab Results   Component Value Date    WBC 8 29 07/22/2022    HGB 11 8 (L) 07/22/2022    HCT 35 6 (L) 07/22/2022    MCV 94 07/22/2022     07/22/2022     Lab Results   Component Value Date    K 3 8 07/22/2022     (H) 07/22/2022    CO2 23 07/22/2022    BUN 26 (H) 07/22/2022    CREATININE 1 57 (H) 07/22/2022    GLUF 116 (H) 07/22/2022    CALCIUM 9 3 07/22/2022    CORRECTEDCA 9 8 12/22/2021    AST 15 07/22/2022    ALT 21 07/22/2022    ALKPHOS 77 07/22/2022    EGFR 46 07/22/2022       Lab Results   Component Value Date    IRON 69 01/31/2022    TIBC 345 01/31/2022    FERRITIN 146 01/31/2022       No results found for: [id number]      ROS: Review of Systems   Constitutional: Negative  Negative for appetite change, chills, diaphoresis, fatigue, fever and unexpected weight change  HENT:   Negative for hearing loss, lump/mass, mouth sores, nosebleeds, sore throat, trouble swallowing and voice change  Eyes: Negative  Negative for eye problems and icterus  Respiratory: Negative  Negative for chest tightness, cough, hemoptysis and shortness of breath  Cardiovascular: Negative for chest pain and leg swelling  Gastrointestinal: Negative for abdominal distention, abdominal pain, blood in stool, constipation, diarrhea and nausea  Endocrine: Negative  Genitourinary: Negative for dysuria, frequency, hematuria and pelvic pain  Musculoskeletal: Negative  Negative for arthralgias, back pain, flank pain, gait problem, myalgias and neck stiffness  Skin: Negative for itching and rash  Neurological: Negative for dizziness, gait problem, headaches, light-headedness, numbness and speech difficulty  Hematological: Negative for adenopathy  Does not bruise/bleed easily  Psychiatric/Behavioral: Negative for confusion, decreased concentration, depression and sleep disturbance  The patient is not nervous/anxious             Current Medications: Reviewed  Allergies: Reviewed  PMH/FH/SH:  Reviewed      Physical Exam:    Body surface area is 2 35 meters squared  Wt Readings from Last 3 Encounters:   22 123 kg (272 lb)   22 124 kg (273 lb 5 9 oz)   22 122 kg (270 lb)        Temp Readings from Last 3 Encounters:   22 98 °F (36 7 °C)   22 97 6 °F (36 4 °C)   22 (!) 97 °F (36 1 °C) (Temporal)        BP Readings from Last 3 Encounters:   22 150/80   22 139/65   22 130/68         Pulse Readings from Last 3 Encounters:   22 71   22 76   22 73        Physical Exam  Vitals reviewed  Constitutional:       General: He is not in acute distress  Appearance: He is well-developed  He is obese  He is not diaphoretic  HENT:      Head: Normocephalic and atraumatic  Eyes:      Conjunctiva/sclera: Conjunctivae normal    Neck:      Trachea: No tracheal deviation  Cardiovascular:      Rate and Rhythm: Normal rate and regular rhythm  Heart sounds: No murmur heard  No friction rub  No gallop  Pulmonary:      Effort: Pulmonary effort is normal  No respiratory distress  Breath sounds: Normal breath sounds  No wheezing or rales  Chest:      Chest wall: No tenderness  Abdominal:      General: There is no distension  Palpations: Abdomen is soft  Tenderness: There is no abdominal tenderness  Musculoskeletal:      Cervical back: Normal range of motion and neck supple  Right lower leg: No edema  Left lower leg: No edema  Lymphadenopathy:      Cervical: No cervical adenopathy  Skin:     General: Skin is warm and dry  Coloration: Skin is not pale  Findings: No erythema  Neurological:      Mental Status: He is alert and oriented to person, place, and time  Psychiatric:         Behavior: Behavior normal          Thought Content: Thought content normal          Judgment: Judgment normal          ECO  Goals and Barriers:  Current Goal: Minimize effects of disease  Barriers: None        Patient's Capacity to Self Care:  Patient is able to self care     Code Status: @[facility]@

## 2022-09-12 ENCOUNTER — APPOINTMENT (OUTPATIENT)
Dept: LAB | Facility: CLINIC | Age: 62
End: 2022-09-12
Payer: COMMERCIAL

## 2022-09-12 DIAGNOSIS — C61 PROSTATE CANCER (HCC): ICD-10-CM

## 2022-09-12 LAB — PSA SERPL-MCNC: 5.4 NG/ML (ref 0–4)

## 2022-09-12 PROCEDURE — 84153 ASSAY OF PSA TOTAL: CPT

## 2022-09-12 PROCEDURE — 36415 COLL VENOUS BLD VENIPUNCTURE: CPT

## 2022-09-13 DIAGNOSIS — I10 ESSENTIAL HYPERTENSION: ICD-10-CM

## 2022-09-13 RX ORDER — NIFEDIPINE 60 MG/1
TABLET, EXTENDED RELEASE ORAL
Qty: 90 TABLET | Refills: 0 | Status: SHIPPED | OUTPATIENT
Start: 2022-09-13

## 2022-10-05 ENCOUNTER — OFFICE VISIT (OUTPATIENT)
Dept: UROLOGY | Facility: MEDICAL CENTER | Age: 62
End: 2022-10-05
Payer: COMMERCIAL

## 2022-10-05 VITALS
SYSTOLIC BLOOD PRESSURE: 144 MMHG | HEIGHT: 69 IN | WEIGHT: 271 LBS | HEART RATE: 66 BPM | DIASTOLIC BLOOD PRESSURE: 82 MMHG | BODY MASS INDEX: 40.14 KG/M2

## 2022-10-05 DIAGNOSIS — C61 PROSTATE CANCER (HCC): Primary | ICD-10-CM

## 2022-10-05 DIAGNOSIS — N20.0 CALCULUS OF KIDNEY: ICD-10-CM

## 2022-10-05 DIAGNOSIS — C62.12 SEMINOMA OF DESCENDED LEFT TESTIS (HCC): ICD-10-CM

## 2022-10-05 DIAGNOSIS — K92.1 HEMATOCHEZIA: ICD-10-CM

## 2022-10-05 PROCEDURE — 99214 OFFICE O/P EST MOD 30 MIN: CPT | Performed by: UROLOGY

## 2022-10-05 NOTE — PROGRESS NOTES
Assessment/Plan:   1  Adenocarcinoma the prostate Jose pattern score 6 identified 09/24/2019 in 1 core on prostate biopsy performed because of a PSA of 5 5 with previous PSA being 7 7  Patient has not had repeat biopsy since but his PSA remains relatively stable at 5 4  MRI not been performed due to renal insufficiency and the need for gadolinium contrast for MRI  Plan repeat PSA in 6 months with transrectal ultrasound-guided biopsy the prostate to be discussed at that time because of active surveillance protocol  I Tiger texted with Vasyl Reich, the patient's nephrologist, and asked for opinion as to whether the patient could tolerate gadolinium contrast for multiparametric MRI of the prostate  I set that the patient has a history of low-grade prostate cancer diagnosed in 2019 followed by serial PSA since  His last creatinine is 1 57 with a GFR calculated at 46 cc/minute  As part of his active surveillance for prostate cancer a multiparametric MRI of the prostate with and without gadolinium is usually ordered and would be helpful in his surveillance protocol  Dr Bone responded on 10/05/2022 saying I am okay with MRI with daniele  Patient has CKD 3  With the new contrast less likely to get nsf  But obviously IV contrast would be more damage  So if the essential for care than MRI with daniele is okay  Thanks"    Will order MRI prior to next visit      2  Seminal of left testis status post 3 courses of chemotherapy with no evidence of metastatic disease but locally advanced T2 B tumor on radical orchiectomy  Followed by Hematology-Oncology with recent CT of the chest abdomen and pelvis negative for metastatic disease or lymphadenopathy  3  Mildly elevated serum creatinine     4  Small renal [id number] intervention at this point     5  Episodes of blood from rectum-planned GI consultation  No problem-specific Assessment & Plan notes found for this encounter         Diagnoses and all orders for this visit:    Prostate cancer ([facility] [address] )    Seminoma of descended left testis ([facility] [address] )    Calculus of kidney          Subjective:      Patient ID: Hailey Lobo is a 60 y o  male  HPI  70-year-old gentleman who initially presented with an elevated PSA and underwent transrectal ultrasound-guided transrectal biopsies of the prostate in the office by Dr Ashwin Snyder it which time 1 core of Jose pattern score 6 (3+3) adenocarcinoma was identified  The patient has been on a surveillance protocol since however because of renal insufficiency has not been able to undergo multiparametric MRI with and without contrast   His PSA remains relatively stable at 5 4  He is voiding adequately but does have urgency frequency and nocturia 3 times per night with an AUA symptom score of 14  He is followed by Hematology Oncology for his seminoma  He apparently underwent 3  Courses of chemotherapy however he was unable to tolerate a 4th course because of side effects including renal insufficiency  The patient presents for follow-up  The following portions of the patient's history were reviewed and updated as appropriate: allergies, current medications, past family history, past medical history, past social history, past surgical history and problem list     Review of Systems   Gastrointestinal: Positive for blood in stool  Patient aware that GI evaluation requested   Genitourinary: Positive for frequency and urgency  Aua ss 14 nocturia 3 times per night and 3/5 urgency and frequency   All other systems reviewed and are negative  Objective:      /82   Pulse 66   Ht 5' 9" (1 753 m)   Wt 123 kg (271 lb)   BMI 40 02 kg/m²          Physical Exam  Vitals reviewed  Constitutional:       General: He is not in acute distress  Appearance: Normal appearance  He is obese  He is not ill-appearing, toxic-appearing or diaphoretic  HENT:      Head: Normocephalic and atraumatic        Nose: Nose normal  Mouth/Throat:      Mouth: Mucous membranes are moist    Eyes:      Extraocular Movements: Extraocular movements intact  Pulmonary:      Effort: Pulmonary effort is normal       Breath sounds: Normal breath sounds  Abdominal:      General: Bowel sounds are normal  There is no distension  Palpations: Abdomen is soft  Tenderness: There is no abdominal tenderness  There is no guarding or rebound  Genitourinary:     Comments: Phallus normal uncircumcised without cutaneous lesions  Meatus patent normally placed  Scrotum normal without cutaneous lesions  Right testis palpably normal without adnexal abnormality or palpable mass  Surgically absent left testis secondary to radical orchiectomy  FIGUEROA normal anal verge normal anal sphincter tone no palpable rectal masses  Prostate approximately 45 g a nodular nontender  Musculoskeletal:      Cervical back: Neck supple  Skin:     General: Skin is dry  Neurological:      Mental Status: He is alert and oriented to person, place, and time  Psychiatric:         Mood and Affect: Mood normal          Behavior: Behavior normal          Thought Content:  Thought content normal          Judgment: Judgment normal

## 2022-10-11 PROBLEM — J21.9 BRONCHIOLITIS: Status: RESOLVED | Noted: 2022-06-06 | Resolved: 2022-10-11

## 2022-10-11 PROBLEM — R05.9 COUGH: Status: RESOLVED | Noted: 2022-06-13 | Resolved: 2022-10-11

## 2022-10-31 ENCOUNTER — TELEPHONE (OUTPATIENT)
Dept: NEPHROLOGY | Facility: HOSPITAL | Age: 62
End: 2022-10-31

## 2022-10-31 ENCOUNTER — CONSULT (OUTPATIENT)
Dept: GASTROENTEROLOGY | Facility: MEDICAL CENTER | Age: 62
End: 2022-10-31
Attending: UROLOGY

## 2022-10-31 VITALS
HEART RATE: 71 BPM | BODY MASS INDEX: 40.02 KG/M2 | TEMPERATURE: 97.7 F | SYSTOLIC BLOOD PRESSURE: 133 MMHG | WEIGHT: 271 LBS | DIASTOLIC BLOOD PRESSURE: 74 MMHG

## 2022-10-31 DIAGNOSIS — K92.1 HEMATOCHEZIA: Primary | ICD-10-CM

## 2022-10-31 DIAGNOSIS — K21.9 GASTROESOPHAGEAL REFLUX DISEASE WITHOUT ESOPHAGITIS: ICD-10-CM

## 2022-10-31 NOTE — PROGRESS NOTES
Assessment/Plan:     Diagnoses and all orders for this visit:    Hematochezia  Patient states he noticed a couple days where there was blood in the toilet bowl after having a bowel movement  He does admit to some constipation since being treated with chemotherapy for his prostate cancer  He does have known hemorrhoids which is likely the source of his bleeding  Most recent hemoglobin was in July and was 11 8  I did recommend starting stool softeners and if ineffective MiraLax daily to help with his constipation  He did have 1 sessile serrated adenoma and would be due for surveillance colonoscopy at this time  Risks and benefits of the procedure were discussed and patient is agreeable  -     Ambulatory Referral to Gastroenterology  -     Colonoscopy; Future  -     polyethylene glycol (GOLYTELY) 4000 mL solution; Take 4,000 mL by mouth once for 1 dose    Gastroesophageal reflux disease without esophagitis  Patient has a history of GERD, currently on pantoprazole 40 mg daily with good control of his symptoms  Subjective:      Patient ID: Mark Powell is a 60 y o  male  HPI     Patient was referred for hematochezia  He has a history of GERD, hypertension, prostate cancer, status post chemo, sleep apnea & CKD  He states that he has been more constipated since his chemotherapy  He has seen some blood with moving his bowels in the toilet bowl  He does have a history of GERD but is currently on pantoprazole with good control of his symptoms  His last endoscopy and colonoscopy were in 2017  EGD showed mild gastritis, biopsies were negative for Kurtz's and H pylori  Colonoscopy showed 1 sessile serrated adenoma, diverticula and internal hemorrhoids      Patient Active Problem List   Diagnosis   • Calculus of kidney   • Elevated PSA   • Benign prostatic hyperplasia with nocturia   • Prostate cancer ([facility] [address] )   • Hydrocele in adult   • Mass of left testis   • Essential hypertension   • GERD (gastroesophageal reflux disease)   • CPAP (continuous positive airway pressure) dependence   • Morbid (severe) obesity due to excess calories (HCC)   • Seminoma of descended left testis (HCC)   • Seminoma of testis, stage 3, left (HCC)   • SIMON on CPAP   • Steroid-induced hyperglycemia   • CKD (chronic kidney disease) stage 3, GFR 30-59 ml/min (HCC)   • Back pain   • Thrombocytopenia (HCC)   • Anemia   • Lymphadenopathy, retroperitoneal   • Pancytopenia (HCC)   • Gastric distention   • Vitamin D deficiency   • Hyperlipidemia   • Hypomagnesemia   • History of nephrolithiasis   • Abnormal EKG   • Lower abdominal pain   • COVID-19 in immunocompromised patient ([facility] [address] )   • Elevated LDH   • Supraventricular tachycardia (HCC)   • Hypertensive urgency   • Hematochezia     No Known Allergies  Current Outpatient Medications on File Prior to Visit   Medication Sig   • allopurinol (ZYLOPRIM) 100 mg tablet Take 2 tablets (200 mg total) by mouth daily   • losartan (COZAAR) 50 mg tablet Take 1 tablet (50 mg total) by mouth daily   • NIFEdipine (PROCARDIA XL) 60 mg 24 hr tablet TAKE ONE TABLET BY MOUTH EVERY DAY   • pantoprazole (PROTONIX) 40 mg tablet Take 1 tablet (40 mg total) by mouth daily before breakfast   • promethazine-codeine (PHENERGAN WITH CODEINE) 6 25-10 mg/5 mL syrup Take 5 mL by mouth every 6 (six) hours as needed for cough   • rosuvastatin (CRESTOR) 20 MG tablet Take 1 tablet (20 mg total) by mouth daily at bedtime   • benzonatate (TESSALON) 200 MG capsule Take 1 capsule (200 mg total) by mouth 3 (three) times a day as needed for cough (Patient not taking: No sig reported)   • Cholecalciferol (VITAMIN D3) 2000 units capsule Take 1 capsule by mouth daily   • magnesium oxide (MAG-OX) 400 mg Take 1 tablet (400 mg total) by mouth once for 1 dose   • methocarbamol (ROBAXIN) 500 mg tablet Take 1 tablet (500 mg total) by mouth 3 (three) times a day for 10 days (Patient not taking: Reported on 10/5/2022)     No current facility-administered medications on file prior to visit  Family History   Problem Relation Age of Onset   • Nephrolithiasis Father    • Alzheimer's disease Mother      Past Medical History:   Diagnosis Date   • BPH without obstruction/lower urinary tract symptoms    • Cancer (HCC)     prostate    • CPAP (continuous positive airway pressure) dependence    • Elevated PSA    • GERD (gastroesophageal reflux disease)    • Gout    • Hyperlipidemia    • Hypertension    • Kidney stone    • Obese abdomen    • Obesity    • Polymyalgia (HCC)    • Prostate cancer (HCC)    • Rash     under both arms and in between legs - family doctor aware - pt uses Desitin   • Sleep apnea    • Testicular carcinoma ([facility] [address] )    • Urinary frequency    • Urinary urgency    • Wears glasses      Social History     Socioeconomic History   • Marital status: /Civil Union     Spouse name: None   • Number of children: None   • Years of education: None   • Highest education level: None   Occupational History   • None   Tobacco Use   • Smoking status: Never Smoker   • Smokeless tobacco: Never Used   Vaping Use   • Vaping Use: Never used   Substance and Sexual Activity   • Alcohol use: Not Currently   • Drug use: No   • Sexual activity: None   Other Topics Concern   • None   Social History Narrative   • None     Social Determinants of Health     Financial Resource Strain: Not on file   Food Insecurity: Unknown   • Worried About Running Out of Food in the Last Year: Never true   • Ran Out of Food in the Last Year: Not on file   Transportation Needs: No Transportation Needs   • Lack of Transportation (Medical): No   • Lack of Transportation (Non-Medical):  No   Physical Activity: Not on file   Stress: Not on file   Social Connections: Not on file   Intimate Partner Violence: Not on file   Housing Stability: Unknown   • Unable to Pay for Housing in the Last Year: No   • Number of Places Lived in the Last Year: Not on file   • Unstable Housing in the Last Year: No     Past Surgical History:   Procedure Laterality Date   • CARPAL TUNNEL RELEASE Bilateral 07/2020   • COLONOSCOPY     • CYSTOSCOPY W/ LASER LITHOTRIPSY  2001   • CYSTOSTOMY W/ STENT INSERTION  2011   • ESOPHAGOGASTRODUODENOSCOPY     • EXTRACORPOREAL SHOCK WAVE LITHOTRIPSY Right 2011   • HERNIA REPAIR     • KNEE ARTHROSCOPY Left    • [place] CYSTO/URETERO W/LITHOTRIPSY &INDWELL STENT INSRT Right 4/29/2018    Procedure: CYSTOSCOPY URETEROSCOPY, RETROGRADE PYELOGRAM AND INSERTION STENT URETERAL;  Surgeon: Richard Blanc MD;  [facility]: [facility];  Service: Urology   • [place] REMOVAL TESTIS,RADICAL Left 2/19/2021    Procedure: Radical  ORCHIECTOMY;  Surgeon: Dylon Mai MD;  [facility]: [facility];  Service: Urology         Review of Systems   All other systems reviewed and are negative  Objective:      /74   Pulse 71   Temp 97 7 °F (36 5 °C) (Tympanic)   Wt 123 kg (271 lb)   BMI 40 02 kg/m²          Physical Exam  Constitutional:       Appearance: He is well-developed  He is obese  HENT:      Head: Normocephalic and atraumatic  Eyes:      Conjunctiva/sclera: Conjunctivae normal    Cardiovascular:      Rate and Rhythm: Normal rate and regular rhythm  Pulmonary:      Effort: Pulmonary effort is normal       Breath sounds: Normal breath sounds  Abdominal:      General: Bowel sounds are normal  There is no distension  Palpations: Abdomen is soft  Tenderness: There is no abdominal tenderness  Musculoskeletal:         General: Normal range of motion  Cervical back: Normal range of motion  Skin:     General: Skin is warm and dry  Neurological:      Mental Status: He is alert and oriented to person, place, and time     Psychiatric:         Mood and Affect: Mood normal          Behavior: Behavior normal

## 2022-10-31 NOTE — TELEPHONE ENCOUNTER
Called and spoke with Patient to complete their bloodwork prior to their appointment on 11/7 with Reynaldo Chavarria at the [facility]

## 2022-10-31 NOTE — PATIENT INSTRUCTIONS
Scheduled date of Colon (as of today) 1/5/23  Physician performing: Dr Loi Holden  Location of procedure:  [facility]  Bowel prep reviewed with patient: Golytely  Instructions reviewed with patient by: MA  Clearances: N/A

## 2022-11-02 ENCOUNTER — APPOINTMENT (OUTPATIENT)
Dept: LAB | Facility: CLINIC | Age: 62
End: 2022-11-02

## 2022-11-02 DIAGNOSIS — E55.9 AVITAMINOSIS D: ICD-10-CM

## 2022-11-02 DIAGNOSIS — I10 ESSENTIAL HYPERTENSION, MALIGNANT: Primary | ICD-10-CM

## 2022-11-02 LAB
25(OH)D3 SERPL-MCNC: 29.8 NG/ML (ref 30–100)
ALBUMIN SERPL BCP-MCNC: 3.6 G/DL (ref 3.5–5)
ANION GAP SERPL CALCULATED.3IONS-SCNC: 5 MMOL/L (ref 4–13)
BUN SERPL-MCNC: 31 MG/DL (ref 5–25)
CALCIUM SERPL-MCNC: 9.3 MG/DL (ref 8.3–10.1)
CHLORIDE SERPL-SCNC: 109 MMOL/L (ref 96–108)
CO2 SERPL-SCNC: 25 MMOL/L (ref 21–32)
CREAT SERPL-MCNC: 1.68 MG/DL (ref 0.6–1.3)
CREAT UR-MCNC: 146 MG/DL
GFR SERPL CREATININE-BSD FRML MDRD: 42 ML/MIN/1.73SQ M
GLUCOSE P FAST SERPL-MCNC: 123 MG/DL (ref 65–99)
MAGNESIUM SERPL-MCNC: 2 MG/DL (ref 1.6–2.6)
PHOSPHATE SERPL-MCNC: 3.4 MG/DL (ref 2.3–4.1)
POTASSIUM SERPL-SCNC: 4.3 MMOL/L (ref 3.5–5.3)
PROT UR-MCNC: 11 MG/DL
PROT/CREAT UR: 0.08 MG/G{CREAT} (ref 0–0.1)
PTH-INTACT SERPL-MCNC: 78.9 PG/ML (ref 18.4–80.1)
SODIUM SERPL-SCNC: 139 MMOL/L (ref 135–147)

## 2022-11-04 ENCOUNTER — TELEPHONE (OUTPATIENT)
Dept: NEPHROLOGY | Facility: CLINIC | Age: 62
End: 2022-11-04

## 2022-11-04 NOTE — TELEPHONE ENCOUNTER
Appointment Confirmation   Person confirmed appointment with  If not patient, name of the person Patient    Date and time of appointment 11/7  2p    Patient acknowledged and will be at appointment? yes    Did you advise the patient that they will need a urine sample if they are a new patient?  N/A    Did you advise the patient to bring their current medications for verification? (including any OTC) Yes    Additional Information

## 2022-11-07 ENCOUNTER — OFFICE VISIT (OUTPATIENT)
Dept: NEPHROLOGY | Facility: CLINIC | Age: 62
End: 2022-11-07

## 2022-11-07 VITALS
HEART RATE: 78 BPM | OXYGEN SATURATION: 98 % | WEIGHT: 273 LBS | HEIGHT: 69 IN | DIASTOLIC BLOOD PRESSURE: 76 MMHG | BODY MASS INDEX: 40.43 KG/M2 | SYSTOLIC BLOOD PRESSURE: 168 MMHG

## 2022-11-07 DIAGNOSIS — I10 ESSENTIAL HYPERTENSION: ICD-10-CM

## 2022-11-07 DIAGNOSIS — N18.32 STAGE 3B CHRONIC KIDNEY DISEASE (HCC): Primary | ICD-10-CM

## 2022-11-07 DIAGNOSIS — Z87.442 HISTORY OF NEPHROLITHIASIS: ICD-10-CM

## 2022-11-07 DIAGNOSIS — E55.9 VITAMIN D DEFICIENCY: ICD-10-CM

## 2022-11-07 NOTE — PROGRESS NOTES
Assessment and Plan:    Dianna Alejandra was seen today for follow-up and chronic kidney disease  Diagnoses and all orders for this visit:    Stage 3b chronic kidney disease ([facility] [address] )  -     Renal function panel; Future  -     PTH, intact; Future  -     Protein / creatinine ratio, urine; Future  -     Urinalysis with microscopic; Future  -     Vitamin D 25 hydroxy; Future    Essential hypertension    History of nephrolithiasis    Vitamin D deficiency      CKD III a/b  -Baseline creatinine:  1 5-1 9 after episode of JULIO C in 11/2021 prior to this was 1-1 5  -Most recent creatinine: 1 68  -Etiology:  Suspect 2nd to age associated nephron loss, non dialysis requiring episode of JULIO C, chemotherapy with platinum compounds, obesity related hyper filtration, hypertensive nephrosclerosis  -Managing Nephrologist: Dr Armando Carbajal (last seen 6/2022)  -Avoid nephrotoxins, avoid NSAIDS (including naproxen, advil, motrin, toradol)  -recent urine protein creatinine ratio 0 08 mg/dL  -CT abdomen pelvis from 08/2022 with enlarged prostate, no hydronephrosis, few punctate nonobstructing left renal calculi  -completed Kidney Smart education classes in 12/2021    Nephrolithiasis  -CT findings as noted above  -last own 2019  -patient reports no flank pain/hematuria/symptoms of stone disease  -prior head ordered for 24 hour urine however this was not completed due to logistics issues    Blood Pressure  -Office visit BP:  168/76, had prior office visits 133/74  -Current antihypertensive medications:  Procardia XL 30 mg daily, started on losartan 50 mg daily on last nephrology visit, can increasing procardia if remaining elevated   -will hold increasing for now given variability readings patient educated to take home blood pressure readings and call the Nephrology office if they are elevated above 170      CKD MBD  -recent PTH 78 9  -phosphorus of 3 4  -vitamin-D 29 8  -can continue vitamin-D 2000 units daily  -recheck PTH/vitamin-D with next appointment    Testicular cancer  -follows with Hematology/Oncology as outpatient, prior on carboplatin and etoposide then had current hospitalization with so no severe JULIO C  -status post left inguinal orchiectomy 2021  -recent CT abdomen pelvis without contrast with no retroperitoneal lymphadenopathy or metastatic disease in the abdomen or pelvis    Prostate cancer  -follows with Urology as outpatient  -per recent urology note plan for surveillance MRI with gadolinium, this was approved by Dr Dakota East 10/5    Hematochezia  -saw GI recently as outpatient after a noted BRBPR after movements  -plan for outpatient colonoscopy    Hypomagnesemia  -on Mag oxide 400 mg daily    Gout  -allopurinol 100 mg daily    Addition medical problems:  Dyslipidemia on Crestor, GERD on Protonix    PATIENT INSTRUCTIONS  Follow up with Dr Dakota East in 6 months with repeat labs prior to that appt  Please call the office with any questions or concerns  AVOID NSAIDS INCLUDING MOTRIN, IBUPROFEN, ADVIL, ALEVE, NAPROXEN      Reason for Visit: Follow-up and Chronic Kidney Disease    HPI: Caren Dudley is a 60 y o  male who is here for follow up of CKD 3, he follows with Dr Dakota East and was last seen by her in June of 2022  He reports that he is not currently on chemotherapy, he completed 3/4 cycles in the last 1 was stopped due to recurrent hospitalizations  He denies unplanned weight loss/weight gain, denies NSAID use reports he utilizes p r n  Tylenol for pain, reports he attempts to stay hydrated at home  Denies any dizziness/chest pain/shortness of breath  He reported he had an episode of bright red blood when using the bathroom and recently saw his gastroenterologist who was planning to do a colonoscopy  He reports his blood pressure is variable and is up and down at all of his physicians appointments    He denies any recent hospitalization/illnesses, reports he is frustrated by ongoing fatigue after he completed his chemotherapy, was told that this will take time to resume to his baseline by his oncologist     ROS:   Review of Systems     Allergies:   Patient has no known allergies      Medications:     Current Outpatient Medications:   •  allopurinol (ZYLOPRIM) 100 mg tablet, Take 2 tablets (200 mg total) by mouth daily, Disp: 90 tablet, Rfl: 3  •  benzonatate (TESSALON) 200 MG capsule, Take 1 capsule (200 mg total) by mouth 3 (three) times a day as needed for cough, Disp: 20 capsule, Rfl: 0  •  Cholecalciferol (VITAMIN D3) 2000 units capsule, Take 1 capsule by mouth daily, Disp: , Rfl:   •  magnesium oxide (MAG-OX) 400 mg, Take 1 tablet (400 mg total) by mouth once for 1 dose, Disp: 90 tablet, Rfl: 4  •  methocarbamol (ROBAXIN) 500 mg tablet, Take 1 tablet (500 mg total) by mouth 3 (three) times a day for 10 days, Disp: 30 tablet, Rfl: 0  •  NIFEdipine (PROCARDIA XL) 60 mg 24 hr tablet, TAKE ONE TABLET BY MOUTH EVERY DAY, Disp: 90 tablet, Rfl: 0  •  pantoprazole (PROTONIX) 40 mg tablet, Take 1 tablet (40 mg total) by mouth daily before breakfast, Disp: 90 tablet, Rfl: 1  •  promethazine-codeine (PHENERGAN WITH CODEINE) 6 25-10 mg/5 mL syrup, Take 5 mL by mouth every 6 (six) hours as needed for cough, Disp: 180 mL, Rfl: 0  •  rosuvastatin (CRESTOR) 20 MG tablet, Take 1 tablet (20 mg total) by mouth daily at bedtime, Disp: 30 tablet, Rfl: 40  •  losartan (COZAAR) 50 mg tablet, Take 1 tablet (50 mg total) by mouth daily, Disp: 90 tablet, Rfl: 3  •  polyethylene glycol (GOLYTELY) 4000 mL solution, Take 4,000 mL by mouth once for 1 dose, Disp: 4000 mL, Rfl: 0    Past Medical History:   Diagnosis Date   • BPH without obstruction/lower urinary tract symptoms    • Cancer (HCC)     prostate    • CPAP (continuous positive airway pressure) dependence    • Elevated PSA    • GERD (gastroesophageal reflux disease)    • Gout    • Hyperlipidemia    • Hypertension    • Kidney stone    • Obese abdomen    • Obesity    • Polymyalgia (HCC)    • Prostate cancer ([facility] [address] )    • Rash     under both arms and in between legs - family doctor aware - pt uses Desitin   • Sleep apnea    • Testicular carcinoma ([facility] [address] )    • Urinary frequency    • Urinary urgency    • Wears glasses      Past Surgical History:   Procedure Laterality Date   • CARPAL TUNNEL RELEASE Bilateral 07/2020   • COLONOSCOPY     • CYSTOSCOPY W/ LASER LITHOTRIPSY  2001   • CYSTOSTOMY W/ STENT INSERTION  2011   • ESOPHAGOGASTRODUODENOSCOPY     • EXTRACORPOREAL SHOCK WAVE LITHOTRIPSY Right 2011   • HERNIA REPAIR     • KNEE ARTHROSCOPY Left    • [place] CYSTO/URETERO W/LITHOTRIPSY &INDWELL STENT INSRT Right 4/29/2018    Procedure: CYSTOSCOPY URETEROSCOPY, RETROGRADE PYELOGRAM AND INSERTION STENT URETERAL;  Surgeon: Kenan Leija MD;  [facility];  Service: Urology   • [place] REMOVAL TESTIS,RADICAL Left 2/19/2021    Procedure: Radical  ORCHIECTOMY;  Surgeon: Candido Rapp MD;  [facility];  Service: Urology     Family History   Problem Relation Age of Onset   • Nephrolithiasis Father    • Alzheimer's disease Mother       reports that he has never smoked  He has never used smokeless tobacco  He reports previous alcohol use  He reports that he does not use drugs  Physical Exam:   Vitals:    11/07/22 1340   BP: 168/76   BP Location: Left arm   Patient Position: Sitting   Cuff Size: Adult   Pulse: 78   SpO2: 98%   Weight: 124 kg (273 lb)   Height: 5' 9" (1 753 m)     Body mass index is 40 32 kg/m²  Physical Exam  Vitals and nursing note reviewed  Constitutional:       General: He is not in acute distress  Appearance: Normal appearance  He is obese  He is not toxic-appearing or diaphoretic  HENT:      Head: Normocephalic and atraumatic  Nose: Nose normal       Mouth/Throat:      Mouth: Mucous membranes are moist    Eyes:      General: No scleral icterus  Cardiovascular:      Rate and Rhythm: Normal rate and regular rhythm  Pulses: Normal pulses  Heart sounds: Normal heart sounds  Pulmonary:      Effort: Pulmonary effort is normal  No respiratory distress  Breath sounds: Normal breath sounds  No stridor  No wheezing or rales  Abdominal:      General: Abdomen is flat  Palpations: Abdomen is soft  Musculoskeletal:      Cervical back: Neck supple  Right lower leg: No edema  Left lower leg: No edema  Skin:     General: Skin is warm and dry  Capillary Refill: Capillary refill takes less than 2 seconds  Neurological:      General: No focal deficit present  Mental Status: He is alert and oriented to person, place, and time  Procedure:  No results found for this or any previous visit  Lab Results   Component Value Date    CALCIUM 9 3 11/02/2022    K 4 3 11/02/2022    CO2 25 11/02/2022     (H) 11/02/2022    BUN 31 (H) 11/02/2022    CREATININE 1 68 (H) 11/02/2022       Results from last 7 days   Lab Units 11/02/22  1045   POTASSIUM mmol/L 4 3   CHLORIDE mmol/L 109*   CO2 mmol/L 25   BUN mg/dL 31*   CREATININE mg/dL 1 68*   CALCIUM mg/dL 9 3   MAGNESIUM mg/dL 2 0   PHOSPHORUS mg/dL 3 4       I have personally reviewed the blood work as stated above and in my note  I have personally reviewed CT abdomen pelvis without contrast imaging studies  I have personally reviewed prior nephrology note

## 2022-11-07 NOTE — PATIENT INSTRUCTIONS
PATIENT INSTRUCTIONS  Follow up with Dr Fox Almendarez in 6 months with repeat labs prior to that appt  Please call the office with any questions or concerns      AVOID NSAIDS INCLUDING MOTRIN, IBUPROFEN, ADVIL, ALEVE, NAPROXEN

## 2022-11-29 ENCOUNTER — RA CDI HCC (OUTPATIENT)
Dept: OTHER | Facility: HOSPITAL | Age: 62
End: 2022-11-29

## 2022-12-06 ENCOUNTER — OFFICE VISIT (OUTPATIENT)
Dept: FAMILY MEDICINE CLINIC | Facility: CLINIC | Age: 62
End: 2022-12-06

## 2022-12-06 DIAGNOSIS — I47.1 SUPRAVENTRICULAR TACHYCARDIA (HCC): ICD-10-CM

## 2022-12-06 DIAGNOSIS — C61 PROSTATE CANCER (HCC): ICD-10-CM

## 2022-12-06 DIAGNOSIS — C62.92 SEMINOMA OF TESTIS, STAGE 3, LEFT (HCC): ICD-10-CM

## 2022-12-06 DIAGNOSIS — Z13.1 SCREENING FOR DIABETES MELLITUS: ICD-10-CM

## 2022-12-06 DIAGNOSIS — D69.6 THROMBOCYTOPENIA (HCC): ICD-10-CM

## 2022-12-06 DIAGNOSIS — I10 ESSENTIAL HYPERTENSION: ICD-10-CM

## 2022-12-06 DIAGNOSIS — Z13.29 SCREENING FOR HYPOTHYROIDISM: ICD-10-CM

## 2022-12-06 DIAGNOSIS — D61.818 PANCYTOPENIA (HCC): ICD-10-CM

## 2022-12-06 DIAGNOSIS — E78.2 MIXED HYPERLIPIDEMIA: ICD-10-CM

## 2022-12-06 DIAGNOSIS — Z00.00 ANNUAL PHYSICAL EXAM: Primary | ICD-10-CM

## 2022-12-06 PROBLEM — N18.30 CKD (CHRONIC KIDNEY DISEASE) STAGE 3, GFR 30-59 ML/MIN (HCC): Status: RESOLVED | Noted: 2021-05-30 | Resolved: 2022-12-06

## 2022-12-06 NOTE — PROGRESS NOTES
Assessment/Plan:    Essential hypertension  Overall very well controlled, he was checking his blood pressures at home and I reviewed it in the past and overall it was very acceptable  Looks like he has a touch of whitecoat syndrome  Continue current therapy with losartan and nifedipine  Supraventricular tachycardia ([facility] [address] )  No recent episodes continue to observe  Thrombocytopenia ([facility] [address] )  Continue follow-up with oncology  P[facility]topenia ([facility] )  Continue follow-up with oncology    Hyperlipidemia  He is on Crestor, will recheck lipid panel  Diagnoses and all orders for this visit:    Annual physical exam    Essential hypertension    Supraventricular tachycardia ([facility] [address] )    Pancytopenia (HCC)    Thrombocytopenia ([facility] [address] )    Prostate cancer ([facility] )    Seminoma of testis, stage 3, left (HCC)    Mixed hyperlipidemia  -     Lipid panel; Future    Screening for diabetes mellitus  -     HEMOGLOBIN A1C W/ EAG ESTIMATION; Future    Screening for hypothyroidism  -     TSH, 3rd generation with Free T4 reflex; Future          Subjective:      Patient ID: Mari Owens is a 60 y o  male  Patient came today for annual checkup  He would like to hold off on any vaccinations as for now  He is scheduled for colonoscopy, he follows up with urology due to elevated PSA and history of prostate cancer  His vital signs revealed elevated BMI but overall his weight is relatively stable  His blood pressure was rechecked by me 140/80  The following portions of the patient's history were reviewed and updated as appropriate: allergies, current medications, past family history, past medical history, past social history, past surgical history, and problem list     Review of Systems   Constitutional: Positive for fatigue  Negative for appetite change, chills and fever  HENT: Negative for congestion, rhinorrhea, sinus pain and sore throat  Respiratory: Negative for cough, chest tightness and shortness of breath  Cardiovascular: Negative for chest pain  Gastrointestinal: Negative for diarrhea, nausea and vomiting  Musculoskeletal: Negative for arthralgias and myalgias  Objective: There were no vitals taken for this visit  No Known Allergies       Current Outpatient Medications:   •  allopurinol (ZYLOPRIM) 100 mg tablet, Take 2 tablets (200 mg total) by mouth daily, Disp: 90 tablet, Rfl: 3  •  benzonatate (TESSALON) 200 MG capsule, Take 1 capsule (200 mg total) by mouth 3 (three) times a day as needed for cough, Disp: 20 capsule, Rfl: 0  •  Cholecalciferol (VITAMIN D3) 2000 units capsule, Take 1 capsule by mouth daily, Disp: , Rfl:   •  losartan (COZAAR) 50 mg tablet, Take 1 tablet (50 mg total) by mouth daily, Disp: 90 tablet, Rfl: 3  •  magnesium oxide (MAG-OX) 400 mg, Take 1 tablet (400 mg total) by mouth once for 1 dose, Disp: 90 tablet, Rfl: 4  •  methocarbamol (ROBAXIN) 500 mg tablet, Take 1 tablet (500 mg total) by mouth 3 (three) times a day for 10 days, Disp: 30 tablet, Rfl: 0  •  NIFEdipine (PROCARDIA XL) 60 mg 24 hr tablet, TAKE ONE TABLET BY MOUTH EVERY DAY, Disp: 90 tablet, Rfl: 0  •  pantoprazole (PROTONIX) 40 mg tablet, Take 1 tablet (40 mg total) by mouth daily before breakfast, Disp: 90 tablet, Rfl: 1  •  polyethylene glycol (GOLYTELY) 4000 mL solution, Take 4,000 mL by mouth once for 1 dose, Disp: 4000 mL, Rfl: 0  •  promethazine-codeine (PHENERGAN WITH CODEINE) 6 25-10 mg/5 mL syrup, Take 5 mL by mouth every 6 (six) hours as needed for cough, Disp: 180 mL, Rfl: 0  •  rosuvastatin (CRESTOR) 20 MG tablet, Take 1 tablet (20 mg total) by mouth daily at bedtime, Disp: 30 tablet, Rfl: 40     There are no Patient Instructions on file for this visit  Physical Exam  Constitutional:       Appearance: He is not ill-appearing  HENT:      Head: Normocephalic and atraumatic  Nose: No congestion or rhinorrhea  Eyes:      Pupils: Pupils are equal, round, and reactive to light  Cardiovascular:      Rate and Rhythm: Normal rate and regular rhythm  Pulses: Normal pulses  Heart sounds: Murmur heard  No friction rub  No gallop  Pulmonary:      Effort: Pulmonary effort is normal  No respiratory distress  Breath sounds: Normal breath sounds  No wheezing or rales  Chest:      Chest wall: No tenderness  Abdominal:      General: Bowel sounds are normal  There is no distension  Palpations: Abdomen is soft  There is no mass  Tenderness: There is no abdominal tenderness  There is no rebound  Hernia: No hernia is present  Musculoskeletal:         General: No swelling, tenderness or deformity  Cervical back: No rigidity  No muscular tenderness  Skin:     Coloration: Skin is not pale  Findings: No erythema, lesion or rash  Neurological:      Mental Status: He is alert and oriented to person, place, and time  Sensory: No sensory deficit  Motor: No weakness     Psychiatric:         Mood and Affect: Mood normal          Behavior: Behavior normal

## 2022-12-06 NOTE — ASSESSMENT & PLAN NOTE
Overall very well controlled, he was checking his blood pressures at home and I reviewed it in the past and overall it was very acceptable  Looks like he has a touch of whitecoat syndrome  Continue current therapy with losartan and nifedipine

## 2022-12-18 DIAGNOSIS — I10 ESSENTIAL HYPERTENSION: ICD-10-CM

## 2022-12-18 RX ORDER — NIFEDIPINE 60 MG/1
TABLET, EXTENDED RELEASE ORAL
Qty: 90 TABLET | Refills: 0 | Status: SHIPPED | OUTPATIENT
Start: 2022-12-18

## 2022-12-26 ENCOUNTER — NURSE TRIAGE (OUTPATIENT)
Dept: OTHER | Facility: OTHER | Age: 62
End: 2022-12-26

## 2022-12-26 NOTE — TELEPHONE ENCOUNTER
Regarding: scratchy throat,sinuses blocked and cough  ----- Message from Comfort Vance sent at 12/26/2022  8:52 AM EST -----  "I would like to schedule a sick appointment for tomorrow  I have a scratchy throat, my sinuses are blocked and I have a cough   I have had it since Thursday "

## 2022-12-28 ENCOUNTER — TELEMEDICINE (OUTPATIENT)
Dept: FAMILY MEDICINE CLINIC | Facility: CLINIC | Age: 62
End: 2022-12-28

## 2022-12-28 DIAGNOSIS — R05.1 ACUTE COUGH: Primary | ICD-10-CM

## 2022-12-28 RX ORDER — AZITHROMYCIN 250 MG/1
TABLET, FILM COATED ORAL
Qty: 6 TABLET | Refills: 0 | Status: SHIPPED | OUTPATIENT
Start: 2022-12-28 | End: 2023-01-02

## 2022-12-28 NOTE — PROGRESS NOTES
Virtual Regular Visit    Verification of patient location:    Patient is located in the following state in which I hold an active license [place]      Assessment/Plan:    Problem List Items Addressed This Visit    None  Visit Diagnoses     Acute cough    -  Primary    Relevant Medications    azithromycin (Zithromax) 250 mg tablet        Clinical presentation consistent with viral tracheobronchitis, to cover for secondary bacterial infection we will start him on Z-Sebastian  He will use Mucinex DM twice a day that he will get over-the-counter  He will let me know if no better in 72 hours  He was also recommended to do COVID-19 test that he will buy in the pharmacy  If it will be positive he will let me know  Reason for visit is   Chief Complaint   Patient presents with   • Virtual Regular Visit        Encounter provider Donn Solano MD    Provider located at [address] [id number] [phone]      Recent Visits  No visits were found meeting these conditions  Showing recent visits within past 7 days and meeting all other requirements  Today's Visits  Date Type Provider Dept   12/28/22 Telemedicine Donn Solano MD [place] [address] [facility]   Showing today's visits and meeting all other requirements  Future Appointments  No visits were found meeting these conditions  Showing future appointments within next 150 days and meeting all other requirements       The patient was identified by name and date of birth  Rossana Portillo was informed that this is a telemedicine visit and that the visit is being conducted through Telephone  My office door was closed  No one else was in the room  He acknowledged consent and understanding of privacy and security of the video platform  The patient has agreed to participate and understands they can discontinue the visit at any time      Patient is aware this is a billable service  Subjective  Candelario Arce is a 60 y o  male   Patient called today with complains of a cough and nasal congestion that started 5 days ago  He did not test himself for COVID-19  Denies any chest pain, shortness of breath, fevers or chills  No wheezing  Past Medical History:   Diagnosis Date   • BPH without obstruction/lower urinary tract symptoms    • Cancer (HCC)     prostate    • CPAP (continuous positive airway pressure) dependence    • Elevated PSA    • GERD (gastroesophageal reflux disease)    • Gout    • Hyperlipidemia    • Hypertension    • Kidney stone    • Obese abdomen    • Obesity    • Polymyalgia ([facility] [address] )    • Prostate cancer ([facility] [address] )    • Rash     under both arms and in between legs - family doctor aware - pt uses Desitin   • Sleep apnea    • Testicular carcinoma ([facility] [address] )    • Urinary frequency    • Urinary urgency    • Wears glasses        Past Surgical History:   Procedure Laterality Date   • CARPAL TUNNEL RELEASE Bilateral 07/2020   • COLONOSCOPY     • CYSTOSCOPY W/ LASER LITHOTRIPSY  2001   • CYSTOSTOMY W/ STENT INSERTION  2011   • ESOPHAGOGASTRODUODENOSCOPY     • EXTRACORPOREAL SHOCK WAVE LITHOTRIPSY Right 2011   • HERNIA REPAIR     • KNEE ARTHROSCOPY Left    • CA CYSTO/URETERO W/LITHOTRIPSY &INDWELL STENT INSRT Right 4/29/2018    Procedure: CYSTOSCOPY URETEROSCOPY, RETROGRADE PYELOGRAM AND INSERTION STENT URETERAL;  Surgeon: Drew Coy MD;  Location[facility];  Service: Urology   • CA REMOVAL TESTIS,RADICAL Left 2/19/2021    Procedure: Radical  ORCHIECTOMY;  Surgeon: Contreras Henson MD;  [facility];  Service: Urology       Current Outpatient Medications   Medication Sig Dispense Refill   • azithromycin (Zithromax) 250 mg tablet Take 2 tablets (500 mg total) by mouth daily for 1 day, THEN 1 tablet (250 mg total) daily for 4 days   6 tablet 0   • allopurinol (ZYLOPRIM) 100 mg tablet Take 2 tablets (200 mg total) by mouth daily 90 tablet 3 • benzonatate (TESSALON) 200 MG capsule Take 1 capsule (200 mg total) by mouth 3 (three) times a day as needed for cough 20 capsule 0   • Cholecalciferol (VITAMIN D3) 2000 units capsule Take 1 capsule by mouth daily     • losartan (COZAAR) 50 mg tablet Take 1 tablet (50 mg total) by mouth daily 90 tablet 3   • magnesium oxide (MAG-OX) 400 mg Take 1 tablet (400 mg total) by mouth once for 1 dose 90 tablet 4   • methocarbamol (ROBAXIN) 500 mg tablet Take 1 tablet (500 mg total) by mouth 3 (three) times a day for 10 days 30 tablet 0   • NIFEdipine (PROCARDIA XL) 60 mg 24 hr tablet TAKE ONE TABLET BY MOUTH EVERY DAY 90 tablet 0   • pantoprazole (PROTONIX) 40 mg tablet Take 1 tablet (40 mg total) by mouth daily before breakfast 90 tablet 1   • polyethylene glycol (GOLYTELY) 4000 mL solution Take 4,000 mL by mouth once for 1 dose 4000 mL 0   • promethazine-codeine (PHENERGAN WITH CODEINE) 6 25-10 mg/5 mL syrup Take 5 mL by mouth every 6 (six) hours as needed for cough 180 mL 0   • rosuvastatin (CRESTOR) 20 MG tablet Take 1 tablet (20 mg total) by mouth daily at bedtime 30 tablet 40     No current facility-administered medications for this visit  No Known Allergies    Review of Systems   Constitutional: Negative for appetite change, chills, fatigue and fever  HENT: Positive for rhinorrhea and sore throat  Negative for sinus pain  Respiratory: Positive for cough  Negative for chest tightness and shortness of breath  Cardiovascular: Negative for chest pain  Gastrointestinal: Negative for diarrhea, nausea and vomiting  Musculoskeletal: Negative for arthralgias and myalgias  Video Exam    There were no vitals filed for this visit      Physical Exam     I spent 15 minutes directly with the patient during this visit

## 2023-01-04 RX ORDER — SODIUM CHLORIDE 9 MG/ML
125 INJECTION, SOLUTION INTRAVENOUS CONTINUOUS
Status: CANCELLED | OUTPATIENT
Start: 2023-01-04

## 2023-01-05 ENCOUNTER — ANESTHESIA (OUTPATIENT)
Dept: GASTROENTEROLOGY | Facility: HOSPITAL | Age: 63
End: 2023-01-05

## 2023-01-05 ENCOUNTER — ANESTHESIA EVENT (OUTPATIENT)
Dept: GASTROENTEROLOGY | Facility: HOSPITAL | Age: 63
End: 2023-01-05

## 2023-01-05 ENCOUNTER — HOSPITAL ENCOUNTER (OUTPATIENT)
Dept: GASTROENTEROLOGY | Facility: HOSPITAL | Age: 63
Setting detail: OUTPATIENT SURGERY
End: 2023-01-05
Attending: INTERNAL MEDICINE

## 2023-01-05 VITALS
BODY MASS INDEX: 40.58 KG/M2 | TEMPERATURE: 98.2 F | HEIGHT: 69 IN | DIASTOLIC BLOOD PRESSURE: 57 MMHG | OXYGEN SATURATION: 95 % | SYSTOLIC BLOOD PRESSURE: 105 MMHG | HEART RATE: 57 BPM | WEIGHT: 274 LBS | RESPIRATION RATE: 16 BRPM

## 2023-01-05 DIAGNOSIS — K92.1 HEMATOCHEZIA: ICD-10-CM

## 2023-01-05 PROBLEM — Z92.84 HISTORY OF UNINTENDED AWARENESS UNDER GENERAL ANESTHESIA: Status: ACTIVE | Noted: 2023-01-05

## 2023-01-05 RX ORDER — GLYCOPYRROLATE 0.2 MG/ML
INJECTION INTRAMUSCULAR; INTRAVENOUS AS NEEDED
Status: DISCONTINUED | OUTPATIENT
Start: 2023-01-05 | End: 2023-01-05

## 2023-01-05 RX ORDER — SODIUM CHLORIDE 9 MG/ML
125 INJECTION, SOLUTION INTRAVENOUS CONTINUOUS
Status: DISCONTINUED | OUTPATIENT
Start: 2023-01-05 | End: 2023-01-09 | Stop reason: HOSPADM

## 2023-01-05 RX ORDER — PROPOFOL 10 MG/ML
INJECTION, EMULSION INTRAVENOUS AS NEEDED
Status: DISCONTINUED | OUTPATIENT
Start: 2023-01-05 | End: 2023-01-05

## 2023-01-05 RX ADMIN — GLYCOPYRROLATE 0.2 MG: 0.2 INJECTION, SOLUTION INTRAMUSCULAR; INTRAVENOUS at 09:36

## 2023-01-05 RX ADMIN — PROPOFOL 100 MG: 10 INJECTION, EMULSION INTRAVENOUS at 09:39

## 2023-01-05 RX ADMIN — PROPOFOL 50 MG: 10 INJECTION, EMULSION INTRAVENOUS at 09:55

## 2023-01-05 RX ADMIN — PROPOFOL 50 MG: 10 INJECTION, EMULSION INTRAVENOUS at 09:50

## 2023-01-05 RX ADMIN — PROPOFOL 50 MG: 10 INJECTION, EMULSION INTRAVENOUS at 09:45

## 2023-01-05 RX ADMIN — PROPOFOL 50 MG: 10 INJECTION, EMULSION INTRAVENOUS at 09:42

## 2023-01-05 RX ADMIN — SODIUM CHLORIDE 125 ML/HR: 0.9 INJECTION, SOLUTION INTRAVENOUS at 08:27

## 2023-01-05 NOTE — H&P
History and Physical - [facility]  Tresa Butler Sarah 60 y o  male MRN: [id number]                  HPI: Francisco Carrillo is a 60y o  year old male who presents for hematochezia, colon polyp surveillance  REVIEW OF SYSTEMS: Per the HPI, and otherwise unremarkable      Historical Information   Past Medical History:   Diagnosis Date   • BPH without obstruction/lower urinary tract symptoms    • Cancer (HCC)     prostate    • CPAP (continuous positive airway pressure) dependence    • Elevated PSA    • GERD (gastroesophageal reflux disease)    • Gout    • Hyperlipidemia    • Hypertension    • Kidney stone    • Obese abdomen    • Obesity    • Polymyalgia ([address] )    • Prostate cancer ([facility] )    • Rash     under both arms and in between legs - family doctor aware - pt uses Desitin   • Sleep apnea    • Testicular carcinoma ([facility] )    • Urinary frequency    • Urinary urgency    • Wears glasses      Past Surgical History:   Procedure Laterality Date   • CARPAL TUNNEL RELEASE Bilateral 07/2020   • COLONOSCOPY     • CYSTOSCOPY W/ LASER LITHOTRIPSY  2001   • CYSTOSTOMY W/ STENT INSERTION  2011   • ESOPHAGOGASTRODUODENOSCOPY     • EXTRACORPOREAL SHOCK WAVE LITHOTRIPSY Right 2011   • HERNIA REPAIR     • KNEE ARTHROSCOPY Left    • IA CYSTO/URETERO W/LITHOTRIPSY &INDWELL STENT INSRT Right 4/29/2018    Procedure: CYSTOSCOPY URETEROSCOPY, RETROGRADE PYELOGRAM AND INSERTION STENT URETERAL;  Surgeon: Prescott Leyden, MD;  [facility];  Service: Urology   • IA ORCHIECTOMY RADICAL TUMOR INGUINAL APPROACH Left 2/19/2021    Procedure: Radical  ORCHIECTOMY;  Surgeon: Shadia Mclaughlin MD;  [facility];  Service: Urology     Social History   Social History     Substance and Sexual Activity   Alcohol Use Not Currently     Social History     Substance and Sexual Activity   Drug Use No     Social History     Tobacco Use   Smoking Status Never   Smokeless Tobacco Never     Family History   Problem Relation Age of Onset   • Nephrolithiasis Father    • Alzheimer's disease Mother        Meds/Allergies       Current Outpatient Medications:   •  allopurinol (ZYLOPRIM) 100 mg tablet  •  losartan (COZAAR) 50 mg tablet  •  NIFEdipine (PROCARDIA XL) 60 mg 24 hr tablet  •  pantoprazole (PROTONIX) 40 mg tablet  •  rosuvastatin (CRESTOR) 20 MG tablet  •  benzonatate (TESSALON) 200 MG capsule  •  Cholecalciferol (VITAMIN D3) 2000 units capsule  •  magnesium oxide (MAG-OX) 400 mg  •  methocarbamol (ROBAXIN) 500 mg tablet  •  polyethylene glycol (GOLYTELY) 4000 mL solution  •  promethazine-codeine (PHENERGAN WITH CODEINE) 6 25-10 mg/5 mL syrup    Current Facility-Administered Medications:   •  sodium chloride 0 9 % infusion, 125 mL/hr, Intravenous, Continuous, 125 mL/hr at 01/05/23 0827    No Known Allergies    Objective     /62   Pulse 57   Temp 98 2 °F (36 8 °C) (Temporal)   Resp 16   Ht 5' 9" (1 753 m)   Wt 124 kg (274 lb)   SpO2 95%   BMI 40 46 kg/m²       PHYSICAL EXAM    Gen: NAD  Head: NCAT  CV: RRR  CHEST: Clear  ABD: soft, NT/ND  EXT: no edema      ASSESSMENT/PLAN:  This is a 60y o  year old male here for colonoscopy, and he is stable and optimized for his procedure

## 2023-01-05 NOTE — ANESTHESIA PREPROCEDURE EVALUATION
Procedure:  COLONOSCOPY    Relevant Problems   ANESTHESIA   (+) History of unintended awareness under general anesthesia      CARDIO   (+) Essential hypertension   (+) Hyperlipidemia   (+) Hypertensive urgency   (+) Supraventricular tachycardia (HCC)      GI/HEPATIC   (+) GERD (gastroesophageal reflux disease)      /RENAL   (+) Calculus of kidney   (+) Prostate cancer (HCC)      HEMATOLOGY   (+) Anemia   (+) Pancytopenia (HCC)   (+) Thrombocytopenia (HCC)      MUSCULOSKELETAL   (+) Back pain   (+) Polymyalgia (HCC)      NEURO/PSYCH   (+) History of nephrolithiasis   (+) History of unintended awareness under general anesthesia      PULMONARY   (+) SIMON on CPAP      Genitourinary   (+) Seminoma of descended left testis (HCC)   (+) Seminoma of testis, stage 3, left (HCC)      Other   (+) CPAP (continuous positive airway pressure) dependence   (+) Lymphadenopathy, retroperitoneal   (+) Morbid obesity with BMI of 40 0-44 9, adult ([facility] [address] )        Physical Exam    Airway    Mallampati score: III  TM Distance: >3 FB  Neck ROM: full     Dental       Cardiovascular  Rhythm: regular, Rate: normal, Cardiovascular exam normal    Pulmonary  Pulmonary exam normal Breath sounds clear to auscultation,     Other Findings        Anesthesia Plan  ASA Score- 3     Anesthesia Type- IV sedation with anesthesia with ASA Monitors  Additional Monitors:   Airway Plan:     Comment: GA prn  Plan Factors-Exercise tolerance (METS): <4 METS  Chart reviewed  Existing labs reviewed  Patient summary reviewed  Patient is not a current smoker  Patient not instructed to abstain from smoking on day of procedure  Patient did not smoke on day of surgery  Induction- intravenous  Postoperative Plan-     Informed Consent- Anesthetic plan and risks discussed with patient and spouse  I personally reviewed this patient with the CRNA  Discussed and agreed on the Anesthesia Plan with the ROMANA Macias

## 2023-01-05 NOTE — ANESTHESIA POSTPROCEDURE EVALUATION
Post-Op Assessment Note    CV Status:  Stable    Pain management: adequate     Mental Status:  Alert and awake   Hydration Status:  Euvolemic   PONV Controlled:  Controlled   Airway Patency:  Patent      Post Op Vitals Reviewed: Yes      Staff: Anesthesiologist, CRNA         No notable events documented      BP      Temp      Pulse     Resp      SpO2      /57   Pulse 57   Temp 98 2 °F (36 8 °C) (Temporal)   Resp 16   Ht 5' 9" (1 753 m)   Wt 124 kg (274 lb)   SpO2 95%   BMI 40 46 kg/m²

## 2023-01-13 DIAGNOSIS — K21.9 GASTROESOPHAGEAL REFLUX DISEASE, UNSPECIFIED WHETHER ESOPHAGITIS PRESENT: ICD-10-CM

## 2023-01-13 DIAGNOSIS — M10.9 GOUT, UNSPECIFIED CAUSE, UNSPECIFIED CHRONICITY, UNSPECIFIED SITE: ICD-10-CM

## 2023-01-17 RX ORDER — ALLOPURINOL 100 MG/1
TABLET ORAL
Qty: 90 TABLET | Refills: 0 | Status: SHIPPED | OUTPATIENT
Start: 2023-01-17

## 2023-01-17 RX ORDER — PANTOPRAZOLE SODIUM 40 MG/1
TABLET, DELAYED RELEASE ORAL
Qty: 90 TABLET | Refills: 0 | Status: SHIPPED | OUTPATIENT
Start: 2023-01-17

## 2023-01-20 ENCOUNTER — OFFICE VISIT (OUTPATIENT)
Dept: GASTROENTEROLOGY | Facility: MEDICAL CENTER | Age: 63
End: 2023-01-20

## 2023-01-20 VITALS
TEMPERATURE: 97.1 F | SYSTOLIC BLOOD PRESSURE: 129 MMHG | WEIGHT: 274 LBS | HEART RATE: 57 BPM | BODY MASS INDEX: 40.46 KG/M2 | DIASTOLIC BLOOD PRESSURE: 73 MMHG

## 2023-01-20 DIAGNOSIS — K92.1 HEMATOCHEZIA: ICD-10-CM

## 2023-01-20 DIAGNOSIS — K21.9 GASTROESOPHAGEAL REFLUX DISEASE, UNSPECIFIED WHETHER ESOPHAGITIS PRESENT: Primary | ICD-10-CM

## 2023-01-20 NOTE — PROGRESS NOTES
Assessment/Plan:     Diagnoses and all orders for this visit:    Gastroesophageal reflux disease, unspecified whether esophagitis present  He is currently on pantoprazole with good control of his symptoms  He can continue with his current dose  Hematochezia  He was previously complaining of constipation and rectal bleeding  Colonoscopy showed internal hemorrhoids, likely the source of the bleeding  He has had no further issues recently  I did instruct him of his constipation returns he can use stool softeners or MiraLAX as needed  He due for repeat colonoscopy in 7 years  We'll see him back on an as-needed basis  Subjective:      Patient ID: Lisseth Mao is a 60 y o  male  HPI     This is a follow up for hematochezia & to discuss his colonoscopy  He has a history of GERD, hypertension, prostate cancer, status post chemo, sleep apnea & CKD  He was complaining that he had been more constipated since his chemotherapy  He has seen some blood with moving his bowels in the toilet bowl  He does have a history of GERD but is currently on pantoprazole with good control of his symptoms  His last endoscopy and colonoscopy were in 2017  EGD showed mild gastritis, biopsies were negative for Kurtz's and H pylori  He underwent colonoscopy which showed 1 tubular adenomatous polyp and small hemorrhoids  He states since his last visit he has had no further issues with constipation or rectal bleeding      Patient Active Problem List   Diagnosis   • Calculus of kidney   • Elevated PSA   • Benign prostatic hyperplasia with nocturia   • Prostate cancer ([facility] )   • Hydrocele in adult   • Mass of left testis   • Essential hypertension   • GERD (gastroesophageal reflux disease)   • CPAP (continuous positive airway pressure) dependence   • Morbid obesity with BMI of 40 0-44 9, adult ([facility] )   • Seminoma of descended left testis ([facility] )   • Seminoma of testis, stage 3, left (HCC)   • SIMON on CPAP   • Polymyalgia ([place] [address] )   • Steroid-induced hyperglycemia   • Back pain   • Thrombocytopenia (HCC)   • Anemia   • Lymphadenopathy, retroperitoneal   • Pancytopenia (HCC)   • Gastric distention   • Vitamin D deficiency   • Hyperlipidemia   • Hypomagnesemia   • History of nephrolithiasis   • Abnormal EKG   • Lower abdominal pain   • COVID-19 in immunocompromised patient ([place] [address] )   • Elevated LDH   • Supraventricular tachycardia ([place] [address] )   • Hypertensive urgency   • Hematochezia   • History of unintended awareness under general anesthesia     No Known Allergies  Current Outpatient Medications on File Prior to Visit   Medication Sig   • allopurinol (ZYLOPRIM) 100 mg tablet TAKE TWO TABLETS BY MOUTH DAILY   • benzonatate (TESSALON) 200 MG capsule Take 1 capsule (200 mg total) by mouth 3 (three) times a day as needed for cough   • losartan (COZAAR) 50 mg tablet Take 1 tablet (50 mg total) by mouth daily   • NIFEdipine (PROCARDIA XL) 60 mg 24 hr tablet TAKE ONE TABLET BY MOUTH EVERY DAY   • pantoprazole (PROTONIX) 40 mg tablet TAKE ONE TABLET BY MOUTH EVERY DAY BEFORE BREAKFAST   • promethazine-codeine (PHENERGAN WITH CODEINE) 6 25-10 mg/5 mL syrup Take 5 mL by mouth every 6 (six) hours as needed for cough   • rosuvastatin (CRESTOR) 20 MG tablet Take 1 tablet (20 mg total) by mouth daily at bedtime   • Cholecalciferol (VITAMIN D3) 2000 units capsule Take 1 capsule by mouth daily   • magnesium oxide (MAG-OX) 400 mg Take 1 tablet (400 mg total) by mouth once for 1 dose   • methocarbamol (ROBAXIN) 500 mg tablet Take 1 tablet (500 mg total) by mouth 3 (three) times a day for 10 days     No current facility-administered medications on file prior to visit       Family History   Problem Relation Age of Onset   • Nephrolithiasis Father    • Alzheimer's disease Mother      Past Medical History:   Diagnosis Date   • BPH without obstruction/lower urinary tract symptoms    • Cancer [facility])     prostate    • CPAP (continuous positive airway pressure) dependence    • Elevated PSA    • GERD (gastroesophageal reflux disease)    • Gout    • Hyperlipidemia    • Hypertension    • Kidney stone    • Obese abdomen    • Obesity    • Polymyalgia (HCC)    • Prostate cancer (HCC)    • Rash     under both arms and in between legs - family doctor aware - pt uses Desitin   • Sleep apnea    • Testicular carcinoma ([facility] [address] )    • Urinary frequency    • Urinary urgency    • Wears glasses      Social History     Socioeconomic History   • Marital status: /Civil Union     Spouse name: None   • Number of children: None   • Years of education: None   • Highest education level: None   Occupational History   • None   Tobacco Use   • Smoking status: Never   • Smokeless tobacco: Never   Vaping Use   • Vaping Use: Never used   Substance and Sexual Activity   • Alcohol use: Not Currently   • Drug use: No   • Sexual activity: None   Other Topics Concern   • None   Social History Narrative   • None     Social Determinants of Health     Financial Resource Strain: Not on file   Food Insecurity: Not on file   Transportation Needs: Not on file   Physical Activity: Not on file   Stress: Not on file   Social Connections: Not on file   Intimate Partner Violence: Not on file   Housing Stability: Not on file     Past Surgical History:   Procedure Laterality Date   • CARPAL TUNNEL RELEASE Bilateral 07/2020   • COLONOSCOPY     • CYSTOSCOPY W/ LASER LITHOTRIPSY  2001   • CYSTOSTOMY W/ STENT INSERTION  2011   • ESOPHAGOGASTRODUODENOSCOPY     • EXTRACORPOREAL SHOCK WAVE LITHOTRIPSY Right 2011   • HERNIA REPAIR     • KNEE ARTHROSCOPY Left    • [place] CYSTO/URETERO W/LITHOTRIPSY &INDWELL STENT INSRT Right 4/29/2018    Procedure: CYSTOSCOPY URETEROSCOPY, RETROGRADE PYELOGRAM AND INSERTION STENT URETERAL;  Surgeon: Heidy Dolan MD;  [facility];  Service: Urology   • NV ORCHIECTOMY RADICAL TUMOR INGUINAL APPROACH Left 2/19/2021    Procedure: Radical  ORCHIECTOMY;  Surgeon: Lexis Donahue MD; Location: [facility];  Service: Urology         Review of Systems   All other systems reviewed and are negative  Objective:      /73 (BP Location: Right arm)   Pulse 57   Temp (!) 97 1 °F (36 2 °C)   Wt 124 kg (274 lb)   BMI 40 46 kg/m²          Physical Exam  Constitutional:       Appearance: He is well-developed  He is obese  HENT:      Head: Normocephalic and atraumatic  Eyes:      Conjunctiva/sclera: Conjunctivae normal    Cardiovascular:      Rate and Rhythm: Normal rate and regular rhythm  Pulmonary:      Effort: Pulmonary effort is normal       Breath sounds: Normal breath sounds  Abdominal:      General: Bowel sounds are normal       Palpations: Abdomen is soft  Musculoskeletal:         General: Normal range of motion  Cervical back: Normal range of motion  Skin:     General: Skin is warm and dry  Neurological:      Mental Status: He is alert and oriented to person, place, and time     Psychiatric:         Mood and Affect: Mood normal          Behavior: Behavior normal

## 2023-02-13 ENCOUNTER — APPOINTMENT (OUTPATIENT)
Dept: LAB | Facility: CLINIC | Age: 63
End: 2023-02-13

## 2023-02-22 ENCOUNTER — OFFICE VISIT (OUTPATIENT)
Dept: HEMATOLOGY ONCOLOGY | Facility: CLINIC | Age: 63
End: 2023-02-22

## 2023-02-22 VITALS
OXYGEN SATURATION: 98 % | WEIGHT: 274.5 LBS | HEART RATE: 80 BPM | SYSTOLIC BLOOD PRESSURE: 160 MMHG | DIASTOLIC BLOOD PRESSURE: 80 MMHG | BODY MASS INDEX: 40.66 KG/M2 | HEIGHT: 69 IN | TEMPERATURE: 98.1 F

## 2023-02-22 DIAGNOSIS — N18.31 ANEMIA DUE TO STAGE 3A CHRONIC KIDNEY DISEASE (HCC): ICD-10-CM

## 2023-02-22 DIAGNOSIS — R97.20 ELEVATED PSA: ICD-10-CM

## 2023-02-22 DIAGNOSIS — K92.1 HEMATOCHEZIA: ICD-10-CM

## 2023-02-22 DIAGNOSIS — D63.1 ANEMIA DUE TO STAGE 3A CHRONIC KIDNEY DISEASE (HCC): ICD-10-CM

## 2023-02-22 DIAGNOSIS — C62.12 SEMINOMA OF DESCENDED LEFT TESTIS (HCC): Primary | ICD-10-CM

## 2023-02-22 DIAGNOSIS — C61 PROSTATE CANCER (HCC): ICD-10-CM

## 2023-02-22 PROBLEM — D61.818 PANCYTOPENIA (HCC): Status: RESOLVED | Noted: 2021-06-24 | Resolved: 2023-02-22

## 2023-02-22 PROBLEM — D69.6 THROMBOCYTOPENIA (HCC): Status: RESOLVED | Noted: 2021-05-30 | Resolved: 2023-02-22

## 2023-02-22 NOTE — PROGRESS NOTES
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 with external right iliac lymphadenopathy measuring up to 3 cm and left para-aortic lymphadenopathy measuring up to 2 3 cm (pT2, N2, S1) with persistent elevation of LDH less than 1 5 normal upper limit     3/23/21 Repeat alpha fetoprotein, hCG are normal, repeat  ()     Received 1st and only cycle of etoposide/ cisplatin with anti emetics   He was admitted to the hospital after 10 days of therapy with nausea, weakness   He was found to have acute renal insufficiency with creatinine of 4,    He received IV fluid with gradual improvement  He also developed neutropenia and thrombocytopenia grade 4 requiring platelet transfusion      CT scan C/A/P 4/15/21 showed significant response of chemotherapy in the abdominal lymphadenopathy    Creatinine improved to 1 73 5/7/21     Cisplatin changed to carboplatin AUC 5 on day 1 and etoposide 100 milligram/meter squared for 5 days    Despite this, he ended up in the hospital with pancytopenia, requiring blood and platelet transfusion, ileus, weight loss, nausea, vomiting, loss of appetite with poor tolerance of therapy     Received 2 cycles 5/17/21 and 6/7/21        Subsequent CT scan showed no evidence of disease in August 2022, normal hCG, alpha fetoprotein, LDH     AFP, HCG remain normal 2/2013         2  Chronic kidney disease  Cr around 1 5  Follows with Dr Jeferson Pascal  Hemoglobin 12 4 2/13/23  3   History of prostate cancer  gnosed on September 2019 with single core with 4 mm focus of prostate cancer with very low risk group  He is on active surveillance with PSA 4 9 in March 2020; 5 3/2022 and 5 4 9/2022  Followed by Dr Zahraa Denis with Urology  MRI prostate ordered  4   Episodes of hematochezia, constipation    Met with GI  Colonoscopy January 5, 2023 identified multiple polyps, flat external hemorrhoids, few small pancolonic diverticula  Recall requested in 7 years  He had EGD 2017 showed mild gastritis, biopsies were negative for Kurtz's and H pylori  On pantoprazole with good control  Orders Placed This Encounter   Procedures   • XR chest pa & lateral   • CT abdomen pelvis wo contrast   • CBC and differential   • Comprehensive metabolic panel   • LD,Blood   • HCG, tumor marker   • AFP tumor marker        GEE Angel is a 26-year-old  male seen initially 4/1/21 regarding stage II B (pT2, pN2, S1) left-sided pure seminoma     He has a history of prostate cancer, nephrolithiasis, benign prostatic hypertrophy, polymyalgia, hypertension, sleep apnea, obesity, GERD, hiatal hernia, gout       His prostate cancer was diagnosed on September 2019 with single core with 4 mm focus of prostate cancer with very low risk group   He is on active surveillance with PSA 4 9 on March 2020 followed by Urology        He was found to have enlargement of the left testicle       Ultrasound of the scrotum on 10/24/2020 showed left testicle measuring 7 2 x 3 9 x 5 1 cm with lobulated contour and diffuse heterogeneous echotexture     Repeat ultrasound on 01/20/2021 showed 9 1 x 4 9 x 6 8 cm increase in size compared to prior ultrasound     LDH was elevated at 376 () normal hCG, alpha fetoprotein     Status post left inguinal orchiectomy on 02/19/2021, showing pure seminoma primary measuring 8 2 cm, abuts and invades into but not through tunica albuginea, invades hilar fat and spermatic cord soft tissue with lymphovascular invasion, spermatic cord is not involved by the tumor confirmed by 2nd opinion at [facility]       CT scan of the chest abdomen and pelvis on 03/20/2021 showed enlarged left iliac and left para-aortic lymph nodes for example left para-aortic lymph node measuring 2 3 cm, left external iliac lymph node measuring 30 mm no evidence of metastatic disease in the chest  He had pain at the left orchiectomy site on and off most likely responding to acetaminophen from healing      He reported 12 lb weight loss after the surgery       4/1/21:  cr 1 13, hemoglobin 1 3 3, MCV of 89, white blood cell count 7 75, 51% neutrophils, 33% lymphocytes, 12% monocytes, platelets 840     4 cycles of  Etoposide/ cisplatin per the NCCN guidelines recommended    For chemotherapy-induced neutropenia, pegfilgrastim recommended      The chemotherapy was complicated with dehydration, acute kidney injury requiring admission to the hospital 4/15-4/20/21  [facility] creatinine at time of discharge was 2 3   It  improved to 1 73 5/7/21     CT scan C/A/P  4/15/21 in the hospital showed significant reduction in seminoma with decrease of the abdominal lymphadenopathy      We changed cisplatin to carboplatin, despite that 2nd cycle was significant for pancytopenia, requiring admission to the hospital, ileus, CT scan showed no evidence of residual lymphadenopathy or masses in June 2021  We decided not to proceed with any additional chemotherapy because of poor tolerance      After 3 months CT scan of the abdomen and pelvis in September 2021 showed 1 1 cm para-aortic lymph node no new lesions, he has normal hCG as well as LDH      Admitted 11/15-11/20/21  2nd to abdominal pain, fatigue, malaise   Tested positive for COVID  Noncontrast CT scan abdomen and pelvis 11/16/21 -  Scattered and ill-defined groundglass opacities in the visualized bilateral lungs correlates with the patient's history of COVID 19 infection  Fatty infiltration of the liver is suspected   In the setting of abdominal pain and/or elevated liver function tests, consider steatohepatitis   Cholelithiasis without discrete evidence of acute cholecystitis, bilateral nephrolithiasis, no hydronephrosis     CT scan of the abdomen and pelvis in January 2022 showed no evidence of malignancy or lymphadenopathy     CT scan in August 2022 showed no evidence of disease normal hCG, LDH, alpha fetoprotein      Interval History:    Since undergoing chemotherapy his stamina is decreased  He has returned to work part-time delivering propane  Chronic nocturia which disrupts his sleep  Resolution of rectal bleeding since constipation improved  Good appetite  No palpable masses  Review of Systems   Constitutional: Positive for fatigue  Negative for appetite change, chills, diaphoresis, fever and unexpected weight change  HENT:   Negative for mouth sores, nosebleeds, sore throat, tinnitus and voice change  Eyes: Negative for eye problems  Respiratory: Negative for chest tightness, cough, shortness of breath and wheezing  Cardiovascular: Negative for chest pain, leg swelling and palpitations  Gastrointestinal: Negative for abdominal distention, abdominal pain, blood in stool, constipation, diarrhea, nausea, rectal pain and vomiting  Endocrine: Negative for hot flashes  Genitourinary: Positive for frequency and nocturia  Negative for bladder incontinence and hematuria  Musculoskeletal: Negative for gait problem and myalgias  Skin: Negative for itching and rash  Neurological: Negative for dizziness, gait problem, headaches, light-headedness and numbness  Hematological: Negative for adenopathy  Psychiatric/Behavioral: Negative for confusion and sleep disturbance  The patient is not nervous/anxious           Test Results:        Labs:   Lab Results   Component Value Date    HGB 12 4 02/13/2023    HCT 39 4 02/13/2023    MCV 94 02/13/2023     02/13/2023    WBC 10 81 (H) 02/13/2023    NRBC 0 02/13/2023    BANDSPCT 4 06/07/2021    ATYLMPCT 3 (H) 06/26/2021    BLASTSPCT 3 (H) 04/19/2021     Lab Results   Component Value Date    K 4 1 02/13/2023     02/13/2023    CO2 26 02/13/2023    BUN 24 02/13/2023    CREATININE 1 44 (H) 02/13/2023    GLUF 124 (H) 02/13/2023    CALCIUM 9 5 02/13/2023    CORRECTEDCA 10 0 02/13/2023    AST 19 02/13/2023    ALT 28 02/13/2023    ALKPHOS 84 02/13/2023    EGFR 50 02/13/2023           Imaging: No results found  Allergies: No Known Allergies  Current Medications: Reviewed  PMH/FH/SH:  Reviewed      Physical Exam:    There is no height or weight on file to calculate BSA  Ht Readings from Last 3 Encounters:   01/05/23 5' 9" (1 753 m)   12/06/22 5' 9" (1 753 m)   11/07/22 5' 9" (1 753 m)        Wt Readings from Last 3 Encounters:   01/20/23 124 kg (274 lb)   01/05/23 124 kg (274 lb)   12/06/22 124 kg (274 lb)        Temp Readings from Last 3 Encounters:   01/20/23 (!) 97 1 °F (36 2 °C)   01/05/23 98 2 °F (36 8 °C) (Temporal)   10/31/22 97 7 °F (36 5 °C) (Tympanic)        BP Readings from Last 3 Encounters:   01/20/23 129/73   01/05/23 105/57   12/06/22 164/70             Physical Exam  Vitals reviewed  Constitutional:       General: He is not in acute distress  Appearance: He is well-developed  He is not diaphoretic  HENT:      Head: Normocephalic and atraumatic  Eyes:      Conjunctiva/sclera: Conjunctivae normal    Neck:      Trachea: No tracheal deviation  Cardiovascular:      Rate and Rhythm: Normal rate and regular rhythm  Heart sounds: No murmur heard  No friction rub  No gallop  Pulmonary:      Effort: Pulmonary effort is normal  No respiratory distress  Breath sounds: Normal breath sounds  No wheezing or rales  Chest:      Chest wall: No tenderness  Abdominal:      General: There is no distension  Palpations: Abdomen is soft  Tenderness: There is no abdominal tenderness  Comments: obesity   Musculoskeletal:      Cervical back: Normal range of motion and neck supple  Lymphadenopathy:      Cervical: No cervical adenopathy  Skin:     General: Skin is warm and dry  Coloration: Skin is not pale  Findings: No erythema     Neurological:      Mental Status: He is alert and oriented to person, place, and time  Psychiatric:         Behavior: Behavior normal          Thought Content:  Thought content normal          Judgment: Judgment normal          ECO    Emergency Contacts:    Extended Emergency Contact Information  Primary Emergency Contact: Noa Alosa [address] Phone: [phone]  Mobile Phone: [phone]  Relation: Daughter  Secondary Emergency Contact: Madeleine Quick [address] Phone: [phone]  Mobile Phone: [phone]  Relation: Son

## 2023-03-24 ENCOUNTER — HOSPITAL ENCOUNTER (OUTPATIENT)
Dept: RADIOLOGY | Age: 63
Discharge: HOME | End: 2023-03-24

## 2023-03-24 DIAGNOSIS — C61 PROSTATE CANCER (HCC): ICD-10-CM

## 2023-03-24 RX ADMIN — GADOBUTROL 12 ML: 604.72 INJECTION INTRAVENOUS at 10:59

## 2023-04-06 NOTE — PLAN OF CARE
Problem: Potential for Falls  Goal: Patient will remain free of falls  Description: INTERVENTIONS:  - Assess patient frequently for physical needs  -  Identify cognitive and physical deficits and behaviors that affect risk of falls    -  [place] fall precautions as indicated by assessment   - Educate patient/family on patient safety including physical limitations  - Instruct patient to call for assistance with activity based on assessment  - Modify environment to reduce risk of injury  - Consider OT/PT consult to assist with strengthening/mobility  Outcome: Progressing
Problem: Potential for Falls  Goal: Patient will remain free of falls  Description: INTERVENTIONS:  - Assess patient frequently for physical needs  -  Identify cognitive and physical deficits and behaviors that affect risk of falls    -  [place] fall precautions as indicated by assessment   - Educate patient/family on patient safety including physical limitations  - Instruct patient to call for assistance with activity based on assessment  - Modify environment to reduce risk of injury  - Consider OT/PT consult to assist with strengthening/mobility  5/19/2021 1512 by Celeste Varela RN  Outcome: Progressing  5/19/2021 1507 by Celeste Varela RN  Outcome: Progressing
Statement Selected

## 2023-05-01 ENCOUNTER — LAB (OUTPATIENT)
Dept: LAB | Facility: CLINIC | Age: 63
End: 2023-05-01

## 2023-05-01 DIAGNOSIS — N18.32 STAGE 3B CHRONIC KIDNEY DISEASE (HCC): ICD-10-CM

## 2023-05-01 DIAGNOSIS — C62.12 SEMINOMA OF DESCENDED LEFT TESTIS (HCC): ICD-10-CM

## 2023-05-01 LAB
25(OH)D3 SERPL-MCNC: 26.2 NG/ML (ref 30–100)
ALBUMIN SERPL BCP-MCNC: 3.8 G/DL (ref 3.5–5)
ANION GAP SERPL CALCULATED.3IONS-SCNC: 2 MMOL/L (ref 4–13)
BACTERIA UR QL AUTO: ABNORMAL /HPF
BILIRUB UR QL STRIP: NEGATIVE
BUN SERPL-MCNC: 26 MG/DL (ref 5–25)
CALCIUM SERPL-MCNC: 9.7 MG/DL (ref 8.3–10.1)
CHLORIDE SERPL-SCNC: 113 MMOL/L (ref 96–108)
CLARITY UR: CLEAR
CO2 SERPL-SCNC: 26 MMOL/L (ref 21–32)
COLOR UR: ABNORMAL
CREAT SERPL-MCNC: 1.7 MG/DL (ref 0.6–1.3)
CREAT UR-MCNC: 157 MG/DL
GFR SERPL CREATININE-BSD FRML MDRD: 41 ML/MIN/1.73SQ M
GLUCOSE P FAST SERPL-MCNC: 107 MG/DL (ref 65–99)
GLUCOSE UR STRIP-MCNC: NEGATIVE MG/DL
HGB UR QL STRIP.AUTO: NEGATIVE
KETONES UR STRIP-MCNC: NEGATIVE MG/DL
LEUKOCYTE ESTERASE UR QL STRIP: NEGATIVE
MUCOUS THREADS UR QL AUTO: ABNORMAL
NITRITE UR QL STRIP: NEGATIVE
NON-SQ EPI CELLS URNS QL MICRO: ABNORMAL /HPF
PH UR STRIP.AUTO: 5.5 [PH]
PHOSPHATE SERPL-MCNC: 3.5 MG/DL (ref 2.3–4.1)
POTASSIUM SERPL-SCNC: 4.2 MMOL/L (ref 3.5–5.3)
PROT UR STRIP-MCNC: NEGATIVE MG/DL
PROT UR-MCNC: 16 MG/DL
PROT/CREAT UR: 0.1 MG/G{CREAT} (ref 0–0.1)
PTH-INTACT SERPL-MCNC: 57.8 PG/ML (ref 18.4–80.1)
RBC #/AREA URNS AUTO: ABNORMAL /HPF
SODIUM SERPL-SCNC: 141 MMOL/L (ref 135–147)
SP GR UR STRIP.AUTO: 1.02 (ref 1–1.03)
UROBILINOGEN UR STRIP-ACNC: <2 MG/DL
WBC #/AREA URNS AUTO: ABNORMAL /HPF

## 2023-05-02 ENCOUNTER — RA CDI HCC (OUTPATIENT)
Dept: OTHER | Facility: HOSPITAL | Age: 63
End: 2023-05-02

## 2023-05-02 NOTE — PROGRESS NOTES
[facility]  coding opportunities          Chart Reviewed number of suggestions sent to Provider: 1   E66 01    This is a reminder to address ALL HCC (risk adjustment) codes as found on active problem list for 2023 as patient scores reset to zero CARTER      Patients Insurance        Commercial Insurance: [employer]

## 2023-05-02 NOTE — RESULT ENCOUNTER NOTE
Jazzmine Olvera in [place]  Labs reviewed  Renal function stable  Vitamin D levels remain low  Continue vitamin D supplements  Results to be reviewed and addressed at scheduled follow-up appointment this week with Dr Richards

## 2023-05-09 ENCOUNTER — OFFICE VISIT (OUTPATIENT)
Dept: NEPHROLOGY | Facility: CLINIC | Age: 63
End: 2023-05-09

## 2023-05-09 VITALS
HEART RATE: 77 BPM | DIASTOLIC BLOOD PRESSURE: 58 MMHG | BODY MASS INDEX: 41.69 KG/M2 | WEIGHT: 281.5 LBS | HEIGHT: 69 IN | SYSTOLIC BLOOD PRESSURE: 138 MMHG

## 2023-05-09 DIAGNOSIS — N18.32 STAGE 3B CHRONIC KIDNEY DISEASE (HCC): Primary | ICD-10-CM

## 2023-05-09 DIAGNOSIS — I10 ESSENTIAL HYPERTENSION: ICD-10-CM

## 2023-05-09 DIAGNOSIS — E66.01 MORBID OBESITY WITH BMI OF 40.0-44.9, ADULT (HCC): ICD-10-CM

## 2023-05-09 DIAGNOSIS — C62.92 SEMINOMA OF TESTIS, STAGE 3, LEFT (HCC): ICD-10-CM

## 2023-05-09 DIAGNOSIS — E55.9 VITAMIN D DEFICIENCY: ICD-10-CM

## 2023-05-09 DIAGNOSIS — Z87.442 HISTORY OF NEPHROLITHIASIS: ICD-10-CM

## 2023-05-09 DIAGNOSIS — E83.42 HYPOMAGNESEMIA: ICD-10-CM

## 2023-05-09 DIAGNOSIS — N18.31 STAGE 3A CHRONIC KIDNEY DISEASE (HCC): ICD-10-CM

## 2023-05-09 DIAGNOSIS — E78.5 HYPERLIPIDEMIA, UNSPECIFIED HYPERLIPIDEMIA TYPE: ICD-10-CM

## 2023-05-09 DIAGNOSIS — D63.1 ANEMIA DUE TO STAGE 3A CHRONIC KIDNEY DISEASE (HCC): ICD-10-CM

## 2023-05-09 DIAGNOSIS — N18.31 ANEMIA DUE TO STAGE 3A CHRONIC KIDNEY DISEASE (HCC): ICD-10-CM

## 2023-05-09 DIAGNOSIS — N20.0 CALCULUS OF KIDNEY: ICD-10-CM

## 2023-05-09 RX ORDER — CALCIUM CARBONATE/VITAMIN D3 500-10/5ML
1 LIQUID (ML) ORAL DAILY
Qty: 90 TABLET | Refills: 4 | Status: SHIPPED | OUTPATIENT
Start: 2023-05-09

## 2023-05-09 RX ORDER — LOSARTAN POTASSIUM 100 MG/1
100 TABLET ORAL DAILY
Qty: 90 TABLET | Refills: 3 | Status: SHIPPED | OUTPATIENT
Start: 2023-05-09

## 2023-05-09 NOTE — PROGRESS NOTES
Nephrology Follow up Consultation  Maria Antonia Valdes 58 y o  male MRN: [id number]            BACKGROUND:  Maria Antonia Valdes is a 58 y o male who was referred by Isidore Mcburney, MD for evaluation of Chronic Kidney Disease and Follow-up    ASSESSMENT / PLAN:   58 y o   male with pmh of multiple co-morbidities including  Hypertension, nephrolithiasis,  Polymyalgia rheumatica, testicular cancer presents to the office for routine follow-up  CKD stage III a/b:  -  after review of records In [facility] as well as Care everywhere patient had a baseline creatinine of 1-1 5mg/dL  Most recent labs show a baseline creatinine around 1 5-1 9 mg/dL after acute kidney injury episode of November 2021  Most recent labs show creatinine of 1 70 mg/dL on 5/1/23  Renal function remains stable at baseline   -  Likely has underlying CKD secondary to age-related nephron loss plus episode of acute kidney injury non dialysis requiring due to chemotherapy with platinum compounds plus obesity related hyper filtration plus hypertensive nephrosclerosis   -  CT abdomen from 06/24/2021 showing no hydronephrosis  - Acid base and lytes stable  - Clinically the patient appears to be  euvolemic  - Recommend to avoid use of NSAIDs, nephrotoxins  Caution advised with regards to exposure to IV contrast dye    - Discussed with the patient in depth His renal status, including the possible etiologies for CKD  - Advised the patient that when  is GFR is close to 20mL/min then will start discussing about RRT(renal replacement therapy) options such as renal transplant, peritoneal dialysis and hemodialysis  - Informed the patient about the various options for Renal Replacement therapy    - Discussed with the patient how we need to work together to delay the progression of CKD with optimal BP control based on their age and co-morbidities, optimal BS control with HbA1c of <7% and trying to reduce proteinuria by the use of anti-proteinuric agents  - referral to CKD education/kidney smart placed on 12/06/2021, completed 12/22/2021    Hypertension:  - Patient is on Procardia XL 60 mg p o  q day, losartan 50mg po Q24  -Increase losartan to 100 mg p o  daily a m  check blood pressures and call with updates in 2 weeks  - Goal BP of < 130/80 based on age and comorbidities  - Instructed to follow low sodium (2gm)diet  Hemoglobin/anemia:  - Goal Hb of 10-12 g/dL  - Most recent labs suggestive of  12 4 grams/deciliter  -  Management per Oncology    CKD-MBD(Mineral Bone Disease) /vitamin-D deficiency:  - Based on patients CKD stage following is the goal of therapy  - Maintain calcium phosphorus product of < 55   - Stage 3 CKD - Goal Ca 8 5-10 mg/dL , goal Phos 2 7-4 6 mg/dL  , goal iPTH 30-70 pg/mL  - Patient is currently not at goal   - Most recent intact PTH of 57 8 and vitamin-D of 26 2 as of 5/1/23  -Increase to vitamin D 5000 units p o  daily for now  - Check intact PTH vitamin-D prior to next visit    Proteinuria:  - proteinuria most likely secondary to obesity related hyper filtration  - most recent protein creatinine ratio 100 mg/dL as of 5/1/23, stable  - check protein creatinine ratio  - currently on therapy for proteinuria with Procardia XL, vitamin-D    Lipids:  -   On Crestor  - goal LDL less than 70  - management per primary team     h/o nephrolithiasis:  - last stone 2019  - Discussed with the patient that the most common types of kidney stones are calcium oxalate stones  - Advised to follow a diet with increased fluid intake so that urine output is greater than 2-2 5L/day  - Advised patient to decrease salt, protein and oxalate intake  - Dietary handouts given  - had ordered 24 urine test in the past however logistically was not done  At this time patient wishes to hold off I respect his decision advised of dietary habits for now though       hypomagnesemia:  -  Likely secondary to chemotherapy with cisplatin  -  Most recent magnesium level 2 0 mEq  -  continue magnesium oxide 400 mg p o  q day check blood work prior to next visit     testicular cancer:  - Management as per primary team  - Follow-up with Heme-Onc  - Last seen by Hematology on2/22/23 notes reviewed in epic  - Chemotherapy   Had been changed to carboplatin and etoposide, however due to poor tolerance it was decided to hold off on further chemotherapy  Continue close follow-up with Heme-Onc  Nutrition/morbid obesity:  - Encouraged patient to follow a renal diet comprising of moderate potassium, low phosphorus and protein restriction to 0 8gm/kg  Advised of dietary habits and weight loss  - Will check serum albumin with next blood work  Followup:  - Patient is to follow-up in 6 months, with lab work to be performed in 1 month and then again in a few days prior to the next visit  Advised patient to call me in 10 days to review the results if they do not hear back from me, as I may have not received the results  Emely Alcaraz, 5/9/2023, 10:25 AM             SUBJECTIVE: 58 y o  Male presents to the office for routine follow-up  Feels well has no complaints no recent hospitalization no issues with edema usually does have some component of whitecoat hypertension no NSAID  No recent stone episodes trying to drink more water  Agreeable to increasing losartan  Thankful for the care information that is gone today  Continues to work part-time still    Review of Systems   Constitutional: Negative for chills and fever  HENT: Negative for sore throat  Respiratory: Negative for cough, shortness of breath and wheezing  Cardiovascular: Negative for leg swelling  Gastrointestinal: Negative for diarrhea and vomiting  Genitourinary: Negative for difficulty urinating, dysuria and hematuria  Musculoskeletal: Negative for back pain  Skin: Negative for rash  Neurological: Negative for dizziness, light-headedness and headaches     Psychiatric/Behavioral: Negative for agitation and confusion  All other systems reviewed and are negative        PAST MEDICAL HISTORY:  Past Medical History:   Diagnosis Date   • BPH without obstruction/lower urinary tract symptoms    • Cancer (HCC)     prostate    • CPAP (continuous positive airway pressure) dependence    • Elevated PSA    • GERD (gastroesophageal reflux disease)    • Gout    • Hyperlipidemia    • Hypertension    • Kidney stone    • Obese abdomen    • Obesity    • Polymyalgia ([place] [address] )    • Prostate cancer ([place] [address] )    • Rash     under both arms and in between legs - family doctor aware - pt uses Desitin   • Sleep apnea    • Testicular carcinoma ([place] [address] )    • Urinary frequency    • Urinary urgency    • Wears glasses        PROBLEM LIST    Patient Active Problem List   Diagnosis   • Calculus of kidney   • Elevated PSA   • Benign prostatic hyperplasia with nocturia   • Prostate cancer ([place] [address] )   • Hydrocele in adult   • Mass of left testis   • Essential hypertension   • GERD (gastroesophageal reflux disease)   • CPAP (continuous positive airway pressure) dependence   • Morbid obesity with BMI of 40 0-44 9, adult ([place] [address] )   • Seminoma of descended left testis ([place] [address] )   • Seminoma of testis, stage 3, left (HCC)   • SIMON on CPAP   • Polymyalgia (HCC)   • Steroid-induced hyperglycemia   • Stage 3b chronic kidney disease (HCC)   • Back pain   • Anemia   • Lymphadenopathy, retroperitoneal   • Gastric distention   • Vitamin D deficiency   • Hyperlipidemia   • Hypomagnesemia   • History of nephrolithiasis   • Abnormal EKG   • Lower abdominal pain   • COVID-19 in immunocompromised patient ([place] [address] )   • Elevated LDH   • Supraventricular tachycardia (HCC)   • Hypertensive urgency   • Hematochezia   • History of unintended awareness under general anesthesia       PAST SURGICAL HISTORY:  Past Surgical History:   Procedure Laterality Date   • CARPAL TUNNEL RELEASE Bilateral 07/2020   • COLONOSCOPY     • CYSTOSCOPY W/ LASER LITHOTRIPSY  2001   • CYSTOSTOMY W/ "STENT INSERTION  2011   • ESOPHAGOGASTRODUODENOSCOPY     • EXTRACORPOREAL SHOCK WAVE LITHOTRIPSY Right 2011   • HERNIA REPAIR     • KNEE ARTHROSCOPY Left    • NE CYSTO/URETERO W/LITHOTRIPSY &INDWELL STENT INSRT Right 4/29/2018    Procedure: CYSTOSCOPY URETEROSCOPY, RETROGRADE PYELOGRAM AND INSERTION STENT URETERAL;  Surgeon: Anu Gandhi MD;  Location: [facility];  Service: Urology   • NE ORCHIECTOMY RADICAL TUMOR INGUINAL APPROACH Left 2/19/2021    Procedure: Radical  ORCHIECTOMY;  Surgeon: Shelby Aiken MD;  Location: [facility];  Service: Urology       SOCIAL HISTORY :   reports that he has never smoked  He has never used smokeless tobacco  He reports that he does not currently use alcohol  He reports that he does not use drugs  FAMILY HISTORY:  Family History   Problem Relation Age of Onset   • Nephrolithiasis Father    • Alzheimer's disease Mother        ALLERGIES:  No Known Allergies        PHYSICAL EXAM:  Vitals:    05/09/23 0929 05/09/23 0942   BP: 148/74 138/58   BP Location: Left arm    Patient Position: Sitting    Cuff Size: Large    Pulse: 77    Weight: 128 kg (281 lb 8 oz)    Height: 5' 9\" (1 753 m)      Body mass index is 41 57 kg/m²  Physical Exam  Vitals reviewed  Constitutional:       General: He is not in acute distress  Appearance: Normal appearance  He is obese  He is not ill-appearing, toxic-appearing or diaphoretic  HENT:      Head: Normocephalic and atraumatic  Mouth/Throat:      Pharynx: Oropharynx is clear  No oropharyngeal exudate  Eyes:      General: No scleral icterus  Conjunctiva/sclera: Conjunctivae normal    Cardiovascular:      Rate and Rhythm: Normal rate  Heart sounds: Normal heart sounds  No friction rub  Pulmonary:      Effort: No respiratory distress  Breath sounds: Normal breath sounds  No stridor  Abdominal:      General: There is no distension  Palpations: Abdomen is soft  There is no mass  Tenderness:  There is no " "abdominal tenderness  There is no right CVA tenderness or left CVA tenderness  Musculoskeletal:         General: No swelling  Cervical back: Normal range of motion  No rigidity  Skin:     General: Skin is warm  Coloration: Skin is not jaundiced  Neurological:      General: No focal deficit present  Mental Status: He is alert and oriented to person, place, and time  Psychiatric:         Mood and Affect: Mood normal          Behavior: Behavior normal          LABORATORY DATA:     Results from last 6 Months   Lab Units 05/01/23  0949 02/13/23  0711   WBC Thousand/uL  --  10 81*   HEMOGLOBIN g/dL  --  12 4   HEMATOCRIT %  --  39 4   PLATELETS Thousands/uL  --  261   POTASSIUM mmol/L 4 2 4 1   CHLORIDE mmol/L 113* 107   CO2 mmol/L 26 26   BUN mg/dL 26* 24   CREATININE mg/dL 1 70* 1 44*   CALCIUM mg/dL 9 7 9 5   PHOSPHORUS mg/dL 3 5  --         rest all reviewed    RADIOLOGY:  No orders to display     Rest all reviewed        MEDICATIONS:    Current Outpatient Medications:   •  allopurinol (ZYLOPRIM) 100 mg tablet, TAKE TWO TABLETS BY MOUTH DAILY, Disp: 90 tablet, Rfl: 0  •  Cholecalciferol (VITAMIN D3) 2000 units capsule, Take 1 capsule by mouth daily, Disp: , Rfl:   •  losartan (COZAAR) 100 MG tablet, Take 1 tablet (100 mg total) by mouth daily, Disp: 90 tablet, Rfl: 3  •  Magnesium Oxide 400 MG CAPS, Take 1 tablet (400 mg total) by mouth in the morning, Disp: 90 tablet, Rfl: 4  •  NIFEdipine (PROCARDIA XL) 60 mg 24 hr tablet, TAKE ONE TABLET BY MOUTH EVERY DAY, Disp: 90 tablet, Rfl: 0  •  pantoprazole (PROTONIX) 40 mg tablet, TAKE ONE TABLET BY MOUTH EVERY DAY BEFORE BREAKFAST, Disp: 90 tablet, Rfl: 0  •  rosuvastatin (CRESTOR) 20 MG tablet, Take 1 tablet (20 mg total) by mouth daily at bedtime, Disp: 30 tablet, Rfl: 40          Portions of the record may have been created with voice recognition software   Occasional wrong word or \"sound a like\" substitutions may have occurred due to the inherent " limitations of voice recognition software  Read the chart carefully and recognize, using context, where substitutions have occurred  If you have any questions, please contact the dictating provider

## 2023-05-09 NOTE — PATIENT INSTRUCTIONS
"Increase losartan to 100mg in am   Increase vit D to 5000 units a day  Call with blood pressure updates in 2 weeks  Restart the magnesium pills once a day    - Please call me in 10 days after having your blood work done to review the results if you do not hear back from me or my office, as I may have not received the results  - please remember to perform blood work prior to the next visit  - Please call if the blood pressure top number is greater than 140 or less than 110 consistently  - Please call if you are gaining more than 2lbs in 2 days for adjustment of water pills   ~ Please AVOID the following pain medications  LIST OF NSAIDS (NONSTEROIDAL ANTI-INFLAMMATORY DRUGS) AND VILLAGOMEZ-2 INHIBITORS    DIFLUNISAL (DOLOBID)  IBUPROFEN (MOTRIN, ADVIL)  FLURBIPROFEN (ANSAID)  KETOPROFEN (ORUDIS, ORUVAIL)  FENOPROFEN (NALFON)  NABUMETONE (RELAFEN)  PIROXICAM (FELDENE)  NAPROXEN (ALEVE, NAPROSYN, NAPRELAN, ANAPROX)  DICLOFENAC (VOLTAREN, CATAFLAM)  INDOMETHACIN (INDOCIN)  SULINDAC (CLINORIL)  TOLMETIN (TOLETIN)  ETODOLAC (LODINE)  MELOXICAM (MOBIC)  KETOROLAC (TORADOL)  OXAPROZIN (DAYPRO)  CELECOXIB (CELEBREX)    Phosphorus diet  Follow a low phosphorus diet      Avoid these higher phosphorus foods: Choose these lower phosphorus foods:   Milk, pudding or yogurt (from animals and from many soy varieties) Rice milk (unfortified), nondairy creamer (if it doesn't have terms in the ingredients list that contain the letters \"phos\")   Hard cheeses, ricotta or cottage cheese, fat-free cream cheese Regular and low-fat cream cheese   Ice cream or frozen yogurt Sherbet or frozen fruit pops   Soups made with higher phosphorus ingredients (milk, dried peas, beans, lentils) Soups made with lower phosphorus ingredients (broth- or water-based with other lower phosphorus ingredients)   Whole grains, including whole-grain breads, crackers, cereal, rice and pasta Refined grains, including white bread, crackers, cereals, rice and pasta   Quick " "breads, biscuits, cornbread, muffins, pancakes or waffles Homemade refined (white) dinner rolls, bagels or English muffins   Dried peas (split, black-eyed), beans (black, garbanzo, lima, kidney, navy, weinberg) or lentils Green peas (canned, frozen), green beans or wax beans   Organ meats, walleye, pollock or sardines Lean beef, pork, lamb, poultry or other fish   Nuts and seeds Popcorn   Peanut butter and other nut butters Jam, jelly or honey   Chocolate, including chocolate drinks Carob (chocolate-flavored) candy, hard candy or gumdrops   Yuliana and pepper-type sodas, flavored kincaid, bottled teas (if a term in the ingredients list contains the letters \"phos\") Lemon-lime soda, ginger ale or root beer, plain water   Follow a moderate potassium diet  Things to do to reduce your blood pressure include working with all your physician to do the following:  ~ stop smoking if you smoke  ~ increase cardiovascular exercise like walking and swimming    ~ modify your diet to decrease fat and salt intake  ~ reduce your weight if you are overweight or obese   ~ increase the consumption of fruits, vegetables and whole grains  ~ decrease alcohol consumption if you consume alcohol    ~ try to minimize stress in your life with lifestyle modifications  ~ be compliant with your anti-hypertensive medications  ~ adjust your medications to help improve your vascular stiffness and decrease risks for heart attacks and strokes  Exercise to Lose Weight     Exercise and a healthy diet may help you lose weight  Your doctor may suggest specific exercises  EXERCISE IDEAS AND TIPS     Choose low-cost things you enjoy doing, such as walking, bicycling, or exercising to workout videos  Take stairs instead of the elevator  Walk during your lunch break  Park your car further away from work or school  Go to a gym or an exercise class  Start with 5 to 10 minutes of exercise each day   Build up to 30 minutes of exercise 4 " to 6 days a week  Wear shoes with good support and comfortable clothes  Stretch before and after working out  Work out until you breathe harder and your heart beats faster  Drink extra water when you exercise  Do not do so much that you hurt yourself, feel dizzy, or get very short of breath  Exercises that burn about 150 calories:   Running 1 ½ miles in 15 minutes  Playing volleyball for 45 to 60 minutes  Washing and waxing a car for 45 to 60 minutes  Playing touch football for 45 minutes  Walking 1 ¾ miles in 35 minutes  Pushing a stroller 1 ½ miles in 30 minutes  Playing basketball for 30 minutes  Raking leaves for 30 minutes  Bicycling 5 miles in 30 minutes  Walking 2 miles in 30 minutes  Dancing for 30 minutes  Shoveling snow for 15 minutes  Swimming laps for 20 minutes  Walking up stairs for 15 minutes  Bicycling 4 miles in 15 minutes  Gardening for 30 to 45 minutes  Jumping rope for 15 minutes  Washing windows or floors for 45 to 60 minutes

## 2023-05-10 ENCOUNTER — OFFICE VISIT (OUTPATIENT)
Dept: UROLOGY | Facility: MEDICAL CENTER | Age: 63
End: 2023-05-10

## 2023-05-10 ENCOUNTER — OFFICE VISIT (OUTPATIENT)
Dept: FAMILY MEDICINE CLINIC | Facility: CLINIC | Age: 63
End: 2023-05-10

## 2023-05-10 ENCOUNTER — TELEPHONE (OUTPATIENT)
Dept: UROLOGY | Facility: MEDICAL CENTER | Age: 63
End: 2023-05-10

## 2023-05-10 VITALS
WEIGHT: 273.8 LBS | OXYGEN SATURATION: 97 % | HEART RATE: 96 BPM | DIASTOLIC BLOOD PRESSURE: 68 MMHG | RESPIRATION RATE: 17 BRPM | HEIGHT: 69 IN | BODY MASS INDEX: 40.55 KG/M2 | SYSTOLIC BLOOD PRESSURE: 142 MMHG | TEMPERATURE: 102.7 F

## 2023-05-10 VITALS
HEIGHT: 69 IN | HEART RATE: 105 BPM | OXYGEN SATURATION: 100 % | BODY MASS INDEX: 39.99 KG/M2 | DIASTOLIC BLOOD PRESSURE: 78 MMHG | SYSTOLIC BLOOD PRESSURE: 138 MMHG | WEIGHT: 270 LBS

## 2023-05-10 DIAGNOSIS — C62.92 SEMINOMA OF TESTIS, STAGE 3, LEFT (HCC): ICD-10-CM

## 2023-05-10 DIAGNOSIS — Z11.9 ENCOUNTER FOR SCREENING FOR INFECTIOUS AND PARASITIC DISEASES, UNSPECIFIED: Primary | ICD-10-CM

## 2023-05-10 DIAGNOSIS — I47.1 SUPRAVENTRICULAR TACHYCARDIA (HCC): ICD-10-CM

## 2023-05-10 DIAGNOSIS — C61 PROSTATE CANCER (HCC): Primary | ICD-10-CM

## 2023-05-10 DIAGNOSIS — M35.3 POLYMYALGIA RHEUMATICA (HCC): ICD-10-CM

## 2023-05-10 DIAGNOSIS — U07.1 COVID: ICD-10-CM

## 2023-05-10 LAB
SARS-COV-2 AG UPPER RESP QL IA: POSITIVE
SL AMB  POCT GLUCOSE, UA: NORMAL
SL AMB LEUKOCYTE ESTERASE,UA: NORMAL
SL AMB POCT BILIRUBIN,UA: NORMAL
SL AMB POCT BLOOD,UA: NORMAL
SL AMB POCT CLARITY,UA: CLEAR
SL AMB POCT COLOR,UA: YELLOW
SL AMB POCT KETONES,UA: NORMAL
SL AMB POCT NITRITE,UA: NORMAL
SL AMB POCT PH,UA: 5.5
SL AMB POCT SPECIFIC GRAVITY,UA: 1.01
SL AMB POCT URINE PROTEIN: NORMAL
SL AMB POCT UROBILINOGEN: 0.2
VALID CONTROL: ABNORMAL

## 2023-05-10 RX ORDER — NIRMATRELVIR AND RITONAVIR 150-100 MG
2 KIT ORAL 2 TIMES DAILY
Qty: 20 TABLET | Refills: 0 | Status: SHIPPED | OUTPATIENT
Start: 2023-05-10 | End: 2023-05-15

## 2023-05-10 NOTE — PROGRESS NOTES
Assessment/Plan:       Problem List Items Addressed This Visit        Cardiovascular and Mediastinum    Supraventricular tachycardia ([facility] [address] )     No recent episodes        Other Visit Diagnoses     Encounter for screening for infectious and parasitic diseases, unspecified    -  Primary    Relevant Orders    POCT Rapid Covid Ag (Completed)    Polymyalgia rheumatica ([facility] [address] )        COVID        Relevant Medications    nirmatrelvir & ritonavir (Paxlovid, 150/100,) tablet therapy pack          COVID testing done in office and positive symptoms most likely due to [place]  Patient has not been vaccinated has multiple medical conditions that make him high risk  We will start Paxlovid renally dose right away advised to go to the ER if worsening he did have a normal respiratory physical exam and his oxygen was at 97% with a normal respiratory rate  He did not have any shortness of breath or problems ambulating around the office today  Subjective:      Patient ID: Maria Antonia Valdes is a 58 y o  male  HPI     76-year-old male past medical history of prostate cancer polymyalgia rheumatica presenting today for acute onset of headache and fever  Reports yesterday he developed some congestion very tired, he became short of breath while mowing the lawn  At that time he had a headache he was having chills he does not think he had a fever at that time but did not take his temperature and he came in for his appointment  Currently he is feeling tired and having body pains he has no chest pain or shortness of breath    His wife has been ill with congestion and a mild cough for the last week      The following portions of the patient's history were reviewed and updated as appropriate: allergies, current medications, past family history, past medical history, past social history, past surgical history and problem list       Current Outpatient Medications:   •  allopurinol (ZYLOPRIM) 100 mg tablet, TAKE TWO TABLETS BY MOUTH "DAILY, Disp: 90 tablet, Rfl: 0  •  Cholecalciferol (VITAMIN D3) 2000 units capsule, Take 1 capsule by mouth daily, Disp: , Rfl:   •  losartan (COZAAR) 100 MG tablet, Take 1 tablet (100 mg total) by mouth daily, Disp: 90 tablet, Rfl: 3  •  Magnesium Oxide 400 MG CAPS, Take 1 tablet (400 mg total) by mouth in the morning, Disp: 90 tablet, Rfl: 4  •  NIFEdipine (PROCARDIA XL) 60 mg 24 hr tablet, TAKE ONE TABLET BY MOUTH EVERY DAY, Disp: 90 tablet, Rfl: 0  •  pantoprazole (PROTONIX) 40 mg tablet, TAKE ONE TABLET BY MOUTH EVERY DAY BEFORE BREAKFAST, Disp: 90 tablet, Rfl: 0  •  rosuvastatin (CRESTOR) 20 MG tablet, Take 1 tablet (20 mg total) by mouth daily at bedtime, Disp: 30 tablet, Rfl: 40  •  nirmatrelvir & ritonavir (Paxlovid, 150/100,) tablet therapy pack, Take 2 tablets by mouth 2 (two) times a day for 5 days Take 1 nirmatrelvir tablet + 1 ritonavir tablet together per dose, do take Crestor while on this medication, Disp: 20 tablet, Rfl: 0     Review of Systems   Constitutional: Positive for chills and fatigue  Negative for activity change and appetite change  Respiratory: Positive for cough  Negative for apnea, chest tightness and shortness of breath  Cardiovascular: Negative for chest pain and palpitations  Gastrointestinal: Negative for abdominal distention  Musculoskeletal: Negative for arthralgias and back pain  Neurological: Positive for headaches  Negative for dizziness, facial asymmetry and speech difficulty  Objective:      /68 (BP Location: Left arm, Patient Position: Sitting, Cuff Size: Large)   Pulse 96   Temp (!) 102 7 °F (39 3 °C) (Tympanic)   Ht 5' 9\" (1 753 m)   Wt 124 kg (273 lb 12 8 oz)   SpO2 97%   BMI 40 43 kg/m²          Physical Exam  Constitutional:       Appearance: He is obese  Cardiovascular:      Rate and Rhythm: Normal rate and regular rhythm  Pulmonary:      Effort: Pulmonary effort is normal       Breath sounds: Normal breath sounds     Abdominal:      " General: Abdomen is flat  Tenderness: There is no abdominal tenderness  Neurological:      Mental Status: He is alert             Ilir Porras MD

## 2023-05-10 NOTE — PROGRESS NOTES
"Assessment/Plan: 1  Adenocarcinoma of the prostate Milnor pattern score 6 in 1 core and prostate biopsy performed because of PSA of 5 5  Continue surveillance  MRI PI-RADS 2 and most recent PSA 5 4     2   History of left testicular seminoma status post 3 cycles of chemotherapy with no evidence of metastatic disease  Alpha-fetoprotein and beta-hCG normal  CT of the chest abdomen and pelvis ordered  3   Small renal stones-nonobstructive and asymptomatic  No problem-specific Assessment & Plan notes found for this encounter  Diagnoses and all orders for this visit:    Prostate cancer ([address] )  -     POCT urine dip auto non-scope    Seminoma of testis, stage 3, left (HCC)          Subjective:      Patient ID: Dana Brock is a 58 y o  male  HPI  79-year-old gentleman presented with elevated PSA with transrectal ultrasound revealing 1 core of Jose pattern score 6 disease  His PSA has been relatively stable in the mid 5 range the patient voiding adequately with slight frequency and urgency  He denies gross hematuria or dysuria  MRI revealed a category 2 or PI-RADS 2 level  The patient presents to discuss results  The following portions of the patient's history were reviewed and updated as appropriate: allergies, current medications, past family history, past medical history, past social history, past surgical history and problem list     Review of Systems   All other systems reviewed and are negative  Objective:      /78 (BP Location: Left arm, Patient Position: Sitting, Cuff Size: Adult)   Pulse 105   Ht 5' 9\" (1 753 m)   Wt 122 kg (270 lb)   SpO2 100%   BMI 39 87 kg/m²          Physical Exam  Vitals reviewed  Constitutional:       General: He is not in acute distress  Appearance: Normal appearance  He is obese  He is not ill-appearing, toxic-appearing or diaphoretic  HENT:      Head: Normocephalic and atraumatic        Nose: Nose normal       Mouth/Throat:      " Mouth: Mucous membranes are moist    Eyes:      Extraocular Movements: Extraocular movements intact  Pulmonary:      Effort: Pulmonary effort is normal  No respiratory distress  Breath sounds: No wheezing, rhonchi or rales  Abdominal:      General: Bowel sounds are normal  There is no distension  Palpations: Abdomen is soft  Tenderness: There is no abdominal tenderness  There is no guarding or rebound  Genitourinary:     Testes: Normal       Comments: Normal solitary right testis with adnexa  Phallus normal without cutaneous lesions  Meatus patent normally placed  Circumcised  FIGUEROA normal anal verge normal anal sphincter tone no palpable rectal masses  Prostate 35 g a nodular nontender  Skin:     General: Skin is dry  Neurological:      Mental Status: He is alert and oriented to person, place, and time  Psychiatric:         Mood and Affect: Mood normal          Behavior: Behavior normal          Thought Content:  Thought content normal          Judgment: Judgment normal

## 2023-05-31 ENCOUNTER — APPOINTMENT (OUTPATIENT)
Dept: LAB | Facility: CLINIC | Age: 63
End: 2023-05-31
Payer: COMMERCIAL

## 2023-05-31 DIAGNOSIS — Z13.1 SCREENING FOR DIABETES MELLITUS: ICD-10-CM

## 2023-05-31 DIAGNOSIS — Z13.29 SCREENING FOR HYPOTHYROIDISM: ICD-10-CM

## 2023-05-31 DIAGNOSIS — E78.2 MIXED HYPERLIPIDEMIA: ICD-10-CM

## 2023-05-31 LAB
CHOLEST SERPL-MCNC: 230 MG/DL
EST. AVERAGE GLUCOSE BLD GHB EST-MCNC: 154 MG/DL
HBA1C MFR BLD: 7 %
HDLC SERPL-MCNC: 47 MG/DL
LDLC SERPL CALC-MCNC: 148 MG/DL (ref 0–100)
NONHDLC SERPL-MCNC: 183 MG/DL
TRIGL SERPL-MCNC: 176 MG/DL
TSH SERPL DL<=0.05 MIU/L-ACNC: 2.17 UIU/ML (ref 0.45–4.5)

## 2023-05-31 PROCEDURE — 3051F HG A1C>EQUAL 7.0%<8.0%: CPT | Performed by: FAMILY MEDICINE

## 2023-05-31 PROCEDURE — 80061 LIPID PANEL: CPT

## 2023-05-31 PROCEDURE — 36415 COLL VENOUS BLD VENIPUNCTURE: CPT

## 2023-05-31 PROCEDURE — 84443 ASSAY THYROID STIM HORMONE: CPT

## 2023-05-31 PROCEDURE — 83036 HEMOGLOBIN GLYCOSYLATED A1C: CPT

## 2023-06-06 ENCOUNTER — OFFICE VISIT (OUTPATIENT)
Dept: FAMILY MEDICINE CLINIC | Facility: CLINIC | Age: 63
End: 2023-06-06
Payer: COMMERCIAL

## 2023-06-06 VITALS
SYSTOLIC BLOOD PRESSURE: 140 MMHG | DIASTOLIC BLOOD PRESSURE: 60 MMHG | HEART RATE: 85 BPM | RESPIRATION RATE: 14 BRPM | WEIGHT: 274.8 LBS | BODY MASS INDEX: 40.7 KG/M2 | HEIGHT: 69 IN | TEMPERATURE: 98.4 F | OXYGEN SATURATION: 96 %

## 2023-06-06 DIAGNOSIS — E78.2 MIXED HYPERLIPIDEMIA: ICD-10-CM

## 2023-06-06 DIAGNOSIS — R01.1 HEART MURMUR: ICD-10-CM

## 2023-06-06 DIAGNOSIS — F32.0 MILD MAJOR DEPRESSION, SINGLE EPISODE (HCC): Primary | ICD-10-CM

## 2023-06-06 DIAGNOSIS — Z12.5 SCREENING FOR PROSTATE CANCER: ICD-10-CM

## 2023-06-06 DIAGNOSIS — E11.9 TYPE 2 DIABETES MELLITUS WITHOUT COMPLICATION, WITHOUT LONG-TERM CURRENT USE OF INSULIN (HCC): ICD-10-CM

## 2023-06-06 DIAGNOSIS — D84.9 IMMUNODEFICIENCY, UNSPECIFIED (HCC): ICD-10-CM

## 2023-06-06 PROCEDURE — 99214 OFFICE O/P EST MOD 30 MIN: CPT | Performed by: INTERNAL MEDICINE

## 2023-06-06 RX ORDER — ESCITALOPRAM OXALATE 10 MG/1
10 TABLET ORAL DAILY
Qty: 90 TABLET | Refills: 2 | Status: SHIPPED | OUTPATIENT
Start: 2023-06-06

## 2023-06-06 RX ORDER — ESCITALOPRAM OXALATE 10 MG/1
10 TABLET ORAL DAILY
Qty: 30 TABLET | Refills: 2 | Status: SHIPPED | OUTPATIENT
Start: 2023-06-06 | End: 2023-06-06

## 2023-06-06 RX ORDER — METFORMIN HYDROCHLORIDE 750 MG/1
750 TABLET, EXTENDED RELEASE ORAL
Qty: 90 TABLET | Refills: 1 | Status: SHIPPED | OUTPATIENT
Start: 2023-06-06 | End: 2023-12-03

## 2023-06-06 RX ORDER — ROSUVASTATIN CALCIUM 40 MG/1
40 TABLET, COATED ORAL DAILY
Qty: 90 TABLET | Refills: 2 | Status: SHIPPED | OUTPATIENT
Start: 2023-06-06

## 2023-06-06 NOTE — ASSESSMENT & PLAN NOTE
Cannot exclude that intensity of his heart murmur is increasing  His last echo in 2021 did not reveal any significant valvular abnormality  We will recheck his echo

## 2023-06-06 NOTE — ASSESSMENT & PLAN NOTE
His hemoglobin A1c got worse, start metformin  mg daily  Recheck hemoglobin A1c in 3 months  Side effects discussed    Lab Results   Component Value Date    HGBA1C 7 0 (H) 05/31/2023

## 2023-06-06 NOTE — ASSESSMENT & PLAN NOTE
He is LDL is slightly above goal   Increase Crestor up to 40 mg daily  Recheck lipid panel in 3 months

## 2023-06-06 NOTE — PROGRESS NOTES
Assessment/Plan:    Type 2 diabetes mellitus without complication, without long-term current use of insulin ([facility])  His hemoglobin A1c got worse, start metformin  mg daily  Recheck hemoglobin A1c in 3 months  Side effects discussed  Lab Results   Component Value Date    HGBA1C 7 0 (H) 05/31/2023       Hyperlipidemia  He is LDL is slightly above goal   Increase Crestor up to 40 mg daily  Recheck lipid panel in 3 months  Mild major depression, single episode ([facility])  PHQ-9 consistent with mild to moderate depression  He looks clinically depressed  Denies any suicidal ideation  Start Lexapro 10 mg daily  Side effects discussed  Heart murmur  Cannot exclude that intensity of his heart murmur is increasing  His last echo in 2021 did not reveal any significant valvular abnormality  We will recheck his echo  Diagnoses and all orders for this visit:    Mild major depression, single episode ([place] [address] )  -     Discontinue: escitalopram (LEXAPRO) 10 mg tablet; Take 1 tablet (10 mg total) by mouth daily  -     escitalopram (LEXAPRO) 10 mg tablet; Take 1 tablet (10 mg total) by mouth daily    Heart murmur  -     Echo complete w/ contrast if indicated; Future    Immunodeficiency, unspecified ([place] [address] )    Type 2 diabetes mellitus without complication, without long-term current use of insulin ([facility])  -     metFORMIN (GLUCOPHAGE-XR) 750 mg 24 hr tablet; Take 1 tablet (750 mg total) by mouth daily with breakfast  -     HEMOGLOBIN A1C W/ EAG ESTIMATION; Future  -     Comprehensive metabolic panel; Future    Mixed hyperlipidemia  -     rosuvastatin (CRESTOR) 40 MG tablet; Take 1 tablet (40 mg total) by mouth daily  -     Lipid panel; Future    Screening for prostate cancer  -     PSA, Total Screen; Future          Subjective:      Patient ID: Rosendo Mariano is a 58 y o  male  Patient came today for 6-month follow-up on his chronic problems          The following portions of the patient's history were reviewed and "updated as appropriate: allergies, current medications, past family history, past medical history, past social history, past surgical history, and problem list     Review of Systems   Constitutional: Positive for fatigue  Negative for appetite change, chills and fever  HENT: Negative for congestion, rhinorrhea, sinus pain and sore throat  Respiratory: Negative for cough, chest tightness and shortness of breath  Cardiovascular: Negative for chest pain  Gastrointestinal: Negative for diarrhea, nausea and vomiting  Musculoskeletal: Negative for arthralgias and myalgias  Psychiatric/Behavioral: Positive for decreased concentration and dysphoric mood  Negative for agitation, behavioral problems, self-injury and suicidal ideas  The patient is not nervous/anxious            Objective:      /60 (BP Location: Left arm, Patient Position: Sitting, Cuff Size: Large)   Pulse 85   Temp 98 4 °F (36 9 °C) (Temporal)   Resp 14   Ht 5' 9\" (1 753 m)   Wt 125 kg (274 lb 12 8 oz)   SpO2 96%   BMI 40 58 kg/m²     No Known Allergies       Current Outpatient Medications:   •  allopurinol (ZYLOPRIM) 100 mg tablet, TAKE TWO TABLETS BY MOUTH DAILY, Disp: 90 tablet, Rfl: 0  •  Cholecalciferol (VITAMIN D3) 2000 units capsule, Take 1 capsule by mouth daily, Disp: , Rfl:   •  escitalopram (LEXAPRO) 10 mg tablet, Take 1 tablet (10 mg total) by mouth daily, Disp: 90 tablet, Rfl: 2  •  losartan (COZAAR) 100 MG tablet, Take 1 tablet (100 mg total) by mouth daily, Disp: 90 tablet, Rfl: 3  •  Magnesium Oxide 400 MG CAPS, Take 1 tablet (400 mg total) by mouth in the morning, Disp: 90 tablet, Rfl: 4  •  metFORMIN (GLUCOPHAGE-XR) 750 mg 24 hr tablet, Take 1 tablet (750 mg total) by mouth daily with breakfast, Disp: 90 tablet, Rfl: 1  •  NIFEdipine (PROCARDIA XL) 60 mg 24 hr tablet, TAKE ONE TABLET BY MOUTH EVERY DAY, Disp: 90 tablet, Rfl: 0  •  pantoprazole (PROTONIX) 40 mg tablet, TAKE ONE TABLET BY MOUTH EVERY DAY BEFORE " BREAKFAST, Disp: 90 tablet, Rfl: 0  •  rosuvastatin (CRESTOR) 40 MG tablet, Take 1 tablet (40 mg total) by mouth daily, Disp: 90 tablet, Rfl: 2     Patient Instructions     Please start metformin 1 pill daily for diabetes  Watch for diarrhea  Increase your Crestor up to 40 mg   I sent new prescription to your pharmacy  Start Lexapro 10 mg daily in the morning for your mood  Take it daily it takes about 3 to 4 weeks until you will feel difference  Please do your echo/ultrasound of your heart at your convenience within the next few months  Recheck blood work in 3 months  Depression   AMBULATORY CARE:   Depression  is a medical condition that causes feelings of sadness or hopelessness that do not go away  Depression may cause you to lose interest in things you used to enjoy  These feelings may interfere with your daily life  Common symptoms include the following:   • Appetite changes, or weight gain or loss    • Trouble going to sleep or staying asleep, or sleeping too much    • Fatigue or lack of energy    • Feeling restless, irritable, or withdrawn    • Feeling worthless, hopeless, discouraged, or guilty    • Trouble concentrating, remembering things, doing daily tasks, or making decisions    • Thoughts about hurting or killing yourself    Call your local emergency number (911 in the [address] [facility]) if:   • You think about harming yourself or someone else  • You have done something on purpose to hurt yourself  Call your therapist or doctor if:   • Your symptoms do not improve  • You cannot make it to your next appointment  • You have new symptoms  • You have questions or concerns about your condition or care      The following resources are available at any time to help you, if needed:   • Contact a suicide prevention organization:        ? For the [address] [place] Suicide and Crisis Lifeline:     - Call or text [facility] a chat on https://[employer] org/chat     - Call 2-960.348.5276 (6-596-491-TALK)    ? For the Suicide Hotline, call 4-978.450.4225 (9-001-XGIUCYZ)    For a list of international numbers: https://save org/find-help/international-resources/  Treatment for depression  may include medicine to relieve depression  Medicine is often used together with therapy  Therapy is a way for you to talk about your feelings and anything that may be causing depression  Therapy can be done alone or in a group  It may also be done with family members or a significant other  Self-care:   • Get regular physical activity  Try to be active for 30 minutes, 3 to 5 days a week  Physical activity can help relieve depression  Work with your healthcare provider to develop a plan that you enjoy  It may help to ask someone to be active with you  • Create a regular sleep schedule  A routine can help you relax before bed  Listen to music, read, or do yoga  Try to go to bed and wake up at the same time every day  Sleep is important for emotional health  • Eat a variety of healthy foods  Healthy foods include fruits, vegetables, whole-grain breads, low-fat dairy products, lean meats, fish, and cooked beans  A healthy meal plan is low in fat, salt, and added sugar  • Do not drink alcohol or use drugs  Alcohol and drugs can make depression worse  Talk to your therapist or doctor if you need help quitting  Follow up with your healthcare provider as directed: Your healthcare provider will monitor your progress at follow-up visits  He or she will also monitor your medicine if you take antidepressants  Your healthcare provider will ask if the medicine is helping  Tell him or her about any side effects or problems you may have with your medicine  The type or amount of medicine may need to be changed  Write down your questions so you remember to ask them during your visits  For more information or support:   • [employer] [facility]  [address] , [address] [place] [address]  Phone: 6- [phone]  Phone: 2- [phone]  Web Address: http://byyd/  [employer]  • [address] Suicide and [address] [address]  Phone: 5- 510 - 004  Web Address: [facility] [facility]  org OR https://[employer]/chat/    © Copyright [employer] 2022 Information is for End User's use only and may not be sold, redistributed or otherwise used for commercial purposes  The above information is an  only  It is not intended as medical advice for individual conditions or treatments  Talk to your doctor, nurse or pharmacist before following any medical regimen to see if it is safe and effective for you  Type 2 Diabetes Management for Adults   AMBULATORY CARE:   Type 2 diabetes  is a disease that affects how your body uses glucose (sugar)  Either your body cannot make enough insulin, or it cannot use the insulin correctly  It is important to keep diabetes controlled to prevent damage to your heart, blood vessels, and other organs  Management will help you feel well and enjoy your daily activities  Your diabetes care team providers can help you make a plan to fit diabetes care into your schedule  Your plan can change over time to fit your needs and your family's needs  Have someone call your local emergency number (911 in the [address] [facility]) if:   • You cannot be woken  • You have signs of diabetic ketoacidosis:     ? confusion, fatigue    ? vomiting    ? rapid heartbeat    ? fruity smelling breath    ? extreme thirst    ? dry mouth and skin    • You have any of the following signs of a heart attack:      ?  Squeezing, pressure, or pain in your chest    ? You may  also have any of the following:     - Discomfort or pain in your back, neck, jaw, stomach, or arm    - Shortness of breath    - Nausea or vomiting    - Lightheadedness or a sudden cold sweat    • You have any of the following signs of a stroke:      ? Numbness or drooping on one side of your face ? Weakness in an arm or leg    ? Confusion or difficulty speaking    ? Dizziness, a severe headache, or vision loss    Call your doctor or diabetes care team provider if:   • You have a sore or wound that will not heal     • You have a change in the amount you urinate  • Your blood sugar levels are higher than your target goals  • You often have lower blood sugar levels than your target goals  • Your skin is red, dry, warm, or swollen  • You have trouble coping with diabetes, or you feel anxious or depressed  • You have questions or concerns about your condition or care  What you need to know about high blood sugar levels:  High blood sugar levels may not cause any symptoms  You may feel more thirsty or urinate more often than usual  Over time, high blood sugar levels can damage your nerves, blood vessels, tissues, and organs  The following can increase your blood sugar levels:  • Large meals or large amounts of carbohydrates at one time    • Less physical activity    • Stress    • Illness    • A lower dose of diabetes medicine or insulin, or a late dose    What you need to know about low blood sugar levels:  Symptoms include feeling shaky, dizzy, irritable, or confused  You can prevent symptoms by keeping your blood sugar levels from going too low  • Treat a low blood sugar level right away:      ? Drink 4 ounces of juice or have 1 tube of glucose gel  ? Check your blood sugar level again 10 to 15 minutes later  ? When the level goes back to normal, eat a meal or snack to prevent another decrease  • Keep glucose gel, raisins, or hard candy with you at all times to treat a low blood sugar level  • Your blood sugar level can get too low if you take diabetes medicine or insulin and do not eat enough food  • If you use insulin, check your blood sugar level before you exercise        ? If your blood sugar level is below 100 mg/dL, eat 4 crackers or 2 ounces of raisins, or drink 4 ounces of juice  ? Check your level every 30 minutes if you exercise longer than 1 hour  ? You may need a snack during or after exercise  What you can do to manage your blood sugar levels:   • Check your blood sugar levels as directed and as needed  Several items are available to use to check your levels  You may need to check by testing a drop of blood in a glucose monitor  You may instead be given a continuous glucose monitoring (CGM) device  The device is worn at all times  The CGM checks your blood sugar level every 5 minutes  It sends results to an electronic device such as a smart phone  A CGM can be used with or without an insulin pump  You and your diabetes care team providers will decide on the best method for you  The goal for blood sugar levels before meals  is between 80 and 130 mg/dL and 2 hours after eating  is lower than 180 mg/dL  • Make healthy food choices  Work with a dietitian to develop a meal plan that works for you and your schedule  A dietitian can help you learn how to eat the right amount of carbohydrates during your meals and snacks  Carbohydrates can raise your blood sugar level if you eat too many at one time  Examples of foods that contain carbohydrates are breads, cereals, rice, pasta, and sweets  • Eat high-fiber foods as directed  Fiber helps improve blood sugar levels  Fiber also lowers your risk for heart disease and other problems diabetes can cause  Examples of high-fiber foods include vegetables, whole-grain bread, and beans such as weinberg beans  Your dietitian can tell you how much fiber to have each day  • Get regular physical activity  Physical activity can help you get to your target blood sugar level goal and manage your weight  Get at least 150 minutes of moderate to vigorous aerobic physical activity each week  Do not miss more than 2 days in a row  Do not sit longer than 30 minutes at a time   Your healthcare provider can help you create an activity plan  The plan can include the best activities for you and can help you build your strength and endurance  • Maintain a healthy weight  Ask your team what a healthy weight is for you  A healthy weight can help you control diabetes and prevent heart disease  Ask your team to help you create a weight loss plan, if needed  Weight loss can help make a difference in managing diabetes  Your team will help you set a weight-loss goal, such as 10 to 15 pounds, or 5% of your extra weight  Together you and your team can set manageable weight loss goals  • Take your diabetes medicine or insulin as directed  You may need diabetes medicine, insulin, or both to help control your blood sugar levels  Your healthcare provider will teach you how and when to take your diabetes medicine or insulin  You will also be taught about side effects oral diabetes medicine can cause  Insulin may be injected or given through a pump or pen  You and your providers will decide on the best method for you:    ? An insulin pump  is an implanted device that gives your insulin 24 hours a day  An insulin pump prevents the need for multiple insulin injections in a day  ? An insulin pen  is a device prefilled with the right amount of insulin  ? You and your family members will be taught how to draw up and give insulin  if this is the best method for you  Your providers will also teach you how to dispose of needles and syringes  ? You will learn how much insulin you need  and when to give it  You will be taught when not to give insulin  You will also be taught what to do if your blood sugar level drops too low  This may happen if you take insulin and do not eat the right amount of carbohydrates  More ways to manage type 2 diabetes:   • Wear medical alert identification  Wear medical alert jewelry or carry a card that says you have diabetes  Ask your provider where to get these items           • Do not smoke   Nicotine and other chemicals in cigarettes and cigars can cause lung and blood vessel damage  It also makes it more difficult to manage your diabetes  Ask your provider for information if you currently smoke and need help to quit  Do not use e-cigarettes or smokeless tobacco in place of cigarettes or to help you quit  They still contain nicotine  • Check your feet each day for cuts, scratches, calluses, or other wounds  Look for redness and swelling, and feel for warmth  Wear shoes that fit well  Check your shoes for rocks or other objects that can hurt your feet  Do not walk barefoot or wear shoes without socks  Wear cotton socks to help keep your feet dry  • Ask about vaccines you may need  You have a higher risk for serious illness if you get the flu, pneumonia, COVID-19, or hepatitis  Ask your provider if you should get vaccines to prevent these or other diseases, and when to get the vaccines  • Talk to your provider if you become stressed about diabetes care  Sometimes being able to fit diabetes care into your life can cause increased stress  The stress can cause you not to take care of yourself properly  Your care team providers can help by offering tips about self-care  Your providers may suggest you talk to a mental health provider who can listen and offer help with self-care issues  • Have your A1c checked as directed  Your provider may check your A1c every 3 months, or 2 times each year if your diabetes is controlled  An A1c test shows the average amount of sugar in your blood over the past 2 to 3 months  Your provider will tell you what your A1c level should be  • Have screening tests as directed  Your provider may recommend screening for complications of diabetes and other conditions that may develop   Some screenings may begin right away and some may happen within the first 5 years of diagnosis:    ? Examples of diabetes complications  include kidney problems, high cholesterol, high blood pressure, blood vessel problems, eye problems, and sleep apnea  ? You may be screened for a low vitamin B level  if you take oral diabetes medicine for a long time  ? Women of childbearing years may be screened  for polycystic ovarian syndrome (PCOS)  Follow up with your doctor or diabetes care team providers as directed: You may need to have blood tests done before your follow-up visit  The test results will show if changes need to be made in your treatment or self-care  Talk to your provider if you cannot afford your medicine  Write down your questions so you remember to ask them during your visits  © Copyright Niurka Sandoval 2022 Information is for End User's use only and may not be sold, redistributed or otherwise used for commercial purposes  The above information is an  only  It is not intended as medical advice for individual conditions or treatments  Talk to your doctor, nurse or pharmacist before following any medical regimen to see if it is safe and effective for you  Physical Exam  Constitutional:       Appearance: He is not ill-appearing  HENT:      Head: Normocephalic and atraumatic  Nose: No congestion or rhinorrhea  Eyes:      Pupils: Pupils are equal, round, and reactive to light  Cardiovascular:      Rate and Rhythm: Normal rate and regular rhythm  Pulses: Normal pulses  Heart sounds: Murmur heard  No friction rub  No gallop  Pulmonary:      Effort: Pulmonary effort is normal  No respiratory distress  Breath sounds: Normal breath sounds  No wheezing or rales  Chest:      Chest wall: No tenderness  Abdominal:      General: Bowel sounds are normal  There is no distension  Palpations: Abdomen is soft  There is no mass  Tenderness: There is no abdominal tenderness  There is no rebound  Hernia: No hernia is present  Musculoskeletal:         General: No swelling, tenderness or deformity  Cervical back: No rigidity  No muscular tenderness  Skin:     Coloration: Skin is not pale  Findings: No erythema, lesion or rash  Neurological:      Mental Status: He is alert and oriented to person, place, and time  Sensory: No sensory deficit  Motor: No weakness  Psychiatric:         Mood and Affect: Mood normal          Behavior: Behavior normal          Depression Screening Follow-up Plan: Patient's depression screening was positive with a PHQ-2 score of 4  Their PHQ-9 score was 10  Patient advised to follow-up with PCP for further management  Depression Screening and Follow-up Plan: Patient's depression screening was positive with a PHQ-2 score of 4  Their PHQ-9 score was 10  Patient advised to follow-up with PCP for further management

## 2023-06-06 NOTE — PATIENT INSTRUCTIONS
Please start metformin 1 pill daily for diabetes  Watch for diarrhea  Increase your Crestor up to 40 mg   I sent new prescription to your pharmacy  Start Lexapro 10 mg daily in the morning for your mood  Take it daily it takes about 3 to 4 weeks until you will feel difference  Please do your echo/ultrasound of your heart at your convenience within the next few months  Recheck blood work in 3 months  Depression   AMBULATORY CARE:   Depression  is a medical condition that causes feelings of sadness or hopelessness that do not go away  Depression may cause you to lose interest in things you used to enjoy  These feelings may interfere with your daily life  Common symptoms include the following:   Appetite changes, or weight gain or loss    Trouble going to sleep or staying asleep, or sleeping too much    Fatigue or lack of energy    Feeling restless, irritable, or withdrawn    Feeling worthless, hopeless, discouraged, or guilty    Trouble concentrating, remembering things, doing daily tasks, or making decisions    Thoughts about hurting or killing yourself    Call your local emergency number (911 in the [address] [facility]) if:   You think about harming yourself or someone else  You have done something on purpose to hurt yourself  Call your therapist or doctor if:   Your symptoms do not improve  You cannot make it to your next appointment  You have new symptoms  You have questions or concerns about your condition or care  The following resources are available at any time to help you, if needed:   Contact a suicide prevention organization:         For the [employer] Suicide and Crisis Lifeline:     Call or text 19643 41 94 73 a chat on https://[employer] org/chat     Call 7-834.131.7464 (2-638-990-TALK)    For the Suicide Hotline, call 4-579.715.7285 (4-943-FKRMCVN)    For a list of international numbers: https://save org/find-help/international-resources/  Treatment for depression  may include medicine to relieve depression  Medicine is often used together with therapy  Therapy is a way for you to talk about your feelings and anything that may be causing depression  Therapy can be done alone or in a group  It may also be done with family members or a significant other  Self-care:   Get regular physical activity  Try to be active for 30 minutes, 3 to 5 days a week  Physical activity can help relieve depression  Work with your healthcare provider to develop a plan that you enjoy  It may help to ask someone to be active with you  Create a regular sleep schedule  A routine can help you relax before bed  Listen to music, read, or do yoga  Try to go to bed and wake up at the same time every day  Sleep is important for emotional health  Eat a variety of healthy foods  Healthy foods include fruits, vegetables, whole-grain breads, low-fat dairy products, lean meats, fish, and cooked beans  A healthy meal plan is low in fat, salt, and added sugar  Do not drink alcohol or use drugs  Alcohol and drugs can make depression worse  Talk to your therapist or doctor if you need help quitting  Follow up with your healthcare provider as directed: Your healthcare provider will monitor your progress at follow-up visits  He or she will also monitor your medicine if you take antidepressants  Your healthcare provider will ask if the medicine is helping  Tell him or her about any side effects or problems you may have with your medicine  The type or amount of medicine may need to be changed  Write down your questions so you remember to ask them during your visits  For more information or support:   [employer] [facility]  [address] [facility]  , [address] [facility] , [address] [place] [address]  Phone: [phone]  Phone: [phone]  Web Address: http://"[employer]/  [employer]  97 347212 Suicide and [facility] [address] [address]  Phone: 5- [phone] - [phone]  Web Address: [employer] [place] https://[employer]/    © Copyright Chrisotfer Phillips 2022 Information is for End User's use only and may not be sold, redistributed or otherwise used for commercial purposes  The above information is an  only  It is not intended as medical advice for individual conditions or treatments  Talk to your doctor, nurse or pharmacist before following any medical regimen to see if it is safe and effective for you  Type 2 Diabetes Management for Adults   AMBULATORY CARE:   Type 2 diabetes  is a disease that affects how your body uses glucose (sugar)  Either your body cannot make enough insulin, or it cannot use the insulin correctly  It is important to keep diabetes controlled to prevent damage to your heart, blood vessels, and other organs  Management will help you feel well and enjoy your daily activities  Your diabetes care team providers can help you make a plan to fit diabetes care into your schedule  Your plan can change over time to fit your needs and your family's needs  Have someone call your local emergency number (911 in the [address] [facility]) if:   You cannot be woken  You have signs of diabetic ketoacidosis:     confusion, fatigue    vomiting    rapid heartbeat    fruity smelling breath    extreme thirst    dry mouth and skin    You have any of the following signs of a heart attack:      Squeezing, pressure, or pain in your chest    You may  also have any of the following:     Discomfort or pain in your back, neck, jaw, stomach, or arm    Shortness of breath    Nausea or vomiting    Lightheadedness or a sudden cold sweat    You have any of the following signs of a stroke:      Numbness or drooping on one side of your face     Weakness in an arm or leg    Confusion or difficulty speaking    Dizziness, a severe headache, or vision loss    Call your doctor or diabetes care team provider if:   You have a sore or wound that will not heal     You have a change in the amount you urinate      Your blood sugar levels are higher than your target goals  You often have lower blood sugar levels than your target goals  Your skin is red, dry, warm, or swollen  You have trouble coping with diabetes, or you feel anxious or depressed  You have questions or concerns about your condition or care  What you need to know about high blood sugar levels:  High blood sugar levels may not cause any symptoms  You may feel more thirsty or urinate more often than usual  Over time, high blood sugar levels can damage your nerves, blood vessels, tissues, and organs  The following can increase your blood sugar levels:  Large meals or large amounts of carbohydrates at one time    Less physical activity    Stress    Illness    A lower dose of diabetes medicine or insulin, or a late dose    What you need to know about low blood sugar levels:  Symptoms include feeling shaky, dizzy, irritable, or confused  You can prevent symptoms by keeping your blood sugar levels from going too low  Treat a low blood sugar level right away:      Drink 4 ounces of juice or have 1 tube of glucose gel  Check your blood sugar level again 10 to 15 minutes later  When the level goes back to normal, eat a meal or snack to prevent another decrease  Keep glucose gel, raisins, or hard candy with you at all times to treat a low blood sugar level  Your blood sugar level can get too low if you take diabetes medicine or insulin and do not eat enough food  If you use insulin, check your blood sugar level before you exercise  If your blood sugar level is below 100 mg/dL, eat 4 crackers or 2 ounces of raisins, or drink 4 ounces of juice  Check your level every 30 minutes if you exercise longer than 1 hour  You may need a snack during or after exercise  What you can do to manage your blood sugar levels:   Check your blood sugar levels as directed and as needed  Several items are available to use to check your levels   You may need to check by testing a drop of blood in a glucose monitor  You may instead be given a continuous glucose monitoring (CGM) device  The device is worn at all times  The CGM checks your blood sugar level every 5 minutes  It sends results to an electronic device such as a smart phone  A CGM can be used with or without an insulin pump  You and your diabetes care team providers will decide on the best method for you  The goal for blood sugar levels before meals  is between 80 and 130 mg/dL and 2 hours after eating  is lower than 180 mg/dL  Make healthy food choices  Work with a dietitian to develop a meal plan that works for you and your schedule  A dietitian can help you learn how to eat the right amount of carbohydrates during your meals and snacks  Carbohydrates can raise your blood sugar level if you eat too many at one time  Examples of foods that contain carbohydrates are breads, cereals, rice, pasta, and sweets  Eat high-fiber foods as directed  Fiber helps improve blood sugar levels  Fiber also lowers your risk for heart disease and other problems diabetes can cause  Examples of high-fiber foods include vegetables, whole-grain bread, and beans such as weinberg beans  Your dietitian can tell you how much fiber to have each day  Get regular physical activity  Physical activity can help you get to your target blood sugar level goal and manage your weight  Get at least 150 minutes of moderate to vigorous aerobic physical activity each week  Do not miss more than 2 days in a row  Do not sit longer than 30 minutes at a time  Your healthcare provider can help you create an activity plan  The plan can include the best activities for you and can help you build your strength and endurance  Maintain a healthy weight  Ask your team what a healthy weight is for you  A healthy weight can help you control diabetes and prevent heart disease   Ask your team to help you create a weight loss plan, if needed  Weight loss can help make a difference in managing diabetes  Your team will help you set a weight-loss goal, such as 10 to 15 pounds, or 5% of your extra weight  Together you and your team can set manageable weight loss goals  Take your diabetes medicine or insulin as directed  You may need diabetes medicine, insulin, or both to help control your blood sugar levels  Your healthcare provider will teach you how and when to take your diabetes medicine or insulin  You will also be taught about side effects oral diabetes medicine can cause  Insulin may be injected or given through a pump or pen  You and your providers will decide on the best method for you: An insulin pump  is an implanted device that gives your insulin 24 hours a day  An insulin pump prevents the need for multiple insulin injections in a day  An insulin pen  is a device prefilled with the right amount of insulin  You and your family members will be taught how to draw up and give insulin  if this is the best method for you  Your providers will also teach you how to dispose of needles and syringes  You will learn how much insulin you need  and when to give it  You will be taught when not to give insulin  You will also be taught what to do if your blood sugar level drops too low  This may happen if you take insulin and do not eat the right amount of carbohydrates  More ways to manage type 2 diabetes:   Wear medical alert identification  Wear medical alert jewelry or carry a card that says you have diabetes  Ask your provider where to get these items  Do not smoke  Nicotine and other chemicals in cigarettes and cigars can cause lung and blood vessel damage  It also makes it more difficult to manage your diabetes  Ask your provider for information if you currently smoke and need help to quit  Do not use e-cigarettes or smokeless tobacco in place of cigarettes or to help you quit   They still contain nicotine  Check your feet each day for cuts, scratches, calluses, or other wounds  Look for redness and swelling, and feel for warmth  Wear shoes that fit well  Check your shoes for rocks or other objects that can hurt your feet  Do not walk barefoot or wear shoes without socks  Wear cotton socks to help keep your feet dry  Ask about vaccines you may need  You have a higher risk for serious illness if you get the flu, pneumonia, COVID-19, or hepatitis  Ask your provider if you should get vaccines to prevent these or other diseases, and when to get the vaccines  Talk to your provider if you become stressed about diabetes care  Sometimes being able to fit diabetes care into your life can cause increased stress  The stress can cause you not to take care of yourself properly  Your care team providers can help by offering tips about self-care  Your providers may suggest you talk to a mental health provider who can listen and offer help with self-care issues  Have your A1c checked as directed  Your provider may check your A1c every 3 months, or 2 times each year if your diabetes is controlled  An A1c test shows the average amount of sugar in your blood over the past 2 to 3 months  Your provider will tell you what your A1c level should be  Have screening tests as directed  Your provider may recommend screening for complications of diabetes and other conditions that may develop  Some screenings may begin right away and some may happen within the first 5 years of diagnosis:    Examples of diabetes complications  include kidney problems, high cholesterol, high blood pressure, blood vessel problems, eye problems, and sleep apnea  You may be screened for a low vitamin B level  if you take oral diabetes medicine for a long time  Women of childbearing years may be screened  for polycystic ovarian syndrome (PCOS)  Follow up with your doctor or diabetes care team providers as directed:   You may need to have blood tests done before your follow-up visit  The test results will show if changes need to be made in your treatment or self-care  Talk to your provider if you cannot afford your medicine  Write down your questions so you remember to ask them during your visits  © Copyright Ar[employer] 2022 Information is for End User's use only and may not be sold, redistributed or otherwise used for commercial purposes  The above information is an  only  It is not intended as medical advice for individual conditions or treatments  Talk to your doctor, nurse or pharmacist before following any medical regimen to see if it is safe and effective for you

## 2023-06-06 NOTE — ASSESSMENT & PLAN NOTE
PHQ-9 consistent with mild to moderate depression  He looks clinically depressed  Denies any suicidal ideation  Start Lexapro 10 mg daily  Side effects discussed

## 2023-06-14 ENCOUNTER — HOSPITAL ENCOUNTER (OUTPATIENT)
Dept: CT IMAGING | Facility: HOSPITAL | Age: 63
Discharge: HOME | End: 2023-06-14
Payer: COMMERCIAL

## 2023-06-14 ENCOUNTER — HOSPITAL ENCOUNTER (OUTPATIENT)
Dept: RADIOLOGY | Facility: HOSPITAL | Age: 63
Discharge: HOME | End: 2023-06-14
Payer: COMMERCIAL

## 2023-06-14 DIAGNOSIS — C62.12 SEMINOMA OF DESCENDED LEFT TESTIS (HCC): ICD-10-CM

## 2023-06-14 PROCEDURE — 74176 CT ABD & PELVIS W/O CONTRAST: CPT

## 2023-06-14 PROCEDURE — G1004 CDSM NDSC: HCPCS

## 2023-06-14 PROCEDURE — 71046 X-RAY EXAM CHEST 2 VIEWS: CPT

## 2023-06-17 ENCOUNTER — APPOINTMENT (OUTPATIENT)
Dept: LAB | Facility: MEDICAL CENTER | Age: 63
End: 2023-06-17
Payer: COMMERCIAL

## 2023-06-17 DIAGNOSIS — C62.12 SEMINOMA OF DESCENDED LEFT TESTIS (HCC): ICD-10-CM

## 2023-06-17 LAB
AFP-TM SERPL-MCNC: 1.88 NG/ML (ref 0–9)
ALBUMIN SERPL BCP-MCNC: 3.4 G/DL (ref 3.5–5)
ALP SERPL-CCNC: 73 U/L (ref 46–116)
ALT SERPL W P-5'-P-CCNC: 25 U/L (ref 12–78)
ANION GAP SERPL CALCULATED.3IONS-SCNC: 3 MMOL/L (ref 4–13)
AST SERPL W P-5'-P-CCNC: 14 U/L (ref 5–45)
BASOPHILS # BLD AUTO: 0.07 THOUSANDS/ÂΜL (ref 0–0.1)
BASOPHILS NFR BLD AUTO: 1 % (ref 0–1)
BILIRUB SERPL-MCNC: 0.92 MG/DL (ref 0.2–1)
BUN SERPL-MCNC: 25 MG/DL (ref 5–25)
CALCIUM ALBUM COR SERPL-MCNC: 9.6 MG/DL (ref 8.3–10.1)
CALCIUM SERPL-MCNC: 9.1 MG/DL (ref 8.3–10.1)
CHLORIDE SERPL-SCNC: 108 MMOL/L (ref 96–108)
CO2 SERPL-SCNC: 29 MMOL/L (ref 21–32)
CREAT SERPL-MCNC: 1.6 MG/DL (ref 0.6–1.3)
EOSINOPHIL # BLD AUTO: 0.18 THOUSAND/ÂΜL (ref 0–0.61)
EOSINOPHIL NFR BLD AUTO: 2 % (ref 0–6)
ERYTHROCYTE [DISTWIDTH] IN BLOOD BY AUTOMATED COUNT: 13 % (ref 11.6–15.1)
GFR SERPL CREATININE-BSD FRML MDRD: 44 ML/MIN/1.73SQ M
GLUCOSE P FAST SERPL-MCNC: 128 MG/DL (ref 65–99)
HCG-TM SERPL-SCNC: <1 MLU/ML
HCT VFR BLD AUTO: 37.6 % (ref 36.5–49.3)
HGB BLD-MCNC: 12 G/DL (ref 12–17)
IMM GRANULOCYTES # BLD AUTO: 0.04 THOUSAND/UL (ref 0–0.2)
IMM GRANULOCYTES NFR BLD AUTO: 0 % (ref 0–2)
LDH SERPL-CCNC: 171 U/L (ref 81–234)
LYMPHOCYTES # BLD AUTO: 3.67 THOUSANDS/ÂΜL (ref 0.6–4.47)
LYMPHOCYTES NFR BLD AUTO: 33 % (ref 14–44)
MCH RBC QN AUTO: 29.8 PG (ref 26.8–34.3)
MCHC RBC AUTO-ENTMCNC: 31.9 G/DL (ref 31.4–37.4)
MCV RBC AUTO: 93 FL (ref 82–98)
MONOCYTES # BLD AUTO: 1.02 THOUSAND/ÂΜL (ref 0.17–1.22)
MONOCYTES NFR BLD AUTO: 9 % (ref 4–12)
NEUTROPHILS # BLD AUTO: 6.09 THOUSANDS/ÂΜL (ref 1.85–7.62)
NEUTS SEG NFR BLD AUTO: 55 % (ref 43–75)
NRBC BLD AUTO-RTO: 0 /100 WBCS
PLATELET # BLD AUTO: 258 THOUSANDS/UL (ref 149–390)
PMV BLD AUTO: 9.6 FL (ref 8.9–12.7)
POTASSIUM SERPL-SCNC: 4.2 MMOL/L (ref 3.5–5.3)
PROT SERPL-MCNC: 7.3 G/DL (ref 6.4–8.4)
RBC # BLD AUTO: 4.03 MILLION/UL (ref 3.88–5.62)
SODIUM SERPL-SCNC: 140 MMOL/L (ref 135–147)
WBC # BLD AUTO: 11.07 THOUSAND/UL (ref 4.31–10.16)

## 2023-06-17 PROCEDURE — 83615 LACTATE (LD) (LDH) ENZYME: CPT

## 2023-06-17 PROCEDURE — 85025 COMPLETE CBC W/AUTO DIFF WBC: CPT

## 2023-06-17 PROCEDURE — 82105 ALPHA-FETOPROTEIN SERUM: CPT

## 2023-06-17 PROCEDURE — 80053 COMPREHEN METABOLIC PANEL: CPT

## 2023-06-17 PROCEDURE — 36415 COLL VENOUS BLD VENIPUNCTURE: CPT

## 2023-06-17 PROCEDURE — 84702 CHORIONIC GONADOTROPIN TEST: CPT

## 2023-06-21 ENCOUNTER — OFFICE VISIT (OUTPATIENT)
Dept: HEMATOLOGY ONCOLOGY | Facility: CLINIC | Age: 63
End: 2023-06-21
Payer: COMMERCIAL

## 2023-06-21 VITALS
SYSTOLIC BLOOD PRESSURE: 140 MMHG | WEIGHT: 274 LBS | BODY MASS INDEX: 40.58 KG/M2 | HEART RATE: 76 BPM | HEIGHT: 69 IN | RESPIRATION RATE: 17 BRPM | DIASTOLIC BLOOD PRESSURE: 78 MMHG | OXYGEN SATURATION: 96 % | TEMPERATURE: 98.1 F

## 2023-06-21 DIAGNOSIS — C62.92 SEMINOMA OF TESTIS, STAGE 3, LEFT (HCC): Primary | ICD-10-CM

## 2023-06-21 DIAGNOSIS — C62.12 SEMINOMA OF DESCENDED LEFT TESTIS (HCC): ICD-10-CM

## 2023-06-21 PROCEDURE — 99213 OFFICE O/P EST LOW 20 MIN: CPT | Performed by: INTERNAL MEDICINE

## 2023-06-21 NOTE — PROGRESS NOTES
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 with external right iliac lymphadenopathy measuring up to 3 cm and left para-aortic lymphadenopathy measuring up to 2 3 cm (pT2, N2, S1) with persistent elevation of LDH less than 1 5 normal upper limit     3/23/21 Repeat alpha fetoprotein, hCG are normal, repeat  ()     Received 1st and only cycle of etoposide/ cisplatin with anti emetics   He was admitted to the hospital after 10 days of therapy with nausea, weakness   He was found to have acute renal insufficiency with creatinine of 4,    He received IV fluid with gradual improvement  He also developed neutropenia and thrombocytopenia grade 4 requiring platelet transfusion      CT scan C/A/P 4/15/21 showed significant response of chemotherapy in the abdominal lymphadenopathy    Creatinine improved to 1 73 5/7/21     Cisplatin changed to carboplatin AUC 5 on day 1 and etoposide 100 milligram/meter squared for 5 days    Despite this, he ended up in the hospital with pancytopenia, requiring blood and platelet transfusion, ileus, weight loss, nausea, vomiting, loss of appetite with poor tolerance of therapy     Received 2 cycles 5/17/21 and 6/7/21     Subsequent CAT scans and the latest in June 2023 showed no evidence of disease, normal tumor markers    At this time we will follow-up on yearly basis with CT scan of the abdomen and pelvis without contrast, CBC, CMP, LDH, alpha-fetoprotein and hCG    History of prostate cancer diagnosed in September 2019 with single core and very low risk group followed by urology    Colonoscopy in January 2023 showed multiple polyps with external hemorrhoids        Labs and imaging studies are reviewed by ordering provider once results are available   If there are findings that need immediate attention, you will be contacted when results available  Discussing results and the implication on your healthcare is best discussed in person at your follow-up visit  HPI:    Shoaib Skinner is a 77-year-old  male seen initially 4/1/21 regarding stage II B (pT2, pN2, S1) left-sided pure seminoma     He has a history of prostate cancer, nephrolithiasis, benign prostatic hypertrophy, polymyalgia, hypertension, sleep apnea, obesity, GERD, hiatal hernia, gout       His prostate cancer was diagnosed on September 2019 with single core with 4 mm focus of prostate cancer with very low risk group   He is on active surveillance with PSA 4 9 on March 2020 followed by Urology        He was found to have enlargement of the left testicle       Ultrasound of the scrotum on 10/24/2020 showed left testicle measuring 7 2 x 3 9 x 5 1 cm with lobulated contour and diffuse heterogeneous echotexture     Repeat ultrasound on 01/20/2021 showed 9 1 x 4 9 x 6 8 cm increase in size compared to prior ultrasound     LDH was elevated at 376 () normal hCG, alpha fetoprotein     Status post left inguinal orchiectomy on 02/19/2021, showing pure seminoma primary measuring 8 2 cm, abuts and invades into but not through tunica albuginea, invades hilar fat and spermatic cord soft tissue with lymphovascular invasion, spermatic cord is not involved by the tumor confirmed by 2nd opinion [facility]       CT scan of the chest abdomen and pelvis on 03/20/2021 showed enlarged left iliac and left para-aortic lymph nodes for example left para-aortic lymph node measuring 2 3 cm, left external iliac lymph node measuring 30 mm no evidence of metastatic disease in the chest  He had pain at the left orchiectomy site on and off most likely responding to acetaminophen from healing      He reported 12 lb weight loss after the surgery       4/1/21:  cr 1 13, hemoglobin 1 3 3, MCV of 89, white blood cell count 7 75, 51% neutrophils, 33% lymphocytes, 12% monocytes, platelets 002     4 cycles of  Etoposide/ cisplatin per the NCCN guidelines recommended    For chemotherapy-induced neutropenia, pegfilgrastim recommended      The chemotherapy was complicated with dehydration, acute kidney injury requiring admission to the hospital 4/15-4/20/21  [facility] creatinine at time of discharge was 2 3   It  improved to 1 73 5/7/21     CT scan C/A/P  4/15/21 in the hospital showed significant reduction in seminoma with decrease of the abdominal lymphadenopathy      We changed cisplatin to carboplatin, despite that 2nd cycle was significant for pancytopenia, requiring admission to the hospital, ileus, CT scan showed no evidence of residual lymphadenopathy or masses in June 2021  We decided not to proceed with any additional chemotherapy because of poor tolerance      After 3 months CT scan of the abdomen and pelvis in September 2021 showed 1 1 cm para-aortic lymph node no new lesions, he has normal hCG as well as LDH      Admitted 11/15-11/20/21  2nd to abdominal pain, fatigue, malaise   Tested positive for COVID  Noncontrast CT scan abdomen and pelvis 11/16/21 -  Scattered and ill-defined groundglass opacities in the visualized bilateral lungs correlates with the patient's history of COVID 19 infection  Fatty infiltration of the liver is suspected  In the setting of abdominal pain and/or elevated liver function tests, consider steatohepatitis   Cholelithiasis without discrete evidence of acute cholecystitis, bilateral nephrolithiasis, no hydronephrosis     CT scan of the abdomen and pelvis in January 2022 showed no evidence of malignancy or lymphadenopathy     CT scan in August 2022 showed no evidence of disease normal hCG, LDH, alpha fetoprotein     Interval History:        Previous Treatment:         Test Results:    Imaging: CT abdomen pelvis wo contrast    Result Date: 6/19/2023  Narrative: CT ABDOMEN AND PELVIS WITHOUT IV CONTRAST INDICATION:   C62 12:  Malignant neoplasm of descended left testis  COMPARISON: 8/12/2022  TECHNIQUE:  CT examination of the abdomen and pelvis was performed without intravenous contrast  Multiplanar 2D reformatted images were created from the source data  This examination, like all CT scans performed in the [facility], was performed utilizing techniques to minimize radiation dose exposure, including the use of iterative reconstruction and automated exposure control  Radiation dose length product (DLP) for this visit:  1266 mGy-cm Enteric contrast was not administered  FINDINGS: ABDOMEN LOWER CHEST:  No clinically significant abnormality identified in the visualized lower chest  LIVER/BILIARY TREE: There is diffuse hypoattenuation of the liver compatible with steatosis  GALLBLADDER: There are gallstone(s) within the gallbladder, without pericholecystic inflammatory changes  SPLEEN:  Unremarkable  PANCREAS:  Unremarkable  ADRENAL GLANDS:  Unremarkable  KIDNEYS/URETERS: Punctate nonobstructing calculi are seen bilaterally  No hydronephrosis  Please note evaluation for solid renal lesions is limited on this noncontrast study  STOMACH AND BOWEL: No abnormal small bowel dilatation  Small duodenal diverticulum is noted  Fat density within the distal duodenum is stable from August 2022 and 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  APPENDIX:  No findings to suggest appendicitis  ABDOMINOPELVIC CAVITY:  No ascites  No pneumoperitoneum  No lymphadenopathy  Right external iliac lymph node is again seen measuring 10 mm on the short axis with a preserved fatty hilum, stable from prior exams  VESSELS: Mild atherosclerotic changes are noted within the abdominal aorta and branching vessels  PELVIS REPRODUCTIVE ORGANS: Prostate is enlarged  URINARY BLADDER:  Unremarkable  ABDOMINAL WALL/INGUINAL REGIONS: Small bilateral fat-containing inguinal hernias are noted  Postsurgical changes from prior periumbilical hernia repair are suggested   OSSEOUS STRUCTURES:  No "acute fracture or destructive osseous lesion  Degenerative changes are noted within the spine  Impression: 1  No CT evidence of metastatic disease within the abdomen or pelvis  2  Hepatic steatosis  3  Cholelithiasis  Workstation performed: [id number]     XR chest pa & lateral    Result Date: 6/16/2023  Narrative: CHEST INDICATION:   C62 12: Malignant neoplasm of descended left testis  COMPARISON: CXR 6/13/2022 and chest CT 6/24/2021  Abdomen CT 6/14/2023  EXAM PERFORMED/VIEWS:  XR CHEST PA & LATERAL  DUAL ENERGY SUBTRACTION  FINDINGS: Cardiomediastinal silhouette normal  Lungs clear  No effusion or pneumothorax  Benign extrapleural fat along the lateral chest walls  Upper abdomen normal  Bones normal for age  Impression: No acute cardiopulmonary disease  Workstation performed: [id number]       Labs:   Lab Results   Component Value Date    WBC 11 07 (H) 06/17/2023    HGB 12 0 06/17/2023    HCT 37 6 06/17/2023    MCV 93 06/17/2023     06/17/2023     Lab Results   Component Value Date    K 4 2 06/17/2023     06/17/2023    CO2 29 06/17/2023    BUN 25 06/17/2023    CREATININE 1 60 (H) 06/17/2023    GLUF 128 (H) 06/17/2023    CALCIUM 9 1 06/17/2023    CORRECTEDCA 9 6 06/17/2023    AST 14 06/17/2023    ALT 25 06/17/2023    ALKPHOS 73 06/17/2023    EGFR 44 06/17/2023       Lab Results   Component Value Date    IRON 69 01/31/2022    TIBC 345 01/31/2022    FERRITIN 146 01/31/2022       No results found for: \"[id number]\"      ROS: Review of Systems   Constitutional: Negative  Negative for appetite change, chills, diaphoresis, fatigue, fever and unexpected weight change  HENT:   Negative for hearing loss, lump/mass, mouth sores, nosebleeds, sore throat, trouble swallowing and voice change  Eyes: Negative  Negative for eye problems and icterus  Respiratory: Positive for shortness of breath  Negative for chest tightness, cough and hemoptysis      Cardiovascular: Negative for chest pain and leg " swelling  Gastrointestinal: Negative for abdominal distention, abdominal pain, blood in stool, constipation, diarrhea and nausea  Endocrine: Negative  Genitourinary: Negative for dysuria, frequency, hematuria and pelvic pain  Musculoskeletal: Negative  Negative for arthralgias, back pain, flank pain, gait problem, myalgias and neck stiffness  Skin: Negative for itching and rash  Neurological: Negative for dizziness, gait problem, headaches, light-headedness, numbness and speech difficulty  Hematological: Negative for adenopathy  Does not bruise/bleed easily  Psychiatric/Behavioral: Negative for confusion, decreased concentration, depression and sleep disturbance  The patient is not nervous/anxious  Current Medications: Reviewed  Allergies: Reviewed  PMH/FH/SH:  Reviewed      Physical Exam:    Body surface area is 2 36 meters squared  Wt Readings from Last 3 Encounters:   06/21/23 124 kg (274 lb)   06/06/23 125 kg (274 lb 12 8 oz)   05/10/23 124 kg (273 lb 12 8 oz)        Temp Readings from Last 3 Encounters:   06/21/23 98 1 °F (36 7 °C)   06/06/23 98 4 °F (36 9 °C) (Temporal)   05/10/23 (!) 102 7 °F (39 3 °C) (Tympanic)        BP Readings from Last 3 Encounters:   06/21/23 140/78   06/06/23 140/60   05/10/23 142/68         Pulse Readings from Last 3 Encounters:   06/21/23 76   06/06/23 85   05/10/23 96        Physical Exam  Vitals reviewed  Constitutional:       General: He is not in acute distress  Appearance: He is well-developed  He is obese  He is not diaphoretic  HENT:      Head: Normocephalic and atraumatic  Eyes:      Conjunctiva/sclera: Conjunctivae normal    Neck:      Trachea: No tracheal deviation  Cardiovascular:      Rate and Rhythm: Normal rate and regular rhythm  Heart sounds: Murmur heard  No friction rub  No gallop  Pulmonary:      Effort: Pulmonary effort is normal  No respiratory distress  Breath sounds: Normal breath sounds   No wheezing or rales  Chest:      Chest wall: No tenderness  Abdominal:      General: There is no distension  Palpations: Abdomen is soft  Tenderness: There is no abdominal tenderness  Musculoskeletal:      Cervical back: Normal range of motion and neck supple  Right lower leg: Edema present  Left lower leg: Edema present  Lymphadenopathy:      Cervical: No cervical adenopathy  Skin:     General: Skin is warm and dry  Coloration: Skin is not pale  Findings: No erythema  Neurological:      Mental Status: He is alert and oriented to person, place, and time  Psychiatric:         Behavior: Behavior normal          Thought Content: Thought content normal          Judgment: Judgment normal          ECO    Goals and Barriers:  Current Goal: Minimize effects of disease  Barriers: None  Patient's Capacity to Self Care:  Patient is able to self care      Code Status: [unfilled]

## 2023-06-29 ENCOUNTER — OFFICE VISIT (OUTPATIENT)
Dept: URGENT CARE | Facility: CLINIC | Age: 63
End: 2023-06-29
Payer: COMMERCIAL

## 2023-06-29 ENCOUNTER — TELEPHONE (OUTPATIENT)
Dept: FAMILY MEDICINE CLINIC | Facility: CLINIC | Age: 63
End: 2023-06-29

## 2023-06-29 ENCOUNTER — HOSPITAL ENCOUNTER (EMERGENCY)
Facility: HOSPITAL | Age: 63
Discharge: HOME | End: 2023-06-29
Attending: EMERGENCY MEDICINE | Admitting: EMERGENCY MEDICINE
Payer: COMMERCIAL

## 2023-06-29 ENCOUNTER — APPOINTMENT (EMERGENCY)
Dept: NON INVASIVE DIAGNOSTICS | Facility: HOSPITAL | Age: 63
End: 2023-06-29
Payer: COMMERCIAL

## 2023-06-29 VITALS
SYSTOLIC BLOOD PRESSURE: 155 MMHG | DIASTOLIC BLOOD PRESSURE: 74 MMHG | TEMPERATURE: 98.3 F | RESPIRATION RATE: 12 BRPM | OXYGEN SATURATION: 95 % | HEART RATE: 65 BPM

## 2023-06-29 VITALS
WEIGHT: 273 LBS | BODY MASS INDEX: 40.43 KG/M2 | TEMPERATURE: 98.3 F | RESPIRATION RATE: 16 BRPM | SYSTOLIC BLOOD PRESSURE: 130 MMHG | OXYGEN SATURATION: 96 % | DIASTOLIC BLOOD PRESSURE: 60 MMHG | HEART RATE: 72 BPM | HEIGHT: 69 IN

## 2023-06-29 DIAGNOSIS — M79.10 MYALGIA: ICD-10-CM

## 2023-06-29 DIAGNOSIS — R94.31 PROLONGED Q-T INTERVAL ON ECG: ICD-10-CM

## 2023-06-29 DIAGNOSIS — M79.603 ARM PAIN: Primary | ICD-10-CM

## 2023-06-29 DIAGNOSIS — M79.602 LEFT ARM PAIN: Primary | ICD-10-CM

## 2023-06-29 LAB
2HR DELTA HS TROPONIN: -1 NG/L
ANION GAP SERPL CALCULATED.3IONS-SCNC: 9 MMOL/L
ATRIAL RATE: 60 BPM
ATRIAL RATE: 60 BPM
BASOPHILS # BLD AUTO: 0.06 THOUSANDS/ÂΜL (ref 0–0.1)
BASOPHILS NFR BLD AUTO: 1 % (ref 0–1)
BNP SERPL-MCNC: 49 PG/ML (ref 0–100)
BUN SERPL-MCNC: 22 MG/DL (ref 5–25)
CALCIUM SERPL-MCNC: 9 MG/DL (ref 8.4–10.2)
CARDIAC TROPONIN I PNL SERPL HS: 7 NG/L
CARDIAC TROPONIN I PNL SERPL HS: 8 NG/L
CHLORIDE SERPL-SCNC: 106 MMOL/L (ref 96–108)
CO2 SERPL-SCNC: 24 MMOL/L (ref 21–32)
CREAT SERPL-MCNC: 1.49 MG/DL (ref 0.6–1.3)
EOSINOPHIL # BLD AUTO: 0.2 THOUSAND/ÂΜL (ref 0–0.61)
EOSINOPHIL NFR BLD AUTO: 2 % (ref 0–6)
ERYTHROCYTE [DISTWIDTH] IN BLOOD BY AUTOMATED COUNT: 13 % (ref 11.6–15.1)
GFR SERPL CREATININE-BSD FRML MDRD: 48 ML/MIN/1.73SQ M
GLUCOSE SERPL-MCNC: 116 MG/DL (ref 65–140)
HCT VFR BLD AUTO: 37.2 % (ref 36.5–49.3)
HGB BLD-MCNC: 12.4 G/DL (ref 12–17)
IMM GRANULOCYTES # BLD AUTO: 0.06 THOUSAND/UL (ref 0–0.2)
IMM GRANULOCYTES NFR BLD AUTO: 1 % (ref 0–2)
LYMPHOCYTES # BLD AUTO: 3.62 THOUSANDS/ÂΜL (ref 0.6–4.47)
LYMPHOCYTES NFR BLD AUTO: 34 % (ref 14–44)
MCH RBC QN AUTO: 30.5 PG (ref 26.8–34.3)
MCHC RBC AUTO-ENTMCNC: 33.3 G/DL (ref 31.4–37.4)
MCV RBC AUTO: 91 FL (ref 82–98)
MONOCYTES # BLD AUTO: 0.7 THOUSAND/ÂΜL (ref 0.17–1.22)
MONOCYTES NFR BLD AUTO: 7 % (ref 4–12)
NEUTROPHILS # BLD AUTO: 5.91 THOUSANDS/ÂΜL (ref 1.85–7.62)
NEUTS SEG NFR BLD AUTO: 55 % (ref 43–75)
NRBC BLD AUTO-RTO: 0 /100 WBCS
P AXIS: 41 DEGREES
P AXIS: 41 DEGREES
PLATELET # BLD AUTO: 229 THOUSANDS/UL (ref 149–390)
PMV BLD AUTO: 9 FL (ref 8.9–12.7)
POTASSIUM SERPL-SCNC: 4.1 MMOL/L (ref 3.5–5.3)
PR INTERVAL: 186 MS
PR INTERVAL: 186 MS
QRS AXIS: 14 DEGREES
QRS AXIS: 14 DEGREES
QRSD INTERVAL: 88 MS
QRSD INTERVAL: 88 MS
QT INTERVAL: 476 MS
QT INTERVAL: 476 MS
QTC INTERVAL: 476 MS
QTC INTERVAL: 476 MS
RBC # BLD AUTO: 4.07 MILLION/UL (ref 3.88–5.62)
SODIUM SERPL-SCNC: 139 MMOL/L (ref 135–147)
T WAVE AXIS: -50 DEGREES
T WAVE AXIS: -50 DEGREES
VENTRICULAR RATE: 60 BPM
VENTRICULAR RATE: 60 BPM
WBC # BLD AUTO: 10.55 THOUSAND/UL (ref 4.31–10.16)

## 2023-06-29 PROCEDURE — 83880 ASSAY OF NATRIURETIC PEPTIDE: CPT | Performed by: EMERGENCY MEDICINE

## 2023-06-29 PROCEDURE — 93005 ELECTROCARDIOGRAM TRACING: CPT | Performed by: PHYSICIAN ASSISTANT

## 2023-06-29 PROCEDURE — 85025 COMPLETE CBC W/AUTO DIFF WBC: CPT | Performed by: EMERGENCY MEDICINE

## 2023-06-29 PROCEDURE — 36415 COLL VENOUS BLD VENIPUNCTURE: CPT | Performed by: EMERGENCY MEDICINE

## 2023-06-29 PROCEDURE — 99213 OFFICE O/P EST LOW 20 MIN: CPT | Performed by: PHYSICIAN ASSISTANT

## 2023-06-29 PROCEDURE — 93010 ELECTROCARDIOGRAM REPORT: CPT | Performed by: STUDENT IN AN ORGANIZED HEALTH CARE EDUCATION/TRAINING PROGRAM

## 2023-06-29 PROCEDURE — 80048 BASIC METABOLIC PNL TOTAL CA: CPT | Performed by: EMERGENCY MEDICINE

## 2023-06-29 PROCEDURE — 93971 EXTREMITY STUDY: CPT

## 2023-06-29 PROCEDURE — 84484 ASSAY OF TROPONIN QUANT: CPT | Performed by: EMERGENCY MEDICINE

## 2023-06-29 PROCEDURE — 93005 ELECTROCARDIOGRAM TRACING: CPT

## 2023-06-29 RX ORDER — ACETAMINOPHEN 325 MG/1
650 TABLET ORAL EVERY 6 HOURS PRN
Status: DISCONTINUED | OUTPATIENT
Start: 2023-06-29 | End: 2023-06-29 | Stop reason: HOSPADM

## 2023-06-29 RX ORDER — OXYCODONE HYDROCHLORIDE AND ACETAMINOPHEN 5; 325 MG/1; MG/1
1 TABLET ORAL EVERY 8 HOURS PRN
Qty: 12 TABLET | Refills: 0 | Status: SHIPPED | OUTPATIENT
Start: 2023-06-29 | End: 2023-07-03

## 2023-06-29 RX ORDER — OXYCODONE HYDROCHLORIDE AND ACETAMINOPHEN 5; 325 MG/1; MG/1
1 TABLET ORAL ONCE
Status: COMPLETED | OUTPATIENT
Start: 2023-06-29 | End: 2023-06-29

## 2023-06-29 RX ORDER — SODIUM CHLORIDE 9 MG/ML
3 INJECTION INTRAVENOUS
Status: DISCONTINUED | OUTPATIENT
Start: 2023-06-29 | End: 2023-06-29 | Stop reason: HOSPADM

## 2023-06-29 RX ADMIN — ACETAMINOPHEN 650 MG: 325 TABLET ORAL at 12:48

## 2023-06-29 RX ADMIN — OXYCODONE HYDROCHLORIDE AND ACETAMINOPHEN 1 TABLET: 5; 325 TABLET ORAL at 15:24

## 2023-06-29 NOTE — TELEPHONE ENCOUNTER
Pt called to cancel his appt  He went to urgent care and is now being sent to the ER  Left side arm pain

## 2023-06-29 NOTE — PROGRESS NOTES
[address] [place]        NAME: Rajesh Cotto is a 58 y o  male  : 1960    MRN: [id number]  DATE: 2023  TIME: 10:05 AM    Assessment and Plan   Left arm pain [M79 [id number]]  1  Left arm pain  DISCONTINUED: Diclofenac Sodium (VOLTAREN) 1 %    CANCELED: Ambulatory Referral to Physical Therapy      2  Prolonged Q-T interval on ECG          Prolonged QT not noted on prior EKGs  Denies known hx of this  Does not follow with cardiology  Daughter will drive to ED    Patient Instructions   Go to ED  Follow up with PCP in 3-5 days  Proceed to  ER if symptoms worsen  Chief Complaint     Chief Complaint   Patient presents with   • Arm Pain     Left arm pain started last 2 days ago and got worse last night and today  Sometimes it shoots down into wrist  States nothing happened to hurt it  History of Present Illness       Patient is a 50-year-old male with significant past medical history of type 2 diabetes, hypertension, SIMON, CKD stage 3 testicular cancer, and polymyalgia presents the office complaining of left arm pain for 3 days  Pain is located to the shoulder with radiation to the upper arm described as 5 out of 10  Reports occasional radiation into the forearm but no radiation to the hand or digits  Pain is intermittent  States he notices he has pain when being still but also with movements  He reports significant worsening pain when he went to lift the radiator and had a sharp pain in his arm  Denies dizziness, lightheadedness, diaphoresis, chest pain, jaw pain, numbness and tingling, shortness of breath, palpitations, nausea, vomiting, abdominal pain, or rashes  Denies any known injury or trauma  He did not take anything for his pain  Symptoms got worse since yesterday  History of bursitis in same arm with similar symptoms  Review of Systems   Review of Systems   Constitutional: Negative for fatigue and fever  HENT: Negative for congestion and sore throat  Respiratory: Negative for cough, chest tightness, shortness of breath and wheezing  Cardiovascular: Negative for chest pain and palpitations  Gastrointestinal: Negative for abdominal pain, diarrhea, nausea and vomiting  Musculoskeletal: Positive for arthralgias  Negative for neck pain and neck stiffness  Skin: Negative for rash  Neurological: Negative for dizziness, light-headedness and headaches           Current Medications       Current Outpatient Medications:   •  allopurinol (ZYLOPRIM) 100 mg tablet, TAKE TWO TABLETS BY MOUTH DAILY, Disp: 90 tablet, Rfl: 0  •  Cholecalciferol (VITAMIN D3) 2000 units capsule, Take 1 capsule by mouth daily, Disp: , Rfl:   •  escitalopram (LEXAPRO) 10 mg tablet, Take 1 tablet (10 mg total) by mouth daily, Disp: 90 tablet, Rfl: 2  •  losartan (COZAAR) 100 MG tablet, Take 1 tablet (100 mg total) by mouth daily, Disp: 90 tablet, Rfl: 3  •  Magnesium Oxide 400 MG CAPS, Take 1 tablet (400 mg total) by mouth in the morning, Disp: 90 tablet, Rfl: 4  •  metFORMIN (GLUCOPHAGE-XR) 750 mg 24 hr tablet, Take 1 tablet (750 mg total) by mouth daily with breakfast, Disp: 90 tablet, Rfl: 1  •  NIFEdipine (PROCARDIA XL) 60 mg 24 hr tablet, TAKE ONE TABLET BY MOUTH EVERY DAY, Disp: 90 tablet, Rfl: 0  •  pantoprazole (PROTONIX) 40 mg tablet, TAKE ONE TABLET BY MOUTH EVERY DAY BEFORE BREAKFAST, Disp: 90 tablet, Rfl: 0  •  rosuvastatin (CRESTOR) 40 MG tablet, Take 1 tablet (40 mg total) by mouth daily, Disp: 90 tablet, Rfl: 2    Current Allergies     Allergies as of 06/29/2023   • (No Known Allergies)            The following portions of the patient's history were reviewed and updated as appropriate: allergies, current medications, past family history, past medical history, past social history, past surgical history and problem list      Past Medical History:   Diagnosis Date   • BPH without obstruction/lower urinary tract symptoms    • Cancer (HCC)     prostate    • CPAP (continuous "positive airway pressure) dependence    • Elevated PSA    • GERD (gastroesophageal reflux disease)    • Gout    • Hyperlipidemia    • Hypertension    • Kidney stone    • Obese abdomen    • Obesity    • Polymyalgia ([address] )    • Prostate cancer ([facility] [address] )    • Rash     under both arms and in between legs - family doctor aware - pt uses Desitin   • Sleep apnea    • Testicular carcinoma ([facility] [address] )    • Urinary frequency    • Urinary urgency    • Wears glasses        Past Surgical History:   Procedure Laterality Date   • CARPAL TUNNEL RELEASE Bilateral 07/2020   • COLONOSCOPY     • CYSTOSCOPY W/ LASER LITHOTRIPSY  2001   • CYSTOSTOMY W/ STENT INSERTION  2011   • ESOPHAGOGASTRODUODENOSCOPY     • EXTRACORPOREAL SHOCK WAVE LITHOTRIPSY Right 2011   • HERNIA REPAIR     • KNEE ARTHROSCOPY Left    • AZ CYSTO/URETERO W/LITHOTRIPSY &INDWELL STENT INSRT Right 4/29/2018    Procedure: CYSTOSCOPY URETEROSCOPY, RETROGRADE PYELOGRAM AND INSERTION STENT URETERAL;  Surgeon: Christopher Frausto MD;  Location: [facility];  Service: Urology   • AZ ORCHIECTOMY RADICAL TUMOR INGUINAL APPROACH Left 2/19/2021    Procedure: Radical  ORCHIECTOMY;  Surgeon: Sunitha Almaguer MD;  Location: [facility];  Service: Urology       Family History   Problem Relation Age of Onset   • Nephrolithiasis Father    • Alzheimer's disease Mother          Medications have been verified  Objective   /60   Pulse 72   Temp 98 3 °F (36 8 °C)   Resp 16   Ht 5' 9\" (1 753 m)   Wt 124 kg (273 lb)   SpO2 96%   BMI 40 32 kg/m²   No LMP for male patient  Physical Exam     Physical Exam  Vitals and nursing note reviewed  Constitutional:       Appearance: Normal appearance  He is well-developed  Comments: Smiling on exam in NAD  HENT:      Head: Normocephalic and atraumatic        Right Ear: Hearing, tympanic membrane, ear canal and external ear normal       Left Ear: Hearing, tympanic membrane, ear canal and external ear normal       Nose: Nose normal      " Mouth/Throat:      Pharynx: Uvula midline  Eyes:      General: Lids are normal       Conjunctiva/sclera: Conjunctivae normal       Pupils: Pupils are equal, round, and reactive to light  Cardiovascular:      Rate and Rhythm: Normal rate and regular rhythm  Pulses: Normal pulses  Heart sounds: Normal heart sounds  No murmur heard  No friction rub  No gallop  Pulmonary:      Effort: Pulmonary effort is normal       Breath sounds: Normal breath sounds  No wheezing, rhonchi or rales  Abdominal:      General: Bowel sounds are normal       Palpations: Abdomen is soft  Tenderness: There is no abdominal tenderness  Musculoskeletal:         General: Normal range of motion  Left shoulder: No swelling, effusion or bony tenderness  Normal range of motion  Normal strength  Normal pulse  Left upper arm: Normal  No swelling, deformity, tenderness or bony tenderness  Left elbow: Normal       Left forearm: Normal  No swelling, tenderness or bony tenderness  Cervical back: Normal range of motion and neck supple  Thoracic back: Normal       Lumbar back: Normal       Comments: Mild discomfort with resisted movements   Skin:     General: Skin is warm and dry  Capillary Refill: Capillary refill takes less than 2 seconds  Neurological:      General: No focal deficit present  Mental Status: He is alert  Sensory: Sensation is intact  Motor: Motor function is intact  Coordination: Coordination is intact  Gait: Gait is intact  Deep Tendon Reflexes: Reflexes are normal and symmetric  Psychiatric:         Speech: Speech normal          Behavior: Behavior normal          EKG: Normal sinus rhythm  No ST elevation or T wave depression  T wave inversion Lead III, aVF, V3, V4, V4, V6  Prolonged QT wave 476  Compared to EKG form 11/2021

## 2023-06-29 NOTE — ED PROVIDER NOTES
History  Chief Complaint   Patient presents with   • Pain     Sent from care now for c/o left arm pain  Pt reports care now did not like the EKG  Pain onset 2 days ago  Denies SOB or chest pain at moment       70-year-old male presents to the ED for evaluation of left arm pain for the last 3 days  He denies any trauma fall or injury  Patient states that the pain extends from his shoulder to his fingertips and is affected by lifting  He denies any numbness or paresthesia  Patient states yesterday he had to lift something heavy and felt the pain more pronounced at that time          Prior to Admission Medications   Prescriptions Last Dose Informant Patient Reported? Taking?    Cholecalciferol (VITAMIN D3) 2000 units capsule  Self Yes No   Sig: Take 1 capsule by mouth daily   Magnesium Oxide 400 MG CAPS  Self No No   Sig: Take 1 tablet (400 mg total) by mouth in the morning   NIFEdipine (PROCARDIA XL) 60 mg 24 hr tablet  Self No No   Sig: TAKE ONE TABLET BY MOUTH EVERY DAY   allopurinol (ZYLOPRIM) 100 mg tablet  Self No No   Sig: TAKE TWO TABLETS BY MOUTH DAILY   escitalopram (LEXAPRO) 10 mg tablet   No No   Sig: Take 1 tablet (10 mg total) by mouth daily   losartan (COZAAR) 100 MG tablet  Self No No   Sig: Take 1 tablet (100 mg total) by mouth daily   metFORMIN (GLUCOPHAGE-XR) 750 mg 24 hr tablet   No No   Sig: Take 1 tablet (750 mg total) by mouth daily with breakfast   pantoprazole (PROTONIX) 40 mg tablet  Self No No   Sig: TAKE ONE TABLET BY MOUTH EVERY DAY BEFORE BREAKFAST   rosuvastatin (CRESTOR) 40 MG tablet   No No   Sig: Take 1 tablet (40 mg total) by mouth daily      Facility-Administered Medications: None       Past Medical History:   Diagnosis Date   • BPH without obstruction/lower urinary tract symptoms    • Cancer (HCC)     prostate    • CPAP (continuous positive airway pressure) dependence    • Elevated PSA    • GERD (gastroesophageal reflux disease)    • Gout    • Hyperlipidemia    • Hypertension • Kidney stone    • Obese abdomen    • Obesity    • Polymyalgia ([facility] [address] )    • Prostate cancer ([facility] [address] )    • Rash     under both arms and in between legs - family doctor aware - pt uses Desitin   • Sleep apnea    • Testicular carcinoma ([facility] [address] )    • Urinary frequency    • Urinary urgency    • Wears glasses        Past Surgical History:   Procedure Laterality Date   • CARPAL TUNNEL RELEASE Bilateral 07/2020   • COLONOSCOPY     • CYSTOSCOPY W/ LASER LITHOTRIPSY  2001   • CYSTOSTOMY W/ STENT INSERTION  2011   • ESOPHAGOGASTRODUODENOSCOPY     • EXTRACORPOREAL SHOCK WAVE LITHOTRIPSY Right 2011   • HERNIA REPAIR     • KNEE ARTHROSCOPY Left    • IL CYSTO/URETERO W/LITHOTRIPSY &INDWELL STENT INSRT Right 4/29/2018    Procedure: CYSTOSCOPY URETEROSCOPY, RETROGRADE PYELOGRAM AND INSERTION STENT URETERAL;  Surgeon: Zayda White MD;  Location: [facility];  Service: Urology   • IL ORCHIECTOMY RADICAL TUMOR INGUINAL APPROACH Left 2/19/2021    Procedure: Radical  ORCHIECTOMY;  Surgeon: Annabel Brannon MD;  Location: [facility];  Service: Urology       Family History   Problem Relation Age of Onset   • Nephrolithiasis Father    • Alzheimer's disease Mother      I have reviewed and agree with the history as documented  E-Cigarette/Vaping   • E-Cigarette Use Never User      E-Cigarette/Vaping Substances   • Nicotine No    • THC No    • CBD No    • Flavoring No    • Other No    • Unknown No      Social History     Tobacco Use   • Smoking status: Never   • Smokeless tobacco: Never   Vaping Use   • Vaping Use: Never used   Substance Use Topics   • Alcohol use: Not Currently   • Drug use: No       Review of Systems   Constitutional: Negative for chills and fever  HENT: Negative for ear pain and sore throat  Eyes: Negative for pain and visual disturbance  Respiratory: Negative for cough  Cardiovascular: Negative for palpitations  Gastrointestinal: Negative for abdominal pain and vomiting     Genitourinary: Negative for dysuria and hematuria  Musculoskeletal: Positive for myalgias  Negative for arthralgias, back pain and gait problem  Skin: Negative for color change and rash  Neurological: Negative for seizures and syncope  All other systems reviewed and are negative  Physical Exam  Physical Exam  Vitals and nursing note reviewed  Constitutional:       General: He is not in acute distress  Appearance: Normal appearance  He is well-developed and normal weight  He is not ill-appearing or toxic-appearing  HENT:      Head: Normocephalic and atraumatic  Right Ear: External ear normal       Left Ear: External ear normal       Nose: Nose normal  No congestion or rhinorrhea  Mouth/Throat:      Mouth: Mucous membranes are moist    Eyes:      Extraocular Movements: Extraocular movements intact  Conjunctiva/sclera: Conjunctivae normal    Cardiovascular:      Rate and Rhythm: Normal rate and regular rhythm  Pulses: Normal pulses  Heart sounds: Normal heart sounds  No murmur heard  Pulmonary:      Effort: Pulmonary effort is normal  No respiratory distress  Breath sounds: Normal breath sounds  Abdominal:      General: Abdomen is flat  There is no distension  Palpations: Abdomen is soft  There is no mass  Tenderness: There is no abdominal tenderness  Musculoskeletal:         General: Tenderness present  No swelling, deformity or signs of injury  Normal range of motion  Cervical back: Normal range of motion and neck supple  Right lower leg: No edema  Left lower leg: No edema  Skin:     General: Skin is warm and dry  Capillary Refill: Capillary refill takes less than 2 seconds  Neurological:      General: No focal deficit present  Mental Status: He is alert and oriented to person, place, and time  Mental status is at baseline  Cranial Nerves: No cranial nerve deficit  Sensory: No sensory deficit  Motor: No weakness        Coordination: Coordination normal       Gait: Gait normal       Deep Tendon Reflexes: Reflexes normal    Psychiatric:         Mood and Affect: Mood normal          Vital Signs  ED Triage Vitals   Temperature Pulse Respirations Blood Pressure SpO2   06/29/23 1051 06/29/23 1051 06/29/23 1051 06/29/23 1051 06/29/23 1051   98 3 °F (36 8 °C) 64 16 157/72 97 %      Temp src Heart Rate Source Patient Position - Orthostatic VS BP Location FiO2 (%)   -- 06/29/23 1130 06/29/23 1051 06/29/23 1051 --    Monitor Lying Right arm       Pain Score       06/29/23 1051       8           Vitals:    06/29/23 1500 06/29/23 1524 06/29/23 1525 06/29/23 1527   BP: 134/80 124/59 151/71 (!) 181/87   Pulse: 68 64 72 74   Patient Position - Orthostatic VS: Lying Lying - Orthostatic VS Sitting - Orthostatic VS Standing - Orthostatic VS         Visual Acuity      ED Medications  Medications   sodium chloride (PF) 0 9 % injection 3 mL (has no administration in time range)   acetaminophen (TYLENOL) tablet 650 mg (650 mg Oral Given 6/29/23 1248)   oxyCODONE-acetaminophen (PERCOCET) 5-325 mg per tablet 1 tablet (1 tablet Oral Given 6/29/23 1524)       Diagnostic Studies  Results Reviewed     Procedure Component Value Units Date/Time    HS Troponin I 2hr [[id number]]  (Normal) Collected: 06/29/23 1255    Lab Status: Final result Specimen: Blood Updated: 06/29/23 1319     hs TnI 2hr 7 ng/L      Delta 2hr hsTnI -1 ng/L     B-Type Natriuretic Peptide(BNP) [[id number]]  (Normal) Collected: 06/29/23 1102    Lab Status: Final result Specimen: Blood from Arm, Right Updated: 06/29/23 1136     BNP 49 pg/mL     HS Troponin 0hr (reflex protocol) [[id number]]  (Normal) Collected: 06/29/23 1102    Lab Status: Final result Specimen: Blood from Arm, Right Updated: 06/29/23 1128     hs TnI 0hr 8 ng/L     Basic metabolic panel [[id number]]  (Abnormal) Collected: 06/29/23 1102    Lab Status: Final result Specimen: Blood from Arm, Right Updated: 06/29/23 1120     Sodium 139 mmol/L Potassium 4 1 mmol/L      Chloride 106 mmol/L      CO2 24 mmol/L      ANION GAP 9 mmol/L      BUN 22 mg/dL      Creatinine 1 49 mg/dL      Glucose 116 mg/dL      Calcium 9 0 mg/dL      eGFR 48 ml/min/1 73sq m     Narrative:      [place] guidelines for Chronic Kidney Disease (CKD):   •  Stage 1 with normal or high GFR (GFR > 90 mL/min/1 73 square meters)  •  Stage 2 Mild CKD (GFR = 60-89 mL/min/1 73 square meters)  •  Stage 3A Moderate CKD (GFR = 45-59 mL/min/1 73 square meters)  •  Stage 3B Moderate CKD (GFR = 30-44 mL/min/1 73 square meters)  •  Stage 4 Severe CKD (GFR = 15-29 mL/min/1 73 square meters)  •  Stage 5 End Stage CKD (GFR <15 mL/min/1 73 square meters)  Note: GFR calculation is accurate only with a steady state creatinine    CBC and differential [[id number]]  (Abnormal) Collected: 06/29/23 1102    Lab Status: Final result Specimen: Blood from Arm, Right Updated: 06/29/23 1106     WBC 10 55 Thousand/uL      RBC 4 07 Million/uL      Hemoglobin 12 4 g/dL      Hematocrit 37 2 %      MCV 91 fL      MCH 30 5 pg      MCHC 33 3 g/dL      RDW 13 0 %      MPV 9 0 fL      Platelets 113 Thousands/uL      nRBC 0 /100 WBCs      Neutrophils Relative 55 %      Immat GRANS % 1 %      Lymphocytes Relative 34 %      Monocytes Relative 7 %      Eosinophils Relative 2 %      Basophils Relative 1 %      Neutrophils Absolute 5 91 Thousands/µL      Immature Grans Absolute 0 06 Thousand/uL      Lymphocytes Absolute 3 62 Thousands/µL      Monocytes Absolute 0 70 Thousand/µL      Eosinophils Absolute 0 20 Thousand/µL      Basophils Absolute 0 06 Thousands/µL                  VAS upper limb venous duplex scan, unilateral/limited    (Results Pending)              Procedures  ECG 12 Lead Documentation Only    Date/Time: 6/29/2023 10:53 AM    Performed by: Jerry Alejandro DO  Authorized by: Jerry Alejandro DO    Indications / Diagnosis:  Arm pain  ECG reviewed by me, the ED Provider: yes Patient location:  ED  Previous ECG:     Previous ECG:  Compared to current    Comparison ECG info:  11/15/2021    Similarity:  No change    Comparison to cardiac monitor: Yes    Interpretation:     Interpretation: abnormal    Rate:     ECG rate:  70    ECG rate assessment: normal    Rhythm:     Rhythm: sinus rhythm    Ectopy:     Ectopy: none    QRS:     QRS axis:  Normal  Conduction:     Conduction: normal    T waves:     T waves: inverted      Inverted:  II, III, V3, V4, V5 and V6             ED Course  ED Course as of 06/29/23 1611   Thu Jun 29, 2023   1609 Patient had no acute findings on the work-up in the ED to suggest cardiac etiology  He complained about having intermittent dizziness for the last 3 days as per his wife but not currently today  Patient states that is when he gets up he finds that he has to steady himself before ambulating  Orthostatic vital signs were done and patient vital signs appear to be normal   He did have headaches during the week absent now but states that his headache were not the worst he is ever felt in his entire life  He is AAOx3 and neurologically intact  Based on the work-up today does not appear that his arm pain is related to anything cardiac  He will be discharged with pain medications and to follow-up outpatient with his PCP/orthopedist                                              Medical Decision Making  DDx: DVT, strain, sprain, spasm    Amount and/or Complexity of Data Reviewed  Labs: ordered  Risk  OTC drugs  Prescription drug management            Disposition  Final diagnoses:   Myalgia   Arm pain     Time reflects when diagnosis was documented in both MDM as applicable and the Disposition within this note     Time User Action Codes Description Comment    6/29/2023  4:05 PM Erkindrae Victoriano Add [L33 11] Myalgia     6/29/2023  4:05 PM Erkindrae Victoriano Add [M79 603] Arm pain     6/29/2023  4:06 PM Erkindrae Victoriano Modify [M79 10] Myalgia     6/29/2023  4:06 PM Tunde Sheridan Modify [B76 916] Arm pain       ED Disposition     ED Disposition   Discharge    Condition   Stable    Date/Time   Thu Jun 29, 2023  4:00 PM    Comment   Mary Smith discharge to home/self care  Follow-up Information     Follow up With Specialties Details Why [address], [place] Internal Medicine In 3 days  [address] [address] [phone]            Patient's Medications   Discharge Prescriptions    OXYCODONE-ACETAMINOPHEN (PERCOCET) 5-325 MG PER TABLET    Take 1 tablet by mouth every 8 (eight) hours as needed for moderate pain for up to 4 days Max Daily Amount: 3 tablets       Start Date: 6/29/2023 End Date: 7/3/2023       Order Dose: 1 tablet       Quantity: 12 tablet    Refills: 0       No discharge procedures on file      PDMP Review       Value Time User    PDMP Reviewed  Yes 2/19/2021  9:50 AM Chani Levi MD          ED Provider  Electronically Signed by           Jeffry Valladares DO  06/29/23 4065

## 2023-06-30 ENCOUNTER — VBI (OUTPATIENT)
Dept: ADMINISTRATIVE | Facility: OTHER | Age: 63
End: 2023-06-30

## 2023-06-30 LAB
ATRIAL RATE: 62 BPM
ATRIAL RATE: 70 BPM
P AXIS: 47 DEGREES
P AXIS: 57 DEGREES
PR INTERVAL: 176 MS
PR INTERVAL: 188 MS
QRS AXIS: 19 DEGREES
QRS AXIS: 28 DEGREES
QRSD INTERVAL: 88 MS
QRSD INTERVAL: 90 MS
QT INTERVAL: 428 MS
QT INTERVAL: 468 MS
QTC INTERVAL: 462 MS
QTC INTERVAL: 475 MS
T WAVE AXIS: -30 DEGREES
T WAVE AXIS: -61 DEGREES
VENTRICULAR RATE: 62 BPM
VENTRICULAR RATE: 70 BPM

## 2023-06-30 NOTE — TELEPHONE ENCOUNTER
ED Visit Information     Ed visit date: 6/29/23  Diagnosis Description: pain  In Network? Yes. [facility]  Discharge status: Home  Discharged with meds ? Yes  Number of ED visits to date: 1  ED Severity:n/a     Outreach Information    Outreach successful: No 3  Date letter mailed: saqib Carroll  Date Finalized: 7/5/23    Care Coordination    Follow up appointment with pcp: yes pt does have f/u on 7/12  Transportation issues ?  NA    Value Base Outreach    Emergent necessity warranted by diagnosis:  Yes  [facility] PCP:  Yes  Transportation:  Friend/Family Transport  Called PCP first?:  No  Feels able to call PCP for urgent problems ?:  Yes  Understands what emergencies can be handled by PCP ?:  Yes  Ever any problems getting appointment with PCP for minor emergency/urgency problems?:  No  Practice Contacted Patient ?:  No  Pt had ED follow up with pcp/staff ?:  No    Seen for follow-up out of network ?:  No  Urgent care Education?:  Yes

## 2023-07-07 ENCOUNTER — RA CDI HCC (OUTPATIENT)
Dept: OTHER | Facility: HOSPITAL | Age: 63
End: 2023-07-07

## 2023-07-07 NOTE — PROGRESS NOTES
[address] [facility] coding opportunities          Chart Reviewed number of suggestions sent to Provider: 1   E66.01    Patients Insurance        Commercial Insurance: [employer]

## 2023-07-12 ENCOUNTER — OFFICE VISIT (OUTPATIENT)
Dept: FAMILY MEDICINE CLINIC | Facility: CLINIC | Age: 63
End: 2023-07-12
Payer: COMMERCIAL

## 2023-07-12 VITALS
DIASTOLIC BLOOD PRESSURE: 78 MMHG | SYSTOLIC BLOOD PRESSURE: 134 MMHG | HEART RATE: 75 BPM | OXYGEN SATURATION: 97 % | BODY MASS INDEX: 39.9 KG/M2 | WEIGHT: 270.2 LBS

## 2023-07-12 DIAGNOSIS — E11.9 TYPE 2 DIABETES MELLITUS WITHOUT COMPLICATION, WITHOUT LONG-TERM CURRENT USE OF INSULIN (HCC): Primary | ICD-10-CM

## 2023-07-12 DIAGNOSIS — G89.29 CHRONIC LEFT SHOULDER PAIN: ICD-10-CM

## 2023-07-12 DIAGNOSIS — M25.512 CHRONIC LEFT SHOULDER PAIN: ICD-10-CM

## 2023-07-12 PROCEDURE — 20610 DRAIN/INJ JOINT/BURSA W/O US: CPT | Performed by: INTERNAL MEDICINE

## 2023-07-12 PROCEDURE — 99214 OFFICE O/P EST MOD 30 MIN: CPT | Performed by: INTERNAL MEDICINE

## 2023-07-12 NOTE — ASSESSMENT & PLAN NOTE
Overall controlled. He is currently on metformin. Tolerates well.   Lab Results   Component Value Date    HGBA1C 7.0 (H) 05/31/2023

## 2023-07-12 NOTE — PROGRESS NOTES
Assessment/Plan:    Type 2 diabetes mellitus without complication, without long-term current use of insulin (HCC)  Overall controlled. He is currently on metformin. Tolerates well. Lab Results   Component Value Date    HGBA1C 7.0 (H) 05/31/2023       Chronic left shoulder pain  We will proceed with intra-articular injection. Diagnoses and all orders for this visit:    Type 2 diabetes mellitus without complication, without long-term current use of insulin (HCC)  -     Cancel: IRIS Diabetic eye exam    Chronic left shoulder pain    Other orders  -     Large joint arthrocentesis            Large joint arthrocentesis: L glenohumeral  Universal Protocol:  Consent: Verbal consent obtained. Risks and benefits: risks, benefits and alternatives were discussed  Consent given by: patient  Time out: Immediately prior to procedure a "time out" was called to verify the correct patient, procedure, equipment, support staff and site/side marked as required. Timeout called at: 7/12/2023 9:45 AM.  Patient understanding: patient states understanding of the procedure being performed  Patient identity confirmed: verbally with patient    Procedure Details  Location: shoulder - L glenohumeral  Needle size: 20 G  Ultrasound guidance: no  Approach: posterior    Patient tolerance: patient tolerated the procedure well with no immediate complications  Dressing:  Sterile dressing applied    40 mg of triamcinolone mixed with 5 cc of 2% lidocaine injected into the left glenohumeral joint. Area was prepped twice with Betadine and alcohol, sterile technique was used. Patient tolerated procedure well. Complications of the procedure discussed. If any worsening of the pain, redness of the joint, chills or fevers he will update me immediately. Subjective:      Patient ID: Rosita Paris is a 58 y.o. male.     Patient came today with complaints of chronic left shoulder pain that started few weeks ago, reports the pain is constant worse with activity. He was emergency room recently, his EKG was negative. Doppler of the left upper extremity did not reveal any clots. The following portions of the patient's history were reviewed and updated as appropriate: allergies, current medications, past family history, past medical history, past social history, past surgical history, and problem list.    Review of Systems   Constitutional: Negative for chills and fever. Respiratory: Negative for cough, shortness of breath and wheezing. Cardiovascular: Negative for chest pain, palpitations and leg swelling. Musculoskeletal: Positive for arthralgias. Psychiatric/Behavioral: Negative for confusion.          Objective:      /78 (BP Location: Right arm, Patient Position: Sitting, Cuff Size: Large)   Pulse 75   Wt 123 kg (270 lb 3.2 oz)   SpO2 97%   BMI 39.90 kg/m²     No Known Allergies       Current Outpatient Medications:   •  allopurinol (ZYLOPRIM) 100 mg tablet, TAKE TWO TABLETS BY MOUTH DAILY, Disp: 90 tablet, Rfl: 0  •  Cholecalciferol (VITAMIN D3) 2000 units capsule, Take 1 capsule by mouth daily, Disp: , Rfl:   •  escitalopram (LEXAPRO) 10 mg tablet, Take 1 tablet (10 mg total) by mouth daily, Disp: 90 tablet, Rfl: 2  •  losartan (COZAAR) 100 MG tablet, Take 1 tablet (100 mg total) by mouth daily, Disp: 90 tablet, Rfl: 3  •  Magnesium Oxide 400 MG CAPS, Take 1 tablet (400 mg total) by mouth in the morning, Disp: 90 tablet, Rfl: 4  •  metFORMIN (GLUCOPHAGE-XR) 750 mg 24 hr tablet, Take 1 tablet (750 mg total) by mouth daily with breakfast, Disp: 90 tablet, Rfl: 1  •  NIFEdipine (PROCARDIA XL) 60 mg 24 hr tablet, TAKE ONE TABLET BY MOUTH EVERY DAY, Disp: 90 tablet, Rfl: 0  •  pantoprazole (PROTONIX) 40 mg tablet, TAKE ONE TABLET BY MOUTH EVERY DAY BEFORE BREAKFAST, Disp: 90 tablet, Rfl: 0  •  rosuvastatin (CRESTOR) 40 MG tablet, Take 1 tablet (40 mg total) by mouth daily, Disp: 90 tablet, Rfl: 2     There are no Patient Instructions on file for this visit. Physical Exam  Constitutional:       General: He is not in acute distress. Appearance: He is not ill-appearing or toxic-appearing. Cardiovascular:      Rate and Rhythm: Normal rate. Heart sounds: No murmur heard. No gallop. Pulmonary:      Effort: No respiratory distress. Breath sounds: No wheezing or rales. Neurological:      Mental Status: He is alert.

## 2023-07-17 ENCOUNTER — OFFICE VISIT (OUTPATIENT)
Dept: FAMILY MEDICINE CLINIC | Facility: CLINIC | Age: 63
End: 2023-07-17
Payer: COMMERCIAL

## 2023-07-17 ENCOUNTER — APPOINTMENT (OUTPATIENT)
Dept: RADIOLOGY | Facility: MEDICAL CENTER | Age: 63
End: 2023-07-17
Payer: COMMERCIAL

## 2023-07-17 VITALS
HEART RATE: 75 BPM | BODY MASS INDEX: 40.49 KG/M2 | TEMPERATURE: 97.2 F | HEIGHT: 69 IN | WEIGHT: 273.4 LBS | DIASTOLIC BLOOD PRESSURE: 80 MMHG | SYSTOLIC BLOOD PRESSURE: 190 MMHG | OXYGEN SATURATION: 97 % | RESPIRATION RATE: 12 BRPM

## 2023-07-17 DIAGNOSIS — M54.12 CERVICAL RADICULOPATHY: ICD-10-CM

## 2023-07-17 DIAGNOSIS — I10 ESSENTIAL HYPERTENSION: ICD-10-CM

## 2023-07-17 DIAGNOSIS — M54.12 CERVICAL RADICULOPATHY: Primary | ICD-10-CM

## 2023-07-17 PROCEDURE — 72050 X-RAY EXAM NECK SPINE 4/5VWS: CPT

## 2023-07-17 PROCEDURE — 99214 OFFICE O/P EST MOD 30 MIN: CPT | Performed by: INTERNAL MEDICINE

## 2023-07-17 RX ORDER — TRAMADOL HYDROCHLORIDE 50 MG/1
50 TABLET ORAL EVERY 8 HOURS PRN
Qty: 45 TABLET | Refills: 0 | Status: SHIPPED | OUTPATIENT
Start: 2023-07-17

## 2023-07-17 NOTE — ASSESSMENT & PLAN NOTE
Blood pressure is uncontrolled, he is completely asymptomatic. We will continue the same therapy as for now. Might be also reaction due to recent steroid injection. He will continue to monitor at home, update in 2 days.

## 2023-07-17 NOTE — PATIENT INSTRUCTIONS
Please do not drive or operate any machinery after taking tramadol. You can start to take it at night if no sedation you can also use it sparingly during the day. Avoid any NSAIDs    Check blood pressures at home twice a day update me with the numbers in 2 to 3 days. Please do x-ray of the cervical spine and start physical therapy.

## 2023-07-17 NOTE — ASSESSMENT & PLAN NOTE
Clinical presentation consistent with cervical radiculopathy. We will do x-ray of the cervical spine to exclude any gross changes. Referred to physical therapy. Reports pain 7 out of 10 intensity. Due to elevated blood pressures cannot use any prednisone or NSAIDs. We will start him on tramadol 50 mg daily every 8 hours as needed, side effects discussed extensively.

## 2023-07-17 NOTE — PROGRESS NOTES
Assessment/Plan:    Essential hypertension  Blood pressure is uncontrolled, he is completely asymptomatic. We will continue the same therapy as for now. Might be also reaction due to recent steroid injection. He will continue to monitor at home, update in 2 days. Cervical radiculopathy  Clinical presentation consistent with cervical radiculopathy. We will do x-ray of the cervical spine to exclude any gross changes. Referred to physical therapy. Reports pain 7 out of 10 intensity. Due to elevated blood pressures cannot use any prednisone or NSAIDs. We will start him on tramadol 50 mg daily every 8 hours as needed, side effects discussed extensively. Diagnoses and all orders for this visit:    Cervical radiculopathy  -     XR spine cervical 2 or 3 vw injury; Future  -     Ambulatory Referral to Physical Therapy; Future  -     traMADol (Ultram) 50 mg tablet; Take 1 tablet (50 mg total) by mouth every 8 (eight) hours as needed for moderate pain    Essential hypertension          Subjective:      Patient ID: Fredrick Nicole is a 58 y.o. male. Patient came today for follow-up on his chronic problems and with ongoing cervical pain radiating down to his left upper extremity. His blood pressures were also uncontrolled today. The following portions of the patient's history were reviewed and updated as appropriate: allergies, current medications, past family history, past medical history, past social history, past surgical history, and problem list.    Review of Systems   Constitutional: Negative for chills and fever. Respiratory: Negative for shortness of breath and wheezing. Cardiovascular: Negative for chest pain and palpitations. Musculoskeletal: Positive for arthralgias (Improved), neck pain and neck stiffness. Psychiatric/Behavioral: Negative for confusion.          Objective:      BP (!) 190/80 (BP Location: Right arm, Patient Position: Sitting, Cuff Size: Large)   Pulse 75   Temp Refill request  Venlafaxine (Effexor) 37.5mg tablet  #30  0 refills  Last refill 1/4/22    LOV 1/4/22  NOV NONE    Not Per Protocol  OK To Refill?     Keagan Barros ) 97.2 °F (36.2 °C) (Temporal)   Resp 12   Ht 5' 9" (1.753 m)   Wt 124 kg (273 lb 6.4 oz)   SpO2 97%   BMI 40.37 kg/m²     No Known Allergies       Current Outpatient Medications:   •  allopurinol (ZYLOPRIM) 100 mg tablet, TAKE TWO TABLETS BY MOUTH DAILY, Disp: 90 tablet, Rfl: 0  •  Cholecalciferol (VITAMIN D3) 2000 units capsule, Take 1 capsule by mouth daily, Disp: , Rfl:   •  escitalopram (LEXAPRO) 10 mg tablet, Take 1 tablet (10 mg total) by mouth daily, Disp: 90 tablet, Rfl: 2  •  losartan (COZAAR) 100 MG tablet, Take 1 tablet (100 mg total) by mouth daily, Disp: 90 tablet, Rfl: 3  •  Magnesium Oxide 400 MG CAPS, Take 1 tablet (400 mg total) by mouth in the morning, Disp: 90 tablet, Rfl: 4  •  metFORMIN (GLUCOPHAGE-XR) 750 mg 24 hr tablet, Take 1 tablet (750 mg total) by mouth daily with breakfast, Disp: 90 tablet, Rfl: 1  •  NIFEdipine (PROCARDIA XL) 60 mg 24 hr tablet, TAKE ONE TABLET BY MOUTH EVERY DAY, Disp: 90 tablet, Rfl: 0  •  pantoprazole (PROTONIX) 40 mg tablet, TAKE ONE TABLET BY MOUTH EVERY DAY BEFORE BREAKFAST, Disp: 90 tablet, Rfl: 0  •  rosuvastatin (CRESTOR) 40 MG tablet, Take 1 tablet (40 mg total) by mouth daily, Disp: 90 tablet, Rfl: 2  •  traMADol (Ultram) 50 mg tablet, Take 1 tablet (50 mg total) by mouth every 8 (eight) hours as needed for moderate pain, Disp: 45 tablet, Rfl: 0     Patient Instructions   Please do not drive or operate any machinery after taking tramadol. You can start to take it at night if no sedation you can also use it sparingly during the day. Avoid any NSAIDs    Check blood pressures at home twice a day update me with the numbers in 2 to 3 days. Please do x-ray of the cervical spine and start physical therapy. Physical Exam  Constitutional:       General: He is not in acute distress. Appearance: He is not ill-appearing or toxic-appearing. Cardiovascular:      Rate and Rhythm: Normal rate.    Musculoskeletal:         General: Tenderness present. Cervical back: Tenderness (Positive Spurling's test) present. Neurological:      Mental Status: He is alert.

## 2023-07-17 NOTE — LETTER
July 17, 2023     Patient: David Bar  YOB: 1960  Date of Visit: 7/17/2023      To Whom it May Concern:    Barbara De Los Santos is under my professional care. Korey Garica was seen in my office on 7/17/2023. Korey Garcia will be excused from work starting from 7/17/2023 until 7/28/2023. If you have any questions or concerns, please don't hesitate to call.          Sincerely,          Marivel Peña MD        CC: No Recipients

## 2023-07-24 ENCOUNTER — EVALUATION (OUTPATIENT)
Dept: PHYSICAL THERAPY | Facility: CLINIC | Age: 63
End: 2023-07-24
Payer: COMMERCIAL

## 2023-07-24 DIAGNOSIS — M54.12 CERVICAL RADICULOPATHY: Primary | ICD-10-CM

## 2023-07-24 PROCEDURE — 97012 MECHANICAL TRACTION THERAPY: CPT

## 2023-07-24 PROCEDURE — 97161 PT EVAL LOW COMPLEX 20 MIN: CPT

## 2023-07-24 NOTE — PROGRESS NOTES
PT Evaluation     Today's date: 2023  Patient name: Crow Oconnell  : 1960  MRN: [id number]  Referring provider: Fawad Jenkins  Dx:   Encounter Diagnosis     ICD-10-CM    1. Cervical radiculopathy  M54.12 Ambulatory Referral to Physical Therapy                     Assessment  Assessment details: Crow Oconnell is a 58 y.o. male presents with L cervical radiculopathy, with flexion and traction mechanical bias. Crow Oconnell has the above listed impairments and will benefit from skilled PT to improve deficits to return to prior level of function. Crow Oconnell was educated on eval findings and plan for management, centralization/peripheralization of sx, effects of traction. HEP initiated. Smith Mcgarry would benefit from skilled services to improve ROM,  flexibility, strength, and function, and to decrease pain. LUE pain improved to end tx following mechanical traction.     Impairments: abnormal or restricted ROM, activity intolerance, lacks appropriate home exercise program, pain with function and poor posture   Understanding of Dx/Px/POC: good   Prognosis: good    Goals  2 wks  - No pain > 3/10  - Increase strength 1/3 grade  - Increase cervical ROM at least 10 deg where applicable  - Increase lifting, carrying, driving tolerance at least 50%    4-6 wks  - Pain 0/10  - Strength 5/5  - Cervical ROM WFL and painfree  - Independent with HEP for self management  - Functional Status Measure at least 72 (score 59 at eval)  - Return to baseline tolerance for lifting, carrying, driving      Plan  Patient would benefit from: skilled physical therapy  Planned modality interventions: thermotherapy: hydrocollator packs and traction  Planned therapy interventions: joint mobilization, manual therapy, neuromuscular re-education, patient education, postural training, strengthening, stretching, flexibility, home exercise program, therapeutic activities and therapeutic exercise  Frequency: 2x week  Duration in visits: 8  Duration in weeks: 4  Treatment plan discussed with: patient        Subjective Evaluation    History of Present Illness  Mechanism of injury: Pt reporting 3 weeks ago, woke one morning with LUE pain. Went to ER, received "all kinds of tests, and EKG and to rule out a stroke, they sent me home and it still hurt."  Went to PCP, received prednisone injection L shoulder which helped pain. 1 week later pt returned to PCP due to continued pain. PCP then did exam, "he twisted my neck and held my shoulder and the pain shot down" and "he said I probably had a pinched nerve and sent me to PT."  Chief complaint LUE pain radiation from L upper trap area to dorsal aspect L hand. Functional limitations: difficulty lifting, carrying, driving. Pt denies having CROOK or dizziness. 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 Treatments  Current treatment: medication        Objective    Posture: forward head and shoulders, increased thoracic kyphosis and lumbar lordosis. OH reach:  R: 140 deg  L: 132 deg, L posterior shoulder discomfort. Cervical ROM: flexion 37 deg, no effect (NE). Repeated flexion NE. Ext 38 deg, L upper trap/shoulder discomfort. Repeated ext with LUE radiation to forearm, worse. SBR 38 deg, NE.   SBL 33 deg, NE. R rotation 68 deg, L cervical pulling. L rotation 71 deg, NE. Shoulder A/PROM:  R: flexion 153 deg,  deg, ER 84 deg, IR 38 deg  L: flexion 149 deg,  deg, ER 72 deg, IR 35 deg. Neuro: DTR with B C5, C6, C7 2/4. Sensation with BUE intact to light touch C4 to T2 dermatomes. Cervicothoracic myotomes 5/5 B to include mid deltoid, biceps, wrist flexors, wrist extensors, , and interossei. Triceps: R 5/5, L 4/5. Special tests: Sharp-Jaylen, alar ligament, vertebral artery (-). Manual cervical traction pain free.        Precautions: PMH DM, CA, HTN, Cardiac      Manuals 7/24/ 23            Man txn 30/10" 3'            R U/L flexion mob C6-C7             R UPA C6-C7             L trans glide C6-C7             Neuro Re-Ed                                                                                                        Ther Ex             L UT self str 20"x5            L shoulder F,A table slides             L triceps press down                                                                              Ther Activity                                                                              Modalities             MH PRN             [facility] tx[facility] 30/10" 10', 21#

## 2023-07-27 ENCOUNTER — OFFICE VISIT (OUTPATIENT)
Dept: PHYSICAL THERAPY | Facility: CLINIC | Age: 63
End: 2023-07-27
Payer: COMMERCIAL

## 2023-07-27 DIAGNOSIS — M54.12 CERVICAL RADICULOPATHY: Primary | ICD-10-CM

## 2023-07-27 PROCEDURE — 97140 MANUAL THERAPY 1/> REGIONS: CPT

## 2023-07-27 PROCEDURE — 97012 MECHANICAL TRACTION THERAPY: CPT

## 2023-07-27 NOTE — PROGRESS NOTES
Daily Note     Today's date: 2023  Patient name: David Bar  : 1960  MRN: [id number]  Referring provider: Familia Schaeffer  Dx:   Encounter Diagnosis     ICD-10-CM    1. Cervical radiculopathy  M54.12                      Subjective: "Not too bad."   L upper arm pain 5/10. Notes pain improved with mechanical traction last visit. Objective: See treatment diary below      Assessment: Tolerated treatment well. Patient would benefit from continued PT      Plan: Continue per plan of care.       Precautions: PMH DM, CA, HTN, Cardiac      Manuals            Man txn 30/10" 3'            R U/L flexion mob C6-C7  G3           R UPA C6-C7  G3           PA T1-T3  G3 LUE sx           L trans glide C6-C7  G3           Neuro Re-Ed                                                                                                        Ther Ex             L UT self str 20"x5 x5 2 bouts           L shoulder F,A table slides  nv           L triceps press down  nv                                                                            Ther Activity                                                                              Modalities             MH PRN  10'           Mech txn 30/10" 10', 21# 10' 18#

## 2023-07-31 ENCOUNTER — OFFICE VISIT (OUTPATIENT)
Dept: PHYSICAL THERAPY | Facility: CLINIC | Age: 63
End: 2023-07-31
Payer: COMMERCIAL

## 2023-07-31 DIAGNOSIS — M54.12 CERVICAL RADICULOPATHY: Primary | ICD-10-CM

## 2023-07-31 PROCEDURE — 97012 MECHANICAL TRACTION THERAPY: CPT

## 2023-07-31 PROCEDURE — 97110 THERAPEUTIC EXERCISES: CPT

## 2023-07-31 PROCEDURE — 97140 MANUAL THERAPY 1/> REGIONS: CPT

## 2023-07-31 NOTE — PROGRESS NOTES
Daily Note     Today's date: 2023  Patient name: Aniket Casey  : 1960  MRN: [id number]  Referring provider: Nickie Mckeon  Dx:   Encounter Diagnosis     ICD-10-CM    1. Cervical radiculopathy  M54.12                      Subjective: "About the same."  L forearm pain 4/10. Objective: See treatment diary below      Assessment: Tolerated treatment well. Patient would benefit from continued PT      Plan: Continue per plan of care.       Precautions: PMH DM, CA, HTN, Cardiac      Manuals           Man txn 30/10" 3'            R U/L flexion mob C6-C7  G3 G3          R UPA C6-C7  G3 G3          PA T1-T3  G3 LUE sx           L trans glide C6-C7  G3 G3          Neuro Re-Ed                                                                                                        Ther Ex             L UT self str 20"x5 x5 2 bouts x5          L shoulder F,A table slides  nv 10" x15          L triceps press down  nv blk 2x10                                                                           Ther Activity                                                                              Modalities             MH PRN  10'           Mech txn 30/10" 10', 21# 10' 18# 10' 26#

## 2023-08-02 ENCOUNTER — OFFICE VISIT (OUTPATIENT)
Dept: PHYSICAL THERAPY | Facility: CLINIC | Age: 63
End: 2023-08-02
Payer: COMMERCIAL

## 2023-08-02 DIAGNOSIS — M54.12 CERVICAL RADICULOPATHY: Primary | ICD-10-CM

## 2023-08-02 PROCEDURE — 97012 MECHANICAL TRACTION THERAPY: CPT

## 2023-08-02 PROCEDURE — 97110 THERAPEUTIC EXERCISES: CPT

## 2023-08-02 PROCEDURE — 97140 MANUAL THERAPY 1/> REGIONS: CPT

## 2023-08-02 NOTE — PROGRESS NOTES
Daily Note     Today's date: 2023  Patient name: Werner Barron  : 1960  MRN: [id number]  Referring provider: Alireza Clay  Dx:   Encounter Diagnosis     ICD-10-CM    1. Cervical radiculopathy  M54.12                      Subjective: RE: pain, "A little bit" rated 4/10 L elbow/forearm/upper arm. RE: noticing improvement with PT, "yes and no, I don't know."  Pt reports "the doctor's office called yesterday, they read the x-rays. I don't know what they said, the nurse talked so fast."  "I start to feel good and then I over-do it, I do it to myself."    "It felt really good yesterday morning so I washed my truck. Well, that did it" with increased LUE pain to follow. Objective: See treatment diary below. See chart for x-ray results, "age-appropriate degenerative changes". Urged pt to hold on repetitive LUE activities like manually washing truck to allow for pain improvement, PT to take effect. Assessment: Tolerated treatment well. Patient would benefit from continued PT      Plan: Continue per plan of care.       Precautions: PMH DM, CA, HTN, Cardiac      Manuals  8/         Man txn 30/10" 3'            R U/L flexion mob C6-C7  G3 G3 G3         R UPA C6-C7  G3 G3 G3         PA T1-T3  G3 LUE sx           L trans glide C6-C7  G3 G3 G3         Neuro Re-Ed                                                                                                        Ther Ex             L UT self str 20"x5 x5 2 bouts x5 x5         L shoulder F,A table slides  nv 10" x15 ea x20         L triceps press down  nv blk 2x10 3x10         Core row    blk 2x10                                                             Ther Activity             Wall slide F    nv                                                             Modalities             MH PRN  10'  10'         Mech txn 30/10" 10', 21# 10' 18# 10' 26# 10'

## 2023-08-05 DIAGNOSIS — K21.9 GASTROESOPHAGEAL REFLUX DISEASE, UNSPECIFIED WHETHER ESOPHAGITIS PRESENT: ICD-10-CM

## 2023-08-05 RX ORDER — PANTOPRAZOLE SODIUM 40 MG/1
40 TABLET, DELAYED RELEASE ORAL
Qty: 90 TABLET | Refills: 0 | Status: SHIPPED | OUTPATIENT
Start: 2023-08-05

## 2023-08-07 ENCOUNTER — OFFICE VISIT (OUTPATIENT)
Dept: PHYSICAL THERAPY | Facility: CLINIC | Age: 63
End: 2023-08-07
Payer: COMMERCIAL

## 2023-08-07 ENCOUNTER — HOSPITAL ENCOUNTER (OUTPATIENT)
Dept: NON INVASIVE DIAGNOSTICS | Facility: HOSPITAL | Age: 63
Discharge: HOME | End: 2023-08-07
Attending: INTERNAL MEDICINE
Payer: COMMERCIAL

## 2023-08-07 VITALS
HEART RATE: 75 BPM | BODY MASS INDEX: 40.43 KG/M2 | HEIGHT: 69 IN | WEIGHT: 273 LBS | DIASTOLIC BLOOD PRESSURE: 80 MMHG | SYSTOLIC BLOOD PRESSURE: 190 MMHG

## 2023-08-07 DIAGNOSIS — M54.12 CERVICAL RADICULOPATHY: Primary | ICD-10-CM

## 2023-08-07 DIAGNOSIS — R01.1 HEART MURMUR: ICD-10-CM

## 2023-08-07 PROCEDURE — 97012 MECHANICAL TRACTION THERAPY: CPT

## 2023-08-07 PROCEDURE — 97140 MANUAL THERAPY 1/> REGIONS: CPT

## 2023-08-07 PROCEDURE — 97110 THERAPEUTIC EXERCISES: CPT

## 2023-08-07 PROCEDURE — 93306 TTE W/DOPPLER COMPLETE: CPT | Performed by: INTERNAL MEDICINE

## 2023-08-07 PROCEDURE — 93306 TTE W/DOPPLER COMPLETE: CPT

## 2023-08-07 NOTE — PROGRESS NOTES
Daily Note     Today's date: 2023  Patient name: Sameer Camejo  : 1960  MRN: [id number]  Referring provider: Julien Gay  Dx:   Encounter Diagnosis     ICD-10-CM    1. Cervical radiculopathy  M54.12                      Subjective: "Not too bad."  Pain 2/10 L forearm. Pt notes pain improving, "it doesn't hurt like it did when I started. I can sleep better at night. I had to lie on my left side this morning for medical testing and it didn't hurt that bad."      Objective: See treatment diary below      Assessment: Tolerated treatment well. Patient would benefit from continued PT. Pain improving. Plan: Continue per plan of care.       Precautions: PMH DM, CA, HTN, Cardiac      Manuals         Man txn 30/10" 3'            R U/L flexion mob C6-C7  G3 G3 G3 G3        R UPA C6-C7  G3 G3 G3 G3        PA T1-T3  G3 LUE sx           L trans glide C6-C7  G3 G3 G3 G3        Neuro Re-Ed                                                                                                        Ther Ex             L UT self str 20"x5 x5 2 bouts x5 x5 x5        L shoulder F,A table slides  nv 10" x15 ea x20 x25        L triceps press down  nv blk 2x10 3x10 3x10        Core row    blk 2x10 3x10                                                            Ther Activity             Wall slide F    nv                                                             Modalities             MH PRN  10'  10' 10'        Mech txn 30/10" 10', 21# 10' 18# 10' 26# 10' 10' 26#

## 2023-08-08 LAB
AORTIC ROOT: 2.9 CM
AORTIC VALVE MEAN VELOCITY: 14.2 M/S
APICAL FOUR CHAMBER EJECTION FRACTION: 64 %
ASCENDING AORTA: 3.6 CM
AV AREA BY CONTINUOUS VTI: 3.2 CM2
AV AREA PEAK VELOCITY: 2.8 CM2
AV LVOT MEAN GRADIENT: 7 MMHG
AV LVOT PEAK GRADIENT: 11 MMHG
AV MEAN GRADIENT: 9 MMHG
AV PEAK GRADIENT: 16 MMHG
AV VALVE AREA: 3.17 CM2
AV VELOCITY RATIO: 0.81
DOP CALC AO PEAK VEL: 2.02 M/S
DOP CALC AO VTI: 44.79 CM
DOP CALC LVOT AREA: 3.46 CM2
DOP CALC LVOT CARDIAC INDEX: 3.93 L/MIN/M2
DOP CALC LVOT CARDIAC OUTPUT: 9.27 L/MIN
DOP CALC LVOT DIAMETER: 2.1 CM
DOP CALC LVOT PEAK VEL VTI: 41.05 CM
DOP CALC LVOT PEAK VEL: 1.63 M/S
DOP CALC LVOT STROKE INDEX: 62.3 ML/M2
DOP CALC LVOT STROKE VOLUME: 142.11
E WAVE DECELERATION TIME: 271 MS
FRACTIONAL SHORTENING: 36 (ref 28–44)
INTERVENTRICULAR SEPTUM IN DIASTOLE (PARASTERNAL SHORT AXIS VIEW): 1.3 CM
INTERVENTRICULAR SEPTUM: 1.3 CM (ref 0.6–1.1)
LAAS-AP2: 26.6 CM2
LAAS-AP4: 29.5 CM2
LEFT ATRIUM SIZE: 4.5 CM
LEFT ATRIUM VOLUME (MOD BIPLANE): 101 ML
LEFT INTERNAL DIMENSION IN SYSTOLE: 2.8 CM (ref 2.1–4)
LEFT VENTRICULAR INTERNAL DIMENSION IN DIASTOLE: 4.4 CM (ref 3.5–6)
LEFT VENTRICULAR POSTERIOR WALL IN END DIASTOLE: 1.2 CM
LEFT VENTRICULAR STROKE VOLUME: 57 ML
LVSV (TEICH): 57 ML
MV E'TISSUE VEL-SEP: 9 CM/S
MV PEAK A VEL: 0.83 M/S
MV PEAK E VEL: 72 CM/S
MV STENOSIS PRESSURE HALF TIME: 79 MS
MV VALVE AREA P 1/2 METHOD: 2.78
RIGHT ATRIUM AREA SYSTOLE A4C: 19.4 CM2
RIGHT VENTRICLE ID DIMENSION: 3.8 CM
SL CV LEFT ATRIUM LENGTH A2C: 6.3 CM
SL CV LV EF: 75
SL CV PED ECHO LEFT VENTRICLE DIASTOLIC VOLUME (MOD BIPLANE) 2D: 88 ML
SL CV PED ECHO LEFT VENTRICLE SYSTOLIC VOLUME (MOD BIPLANE) 2D: 30 ML
TR MAX PG: 32 MMHG
TR PEAK VELOCITY: 2.8 M/S
TRICUSPID ANNULAR PLANE SYSTOLIC EXCURSION: 2.3 CM
TRICUSPID VALVE PEAK REGURGITATION VELOCITY: 2.84 M/S

## 2023-08-09 ENCOUNTER — OFFICE VISIT (OUTPATIENT)
Dept: PHYSICAL THERAPY | Facility: CLINIC | Age: 63
End: 2023-08-09
Payer: COMMERCIAL

## 2023-08-09 DIAGNOSIS — M54.12 CERVICAL RADICULOPATHY: Primary | ICD-10-CM

## 2023-08-09 PROCEDURE — 97140 MANUAL THERAPY 1/> REGIONS: CPT

## 2023-08-09 PROCEDURE — 97110 THERAPEUTIC EXERCISES: CPT

## 2023-08-09 PROCEDURE — 97012 MECHANICAL TRACTION THERAPY: CPT

## 2023-08-09 NOTE — PROGRESS NOTES
Daily Note     Today's date: 2023  Patient name: Masha Mccarthy  : 1960  MRN: [id number]  Referring provider: Yolanda Delarosa  Dx:   Encounter Diagnosis     ICD-10-CM    1. Cervical radiculopathy  M54.12                      Subjective: Pain 2/10 L forearm, "a little bit, not bad."  Reports pain improving. Objective: See treatment diary below      Assessment: Tolerated treatment well. Patient exhibited good technique with therapeutic exercises and would benefit from continued PT      Plan: Continue per plan of care.       Precautions: PMH DM, CA, HTN, Cardiac      Manuals        Man txn 30/10" 3'            R U/L flexion mob C6-C7  G3 G3 G3 G3 G3       R UPA C6-C7  G3 G3 G3 G3 G3       PA T1-T3  G3 LUE sx           L trans glide C6-C7  G3 G3 G3 G3 G3       Neuro Re-Ed                                                                                                        Ther Ex             L UT self str 20"x5 x5 2 bouts x5 x5 x5 x5       L shoulder F,A table slides  nv 10" x15 ea x20 x25 x30       L triceps press down  nv blk 2x10 3x10 3x10 3x10       Core row    blk 2x10 3x10 3x10                                                           Ther Activity             Wall slide F    nv                                                             Modalities             MH PRN  10'  10' 10' 10'       Mech txn 30/10" 10', 21# 10' 18# 10' 26# 10' 10' 26# 10' 29#

## 2023-08-14 ENCOUNTER — OFFICE VISIT (OUTPATIENT)
Dept: PHYSICAL THERAPY | Facility: CLINIC | Age: 63
End: 2023-08-14
Payer: COMMERCIAL

## 2023-08-14 DIAGNOSIS — M54.12 CERVICAL RADICULOPATHY: Primary | ICD-10-CM

## 2023-08-14 PROCEDURE — 97110 THERAPEUTIC EXERCISES: CPT

## 2023-08-14 PROCEDURE — 97012 MECHANICAL TRACTION THERAPY: CPT

## 2023-08-14 NOTE — PROGRESS NOTES
Daily Note     Today's date: 2023  Patient name: Levert Hodgkin  : 1960  MRN: [id number]  Referring provider: Jan Humphreys  Dx:   Encounter Diagnosis     ICD-10-CM    1. Cervical radiculopathy  M54.12                      Subjective: "Better."  LUE pain 0/10. Objective: See treatment diary below      Assessment: Tolerated treatment well. Patient exhibited good technique with therapeutic exercises. Currently pain free. Plan: Progress note during next visit. Potential discharge next visit.      Precautions: PMH DM, CA, HTN, Cardiac      Manuals       Man txn 30/10" 3'            R U/L flexion mob C6-C7  G3 G3 G3 G3 G3 G3      R UPA C6-C7  G3 G3 G3 G3 G3 G3      PA T1-T3  G3 LUE sx           L trans glide C6-C7  G3 G3 G3 G3 G3 G3      Neuro Re-Ed                                                                                                        Ther Ex             L UT self str 20"x5 x5 2 bouts x5 x5 x5 x5 x5      L shoulder F,A table slides  nv 10" x15 ea x20 x25 x30 x30      L triceps press down  nv blk 2x10 3x10 3x10 3x10 3x10      Core row    blk 2x10 3x10 3x10 3x10 silver                                                          Ther Activity             Wall slide F    nv                                                             Modalities             MH PRN  10'  10' 10' 10' 10'      Mech txn 30/10" 10', 21# 10' 18# 10' 26# 10' 10' 26# 10' 29# 10'

## 2023-08-16 ENCOUNTER — EVALUATION (OUTPATIENT)
Dept: PHYSICAL THERAPY | Facility: CLINIC | Age: 63
End: 2023-08-16
Payer: COMMERCIAL

## 2023-08-16 DIAGNOSIS — M54.12 CERVICAL RADICULOPATHY: Primary | ICD-10-CM

## 2023-08-16 PROCEDURE — 97012 MECHANICAL TRACTION THERAPY: CPT

## 2023-08-16 PROCEDURE — 97164 PT RE-EVAL EST PLAN CARE: CPT

## 2023-08-16 PROCEDURE — 97140 MANUAL THERAPY 1/> REGIONS: CPT

## 2023-08-16 PROCEDURE — 97110 THERAPEUTIC EXERCISES: CPT

## 2023-08-16 NOTE — PROGRESS NOTES
PT DISCHARGE    Today's date: 2023  Patient name: Xander Cannon  : 1960  MRN: [id number]  Referring provider: Cindy Funez  Dx:   Encounter Diagnosis     ICD-10-CM    1. Cervical radiculopathy  M54.12           Start Time: 0752          Subjective: "I feel good, I haven't had any pain in it since last Friday (23)."  Pt agreeable to D/C to HEP. No issues with lifting, carrying, driving. Objective: See treatment diary below    RE-EVALUATION:    Pain: 0/10 (3-8/10 L upper trap, LUE to hand at 23 eval)    FOTO functional score 78, projected score 72. Score at eval 59. Higher score = higher function. OH reach L: 151 deg, pain free. (132 deg, L shoulder discomfort at eval)    L shoulder A/PROM:  Flexion 162 deg (149 deg at eval).  deg (142 deg at eval). ER 90 deg (72 deg at eval). IR 57 deg (35 deg at eval)    Cervical ROM: flexion 43 deg (37 deg at eval). Ext 40 deg (38 deg with L UT/shoulder discomfort at eval). SBR 38 deg (same as eval). SBL 40 deg (33 deg at eval). R rotation 68 deg (68 deg with L cervical pulling at eval). L rotation 67 deg (71 deg at eval). All ROM pain free today. MMT: L triceps 5/5 (4/5 at eval)      Assessment: Tolerated treatment well. Patient exhibited good technique with therapeutic exercises     Xander Cannon has been compliant with attending PT and home exercise program since initial eval.  Mitchell Rivera  has made improvements in objective data since initial evalulation and has achieved all goals. Patient reports having returned to their prior level of function. It was mutually agreed to Discharge to home exercise program at this time. Mitchell Rivera attended 8 visits, missed 0.     Goals - all met  2 wks  - No pain > 3/10  - Increase strength 1/3 grade  - Increase cervical ROM at least 10 deg where applicable  - Increase lifting, carrying, driving tolerance at least 50%    4-6 wks  - Pain 0/10  - Strength 5/5  - [facility]  - Independent with HEP for self management  - Functional Status Measure at least 72 (score 59 at eval)  - Return to baseline tolerance for lifting, carrying, driving      Plan: D/C to HEP for self management.      Precautions: PMH DM, CA, HTN, Cardiac      Manuals 7/24/ 23 7/27 7/31 8/2 8/7 8/9 8/14 8/16     Man txn 30/10" 3'            R U/L flexion mob C6-C7  G3 G3 G3 G3 G3 G3 G3     R UPA C6-C7  G3 G3 G3 G3 G3 G3 G3     PA T1-T3  G3 LUE sx           L trans glide C6-C7  G3 G3 G3 G3 G3 G3 G3     Neuro Re-Ed                                                                                                        Ther Ex             L UT self str 20"x5 x5 2 bouts x5 x5 x5 x5 x5 x5     L shoulder F,A table slides  nv 10" x15 ea x20 x25 x30 x30 x30     L triceps press down  nv blk 2x10 3x10 3x10 3x10 3x10 3x10     Core row    blk 2x10 3x10 3x10 3x10 silver 3x10                                                         Ther Activity             Wall slide F    nv                                                             Modalities             MH PRN  10'  10' 10' 10' 10'      [facility] txn 30/10" 10', 21# 10' 18# 10' 26# 10' 10' 26# 10' 29# 10'  28# 10' 27#

## 2023-09-01 ENCOUNTER — APPOINTMENT (OUTPATIENT)
Dept: LAB | Facility: CLINIC | Age: 63
End: 2023-09-01
Payer: COMMERCIAL

## 2023-09-01 DIAGNOSIS — Z12.5 SCREENING FOR PROSTATE CANCER: ICD-10-CM

## 2023-09-01 DIAGNOSIS — E11.9 TYPE 2 DIABETES MELLITUS WITHOUT COMPLICATION, WITHOUT LONG-TERM CURRENT USE OF INSULIN (HCC): ICD-10-CM

## 2023-09-01 DIAGNOSIS — E78.2 MIXED HYPERLIPIDEMIA: ICD-10-CM

## 2023-09-01 LAB
ALBUMIN SERPL BCP-MCNC: 3.9 G/DL (ref 3.5–5)
ALP SERPL-CCNC: 60 U/L (ref 34–104)
ALT SERPL W P-5'-P-CCNC: 18 U/L (ref 7–52)
ANION GAP SERPL CALCULATED.3IONS-SCNC: 10 MMOL/L
AST SERPL W P-5'-P-CCNC: 18 U/L (ref 13–39)
BILIRUB SERPL-MCNC: 0.99 MG/DL (ref 0.2–1)
BUN SERPL-MCNC: 21 MG/DL (ref 5–25)
CALCIUM SERPL-MCNC: 9.2 MG/DL (ref 8.4–10.2)
CHLORIDE SERPL-SCNC: 105 MMOL/L (ref 96–108)
CHOLEST SERPL-MCNC: 143 MG/DL
CO2 SERPL-SCNC: 28 MMOL/L (ref 21–32)
CREAT SERPL-MCNC: 1.52 MG/DL (ref 0.6–1.3)
GFR SERPL CREATININE-BSD FRML MDRD: 47 ML/MIN/1.73SQ M
GLUCOSE P FAST SERPL-MCNC: 129 MG/DL (ref 65–99)
HDLC SERPL-MCNC: 41 MG/DL
LDLC SERPL CALC-MCNC: 71 MG/DL (ref 0–100)
NONHDLC SERPL-MCNC: 102 MG/DL
POTASSIUM SERPL-SCNC: 4.5 MMOL/L (ref 3.5–5.3)
PROT SERPL-MCNC: 6.8 G/DL (ref 6.4–8.4)
PSA SERPL-MCNC: 5.72 NG/ML (ref 0–4)
SODIUM SERPL-SCNC: 143 MMOL/L (ref 135–147)
TRIGL SERPL-MCNC: 153 MG/DL

## 2023-09-01 PROCEDURE — 36415 COLL VENOUS BLD VENIPUNCTURE: CPT

## 2023-09-01 PROCEDURE — 80053 COMPREHEN METABOLIC PANEL: CPT

## 2023-09-01 PROCEDURE — 83036 HEMOGLOBIN GLYCOSYLATED A1C: CPT

## 2023-09-01 PROCEDURE — G0103 PSA SCREENING: HCPCS

## 2023-09-01 PROCEDURE — 80061 LIPID PANEL: CPT

## 2023-09-05 LAB
EST. AVERAGE GLUCOSE BLD GHB EST-MCNC: 151 MG/DL
HBA1C MFR BLD: 6.9 %

## 2023-09-06 ENCOUNTER — OFFICE VISIT (OUTPATIENT)
Dept: FAMILY MEDICINE CLINIC | Facility: CLINIC | Age: 63
End: 2023-09-06
Payer: COMMERCIAL

## 2023-09-06 VITALS
HEIGHT: 69 IN | OXYGEN SATURATION: 98 % | WEIGHT: 274.4 LBS | SYSTOLIC BLOOD PRESSURE: 160 MMHG | HEART RATE: 63 BPM | RESPIRATION RATE: 12 BRPM | DIASTOLIC BLOOD PRESSURE: 68 MMHG | BODY MASS INDEX: 40.64 KG/M2

## 2023-09-06 DIAGNOSIS — E78.5 HYPERLIPIDEMIA, UNSPECIFIED HYPERLIPIDEMIA TYPE: ICD-10-CM

## 2023-09-06 DIAGNOSIS — E11.9 TYPE 2 DIABETES MELLITUS WITHOUT COMPLICATION, WITHOUT LONG-TERM CURRENT USE OF INSULIN (HCC): Primary | ICD-10-CM

## 2023-09-06 DIAGNOSIS — I10 ESSENTIAL HYPERTENSION: ICD-10-CM

## 2023-09-06 PROBLEM — I16.0 HYPERTENSIVE URGENCY: Status: RESOLVED | Noted: 2022-06-13 | Resolved: 2023-09-06

## 2023-09-06 LAB
CREAT UR-MCNC: 200.5 MG/DL
MICROALBUMIN UR-MCNC: 7.9 MG/L
MICROALBUMIN/CREAT 24H UR: 4 MG/G CREATININE (ref 0–30)

## 2023-09-06 PROCEDURE — 82043 UR ALBUMIN QUANTITATIVE: CPT | Performed by: INTERNAL MEDICINE

## 2023-09-06 PROCEDURE — 82570 ASSAY OF URINE CREATININE: CPT | Performed by: INTERNAL MEDICINE

## 2023-09-06 PROCEDURE — 99214 OFFICE O/P EST MOD 30 MIN: CPT | Performed by: INTERNAL MEDICINE

## 2023-09-06 RX ORDER — NIFEDIPINE 90 MG/1
90 TABLET, FILM COATED, EXTENDED RELEASE ORAL DAILY
Qty: 90 TABLET | Refills: 0 | Status: SHIPPED | OUTPATIENT
Start: 2023-09-06 | End: 2024-08-31

## 2023-09-06 NOTE — ASSESSMENT & PLAN NOTE
Select not very well controlled right now. Increase nifedipine up to 90 mg daily in the morning. Continue losartan 100 mg daily at night. He will follow-up with nephrology in few weeks to reassess his blood pressure readings.

## 2023-09-06 NOTE — PATIENT INSTRUCTIONS
Please increase your nifedipine up to 90 mg daily in the morning, continue losartan at night. Before your appointment with kidney doctor ask your daughter to check your blood pressure twice a day and write down your numbers, please bring this numbers to the kidney doctor. When you will see me in January also do the same thing for me.

## 2023-09-06 NOTE — PROGRESS NOTES
Assessment/Plan:    Type 2 diabetes mellitus without complication, without long-term current use of insulin (HCC)  Hemoglobin A1c is within acceptable range. We will continue the same therapy with metformin. Continue low-carb diet. Lab Results   Component Value Date    HGBA1C 6.9 (H) 09/01/2023       Essential hypertension  Select not very well controlled right now. Increase nifedipine up to 90 mg daily in the morning. Continue losartan 100 mg daily at night. He will follow-up with nephrology in few weeks to reassess his blood pressure readings. Hyperlipidemia  LDL within the great range, continue current dose of Crestor. Diagnoses and all orders for this visit:    Type 2 diabetes mellitus without complication, without long-term current use of insulin ([address])  -     Cancel: Albumin / creatinine urine ratio; Future  -     Albumin / creatinine urine ratio; Future  -     Albumin / creatinine urine ratio    Essential hypertension  -     NIFEdipine (ADALAT CC) 90 mg 24 hr tablet; Take 1 tablet (90 mg total) by mouth daily    Hyperlipidemia, unspecified hyperlipidemia type          Subjective:      Patient ID: Xuan Mayer is a 58 y.o. male. Patient came today for follow-up on his chronic problems including hypertension, type 2 diabetes, hyperlipidemia. The following portions of the patient's history were reviewed and updated as appropriate: allergies, current medications, past family history, past medical history, past social history, past surgical history, and problem list.    Review of Systems   Constitutional: Negative for chills and fever. Respiratory: Negative for shortness of breath and wheezing. Cardiovascular: Negative for chest pain and palpitations. Musculoskeletal: Positive for arthralgias (Improved), neck pain and neck stiffness. Psychiatric/Behavioral: Negative for confusion.          Objective:      /68 (BP Location: Left arm, Patient Position: Sitting, Cuff Size: Standard)   Pulse 63   Resp 12   Ht 5' 9" (1.753 m)   Wt 124 kg (274 lb 6.4 oz)   SpO2 98%   BMI 40.52 kg/m²     No Known Allergies       Current Outpatient Medications:   •  allopurinol (ZYLOPRIM) 100 mg tablet, TAKE TWO TABLETS BY MOUTH DAILY, Disp: 90 tablet, Rfl: 0  •  escitalopram (LEXAPRO) 10 mg tablet, Take 1 tablet (10 mg total) by mouth daily, Disp: 90 tablet, Rfl: 2  •  losartan (COZAAR) 100 MG tablet, Take 1 tablet (100 mg total) by mouth daily, Disp: 90 tablet, Rfl: 3  •  Magnesium Oxide 400 MG CAPS, Take 1 tablet (400 mg total) by mouth in the morning, Disp: 90 tablet, Rfl: 4  •  metFORMIN (GLUCOPHAGE-XR) 750 mg 24 hr tablet, Take 1 tablet (750 mg total) by mouth daily with breakfast, Disp: 90 tablet, Rfl: 1  •  NIFEdipine (ADALAT CC) 90 mg 24 hr tablet, Take 1 tablet (90 mg total) by mouth daily, Disp: 90 tablet, Rfl: 0  •  pantoprazole (PROTONIX) 40 mg tablet, Take 1 tablet by mouth daily before breakfast, Disp: 90 tablet, Rfl: 0  •  rosuvastatin (CRESTOR) 40 MG tablet, Take 1 tablet (40 mg total) by mouth daily, Disp: 90 tablet, Rfl: 2  •  Cholecalciferol (VITAMIN D3) 2000 units capsule, Take 1 capsule by mouth daily, Disp: , Rfl:      Patient Instructions   Please increase your nifedipine up to 90 mg daily in the morning, continue losartan at night. Before your appointment with kidney doctor ask your daughter to check your blood pressure twice a day and write down your numbers, please bring this numbers to the kidney doctor. When you will see me in January also do the same thing for me. Physical Exam  Constitutional:       General: He is not in acute distress. Appearance: He is not ill-appearing or toxic-appearing. Cardiovascular:      Rate and Rhythm: Normal rate. Heart sounds: No murmur heard. No gallop. Pulmonary:      Effort: No respiratory distress. Breath sounds: No wheezing or rales. Musculoskeletal:         General: No tenderness.       Cervical back: No tenderness (Positive Spurling's test). Neurological:      Mental Status: He is alert.

## 2023-09-06 NOTE — ASSESSMENT & PLAN NOTE
Hemoglobin A1c is within acceptable range. We will continue the same therapy with metformin. Continue low-carb diet.   Lab Results   Component Value Date    HGBA1C 6.9 (H) 09/01/2023

## 2023-09-06 NOTE — PROGRESS NOTES
Diabetic Foot Exam    Patient's shoes and socks removed. Right Foot/Ankle   Right Foot Inspection  Skin Exam: skin normal and skin intact. No dry skin, no warmth, no callus, no erythema, no maceration, no abnormal color, no pre-ulcer, no ulcer and no callus. Toe Exam: ROM and strength within normal limits. Sensory   Monofilament testing: diminished    Vascular  Capillary refills: < 3 seconds  The right DP pulse is 1+. The right PT pulse is 1+. Left Foot/Ankle  Left Foot Inspection  Skin Exam: skin normal and skin intact. No dry skin, no warmth, no erythema, no maceration, normal color, no pre-ulcer, no ulcer and no callus. Toe Exam: ROM and strength within normal limits. Sensory   Monofilament testing: diminished    Vascular  Capillary refills: < 3 seconds  The left DP pulse is 1+. The left PT pulse is 1+. Assign Risk Category  No deformity present  No loss of protective sensation  No weak pulses  Risk: 0    BMI Counseling: Body mass index is 40.52 kg/m². The BMI is above normal. Nutrition recommendations include reducing portion sizes.

## 2023-09-07 ENCOUNTER — TELEPHONE (OUTPATIENT)
Dept: ADMINISTRATIVE | Facility: OTHER | Age: 63
End: 2023-09-07

## 2023-09-07 NOTE — TELEPHONE ENCOUNTER
Upon review of the In Basket request we have found/obtained the documentation. After careful review of the document we are unable to complete this request for Diabetic Eye Exam because the documentation does not have the proper verbiage (wording) needed to close the requested care gap(s) and the documentation does not have the result(s) needed to close the requested care gap(s). Any additional questions or concerns should be emailed to the Practice Liaisons via the appropriate education email address, please do not reply via In Basket.     Thank you  Nely Barboza MA

## 2023-09-07 NOTE — TELEPHONE ENCOUNTER
----- Message from Judi Delgado sent at 9/6/2023  4:49 PM EDT -----  Regarding: dm eye exam  09/06/23 4:53 PM    Hello, our patient Xuan Mayer has had Diabetic Eye Exam completed/performed.  Please assist in updating the patient chart by making an External outreach to [address] located in [address] [place] [address] [phone], [address]    The date of service is July 2023    Thank you,  Judi [facility]

## 2023-09-07 NOTE — TELEPHONE ENCOUNTER
09/07/23 8:25 AM     VB [employer] SmartForm used to document caregap status.     Court ELAINA Sterling

## 2023-09-18 DIAGNOSIS — M10.9 GOUT, UNSPECIFIED CAUSE, UNSPECIFIED CHRONICITY, UNSPECIFIED SITE: ICD-10-CM

## 2023-09-19 RX ORDER — ALLOPURINOL 100 MG/1
TABLET ORAL
Qty: 90 TABLET | Refills: 0 | Status: SHIPPED | OUTPATIENT
Start: 2023-09-19

## 2023-10-20 ENCOUNTER — APPOINTMENT (OUTPATIENT)
Dept: LAB | Facility: CLINIC | Age: 63
End: 2023-10-20
Payer: MEDICARE

## 2023-10-20 DIAGNOSIS — E78.5 HYPERLIPIDEMIA, UNSPECIFIED HYPERLIPIDEMIA TYPE: ICD-10-CM

## 2023-10-20 DIAGNOSIS — E66.01 MORBID OBESITY WITH BMI OF 40.0-44.9, ADULT (HCC): ICD-10-CM

## 2023-10-20 DIAGNOSIS — N18.31 STAGE 3A CHRONIC KIDNEY DISEASE (HCC): ICD-10-CM

## 2023-10-20 DIAGNOSIS — N18.31 ANEMIA DUE TO STAGE 3A CHRONIC KIDNEY DISEASE: ICD-10-CM

## 2023-10-20 DIAGNOSIS — E55.9 VITAMIN D DEFICIENCY: ICD-10-CM

## 2023-10-20 DIAGNOSIS — N18.32 STAGE 3B CHRONIC KIDNEY DISEASE (HCC): ICD-10-CM

## 2023-10-20 DIAGNOSIS — I10 ESSENTIAL HYPERTENSION: ICD-10-CM

## 2023-10-20 DIAGNOSIS — Z87.442 HISTORY OF NEPHROLITHIASIS: ICD-10-CM

## 2023-10-20 DIAGNOSIS — N20.0 CALCULUS OF KIDNEY: ICD-10-CM

## 2023-10-20 DIAGNOSIS — C62.92 SEMINOMA OF TESTIS, STAGE 3, LEFT (HCC): ICD-10-CM

## 2023-10-20 DIAGNOSIS — E83.42 HYPOMAGNESEMIA: ICD-10-CM

## 2023-10-20 DIAGNOSIS — D63.1 ANEMIA DUE TO STAGE 3A CHRONIC KIDNEY DISEASE: ICD-10-CM

## 2023-10-20 LAB
25(OH)D3 SERPL-MCNC: 33.5 NG/ML (ref 30–100)
ALBUMIN SERPL BCP-MCNC: 4.1 G/DL (ref 3.5–5)
ANION GAP SERPL CALCULATED.3IONS-SCNC: 9 MMOL/L
BUN SERPL-MCNC: 21 MG/DL (ref 5–25)
CALCIUM SERPL-MCNC: 9.7 MG/DL (ref 8.4–10.2)
CHLORIDE SERPL-SCNC: 105 MMOL/L (ref 96–108)
CO2 SERPL-SCNC: 29 MMOL/L (ref 21–32)
CREAT SERPL-MCNC: 1.44 MG/DL (ref 0.6–1.3)
CREAT UR-MCNC: 135.3 MG/DL
GFR SERPL CREATININE-BSD FRML MDRD: 50 ML/MIN/1.73SQ M
GLUCOSE P FAST SERPL-MCNC: 131 MG/DL (ref 65–99)
MAGNESIUM SERPL-MCNC: 1.9 MG/DL (ref 1.9–2.7)
PHOSPHATE SERPL-MCNC: 3.3 MG/DL (ref 2.3–4.1)
POTASSIUM SERPL-SCNC: 4.4 MMOL/L (ref 3.5–5.3)
PROT UR-MCNC: 13 MG/DL
PROT/CREAT UR: 0.1 MG/G{CREAT} (ref 0–0.1)
PTH-INTACT SERPL-MCNC: 78.8 PG/ML (ref 12–88)
SODIUM SERPL-SCNC: 143 MMOL/L (ref 135–147)

## 2023-10-20 PROCEDURE — 82570 ASSAY OF URINE CREATININE: CPT

## 2023-10-20 PROCEDURE — 84156 ASSAY OF PROTEIN URINE: CPT

## 2023-10-20 PROCEDURE — 83970 ASSAY OF PARATHORMONE: CPT

## 2023-10-20 PROCEDURE — 80069 RENAL FUNCTION PANEL: CPT

## 2023-10-20 PROCEDURE — 82306 VITAMIN D 25 HYDROXY: CPT

## 2023-10-20 PROCEDURE — 36415 COLL VENOUS BLD VENIPUNCTURE: CPT

## 2023-10-20 PROCEDURE — 83735 ASSAY OF MAGNESIUM: CPT

## 2023-10-27 ENCOUNTER — OFFICE VISIT (OUTPATIENT)
Dept: NEPHROLOGY | Facility: CLINIC | Age: 63
End: 2023-10-27
Payer: MEDICARE

## 2023-10-27 VITALS
BODY MASS INDEX: 41.32 KG/M2 | DIASTOLIC BLOOD PRESSURE: 60 MMHG | SYSTOLIC BLOOD PRESSURE: 146 MMHG | WEIGHT: 279 LBS | HEIGHT: 69 IN

## 2023-10-27 DIAGNOSIS — E66.01 MORBID OBESITY WITH BMI OF 40.0-44.9, ADULT (HCC): ICD-10-CM

## 2023-10-27 DIAGNOSIS — E11.9 TYPE 2 DIABETES MELLITUS WITHOUT COMPLICATION, WITHOUT LONG-TERM CURRENT USE OF INSULIN (HCC): ICD-10-CM

## 2023-10-27 DIAGNOSIS — C62.92 SEMINOMA OF TESTIS, STAGE 3, LEFT (HCC): ICD-10-CM

## 2023-10-27 DIAGNOSIS — E83.42 HYPOMAGNESEMIA: ICD-10-CM

## 2023-10-27 DIAGNOSIS — N20.0 CALCULUS OF KIDNEY: ICD-10-CM

## 2023-10-27 DIAGNOSIS — N18.32 STAGE 3B CHRONIC KIDNEY DISEASE (HCC): ICD-10-CM

## 2023-10-27 DIAGNOSIS — E55.9 VITAMIN D DEFICIENCY: ICD-10-CM

## 2023-10-27 DIAGNOSIS — N18.31 STAGE 3A CHRONIC KIDNEY DISEASE (HCC): Primary | ICD-10-CM

## 2023-10-27 DIAGNOSIS — E78.5 HYPERLIPIDEMIA, UNSPECIFIED HYPERLIPIDEMIA TYPE: ICD-10-CM

## 2023-10-27 DIAGNOSIS — I10 ESSENTIAL HYPERTENSION: ICD-10-CM

## 2023-10-27 PROCEDURE — 99214 OFFICE O/P EST MOD 30 MIN: CPT | Performed by: INTERNAL MEDICINE

## 2023-10-27 RX ORDER — DOXAZOSIN MESYLATE 1 MG/1
1 TABLET ORAL 2 TIMES DAILY
Qty: 180 TABLET | Refills: 3 | Status: SHIPPED | OUTPATIENT
Start: 2023-10-27

## 2023-10-27 NOTE — PROGRESS NOTES
Nephrology Follow up Consultation  Lili Berrios 58 y.o. male MRN: [id number]            BACKGROUND:  Lili Berrios is a 58 y.o.male who was referred by Elizabeth Mcintosh MD for evaluation of Follow-up and Chronic Kidney Disease  . ASSESSMENT / PLAN:   58 y.o.  male with pmh of multiple co-morbidities including  Hypertension, nephrolithiasis,  Polymyalgia rheumatica, testicular cancer presents to the office for routine follow-up. CKD stage III a/b:  -  after review of records In [facility] as well as Care everywhere patient had a baseline creatinine of 1-1.5mg/dL. Most recent labs show a baseline creatinine around 1.5-1.9 mg/dL after acute kidney injury episode of November 2021. Most recent labs show creatinine of 1.44 mg/dL on 10/20/23. Renal function remains stable at baseline. Blood work in 3 months. -  Likely has underlying CKD secondary to age-related nephron loss plus episode of acute kidney injury non dialysis requiring due to chemotherapy with platinum compounds plus obesity related hyper filtration plus hypertensive nephrosclerosis . -  CT abdomen from 06/24/2021 showing no hydronephrosis. - Acid base and lytes stable. - Clinically the patient appears to be  euvolemic  - Recommend to avoid use of NSAIDs, nephrotoxins. Caution advised with regards to exposure to IV contrast dye.   - Discussed with the patient in depth His renal status, including the possible etiologies for CKD. - Advised the patient that when  is GFR is close to 20mL/min then will start discussing about RRT(renal replacement therapy) options such as renal transplant, peritoneal dialysis and hemodialysis. - Informed the patient about the various options for Renal Replacement therapy.   - Discussed with the patient how we need to work together to delay the progression of CKD with optimal BP control based on their age and co-morbidities, optimal BS control with HbA1c of <7% and trying to reduce proteinuria by the use of anti-proteinuric agents. - referral to CKD education/kidney smart placed on 12/06/2021, completed 12/22/2021    Hypertension:  - Patient is on Procardia XL 90 mg p.o. q.day, losartan 100mg po Q24  -Start Cardura 1 mg p.o. twice daily Call with blood pressure updates in 2 weeks  - Goal BP of < 130/80 based on age and comorbidities  - Instructed to follow low sodium (2gm)diet. Hemoglobin/anemia:  - Goal Hb of 10-12 g/dL  - Most recent labs suggestive of  12.4 grams/deciliter. -  Management per Oncology    CKD-MBD(Mineral Bone Disease) /vitamin-D deficiency:  - Based on patients CKD stage following is the goal of therapy. - Maintain calcium phosphorus product of < 55.  - Stage 3 CKD - Goal Ca 8.5-10 mg/dL , goal Phos 2.7-4.6 mg/dL  , goal iPTH 30-70 pg/mL  - Patient is currently not at goal.  - Most recent intact PTH of 78.8 and vitamin-D of 33.5 as of 10/20/23  -continue vitamin D 2000 units p.o. daily for now if iPTH continues to rise we will consider starting calcitriol at next visit  - Check intact PTH vitamin-D prior to next visit    Proteinuria:  - proteinuria most likely secondary to obesity related hyper filtration  - most recent protein creatinine ratio 100 mg/dL as of 10/20/23, stable  - check protein creatinine ratio  - currently on therapy for proteinuria with Procardia XL, vitamin-D    Lipids:  -   On Crestor  - goal LDL less than 70  - management per primary team     h/o nephrolithiasis:  - last stone 2019  - Discussed with the patient that the most common types of kidney stones are calcium oxalate stones. - Advised to follow a diet with increased fluid intake so that urine output is greater than 2-2.5L/day. - Advised patient to decrease salt, protein and oxalate intake. - Dietary handouts given. - had ordered 24 urine test in the past however logistically was not done. At this time patient wishes to hold off I respect his decision advised of dietary habits for now though. hypomagnesemia:  -  Likely secondary to chemotherapy with cisplatin  -  Most recent magnesium level 1.9mEq  -  continue magnesium oxide 400 mg p.o. q.day check blood work prior to next visit     testicular cancer:  - Management as per primary team  - Follow-up with Heme-Onc  - Last seen by Hematology on6/21/23 notes reviewed in epic. - Chemotherapy   Had been changed to carboplatin and etoposide, however due to poor tolerance it was decided to hold off on further chemotherapy. Continue close follow-up with Heme-Onc. DM:  - on metformin 750mg po Q24  -Discussed with patient when and if renal parameters continue deteriorate he may need to be taken off of metformin  -Advised of tight glycemic control in order to delay CKD progression    Nutrition/morbid obesity:  - Encouraged patient to follow a renal diet comprising of moderate potassium, low phosphorus and protein restriction to 0.8gm/kg. Advised of dietary habits and weight loss  - Will check serum albumin with next blood work. Followup:  - Patient is to follow-up in 6 months, with lab work to be performed in [facility] and then again in a few days prior to the next visit. Advised patient to call me in 10 days to review the results if they do not hear back from me, as I may have not received the results. Emely Breanna Gardner, 10/27/2023, 9:27 AM             SUBJECTIVE: 58 y.o. Male presents to the office for routine follow-up. Recently seen by PCP in September Procardia dose was increased at that time. Home blood pressures definitely improved from prior with increasing Procardia however still slightly elevated records that he brought with him show blood pressures mostly in the high 140s. No NSAID use happy to hear renal parameters are stable agreeable to starting trial of Cardura to help with his blood pressure still continues to work, next testing for CDL is next year. Review of Systems   Constitutional:  Negative for chills and fever.    HENT: Negative for congestion and sore throat. Respiratory:  Negative for cough and wheezing. Cardiovascular:  Negative for leg swelling. Gastrointestinal:  Negative for constipation, diarrhea and vomiting. Genitourinary:  Negative for difficulty urinating, dysuria and hematuria. Musculoskeletal:  Negative for back pain. Skin:  Negative for wound. Neurological:  Negative for dizziness, light-headedness and headaches. Psychiatric/Behavioral:  Negative for agitation and confusion. All other systems reviewed and are negative.       PAST MEDICAL HISTORY:  Past Medical History:   Diagnosis Date   • BPH without obstruction/lower urinary tract symptoms    • Cancer (HCC)     prostate    • CPAP (continuous positive airway pressure) dependence    • Elevated PSA    • GERD (gastroesophageal reflux disease)    • Gout    • Hyperlipidemia    • Hypertension    • Kidney stone    • Obese abdomen    • Obesity    • Polymyalgia ([address])    • Prostate cancer ([address])    • Rash     under both arms and in between legs - family doctor aware - pt uses Desitin   • Sleep apnea    • Testicular carcinoma ([address])    • Urinary frequency    • Urinary urgency    • Wears glasses        PROBLEM LIST    Patient Active Problem List   Diagnosis   • Calculus of kidney   • Elevated PSA   • Benign prostatic hyperplasia with nocturia   • Prostate cancer ([address])   • Hydrocele in adult   • Mass of left testis   • Essential hypertension   • GERD (gastroesophageal reflux disease)   • CPAP (continuous positive airway pressure) dependence   • Morbid obesity with BMI of 40.0-44.9, adult ([address])   • Seminoma of testis, stage 3, left (HCC)   • SIMON on CPAP   • Polymyalgia (HCC)   • Steroid-induced hyperglycemia   • Stage 3a chronic kidney disease (HCC)   • Stage 3b chronic kidney disease (HCC)   • Back pain   • Anemia   • Type 2 diabetes mellitus without complication, without long-term current use of insulin (HCC)   • Lymphadenopathy, retroperitoneal   • Gastric distention   • Vitamin D deficiency   • Hyperlipidemia   • Hypomagnesemia   • History of nephrolithiasis   • Abnormal EKG   • Lower abdominal pain   • COVID-19 in immunocompromised patient ([address])   • Elevated LDH   • Supraventricular tachycardia   • Hematochezia   • History of unintended awareness under general anesthesia   • Mild major depression, single episode (HCC)   • Heart murmur   • Chronic left shoulder pain   • Cervical radiculopathy       PAST SURGICAL HISTORY:  Past Surgical History:   Procedure Laterality Date   • CARPAL TUNNEL RELEASE Bilateral 07/2020   • COLONOSCOPY     • CYSTOSCOPY W/ LASER LITHOTRIPSY  2001   • CYSTOSTOMY W/ STENT INSERTION  2011   • ESOPHAGOGASTRODUODENOSCOPY     • EXTRACORPOREAL SHOCK WAVE LITHOTRIPSY Right 2011   • HERNIA REPAIR     • KNEE ARTHROSCOPY Left    • WA CYSTO/URETERO W/LITHOTRIPSY &INDWELL STENT INSRT Right 4/29/2018    Procedure: CYSTOSCOPY URETEROSCOPY, RETROGRADE PYELOGRAM AND INSERTION STENT URETERAL;  Surgeon: Soto Gilmore MD;  Location: [facility];  Service: Urology   • WA ORCHIECTOMY RADICAL TUMOR INGUINAL APPROACH Left 2/19/2021    Procedure: Radical  ORCHIECTOMY;  Surgeon: Abdulaziz Young MD;  Location: [facility];  Service: Urology       SOCIAL HISTORY :   reports that he has never smoked. He has never used smokeless tobacco. He reports that he does not currently use alcohol. He reports that he does not use drugs. FAMILY HISTORY:  Family History   Problem Relation Age of Onset   • Nephrolithiasis Father    • Alzheimer's disease Mother        ALLERGIES:  No Known Allergies        PHYSICAL EXAM:  Vitals:    10/27/23 0906   BP: 146/60   BP Location: Left arm   Patient Position: Sitting   Cuff Size: Large   Weight: 127 kg (279 lb)   Height: 5' 9" (1.753 m)     Body mass index is 41.2 kg/m². Physical Exam  Vitals reviewed. Constitutional:       General: He is not in acute distress. Appearance: Normal appearance. He is obese.  He is not ill-appearing, toxic-appearing or diaphoretic. HENT:      Head: Normocephalic and atraumatic. Mouth/Throat:      Pharynx: No oropharyngeal exudate. Eyes:      General: No scleral icterus. Cardiovascular:      Rate and Rhythm: Normal rate. Pulmonary:      Effort: No respiratory distress. Breath sounds: Normal breath sounds. No stridor. Abdominal:      Palpations: Abdomen is soft. There is no mass. Tenderness: There is no abdominal tenderness. There is no right CVA tenderness or left CVA tenderness. Musculoskeletal:         General: No swelling. Cervical back: Normal range of motion. No rigidity. Skin:     Coloration: Skin is not jaundiced. Neurological:      General: No focal deficit present. Mental Status: He is alert and oriented to person, place, and time.    Psychiatric:         Mood and Affect: Mood normal.         Behavior: Behavior normal.         LABORATORY DATA:     Results from last 6 Months   Lab Units 10/20/23  0832 09/01/23  0833 06/29/23  1102 06/17/23  0740 05/01/23  0949   WBC Thousand/uL  --   --  10.55* 11.07*  --    HEMOGLOBIN g/dL  --   --  12.4 12.0  --    HEMATOCRIT %  --   --  37.2 37.6  --    PLATELETS Thousands/uL  --   --  229 258  --    POTASSIUM mmol/L 4.4 4.5 4.1 4.2 4.2   CHLORIDE mmol/L 105 105 106 108 113*   CO2 mmol/L 29 28 24 29 26   BUN mg/dL 21 21 22 25 26*   CREATININE mg/dL 1.44* 1.52* 1.49* 1.60* 1.70*   CALCIUM mg/dL 9.7 9.2 9.0 9.1 9.7   MAGNESIUM mg/dL 1.9  --   --   --   --    PHOSPHORUS mg/dL 3.3  --   --   --  3.5          rest all reviewed    RADIOLOGY:  No orders to display     Rest all reviewed        MEDICATIONS:    Current Outpatient Medications:   •  allopurinol (ZYLOPRIM) 100 mg tablet, TAKE TWO TABLETS BY MOUTH DAILY, Disp: 90 tablet, Rfl: 0  •  Cholecalciferol (VITAMIN D3) 2000 units capsule, Take 1 capsule by mouth daily, Disp: , Rfl:   •  doxazosin (CARDURA) 1 mg tablet, Take 1 tablet (1 mg total) by mouth 2 (two) times a day, Disp: 180 tablet, Rfl: 3  •  escitalopram (LEXAPRO) 10 mg tablet, Take 1 tablet (10 mg total) by mouth daily, Disp: 90 tablet, Rfl: 2  •  losartan (COZAAR) 100 MG tablet, Take 1 tablet (100 mg total) by mouth daily, Disp: 90 tablet, Rfl: 3  •  Magnesium Oxide 400 MG CAPS, Take 1 tablet (400 mg total) by mouth in the morning, Disp: 90 tablet, Rfl: 4  •  metFORMIN (GLUCOPHAGE-XR) 750 mg 24 hr tablet, Take 1 tablet (750 mg total) by mouth daily with breakfast, Disp: 90 tablet, Rfl: 1  •  NIFEdipine (ADALAT CC) 90 mg 24 hr tablet, Take 1 tablet (90 mg total) by mouth daily, Disp: 90 tablet, Rfl: 0  •  pantoprazole (PROTONIX) 40 mg tablet, Take 1 tablet by mouth daily before breakfast, Disp: 90 tablet, Rfl: 0  •  rosuvastatin (CRESTOR) 40 MG tablet, Take 1 tablet (40 mg total) by mouth daily, Disp: 90 tablet, Rfl: 2          Portions of the record may have been created with voice recognition software. Occasional wrong word or "sound a like" substitutions may have occurred due to the inherent limitations of voice recognition software. Read the chart carefully and recognize, using context, where substitutions have occurred. If you have any questions, please contact the dictating provider.

## 2023-10-27 NOTE — PATIENT INSTRUCTIONS
- start cardura 1mg twice a day  - call with blood pressure updates in 2 weeks  - check blood work in 3 months  - follow up with urology for elevated PSA    - Please call me in 10 days after having your blood work done to review the results if you do not hear back from me or my office, as I may have not received the results. - please remember to perform blood work prior to the next visit. - Please call if the blood pressure top number is greater than 140 or less than 110 consistently. - Please call if you are gaining more than 2lbs in 2 days for adjustment of water pills.  ~ Please AVOID the following pain medications. LIST OF NSAIDS (NONSTEROIDAL ANTI-INFLAMMATORY DRUGS) AND VILLAGOMEZ-2 INHIBITORS    DIFLUNISAL (DOLOBID)  IBUPROFEN (MOTRIN, ADVIL)  FLURBIPROFEN (ANSAID)  KETOPROFEN (ORUDIS, ORUVAIL)  FENOPROFEN (NALFON)  NABUMETONE (RELAFEN)  PIROXICAM (FELDENE)  NAPROXEN (ALEVE, NAPROSYN, NAPRELAN, ANAPROX)  DICLOFENAC (VOLTAREN, CATAFLAM)  INDOMETHACIN (INDOCIN)  SULINDAC (CLINORIL)  TOLMETIN (TOLETIN)  ETODOLAC (LODINE)  MELOXICAM (MOBIC)  KETOROLAC (TORADOL)  OXAPROZIN (DAYPRO)  CELECOXIB (CELEBREX)    Phosphorus diet  Follow a low phosphorus diet.     Avoid these higher phosphorus foods: Choose these lower phosphorus foods:   Milk, pudding or yogurt (from animals and from many soy varieties) Rice milk (unfortified), nondairy creamer (if it doesn't have terms in the ingredients list that contain the letters "phos")   Hard cheeses, ricotta or cottage cheese, fat-free cream cheese Regular and low-fat cream cheese   Ice cream or frozen yogurt Sherbet or frozen fruit pops   Soups made with higher phosphorus ingredients (milk, dried peas, beans, lentils) Soups made with lower phosphorus ingredients (broth- or water-based with other lower phosphorus ingredients)   Whole grains, including whole-grain breads, crackers, cereal, rice and pasta Refined grains, including white bread, crackers, cereals, rice and pasta   Quick breads, biscuits, cornbread, muffins, pancakes or waffles Homemade refined (white) dinner rolls, bagels or English muffins   Dried peas (split, black-eyed), beans (black, garbanzo, lima, kidney, navy, weinberg) or lentils Green peas (canned, frozen), green beans or wax beans   Organ meats, walleye, pollock or sardines Lean beef, pork, lamb, poultry or other fish   Nuts and seeds Popcorn   Peanut butter and other nut butters Jam, jelly or honey   Chocolate, including chocolate drinks Carob (chocolate-flavored) candy, hard candy or gumdrops   Yuliaan and pepper-type sodas, flavored kincaid, bottled teas (if a term in the ingredients list contains the letters "phos") Lemon-lime soda, ginger ale or root beer, plain water   Follow a moderate potassium diet. Things to do to reduce your blood pressure include working with all your physician to do the following:  ~ stop smoking if you smoke. ~ increase cardiovascular exercise like walking and swimming.   ~ modify your diet to decrease fat and salt intake. ~ reduce your weight if you are overweight or obese.  ~ increase the consumption of fruits, vegetables and whole grains. ~ decrease alcohol consumption if you consume alcohol.   ~ try to minimize stress in your life with lifestyle modifications. ~ be compliant with your anti-hypertensive medications. ~ adjust your medications to help improve your vascular stiffness and decrease risks for heart attacks and strokes. Exercise to Lose Weight     Exercise and a healthy diet may help you lose weight. Your doctor may suggest specific exercises. EXERCISE IDEAS AND TIPS     Choose low-cost things you enjoy doing, such as walking, bicycling, or exercising to workout videos. Take stairs instead of the elevator. Walk during your lunch break. Park your car further away from work or school. Go to a gym or an exercise class. Start with 5 to 10 minutes of exercise each day.  Build up to 30 minutes of exercise 4 to 6 days a week. Wear shoes with good support and comfortable clothes. Stretch before and after working out. Work out until you breathe harder and your heart beats faster. Drink extra water when you exercise. Do not do so much that you hurt yourself, feel dizzy, or get very short of breath. Exercises that burn about 150 calories:   Running 1 ½ miles in 15 minutes. Playing volleyball for 45 to 60 minutes. Washing and waxing a car for 45 to 60 minutes. Playing touch football for 45 minutes. Walking 1 ¾ miles in 35 minutes. Pushing a stroller 1 ½ miles in 30 minutes. Playing basketball for 30 minutes. Raking leaves for 30 minutes. Bicycling 5 miles in 30 minutes. Walking 2 miles in 30 minutes. Dancing for 30 minutes. Shoveling snow for 15 minutes. Swimming laps for 20 minutes. Walking up stairs for 15 minutes. Bicycling 4 miles in 15 minutes. Gardening for 30 to 45 minutes. Jumping rope for 15 minutes. Washing windows or floors for 45 to 60 minutes.

## 2023-11-03 DIAGNOSIS — K21.9 GASTROESOPHAGEAL REFLUX DISEASE, UNSPECIFIED WHETHER ESOPHAGITIS PRESENT: ICD-10-CM

## 2023-11-03 RX ORDER — PANTOPRAZOLE SODIUM 40 MG/1
40 TABLET, DELAYED RELEASE ORAL
Qty: 90 TABLET | Refills: 0 | Status: SHIPPED | OUTPATIENT
Start: 2023-11-03

## 2023-11-06 ENCOUNTER — OFFICE VISIT (OUTPATIENT)
Dept: FAMILY MEDICINE CLINIC | Facility: CLINIC | Age: 63
End: 2023-11-06
Payer: MEDICARE

## 2023-11-06 ENCOUNTER — TELEPHONE (OUTPATIENT)
Dept: ADMINISTRATIVE | Facility: OTHER | Age: 63
End: 2023-11-06

## 2023-11-06 VITALS
DIASTOLIC BLOOD PRESSURE: 70 MMHG | WEIGHT: 286 LBS | HEART RATE: 83 BPM | OXYGEN SATURATION: 94 % | SYSTOLIC BLOOD PRESSURE: 138 MMHG | BODY MASS INDEX: 42.23 KG/M2

## 2023-11-06 DIAGNOSIS — E11.9 TYPE 2 DIABETES MELLITUS WITHOUT COMPLICATION, WITHOUT LONG-TERM CURRENT USE OF INSULIN (HCC): ICD-10-CM

## 2023-11-06 DIAGNOSIS — I10 ESSENTIAL HYPERTENSION: ICD-10-CM

## 2023-11-06 DIAGNOSIS — J40 BRONCHITIS: Primary | ICD-10-CM

## 2023-11-06 PROCEDURE — 99214 OFFICE O/P EST MOD 30 MIN: CPT | Performed by: INTERNAL MEDICINE

## 2023-11-06 RX ORDER — BENZONATATE 200 MG/1
200 CAPSULE ORAL 3 TIMES DAILY PRN
Qty: 30 CAPSULE | Refills: 0 | Status: SHIPPED | OUTPATIENT
Start: 2023-11-06

## 2023-11-06 RX ORDER — DOXAZOSIN 2 MG/1
2 TABLET ORAL
Qty: 180 TABLET | Refills: 0 | Status: SHIPPED | OUTPATIENT
Start: 2023-11-06

## 2023-11-06 RX ORDER — AZITHROMYCIN 250 MG/1
TABLET, FILM COATED ORAL
Qty: 6 TABLET | Refills: 0 | Status: SHIPPED | OUTPATIENT
Start: 2023-11-06 | End: 2023-11-10

## 2023-11-06 NOTE — ASSESSMENT & PLAN NOTE
Start Z-Sebastian, will use Mucinex DM and benzonatate to control his cough. He will update if no improvement in 72 hours. COVID-19 test was negative in the office.

## 2023-11-06 NOTE — ASSESSMENT & PLAN NOTE
Looks like blood pressures improved with adding Cardura, increase up to 2 mg twice a day. Continue losartan and nifedipine current dose.

## 2023-11-06 NOTE — LETTER
Diabetic Eye Exam Form    Date Requested: 11/10/23  Patient: Jeanne Tubbs  Patient : 1960   Referring Provider: Esther Gonzalez MD      DIABETIC Eye Exam Date _______________________________      Type of Exam MUST be documented for Diabetic Eye Exams. Please CHECK ONE. Retinal Exam       Dilated Retinal Exam       OCT       Optomap-Iris Exam      Fundus Photography       Left Eye - Please check Retinopathy or No Retinopathy        Exam did show retinopathy    Exam did not show retinopathy       Right Eye - Please check Retinopathy or No Retinopathy       Exam did show retinopathy    Exam did not show retinopathy       Comments __________________________________________________________    Practice Providing Exam ______________________________________________    Exam Performed By (print name) _______________________________________      Provider Signature ___________________________________________________      These reports are needed for  compliance. Please fax this completed form and a copy of the Diabetic Eye Exam report to our office located at [address] [place] [address] as soon as possible via Fax [phone] attention Isidra: Phone [phone]  We thank you for your assistance in treating our mutual patient.

## 2023-11-06 NOTE — LETTER
Diabetic Eye Exam Form    Date Requested: 23  Patient: Araceli Dominguez  Patient : 1960   Referring Provider: Suzette Hummel MD      DIABETIC Eye Exam Date _______________________________      Type of Exam MUST be documented for Diabetic Eye Exams. Please CHECK ONE. Retinal Exam       Dilated Retinal Exam       OCT       Optomap-Iris Exam      Fundus Photography       Left Eye - Please check Retinopathy or No Retinopathy        Exam did show retinopathy    Exam did not show retinopathy       Right Eye - Please check Retinopathy or No Retinopathy       Exam did show retinopathy    Exam did not show retinopathy       Comments __________________________________________________________    Practice Providing Exam ______________________________________________    Exam Performed By (print name) _______________________________________      Provider Signature ___________________________________________________      These reports are needed for  compliance. Please fax this completed form and a copy of the Diabetic Eye Exam report to our office located at [address] [place] [address] as soon as possible via Fax [phone] attention Isidra: Phone [phone]  We thank you for your assistance in treating our mutual patient.

## 2023-11-06 NOTE — PROGRESS NOTES
Assessment/Plan:    Essential hypertension  Looks like blood pressures improved with adding Cardura, increase up to 2 mg twice a day. Continue losartan and nifedipine current dose. Bronchitis  Start Z-Sebastian, will use Mucinex DM and benzonatate to control his cough. He will update if no improvement in 72 hours. COVID-19 test was negative in the office. Diagnoses and all orders for this visit:    Bronchitis  -     azithromycin (Zithromax) 250 mg tablet; Take 2 tablets (500 mg total) by mouth daily for 1 day, THEN 1 tablet (250 mg total) daily for 4 days. -     benzonatate (TESSALON) 200 MG capsule; Take 1 capsule (200 mg total) by mouth 3 (three) times a day as needed for cough    Essential hypertension  -     doxazosin (CARDURA) 2 mg tablet; Take 1 tablet (2 mg total) by mouth daily at bedtime    Type 2 diabetes mellitus without complication, without long-term current use of insulin ([facility])  -     IRIS Diabetic eye exam          Subjective:      Patient ID: Sharmaine Ortiz is a 58 y.o. male. Patient came today with a new complains of cough that started just recently. We also followed up on his blood pressures. Cough  Associated symptoms include headaches. Pertinent negatives include no chest pain, chills, fever, postnasal drip, sore throat, shortness of breath or wheezing. Headache      The following portions of the patient's history were reviewed and updated as appropriate: allergies, current medications, past family history, past medical history, past social history, past surgical history, and problem list.    Review of Systems   Constitutional:  Negative for chills and fever. HENT:  Positive for congestion and sinus pressure. Negative for postnasal drip and sore throat. Respiratory:  Positive for cough. Negative for shortness of breath and wheezing. Cardiovascular:  Negative for chest pain and palpitations. Neurological:  Positive for headaches.          Objective:      /70 (BP Location: Left arm, Patient Position: Sitting, Cuff Size: Large)   Pulse 83   Wt 130 kg (286 lb)   SpO2 94%   BMI 42.23 kg/m²     No Known Allergies       Current Outpatient Medications:     allopurinol (ZYLOPRIM) 100 mg tablet, TAKE TWO TABLETS BY MOUTH DAILY, Disp: 90 tablet, Rfl: 0    azithromycin (Zithromax) 250 mg tablet, Take 2 tablets (500 mg total) by mouth daily for 1 day, THEN 1 tablet (250 mg total) daily for 4 days. , Disp: 6 tablet, Rfl: 0    benzonatate (TESSALON) 200 MG capsule, Take 1 capsule (200 mg total) by mouth 3 (three) times a day as needed for cough, Disp: 30 capsule, Rfl: 0    Cholecalciferol (VITAMIN D3) 2000 units capsule, Take 1 capsule by mouth daily, Disp: , Rfl:     doxazosin (CARDURA) 2 mg tablet, Take 1 tablet (2 mg total) by mouth daily at bedtime, Disp: 180 tablet, Rfl: 0    escitalopram (LEXAPRO) 10 mg tablet, Take 1 tablet (10 mg total) by mouth daily, Disp: 90 tablet, Rfl: 2    losartan (COZAAR) 100 MG tablet, Take 1 tablet (100 mg total) by mouth daily, Disp: 90 tablet, Rfl: 3    Magnesium Oxide 400 MG CAPS, Take 1 tablet (400 mg total) by mouth in the morning, Disp: 90 tablet, Rfl: 4    metFORMIN (GLUCOPHAGE-XR) 750 mg 24 hr tablet, Take 1 tablet (750 mg total) by mouth daily with breakfast, Disp: 90 tablet, Rfl: 1    NIFEdipine (ADALAT CC) 90 mg 24 hr tablet, Take 1 tablet (90 mg total) by mouth daily, Disp: 90 tablet, Rfl: 0    pantoprazole (PROTONIX) 40 mg tablet, Take 1 tablet by mouth daily before breakfast, Disp: 90 tablet, Rfl: 0    rosuvastatin (CRESTOR) 40 MG tablet, Take 1 tablet (40 mg total) by mouth daily, Disp: 90 tablet, Rfl: 2     There are no Patient Instructions on file for this visit. Physical Exam  Constitutional:       General: He is not in acute distress. Appearance: He is not ill-appearing or toxic-appearing. HENT:      Nose: Congestion present. No rhinorrhea.       Mouth/Throat:      Pharynx: No oropharyngeal exudate or posterior oropharyngeal erythema. Cardiovascular:      Rate and Rhythm: Normal rate. Heart sounds: No murmur heard. No gallop. Pulmonary:      Effort: No respiratory distress. Breath sounds: No wheezing or rales.

## 2023-11-06 NOTE — TELEPHONE ENCOUNTER
Upon review of the In Basket request and the patient's chart, initial outreach has been made via fax to facility. Please see Contacts section for details.      Thank you  Jef Pastor MA

## 2023-11-06 NOTE — TELEPHONE ENCOUNTER
----- Message from [facility] sent at 11/6/2023 12:21 PM EST -----  11/06/23 12:22 PM    Hello, our patient Sagrario Cintron has had Diabetic Eye Exam completed/performed. Please assist in updating the patient chart by making an External outreach to [facility] facility located in [address] [place] [address], [address] [place] [address], [facility]. The date of service is August 2023.     Thank you,  [address] [facility]

## 2023-11-07 LAB
LEFT EYE DIABETIC RETINOPATHY: ABNORMAL
LEFT EYE IMAGE QUALITY: ABNORMAL
LEFT EYE MACULAR EDEMA: ABNORMAL
LEFT EYE OTHER RETINOPATHY: ABNORMAL
RIGHT EYE DIABETIC RETINOPATHY: ABNORMAL
RIGHT EYE IMAGE QUALITY: ABNORMAL
RIGHT EYE MACULAR EDEMA: ABNORMAL
RIGHT EYE OTHER RETINOPATHY: ABNORMAL
SEVERITY (EYE EXAM): ABNORMAL

## 2023-11-10 NOTE — TELEPHONE ENCOUNTER
As a follow-up, a second attempt has been made for outreach via fax to facility. Please see Contacts section for details.     Thank you  Vicky Green MA

## 2023-11-15 ENCOUNTER — VBI (OUTPATIENT)
Dept: ADMINISTRATIVE | Facility: OTHER | Age: 63
End: 2023-11-15

## 2023-11-15 NOTE — TELEPHONE ENCOUNTER
As a final attempt, a third outreach has been made via telephone call to facility. Please see Contacts section for details. This encounter will be closed and completed by end of day. Should we receive the requested information because of previous outreach attempts, the requested patient's chart will be updated appropriately.      Thank you  Ganesh Quiñonez MA

## 2023-11-27 ENCOUNTER — APPOINTMENT (OUTPATIENT)
Dept: LAB | Facility: CLINIC | Age: 63
End: 2023-11-27
Payer: MEDICARE

## 2023-11-27 DIAGNOSIS — C61 PROSTATE CANCER (HCC): ICD-10-CM

## 2023-11-27 LAB — PSA SERPL-MCNC: 6.63 NG/ML (ref 0–4)

## 2023-11-27 PROCEDURE — 36415 COLL VENOUS BLD VENIPUNCTURE: CPT

## 2023-11-27 PROCEDURE — 84153 ASSAY OF PSA TOTAL: CPT

## 2023-11-29 NOTE — PROGRESS NOTES
Pt arrived to unit without complaint, tolerated cycle 1, day 1 Cisplatin/Etoposide without any adverse reaction  Pt left unit in stable condition without question or concern, AVS provided, pt aware of need to return tomorrow for day 2 tx 
What Type Of Note Output Would You Prefer (Optional)?: Standard Output
Hpi Title: Evaluation of Skin Lesions

## 2023-11-30 DIAGNOSIS — I10 ESSENTIAL HYPERTENSION: ICD-10-CM

## 2023-11-30 RX ORDER — NIFEDIPINE 90 MG/1
90 TABLET, FILM COATED, EXTENDED RELEASE ORAL DAILY
Qty: 90 TABLET | Refills: 0 | Status: SHIPPED | OUTPATIENT
Start: 2023-11-30

## 2023-12-01 DIAGNOSIS — E11.9 TYPE 2 DIABETES MELLITUS WITHOUT COMPLICATION, WITHOUT LONG-TERM CURRENT USE OF INSULIN (HCC): ICD-10-CM

## 2023-12-01 RX ORDER — METFORMIN HYDROCHLORIDE 750 MG/1
750 TABLET, EXTENDED RELEASE ORAL
Qty: 90 TABLET | Refills: 0 | Status: SHIPPED | OUTPATIENT
Start: 2023-12-01

## 2023-12-06 ENCOUNTER — OFFICE VISIT (OUTPATIENT)
Dept: UROLOGY | Facility: MEDICAL CENTER | Age: 63
End: 2023-12-06
Payer: MEDICARE

## 2023-12-06 VITALS
HEIGHT: 69 IN | BODY MASS INDEX: 40.29 KG/M2 | DIASTOLIC BLOOD PRESSURE: 64 MMHG | HEART RATE: 80 BPM | SYSTOLIC BLOOD PRESSURE: 140 MMHG | OXYGEN SATURATION: 98 % | WEIGHT: 272 LBS

## 2023-12-06 DIAGNOSIS — C61 PROSTATE CANCER (HCC): Primary | ICD-10-CM

## 2023-12-06 DIAGNOSIS — N40.1 BPH WITH OBSTRUCTION/LOWER URINARY TRACT SYMPTOMS: ICD-10-CM

## 2023-12-06 DIAGNOSIS — C62.92 SEMINOMA OF TESTIS, STAGE 3, LEFT (HCC): ICD-10-CM

## 2023-12-06 DIAGNOSIS — N13.8 BPH WITH OBSTRUCTION/LOWER URINARY TRACT SYMPTOMS: ICD-10-CM

## 2023-12-06 DIAGNOSIS — R97.20 ELEVATED PSA: ICD-10-CM

## 2023-12-06 DIAGNOSIS — N20.0 CALCULUS OF KIDNEY: ICD-10-CM

## 2023-12-06 PROCEDURE — 81003 URINALYSIS AUTO W/O SCOPE: CPT | Performed by: UROLOGY

## 2023-12-06 PROCEDURE — 99214 OFFICE O/P EST MOD 30 MIN: CPT | Performed by: UROLOGY

## 2023-12-06 NOTE — PROGRESS NOTES
Assessment/Plan:  1. Adenocarcinoma of the prostate [place] pattern score 6 in 1 core on prostate biopsy. PSA slightly elevated compared to last PSA. We will repeat again in 6 months along with multiparametric MRI. Consider repeat biopsy if PSA tends to increase and or multiparametric MRI shows lesion. #2.  BPH with lower urinary tract obstructive symptoms-AUA symptom score 16 however patient does not wish to start surgical or pharmacologic therapy. We will consider alpha blockade in the future. 3.  History of left testicular seminoma stage III-followed by oncology with no evidence of disease recurrence. 4.  Asymptomatic renal calculus-no intervention    5. Elevated PSA-as in #1 above. No problem-specific Assessment & Plan notes found for this encounter. Diagnoses and all orders for this visit:    Prostate cancer ([address])  -     POCT urine dip auto non-scope  -     PSA Total, Diagnostic; Future  -     MRI prostate multiparametric wo w contrast; Future    BPH with obstruction/lower urinary tract symptoms  -     Basic metabolic panel; Future    Seminoma of testis, stage 3, left (HCC)    Calculus of kidney    Elevated PSA          Subjective:      Patient ID: Anabell Deleon is a 58 y.o. male. HPI  70-year-old male with elevated PSA and transrectal ultrasound revealing 1 core of [place] pattern score 6 disease. The patient's PSA has been relatively stable in the mid 5 range however recently it has bumped up to the mid 6 range. He has an AUA symptom score of 16 but notes no personal feelings that he would like to be treated either with pharmacologic or surgical therapy. Previous MRI was category 2 without dominant lesion. The patient presents for follow-up.   The following portions of the patient's history were reviewed and updated as appropriate: allergies, current medications, past family history, past medical history, past social history, past surgical history, and problem list.    Review of Systems   Genitourinary:         See aua ss of 16- not interested in treatment   All other systems reviewed and are negative. Objective:      /64 (BP Location: Left arm, Patient Position: Sitting, Cuff Size: Large)   Pulse 80   Ht 5' 9" (1.753 m)   Wt 123 kg (272 lb)   SpO2 98%   BMI 40.17 kg/m²          Physical Exam  Vitals reviewed. Constitutional:       General: He is not in acute distress. Appearance: Normal appearance. He is obese. He is not ill-appearing, toxic-appearing or diaphoretic. HENT:      Head: Normocephalic and atraumatic. Nose: Nose normal.      Mouth/Throat:      Mouth: Mucous membranes are moist.   Eyes:      Extraocular Movements: Extraocular movements intact. Pulmonary:      Effort: Pulmonary effort is normal. No respiratory distress. Genitourinary:     Comments: FIGUEROA normal anal verge normal anal sphincter tone no palpable rectal masses. Prostate approximately 35 g a nodular nontender  Skin:     General: Skin is dry. Neurological:      Mental Status: He is alert and oriented to person, place, and time. Psychiatric:         Mood and Affect: Mood normal.         Behavior: Behavior normal.         Thought Content:  Thought content normal.         Judgment: Judgment normal.

## 2023-12-06 NOTE — LETTER
December 6, 2023     Amrit Schmitt, [address] [id number]    Patient: Nina Choudhury   YOB: 1960   Date of Visit: 12/6/2023       Dear Dr. Leonardo Reed: Thank you for referring Kris Brock to me for evaluation. Below are my notes for this consultation. If you have questions, please do not hesitate to call me. I look forward to following your patient along with you. Sincerely,        Glen Robin MD        CC: No Recipients    Glen Robin MD  12/6/2023 10:15 AM  Sign when Signing Visit  Assessment/Plan:  1. Adenocarcinoma of the prostate [place] pattern score 6 in 1 core on prostate biopsy. PSA slightly elevated compared to last PSA. We will repeat again in 6 months along with multiparametric MRI. Consider repeat biopsy if PSA tends to increase and or multiparametric MRI shows lesion. #2.  BPH with lower urinary tract obstructive symptoms-AUA symptom score 16 however patient does not wish to start surgical or pharmacologic therapy. We will consider alpha blockade in the future. 3.  History of left testicular seminoma stage III-followed by oncology with no evidence of disease recurrence. 4.  Asymptomatic renal calculus-no intervention    5. Elevated PSA-as in #1 above. No problem-specific Assessment & Plan notes found for this encounter. Diagnoses and all orders for this visit:    Prostate cancer ([address])  -     POCT urine dip auto non-scope  -     PSA Total, Diagnostic; Future  -     MRI prostate multiparametric wo w contrast; Future    BPH with obstruction/lower urinary tract symptoms  -     Basic metabolic panel; Future    Seminoma of testis, stage 3, left (HCC)    Calculus of kidney    Elevated PSA          Subjective:      Patient ID: Nina Choudhury is a 58 y.o. male. HPI  78-year-old male with elevated PSA and transrectal ultrasound revealing 1 core of Jose pattern score 6 disease.   The patient's PSA has been relatively stable in the mid 5 range however recently it has bumped up to the mid 6 range. He has an AUA symptom score of 16 but notes no personal feelings that he would like to be treated either with pharmacologic or surgical therapy. Previous MRI was category 2 without dominant lesion. The patient presents for follow-up. The following portions of the patient's history were reviewed and updated as appropriate: allergies, current medications, past family history, past medical history, past social history, past surgical history, and problem list.    Review of Systems   Genitourinary:         See aua ss of 16- not interested in treatment   All other systems reviewed and are negative. Objective:      /64 (BP Location: Left arm, Patient Position: Sitting, Cuff Size: Large)   Pulse 80   Ht 5' 9" (1.753 m)   Wt 123 kg (272 lb)   SpO2 98%   BMI 40.17 kg/m²          Physical Exam  Vitals reviewed. Constitutional:       General: He is not in acute distress. Appearance: Normal appearance. He is obese. He is not ill-appearing, toxic-appearing or diaphoretic. HENT:      Head: Normocephalic and atraumatic. Nose: Nose normal.      Mouth/Throat:      Mouth: Mucous membranes are moist.   Eyes:      Extraocular Movements: Extraocular movements intact. Pulmonary:      Effort: Pulmonary effort is normal. No respiratory distress. Genitourinary:     Comments: FIGUEROA normal anal verge normal anal sphincter tone no palpable rectal masses. Prostate approximately 35 g a nodular nontender  Skin:     General: Skin is dry. Neurological:      Mental Status: He is alert and oriented to person, place, and time. Psychiatric:         Mood and Affect: Mood normal.         Behavior: Behavior normal.         Thought Content:  Thought content normal.         Judgment: Judgment normal.

## 2024-01-08 ENCOUNTER — RA CDI HCC (OUTPATIENT)
Dept: OTHER | Facility: HOSPITAL | Age: 64
End: 2024-01-08

## 2024-01-09 ENCOUNTER — LAB (OUTPATIENT)
Dept: LAB | Facility: CLINIC | Age: 64
End: 2024-01-09
Payer: MEDICARE

## 2024-01-09 DIAGNOSIS — E11.9 TYPE 2 DIABETES MELLITUS WITHOUT COMPLICATION, WITHOUT LONG-TERM CURRENT USE OF INSULIN (HCC): ICD-10-CM

## 2024-01-09 DIAGNOSIS — E66.01 MORBID OBESITY WITH BMI OF 40.0-44.9, ADULT (HCC): ICD-10-CM

## 2024-01-09 DIAGNOSIS — E83.42 HYPOMAGNESEMIA: ICD-10-CM

## 2024-01-09 DIAGNOSIS — E55.9 VITAMIN D DEFICIENCY: ICD-10-CM

## 2024-01-09 DIAGNOSIS — N18.31 STAGE 3A CHRONIC KIDNEY DISEASE (HCC): ICD-10-CM

## 2024-01-09 DIAGNOSIS — N20.0 CALCULUS OF KIDNEY: ICD-10-CM

## 2024-01-09 DIAGNOSIS — C62.92 SEMINOMA OF TESTIS, STAGE 3, LEFT (HCC): ICD-10-CM

## 2024-01-09 DIAGNOSIS — I10 ESSENTIAL HYPERTENSION: ICD-10-CM

## 2024-01-09 DIAGNOSIS — E78.5 HYPERLIPIDEMIA, UNSPECIFIED HYPERLIPIDEMIA TYPE: ICD-10-CM

## 2024-01-09 DIAGNOSIS — N18.32 STAGE 3B CHRONIC KIDNEY DISEASE (HCC): ICD-10-CM

## 2024-01-09 LAB
25(OH)D3 SERPL-MCNC: 31.1 NG/ML (ref 30–100)
ALBUMIN SERPL BCP-MCNC: 4.1 G/DL (ref 3.5–5)
ANION GAP SERPL CALCULATED.3IONS-SCNC: 11 MMOL/L
BUN SERPL-MCNC: 21 MG/DL (ref 5–25)
CALCIUM SERPL-MCNC: 9.8 MG/DL (ref 8.4–10.2)
CHLORIDE SERPL-SCNC: 110 MMOL/L (ref 96–108)
CO2 SERPL-SCNC: 26 MMOL/L (ref 21–32)
CREAT SERPL-MCNC: 1.27 MG/DL (ref 0.6–1.3)
GFR SERPL CREATININE-BSD FRML MDRD: 59 ML/MIN/1.73SQ M
GLUCOSE P FAST SERPL-MCNC: 108 MG/DL (ref 65–99)
MAGNESIUM SERPL-MCNC: 1.7 MG/DL (ref 1.9–2.7)
PHOSPHATE SERPL-MCNC: 3.4 MG/DL (ref 2.3–4.1)
POTASSIUM SERPL-SCNC: 4.6 MMOL/L (ref 3.5–5.3)
PTH-INTACT SERPL-MCNC: 59.5 PG/ML (ref 12–88)
SODIUM SERPL-SCNC: 147 MMOL/L (ref 135–147)

## 2024-01-09 PROCEDURE — 80069 RENAL FUNCTION PANEL: CPT

## 2024-01-09 PROCEDURE — 83970 ASSAY OF PARATHORMONE: CPT

## 2024-01-09 PROCEDURE — 36415 COLL VENOUS BLD VENIPUNCTURE: CPT

## 2024-01-09 PROCEDURE — 83735 ASSAY OF MAGNESIUM: CPT

## 2024-01-09 PROCEDURE — 82306 VITAMIN D 25 HYDROXY: CPT

## 2024-01-10 DIAGNOSIS — Z87.442 HISTORY OF NEPHROLITHIASIS: ICD-10-CM

## 2024-01-10 DIAGNOSIS — C62.92 SEMINOMA OF TESTIS, STAGE 3, LEFT (HCC): ICD-10-CM

## 2024-01-10 DIAGNOSIS — E83.42 HYPOMAGNESEMIA: ICD-10-CM

## 2024-01-10 DIAGNOSIS — I10 ESSENTIAL HYPERTENSION: ICD-10-CM

## 2024-01-10 DIAGNOSIS — N18.32 STAGE 3B CHRONIC KIDNEY DISEASE (HCC): ICD-10-CM

## 2024-01-10 DIAGNOSIS — E78.5 HYPERLIPIDEMIA, UNSPECIFIED HYPERLIPIDEMIA TYPE: ICD-10-CM

## 2024-01-10 DIAGNOSIS — D63.1 ANEMIA DUE TO STAGE 3A CHRONIC KIDNEY DISEASE: ICD-10-CM

## 2024-01-10 DIAGNOSIS — N18.31 ANEMIA DUE TO STAGE 3A CHRONIC KIDNEY DISEASE: ICD-10-CM

## 2024-01-10 DIAGNOSIS — N20.0 CALCULUS OF KIDNEY: ICD-10-CM

## 2024-01-10 DIAGNOSIS — E66.01 MORBID OBESITY WITH BMI OF 40.0-44.9, ADULT (HCC): ICD-10-CM

## 2024-01-10 DIAGNOSIS — E55.9 VITAMIN D DEFICIENCY: ICD-10-CM

## 2024-01-10 DIAGNOSIS — N18.31 STAGE 3A CHRONIC KIDNEY DISEASE (HCC): ICD-10-CM

## 2024-01-10 NOTE — TELEPHONE ENCOUNTER
----- Message from Emely Bone MD sent at 1/10/2024  7:40 AM EST -----  Please let the patient know that most recent lab work in terms of renal parameters are stable except for his magnesium level is running low please make sure he is at least taking his magnesium regularly once a day if he is then have him increase it to twice a day for now.    Will discuss further at the upcoming visit, let me know if they have any questions or concerns.    Thanks

## 2024-01-10 NOTE — TELEPHONE ENCOUNTER
I deneen ch asking to call the office to verify if he has received message in regards to medication changes.

## 2024-01-10 NOTE — RESULT ENCOUNTER NOTE
Please let the patient know that most recent lab work in terms of renal parameters are stable except for his magnesium level is running low please make sure he is at least taking his magnesium regularly once a day if he is then have him increase it to twice a day for now.    Will discuss further at the upcoming visit, let me know if they have any questions or concerns.    Thanks

## 2024-01-11 RX ORDER — CALCIUM CARBONATE/VITAMIN D3 500-10/5ML
1 LIQUID (ML) ORAL 2 TIMES DAILY
Qty: 180 TABLET | Refills: 3 | Status: SHIPPED | OUTPATIENT
Start: 2024-01-11

## 2024-01-12 ENCOUNTER — OFFICE VISIT (OUTPATIENT)
Dept: FAMILY MEDICINE CLINIC | Facility: CLINIC | Age: 64
End: 2024-01-12
Payer: MEDICARE

## 2024-01-12 VITALS
RESPIRATION RATE: 12 BRPM | DIASTOLIC BLOOD PRESSURE: 70 MMHG | SYSTOLIC BLOOD PRESSURE: 120 MMHG | WEIGHT: 276.8 LBS | BODY MASS INDEX: 41 KG/M2 | HEIGHT: 69 IN

## 2024-01-12 DIAGNOSIS — I10 ESSENTIAL HYPERTENSION: ICD-10-CM

## 2024-01-12 DIAGNOSIS — Z00.00 MEDICARE ANNUAL WELLNESS VISIT, SUBSEQUENT: Primary | ICD-10-CM

## 2024-01-12 DIAGNOSIS — D84.9 IMMUNODEFICIENCY, UNSPECIFIED (HCC): ICD-10-CM

## 2024-01-12 DIAGNOSIS — N18.32 STAGE 3B CHRONIC KIDNEY DISEASE (HCC): ICD-10-CM

## 2024-01-12 DIAGNOSIS — M35.3 POLYMYALGIA RHEUMATICA (HCC): ICD-10-CM

## 2024-01-12 DIAGNOSIS — Z23 NEED FOR PROPHYLACTIC VACCINATION AND INOCULATION AGAINST VARICELLA: ICD-10-CM

## 2024-01-12 DIAGNOSIS — N18.31 STAGE 3A CHRONIC KIDNEY DISEASE (HCC): ICD-10-CM

## 2024-01-12 DIAGNOSIS — Z23 NEED FOR PROPHYLACTIC VACCINATION WITH COMBINED DIPHTHERIA-TETANUS-PERTUSSIS (DTP) VACCINE: ICD-10-CM

## 2024-01-12 DIAGNOSIS — N18.2 TYPE 2 DIABETES MELLITUS WITH STAGE 2 CHRONIC KIDNEY DISEASE, WITHOUT LONG-TERM CURRENT USE OF INSULIN: ICD-10-CM

## 2024-01-12 DIAGNOSIS — E83.42 HYPOMAGNESEMIA: ICD-10-CM

## 2024-01-12 DIAGNOSIS — E11.22 TYPE 2 DIABETES MELLITUS WITH STAGE 2 CHRONIC KIDNEY DISEASE, WITHOUT LONG-TERM CURRENT USE OF INSULIN: ICD-10-CM

## 2024-01-12 DIAGNOSIS — F32.0 MILD MAJOR DEPRESSION, SINGLE EPISODE (HCC): ICD-10-CM

## 2024-01-12 DIAGNOSIS — E11.9 TYPE 2 DIABETES MELLITUS WITHOUT COMPLICATION, WITHOUT LONG-TERM CURRENT USE OF INSULIN (HCC): ICD-10-CM

## 2024-01-12 DIAGNOSIS — E66.01 MORBID OBESITY WITH BMI OF 40.0-44.9, ADULT (HCC): ICD-10-CM

## 2024-01-12 DIAGNOSIS — C61 PROSTATE CANCER (HCC): ICD-10-CM

## 2024-01-12 LAB — SL AMB POCT HEMOGLOBIN AIC: 6.9 (ref ?–6.5)

## 2024-01-12 PROCEDURE — G0439 PPPS, SUBSEQ VISIT: HCPCS | Performed by: INTERNAL MEDICINE

## 2024-01-12 PROCEDURE — 99214 OFFICE O/P EST MOD 30 MIN: CPT | Performed by: INTERNAL MEDICINE

## 2024-01-12 PROCEDURE — 83036 HEMOGLOBIN GLYCOSYLATED A1C: CPT | Performed by: INTERNAL MEDICINE

## 2024-01-12 RX ORDER — ZOSTER VACCINE RECOMBINANT, ADJUVANTED 50 MCG/0.5
0.5 KIT INTRAMUSCULAR ONCE
Qty: 1 EACH | Refills: 1 | Status: SHIPPED | OUTPATIENT
Start: 2024-01-12 | End: 2024-01-12

## 2024-01-12 RX ORDER — DOXAZOSIN 2 MG/1
2 TABLET ORAL
Qty: 180 TABLET | Refills: 0 | Status: SHIPPED | OUTPATIENT
Start: 2024-01-12

## 2024-01-12 NOTE — ASSESSMENT & PLAN NOTE
Hemoglobin A1c is very acceptable.  Continue current therapy.  Lab Results   Component Value Date    HGBA1C 6.9 (A) 01/12/2024

## 2024-01-12 NOTE — PROGRESS NOTES
Assessment and Plan:     Problem List Items Addressed This Visit       Prostate cancer (HCC)     Continue follow-up with urology.         Essential hypertension     Blood pressure is checked by me 120/70, continue the same meds.         Relevant Medications    doxazosin (CARDURA) 2 mg tablet    Morbid obesity with BMI of 40.0-44.9, adult (HCC)    Stage 3a chronic kidney disease (HCC)    Stage 3b chronic kidney disease (HCC)     Lab Results   Component Value Date    EGFR 59 01/09/2024    EGFR 50 10/20/2023    EGFR 47 09/01/2023    CREATININE 1.27 01/09/2024    CREATININE 1.44 (H) 10/20/2023    CREATININE 1.52 (H) 09/01/2023   Remains very stable.         Type 2 diabetes mellitus with stage 2 chronic kidney disease, without long-term current use of insulin      Hemoglobin A1c is very acceptable.  Continue current therapy.  Lab Results   Component Value Date    HGBA1C 6.9 (A) 01/12/2024            Hypomagnesemia     His dose of magnesium was increased recently.         Mild major depression, single episode (HCC)     Seems to be stable right now.  Continue current dose of Lexapro.          Other Visit Diagnoses       Medicare annual wellness visit, subsequent    -  Primary    Need for prophylactic vaccination and inoculation against varicella        Relevant Medications    Zoster Vac Recomb Adjuvanted (Shingrix) 50 MCG/0.5ML SUSR    Need for prophylactic vaccination with combined diphtheria-tetanus-pertussis (DTP) vaccine        Relevant Medications    tetanus-diphtheria-acellular pertussis (ADACEL) 5-2-15.5 LF-mcg/0.5 injection    Immunodeficiency, unspecified (HCC)        Polymyalgia rheumatica (HCC)                 Preventive health issues were discussed with patient, and age appropriate screening tests were ordered as noted in patient's After Visit Summary.  Personalized health advice and appropriate referrals for health education or preventive services given if needed, as noted in patient's After Visit  Summary.     History of Present Illness:     Patient presents for a Medicare Wellness Visit    Patient came today for Medicare wellness visit and follow-up on his chronic problems.  He agreed for his tetanus shot and Shingrix, prescription were given.  Up-to-date on colonoscopy, he follows up with urology for PSA.  He is trying to be active, tries to follow a healthier diet.         Patient Care Team:  Amrit Faust MD as PCP - General (Internal Medicine)  Emely Bone MD (Nephrology)  PEREZ Cornell as Nurse Practitioner (Nurse Practitioner)     Review of Systems:     Review of Systems   Constitutional:  Negative for chills and fever.   Respiratory:  Negative for cough, shortness of breath and wheezing.    Cardiovascular:  Negative for chest pain, palpitations and leg swelling.   Musculoskeletal:  Positive for arthralgias.   Psychiatric/Behavioral:  Negative for confusion.         Problem List:     Patient Active Problem List   Diagnosis    Calculus of kidney    Elevated PSA    Benign prostatic hyperplasia with nocturia    Prostate cancer (HCC)    Hydrocele in adult    Mass of left testis    Essential hypertension    GERD (gastroesophageal reflux disease)    CPAP (continuous positive airway pressure) dependence    Morbid obesity with BMI of 40.0-44.9, adult (HCC)    Seminoma of testis, stage 3, left (HCC)    SIMON on CPAP    Polymyalgia (HCC)    Steroid-induced hyperglycemia    Stage 3a chronic kidney disease (HCC)    Stage 3b chronic kidney disease (HCC)    Back pain    Anemia    Type 2 diabetes mellitus with stage 2 chronic kidney disease, without long-term current use of insulin     Lymphadenopathy, retroperitoneal    Gastric distention    Vitamin D deficiency    Hyperlipidemia    Hypomagnesemia    History of nephrolithiasis    Abnormal EKG    Lower abdominal pain    COVID-19 in immunocompromised patient (HCC)    Elevated LDH    Supraventricular tachycardia    Hematochezia    History of  unintended awareness under general anesthesia    Mild major depression, single episode (HCC)    Heart murmur    Chronic left shoulder pain    Cervical radiculopathy    Bronchitis      Past Medical and Surgical History:     Past Medical History:   Diagnosis Date    BPH without obstruction/lower urinary tract symptoms     Cancer (HCC)     prostate     CPAP (continuous positive airway pressure) dependence     Elevated PSA     GERD (gastroesophageal reflux disease)     Gout     Hyperlipidemia     Hypertension     Kidney stone     Obese abdomen     Obesity     Polymyalgia (HCC)     Prostate cancer (HCC)     Rash     under both arms and in between legs - family doctor aware - pt uses Desitin    Sleep apnea     Testicular carcinoma (HCC)     Urinary frequency     Urinary urgency     Wears glasses      Past Surgical History:   Procedure Laterality Date    CARPAL TUNNEL RELEASE Bilateral 07/2020    COLONOSCOPY      CYSTOSCOPY W/ LASER LITHOTRIPSY  2001    CYSTOSTOMY W/ STENT INSERTION  2011    ESOPHAGOGASTRODUODENOSCOPY      EXTRACORPOREAL SHOCK WAVE LITHOTRIPSY Right 2011    HERNIA REPAIR      KNEE ARTHROSCOPY Left     IL CYSTO/URETERO W/LITHOTRIPSY &INDWELL STENT INSRT Right 4/29/2018    Procedure: CYSTOSCOPY URETEROSCOPY, RETROGRADE PYELOGRAM AND INSERTION STENT URETERAL;  Surgeon: Carlin Taylor MD;  Location: [facility];  Service: Urology    IL ORCHIECTOMY RADICAL TUMOR INGUINAL APPROACH Left 2/19/2021    Procedure: Radical  ORCHIECTOMY;  Surgeon: Fawad Manzo MD;  Location: [facility];  Service: Urology      Family History:     Family History   Problem Relation Age of Onset    Nephrolithiasis Father     Alzheimer's disease Mother       Social History:     Social History     Socioeconomic History    Marital status: /Civil Union     Spouse name: None    Number of children: None    Years of education: None    Highest education level: None   Occupational History    None   Tobacco Use    Smoking status:  Never    Smokeless tobacco: Never   Vaping Use    Vaping status: Never Used   Substance and Sexual Activity    Alcohol use: Not Currently    Drug use: No    Sexual activity: None   Other Topics Concern    None   Social History Narrative    None     Social Determinants of Health     Financial Resource Strain: Low Risk  (1/12/2024)    Overall Financial Resource Strain (CARDIA)     Difficulty of Paying Living Expenses: Not hard at all   Food Insecurity: Unknown (11/19/2021)    Hunger Vital Sign     Worried About Running Out of Food in the Last Year: Never true     Ran Out of Food in the Last Year: Not on file   Transportation Needs: No Transportation Needs (1/12/2024)    PRAPARE - Transportation     Lack of Transportation (Medical): No     Lack of Transportation (Non-Medical): No   Physical Activity: Not on file   Stress: Not on file   Social Connections: Not on file   Intimate Partner Violence: Not on file   Housing Stability: Unknown (11/19/2021)    Housing Stability Vital Sign     Unable to Pay for Housing in the Last Year: No     Number of Places Lived in the Last Year: Not on file     Unstable Housing in the Last Year: No      Medications and Allergies:     Current Outpatient Medications   Medication Sig Dispense Refill    allopurinol (ZYLOPRIM) 100 mg tablet TAKE TWO TABLETS BY MOUTH DAILY 90 tablet 0    doxazosin (CARDURA) 2 mg tablet Take 1 tablet (2 mg total) by mouth daily at bedtime 180 tablet 0    escitalopram (LEXAPRO) 10 mg tablet Take 1 tablet (10 mg total) by mouth daily 90 tablet 2    losartan (COZAAR) 100 MG tablet Take 1 tablet (100 mg total) by mouth daily 90 tablet 3    Magnesium Oxide 400 MG CAPS Take 1 tablet (400 mg total) by mouth 2 (two) times a day 180 tablet 3    metFORMIN (GLUCOPHAGE-XR) 750 mg 24 hr tablet TAKE ONE TABLET BY MOUTH EVERY MORNING with breakfast 90 tablet 0    NIFEdipine (ADALAT CC) 90 mg 24 hr tablet TAKE ONE TABLET BY MOUTH EVERY DAY 90 tablet 0    pantoprazole (PROTONIX)  40 mg tablet Take 1 tablet by mouth daily before breakfast 90 tablet 0    rosuvastatin (CRESTOR) 40 MG tablet Take 1 tablet (40 mg total) by mouth daily 90 tablet 2    tetanus-diphtheria-acellular pertussis (ADACEL) 5-2-15.5 LF-mcg/0.5 injection Inject 0.5 mL into a muscle once for 1 dose 0.5 mL 0    Zoster Vac Recomb Adjuvanted (Shingrix) 50 MCG/0.5ML SUSR Inject 0.5 mL into a muscle once for 1 dose Repeat dose in 2 to 6 months 1 each 1    Cholecalciferol (VITAMIN D3) 2000 units capsule Take 1 capsule by mouth daily       No current facility-administered medications for this visit.     No Known Allergies   Immunizations:     Immunization History   Administered Date(s) Administered    Pneumococcal Conjugate 13-Valent 11/19/2019    Pneumococcal Polysaccharide PPV23 10/07/2009, 12/01/2020    Tdap 05/13/2014      Health Maintenance:         Topic Date Due    Colorectal Cancer Screening  01/03/2030    HIV Screening  Completed    Hepatitis C Screening  Completed         Topic Date Due    COVID-19 Vaccine (1) Never done    Influenza Vaccine (1) Never done      Medicare Screening Tests and Risk Assessments:     Faisal is here for his Subsequent Wellness visit.     Health Risk Assessment:   Patient rates overall health as good. Patient feels that their physical health rating is same. Patient is satisfied with their life. Eyesight was rated as same. Hearing was rated as same. Patient feels that their emotional and mental health rating is same. Patients states they are never, rarely angry. Patient states they are sometimes unusually tired/fatigued. Pain experienced in the last 7 days has been some. Patient's pain rating has been 3/10. Patient states that he has experienced no weight loss or gain in last 6 months.     Depression Screening:   PHQ-9 Score: 0      Fall Risk Screening:   In the past year, patient has experienced: no history of falling in past year      Home Safety:  Patient does not have trouble with stairs inside  "or outside of their home. Patient has working smoke alarms and has working carbon monoxide detector. Home safety hazards include: none.     Nutrition:   Current diet is Regular.     Medications:   Patient is not currently taking any over-the-counter supplements. Patient is able to manage medications.     Activities of Daily Living (ADLs)/Instrumental Activities of Daily Living (IADLs):   Walk and transfer into and out of bed and chair?: Yes  Dress and groom yourself?: Yes    Bathe or shower yourself?: Yes    Feed yourself? Yes  Do your laundry/housekeeping?: Yes  Manage your money, pay your bills and track your expenses?: Yes  Make your own meals?: Yes    Do your own shopping?: Yes    Previous Hospitalizations:   Any hospitalizations or ED visits within the last 12 months?: No      PREVENTIVE SCREENINGS      Cardiovascular Screening:    General: Screening Not Indicated and History Lipid Disorder      Diabetes Screening:     General: Screening Not Indicated and History Diabetes      Colorectal Cancer Screening:     General: Screening Current      Prostate Cancer Screening:    General: History Prostate Cancer      Lung Cancer Screening:     General: Screening Not Indicated      Hepatitis C Screening:    General: Screening Current    Screening, Brief Intervention, and Referral to Treatment (SBIRT)    Screening  Typical number of drinks in a day: 0  Typical number of drinks in a week: 0  Interpretation: Low risk drinking behavior.    Single Item Drug Screening:  How often have you used an illegal drug (including marijuana) or a prescription medication for non-medical reasons in the past year? never    Single Item Drug Screen Score: 0  Interpretation: Negative screen for possible drug use disorder    No results found.     Physical Exam:     /70   Resp 12   Ht 5' 9\" (1.753 m)   Wt 126 kg (276 lb 12.8 oz)   BMI 40.88 kg/m²     Physical Exam  Constitutional:       General: He is not in acute distress.     " Appearance: He is not toxic-appearing.   Cardiovascular:      Rate and Rhythm: Normal rate.      Heart sounds: No murmur heard.     No gallop.   Pulmonary:      Effort: No respiratory distress.      Breath sounds: No wheezing or rales.   Neurological:      Mental Status: He is alert.          Amrit Faust MD

## 2024-01-12 NOTE — ASSESSMENT & PLAN NOTE
Lab Results   Component Value Date    EGFR 59 01/09/2024    EGFR 50 10/20/2023    EGFR 47 09/01/2023    CREATININE 1.27 01/09/2024    CREATININE 1.44 (H) 10/20/2023    CREATININE 1.52 (H) 09/01/2023   Remains very stable.

## 2024-01-29 DIAGNOSIS — K21.9 GASTROESOPHAGEAL REFLUX DISEASE, UNSPECIFIED WHETHER ESOPHAGITIS PRESENT: ICD-10-CM

## 2024-01-29 RX ORDER — PANTOPRAZOLE SODIUM 40 MG/1
40 TABLET, DELAYED RELEASE ORAL
Qty: 90 TABLET | Refills: 0 | Status: SHIPPED | OUTPATIENT
Start: 2024-01-29

## 2024-02-05 ENCOUNTER — OFFICE VISIT (OUTPATIENT)
Dept: FAMILY MEDICINE CLINIC | Facility: CLINIC | Age: 64
End: 2024-02-05
Payer: MEDICARE

## 2024-02-05 VITALS
TEMPERATURE: 97.3 F | OXYGEN SATURATION: 97 % | BODY MASS INDEX: 40.67 KG/M2 | WEIGHT: 274.6 LBS | HEIGHT: 69 IN | HEART RATE: 66 BPM | SYSTOLIC BLOOD PRESSURE: 130 MMHG | DIASTOLIC BLOOD PRESSURE: 52 MMHG

## 2024-02-05 DIAGNOSIS — J01.41 ACUTE RECURRENT PANSINUSITIS: Primary | ICD-10-CM

## 2024-02-05 PROCEDURE — 99214 OFFICE O/P EST MOD 30 MIN: CPT | Performed by: INTERNAL MEDICINE

## 2024-02-05 RX ORDER — AMOXICILLIN AND CLAVULANATE POTASSIUM 875; 125 MG/1; MG/1
1 TABLET, FILM COATED ORAL EVERY 12 HOURS SCHEDULED
Qty: 14 TABLET | Refills: 0 | Status: SHIPPED | OUTPATIENT
Start: 2024-02-05 | End: 2024-02-12

## 2024-02-05 NOTE — ASSESSMENT & PLAN NOTE
Due to worsening of his symptoms will start him on Augmentin 875 mg twice daily for 7 days.  Side effects discussed.  He will continue with over-the-counter Mucinex.  Update if no better in 72 hours.  
line in appropriate postion/ultrasound/picc tip ends at SVC-RA, confirmed with 3CG ultrasound

## 2024-02-05 NOTE — PROGRESS NOTES
Assessment/Plan:    Acute recurrent pansinusitis  Due to worsening of his symptoms will start him on Augmentin 875 mg twice daily for 7 days.  Side effects discussed.  He will continue with over-the-counter Mucinex.  Update if no better in 72 hours.       Diagnoses and all orders for this visit:    Acute recurrent pansinusitis  -     amoxicillin-clavulanate (AUGMENTIN) 875-125 mg per tablet; Take 1 tablet by mouth every 12 (twelve) hours for 7 days          Subjective:      Patient ID: Faisal Smith is a 64 y.o. male.    Patient came today with complains of persistent cough associated with nasal congestion and pain in his sinuses that started roughly few weeks ago which was improving slowly and then got worse again.    Cough  Associated symptoms include headaches. Pertinent negatives include no chest pain, chills, fever, myalgias, rhinorrhea, sore throat or shortness of breath.   Headache  Fever  Associated symptoms include coughing and headaches. Pertinent negatives include no arthralgias, chest pain, chills, fatigue, fever, myalgias, nausea, sore throat or vomiting.       The following portions of the patient's history were reviewed and updated as appropriate: allergies, current medications, past family history, past medical history, past social history, past surgical history, and problem list.    Review of Systems   Constitutional:  Negative for appetite change, chills, fatigue and fever.   HENT:  Negative for rhinorrhea, sinus pain and sore throat.    Respiratory:  Positive for cough. Negative for chest tightness and shortness of breath.    Cardiovascular:  Negative for chest pain.   Gastrointestinal:  Negative for diarrhea, nausea and vomiting.   Musculoskeletal:  Negative for arthralgias and myalgias.   Neurological:  Positive for headaches.         Objective:      /52 (BP Location: Left arm, Patient Position: Sitting, Cuff Size: Large)   Pulse 66   Temp (!) 97.3 °F (36.3 °C) (Tympanic)   Ht 5'  "9\" (1.753 m)   Wt 125 kg (274 lb 9.6 oz)   SpO2 97%   BMI 40.55 kg/m²     No Known Allergies       Current Outpatient Medications:     allopurinol (ZYLOPRIM) 100 mg tablet, TAKE TWO TABLETS BY MOUTH DAILY, Disp: 90 tablet, Rfl: 0    amoxicillin-clavulanate (AUGMENTIN) 875-125 mg per tablet, Take 1 tablet by mouth every 12 (twelve) hours for 7 days, Disp: 14 tablet, Rfl: 0    doxazosin (CARDURA) 2 mg tablet, Take 1 tablet (2 mg total) by mouth daily at bedtime, Disp: 180 tablet, Rfl: 0    escitalopram (LEXAPRO) 10 mg tablet, Take 1 tablet (10 mg total) by mouth daily, Disp: 90 tablet, Rfl: 2    losartan (COZAAR) 100 MG tablet, Take 1 tablet (100 mg total) by mouth daily, Disp: 90 tablet, Rfl: 3    Magnesium Oxide 400 MG CAPS, Take 1 tablet (400 mg total) by mouth 2 (two) times a day, Disp: 180 tablet, Rfl: 3    metFORMIN (GLUCOPHAGE-XR) 750 mg 24 hr tablet, TAKE ONE TABLET BY MOUTH EVERY MORNING with breakfast, Disp: 90 tablet, Rfl: 0    NIFEdipine (ADALAT CC) 90 mg 24 hr tablet, TAKE ONE TABLET BY MOUTH EVERY DAY, Disp: 90 tablet, Rfl: 0    pantoprazole (PROTONIX) 40 mg tablet, Take 1 tablet by mouth daily before breakfast, Disp: 90 tablet, Rfl: 0    rosuvastatin (CRESTOR) 40 MG tablet, Take 1 tablet (40 mg total) by mouth daily, Disp: 90 tablet, Rfl: 2    Cholecalciferol (VITAMIN D3) 2000 units capsule, Take 1 capsule by mouth daily, Disp: , Rfl:      There are no Patient Instructions on file for this visit.        Physical Exam  Constitutional:       General: He is not in acute distress.     Appearance: He is not ill-appearing or toxic-appearing.   HENT:      Nose: Congestion present. No rhinorrhea.      Mouth/Throat:      Pharynx: No oropharyngeal exudate or posterior oropharyngeal erythema.   Pulmonary:      Effort: No respiratory distress.      Breath sounds: Rales present. No wheezing.         "

## 2024-02-08 ENCOUNTER — OFFICE VISIT (OUTPATIENT)
Dept: FAMILY MEDICINE CLINIC | Facility: CLINIC | Age: 64
End: 2024-02-08
Payer: MEDICARE

## 2024-02-08 VITALS
BODY MASS INDEX: 40.82 KG/M2 | DIASTOLIC BLOOD PRESSURE: 72 MMHG | SYSTOLIC BLOOD PRESSURE: 126 MMHG | WEIGHT: 276.4 LBS | HEART RATE: 68 BPM | OXYGEN SATURATION: 97 %

## 2024-02-08 DIAGNOSIS — R05.1 ACUTE COUGH: ICD-10-CM

## 2024-02-08 DIAGNOSIS — R06.2 WHEEZING: Primary | ICD-10-CM

## 2024-02-08 PROCEDURE — 99214 OFFICE O/P EST MOD 30 MIN: CPT | Performed by: INTERNAL MEDICINE

## 2024-02-08 RX ORDER — BENZONATATE 200 MG/1
200 CAPSULE ORAL 3 TIMES DAILY PRN
Qty: 30 CAPSULE | Refills: 0 | Status: SHIPPED | OUTPATIENT
Start: 2024-02-08

## 2024-02-08 RX ORDER — PREDNISONE 10 MG/1
TABLET ORAL
Qty: 28 TABLET | Refills: 0 | Status: SHIPPED | OUTPATIENT
Start: 2024-02-08

## 2024-02-08 NOTE — PROGRESS NOTES
Assessment/Plan:    Acute cough  Continue Augmentin current dose, will add prednisone due to new appearance of wheezing, side effects discussed.  Will also add benzonatate to help with with his symptoms.       Diagnoses and all orders for this visit:    Wheezing  -     predniSONE 10 mg tablet; Take 5 pills on day 1 and 2, 4 pills on days 3 and 4, 3 pills on day 5 and 6, 2 pills on days 7 and 8 then stop.    Acute cough  -     benzonatate (TESSALON) 200 MG capsule; Take 1 capsule (200 mg total) by mouth 3 (three) times a day as needed for cough          Subjective:      Patient ID: Faisal Smith is a 64 y.o. male.    Patient still complains of persistent cough with associated wheezing.  He symptoms got slightly better since he started his Augmentin.        The following portions of the patient's history were reviewed and updated as appropriate: allergies, current medications, past family history, past medical history, past social history, past surgical history, and problem list.    Review of Systems   Constitutional:  Negative for appetite change, chills, fatigue and fever.   HENT:  Negative for rhinorrhea, sinus pain and sore throat.    Respiratory:  Positive for cough. Negative for chest tightness and shortness of breath.    Cardiovascular:  Negative for chest pain.   Gastrointestinal:  Negative for diarrhea, nausea and vomiting.   Musculoskeletal:  Negative for arthralgias and myalgias.         Objective:      /72 (BP Location: Left arm, Patient Position: Sitting, Cuff Size: Large)   Pulse 68   Wt 125 kg (276 lb 6.4 oz)   SpO2 97%   BMI 40.82 kg/m²     No Known Allergies       Current Outpatient Medications:     allopurinol (ZYLOPRIM) 100 mg tablet, TAKE TWO TABLETS BY MOUTH DAILY, Disp: 90 tablet, Rfl: 0    amoxicillin-clavulanate (AUGMENTIN) 875-125 mg per tablet, Take 1 tablet by mouth every 12 (twelve) hours for 7 days, Disp: 14 tablet, Rfl: 0    benzonatate (TESSALON) 200 MG capsule, Take 1  capsule (200 mg total) by mouth 3 (three) times a day as needed for cough, Disp: 30 capsule, Rfl: 0    Cholecalciferol (VITAMIN D3) 2000 units capsule, Take 1 capsule by mouth daily, Disp: , Rfl:     doxazosin (CARDURA) 2 mg tablet, Take 1 tablet (2 mg total) by mouth daily at bedtime, Disp: 180 tablet, Rfl: 0    escitalopram (LEXAPRO) 10 mg tablet, Take 1 tablet (10 mg total) by mouth daily, Disp: 90 tablet, Rfl: 2    losartan (COZAAR) 100 MG tablet, Take 1 tablet (100 mg total) by mouth daily, Disp: 90 tablet, Rfl: 3    Magnesium Oxide 400 MG CAPS, Take 1 tablet (400 mg total) by mouth 2 (two) times a day, Disp: 180 tablet, Rfl: 3    metFORMIN (GLUCOPHAGE-XR) 750 mg 24 hr tablet, TAKE ONE TABLET BY MOUTH EVERY MORNING with breakfast, Disp: 90 tablet, Rfl: 0    NIFEdipine (ADALAT CC) 90 mg 24 hr tablet, TAKE ONE TABLET BY MOUTH EVERY DAY, Disp: 90 tablet, Rfl: 0    pantoprazole (PROTONIX) 40 mg tablet, Take 1 tablet by mouth daily before breakfast, Disp: 90 tablet, Rfl: 0    predniSONE 10 mg tablet, Take 5 pills on day 1 and 2, 4 pills on days 3 and 4, 3 pills on day 5 and 6, 2 pills on days 7 and 8 then stop., Disp: 28 tablet, Rfl: 0    rosuvastatin (CRESTOR) 40 MG tablet, Take 1 tablet (40 mg total) by mouth daily, Disp: 90 tablet, Rfl: 2     There are no Patient Instructions on file for this visit.        Physical Exam  Constitutional:       General: He is not in acute distress.     Appearance: He is not ill-appearing or toxic-appearing.   HENT:      Nose: Congestion present. No rhinorrhea.      Mouth/Throat:      Pharynx: No oropharyngeal exudate or posterior oropharyngeal erythema.   Pulmonary:      Effort: No respiratory distress.      Breath sounds: Wheezing and rales present.

## 2024-02-08 NOTE — ASSESSMENT & PLAN NOTE
Continue Augmentin current dose, will add prednisone due to new appearance of wheezing, side effects discussed.  Will also add benzonatate to help with with his symptoms.

## 2024-02-09 ENCOUNTER — TELEPHONE (OUTPATIENT)
Age: 64
End: 2024-02-09

## 2024-02-09 NOTE — TELEPHONE ENCOUNTER
Lilli from [facility] called requesting an order be place for C pap supply's. Once order has been place she would like order to be faxed to [phone]

## 2024-02-13 NOTE — PLAN OF CARE
Problem: Potential for Falls  Goal: Patient will remain free of falls  Description: INTERVENTIONS:  - Assess patient frequently for physical needs  -  Identify cognitive and physical deficits and behaviors that affect risk of falls    -  Tad fall precautions as indicated by assessment   - Educate patient/family on patient safety including physical limitations  - Instruct patient to call for assistance with activity based on assessment  - Modify environment to reduce risk of injury  - Consider OT/PT consult to assist with strengthening/mobility  Outcome: Progressing hide

## 2024-02-19 ENCOUNTER — TELEPHONE (OUTPATIENT)
Dept: FAMILY MEDICINE CLINIC | Facility: CLINIC | Age: 64
End: 2024-02-19

## 2024-02-21 ENCOUNTER — TELEPHONE (OUTPATIENT)
Dept: FAMILY MEDICINE CLINIC | Facility: CLINIC | Age: 64
End: 2024-02-21

## 2024-02-21 PROBLEM — J01.41 ACUTE RECURRENT PANSINUSITIS: Status: RESOLVED | Noted: 2024-02-05 | Resolved: 2024-02-21

## 2024-02-21 PROBLEM — R05.1 ACUTE COUGH: Status: RESOLVED | Noted: 2022-06-13 | Resolved: 2024-02-21

## 2024-02-21 NOTE — TELEPHONE ENCOUNTER
Contacted [facility] in regards to past CPAP. Rep kept putting me on hold from time to time for over 5 minutes and never came back on the line. She trasnferred me to compliance and after I relayed Pt info I got put on hold again. Due to high volume of Pts in office call was disconnected will CB later.

## 2024-02-21 NOTE — TELEPHONE ENCOUNTER
Called [facility] again to go over CPAP machine settings from order 10 years ago. I got transferred by Ronda to compliance dept for further assistance. Upon review the last order they have is from 2019 not 10 years ago. She stated the pressure setting was 10. There were no additional settings.

## 2024-02-21 NOTE — TELEPHONE ENCOUNTER
Pt called back, and he has no idea what the setting are he had the machine for 12 years or so  he stated it is made by [employer]. We will also call [facility] to see what was ordered.

## 2024-02-21 NOTE — TELEPHONE ENCOUNTER
Placed call to ask Pt if he used a fixed or auto CPAP in the past. Also need to know the pressure settings so I can order the supplies accordingly. Pt did not answer LM advising CB. Upon return call please transfer to [facility] to further assess.

## 2024-02-21 NOTE — TELEPHONE ENCOUNTER
Pt returning call back regarding cpap supplies. Warm transferred pt to [facility] to further assist.

## 2024-02-21 NOTE — PROGRESS NOTES
Contacted [facility] in regards to past CPAP. Rep kept putting me on hold from time to time for over 5 minutes and never came back on the line and said a word. Due to high volume of Pts call was disconnected.

## 2024-02-23 LAB

## 2024-02-26 DIAGNOSIS — M10.9 GOUT, UNSPECIFIED CAUSE, UNSPECIFIED CHRONICITY, UNSPECIFIED SITE: ICD-10-CM

## 2024-02-26 RX ORDER — ALLOPURINOL 100 MG/1
200 TABLET ORAL DAILY
Qty: 180 TABLET | Refills: 1 | Status: SHIPPED | OUTPATIENT
Start: 2024-02-26

## 2024-02-27 LAB

## 2024-03-04 ENCOUNTER — APPOINTMENT (OUTPATIENT)
Dept: RADIOLOGY | Facility: MEDICAL CENTER | Age: 64
End: 2024-03-04
Payer: MEDICARE

## 2024-03-04 ENCOUNTER — OFFICE VISIT (OUTPATIENT)
Dept: FAMILY MEDICINE CLINIC | Facility: CLINIC | Age: 64
End: 2024-03-04
Payer: MEDICARE

## 2024-03-04 VITALS
HEIGHT: 69 IN | RESPIRATION RATE: 20 BRPM | DIASTOLIC BLOOD PRESSURE: 68 MMHG | HEART RATE: 62 BPM | BODY MASS INDEX: 40.73 KG/M2 | OXYGEN SATURATION: 95 % | WEIGHT: 275 LBS | SYSTOLIC BLOOD PRESSURE: 138 MMHG

## 2024-03-04 DIAGNOSIS — R06.2 WHEEZING: ICD-10-CM

## 2024-03-04 DIAGNOSIS — I47.10 SVT (SUPRAVENTRICULAR TACHYCARDIA): ICD-10-CM

## 2024-03-04 DIAGNOSIS — R05.3 CHRONIC COUGH: Primary | ICD-10-CM

## 2024-03-04 DIAGNOSIS — R05.3 CHRONIC COUGH: ICD-10-CM

## 2024-03-04 PROCEDURE — G2211 COMPLEX E/M VISIT ADD ON: HCPCS | Performed by: FAMILY MEDICINE

## 2024-03-04 PROCEDURE — 99214 OFFICE O/P EST MOD 30 MIN: CPT | Performed by: FAMILY MEDICINE

## 2024-03-04 PROCEDURE — 71046 X-RAY EXAM CHEST 2 VIEWS: CPT

## 2024-03-04 RX ORDER — ALBUTEROL SULFATE 90 UG/1
2 AEROSOL, METERED RESPIRATORY (INHALATION) EVERY 6 HOURS PRN
Qty: 8.5 G | Refills: 1 | Status: SHIPPED | OUTPATIENT
Start: 2024-03-04

## 2024-03-04 NOTE — ASSESSMENT & PLAN NOTE
Check xray today, due to continued wheezing start albuterol as needed, if not resolving check PFTs

## 2024-03-04 NOTE — PROGRESS NOTES
Assessment/Plan:       Problem List Items Addressed This Visit          Cardiovascular and Mediastinum    SVT (supraventricular tachycardia)     No recent episodes, occurred during acute covid infection            Other    Chronic cough - Primary     Check xray today, due to continued wheezing start albuterol as needed, if not resolving check PFTs         Relevant Medications    albuterol (ProAir HFA) 90 mcg/act inhaler    Other Relevant Orders    XR chest pa & lateral     Other Visit Diagnoses       Wheezing        Relevant Medications    albuterol (ProAir HFA) 90 mcg/act inhaler              Subjective:      Patient ID: Faisal Smith is a 64 y.o. male.    HPI    64 year old male with pmh of htn. Svt, presenting for cough for one month.    Patient presenting for dry cough for one month, has been treated with augmentin and prednisione.    Has made some improvement but still having coughing spells.    No longer having shorntess of breath.    The following portions of the patient's history were reviewed and updated as appropriate: allergies, current medications, past family history, past medical history, past social history, past surgical history and problem list.      Current Outpatient Medications:     albuterol (ProAir HFA) 90 mcg/act inhaler, Inhale 2 puffs every 6 (six) hours as needed for wheezing, Disp: 8.5 g, Rfl: 1    allopurinol (ZYLOPRIM) 100 mg tablet, Take 2 tablets (200 mg total) by mouth daily, Disp: 180 tablet, Rfl: 1    benzonatate (TESSALON) 200 MG capsule, Take 1 capsule (200 mg total) by mouth 3 (three) times a day as needed for cough, Disp: 30 capsule, Rfl: 0    doxazosin (CARDURA) 2 mg tablet, Take 1 tablet (2 mg total) by mouth daily at bedtime, Disp: 180 tablet, Rfl: 0    escitalopram (LEXAPRO) 10 mg tablet, Take 1 tablet (10 mg total) by mouth daily, Disp: 90 tablet, Rfl: 2    losartan (COZAAR) 100 MG tablet, Take 1 tablet (100 mg total) by mouth daily, Disp: 90 tablet, Rfl: 3     "Magnesium Oxide 400 MG CAPS, Take 1 tablet (400 mg total) by mouth 2 (two) times a day, Disp: 180 tablet, Rfl: 3    metFORMIN (GLUCOPHAGE-XR) 750 mg 24 hr tablet, TAKE ONE TABLET BY MOUTH EVERY MORNING with breakfast, Disp: 90 tablet, Rfl: 0    NIFEdipine (ADALAT CC) 90 mg 24 hr tablet, TAKE ONE TABLET BY MOUTH EVERY DAY, Disp: 90 tablet, Rfl: 0    pantoprazole (PROTONIX) 40 mg tablet, Take 1 tablet by mouth daily before breakfast, Disp: 90 tablet, Rfl: 0    rosuvastatin (CRESTOR) 40 MG tablet, Take 1 tablet (40 mg total) by mouth daily, Disp: 90 tablet, Rfl: 2    Cholecalciferol (VITAMIN D3) 2000 units capsule, Take 1 capsule by mouth daily, Disp: , Rfl:     predniSONE 10 mg tablet, Take 5 pills on day 1 and 2, 4 pills on days 3 and 4, 3 pills on day 5 and 6, 2 pills on days 7 and 8 then stop. (Patient not taking: Reported on 3/4/2024), Disp: 28 tablet, Rfl: 0     Review of Systems   Constitutional:  Negative for activity change and appetite change.   Respiratory:  Positive for cough and wheezing. Negative for chest tightness and shortness of breath.    Cardiovascular:  Negative for chest pain and palpitations.   Gastrointestinal:  Negative for abdominal distention and abdominal pain.   Musculoskeletal:  Negative for arthralgias and back pain.         Objective:      /68 (BP Location: Right arm, Patient Position: Sitting, Cuff Size: Large)   Pulse 62   Resp 20   Ht 5' 9\" (1.753 m)   Wt 125 kg (275 lb)   SpO2 95%   BMI 40.61 kg/m²          Physical Exam  Constitutional:       Appearance: Normal appearance.   Cardiovascular:      Rate and Rhythm: Normal rate and regular rhythm.      Pulses: Normal pulses.   Pulmonary:      Effort: Pulmonary effort is normal.      Breath sounds: Wheezing present.      Comments: Faint wheezing with expiration  Abdominal:      General: Abdomen is flat.   Skin:     General: Skin is warm.   Neurological:      General: No focal deficit present.      Mental Status: He is alert " and oriented to person, place, and time.           Charles Lawson MD

## 2024-03-07 NOTE — PLAN OF CARE
Problem: Potential for Falls  Goal: Patient will remain free of falls  Description: INTERVENTIONS:  - Assess patient frequently for physical needs  -  Identify cognitive and physical deficits and behaviors that affect risk of falls    -  Gilmer fall precautions as indicated by assessment   - Educate patient/family on patient safety including physical limitations  - Instruct patient to call for assistance with activity based on assessment  - Modify environment to reduce risk of injury  - Consider OT/PT consult to assist with strengthening/mobility  Outcome: Progressing No

## 2024-03-08 DIAGNOSIS — E78.2 MIXED HYPERLIPIDEMIA: ICD-10-CM

## 2024-03-08 RX ORDER — ROSUVASTATIN CALCIUM 40 MG/1
40 TABLET, COATED ORAL DAILY
Qty: 90 TABLET | Refills: 1 | Status: SHIPPED | OUTPATIENT
Start: 2024-03-08

## 2024-03-13 LAB

## 2024-03-29 ENCOUNTER — TELEPHONE (OUTPATIENT)
Dept: FAMILY MEDICINE CLINIC | Facility: CLINIC | Age: 64
End: 2024-03-29

## 2024-04-15 ENCOUNTER — LAB (OUTPATIENT)
Dept: LAB | Facility: CLINIC | Age: 64
End: 2024-04-15
Payer: MEDICARE

## 2024-04-15 DIAGNOSIS — N18.31 STAGE 3A CHRONIC KIDNEY DISEASE (HCC): ICD-10-CM

## 2024-04-15 DIAGNOSIS — E78.5 HYPERLIPIDEMIA, UNSPECIFIED HYPERLIPIDEMIA TYPE: ICD-10-CM

## 2024-04-15 DIAGNOSIS — I10 ESSENTIAL HYPERTENSION: ICD-10-CM

## 2024-04-15 DIAGNOSIS — E11.9 TYPE 2 DIABETES MELLITUS WITHOUT COMPLICATION, WITHOUT LONG-TERM CURRENT USE OF INSULIN (HCC): ICD-10-CM

## 2024-04-15 DIAGNOSIS — E55.9 VITAMIN D DEFICIENCY: ICD-10-CM

## 2024-04-15 DIAGNOSIS — N20.0 CALCULUS OF KIDNEY: ICD-10-CM

## 2024-04-15 DIAGNOSIS — E66.01 MORBID OBESITY WITH BMI OF 40.0-44.9, ADULT (HCC): ICD-10-CM

## 2024-04-15 DIAGNOSIS — C62.92 SEMINOMA OF TESTIS, STAGE 3, LEFT (HCC): ICD-10-CM

## 2024-04-15 DIAGNOSIS — N18.32 STAGE 3B CHRONIC KIDNEY DISEASE (HCC): ICD-10-CM

## 2024-04-15 DIAGNOSIS — E83.42 HYPOMAGNESEMIA: ICD-10-CM

## 2024-04-15 LAB
25(OH)D3 SERPL-MCNC: 24.9 NG/ML (ref 30–100)
ALBUMIN SERPL BCP-MCNC: 3.9 G/DL (ref 3.5–5)
ANION GAP SERPL CALCULATED.3IONS-SCNC: 8 MMOL/L (ref 4–13)
BUN SERPL-MCNC: 25 MG/DL (ref 5–25)
CALCIUM SERPL-MCNC: 9.2 MG/DL (ref 8.4–10.2)
CHLORIDE SERPL-SCNC: 105 MMOL/L (ref 96–108)
CO2 SERPL-SCNC: 27 MMOL/L (ref 21–32)
CREAT SERPL-MCNC: 1.43 MG/DL (ref 0.6–1.3)
CREAT UR-MCNC: 101.7 MG/DL
GFR SERPL CREATININE-BSD FRML MDRD: 51 ML/MIN/1.73SQ M
GLUCOSE P FAST SERPL-MCNC: 135 MG/DL (ref 65–99)
MAGNESIUM SERPL-MCNC: 1.8 MG/DL (ref 1.9–2.7)
MICROALBUMIN UR-MCNC: <7 MG/L
MICROALBUMIN/CREAT 24H UR: <7 MG/G CREATININE (ref 0–30)
PHOSPHATE SERPL-MCNC: 3.6 MG/DL (ref 2.3–4.1)
POTASSIUM SERPL-SCNC: 4.6 MMOL/L (ref 3.5–5.3)
PTH-INTACT SERPL-MCNC: 61 PG/ML (ref 12–88)
SODIUM SERPL-SCNC: 140 MMOL/L (ref 135–147)

## 2024-04-15 PROCEDURE — 82043 UR ALBUMIN QUANTITATIVE: CPT

## 2024-04-15 PROCEDURE — 36415 COLL VENOUS BLD VENIPUNCTURE: CPT

## 2024-04-15 PROCEDURE — 83735 ASSAY OF MAGNESIUM: CPT

## 2024-04-15 PROCEDURE — 82570 ASSAY OF URINE CREATININE: CPT

## 2024-04-15 PROCEDURE — 83970 ASSAY OF PARATHORMONE: CPT

## 2024-04-15 PROCEDURE — 82306 VITAMIN D 25 HYDROXY: CPT

## 2024-04-15 PROCEDURE — 80069 RENAL FUNCTION PANEL: CPT

## 2024-04-16 ENCOUNTER — TELEPHONE (OUTPATIENT)
Dept: NEPHROLOGY | Facility: CLINIC | Age: 64
End: 2024-04-16

## 2024-04-16 NOTE — TELEPHONE ENCOUNTER
I spoke to Faisal he is aware of results.   - magnesium to be increased to two tablets daily.  - vitamin d to be increased from 200 units daily to 3000 units daily.

## 2024-04-16 NOTE — RESULT ENCOUNTER NOTE
Please let the patient know that most recent lab work in terms of renal parameters are stable.    Please advise patient magnesium level is running low please increase his magnesium to twice a day for now  Also to make sure he is taking at least 2000 units of vitamin D every day if he is then to increase it to 3000 units a day since his vitamin D level still running slightly low  Will discuss further at the upcoming visit, let me know if they have any questions or concerns.    Thanks

## 2024-04-16 NOTE — TELEPHONE ENCOUNTER
----- Message from Emely Bone MD sent at 4/16/2024  9:06 AM EDT -----  Please let the patient know that most recent lab work in terms of renal parameters are stable.    Please advise patient magnesium level is running low please increase his magnesium to twice a day for now  Also to make sure he is taking at least 2000 units of vitamin D every day if he is then to increase it to 3000 units a day since his vitamin D level still running slightly low  Will discuss further at the upcoming visit, let me know if they have any questions or concerns.    Thanks

## 2024-05-15 ENCOUNTER — TELEPHONE (OUTPATIENT)
Age: 64
End: 2024-05-15

## 2024-05-15 NOTE — TELEPHONE ENCOUNTER
Pt wife called in requesting if the patient needs any blood work to complete before his appt on 7/17/24. Please advise.

## 2024-06-07 ENCOUNTER — APPOINTMENT (OUTPATIENT)
Dept: LAB | Facility: CLINIC | Age: 64
End: 2024-06-07
Payer: MEDICARE

## 2024-06-07 DIAGNOSIS — C62.92 SEMINOMA OF TESTIS, STAGE 3, LEFT (HCC): ICD-10-CM

## 2024-06-07 DIAGNOSIS — N13.8 BPH WITH OBSTRUCTION/LOWER URINARY TRACT SYMPTOMS: ICD-10-CM

## 2024-06-07 DIAGNOSIS — N40.1 BPH WITH OBSTRUCTION/LOWER URINARY TRACT SYMPTOMS: ICD-10-CM

## 2024-06-07 DIAGNOSIS — C61 PROSTATE CANCER (HCC): ICD-10-CM

## 2024-06-07 LAB
AFP-TM SERPL-MCNC: 1.92 NG/ML (ref 0–9)
ALBUMIN SERPL BCP-MCNC: 4.1 G/DL (ref 3.5–5)
ALP SERPL-CCNC: 60 U/L (ref 34–104)
ALT SERPL W P-5'-P-CCNC: 23 U/L (ref 7–52)
ANION GAP SERPL CALCULATED.3IONS-SCNC: 13 MMOL/L (ref 4–13)
AST SERPL W P-5'-P-CCNC: 22 U/L (ref 13–39)
BASOPHILS # BLD AUTO: 0.1 THOUSANDS/ÂΜL (ref 0–0.1)
BASOPHILS NFR BLD AUTO: 1 % (ref 0–1)
BILIRUB SERPL-MCNC: 0.8 MG/DL (ref 0.2–1)
BUN SERPL-MCNC: 22 MG/DL (ref 5–25)
CALCIUM SERPL-MCNC: 8.9 MG/DL (ref 8.4–10.2)
CHLORIDE SERPL-SCNC: 106 MMOL/L (ref 96–108)
CO2 SERPL-SCNC: 25 MMOL/L (ref 21–32)
CREAT SERPL-MCNC: 1.44 MG/DL (ref 0.6–1.3)
EOSINOPHIL # BLD AUTO: 0.2 THOUSAND/ÂΜL (ref 0–0.61)
EOSINOPHIL NFR BLD AUTO: 2 % (ref 0–6)
ERYTHROCYTE [DISTWIDTH] IN BLOOD BY AUTOMATED COUNT: 12.7 % (ref 11.6–15.1)
GFR SERPL CREATININE-BSD FRML MDRD: 50 ML/MIN/1.73SQ M
GLUCOSE P FAST SERPL-MCNC: 121 MG/DL (ref 65–99)
HCG-TM SERPL-SCNC: 1.3 MLU/ML
HCT VFR BLD AUTO: 41.9 % (ref 36.5–49.3)
HGB BLD-MCNC: 13.4 G/DL (ref 12–17)
IMM GRANULOCYTES # BLD AUTO: 0.03 THOUSAND/UL (ref 0–0.2)
IMM GRANULOCYTES NFR BLD AUTO: 0 % (ref 0–2)
LDH SERPL-CCNC: 174 U/L (ref 140–271)
LYMPHOCYTES # BLD AUTO: 3.72 THOUSANDS/ÂΜL (ref 0.6–4.47)
LYMPHOCYTES NFR BLD AUTO: 42 % (ref 14–44)
MCH RBC QN AUTO: 30.2 PG (ref 26.8–34.3)
MCHC RBC AUTO-ENTMCNC: 32 G/DL (ref 31.4–37.4)
MCV RBC AUTO: 95 FL (ref 82–98)
MONOCYTES # BLD AUTO: 0.7 THOUSAND/ÂΜL (ref 0.17–1.22)
MONOCYTES NFR BLD AUTO: 8 % (ref 4–12)
NEUTROPHILS # BLD AUTO: 4.14 THOUSANDS/ÂΜL (ref 1.85–7.62)
NEUTS SEG NFR BLD AUTO: 47 % (ref 43–75)
NRBC BLD AUTO-RTO: 0 /100 WBCS
PLATELET # BLD AUTO: 271 THOUSANDS/UL (ref 149–390)
PMV BLD AUTO: 9.9 FL (ref 8.9–12.7)
POTASSIUM SERPL-SCNC: 4.5 MMOL/L (ref 3.5–5.3)
PROT SERPL-MCNC: 6.9 G/DL (ref 6.4–8.4)
PSA SERPL-MCNC: 6.98 NG/ML (ref 0–4)
RBC # BLD AUTO: 4.43 MILLION/UL (ref 3.88–5.62)
SODIUM SERPL-SCNC: 144 MMOL/L (ref 135–147)
WBC # BLD AUTO: 8.89 THOUSAND/UL (ref 4.31–10.16)

## 2024-06-07 PROCEDURE — 82105 ALPHA-FETOPROTEIN SERUM: CPT

## 2024-06-07 PROCEDURE — 80053 COMPREHEN METABOLIC PANEL: CPT

## 2024-06-07 PROCEDURE — 84702 CHORIONIC GONADOTROPIN TEST: CPT

## 2024-06-07 PROCEDURE — 85025 COMPLETE CBC W/AUTO DIFF WBC: CPT

## 2024-06-07 PROCEDURE — 83615 LACTATE (LD) (LDH) ENZYME: CPT

## 2024-06-07 PROCEDURE — 84153 ASSAY OF PSA TOTAL: CPT

## 2024-06-07 PROCEDURE — 36415 COLL VENOUS BLD VENIPUNCTURE: CPT

## 2024-06-10 DIAGNOSIS — I10 ESSENTIAL HYPERTENSION: ICD-10-CM

## 2024-06-10 RX ORDER — NIFEDIPINE 90 MG/1
90 TABLET, FILM COATED, EXTENDED RELEASE ORAL DAILY
Qty: 90 TABLET | Refills: 1 | Status: SHIPPED | OUTPATIENT
Start: 2024-06-10

## 2024-06-12 ENCOUNTER — HOSPITAL ENCOUNTER (OUTPATIENT)
Dept: CT IMAGING | Facility: HOSPITAL | Age: 64
Discharge: HOME | End: 2024-06-12
Attending: INTERNAL MEDICINE
Payer: MEDICARE

## 2024-06-12 DIAGNOSIS — C62.92 SEMINOMA OF TESTIS, STAGE 3, LEFT (HCC): ICD-10-CM

## 2024-06-12 PROCEDURE — G1004 CDSM NDSC: HCPCS

## 2024-06-12 PROCEDURE — 74176 CT ABD & PELVIS W/O CONTRAST: CPT

## 2024-06-13 ENCOUNTER — HOSPITAL ENCOUNTER (OUTPATIENT)
Facility: MEDICAL CENTER | Age: 64
Discharge: HOME | End: 2024-06-13
Attending: UROLOGY
Payer: MEDICARE

## 2024-06-13 DIAGNOSIS — C61 PROSTATE CANCER (HCC): ICD-10-CM

## 2024-06-13 PROCEDURE — 72197 MRI PELVIS W/O & W/DYE: CPT

## 2024-06-13 PROCEDURE — G1004 CDSM NDSC: HCPCS

## 2024-06-13 PROCEDURE — 76377 3D RENDER W/INTRP POSTPROCES: CPT

## 2024-06-13 PROCEDURE — A9585 GADOBUTROL INJECTION: HCPCS | Performed by: UROLOGY

## 2024-06-13 RX ORDER — GADOBUTROL 604.72 MG/ML
12 INJECTION INTRAVENOUS
Status: COMPLETED | OUTPATIENT
Start: 2024-06-13 | End: 2024-06-13

## 2024-06-13 RX ADMIN — GADOBUTROL 12 ML: 604.72 INJECTION INTRAVENOUS at 11:38

## 2024-06-19 ENCOUNTER — TELEMEDICINE (OUTPATIENT)
Dept: HEMATOLOGY ONCOLOGY | Facility: CLINIC | Age: 64
End: 2024-06-19
Payer: MEDICARE

## 2024-06-19 DIAGNOSIS — C61 PROSTATE CANCER (HCC): Primary | ICD-10-CM

## 2024-06-19 DIAGNOSIS — C62.92 SEMINOMA OF TESTIS, STAGE 3, LEFT (HCC): ICD-10-CM

## 2024-06-19 PROCEDURE — 99442 PR PHYS/QHP TELEPHONE EVALUATION 11-20 MIN: CPT | Performed by: PHYSICIAN ASSISTANT

## 2024-06-19 NOTE — PROGRESS NOTES
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  He was admitted to the hospital after 10 days of therapy with nausea, weakness.  He was found to have acute renal insufficiency with creatinine of 4, .  He received IV fluid with gradual improvement. He also developed neutropenia and thrombocytopenia grade 4 requiring platelet transfusion.     CT scan C/A/P 4/15/21 showed significant response of chemotherapy in the abdominal lymphadenopathy.   Creatinine improved to 1.73 5/7/21     Cisplatin changed to carboplatin AUC 5 on day 1 and etoposide 100 milligram/meter squared for 5 days.   Despite this, he ended up in the hospital with pancytopenia, requiring blood and platelet transfusion, ileus, weight loss, nausea, vomiting, loss of appetite with poor tolerance of therapy.    Received 2 cycles 5/17/21 and 6/7/21.      Subsequent CAT scans and the latest in June 2024 showed no evidence of disease, normal tumor markers     At this time we will follow-up on yearly basis with CT scan of the abdomen and pelvis without contrast, CBC, CMP, LDH, alpha-fetoprotein and hCG        Colonoscopy in January 2023 showed multiple polyps with external hemorrhoids        Adenocarcinoma of the prostate Barnhart pattern score 6 in 1 core dx 9/24/2019.    PSA elevating.  MRI prostate 6/13/24 - results pending.  Follows  with Dr. Manzo        HPI:    Faisal Smith is a 64-year-old  male seen initially 4/1/21 regarding stage II B (pT2, pN2, S1) left-sided pure seminoma     He has a history of prostate cancer, nephrolithiasis, benign prostatic hypertrophy, polymyalgia, hypertension, sleep apnea, obesity, GERD, hiatal hernia, gout.      His prostate cancer was diagnosed on September 2019 with single core with 4 mm focus of prostate cancer with very low risk group. He is on active surveillance with PSA 4.9 on March 2020 followed by Urology.       He was found to have enlargement of the left testicle.      Ultrasound of the scrotum on 10/24/2020 showed left testicle measuring 7.2 x 3.9 x 5.1 cm with lobulated contour and diffuse heterogeneous echotexture     Repeat ultrasound on 01/20/2021 showed 9.1 x 4.9 x 6.8 cm increase in size compared to prior ultrasound     LDH was elevated at 376 () normal hCG, alpha fetoprotein     Status post left inguinal orchiectomy on 02/19/2021, showing pure seminoma primary measuring 8.2 cm, abuts and invades into but not through tunica albuginea, invades hilar fat and spermatic cord soft tissue with lymphovascular invasion, spermatic cord is not involved by the tumor confirmed by 2nd opinion at [facility].      CT scan of the chest abdomen and pelvis on 03/20/2021 showed enlarged left iliac and left para-aortic lymph nodes for example left para-aortic lymph node measuring 2.3 cm, left external iliac lymph node measuring 30 mm no evidence of metastatic disease in the chest  He had pain at the left orchiectomy site on and off most likely responding to acetaminophen from healing.     He reported 12 lb weight loss after the surgery.      4/1/21:  cr 1.13, hemoglobin 1 3.3, MCV of 89, white blood cell count 7.75, 51% neutrophils, 33% lymphocytes, 12% monocytes, platelets 289     4 cycles of  Etoposide/ cisplatin per the NCCN guidelines recommended.   For chemotherapy-induced  neutropenia, pegfilgrastim recommended.     The chemotherapy was complicated with dehydration, acute kidney injury requiring admission to the hospital 4/15-4/20/21.   His creatinine at time of discharge was 2.3.  It  improved to 1.73 5/7/21     CT scan C/A/P  4/15/21 in the hospital showed significant reduction in seminoma with decrease of the abdominal lymphadenopathy.     We changed cisplatin to carboplatin, despite that 2nd cycle was significant for pancytopenia, requiring admission to the hospital, ileus, CT scan showed no evidence of residual lymphadenopathy or masses in June 2021. We decided not to proceed with any additional chemotherapy because of poor tolerance      After 3 months CT scan of the abdomen and pelvis in September 2021 showed 1.1 cm para-aortic lymph node no new lesions, he has normal hCG as well as LDH      Admitted 11/15-11/20/21  2nd to abdominal pain, fatigue, malaise.  Tested positive for COVID  Noncontrast CT scan abdomen and pelvis 11/16/21 -  Scattered and ill-defined groundglass opacities in the visualized bilateral lungs correlates with the patient's history of COVID 19 infection.   Fatty infiltration of the liver is suspected. In the setting of abdominal pain and/or elevated liver function tests, consider steatohepatitis.  Cholelithiasis without discrete evidence of acute cholecystitis, bilateral nephrolithiasis, no hydronephrosis     CT scan of the abdomen and pelvis in January 2022 showed no evidence of malignancy or lymphadenopathy     CT scan in August 2022 showed no evidence of disease normal hCG, LDH, alpha fetoprotein          Interval History  6/12/24  CT A/P- PAMELA  6/13/24 MRI prostate-  results pending    Chief complaint is that since undergoing chemotherapy, his stamina is not as good as it used to be    Review of Systems     Physical Exam     Past Medical History:  No date: BPH without obstruction/lower urinary tract symptoms  No date: Cancer (HCC)      Comment:  prostate    No date: CPAP (continuous positive airway pressure) dependence  No date: Elevated PSA  No date: GERD (gastroesophageal reflux disease)  No date: Gout  No date: Hyperlipidemia  No date: Hypertension  No date: Kidney stone  No date: Obese abdomen  No date: Obesity  No date: Polymyalgia (HCC)  No date: Prostate cancer (HCC)  No date: Rash      Comment:  under both arms and in between legs - family doctor                aware - pt uses Desitin  No date: Sleep apnea  No date: Testicular carcinoma (HCC)  No date: Urinary frequency  No date: Urinary urgency  No date: Wears glasses     Past Surgical History:  07/2020: CARPAL TUNNEL RELEASE; Bilateral  No date: COLONOSCOPY  2001: CYSTOSCOPY W/ LASER LITHOTRIPSY  2011: CYSTOSTOMY W/ STENT INSERTION  No date: ESOPHAGOGASTRODUODENOSCOPY  2011: EXTRACORPOREAL SHOCK WAVE LITHOTRIPSY; Right  No date: HERNIA REPAIR  No date: KNEE ARTHROSCOPY; Left  4/29/2018: MD CYSTO/URETERO W/LITHOTRIPSY &INDWELL STENT INSRT; Right      Comment:  Procedure: CYSTOSCOPY URETEROSCOPY, RETROGRADE PYELOGRAM               AND INSERTION STENT URETERAL;  Surgeon: Carlin Taylor MD;  Location: [facility];  Service: Urology  2/19/2021: MD ORCHIECTOMY RADICAL TUMOR INGUINAL APPROACH; Left      Comment:  Procedure: Radical  ORCHIECTOMY;  Surgeon: Fawad Manzo MD;  Location: [facility];  Service: Urology     Current Outpatient Medications on File Prior to Visit   Medication Sig Dispense Refill    albuterol (ProAir HFA) 90 mcg/act inhaler Inhale 2 puffs every 6 (six) hours as needed for wheezing 8.5 g 1    allopurinol (ZYLOPRIM) 100 mg tablet Take 2 tablets (200 mg total) by mouth daily 180 tablet 1    benzonatate (TESSALON) 200 MG capsule Take 1 capsule (200 mg total) by mouth 3 (three) times a day as needed for cough 30 capsule 0    Cholecalciferol (VITAMIN D3) 2000 units capsule Take 1 capsule by mouth daily      doxazosin (CARDURA) 2 mg tablet Take 1 tablet (2 mg  total) by mouth daily at bedtime 180 tablet 0    escitalopram (LEXAPRO) 10 mg tablet Take 1 tablet (10 mg total) by mouth daily 90 tablet 2    losartan (COZAAR) 100 MG tablet Take 1 tablet (100 mg total) by mouth daily 90 tablet 3    Magnesium Oxide 400 MG CAPS Take 1 tablet (400 mg total) by mouth 2 (two) times a day 180 tablet 3    metFORMIN (GLUCOPHAGE-XR) 750 mg 24 hr tablet TAKE ONE TABLET BY MOUTH EVERY MORNING with breakfast 90 tablet 0    NIFEdipine (ADALAT CC) 90 mg 24 hr tablet TAKE ONE TABLET BY MOUTH EVERY DAY 90 tablet 1    pantoprazole (PROTONIX) 40 mg tablet Take 1 tablet by mouth daily before breakfast 90 tablet 0    predniSONE 10 mg tablet Take 5 pills on day 1 and 2, 4 pills on days 3 and 4, 3 pills on day 5 and 6, 2 pills on days 7 and 8 then stop. (Patient not taking: Reported on 3/4/2024) 28 tablet 0    rosuvastatin (CRESTOR) 40 MG tablet Take 1 tablet (40 mg total) by mouth daily 90 tablet 1    [DISCONTINUED] methocarbamol (ROBAXIN) 500 mg tablet Take 1 tablet (500 mg total) by mouth 3 (three) times a day for 10 days 30 tablet 0     No current facility-administered medications on file prior to visit.          The patient was identified by name and date of birth. Faisal Smith was informed that this is a telemedicine visit and that the visit is being conducted through Telephone.  My office door was closed. No one else was in the room.  He acknowledged consent and understanding of privacy and security of the video platform. The patient has agreed to participate and understands they can discontinue the visit at any time.    Patient is aware this is a billable service.     I invested 20 minutes thoroughly reviewing the patient's medical history and discussing the care plan directly with the patient.      Verification of patient location:  Patient is located in [place] where I have an active license.

## 2024-06-21 ENCOUNTER — OCCMED (OUTPATIENT)
Age: 64
End: 2024-06-21

## 2024-06-25 ENCOUNTER — OFFICE VISIT (OUTPATIENT)
Dept: UROLOGY | Facility: MEDICAL CENTER | Age: 64
End: 2024-06-25
Payer: MEDICARE

## 2024-06-25 VITALS
WEIGHT: 271 LBS | HEIGHT: 69 IN | BODY MASS INDEX: 40.14 KG/M2 | SYSTOLIC BLOOD PRESSURE: 138 MMHG | OXYGEN SATURATION: 96 % | HEART RATE: 60 BPM | DIASTOLIC BLOOD PRESSURE: 78 MMHG

## 2024-06-25 DIAGNOSIS — N13.8 PROSTATE NODULE WITH URINARY OBSTRUCTION: ICD-10-CM

## 2024-06-25 DIAGNOSIS — C61 PROSTATE CANCER (HCC): Primary | ICD-10-CM

## 2024-06-25 DIAGNOSIS — N13.8 BPH WITH OBSTRUCTION/LOWER URINARY TRACT SYMPTOMS: ICD-10-CM

## 2024-06-25 DIAGNOSIS — N40.1 BPH WITH OBSTRUCTION/LOWER URINARY TRACT SYMPTOMS: ICD-10-CM

## 2024-06-25 DIAGNOSIS — N40.3 PROSTATE NODULE WITH URINARY OBSTRUCTION: ICD-10-CM

## 2024-06-25 DIAGNOSIS — C62.12 SEMINOMA OF DESCENDED LEFT TESTIS (HCC): ICD-10-CM

## 2024-06-25 PROCEDURE — 99214 OFFICE O/P EST MOD 30 MIN: CPT | Performed by: UROLOGY

## 2024-06-25 NOTE — LETTER
June 25, 2024     Amrit Faust MD  [address]  [address]  [place] [address]    Patient: Faisal Smith   YOB: 1960   Date of Visit: 6/25/2024       Dear Dr. Faust:    Thank you for referring Faisal Smith to me for evaluation. Below are my notes for this consultation.    If you have questions, please do not hesitate to call me. I look forward to following your patient along with you.         Sincerely,        Fawad Manzo MD        CC: No Recipients    Fawad Manzo MD  6/25/2024  9:18 AM  Sign when Signing Visit  H&P Exam - Urology   Faisal Smith 64 y.o. male MRN: [id number]  Unit/Bed#:  Encounter: [id number]    Assessment & Plan    Assessment:  Elevated PSA-6.98  PI-RADS 4 lesion of the prostate on multiparametric MRI  BPH with lower tract symptoms-AUA symptom score 12 with patient noting 3/5 or urgency with 1/5 weakening of urinary stream and 2/5 intermittency and feelings of incomplete emptying.  Plan:  MR fusion guided transrectal ultrasound-guided transperineal needle biopsies of the prostate    History of Present Illness  HPI:  Faisal Smith is a 64 y.o. male who presents with a history of adenocarcinoma of the prostate on prostate biopsy 2019-Jose pattern score 6.  The patient was on active surveillance and there is a slight increase in his PSA to 6.98.  Multiparametric MRI revealed a 0.4 x 0.3 cm lesion in the right posterolateral peripheral zone in the mid gland consistent with a PI-RADS 4 lesion.  The patient now will present for MR fusion biopsy.  The procedure was described step-by-step all questions answered and the risks of bleeding infection false negative biopsies urinary retention possible need for operative intervention or catheter were explained.  The patient provided verbal and signed informed consent.    .    Review of Systems    Historical Information  Past Medical History:   Diagnosis Date   • BPH without  "obstruction/lower urinary tract symptoms    • Cancer (HCC)     prostate    • CPAP (continuous positive airway pressure) dependence    • Elevated PSA    • GERD (gastroesophageal reflux disease)    • Gout    • Hyperlipidemia    • Hypertension    • Kidney stone    • Obese abdomen    • Obesity    • Polymyalgia (HCC)    • Prostate cancer (HCC)    • Rash     under both arms and in between legs - family doctor aware - pt uses Desitin   • Sleep apnea    • Testicular carcinoma (HCC)    • Urinary frequency    • Urinary urgency    • Wears glasses      Past Surgical History:   Procedure Laterality Date   • CARPAL TUNNEL RELEASE Bilateral 07/2020   • COLONOSCOPY     • CYSTOSCOPY W/ LASER LITHOTRIPSY  2001   • CYSTOSTOMY W/ STENT INSERTION  2011   • ESOPHAGOGASTRODUODENOSCOPY     • EXTRACORPOREAL SHOCK WAVE LITHOTRIPSY Right 2011   • HERNIA REPAIR     • KNEE ARTHROSCOPY Left    • ND CYSTO/URETERO W/LITHOTRIPSY &INDWELL STENT INSRT Right 4/29/2018    Procedure: CYSTOSCOPY URETEROSCOPY, RETROGRADE PYELOGRAM AND INSERTION STENT URETERAL;  Surgeon: Carlin Taylor MD;  Location[facility];  Service: Urology   • ND ORCHIECTOMY RADICAL TUMOR INGUINAL APPROACH Left 2/19/2021    Procedure: Radical  ORCHIECTOMY;  Surgeon: Fawad Manzo MD;  Location[facility];  Service: Urology     Social History  Social History     Substance and Sexual Activity   Alcohol Use Not Currently     Social History     Substance and Sexual Activity   Drug Use No     Social History     Tobacco Use   Smoking Status Never   Smokeless Tobacco Never     Family History:   Family History   Problem Relation Age of Onset   • Nephrolithiasis Father    • Alzheimer's disease Mother        Meds/Allergies  all medications and allergies reviewed  No Known Allergies    Objective  Vitals: Blood pressure 138/78, pulse 60, height 5' 9\" (1.753 m), weight 123 kg (271 lb), SpO2 96%.    [unfilled]    Invasive Devices       None                   Physical Exam  Vitals " reviewed.   Constitutional:       General: He is not in acute distress.     Appearance: Normal appearance. He is obese. He is not ill-appearing, toxic-appearing or diaphoretic.   HENT:      Head: Normocephalic and atraumatic.      Nose: Nose normal.      Mouth/Throat:      Mouth: Mucous membranes are moist.   Eyes:      Extraocular Movements: Extraocular movements intact.   Cardiovascular:      Rate and Rhythm: Normal rate and regular rhythm.   Pulmonary:      Effort: No respiratory distress.      Breath sounds: No wheezing, rhonchi or rales.   Abdominal:      General: There is no distension.      Palpations: Abdomen is soft.      Tenderness: There is no abdominal tenderness. There is no guarding or rebound.   Musculoskeletal:      Cervical back: Neck supple.   Skin:     General: Skin is dry.   Neurological:      Mental Status: He is alert and oriented to person, place, and time.   Psychiatric:         Mood and Affect: Mood normal.         Behavior: Behavior normal.         Thought Content: Thought content normal.         Judgment: Judgment normal.         Lab Results: I have personally reviewed pertinent reports.    Imaging: I have personally reviewed pertinent reports.   and I have personally reviewed pertinent films in PACS  EKG, Pathology, and Other Studies: I have personally reviewed pertinent reports.   and I have personally reviewed pertinent films in PACS  VTE Prophylaxis: Sequential compression device (Venodyne)     Code Status: [unfilled]  Advance Directive and Living Will:      Power of :    POLST:      Counseling / Coordination of Care  Total floor / unit time spent today 30 minutes.  Greater than 50% of total time was spent with the patient and / or family counseling and / or coordination of care.  A description of the counseling / coordination of care:  .

## 2024-06-25 NOTE — PROGRESS NOTES
H&P Exam - Urology   Faisal Smith 64 y.o. male MRN: [id number]  Unit/Bed#:  Encounter: [id number]    Assessment & Plan     Assessment:  Elevated PSA-6.98  PI-RADS 4 lesion of the prostate on multiparametric MRI  BPH with lower tract symptoms-AUA symptom score 12 with patient noting 3/5 or urgency with 1/5 weakening of urinary stream and 2/5 intermittency and feelings of incomplete emptying.  Plan:  MR fusion guided transrectal ultrasound-guided transperineal needle biopsies of the prostate    History of Present Illness   HPI:  Faisal Smith is a 64 y.o. male who presents with a history of adenocarcinoma of the prostate on prostate biopsy 2019-[place] pattern score 6.  The patient was on active surveillance and there is a slight increase in his PSA to 6.98.  Multiparametric MRI revealed a 0.4 x 0.3 cm lesion in the right posterolateral peripheral zone in the mid gland consistent with a PI-RADS 4 lesion.  The patient now will present for MR fusion biopsy.  The procedure was described step-by-step all questions answered and the risks of bleeding infection false negative biopsies urinary retention possible need for operative intervention or catheter were explained.  The patient provided verbal and signed informed consent.    .    Review of Systems    Historical Information   Past Medical History:   Diagnosis Date    BPH without obstruction/lower urinary tract symptoms     Cancer (HCC)     prostate     CPAP (continuous positive airway pressure) dependence     Elevated PSA     GERD (gastroesophageal reflux disease)     Gout     Hyperlipidemia     Hypertension     Kidney stone     Obese abdomen     Obesity     Polymyalgia (HCC)     Prostate cancer (HCC)     Rash     under both arms and in between legs - family doctor aware - pt uses Desitin    Sleep apnea     Testicular carcinoma (HCC)     Urinary frequency     Urinary urgency     Wears glasses      Past Surgical History:   Procedure Laterality Date    CARPAL  "TUNNEL RELEASE Bilateral 07/2020    COLONOSCOPY      CYSTOSCOPY W/ LASER LITHOTRIPSY  2001    CYSTOSTOMY W/ STENT INSERTION  2011    ESOPHAGOGASTRODUODENOSCOPY      EXTRACORPOREAL SHOCK WAVE LITHOTRIPSY Right 2011    HERNIA REPAIR      KNEE ARTHROSCOPY Left     [place] CYSTO/URETERO W/LITHOTRIPSY &INDWELL STENT INSRT Right 4/29/2018    Procedure: CYSTOSCOPY URETEROSCOPY, RETROGRADE PYELOGRAM AND INSERTION STENT URETERAL;  Surgeon: Carlin Taylor MD;  Location: [facility];  Service: Urology    [place] ORCHIECTOMY RADICAL TUMOR INGUINAL APPROACH Left 2/19/2021    Procedure: Radical  ORCHIECTOMY;  Surgeon: Fawad Manzo MD;  Location[facility];  Service: Urology     Social History   Social History     Substance and Sexual Activity   Alcohol Use Not Currently     Social History     Substance and Sexual Activity   Drug Use No     Social History     Tobacco Use   Smoking Status Never   Smokeless Tobacco Never     Family History:   Family History   Problem Relation Age of Onset    Nephrolithiasis Father     Alzheimer's disease Mother        Meds/Allergies   all medications and allergies reviewed  No Known Allergies    Objective   Vitals: Blood pressure 138/78, pulse 60, height 5' 9\" (1.753 m), weight 123 kg (271 lb), SpO2 96%.    [unfilled]    Invasive Devices       None                   Physical Exam  Vitals reviewed.   Constitutional:       General: He is not in acute distress.     Appearance: Normal appearance. He is obese. He is not ill-appearing, toxic-appearing or diaphoretic.   HENT:      Head: Normocephalic and atraumatic.      Nose: Nose normal.      Mouth/Throat:      Mouth: Mucous membranes are moist.   Eyes:      Extraocular Movements: Extraocular movements intact.   Cardiovascular:      Rate and Rhythm: Normal rate and regular rhythm.   Pulmonary:      Effort: No respiratory distress.      Breath sounds: No wheezing, rhonchi or rales.   Abdominal:      General: There is no distension.      Palpations: " Abdomen is soft.      Tenderness: There is no abdominal tenderness. There is no guarding or rebound.   Musculoskeletal:      Cervical back: Neck supple.   Skin:     General: Skin is dry.   Neurological:      Mental Status: He is alert and oriented to person, place, and time.   Psychiatric:         Mood and Affect: Mood normal.         Behavior: Behavior normal.         Thought Content: Thought content normal.         Judgment: Judgment normal.         Lab Results: I have personally reviewed pertinent reports.    Imaging: I have personally reviewed pertinent reports.   and I have personally reviewed pertinent films in PACS  EKG, Pathology, and Other Studies: I have personally reviewed pertinent reports.   and I have personally reviewed pertinent films in PACS  VTE Prophylaxis: Sequential compression device (Venodyne)     Code Status: [unfilled]  Advance Directive and Living Will:      Power of :    POLST:      Counseling / Coordination of Care  Total floor / unit time spent today 30 minutes.  Greater than 50% of total time was spent with the patient and / or family counseling and / or coordination of care.  A description of the counseling / coordination of care:  .

## 2024-06-25 NOTE — H&P
H&P Exam - Urology   Faisal Smith 64 y.o. male MRN: [id number]  Unit/Bed#:  Encounter: [id number]    Assessment & Plan    Assessment:  Elevated PSA-6.98  PI-RADS 4 lesion of the prostate on multiparametric MRI  BPH with lower tract symptoms-AUA symptom score 12 with patient noting 3/5 or urgency with 1/5 weakening of urinary stream and 2/5 intermittency and feelings of incomplete emptying.  Plan:  MR fusion guided transrectal ultrasound-guided transperineal needle biopsies of the prostate    History of Present Illness  HPI:  Faisal Smith is a 64 y.o. male who presents with a history of adenocarcinoma of the prostate on prostate biopsy 2019-[place] pattern score 6.  The patient was on active surveillance and there is a slight increase in his PSA to 6.98.  Multiparametric MRI revealed a 0.4 x 0.3 cm lesion in the right posterolateral peripheral zone in the mid gland consistent with a PI-RADS 4 lesion.  The patient now will present for MR fusion biopsy.  The procedure was described step-by-step all questions answered and the risks of bleeding infection false negative biopsies urinary retention possible need for operative intervention or catheter were explained.  The patient provided verbal and signed informed consent.    .    Review of Systems    Historical Information  Past Medical History:   Diagnosis Date    BPH without obstruction/lower urinary tract symptoms     Cancer (HCC)     prostate     CPAP (continuous positive airway pressure) dependence     Elevated PSA     GERD (gastroesophageal reflux disease)     Gout     Hyperlipidemia     Hypertension     Kidney stone     Obese abdomen     Obesity     Polymyalgia (HCC)     Prostate cancer (HCC)     Rash     under both arms and in between legs - family doctor aware - pt uses Desitin    Sleep apnea     Testicular carcinoma (HCC)     Urinary frequency     Urinary urgency     Wears glasses      Past Surgical History:   Procedure Laterality Date    CARPAL  "TUNNEL RELEASE Bilateral 07/2020    COLONOSCOPY      CYSTOSCOPY W/ LASER LITHOTRIPSY  2001    CYSTOSTOMY W/ STENT INSERTION  2011    ESOPHAGOGASTRODUODENOSCOPY      EXTRACORPOREAL SHOCK WAVE LITHOTRIPSY Right 2011    HERNIA REPAIR      KNEE ARTHROSCOPY Left     [place] CYSTO/URETERO W/LITHOTRIPSY &INDWELL STENT INSRT Right 4/29/2018    Procedure: CYSTOSCOPY URETEROSCOPY, RETROGRADE PYELOGRAM AND INSERTION STENT URETERAL;  Surgeon: Carlin Taylor MD;  Location[facility];  Service: Urology    [place] ORCHIECTOMY RADICAL TUMOR INGUINAL APPROACH Left 2/19/2021    Procedure: Radical  ORCHIECTOMY;  Surgeon: Fawad Manzo MD;  [facility];  Service: Urology     Social History  Social History     Substance and Sexual Activity   Alcohol Use Not Currently     Social History     Substance and Sexual Activity   Drug Use No     Social History     Tobacco Use   Smoking Status Never   Smokeless Tobacco Never     Family History:   Family History   Problem Relation Age of Onset    Nephrolithiasis Father     Alzheimer's disease Mother        Meds/Allergies  all medications and allergies reviewed  No Known Allergies    Objective  Vitals: Blood pressure 138/78, pulse 60, height 5' 9\" (1.753 m), weight 123 kg (271 lb), SpO2 96%.    [unfilled]    Invasive Devices       None                   Physical Exam  Vitals reviewed.   Constitutional:       General: He is not in acute distress.     Appearance: Normal appearance. He is obese. He is not ill-appearing, toxic-appearing or diaphoretic.   HENT:      Head: Normocephalic and atraumatic.      Nose: Nose normal.      Mouth/Throat:      Mouth: Mucous membranes are moist.   Eyes:      Extraocular Movements: Extraocular movements intact.   Cardiovascular:      Rate and Rhythm: Normal rate and regular rhythm.   Pulmonary:      Effort: No respiratory distress.      Breath sounds: No wheezing, rhonchi or rales.   Abdominal:      General: There is no distension.      Palpations: " Abdomen is soft.      Tenderness: There is no abdominal tenderness. There is no guarding or rebound.   Musculoskeletal:      Cervical back: Neck supple.   Skin:     General: Skin is dry.   Neurological:      Mental Status: He is alert and oriented to person, place, and time.   Psychiatric:         Mood and Affect: Mood normal.         Behavior: Behavior normal.         Thought Content: Thought content normal.         Judgment: Judgment normal.         Lab Results: I have personally reviewed pertinent reports.    Imaging: I have personally reviewed pertinent reports.   and I have personally reviewed pertinent films in PACS  EKG, Pathology, and Other Studies: I have personally reviewed pertinent reports.   and I have personally reviewed pertinent films in PACS  VTE Prophylaxis: Sequential compression device (Venodyne)     Code Status: [unfilled]  Advance Directive and Living Will:      Power of :    POLST:      Counseling / Coordination of Care  Total floor / unit time spent today 30 minutes.  Greater than 50% of total time was spent with the patient and / or family counseling and / or coordination of care.  A description of the counseling / coordination of care:  .

## 2024-07-05 ENCOUNTER — TELEPHONE (OUTPATIENT)
Dept: UROLOGY | Facility: MEDICAL CENTER | Age: 64
End: 2024-07-05

## 2024-07-11 ENCOUNTER — TELEPHONE (OUTPATIENT)
Age: 64
End: 2024-07-11

## 2024-07-11 DIAGNOSIS — N13.8 PROSTATE NODULE WITH URINARY OBSTRUCTION: ICD-10-CM

## 2024-07-11 DIAGNOSIS — N18.32 STAGE 3B CHRONIC KIDNEY DISEASE (HCC): ICD-10-CM

## 2024-07-11 DIAGNOSIS — N18.2 TYPE 2 DIABETES MELLITUS WITH STAGE 2 CHRONIC KIDNEY DISEASE, WITHOUT LONG-TERM CURRENT USE OF INSULIN  (HCC): ICD-10-CM

## 2024-07-11 DIAGNOSIS — E11.22 TYPE 2 DIABETES MELLITUS WITH STAGE 2 CHRONIC KIDNEY DISEASE, WITHOUT LONG-TERM CURRENT USE OF INSULIN  (HCC): ICD-10-CM

## 2024-07-11 DIAGNOSIS — N18.31 STAGE 3A CHRONIC KIDNEY DISEASE (HCC): ICD-10-CM

## 2024-07-11 DIAGNOSIS — N40.3 PROSTATE NODULE WITH URINARY OBSTRUCTION: ICD-10-CM

## 2024-07-11 DIAGNOSIS — I10 ESSENTIAL HYPERTENSION: Primary | ICD-10-CM

## 2024-07-11 DIAGNOSIS — E83.42 HYPOMAGNESEMIA: ICD-10-CM

## 2024-07-11 NOTE — TELEPHONE ENCOUNTER
Pt called back to speak with his SS to schedule a date for his biopsy. Pt asking for a return call to coordinate a date.     Call back: [phone]

## 2024-07-11 NOTE — TELEPHONE ENCOUNTER
The pt called to schedule a followup appt and will need new lab slips entered in epic and mailed to him (verified address in epic) He is scheduled for November 2024

## 2024-07-15 ENCOUNTER — PREP FOR PROCEDURE (OUTPATIENT)
Dept: UROLOGY | Facility: MEDICAL CENTER | Age: 64
End: 2024-07-15

## 2024-07-15 DIAGNOSIS — C61 PROSTATE CANCER (HCC): Primary | ICD-10-CM

## 2024-07-15 DIAGNOSIS — E11.22 TYPE 2 DIABETES MELLITUS WITH STAGE 2 CHRONIC KIDNEY DISEASE, WITHOUT LONG-TERM CURRENT USE OF INSULIN  (HCC): ICD-10-CM

## 2024-07-15 DIAGNOSIS — Z01.810 PRE-OPERATIVE CARDIOVASCULAR EXAMINATION: ICD-10-CM

## 2024-07-15 DIAGNOSIS — N18.2 TYPE 2 DIABETES MELLITUS WITH STAGE 2 CHRONIC KIDNEY DISEASE, WITHOUT LONG-TERM CURRENT USE OF INSULIN  (HCC): ICD-10-CM

## 2024-07-15 DIAGNOSIS — R39.89 SUSPECTED UTI: ICD-10-CM

## 2024-07-15 DIAGNOSIS — R97.20 ELEVATED PSA: ICD-10-CM

## 2024-07-15 DIAGNOSIS — Z01.812 PRE-OPERATIVE LABORATORY EXAMINATION: ICD-10-CM

## 2024-07-17 ENCOUNTER — OFFICE VISIT (OUTPATIENT)
Dept: FAMILY MEDICINE CLINIC | Facility: CLINIC | Age: 64
End: 2024-07-17
Payer: MEDICARE

## 2024-07-17 VITALS
DIASTOLIC BLOOD PRESSURE: 68 MMHG | RESPIRATION RATE: 20 BRPM | SYSTOLIC BLOOD PRESSURE: 132 MMHG | TEMPERATURE: 97.8 F | OXYGEN SATURATION: 94 % | HEART RATE: 76 BPM | WEIGHT: 273.2 LBS | BODY MASS INDEX: 40.46 KG/M2 | HEIGHT: 69 IN

## 2024-07-17 DIAGNOSIS — C61 PROSTATE CANCER (HCC): ICD-10-CM

## 2024-07-17 DIAGNOSIS — N18.31 STAGE 3A CHRONIC KIDNEY DISEASE (HCC): ICD-10-CM

## 2024-07-17 DIAGNOSIS — N18.2 TYPE 2 DIABETES MELLITUS WITH STAGE 2 CHRONIC KIDNEY DISEASE, WITHOUT LONG-TERM CURRENT USE OF INSULIN  (HCC): ICD-10-CM

## 2024-07-17 DIAGNOSIS — F32.0 MILD MAJOR DEPRESSION, SINGLE EPISODE (HCC): ICD-10-CM

## 2024-07-17 DIAGNOSIS — I10 ESSENTIAL HYPERTENSION: ICD-10-CM

## 2024-07-17 DIAGNOSIS — E78.2 MIXED HYPERLIPIDEMIA: Primary | ICD-10-CM

## 2024-07-17 DIAGNOSIS — E11.22 TYPE 2 DIABETES MELLITUS WITH STAGE 2 CHRONIC KIDNEY DISEASE, WITHOUT LONG-TERM CURRENT USE OF INSULIN  (HCC): ICD-10-CM

## 2024-07-17 PROBLEM — M54.12 CERVICAL RADICULOPATHY: Status: RESOLVED | Noted: 2023-07-17 | Resolved: 2024-07-17

## 2024-07-17 PROBLEM — J40 BRONCHITIS: Status: RESOLVED | Noted: 2023-11-06 | Resolved: 2024-07-17

## 2024-07-17 LAB — SL AMB POCT HEMOGLOBIN AIC: 7 (ref ?–6.5)

## 2024-07-17 PROCEDURE — 83036 HEMOGLOBIN GLYCOSYLATED A1C: CPT | Performed by: INTERNAL MEDICINE

## 2024-07-17 PROCEDURE — 99214 OFFICE O/P EST MOD 30 MIN: CPT | Performed by: INTERNAL MEDICINE

## 2024-07-17 NOTE — ASSESSMENT & PLAN NOTE
Lab Results   Component Value Date    EGFR 50 06/07/2024    EGFR 51 04/15/2024    EGFR 59 01/09/2024    CREATININE 1.44 (H) 06/07/2024    CREATININE 1.43 (H) 04/15/2024    CREATININE 1.27 01/09/2024   Stable.

## 2024-07-17 NOTE — ASSESSMENT & PLAN NOTE
Hemoglobin A1c of 7 today, continue current dose of metformin.  Continue with low-carb diet.  Lab Results   Component Value Date    HGBA1C 7.0 (A) 07/17/2024

## 2024-07-17 NOTE — PROGRESS NOTES
Assessment/Plan:    Essential hypertension  Blood pressure is very well-controlled on current meds.  Continue the same.    Type 2 diabetes mellitus with stage 2 chronic kidney disease, without long-term current use of insulin   Hemoglobin A1c of 7 today, continue current dose of metformin.  Continue with low-carb diet.  Lab Results   Component Value Date    HGBA1C 7.0 (A) 07/17/2024       Stage 3a chronic kidney disease (HCC)  Lab Results   Component Value Date    EGFR 50 06/07/2024    EGFR 51 04/15/2024    EGFR 59 01/09/2024    CREATININE 1.44 (H) 06/07/2024    CREATININE 1.43 (H) 04/15/2024    CREATININE 1.27 01/09/2024   Stable.    Mild major depression, single episode (HCC)  He feels that his depression is very stable right now so he would like to taper down his Lexapro, he will take 5 mg for 2 weeks then he will try to stop.  Update in few weeks.       Diagnoses and all orders for this visit:    Mixed hyperlipidemia  -     Lipid panel; Future    Type 2 diabetes mellitus with stage 2 chronic kidney disease, without long-term current use of insulin  (HCC)  -     POCT hemoglobin A1c    Essential hypertension    Stage 3a chronic kidney disease (HCC)    Prostate cancer (HCC)    Mild major depression, single episode (HCC)          Subjective:      Patient ID: Faisal Smith is a 64 y.o. male.    Patient came today for follow-up on his chronic medical problems.        The following portions of the patient's history were reviewed and updated as appropriate: allergies, current medications, past family history, past medical history, past social history, past surgical history, and problem list.    Review of Systems   Constitutional:  Negative for appetite change, chills, fatigue and fever.   HENT:  Negative for rhinorrhea, sinus pain and sore throat.    Respiratory:  Positive for cough. Negative for chest tightness and shortness of breath.    Cardiovascular:  Negative for chest pain.   Gastrointestinal:  Negative for  "diarrhea, nausea and vomiting.   Musculoskeletal:  Negative for arthralgias and myalgias.         Objective:      /68 (BP Location: Right arm, Patient Position: Sitting, Cuff Size: Large)   Pulse 76   Temp 97.8 °F (36.6 °C) (Skin)   Resp 20   Ht 5' 9\" (1.753 m)   Wt 124 kg (273 lb 3.2 oz)   SpO2 94%   BMI 40.34 kg/m²     No Known Allergies       Current Outpatient Medications:     allopurinol (ZYLOPRIM) 100 mg tablet, Take 2 tablets (200 mg total) by mouth daily, Disp: 180 tablet, Rfl: 1    doxazosin (CARDURA) 2 mg tablet, Take 1 tablet (2 mg total) by mouth daily at bedtime, Disp: 180 tablet, Rfl: 0    escitalopram (LEXAPRO) 10 mg tablet, Take 1 tablet (10 mg total) by mouth daily, Disp: 90 tablet, Rfl: 2    losartan (COZAAR) 100 MG tablet, Take 1 tablet (100 mg total) by mouth daily, Disp: 90 tablet, Rfl: 3    Magnesium Oxide 400 MG CAPS, Take 1 tablet (400 mg total) by mouth 2 (two) times a day, Disp: 180 tablet, Rfl: 3    metFORMIN (GLUCOPHAGE-XR) 750 mg 24 hr tablet, TAKE ONE TABLET BY MOUTH EVERY MORNING with breakfast, Disp: 90 tablet, Rfl: 0    NIFEdipine (ADALAT CC) 90 mg 24 hr tablet, TAKE ONE TABLET BY MOUTH EVERY DAY, Disp: 90 tablet, Rfl: 1    pantoprazole (PROTONIX) 40 mg tablet, Take 1 tablet by mouth daily before breakfast, Disp: 90 tablet, Rfl: 0    rosuvastatin (CRESTOR) 40 MG tablet, Take 1 tablet (40 mg total) by mouth daily, Disp: 90 tablet, Rfl: 1    Cholecalciferol (VITAMIN D3) 2000 units capsule, Take 1 capsule by mouth daily, Disp: , Rfl:      There are no Patient Instructions on file for this visit.        Physical Exam  Constitutional:       General: He is not in acute distress.     Appearance: He is not ill-appearing or toxic-appearing.   HENT:      Nose: No congestion or rhinorrhea.      Mouth/Throat:      Pharynx: No oropharyngeal exudate or posterior oropharyngeal erythema.   Cardiovascular:      Heart sounds: Murmur heard.      No gallop.   Pulmonary:      Effort: No " respiratory distress.      Breath sounds: No wheezing or rales.

## 2024-07-17 NOTE — ASSESSMENT & PLAN NOTE
He feels that his depression is very stable right now so he would like to taper down his Lexapro, he will take 5 mg for 2 weeks then he will try to stop.  Update in few weeks.

## 2024-07-26 LAB
LEFT EYE DIABETIC RETINOPATHY: NORMAL
RIGHT EYE DIABETIC RETINOPATHY: NORMAL

## 2024-08-23 DIAGNOSIS — E11.9 TYPE 2 DIABETES MELLITUS WITHOUT COMPLICATION, WITHOUT LONG-TERM CURRENT USE OF INSULIN (HCC): ICD-10-CM

## 2024-08-23 RX ORDER — METFORMIN HYDROCHLORIDE 750 MG/1
750 TABLET, EXTENDED RELEASE ORAL
Qty: 90 TABLET | Refills: 1 | Status: SHIPPED | OUTPATIENT
Start: 2024-08-23

## 2024-08-26 ENCOUNTER — APPOINTMENT (OUTPATIENT)
Dept: LAB | Facility: HOSPITAL | Age: 64
End: 2024-08-26
Payer: MEDICARE

## 2024-08-26 DIAGNOSIS — R97.20 ELEVATED PSA: ICD-10-CM

## 2024-08-26 DIAGNOSIS — C61 PROSTATE CANCER (HCC): ICD-10-CM

## 2024-08-26 DIAGNOSIS — Z01.810 PRE-OPERATIVE CARDIOVASCULAR EXAMINATION: ICD-10-CM

## 2024-08-26 DIAGNOSIS — R39.89 SUSPECTED UTI: ICD-10-CM

## 2024-08-26 DIAGNOSIS — N18.2 TYPE 2 DIABETES MELLITUS WITH STAGE 2 CHRONIC KIDNEY DISEASE, WITHOUT LONG-TERM CURRENT USE OF INSULIN  (HCC): ICD-10-CM

## 2024-08-26 DIAGNOSIS — Z01.812 PRE-OPERATIVE LABORATORY EXAMINATION: ICD-10-CM

## 2024-08-26 DIAGNOSIS — E11.22 TYPE 2 DIABETES MELLITUS WITH STAGE 2 CHRONIC KIDNEY DISEASE, WITHOUT LONG-TERM CURRENT USE OF INSULIN  (HCC): ICD-10-CM

## 2024-08-26 LAB
ALBUMIN SERPL BCG-MCNC: 4 G/DL (ref 3.5–5)
ALP SERPL-CCNC: 66 U/L (ref 34–104)
ALT SERPL W P-5'-P-CCNC: 32 U/L (ref 7–52)
ANION GAP SERPL CALCULATED.3IONS-SCNC: 6 MMOL/L (ref 4–13)
AST SERPL W P-5'-P-CCNC: 28 U/L (ref 13–39)
ATRIAL RATE: 63 BPM
BASOPHILS # BLD AUTO: 0.07 THOUSANDS/ÂΜL (ref 0–0.1)
BASOPHILS NFR BLD AUTO: 1 % (ref 0–1)
BILIRUB SERPL-MCNC: 0.67 MG/DL (ref 0.2–1)
BUN SERPL-MCNC: 27 MG/DL (ref 5–25)
CALCIUM SERPL-MCNC: 9.7 MG/DL (ref 8.4–10.2)
CHLORIDE SERPL-SCNC: 107 MMOL/L (ref 96–108)
CO2 SERPL-SCNC: 26 MMOL/L (ref 21–32)
CREAT SERPL-MCNC: 1.44 MG/DL (ref 0.6–1.3)
EOSINOPHIL # BLD AUTO: 0.19 THOUSAND/ÂΜL (ref 0–0.61)
EOSINOPHIL NFR BLD AUTO: 2 % (ref 0–6)
ERYTHROCYTE [DISTWIDTH] IN BLOOD BY AUTOMATED COUNT: 13.5 % (ref 11.6–15.1)
EST. AVERAGE GLUCOSE BLD GHB EST-MCNC: 177 MG/DL
GFR SERPL CREATININE-BSD FRML MDRD: 50 ML/MIN/1.73SQ M
GLUCOSE P FAST SERPL-MCNC: 159 MG/DL (ref 65–99)
HBA1C MFR BLD: 7.8 %
HCT VFR BLD AUTO: 40.9 % (ref 36.5–49.3)
HGB BLD-MCNC: 13.5 G/DL (ref 12–17)
IMM GRANULOCYTES # BLD AUTO: 0.03 THOUSAND/UL (ref 0–0.2)
IMM GRANULOCYTES NFR BLD AUTO: 0 % (ref 0–2)
LYMPHOCYTES # BLD AUTO: 3.65 THOUSANDS/ÂΜL (ref 0.6–4.47)
LYMPHOCYTES NFR BLD AUTO: 40 % (ref 14–44)
MCH RBC QN AUTO: 29.8 PG (ref 26.8–34.3)
MCHC RBC AUTO-ENTMCNC: 33 G/DL (ref 31.4–37.4)
MCV RBC AUTO: 90 FL (ref 82–98)
MONOCYTES # BLD AUTO: 0.78 THOUSAND/ÂΜL (ref 0.17–1.22)
MONOCYTES NFR BLD AUTO: 9 % (ref 4–12)
NEUTROPHILS # BLD AUTO: 4.46 THOUSANDS/ÂΜL (ref 1.85–7.62)
NEUTS SEG NFR BLD AUTO: 48 % (ref 43–75)
NRBC BLD AUTO-RTO: 0 /100 WBCS
P AXIS: 47 DEGREES
PLATELET # BLD AUTO: 229 THOUSANDS/UL (ref 149–390)
PMV BLD AUTO: 9.4 FL (ref 8.9–12.7)
POTASSIUM SERPL-SCNC: 4.3 MMOL/L (ref 3.5–5.3)
PR INTERVAL: 178 MS
PROT SERPL-MCNC: 7.2 G/DL (ref 6.4–8.4)
QRS AXIS: 14 DEGREES
QRSD INTERVAL: 88 MS
QT INTERVAL: 452 MS
QTC INTERVAL: 462 MS
RBC # BLD AUTO: 4.53 MILLION/UL (ref 3.88–5.62)
SODIUM SERPL-SCNC: 139 MMOL/L (ref 135–147)
T WAVE AXIS: 172 DEGREES
VENTRICULAR RATE: 63 BPM
WBC # BLD AUTO: 9.18 THOUSAND/UL (ref 4.31–10.16)

## 2024-08-26 PROCEDURE — 80053 COMPREHEN METABOLIC PANEL: CPT

## 2024-08-26 PROCEDURE — 87086 URINE CULTURE/COLONY COUNT: CPT

## 2024-08-26 PROCEDURE — 93010 ELECTROCARDIOGRAM REPORT: CPT | Performed by: INTERNAL MEDICINE

## 2024-08-26 PROCEDURE — 85025 COMPLETE CBC W/AUTO DIFF WBC: CPT

## 2024-08-26 PROCEDURE — 83036 HEMOGLOBIN GLYCOSYLATED A1C: CPT

## 2024-08-26 PROCEDURE — 36415 COLL VENOUS BLD VENIPUNCTURE: CPT

## 2024-08-27 LAB — BACTERIA UR CULT: NORMAL

## 2024-08-28 ENCOUNTER — ANESTHESIA EVENT (OUTPATIENT)
Dept: PERIOP | Facility: HOSPITAL | Age: 64
End: 2024-08-28
Payer: MEDICARE

## 2024-09-06 RX ORDER — ACETAMINOPHEN 500 MG
500 TABLET ORAL EVERY 6 HOURS PRN
COMMUNITY

## 2024-09-06 NOTE — PRE-PROCEDURE INSTRUCTIONS
Pre-Surgery Instructions:   Medication Instructions    acetaminophen (TYLENOL) 500 mg tablet Uses PRN- OK to take day of surgery    allopurinol (ZYLOPRIM) 100 mg tablet Take day of surgery.    Cholecalciferol (VITAMIN D3) 2000 units capsule Stop taking 7 days prior to surgery.    doxazosin (CARDURA) 2 mg tablet Take night before surgery    losartan (COZAAR) 100 MG tablet Hold day of surgery.    Magnesium Oxide 400 MG CAPS Stop taking 7 days prior to surgery.    metFORMIN (GLUCOPHAGE-XR) 750 mg 24 hr tablet Hold day of surgery.    NIFEdipine (ADALAT CC) 90 mg 24 hr tablet Take day of surgery.    pantoprazole (PROTONIX) 40 mg tablet Take day of surgery.    rosuvastatin (CRESTOR) 40 MG tablet Take day of surgery.    Medication instructions for day surgery reviewed. Please use only a sip of water to take your instructed medications. Avoid all over the counter vitamins, supplements and NSAIDS for one week prior to surgery per anesthesia guidelines. Tylenol is ok to take as needed.     You will receive a call one business day prior to surgery with an arrival time and hospital directions. If your surgery is scheduled on a Monday, the hospital will be calling you on the Friday prior to your surgery. If you have not heard from anyone by 8pm, please call the hospital supervisor through the hospital  at [phone]. ([place] [phone] or [place] [phone]).    Do not eat or drink anything after midnight the night before your surgery, including candy, mints, lifesavers, or chewing gum. Do not drink alcohol 24hrs before your surgery. Try not to smoke at least 24hrs before your surgery.       Follow the pre surgery showering instructions as listed in the “My Surgical Experience Booklet” or otherwise provided by your surgeon's office. Do not use a blade to shave the surgical area 1 week before surgery. It is okay to use a clean electric clippers up to 24 hours before surgery. Do not apply any lotions, creams,  including makeup, cologne, deodorant, or perfumes after showering on the day of your surgery. Do not use dry shampoo, hair spray, hair gel, or any type of hair products.     No contact lenses, eye make-up, or artificial eyelashes. Remove nail polish, including gel polish, and any artificial, gel, or acrylic nails if possible. Remove all jewelry including rings and body piercing jewelry.     Wear causal clothing that is easy to take on and off. Consider your type of surgery.    Keep any valuables, jewelry, piercings at home. Please bring any specially ordered equipment (sling, braces) if indicated.    Arrange for a responsible person to drive you to and from the hospital on the day of your surgery. Please confirm the visitor policy for the day of your procedure when you receive your phone call with an arrival time.     Call the surgeon's office with any new illnesses, exposures, or additional questions prior to surgery.    Please reference your “My Surgical Experience Booklet” for additional information to prepare for your upcoming surgery.

## 2024-09-11 ENCOUNTER — HOSPITAL ENCOUNTER (OUTPATIENT)
Facility: HOSPITAL | Age: 64
Setting detail: OUTPATIENT SURGERY
Discharge: HOME | End: 2024-09-11
Attending: UROLOGY | Admitting: UROLOGY
Payer: MEDICARE

## 2024-09-11 ENCOUNTER — ANESTHESIA (OUTPATIENT)
Dept: PERIOP | Facility: HOSPITAL | Age: 64
End: 2024-09-11
Payer: MEDICARE

## 2024-09-11 VITALS
WEIGHT: 274.03 LBS | HEART RATE: 64 BPM | DIASTOLIC BLOOD PRESSURE: 59 MMHG | OXYGEN SATURATION: 93 % | SYSTOLIC BLOOD PRESSURE: 132 MMHG | TEMPERATURE: 97.5 F | HEIGHT: 69 IN | RESPIRATION RATE: 18 BRPM | BODY MASS INDEX: 40.59 KG/M2

## 2024-09-11 DIAGNOSIS — N40.3 PROSTATE NODULE WITH URINARY OBSTRUCTION: ICD-10-CM

## 2024-09-11 DIAGNOSIS — N13.8 PROSTATE NODULE WITH URINARY OBSTRUCTION: ICD-10-CM

## 2024-09-11 DIAGNOSIS — C61 PROSTATE CANCER (HCC): ICD-10-CM

## 2024-09-11 PROCEDURE — 88344 IMHCHEM/IMCYTCHM EA MLT ANTB: CPT | Performed by: PATHOLOGY

## 2024-09-11 PROCEDURE — 76942 ECHO GUIDE FOR BIOPSY: CPT | Performed by: UROLOGY

## 2024-09-11 PROCEDURE — G0416 PROSTATE BIOPSY, ANY MTHD: HCPCS | Performed by: PATHOLOGY

## 2024-09-11 PROCEDURE — 55700 PR PROSTATE NEEDLE BIOPSY ANY APPROACH: CPT | Performed by: UROLOGY

## 2024-09-11 PROCEDURE — NC001 PR NO CHARGE: Performed by: UROLOGY

## 2024-09-11 RX ORDER — PROPOFOL 10 MG/ML
INJECTION, EMULSION INTRAVENOUS AS NEEDED
Status: DISCONTINUED | OUTPATIENT
Start: 2024-09-11 | End: 2024-09-11

## 2024-09-11 RX ORDER — PROPOFOL 10 MG/ML
INJECTION, EMULSION INTRAVENOUS CONTINUOUS PRN
Status: DISCONTINUED | OUTPATIENT
Start: 2024-09-11 | End: 2024-09-11

## 2024-09-11 RX ORDER — ONDANSETRON 2 MG/ML
INJECTION INTRAMUSCULAR; INTRAVENOUS AS NEEDED
Status: DISCONTINUED | OUTPATIENT
Start: 2024-09-11 | End: 2024-09-11

## 2024-09-11 RX ORDER — LIDOCAINE HYDROCHLORIDE 10 MG/ML
INJECTION, SOLUTION EPIDURAL; INFILTRATION; INTRACAUDAL; PERINEURAL AS NEEDED
Status: DISCONTINUED | OUTPATIENT
Start: 2024-09-11 | End: 2024-09-11

## 2024-09-11 RX ORDER — BUPIVACAINE HYDROCHLORIDE 5 MG/ML
INJECTION, SOLUTION EPIDURAL; INTRACAUDAL AS NEEDED
Status: DISCONTINUED | OUTPATIENT
Start: 2024-09-11 | End: 2024-09-11 | Stop reason: HOSPADM

## 2024-09-11 RX ORDER — SODIUM CHLORIDE 9 MG/ML
125 INJECTION, SOLUTION INTRAVENOUS CONTINUOUS
Status: DISCONTINUED | OUTPATIENT
Start: 2024-09-11 | End: 2024-09-11 | Stop reason: HOSPADM

## 2024-09-11 RX ORDER — FENTANYL CITRATE 50 UG/ML
INJECTION, SOLUTION INTRAMUSCULAR; INTRAVENOUS AS NEEDED
Status: DISCONTINUED | OUTPATIENT
Start: 2024-09-11 | End: 2024-09-11

## 2024-09-11 RX ORDER — MIDAZOLAM HYDROCHLORIDE 2 MG/2ML
INJECTION, SOLUTION INTRAMUSCULAR; INTRAVENOUS AS NEEDED
Status: DISCONTINUED | OUTPATIENT
Start: 2024-09-11 | End: 2024-09-11

## 2024-09-11 RX ADMIN — PROPOFOL 50 MG: 10 INJECTION, EMULSION INTRAVENOUS at 11:47

## 2024-09-11 RX ADMIN — PROPOFOL 50 MG: 10 INJECTION, EMULSION INTRAVENOUS at 11:53

## 2024-09-11 RX ADMIN — PROPOFOL 100 MCG/KG/MIN: 10 INJECTION, EMULSION INTRAVENOUS at 11:47

## 2024-09-11 RX ADMIN — FENTANYL CITRATE 100 MCG: 50 INJECTION INTRAMUSCULAR; INTRAVENOUS at 11:47

## 2024-09-11 RX ADMIN — ONDANSETRON 4 MG: 2 INJECTION INTRAMUSCULAR; INTRAVENOUS at 11:47

## 2024-09-11 RX ADMIN — Medication 3000 MG: at 11:50

## 2024-09-11 RX ADMIN — SODIUM CHLORIDE 125 ML/HR: 0.9 INJECTION, SOLUTION INTRAVENOUS at 09:35

## 2024-09-11 RX ADMIN — MIDAZOLAM 2 MG: 1 INJECTION INTRAMUSCULAR; INTRAVENOUS at 11:43

## 2024-09-11 RX ADMIN — LIDOCAINE HYDROCHLORIDE 100 MG: 10 INJECTION, SOLUTION EPIDURAL; INFILTRATION; INTRACAUDAL; PERINEURAL at 11:47

## 2024-09-11 NOTE — ANESTHESIA POSTPROCEDURE EVALUATION
Post-Op Assessment Note    CV Status:  Stable  Pain Score: 0    Pain management: adequate       Mental Status:  Alert and awake   Hydration Status:  Euvolemic   PONV Controlled:  Controlled   Airway Patency:  Patent     Post Op Vitals Reviewed: Yes    No anethesia notable event occurred.    Staff: CRNA               BP   116/56   Temp 98.5   Pulse 75   Resp 12   SpO2 97% 8L FM

## 2024-09-11 NOTE — H&P
"Faisal is a 64-year-old male with a history of [place] 6 prostate cancer on active surveillance.  His most recent PSA level is 6.98.  This is his highest PSA level to date.  His most recent multiparametric MRI of the prostate reveals a 57 g gland with a PI-RADS 4 lesion in the right posterior peripheral zone.  He presents today for a surveillance biopsy.    /65   Pulse 77   Temp 97.6 °F (36.4 °C) (Temporal)   Resp 16   Ht 5' 9\" (1.753 m)   Wt 124 kg (274 lb 0.5 oz)   SpO2 96%   BMI 40.47 kg/m²     On examination he is in no acute distress.  Cardiac is regular.  Respiratory no distress.  Abdomen soft nontender nondistended.  Skin is warm.  Extremities without edema.    Impression: History of [place] 6 prostate cancer on active surveillance, elevated PSA, multiparametric MRI of the prostate with PI-RADS 4 scoring    Plan: I recommend a standard transperineal 20 core saturation biopsy along with 4 cores from the PI-RADS 4 lesion in the right peripheral zone.  Risk of the procedure including, not limited to bleeding, infection, sepsis, hematuria, and urinary retention were discussed and reviewed.  Informed consent obtained.  "

## 2024-09-11 NOTE — ANESTHESIA PREPROCEDURE EVALUATION
Procedure:  TRANSPERINEAL MRI FUSION BX PROSTATE (Perineum)    Relevant Problems   ANESTHESIA   (+) History of unintended awareness under general anesthesia      CARDIO   (+) Essential hypertension   (+) Heart murmur   (+) Hyperlipidemia   (+) SVT (supraventricular tachycardia)      ENDO   (+) Type 2 diabetes mellitus with stage 2 chronic kidney disease, without long-term current use of insulin  (HCC)      GI/HEPATIC   (+) GERD (gastroesophageal reflux disease) (Controlled on medication)      /RENAL   (+) Calculus of kidney   (+) Prostate cancer (HCC)   (+) Prostate nodule with urinary obstruction   (+) Stage 3a chronic kidney disease (HCC)   (+) Stage 3b chronic kidney disease (HCC)      HEMATOLOGY   (+) Anemia      MUSCULOSKELETAL   (+) Back pain   (+) Polymyalgia (HCC)      NEURO/PSYCH   (+) Chronic left shoulder pain   (+) Mild major depression, single episode (HCC)      PULMONARY   (+) SIMON on CPAP      Other   (+) Lymphadenopathy, retroperitoneal   (+) Morbid obesity with BMI of 40.0-44.9, adult ([facility])        Physical Exam    Airway  Comment: Large neck  Mallampati score: II  TM Distance: >3 FB  Neck ROM: full     Dental   No notable dental hx     Cardiovascular  Cardiovascular exam normal    Pulmonary  Pulmonary exam normal     Other Findings        Anesthesia Plan  ASA Score- 3     Anesthesia Type- IV sedation with anesthesia with ASA Monitors.         Additional Monitors:     Airway Plan:     Comment: GA backup.       Plan Factors-    Chart reviewed.    Patient summary reviewed.                  Induction- intravenous.    Postoperative Plan-         Informed Consent- Anesthetic plan and risks discussed with patient.

## 2024-09-11 NOTE — OP NOTE
OPERATIVE REPORT  PATIENT NAME: Faisal Smith    :  1960  MRN: [id number]  Pt Location: [facility] OR ROOM 03    SURGERY DATE: 2024    Surgeons and Role:     * Tucker Ivan MD - Primary    Preop Diagnosis:  Prostate cancer (HCC) [C61]  Prostate nodule with urinary obstruction [N40.3, N13.8]    Post-Op Diagnosis Codes:     * Prostate cancer (HCC) [C61]     * Prostate nodule with urinary obstruction [N40.3, N13.8]    Procedure(s):  TRANSPERINEAL MRI FUSION BX PROSTATE    Specimen(s):  * No specimens in log *    Estimated Blood Loss:   Minimal    Drains:  * No LDAs found *    Anesthesia Type:   IV Sedation with Anesthesia    Operative Indications:  Prostate cancer (HCC) [C61]  Prostate nodule with urinary obstruction [N40.3, N13.8]      Operative Findings:  Standard 20 core transperineal biopsy with 4 additional cores of the target lesion performed with MRI fusion.  Total of 24 cores taken.      Complications:   None    Procedure and Technique:  Procedure was transperineal saturation prostate biopsy with MRI fusion sampling of lesion or lesions utilizing the Precision Point perineal access device utilized to map the standard geographic sites of the prostate.    Faisal Smith is a 64 y.o. male with history of prostate cancer on surveillance. Patient has undergone prior biopsy of the prostate prior biopsy revealed Holton 6 prostate cancer on active surveillance..    Patient did undergo pre biopsy multiparametric MRI of the prostate. There were 1 lesions with highest PIRADs rating 4 so the patient was scheduled for transperineal saturation biopsy with addition with MRI fusion of the abnormal lesions.    The patient was scheduled for his procedure in an outpatient surgical context with the assistance of the anesthesia team for sedation. He was met in the preoperative holding area by the performing urologist, the anesthesia team and the intraoperative nursing staff. The procedure along with its  risks and benefits were discussed and reviewed. Patient signed an informed consent.   Patient was then transferred to procedure suite. Pre-procedural time out was performed with all parties present. He was treated with periprocedural antibiotics, Ancef. He was placed in dorsal lithotomy with care to pad all pressure points. His perineum and genitalia were shave/prepped with ChloraPrep. The scrotum was elevated a secured aware from the perineum above the rectum.      Side-fire, biplanar transrectal ultrasound was introduced and the prostate was imaged in the sagittal and axial views. Prostate gland measurements from MRI revealed a T7 gram gland.    With the assistance of the UroNav technician, a survey of the prostate anatomy was performed. The technician then linked the previously obtained MRI images to the real-time ultrasound. The PIRADs lesions seen on MRI were identified on the ultrasound.     In the midportion of the left and right prostate, a toni was denoted in the perineal skin. Skin wheal was elevated with 0.5% Marcaine plain on either side.    The PrecisionPoint access needle was then introduced into the left perineal subcutaneous tissue. We visualized the tip of the needle. Spinal needle was introduced through the subcutaneous fat and into the pelvic floor muscle where a perineal pudendal block was performed with additional local anesthetic. This was repeated on the patient's right side.    Utilizing the Precision Point access needle and stepper, ultrasound guidance was utilized to place the spring-loaded biopsy needle into MRI lesions. The first lesion that was a PIRADs 4 in the right peripheral. 4 samples were taken. We confirmed good position of the needle strikes utilizing the fusion software.      We now moved to take our standard transperineal samples, which allowed us to carefully map and saturate each of the 10 zones that have subdivided the prostate into zonal anatomy.     The Precision Point  access needle and stepper was positioned into the RIGHT perineal space and ultrasound guidance was utilized to place the spring-loaded biopsy needle into the following areas    RIGHT anterior medial: 2 biopsies taken  RIGHT posterior medial: 2 biopsies taken  RIGHT posterior lateral: 2 biopsies taken  RIGHT base: 2 biopsies taken  RIGHT anterior lateral: 2 biopsies taken  The Precision Point access needle and stepper was re-positioned into the LEFT perineal space and ultrasound guidance was utilized to place the spring-loaded biopsy needle into the following areas  LEFT anterior medial: 2 biopsies taken  LEFT posterior medial: 2 biopsies taken  LEFT posterior lateral: 2 biopsies taken  LEFT base: 2 biopsies taken  LEFT anterior lateral: 2 biopsies taken  LEFT anterior medial: 2 biopsies taken    A total of 24 biopsies (including standard transperineal saturation and additional fusion biopsy):24    Transrectal ultrasound removed. The scrotum was released. Some gentle pressure was placed on the perineum for approximately 5 minutes for hemostasis.    At the completion of the procedure, the patient was taken out of dorsal lithotomy, recovered from their anesthesia, and transferred to PACU in good condition.     Overall, the patient tolerated the procedure and there were no complications.    Plan: The patient will be monitored in recovery, allowed to void prior to discharge and return for biopsy pathology review in the office with their primary urologist.    Patient Disposition:  PACU              SIGNATURE: Tucker Ivan MD  DATE: September 11, 2024  TIME: 11:45 AM

## 2024-09-19 PROCEDURE — 88344 IMHCHEM/IMCYTCHM EA MLT ANTB: CPT | Performed by: PATHOLOGY

## 2024-09-19 PROCEDURE — G0416 PROSTATE BIOPSY, ANY MTHD: HCPCS | Performed by: PATHOLOGY

## 2024-09-27 ENCOUNTER — OFFICE VISIT (OUTPATIENT)
Dept: UROLOGY | Facility: MEDICAL CENTER | Age: 64
End: 2024-09-27
Payer: MEDICARE

## 2024-09-27 VITALS
SYSTOLIC BLOOD PRESSURE: 140 MMHG | HEART RATE: 83 BPM | OXYGEN SATURATION: 97 % | BODY MASS INDEX: 39.99 KG/M2 | WEIGHT: 270 LBS | DIASTOLIC BLOOD PRESSURE: 80 MMHG | HEIGHT: 69 IN

## 2024-09-27 DIAGNOSIS — N40.1 BPH WITH OBSTRUCTION/LOWER URINARY TRACT SYMPTOMS: ICD-10-CM

## 2024-09-27 DIAGNOSIS — N13.8 BPH WITH OBSTRUCTION/LOWER URINARY TRACT SYMPTOMS: ICD-10-CM

## 2024-09-27 DIAGNOSIS — C61 PROSTATE CANCER (HCC): Primary | ICD-10-CM

## 2024-09-27 DIAGNOSIS — N40.3 PROSTATE NODULE WITH URINARY OBSTRUCTION: ICD-10-CM

## 2024-09-27 DIAGNOSIS — N13.8 PROSTATE NODULE WITH URINARY OBSTRUCTION: ICD-10-CM

## 2024-09-27 DIAGNOSIS — C62.12 SEMINOMA OF DESCENDED LEFT TESTIS (HCC): ICD-10-CM

## 2024-09-27 PROCEDURE — 99214 OFFICE O/P EST MOD 30 MIN: CPT | Performed by: UROLOGY

## 2024-09-27 NOTE — PROGRESS NOTES
100% counseling face-to-face with patient lasting 30 minutes.    The patient has a past history of Jose pattern score 6 adenocarcinoma the prostate diagnosed by Dr. Garvey in 2019.  His PSA is slowly risen however his PSA velocity is not abnormal.  Multiparametric MRI was performed which revealed a 4 mm PI-RADS 4 lesion that was biopsied using MR fusion technology.  Most biopsies were completely benign with occasional biopsy showing atypical cells suspicious for but not diagnostic of adenocarcinoma the prostate.  The patient will continue on a surveillance protocol and repeat PSA with FIGUEROA in approximately 6 months.  Multiparametric MRI will be used in the future also for comparison and for repeat biopsy in view of his previous diagnosis.  This was explained to the patient in detail and he is in agreement.  Will return in 6 months for PSA testing and further discussion with FIGUEROA.

## 2024-09-27 NOTE — LETTER
September 27, 2024     Amrit Fasut MD  [address]   [address]  [facility] [address]    Patient: Faisal Smith   YOB: 1960   Date of Visit: 9/27/2024       Dear Dr. Faust:    Thank you for referring Faisal Smith to me for evaluation. Below are my notes for this consultation.    If you have questions, please do not hesitate to call me. I look forward to following your patient along with you.         Sincerely,        Fawad Manzo MD        CC: No Recipients    Fawad Manzo MD  9/27/2024  4:59 PM  Sign when Signing Visit  100% counseling face-to-face with patient lasting 30 minutes.    The patient has a past history of Jose pattern score 6 adenocarcinoma the prostate diagnosed by Dr. Garvey in 2019.  His PSA is slowly risen however his PSA velocity is not abnormal.  Multiparametric MRI was performed which revealed a 4 mm PI-RADS 4 lesion that was biopsied using MR fusion technology.  Most biopsies were completely benign with occasional biopsy showing atypical cells suspicious for but not diagnostic of adenocarcinoma the prostate.  The patient will continue on a surveillance protocol and repeat PSA with FIGUEROA in approximately 6 months.  Multiparametric MRI will be used in the future also for comparison and for repeat biopsy in view of his previous diagnosis.  This was explained to the patient in detail and he is in agreement.  Will return in 6 months for PSA testing and further discussion with FIGUEROA.

## 2024-10-23 ENCOUNTER — TELEPHONE (OUTPATIENT)
Dept: FAMILY MEDICINE CLINIC | Facility: CLINIC | Age: 64
End: 2024-10-23

## 2024-10-28 ENCOUNTER — CLINICAL SUPPORT (OUTPATIENT)
Dept: FAMILY MEDICINE CLINIC | Facility: CLINIC | Age: 64
End: 2024-10-28

## 2024-10-28 DIAGNOSIS — E11.22 TYPE 2 DIABETES MELLITUS WITH STAGE 2 CHRONIC KIDNEY DISEASE, WITHOUT LONG-TERM CURRENT USE OF INSULIN  (HCC): Primary | ICD-10-CM

## 2024-10-28 DIAGNOSIS — N18.2 TYPE 2 DIABETES MELLITUS WITH STAGE 2 CHRONIC KIDNEY DISEASE, WITHOUT LONG-TERM CURRENT USE OF INSULIN  (HCC): Primary | ICD-10-CM

## 2024-10-28 PROCEDURE — RECHECK

## 2024-10-30 ENCOUNTER — APPOINTMENT (OUTPATIENT)
Dept: LAB | Facility: CLINIC | Age: 64
End: 2024-10-30
Payer: MEDICARE

## 2024-10-30 DIAGNOSIS — N18.2 TYPE 2 DIABETES MELLITUS WITH STAGE 2 CHRONIC KIDNEY DISEASE, WITHOUT LONG-TERM CURRENT USE OF INSULIN  (HCC): ICD-10-CM

## 2024-10-30 DIAGNOSIS — E78.2 MIXED HYPERLIPIDEMIA: ICD-10-CM

## 2024-10-30 DIAGNOSIS — E83.42 HYPOMAGNESEMIA: ICD-10-CM

## 2024-10-30 DIAGNOSIS — I10 ESSENTIAL HYPERTENSION: ICD-10-CM

## 2024-10-30 DIAGNOSIS — N18.32 STAGE 3B CHRONIC KIDNEY DISEASE (HCC): ICD-10-CM

## 2024-10-30 DIAGNOSIS — N40.3 PROSTATE NODULE WITH URINARY OBSTRUCTION: ICD-10-CM

## 2024-10-30 DIAGNOSIS — N18.31 STAGE 3A CHRONIC KIDNEY DISEASE (HCC): ICD-10-CM

## 2024-10-30 DIAGNOSIS — E11.22 TYPE 2 DIABETES MELLITUS WITH STAGE 2 CHRONIC KIDNEY DISEASE, WITHOUT LONG-TERM CURRENT USE OF INSULIN  (HCC): ICD-10-CM

## 2024-10-30 DIAGNOSIS — N13.8 PROSTATE NODULE WITH URINARY OBSTRUCTION: ICD-10-CM

## 2024-10-30 LAB
ANION GAP SERPL CALCULATED.3IONS-SCNC: 10 MMOL/L (ref 4–13)
BACTERIA UR QL AUTO: ABNORMAL /HPF
BASOPHILS # BLD AUTO: 0.08 THOUSANDS/ΜL (ref 0–0.1)
BASOPHILS NFR BLD AUTO: 1 % (ref 0–1)
BILIRUB UR QL STRIP: NEGATIVE
BUN SERPL-MCNC: 29 MG/DL (ref 5–25)
CALCIUM SERPL-MCNC: 9.3 MG/DL (ref 8.4–10.2)
CHLORIDE SERPL-SCNC: 106 MMOL/L (ref 96–108)
CHOLEST SERPL-MCNC: 164 MG/DL
CLARITY UR: CLEAR
CO2 SERPL-SCNC: 26 MMOL/L (ref 21–32)
COLOR UR: ABNORMAL
CREAT SERPL-MCNC: 1.34 MG/DL (ref 0.6–1.3)
CREAT UR-MCNC: 133 MG/DL
EOSINOPHIL # BLD AUTO: 0.15 THOUSAND/ΜL (ref 0–0.61)
EOSINOPHIL NFR BLD AUTO: 2 % (ref 0–6)
ERYTHROCYTE [DISTWIDTH] IN BLOOD BY AUTOMATED COUNT: 13.2 % (ref 11.6–15.1)
GFR SERPL CREATININE-BSD FRML MDRD: 55 ML/MIN/1.73SQ M
GLUCOSE P FAST SERPL-MCNC: 134 MG/DL (ref 65–99)
GLUCOSE UR STRIP-MCNC: NEGATIVE MG/DL
HCT VFR BLD AUTO: 41.5 % (ref 36.5–49.3)
HDLC SERPL-MCNC: 45 MG/DL
HGB BLD-MCNC: 13.5 G/DL (ref 12–17)
HGB UR QL STRIP.AUTO: ABNORMAL
IMM GRANULOCYTES # BLD AUTO: 0.03 THOUSAND/UL (ref 0–0.2)
IMM GRANULOCYTES NFR BLD AUTO: 0 % (ref 0–2)
KETONES UR STRIP-MCNC: NEGATIVE MG/DL
LDLC SERPL CALC-MCNC: 89 MG/DL (ref 0–100)
LEUKOCYTE ESTERASE UR QL STRIP: NEGATIVE
LYMPHOCYTES # BLD AUTO: 3.59 THOUSANDS/ΜL (ref 0.6–4.47)
LYMPHOCYTES NFR BLD AUTO: 43 % (ref 14–44)
MAGNESIUM SERPL-MCNC: 1.8 MG/DL (ref 1.9–2.7)
MCH RBC QN AUTO: 30.2 PG (ref 26.8–34.3)
MCHC RBC AUTO-ENTMCNC: 32.5 G/DL (ref 31.4–37.4)
MCV RBC AUTO: 93 FL (ref 82–98)
MICROALBUMIN UR-MCNC: <7 MG/L
MONOCYTES # BLD AUTO: 0.61 THOUSAND/ΜL (ref 0.17–1.22)
MONOCYTES NFR BLD AUTO: 7 % (ref 4–12)
NEUTROPHILS # BLD AUTO: 3.97 THOUSANDS/ΜL (ref 1.85–7.62)
NEUTS SEG NFR BLD AUTO: 47 % (ref 43–75)
NITRITE UR QL STRIP: NEGATIVE
NON-SQ EPI CELLS URNS QL MICRO: ABNORMAL /HPF
NONHDLC SERPL-MCNC: 119 MG/DL
NRBC BLD AUTO-RTO: 0 /100 WBCS
PH UR STRIP.AUTO: 6 [PH]
PHOSPHATE SERPL-MCNC: 2.6 MG/DL (ref 2.3–4.1)
PLATELET # BLD AUTO: 215 THOUSANDS/UL (ref 149–390)
PMV BLD AUTO: 10.2 FL (ref 8.9–12.7)
POTASSIUM SERPL-SCNC: 4.4 MMOL/L (ref 3.5–5.3)
PROT UR STRIP-MCNC: NEGATIVE MG/DL
RBC # BLD AUTO: 4.47 MILLION/UL (ref 3.88–5.62)
RBC #/AREA URNS AUTO: ABNORMAL /HPF
SODIUM SERPL-SCNC: 142 MMOL/L (ref 135–147)
SP GR UR STRIP.AUTO: 1.02 (ref 1–1.03)
TRIGL SERPL-MCNC: 150 MG/DL
UROBILINOGEN UR STRIP-ACNC: <2 MG/DL
WBC # BLD AUTO: 8.43 THOUSAND/UL (ref 4.31–10.16)
WBC #/AREA URNS AUTO: ABNORMAL /HPF

## 2024-10-30 PROCEDURE — 36415 COLL VENOUS BLD VENIPUNCTURE: CPT

## 2024-10-30 PROCEDURE — 84100 ASSAY OF PHOSPHORUS: CPT

## 2024-10-30 PROCEDURE — 80061 LIPID PANEL: CPT

## 2024-10-30 PROCEDURE — 82570 ASSAY OF URINE CREATININE: CPT

## 2024-10-30 PROCEDURE — 83735 ASSAY OF MAGNESIUM: CPT

## 2024-10-30 PROCEDURE — 81001 URINALYSIS AUTO W/SCOPE: CPT

## 2024-10-30 PROCEDURE — 85025 COMPLETE CBC W/AUTO DIFF WBC: CPT

## 2024-10-30 PROCEDURE — 82043 UR ALBUMIN QUANTITATIVE: CPT

## 2024-10-30 PROCEDURE — 80048 BASIC METABOLIC PNL TOTAL CA: CPT

## 2024-11-06 ENCOUNTER — OFFICE VISIT (OUTPATIENT)
Dept: NEPHROLOGY | Facility: CLINIC | Age: 64
End: 2024-11-06
Payer: MEDICARE

## 2024-11-06 ENCOUNTER — TELEPHONE (OUTPATIENT)
Dept: FAMILY MEDICINE CLINIC | Facility: CLINIC | Age: 64
End: 2024-11-06

## 2024-11-06 ENCOUNTER — TELEPHONE (OUTPATIENT)
Age: 64
End: 2024-11-06

## 2024-11-06 VITALS
SYSTOLIC BLOOD PRESSURE: 138 MMHG | HEIGHT: 69 IN | WEIGHT: 274 LBS | DIASTOLIC BLOOD PRESSURE: 60 MMHG | BODY MASS INDEX: 40.58 KG/M2

## 2024-11-06 DIAGNOSIS — N18.31 ANEMIA DUE TO STAGE 3A CHRONIC KIDNEY DISEASE  (HCC): ICD-10-CM

## 2024-11-06 DIAGNOSIS — E83.9 CHRONIC KIDNEY DISEASE-MINERAL AND BONE DISORDER (CKD-MBD): ICD-10-CM

## 2024-11-06 DIAGNOSIS — M89.9 CHRONIC KIDNEY DISEASE-MINERAL AND BONE DISORDER (CKD-MBD): ICD-10-CM

## 2024-11-06 DIAGNOSIS — E78.5 HYPERLIPIDEMIA, UNSPECIFIED HYPERLIPIDEMIA TYPE: ICD-10-CM

## 2024-11-06 DIAGNOSIS — C61 PROSTATE CANCER (HCC): ICD-10-CM

## 2024-11-06 DIAGNOSIS — N20.0 CALCULUS OF KIDNEY: ICD-10-CM

## 2024-11-06 DIAGNOSIS — E79.0 HYPERURICEMIA: ICD-10-CM

## 2024-11-06 DIAGNOSIS — E11.22 CKD STAGE 3 DUE TO TYPE 2 DIABETES MELLITUS (HCC): ICD-10-CM

## 2024-11-06 DIAGNOSIS — E66.01 MORBID OBESITY WITH BMI OF 40.0-44.9, ADULT (HCC): ICD-10-CM

## 2024-11-06 DIAGNOSIS — I10 ESSENTIAL HYPERTENSION: ICD-10-CM

## 2024-11-06 DIAGNOSIS — N18.31 HYPERTENSIVE KIDNEY DISEASE WITH STAGE 3A CHRONIC KIDNEY DISEASE (HCC): Primary | ICD-10-CM

## 2024-11-06 DIAGNOSIS — C62.92 SEMINOMA OF TESTIS, STAGE 3, LEFT (HCC): ICD-10-CM

## 2024-11-06 DIAGNOSIS — D63.1 ANEMIA DUE TO STAGE 3A CHRONIC KIDNEY DISEASE  (HCC): ICD-10-CM

## 2024-11-06 DIAGNOSIS — Z87.442 HISTORY OF NEPHROLITHIASIS: ICD-10-CM

## 2024-11-06 DIAGNOSIS — N18.9 CHRONIC KIDNEY DISEASE-MINERAL AND BONE DISORDER (CKD-MBD): ICD-10-CM

## 2024-11-06 DIAGNOSIS — I12.9 HYPERTENSIVE KIDNEY DISEASE WITH STAGE 3A CHRONIC KIDNEY DISEASE (HCC): Primary | ICD-10-CM

## 2024-11-06 DIAGNOSIS — E55.9 VITAMIN D DEFICIENCY: ICD-10-CM

## 2024-11-06 DIAGNOSIS — N18.31 STAGE 3A CHRONIC KIDNEY DISEASE (HCC): ICD-10-CM

## 2024-11-06 DIAGNOSIS — E83.42 HYPOMAGNESEMIA: ICD-10-CM

## 2024-11-06 DIAGNOSIS — R80.1 PERSISTENT PROTEINURIA: ICD-10-CM

## 2024-11-06 DIAGNOSIS — K21.9 GASTROESOPHAGEAL REFLUX DISEASE, UNSPECIFIED WHETHER ESOPHAGITIS PRESENT: ICD-10-CM

## 2024-11-06 DIAGNOSIS — N18.30 CKD STAGE 3 DUE TO TYPE 2 DIABETES MELLITUS (HCC): ICD-10-CM

## 2024-11-06 DIAGNOSIS — N18.32 STAGE 3B CHRONIC KIDNEY DISEASE (HCC): ICD-10-CM

## 2024-11-06 PROBLEM — D64.9 ANEMIA: Status: RESOLVED | Noted: 2021-05-30 | Resolved: 2024-11-06

## 2024-11-06 PROCEDURE — 99214 OFFICE O/P EST MOD 30 MIN: CPT | Performed by: INTERNAL MEDICINE

## 2024-11-06 RX ORDER — ACETAMINOPHEN 160 MG
4000 TABLET,DISINTEGRATING ORAL DAILY
Start: 2024-11-06 | End: 2029-12-12

## 2024-11-06 RX ORDER — CALCIUM CARBONATE/VITAMIN D3 500-10/5ML
1 LIQUID (ML) ORAL 3 TIMES DAILY
Qty: 270 TABLET | Refills: 3 | Status: SHIPPED | OUTPATIENT
Start: 2024-11-06

## 2024-11-06 NOTE — ASSESSMENT & PLAN NOTE
Encouraged patient to follow a renal diet comprising of moderate potassium, low phosphorus and protein restriction to 0.8gm/kg.  Will check serum albumin with next blood work.   Orders:    Magnesium Oxide 400 MG CAPS; Take 1 tablet (400 mg total) by mouth 3 (three) times a day    Cholecalciferol (Vitamin D3) 50 MCG (2000 UT) capsule; Take 2 capsules (4,000 Units total) by mouth daily    Renal function panel; Future    Renal function panel; Future    PTH, intact; Future    Renal function panel; Future    Vitamin D 25 hydroxy; Future    Magnesium; Future    Albumin / creatinine urine ratio; Future    CBC; Future    Magnesium; Future    Phosphorus; Future    Uric acid; Future    Renal function panel; Future    PTH, intact; Future    Vitamin D 25 hydroxy; Future    Empagliflozin (JARDIANCE) 10 MG TABS tablet; Take 1 tablet (10 mg total) by mouth every morning

## 2024-11-06 NOTE — ASSESSMENT & PLAN NOTE
Goal LDL less than 70  Continue on rosuvastatin  Orders:    Magnesium Oxide 400 MG CAPS; Take 1 tablet (400 mg total) by mouth 3 (three) times a day    Cholecalciferol (Vitamin D3) 50 MCG (2000 UT) capsule; Take 2 capsules (4,000 Units total) by mouth daily    Renal function panel; Future    Renal function panel; Future    PTH, intact; Future    Renal function panel; Future    Vitamin D 25 hydroxy; Future    Magnesium; Future    Albumin / creatinine urine ratio; Future    CBC; Future    Magnesium; Future    Phosphorus; Future    Uric acid; Future    Renal function panel; Future    PTH, intact; Future    Vitamin D 25 hydroxy; Future    Empagliflozin (JARDIANCE) 10 MG TABS tablet; Take 1 tablet (10 mg total) by mouth every morning

## 2024-11-06 NOTE — ASSESSMENT & PLAN NOTE
Lab Results   Component Value Date    EGFR 55 10/30/2024    EGFR 50 08/26/2024    EGFR 50 06/07/2024    CREATININE 1.34 (H) 10/30/2024    CREATININE 1.44 (H) 08/26/2024    CREATININE 1.44 (H) 06/07/2024     BP Readings from Last 3 Encounters:   11/06/24 138/60   09/27/24 140/80   09/11/24 132/59   Current medications: Losartan 100 mg p.o. daily, Cardura 2 mg p.o. QHS, Procardia XL 90 mg p.o. daily  Changes made today: Discussed risks and benefits of being on SGLT2 inhibitor from renal standpoint all questions and concerns answered start on Jardiance 10 mg p.o. daily check blood work in 2 weeks and then again in 4 weeks. volume status stable we discussed techniques for checking blood pressures call with updates if any issues  Goal BP of < 130/80 based on age and comorbidities  Instructed to follow low sodium (2gm)diet.  Advised to hold ACEI/ARBs if patient suffers from dehydration due to gastrointestinal losses due to risk of JULIO C secondary to failure to autoregulate.  after review of records In [facility] as well as Care everywhere patient has a baseline creatinine of 1.3-1.9 mg/dL.   Most recent labs show a Creatinine of 1.34 mg/dL on 10/30/2024. Renal function remains stable.  Check blood work in 3 months and then again prior to next visit  Likely has underlying CKD due to hypertensive nephrosclerosis plus obesity related hyperfiltration plus history of JULIO C nondialysis requiring plus chemotherapy nephrotoxicity plus age-related nephron loss.  Imaging: CT abdomen pelvis from 6/12/2024 punctate nonobstructing stones bilaterally no hydronephrosis.  Prostatic megaly.  Recommend to avoid use of NSAIDs, nephrotoxins. Caution advised with regards to exposure to IV contrast dye.   Discussed with the patient in depth his renal status, including the possible etiologies for CKD.   Advised the patient that when his GFR is close to 20mL/min then will start discussing about RRT(renal replacement therapy) options such as renal  transplant, peritoneal dialysis and hemodialysis.   Informed the patient about the various options for Renal Replacement therapy.  Discussed with the patient how we need to work together to delay the progression of CKD with optimal BP control based on their age and co-morbidities, optimal BS control with HbA1c of <7% and trying to reduce proteinuria by the use of anti-proteinuric agents.   CKD education/Kidney smart: Referral placed 12/22/2021  Follow-up: Patient is to follow-up in 6 months, with lab work to be performed in 3 months and then again in a few days prior to the next visit. Advised patient to call me in 10 days to review the results if they do not hear back from me, as I may have not received the results.  Orders:    Cholecalciferol (Vitamin D3) 50 MCG (2000 UT) capsule; Take 2 capsules (4,000 Units total) by mouth daily    Renal function panel; Future    Renal function panel; Future    PTH, intact; Future    Renal function panel; Future    Vitamin D 25 hydroxy; Future    Magnesium; Future    Albumin / creatinine urine ratio; Future    CBC; Future    Magnesium; Future    Phosphorus; Future    Uric acid; Future    Renal function panel; Future    PTH, intact; Future    Vitamin D 25 hydroxy; Future    Empagliflozin (JARDIANCE) 10 MG TABS tablet; Take 1 tablet (10 mg total) by mouth every morning

## 2024-11-06 NOTE — ASSESSMENT & PLAN NOTE
Orders:    Magnesium Oxide 400 MG CAPS; Take 1 tablet (400 mg total) by mouth 3 (three) times a day

## 2024-11-06 NOTE — ASSESSMENT & PLAN NOTE
Check uric acid level prior to next visit  Continue on allopurinol 200 mg p.o. daily for now  When GFR declines may need to adjust dosage  Orders:    Cholecalciferol (Vitamin D3) 50 MCG (2000 UT) capsule; Take 2 capsules (4,000 Units total) by mouth daily    Renal function panel; Future    Renal function panel; Future    PTH, intact; Future    Renal function panel; Future    Vitamin D 25 hydroxy; Future    Magnesium; Future    Albumin / creatinine urine ratio; Future    CBC; Future    Magnesium; Future    Phosphorus; Future    Uric acid; Future    Renal function panel; Future    PTH, intact; Future    Vitamin D 25 hydroxy; Future    Empagliflozin (JARDIANCE) 10 MG TABS tablet; Take 1 tablet (10 mg total) by mouth every morning

## 2024-11-06 NOTE — PROGRESS NOTES
Ambulatory Visit  Name: Faisal Smith      : 1960      MRN: [id number]  Encounter Provider: Emely Bone MD  Encounter Date: 2024   Encounter department: [facility] [place]    ASSESSMENT / PLAN:   64 y.o.  male with pmh of multiple co-morbidities including  Hypertension, nephrolithiasis,  Polymyalgia rheumatica, testicular cancer presents to the office for routine follow-up.    Assessment & Plan  Hypertensive kidney disease with stage 3a chronic kidney disease (HCC)  Lab Results   Component Value Date    EGFR 55 10/30/2024    EGFR 50 2024    EGFR 50 2024    CREATININE 1.34 (H) 10/30/2024    CREATININE 1.44 (H) 2024    CREATININE 1.44 (H) 2024     BP Readings from Last 3 Encounters:   24 138/60   24 140/80   24 132/59   Current medications: Losartan 100 mg p.o. daily, Cardura 2 mg p.o. QHS, Procardia XL 90 mg p.o. daily  Changes made today: Discussed risks and benefits of being on SGLT2 inhibitor from renal standpoint all questions and concerns answered start on Jardiance 10 mg p.o. daily check blood work in 2 weeks and then again in 4 weeks. volume status stable we discussed techniques for checking blood pressures call with updates if any issues  Goal BP of < 130/80 based on age and comorbidities  Instructed to follow low sodium (2gm)diet.  Advised to hold ACEI/ARBs if patient suffers from dehydration due to gastrointestinal losses due to risk of JULIO C secondary to failure to autoregulate.  after review of records In [facility] as well as Care everywhere patient has a baseline creatinine of 1.3-1.9 mg/dL.   Most recent labs show a Creatinine of 1.34 mg/dL on 10/30/2024. Renal function remains stable.  Check blood work in 3 months and then again prior to next visit  Likely has underlying CKD due to hypertensive nephrosclerosis plus obesity related hyperfiltration plus history of JULIO C nondialysis requiring plus chemotherapy nephrotoxicity  plus age-related nephron loss.  Imaging: CT abdomen pelvis from 6/12/2024 punctate nonobstructing stones bilaterally no hydronephrosis.  Prostatic megaly.  Recommend to avoid use of NSAIDs, nephrotoxins. Caution advised with regards to exposure to IV contrast dye.   Discussed with the patient in depth his renal status, including the possible etiologies for CKD.   Advised the patient that when his GFR is close to 20mL/min then will start discussing about RRT(renal replacement therapy) options such as renal transplant, peritoneal dialysis and hemodialysis.   Informed the patient about the various options for Renal Replacement therapy.  Discussed with the patient how we need to work together to delay the progression of CKD with optimal BP control based on their age and co-morbidities, optimal BS control with HbA1c of <7% and trying to reduce proteinuria by the use of anti-proteinuric agents.   CKD education/Kidney smart: Referral placed 12/22/2021  Follow-up: Patient is to follow-up in 6 months, with lab work to be performed in 3 months and then again in a few days prior to the next visit. Advised patient to call me in 10 days to review the results if they do not hear back from me, as I may have not received the results.  Orders:    Cholecalciferol (Vitamin D3) 50 MCG (2000 UT) capsule; Take 2 capsules (4,000 Units total) by mouth daily    Renal function panel; Future    Renal function panel; Future    PTH, intact; Future    Renal function panel; Future    Vitamin D 25 hydroxy; Future    Magnesium; Future    Albumin / creatinine urine ratio; Future    CBC; Future    Magnesium; Future    Phosphorus; Future    Uric acid; Future    Renal function panel; Future    PTH, intact; Future    Vitamin D 25 hydroxy; Future    Empagliflozin (JARDIANCE) 10 MG TABS tablet; Take 1 tablet (10 mg total) by mouth every morning    CKD stage 3 due to type 2 diabetes mellitus (HCC)    Lab Results   Component Value Date    HGBA1C 7.8 (H)  08/26/2024   Most recent A1c 7.8% as of 8/26/2024  A1c as high as 7.8% in August 2024  Currently on metformin  Discussed with patient when and if renal parameters continue deteriorate he may need to be taken off of metformin  Discussed risks and benefits of being on SGLT2 inhibitor from renal standpoint all questions and concerns answered start on Jardiance 10 mg p.o. daily check blood work in 2 weeks and then again in 4 weeks.  after review of records In [facility] as well as Care everywhere patient has a baseline creatinine of 1.3-1.9 mg/dL.   Most recent labs show a Creatinine of 1.34 mg/dL on 10/30/2024. Renal function remains stable.  Check blood work in 3 months and then again prior to next visit  Likely has underlying CKD due to hypertensive nephrosclerosis plus obesity related hyperfiltration plus history of JULIO C nondialysis requiring plus chemotherapy nephrotoxicity plus age-related nephron loss.  Imaging: CT abdomen pelvis from 6/12/2024 punctate nonobstructing stones bilaterally no hydronephrosis.  Prostatic megaly.  Recommend to avoid use of NSAIDs, nephrotoxins. Caution advised with regards to exposure to IV contrast dye.   Discussed with the patient in depth his renal status, including the possible etiologies for CKD.   Advised the patient that when his GFR is close to 20mL/min then will start discussing about RRT(renal replacement therapy) options such as renal transplant, peritoneal dialysis and hemodialysis.   Informed the patient about the various options for Renal Replacement therapy.  Discussed with the patient how we need to work together to delay the progression of CKD with optimal BP control based on their age and co-morbidities, optimal BS control with HbA1c of <7% and trying to reduce proteinuria by the use of anti-proteinuric agents.   CKD education/Kidney smart: Referral placed 12/22/2021  Follow-up: Patient is to follow-up in 6 months, with lab work to be performed in 3 months and then again in  a few days prior to the next visit. Advised patient to call me in 10 days to review the results if they do not hear back from me, as I may have not received the results.    Orders:    Cholecalciferol (Vitamin D3) 50 MCG (2000 UT) capsule; Take 2 capsules (4,000 Units total) by mouth daily    Renal function panel; Future    Renal function panel; Future    PTH, intact; Future    Renal function panel; Future    Vitamin D 25 hydroxy; Future    Magnesium; Future    Albumin / creatinine urine ratio; Future    CBC; Future    Magnesium; Future    Phosphorus; Future    Uric acid; Future    Renal function panel; Future    PTH, intact; Future    Vitamin D 25 hydroxy; Future    Empagliflozin (JARDIANCE) 10 MG TABS tablet; Take 1 tablet (10 mg total) by mouth every morning    Hypomagnesemia  Likely secondary to chemotherapy with cisplatin  Most recent magnesium level at 1.8  Currently on magnesium 400 mg p.o. twice daily increased to 400 mg p.o. 3 times daily  Check magnesium level prior to next visit and in 3 months  Orders:    Magnesium Oxide 400 MG CAPS; Take 1 tablet (400 mg total) by mouth 3 (three) times a day    Cholecalciferol (Vitamin D3) 50 MCG (2000 UT) capsule; Take 2 capsules (4,000 Units total) by mouth daily    Renal function panel; Future    Renal function panel; Future    PTH, intact; Future    Renal function panel; Future    Vitamin D 25 hydroxy; Future    Magnesium; Future    Albumin / creatinine urine ratio; Future    CBC; Future    Magnesium; Future    Phosphorus; Future    Uric acid; Future    Renal function panel; Future    PTH, intact; Future    Vitamin D 25 hydroxy; Future    Empagliflozin (JARDIANCE) 10 MG TABS tablet; Take 1 tablet (10 mg total) by mouth every morning    History of nephrolithiasis  Discussed with the patient that the most common types of kidney stones are calcium oxalate stones.   Advised to follow a diet with increased fluid intake so that urine output is greater than  2-2.5L/day.  Advised patient to decrease salt, protein and oxalate intake.   Dietary handouts given.  Holding off on 24-hour urine stone workup as per patient wishes agree and respect.  Last stone episode around 2019    Hyperuricemia  Check uric acid level prior to next visit  Continue on allopurinol 200 mg p.o. daily for now  When GFR declines may need to adjust dosage  Orders:    Cholecalciferol (Vitamin D3) 50 MCG (2000 UT) capsule; Take 2 capsules (4,000 Units total) by mouth daily    Renal function panel; Future    Renal function panel; Future    PTH, intact; Future    Renal function panel; Future    Vitamin D 25 hydroxy; Future    Magnesium; Future    Albumin / creatinine urine ratio; Future    CBC; Future    Magnesium; Future    Phosphorus; Future    Uric acid; Future    Renal function panel; Future    PTH, intact; Future    Vitamin D 25 hydroxy; Future    Empagliflozin (JARDIANCE) 10 MG TABS tablet; Take 1 tablet (10 mg total) by mouth every morning    Chronic kidney disease-mineral and bone disorder (CKD-MBD)  Lab Results   Component Value Date    EGFR 55 10/30/2024    EGFR 50 08/26/2024    EGFR 50 06/07/2024    CREATININE 1.34 (H) 10/30/2024    CREATININE 1.44 (H) 08/26/2024    CREATININE 1.44 (H) 06/07/2024   Based on patients CKD stage following is the goal of therapy.  Maintain calcium phosphorus product of < 55.  Stage 3 CKD - Goal Ca 8.5-10 mg/dL , goal Phos 2.7-4.6 mg/dL  , goal iPTH 30-70 pg/m  Currently on: Vitamin D 2000 units p.o. daily  Changes made today: Increase to 4000 units p.o. daily  most recent ipth 61 and vit D 24.9 as of 4/15/2024  Check iPTH vitamin D prior to next visit and in 3 months    Orders:    Cholecalciferol (Vitamin D3) 50 MCG (2000 UT) capsule; Take 2 capsules (4,000 Units total) by mouth daily    Renal function panel; Future    Renal function panel; Future    PTH, intact; Future    Renal function panel; Future    Vitamin D 25 hydroxy; Future    Magnesium; Future    Albumin  / creatinine urine ratio; Future    CBC; Future    Magnesium; Future    Phosphorus; Future    Uric acid; Future    Renal function panel; Future    PTH, intact; Future    Vitamin D 25 hydroxy; Future    Empagliflozin (JARDIANCE) 10 MG TABS tablet; Take 1 tablet (10 mg total) by mouth every morning    Persistent proteinuria  likely has underlying proteinuria due to obesity related hyperfiltration  most recent MACR is too small to be quantified as of 10/30/2024  Recheck MACR prior to next visit  For proteinuria reduction continue on rosuvastatin, vitamin D, losartan.  Will start patient on Farxiga  Orders:    Cholecalciferol (Vitamin D3) 50 MCG (2000 UT) capsule; Take 2 capsules (4,000 Units total) by mouth daily    Renal function panel; Future    Renal function panel; Future    PTH, intact; Future    Renal function panel; Future    Vitamin D 25 hydroxy; Future    Magnesium; Future    Albumin / creatinine urine ratio; Future    CBC; Future    Magnesium; Future    Phosphorus; Future    Uric acid; Future    Renal function panel; Future    PTH, intact; Future    Vitamin D 25 hydroxy; Future    Empagliflozin (JARDIANCE) 10 MG TABS tablet; Take 1 tablet (10 mg total) by mouth every morning    Hyperlipidemia, unspecified hyperlipidemia type  Goal LDL less than 70  Continue on rosuvastatin  Orders:    Magnesium Oxide 400 MG CAPS; Take 1 tablet (400 mg total) by mouth 3 (three) times a day    Cholecalciferol (Vitamin D3) 50 MCG (2000 UT) capsule; Take 2 capsules (4,000 Units total) by mouth daily    Renal function panel; Future    Renal function panel; Future    PTH, intact; Future    Renal function panel; Future    Vitamin D 25 hydroxy; Future    Magnesium; Future    Albumin / creatinine urine ratio; Future    CBC; Future    Magnesium; Future    Phosphorus; Future    Uric acid; Future    Renal function panel; Future    PTH, intact; Future    Vitamin D 25 hydroxy; Future    Empagliflozin (JARDIANCE) 10 MG TABS tablet; Take 1  tablet (10 mg total) by mouth every morning    Prostate cancer (HCC)  Last seen urology 9/27/2024 notes reviewed  As per most recent urology note biopsies showing atypical cells suspicious for but not diagnostic of adenocarcinoma of the prostate  Continue close follow-up with urology  Orders:    Cholecalciferol (Vitamin D3) 50 MCG (2000 UT) capsule; Take 2 capsules (4,000 Units total) by mouth daily    Renal function panel; Future    Renal function panel; Future    PTH, intact; Future    Renal function panel; Future    Vitamin D 25 hydroxy; Future    Magnesium; Future    Albumin / creatinine urine ratio; Future    CBC; Future    Magnesium; Future    Phosphorus; Future    Uric acid; Future    Renal function panel; Future    PTH, intact; Future    Vitamin D 25 hydroxy; Future    Empagliflozin (JARDIANCE) 10 MG TABS tablet; Take 1 tablet (10 mg total) by mouth every morning    Morbid obesity with BMI of 40.0-44.9, adult ([facility])  Encouraged patient to follow a renal diet comprising of moderate potassium, low phosphorus and protein restriction to 0.8gm/kg.  Will check serum albumin with next blood work.   Orders:    Magnesium Oxide 400 MG CAPS; Take 1 tablet (400 mg total) by mouth 3 (three) times a day    Cholecalciferol (Vitamin D3) 50 MCG (2000 UT) capsule; Take 2 capsules (4,000 Units total) by mouth daily    Renal function panel; Future    Renal function panel; Future    PTH, intact; Future    Renal function panel; Future    Vitamin D 25 hydroxy; Future    Magnesium; Future    Albumin / creatinine urine ratio; Future    CBC; Future    Magnesium; Future    Phosphorus; Future    Uric acid; Future    Renal function panel; Future    PTH, intact; Future    Vitamin D 25 hydroxy; Future    Empagliflozin (JARDIANCE) 10 MG TABS tablet; Take 1 tablet (10 mg total) by mouth every morning      History of Present Illness     Faisal Smith is a 64 y.o. male who presents to the office for routine follow-up feels well has no  "complaints or recent hospitalization disappointed about his blood sugars agreeable to trying Farxiga for his blood sugars as well as for delaying CKD progression as long as cost is manageable.      Review of Systems   Constitutional:  Negative for chills and fever.   HENT:  Negative for congestion.    Respiratory:  Negative for cough and wheezing.    Cardiovascular:  Negative for leg swelling.   Gastrointestinal:  Negative for constipation.   Genitourinary:  Negative for dysuria and hematuria.   Musculoskeletal:  Negative for back pain.   Neurological:  Negative for dizziness and headaches.   Psychiatric/Behavioral:  Negative for agitation and confusion.    All other systems reviewed and are negative.          Objective     /60 (BP Location: Left arm, Patient Position: Sitting, Cuff Size: Large)   Ht 5' 9\" (1.753 m)   Wt 124 kg (274 lb)   BMI 40.46 kg/m²     Physical Exam  Vitals reviewed.   Constitutional:       General: He is not in acute distress.     Appearance: Normal appearance. He is obese. He is not ill-appearing, toxic-appearing or diaphoretic.   HENT:      Head: Normocephalic and atraumatic.      Mouth/Throat:      Pharynx: No oropharyngeal exudate.   Eyes:      General: No scleral icterus.  Cardiovascular:      Rate and Rhythm: Normal rate.   Pulmonary:      Effort: No respiratory distress.      Breath sounds: Normal breath sounds. No stridor. No wheezing.   Abdominal:      General: There is no distension.      Palpations: There is no mass.      Tenderness: There is no right CVA tenderness or left CVA tenderness.   Musculoskeletal:         General: No swelling.      Cervical back: Normal range of motion. No rigidity.   Skin:     Coloration: Skin is not jaundiced.   Neurological:      General: No focal deficit present.      Mental Status: He is alert and oriented to person, place, and time.   Psychiatric:         Mood and Affect: Mood normal.         Behavior: Behavior normal.         "

## 2024-11-06 NOTE — TELEPHONE ENCOUNTER
----- Message from Amrit Faust MD sent at 11/6/2024  8:52 AM EST -----  Cholesterol level looks great.  Continue current meds.

## 2024-11-06 NOTE — ASSESSMENT & PLAN NOTE
Lab Results   Component Value Date    HGBA1C 7.8 (H) 08/26/2024   Most recent A1c 7.8% as of 8/26/2024  A1c as high as 7.8% in August 2024  Currently on metformin  Discussed with patient when and if renal parameters continue deteriorate he may need to be taken off of metformin  Discussed risks and benefits of being on SGLT2 inhibitor from renal standpoint all questions and concerns answered start on Jardiance 10 mg p.o. daily check blood work in 2 weeks and then again in 4 weeks.  after review of records In [facility] as well as Care everywhere patient has a baseline creatinine of 1.3-1.9 mg/dL.   Most recent labs show a Creatinine of 1.34 mg/dL on 10/30/2024. Renal function remains stable.  Check blood work in 3 months and then again prior to next visit  Likely has underlying CKD due to hypertensive nephrosclerosis plus obesity related hyperfiltration plus history of JULIO C nondialysis requiring plus chemotherapy nephrotoxicity plus age-related nephron loss.  Imaging: CT abdomen pelvis from 6/12/2024 punctate nonobstructing stones bilaterally no hydronephrosis.  Prostatic megaly.  Recommend to avoid use of NSAIDs, nephrotoxins. Caution advised with regards to exposure to IV contrast dye.   Discussed with the patient in depth his renal status, including the possible etiologies for CKD.   Advised the patient that when his GFR is close to 20mL/min then will start discussing about RRT(renal replacement therapy) options such as renal transplant, peritoneal dialysis and hemodialysis.   Informed the patient about the various options for Renal Replacement therapy.  Discussed with the patient how we need to work together to delay the progression of CKD with optimal BP control based on their age and co-morbidities, optimal BS control with HbA1c of <7% and trying to reduce proteinuria by the use of anti-proteinuric agents.   CKD education/Kidney smart: Referral placed 12/22/2021  Follow-up: Patient is to follow-up in 6 months, with  lab work to be performed in 3 months and then again in a few days prior to the next visit. Advised patient to call me in 10 days to review the results if they do not hear back from me, as I may have not received the results.    Orders:    Cholecalciferol (Vitamin D3) 50 MCG (2000 UT) capsule; Take 2 capsules (4,000 Units total) by mouth daily    Renal function panel; Future    Renal function panel; Future    PTH, intact; Future    Renal function panel; Future    Vitamin D 25 hydroxy; Future    Magnesium; Future    Albumin / creatinine urine ratio; Future    CBC; Future    Magnesium; Future    Phosphorus; Future    Uric acid; Future    Renal function panel; Future    PTH, intact; Future    Vitamin D 25 hydroxy; Future    Empagliflozin (JARDIANCE) 10 MG TABS tablet; Take 1 tablet (10 mg total) by mouth every morning

## 2024-11-06 NOTE — ASSESSMENT & PLAN NOTE
Lab Results   Component Value Date    EGFR 55 10/30/2024    EGFR 50 08/26/2024    EGFR 50 06/07/2024    CREATININE 1.34 (H) 10/30/2024    CREATININE 1.44 (H) 08/26/2024    CREATININE 1.44 (H) 06/07/2024   Based on patients CKD stage following is the goal of therapy.  Maintain calcium phosphorus product of < 55.  Stage 3 CKD - Goal Ca 8.5-10 mg/dL , goal Phos 2.7-4.6 mg/dL  , goal iPTH 30-70 pg/m  Currently on: Vitamin D 2000 units p.o. daily  Changes made today: Increase to 4000 units p.o. daily  most recent ipth 61 and vit D 24.9 as of 4/15/2024  Check iPTH vitamin D prior to next visit and in 3 months    Orders:    Cholecalciferol (Vitamin D3) 50 MCG (2000 UT) capsule; Take 2 capsules (4,000 Units total) by mouth daily    Renal function panel; Future    Renal function panel; Future    PTH, intact; Future    Renal function panel; Future    Vitamin D 25 hydroxy; Future    Magnesium; Future    Albumin / creatinine urine ratio; Future    CBC; Future    Magnesium; Future    Phosphorus; Future    Uric acid; Future    Renal function panel; Future    PTH, intact; Future    Vitamin D 25 hydroxy; Future    Empagliflozin (JARDIANCE) 10 MG TABS tablet; Take 1 tablet (10 mg total) by mouth every morning

## 2024-11-06 NOTE — ASSESSMENT & PLAN NOTE
Lab Results   Component Value Date    EGFR 55 10/30/2024    EGFR 50 08/26/2024    EGFR 50 06/07/2024    CREATININE 1.34 (H) 10/30/2024    CREATININE 1.44 (H) 08/26/2024    CREATININE 1.44 (H) 06/07/2024       Orders:    Magnesium Oxide 400 MG CAPS; Take 1 tablet (400 mg total) by mouth 3 (three) times a day

## 2024-11-06 NOTE — ASSESSMENT & PLAN NOTE
Likely secondary to chemotherapy with cisplatin  Most recent magnesium level at 1.8  Currently on magnesium 400 mg p.o. twice daily increased to 400 mg p.o. 3 times daily  Check magnesium level prior to next visit and in 3 months  Orders:    Magnesium Oxide 400 MG CAPS; Take 1 tablet (400 mg total) by mouth 3 (three) times a day    Cholecalciferol (Vitamin D3) 50 MCG (2000 UT) capsule; Take 2 capsules (4,000 Units total) by mouth daily    Renal function panel; Future    Renal function panel; Future    PTH, intact; Future    Renal function panel; Future    Vitamin D 25 hydroxy; Future    Magnesium; Future    Albumin / creatinine urine ratio; Future    CBC; Future    Magnesium; Future    Phosphorus; Future    Uric acid; Future    Renal function panel; Future    PTH, intact; Future    Vitamin D 25 hydroxy; Future    Empagliflozin (JARDIANCE) 10 MG TABS tablet; Take 1 tablet (10 mg total) by mouth every morning

## 2024-11-06 NOTE — ASSESSMENT & PLAN NOTE
Discussed with the patient that the most common types of kidney stones are calcium oxalate stones.   Advised to follow a diet with increased fluid intake so that urine output is greater than 2-2.5L/day.  Advised patient to decrease salt, protein and oxalate intake.   Dietary handouts given.  Holding off on 24-hour urine stone workup as per patient wishes agree and respect.  Last stone episode around 2019

## 2024-11-06 NOTE — ASSESSMENT & PLAN NOTE
likely has underlying proteinuria due to obesity related hyperfiltration  most recent MACR is too small to be quantified as of 10/30/2024  Recheck MACR prior to next visit  For proteinuria reduction continue on rosuvastatin, vitamin D, losartan.  Will start patient on Farxiga  Orders:    Cholecalciferol (Vitamin D3) 50 MCG (2000 UT) capsule; Take 2 capsules (4,000 Units total) by mouth daily    Renal function panel; Future    Renal function panel; Future    PTH, intact; Future    Renal function panel; Future    Vitamin D 25 hydroxy; Future    Magnesium; Future    Albumin / creatinine urine ratio; Future    CBC; Future    Magnesium; Future    Phosphorus; Future    Uric acid; Future    Renal function panel; Future    PTH, intact; Future    Vitamin D 25 hydroxy; Future    Empagliflozin (JARDIANCE) 10 MG TABS tablet; Take 1 tablet (10 mg total) by mouth every morning

## 2024-11-06 NOTE — PATIENT INSTRUCTIONS
"- increase magnesium to 1 tab three times a day  - increase vit D to 4000 units a day    - start jardiance 10mg once a day and if so then do blood work every 32 weeks times 2.   - blood work in 3 month    Please call me in 10 days after having your blood work done to review the results if you do not hear back from me or my office, as I may have not received the results.  please remember to perform blood work prior to the next visit.  Please call if the blood pressure top number is greater than 140 or less than 110 consistently.  Please call if you are gaining more than 2lbs in 2 days for adjustment of water pills.  ~ Please AVOID the following pain medications.  LIST OF NSAIDS (NONSTEROIDAL ANTI-INFLAMMATORY DRUGS) AND VILLAGOMEZ-2 INHIBITORS    DIFLUNISAL (DOLOBID)  IBUPROFEN (MOTRIN, ADVIL)  FLURBIPROFEN (ANSAID)  KETOPROFEN (ORUDIS, ORUVAIL)  FENOPROFEN (NALFON)  NABUMETONE (RELAFEN)  PIROXICAM (FELDENE)  NAPROXEN (ALEVE, NAPROSYN, NAPRELAN, ANAPROX)  DICLOFENAC (VOLTAREN, CATAFLAM)  INDOMETHACIN (INDOCIN)  SULINDAC (CLINORIL)  TOLMETIN (TOLETIN)  ETODOLAC (LODINE)  MELOXICAM (MOBIC)  KETOROLAC (TORADOL)  OXAPROZIN (DAYPRO)  CELECOXIB (CELEBREX)    Phosphorus diet  Follow a low phosphorus diet.    Avoid these higher phosphorus foods: Choose these lower phosphorus foods:   Milk, pudding or yogurt (from animals and from many soy varieties) Rice milk (unfortified), nondairy creamer (if it doesn't have terms in the ingredients list that contain the letters \"phos\")   Hard cheeses, ricotta or cottage cheese, fat-free cream cheese Regular and low-fat cream cheese   Ice cream or frozen yogurt Sherbet or frozen fruit pops   Soups made with higher phosphorus ingredients (milk, dried peas, beans, lentils) Soups made with lower phosphorus ingredients (broth- or water-based with other lower phosphorus ingredients)   Whole grains, including whole-grain breads, crackers, cereal, rice and pasta Refined grains, including white bread, " "crackers, cereals, rice and pasta   Quick breads, biscuits, cornbread, muffins, pancakes or waffles Homemade refined (white) dinner rolls, bagels or English muffins   Dried peas (split, black-eyed), beans (black, garbanzo, lima, kidney, navy, weinberg) or lentils Green peas (canned, frozen), green beans or wax beans   Organ meats, walleye, pollock or sardines Lean beef, pork, lamb, poultry or other fish   Nuts and seeds Popcorn   Peanut butter and other nut butters Jam, jelly or honey   Chocolate, including chocolate drinks Carob (chocolate-flavored) candy, hard candy or gumdrops   Yuliana and pepper-type sodas, flavored kincaid, bottled teas (if a term in the ingredients list contains the letters \"phos\") Lemon-lime soda, ginger ale or root beer, plain water   Follow a moderate potassium diet.      Kidney Stone Prevention    Fluid Intake    A high fluid intake is one of the most important cornerstones of kidney stone dietary prevention. A sufficiently dilute urine will prevent the individual chemical components of stones from becoming concentrated enough to precipitate out of solution, keeping them instead in their dissolved state. A high urine output also may reduce stone from forming through \"flushing\" out of stone components and prevention of urine stagnation. In addition to stone benefits, increased water intake has been shown to have a multitude of other benefits, including improved alertness, better skin appearance, enhanced physical performance, reduced constipation and enhanced weight loss.    The average daily urine output for normal healthy adults is 1.2 liters a day, ranging from 1 to 2 liters in most individuals and varying with body weight and gender. In stone formers, however, a higher daily urine output is required for stone prevention and achieving a daily volume of at least 2.0 to 2.5 liters a day can significantly reduce the recurrence of future stones. In a randomized study of stone formers who were " given specific instructions to increase their fluid intake compared to stone formers told to not change their diet, those given specific fluid instructions achieved a high urine output of 2.6 liters a day versus 1.0 liters a day in those not given dietary instructions. Over a period of five years, the high fluid intake group was half as likely to form new stones as compared to the normal fluid intake group (Geo et al, J Urol 1996).    We recommend that most stone formers increase their daily fluid intake by one liter (an additional two 16 oz water bottles or two tall glasses a day) in order to achieve a urine output of 2.5 liters a day. (One liter = 4.2 8-oz glasses or 34 oz). Alternatively, a 24 hour urine collection can be performed to guide fluid intake to achieve 2.5 liters of urine output in a specific patient.    Type of Fluid Intake    The type of fluid intake is generally less important than the total intake. While drinking water is our preferred recommendation because it is inexpensive and contains no calories, for stone patients who do not enjoy drinking water, any beverage will be beneficial in reducing stone risk.    Contrary to popular belief, studies have found that an increased intake of tea, coffee, and alcoholic beverage actually reduces the risk of stones, possibly because of an associated increase in urine output (Kiran et al, Am J of Epidemiology, 1996). While tea contains high levels of oxalate, this does not appear to result in increased stone formation for the reasons discussed below in discussion on oxalate.    Soda intake (including alicja) and milk intake also do not appear to increase the risk of stones.    Citrus Fruit Juices    Citrus juices, including lemon juice and orange juice, contain citrate, which acts as a stone inhibitor for calcium based stones. Citrate seems to do this by binding calcium, making it unavailable to combine with oxalate or phosphate: a necessary first step in  the formation of stones. Citrate also seems to make it more difficult for stones to grow once they've formed.    Drinking citrus juice in the form of concentrated lemon juice mixed with water has been shown to effectively increase urinary citrate levels and reduce urinary calcium levels, both of which will reduce stone risk. Orange juice will similarly increase urinary citrate levels. However, orange juice appears to also increase urinary oxalate levels ( a stone promoter). Other sources of citrate, including grapefruit juice, have had less research completed confirming their beneficial effects on urinary citrate levels. Therefore, lemon juice is typically favored over the other citrus juices as a natural method to increase urinary citrate levels. Many patients find drinking citrus juices to be an attractive alternative to pharmaceutical treatment with potassium citrate.    We recommend that stone formers consider supplementing their daily fluid intake with a mixture of 60 ml of concentrated lemon juice in one liter of water to increase their urinary citrate levels.    Salt    A high sodium intake increases the risk of stone formation by increasing calcium levels and decreasing citrate (a stone inhibitor) levels in urine. Additionally, high sodium intake will impair the ability of medications such as hydrochlorothiazide to effectively reduce calcium levels in urine. A study of stone formers who were kept on a strict diet with a maximum daily sodium intake of 50 mmol (1200 mg) in addition to a reduced protein diet demonstrated that the low sodium diet was effective in reducing stone recurrence by 50% as compared to the low calcium diet.    We recommend that stone formers aim to follow the FDA's guideline of limiting salt intake to 2300 mg of sodium a day in the general population and 1500 mg of sodium a day in those with hypertension,  Americans, or middle aged and older adults. 2300 mg is equivalent to about  1 teaspoon of table salt.    The best way to determine the salt content of your food is to read the nutrition label. Processed foods tend to contain higher amounts of salt. Choose low sodium options whenever possible.    1 cup of canned chicken noodle soup contains 870 mg of sodium.  A fried chicken drumstick contains 310 mg of sodium.  A serving of shrimp contains 240 mg of sodium.  2 slices of white bread contains 200 mg of sodium.  15 potato chips contain 180 mg of sodium.  1 container of strawberry yogurt contains 85 mg of sodium.  1 tomato contains 20 mg of sodium.  1 apple contains 0 mg of sodium.    In addition to lowering the risk of stones, a low sodium intake helps to control or prevent high blood pressure, which can lead to heart disease, stroke, heart failure, and kidney disease.    Animal Protein    Animal protein in meat products increases the risk of stone by increasing calcium, oxalate, and uric acid levels in urine. All three of these changes increase the risk of stones. In studies comparing high meat eaters versus low meat eaters, high meat eaters were found to be at increased risk of forming stones. A randomized study of stone formers restricted to a low meat intake of 52 grams a day (equivalent to 8 oz of beef) in combination with sodium restriction found that the combination reduced stone recurrence by 50% compared to calcium restriction alone (Geo et al, [facility] 2002).    We recommend that most stone formers try to reduce their meat intake to 6 oz a day. This includes all types of meat: beef, pork, poultry, and seafood.    The USDA has recommended a daily allowance of 5-6 oz of protein intake among adults. They also recommend choosing non-meat protein foods such as nuts and beans instead of meat sources. Protein from non-meat sources does not appear to increase the risk of stones.    A small steak contains about 3-4 oz of protein.  A quarter pound hamburger with cheese contains 4 oz of  protein.  A chicken breast contains about 5 oz of protein, a chicken thigh about 2.5 oz, a chicken drumstick about 1.5 oz.  One 5 oz can of tuna contains 5 oz of protein.  1 medium egg contains 1 oz of protein.    Lowering your animal protein intake and eating more fruits and vegetables also benefits your overall health by limiting the amount of saturated fats and cholesterol in your diet. This helps to reduce your risk of cardiovascular disease.    Oxalate    While oxalate plays an important role in the development of calcium oxalate stones, dietary restriction does not appear to be effective in reducing the risk of stones in the majority of patients. About 40% of urinary oxalate comes from dietary sources while the remainder is naturally made within the liver. Therefore, reducing oxalate dietary intake does not always have a significant impact on total urinary oxalate levels.    Oxalate is found in many vegetable and fruits, including many healthy dietary choices often making it difficult to achieve a low oxalate diet. Because of these issues, oxalate avoidance is beneficial primarily in those individuals with specific abnormalities that lead to high oxalate urinary levels.    We recommend that most stone formers should maintain a normal oxalate intake without the need for oxalate restriction. High oxalate intake should be avoided in individuals found to have high urinary oxalate levels on metabolic evaluation. Oxalate restriction may be beneficial in certain individuals with high urinary oxalate levels.    Oxalate Rich Foods    Tea (black)  Spinach  Mustard Greens  Chard  Beets  Rhubarb  Okra  Berries  Chocolate  Nuts  Sweet Potatoes        Calcium    Kidney Stone formers often question whether to stop or reduce their calcium intake. Despite the fact that calcium is a major component of 75% of stones, excessive calcium intake is vary rarely the cause of stone formation. In fact, several studies have shown that  restricting calcium intake in most stone formers actually increases the number of stones they develop. This appears to happen because when less calcium is ingested, it becomes easier for oxalate (which normally binds with calcium in the gut) to be absorbed. Higher levels of oxalate in the urine then lead to an increase in stone risk. Calcium obtained from dietary sources appears to be better than calcium from supplements in regards to lowering stone risk because supplements can actually increase your risk of stones slightly (by 17%) while dietary calcium intake is instead associated with lower stone risk. If you need to take supplements, taking them during meals appear to be better in terms of stone risk.    We recommend that stone formers maintain a normal calcium intake, preferably from dietary sources. Female stone formers taking calcium supplementation to prevent osteoporosis experience a slightly increased risk of stones (17%) which needs to be balanced against their risk of osteoporosis.    Exercise to Lose Weight     Exercise and a healthy diet may help you lose weight. Your doctor may suggest specific exercises.     EXERCISE IDEAS AND TIPS     Choose low-cost things you enjoy doing, such as walking, bicycling, or exercising to workout videos.   Take stairs instead of the elevator.   Walk during your lunch break.   Park your car further away from work or school.   Go to a gym or an exercise class.   Start with 5 to 10 minutes of exercise each day. Build up to 30 minutes of exercise 4 to 6 days a week.   Wear shoes with good support and comfortable clothes.   Stretch before and after working out.   Work out until you breathe harder and your heart beats faster.   Drink extra water when you exercise.   Do not do so much that you hurt yourself, feel dizzy, or get very short of breath.     Exercises that burn about 150 calories:   Running 1 ½ miles in 15 minutes.   Playing volleyball for 45 to 60 minutes.    Washing and waxing a car for 45 to 60 minutes.   Playing touch football for 45 minutes.   Walking 1 ¾ miles in 35 minutes.   Pushing a stroller 1 ½ miles in 30 minutes.   Playing basketball for 30 minutes.   Raking leaves for 30 minutes.   Bicycling 5 miles in 30 minutes.   Walking 2 miles in 30 minutes.   Dancing for 30 minutes.   Shoveling snow for 15 minutes.   Swimming laps for 20 minutes.   Walking up stairs for 15 minutes.   Bicycling 4 miles in 15 minutes.   Gardening for 30 to 45 minutes.   Jumping rope for 15 minutes.   Washing windows or floors for 45 to 60 minutes.

## 2024-11-06 NOTE — TELEPHONE ENCOUNTER
Patient was calling to let provider know that the jardiance would cost him $500 a month.  Is there something else he can take instead?

## 2024-11-06 NOTE — TELEPHONE ENCOUNTER
I spoke to Faisal he is aware he does not have to  the medication at this time due to being too costly. He is aware to contact PCP for diabetic management.

## 2024-11-06 NOTE — ASSESSMENT & PLAN NOTE
Last seen urology 9/27/2024 notes reviewed  As per most recent urology note biopsies showing atypical cells suspicious for but not diagnostic of adenocarcinoma of the prostate  Continue close follow-up with urology  Orders:    Cholecalciferol (Vitamin D3) 50 MCG (2000 UT) capsule; Take 2 capsules (4,000 Units total) by mouth daily    Renal function panel; Future    Renal function panel; Future    PTH, intact; Future    Renal function panel; Future    Vitamin D 25 hydroxy; Future    Magnesium; Future    Albumin / creatinine urine ratio; Future    CBC; Future    Magnesium; Future    Phosphorus; Future    Uric acid; Future    Renal function panel; Future    PTH, intact; Future    Vitamin D 25 hydroxy; Future    Empagliflozin (JARDIANCE) 10 MG TABS tablet; Take 1 tablet (10 mg total) by mouth every morning

## 2024-11-07 ENCOUNTER — TELEPHONE (OUTPATIENT)
Dept: FAMILY MEDICINE CLINIC | Facility: CLINIC | Age: 64
End: 2024-11-07

## 2024-11-07 RX ORDER — PANTOPRAZOLE SODIUM 40 MG/1
40 TABLET, DELAYED RELEASE ORAL
Qty: 90 TABLET | Refills: 1 | Status: SHIPPED | OUTPATIENT
Start: 2024-11-07

## 2024-11-22 ENCOUNTER — OFFICE VISIT (OUTPATIENT)
Dept: FAMILY MEDICINE CLINIC | Facility: CLINIC | Age: 64
End: 2024-11-22
Payer: MEDICARE

## 2024-11-22 VITALS
OXYGEN SATURATION: 98 % | WEIGHT: 281 LBS | RESPIRATION RATE: 20 BRPM | DIASTOLIC BLOOD PRESSURE: 80 MMHG | BODY MASS INDEX: 41.62 KG/M2 | SYSTOLIC BLOOD PRESSURE: 180 MMHG | HEART RATE: 78 BPM | TEMPERATURE: 97.9 F | HEIGHT: 69 IN

## 2024-11-22 DIAGNOSIS — J01.41 ACUTE RECURRENT PANSINUSITIS: ICD-10-CM

## 2024-11-22 DIAGNOSIS — R05.1 ACUTE COUGH: ICD-10-CM

## 2024-11-22 DIAGNOSIS — J02.9 SORE THROAT: Primary | ICD-10-CM

## 2024-11-22 DIAGNOSIS — I10 ESSENTIAL HYPERTENSION: ICD-10-CM

## 2024-11-22 LAB
SARS-COV-2 AG UPPER RESP QL IA: NEGATIVE
SL AMB POCT RAPID FLU A: NEGATIVE
SL AMB POCT RAPID FLU B: NEGATIVE
VALID CONTROL: NORMAL

## 2024-11-22 PROCEDURE — 87811 SARS-COV-2 COVID19 W/OPTIC: CPT | Performed by: INTERNAL MEDICINE

## 2024-11-22 PROCEDURE — 87804 INFLUENZA ASSAY W/OPTIC: CPT | Performed by: INTERNAL MEDICINE

## 2024-11-22 PROCEDURE — 99214 OFFICE O/P EST MOD 30 MIN: CPT | Performed by: INTERNAL MEDICINE

## 2024-11-22 PROCEDURE — G2211 COMPLEX E/M VISIT ADD ON: HCPCS | Performed by: INTERNAL MEDICINE

## 2024-11-22 RX ORDER — BENZONATATE 200 MG/1
200 CAPSULE ORAL 3 TIMES DAILY PRN
Qty: 30 CAPSULE | Refills: 0 | Status: SHIPPED | OUTPATIENT
Start: 2024-11-22

## 2024-11-22 NOTE — ASSESSMENT & PLAN NOTE
Blood pressures are highly elevated, patient is a symptomatic.  Likely due to using OTC cough syrup containing dextromethorphan.  Patient was instructed to stop his cough syrup that he has at home.  Continue to monitor blood pressures at home.  If no improvement update immediately.

## 2024-11-22 NOTE — PATIENT INSTRUCTIONS
Please use Mucinex 1200 mg twice a day, make sure it is not DM, use benzonatate 3 times a day.  Use Augmentin 1 pill twice a day for 7 days.    Start Afrin 2 sprays each nostril twice a day 5 days.

## 2024-11-22 NOTE — ASSESSMENT & PLAN NOTE
Negative for rapid COVID-19 and flu.  Clinical presentation consistent with acute sinusitis, start Augmentin 875 mg twice daily.  Will use benzonatate 3 times daily for cough, okay to use plain Mucinex 1200 mg twice daily.  Continue with Afrin twice a day not more than for 5 days.  Update if no improvement in 3 to 4 days.  Continue with oral hydration, rest.

## 2024-11-22 NOTE — PROGRESS NOTES
Assessment/Plan:    Essential hypertension  Blood pressures are highly elevated, patient is a symptomatic.  Likely due to using OTC cough syrup containing dextromethorphan.  Patient was instructed to stop his cough syrup that he has at home.  Continue to monitor blood pressures at home.  If no improvement update immediately.    Acute recurrent pansinusitis  Negative for rapid COVID-19 and flu.  Clinical presentation consistent with acute sinusitis, start Augmentin 875 mg twice daily.  Will use benzonatate 3 times daily for cough, okay to use plain Mucinex 1200 mg twice daily.  Continue with Afrin twice a day not more than for 5 days.  Update if no improvement in 3 to 4 days.  Continue with oral hydration, rest.       Diagnoses and all orders for this visit:    Sore throat  -     POCT Rapid Covid Ag  -     POCT rapid flu A and B    Acute cough  -     benzonatate (TESSALON) 200 MG capsule; Take 1 capsule (200 mg total) by mouth 3 (three) times a day as needed for cough    Acute recurrent pansinusitis  -     amoxicillin-clavulanate (AUGMENTIN) 875-125 mg per tablet; Take 1 tablet by mouth every 12 (twelve) hours for 7 days    Essential hypertension          Subjective:      Patient ID: Faisal Smith is a 64 y.o. male.    Patient came today with a new complaints of nasal congestion and sinus pain that he developed few days ago.  His blood pressures were also uncontrolled today.    Cough  Associated symptoms include chills, rhinorrhea and a sore throat. Pertinent negatives include no chest pain, fever, myalgias or shortness of breath.   Sore Throat   Associated symptoms include congestion and coughing. Pertinent negatives include no diarrhea, shortness of breath or vomiting.       The following portions of the patient's history were reviewed and updated as appropriate: allergies, current medications, past family history, past medical history, past social history, past surgical history, and problem list.    Review of  "Systems   Constitutional:  Positive for chills and fatigue. Negative for appetite change and fever.   HENT:  Positive for congestion, rhinorrhea, sinus pain and sore throat.    Respiratory:  Positive for cough. Negative for chest tightness and shortness of breath.    Cardiovascular:  Negative for chest pain.   Gastrointestinal:  Negative for diarrhea, nausea and vomiting.   Musculoskeletal:  Negative for arthralgias and myalgias.         Objective:      BP (!) 180/80 (BP Location: Left arm, Patient Position: Sitting, Cuff Size: Large)   Pulse 78   Temp 97.9 °F (36.6 °C) (Tympanic)   Resp 20   Ht 5' 9\" (1.753 m)   Wt 127 kg (281 lb)   SpO2 98%   BMI 41.50 kg/m²     Allergies   Allergen Reactions    Aspirin Other (See Comments)     Per pt told by kidney doctor not to take    Nsaids Other (See Comments)     Per pt instructed by his kidney doctor-Dr Bone---Not to take          Current Outpatient Medications:     acetaminophen (TYLENOL) 500 mg tablet, Take 500 mg by mouth every 6 (six) hours as needed for mild pain, Disp: , Rfl:     allopurinol (ZYLOPRIM) 100 mg tablet, Take 2 tablets (200 mg total) by mouth daily, Disp: 180 tablet, Rfl: 1    amoxicillin-clavulanate (AUGMENTIN) 875-125 mg per tablet, Take 1 tablet by mouth every 12 (twelve) hours for 7 days, Disp: 14 tablet, Rfl: 0    benzonatate (TESSALON) 200 MG capsule, Take 1 capsule (200 mg total) by mouth 3 (three) times a day as needed for cough, Disp: 30 capsule, Rfl: 0    Cholecalciferol (Vitamin D3) 50 MCG (2000 UT) capsule, Take 2 capsules (4,000 Units total) by mouth daily, Disp: , Rfl:     doxazosin (CARDURA) 2 mg tablet, Take 1 tablet (2 mg total) by mouth daily at bedtime, Disp: 180 tablet, Rfl: 0    Empagliflozin (JARDIANCE) 10 MG TABS tablet, Take 1 tablet (10 mg total) by mouth every morning, Disp: 90 tablet, Rfl: 3    losartan (COZAAR) 100 MG tablet, Take 1 tablet (100 mg total) by mouth daily, Disp: 90 tablet, Rfl: 3    Magnesium Oxide 400 MG " CAPS, Take 1 tablet (400 mg total) by mouth 3 (three) times a day, Disp: 270 tablet, Rfl: 3    metFORMIN (GLUCOPHAGE-XR) 750 mg 24 hr tablet, Take 1 tablet (750 mg total) by mouth daily with breakfast, Disp: 90 tablet, Rfl: 1    NIFEdipine (ADALAT CC) 90 mg 24 hr tablet, TAKE ONE TABLET BY MOUTH EVERY DAY, Disp: 90 tablet, Rfl: 1    pantoprazole (PROTONIX) 40 mg tablet, Take 1 tablet by mouth daily before breakfast, Disp: 90 tablet, Rfl: 1    rosuvastatin (CRESTOR) 40 MG tablet, Take 1 tablet (40 mg total) by mouth daily, Disp: 90 tablet, Rfl: 1     Patient Instructions   Please use Mucinex 1200 mg twice a day, make sure it is not DM, use benzonatate 3 times a day.  Use Augmentin 1 pill twice a day for 7 days.    Start Afrin 2 sprays each nostril twice a day 5 days.        Physical Exam  Constitutional:       General: He is not in acute distress.     Appearance: He is not ill-appearing or toxic-appearing.   HENT:      Nose: Congestion and rhinorrhea present.      Mouth/Throat:      Pharynx: Posterior oropharyngeal erythema present. No oropharyngeal exudate.   Cardiovascular:      Pulses: Normal pulses.   Pulmonary:      Effort: No respiratory distress.      Breath sounds: No wheezing or rales.

## 2024-11-29 ENCOUNTER — APPOINTMENT (OUTPATIENT)
Dept: RADIOLOGY | Facility: MEDICAL CENTER | Age: 64
End: 2024-11-29
Payer: MEDICARE

## 2024-11-29 ENCOUNTER — OFFICE VISIT (OUTPATIENT)
Dept: FAMILY MEDICINE CLINIC | Facility: CLINIC | Age: 64
End: 2024-11-29
Payer: MEDICARE

## 2024-11-29 VITALS
DIASTOLIC BLOOD PRESSURE: 72 MMHG | HEART RATE: 70 BPM | OXYGEN SATURATION: 95 % | TEMPERATURE: 98 F | WEIGHT: 270.4 LBS | SYSTOLIC BLOOD PRESSURE: 148 MMHG | BODY MASS INDEX: 40.05 KG/M2 | RESPIRATION RATE: 16 BRPM | HEIGHT: 69 IN

## 2024-11-29 DIAGNOSIS — R05.3 CHRONIC COUGH: ICD-10-CM

## 2024-11-29 DIAGNOSIS — R06.2 WHEEZING: Primary | ICD-10-CM

## 2024-11-29 DIAGNOSIS — N18.30 CKD STAGE 3 DUE TO TYPE 2 DIABETES MELLITUS (HCC): ICD-10-CM

## 2024-11-29 DIAGNOSIS — J01.10 ACUTE NON-RECURRENT FRONTAL SINUSITIS: ICD-10-CM

## 2024-11-29 DIAGNOSIS — E11.22 CKD STAGE 3 DUE TO TYPE 2 DIABETES MELLITUS (HCC): ICD-10-CM

## 2024-11-29 PROCEDURE — G2211 COMPLEX E/M VISIT ADD ON: HCPCS | Performed by: FAMILY MEDICINE

## 2024-11-29 PROCEDURE — 71046 X-RAY EXAM CHEST 2 VIEWS: CPT

## 2024-11-29 PROCEDURE — 99214 OFFICE O/P EST MOD 30 MIN: CPT | Performed by: FAMILY MEDICINE

## 2024-11-29 RX ORDER — ALBUTEROL SULFATE 90 UG/1
2 INHALANT RESPIRATORY (INHALATION) EVERY 6 HOURS PRN
Qty: 8.5 G | Refills: 1 | Status: SHIPPED | OUTPATIENT
Start: 2024-11-29

## 2024-11-29 RX ORDER — PREDNISONE 20 MG/1
40 TABLET ORAL DAILY
Qty: 10 TABLET | Refills: 0 | Status: SHIPPED | OUTPATIENT
Start: 2024-11-29 | End: 2024-12-04

## 2024-11-29 NOTE — PROGRESS NOTES
Name: Faisal Smith      : 1960      MRN: [id number]  Encounter Provider: Charles Lawson MD  Encounter Date: 2024   Encounter department: [facility] PRIMARY CARE  :  Assessment & Plan  Acute non-recurrent frontal sinusitis    Recently treated with Augmentin with some improvement, given wheezing on exam and worsening cough, check xray and start prednisone will send in albuterol as well as this helped in past, follow up right away if worsening    Orders:    predniSONE 20 mg tablet; Take 2 tablets (40 mg total) by mouth daily for 5 days    Chronic cough    Orders:    XR chest pa and lateral; Future    Wheezing    Orders:    albuterol (ProAir HFA) 90 mcg/act inhaler; Inhale 2 puffs every 6 (six) hours as needed for wheezing or shortness of breath    CKD stage 3 due to type 2 diabetes mellitus (HCC)    Discussed side effects of prednsione and importance of following diabetic diet on medication, has tolerated in past  Lab Results   Component Value Date    HGBA1C 7.8 (H) 2024                   History of Present Illness     Cough  Associated symptoms include headaches. Pertinent negatives include no chest pain.   Headache      64 year old presenting for follow up treated for sinusitis.    Congestion has improved, but cough and headache are pesisting,.    Has developed diarrhea after starting augmentin.    Reports daughter and grandson with recent colds, wife is sick as well.      Review of Systems   Constitutional:  Negative for activity change and appetite change.   Respiratory:  Positive for cough.    Cardiovascular:  Negative for chest pain and palpitations.   Gastrointestinal:  Negative for abdominal distention and abdominal pain.   Musculoskeletal:  Negative for arthralgias and back pain.   Neurological:  Positive for headaches.          Objective   /72 (BP Location: Left arm, Patient Position: Sitting, Cuff Size: Large)   Pulse 70   Temp 98 °F (36.7 °C)  "(Temporal)   Resp 16   Ht 5' 9\" (1.753 m)   Wt 123 kg (270 lb 6.4 oz)   SpO2 95%   BMI 39.93 kg/m²      Physical Exam  HENT:      Nose: Congestion present.   Eyes:      Pupils: Pupils are equal, round, and reactive to light.   Cardiovascular:      Rate and Rhythm: Normal rate and regular rhythm.      Pulses: Normal pulses.   Pulmonary:      Effort: Pulmonary effort is normal.      Breath sounds: Wheezing present.   Abdominal:      General: Abdomen is flat.      Tenderness: There is no abdominal tenderness.   Musculoskeletal:         General: Normal range of motion.   Neurological:      General: No focal deficit present.      Mental Status: He is alert and oriented to person, place, and time.         "

## 2024-11-29 NOTE — ASSESSMENT & PLAN NOTE
Discussed side effects of prednsione and importance of following diabetic diet on medication, has tolerated in past  Lab Results   Component Value Date    HGBA1C 7.8 (H) 08/26/2024

## 2024-12-02 ENCOUNTER — TELEPHONE (OUTPATIENT)
Dept: FAMILY MEDICINE CLINIC | Facility: CLINIC | Age: 64
End: 2024-12-02

## 2024-12-02 ENCOUNTER — RESULTS FOLLOW-UP (OUTPATIENT)
Dept: FAMILY MEDICINE CLINIC | Facility: CLINIC | Age: 64
End: 2024-12-02

## 2024-12-02 NOTE — TELEPHONE ENCOUNTER
Relayed xray results to patient as per provider message. Patient expressed understanding and did not have any further questions.

## 2024-12-22 PROBLEM — J01.41 ACUTE RECURRENT PANSINUSITIS: Status: RESOLVED | Noted: 2024-02-05 | Resolved: 2024-12-22

## 2025-01-04 ENCOUNTER — APPOINTMENT (OUTPATIENT)
Dept: LAB | Facility: MEDICAL CENTER | Age: 65
End: 2025-01-04
Payer: MEDICARE

## 2025-01-04 ENCOUNTER — RESULTS FOLLOW-UP (OUTPATIENT)
Dept: OTHER | Facility: HOSPITAL | Age: 65
End: 2025-01-04

## 2025-01-04 DIAGNOSIS — E79.0 HYPERURICEMIA: ICD-10-CM

## 2025-01-04 DIAGNOSIS — E83.42 HYPOMAGNESEMIA: ICD-10-CM

## 2025-01-04 DIAGNOSIS — N18.30 CKD STAGE 3 DUE TO TYPE 2 DIABETES MELLITUS (HCC): ICD-10-CM

## 2025-01-04 DIAGNOSIS — C61 PROSTATE CANCER (HCC): ICD-10-CM

## 2025-01-04 DIAGNOSIS — E83.9 CHRONIC KIDNEY DISEASE-MINERAL AND BONE DISORDER (CKD-MBD): ICD-10-CM

## 2025-01-04 DIAGNOSIS — E78.5 HYPERLIPIDEMIA, UNSPECIFIED HYPERLIPIDEMIA TYPE: ICD-10-CM

## 2025-01-04 DIAGNOSIS — M89.9 CHRONIC KIDNEY DISEASE-MINERAL AND BONE DISORDER (CKD-MBD): ICD-10-CM

## 2025-01-04 DIAGNOSIS — N18.9 CHRONIC KIDNEY DISEASE-MINERAL AND BONE DISORDER (CKD-MBD): ICD-10-CM

## 2025-01-04 DIAGNOSIS — E11.22 CKD STAGE 3 DUE TO TYPE 2 DIABETES MELLITUS (HCC): ICD-10-CM

## 2025-01-04 DIAGNOSIS — N18.31 HYPERTENSIVE KIDNEY DISEASE WITH STAGE 3A CHRONIC KIDNEY DISEASE (HCC): ICD-10-CM

## 2025-01-04 DIAGNOSIS — R80.1 PERSISTENT PROTEINURIA: ICD-10-CM

## 2025-01-04 DIAGNOSIS — I12.9 HYPERTENSIVE KIDNEY DISEASE WITH STAGE 3A CHRONIC KIDNEY DISEASE (HCC): ICD-10-CM

## 2025-01-04 DIAGNOSIS — Z87.442 HISTORY OF NEPHROLITHIASIS: ICD-10-CM

## 2025-01-04 DIAGNOSIS — E66.01 MORBID OBESITY WITH BMI OF 40.0-44.9, ADULT (HCC): ICD-10-CM

## 2025-01-04 LAB
25(OH)D3 SERPL-MCNC: 26.4 NG/ML (ref 30–100)
ALBUMIN SERPL BCG-MCNC: 4 G/DL (ref 3.5–5)
ANION GAP SERPL CALCULATED.3IONS-SCNC: 8 MMOL/L (ref 4–13)
BUN SERPL-MCNC: 21 MG/DL (ref 5–25)
CALCIUM SERPL-MCNC: 9.3 MG/DL (ref 8.4–10.2)
CHLORIDE SERPL-SCNC: 106 MMOL/L (ref 96–108)
CO2 SERPL-SCNC: 28 MMOL/L (ref 21–32)
CREAT SERPL-MCNC: 1.32 MG/DL (ref 0.6–1.3)
ERYTHROCYTE [DISTWIDTH] IN BLOOD BY AUTOMATED COUNT: 12.9 % (ref 11.6–15.1)
GFR SERPL CREATININE-BSD FRML MDRD: 56 ML/MIN/1.73SQ M
GLUCOSE P FAST SERPL-MCNC: 111 MG/DL (ref 65–99)
HCT VFR BLD AUTO: 40 % (ref 36.5–49.3)
HGB BLD-MCNC: 13 G/DL (ref 12–17)
MAGNESIUM SERPL-MCNC: 1.6 MG/DL (ref 1.9–2.7)
MCH RBC QN AUTO: 29.8 PG (ref 26.8–34.3)
MCHC RBC AUTO-ENTMCNC: 32.5 G/DL (ref 31.4–37.4)
MCV RBC AUTO: 92 FL (ref 82–98)
PHOSPHATE SERPL-MCNC: 2.6 MG/DL (ref 2.3–4.1)
PLATELET # BLD AUTO: 236 THOUSANDS/UL (ref 149–390)
PMV BLD AUTO: 10 FL (ref 8.9–12.7)
POTASSIUM SERPL-SCNC: 3.6 MMOL/L (ref 3.5–5.3)
PTH-INTACT SERPL-MCNC: 56.5 PG/ML (ref 12–88)
RBC # BLD AUTO: 4.36 MILLION/UL (ref 3.88–5.62)
SODIUM SERPL-SCNC: 142 MMOL/L (ref 135–147)
WBC # BLD AUTO: 9.58 THOUSAND/UL (ref 4.31–10.16)

## 2025-01-04 PROCEDURE — 36415 COLL VENOUS BLD VENIPUNCTURE: CPT

## 2025-01-04 PROCEDURE — 82306 VITAMIN D 25 HYDROXY: CPT

## 2025-01-04 PROCEDURE — 83735 ASSAY OF MAGNESIUM: CPT

## 2025-01-04 PROCEDURE — 83970 ASSAY OF PARATHORMONE: CPT

## 2025-01-04 PROCEDURE — 85027 COMPLETE CBC AUTOMATED: CPT

## 2025-01-04 PROCEDURE — 80069 RENAL FUNCTION PANEL: CPT

## 2025-01-04 NOTE — RESULT ENCOUNTER NOTE
Please inform patient creatinine stable at baseline magnesium level is running low please make sure that he is taking his magnesium tablets 3 times a day.  If he is then increase it to 4 times a day

## 2025-01-04 NOTE — RESULT ENCOUNTER NOTE
Please inform patient vitamin D level is running low ensure he is taking his vitamin D 4000 units daily over-the-counter

## 2025-01-06 NOTE — RESULT ENCOUNTER NOTE
I spoke to Faisal he is aware that  wants him to improve his magnesium supplement to 4 times a day and vitamin D to 4000 units daily. He did not have any questions with the medication changes.

## 2025-01-14 ENCOUNTER — OFFICE VISIT (OUTPATIENT)
Dept: FAMILY MEDICINE CLINIC | Facility: CLINIC | Age: 65
End: 2025-01-14
Payer: MEDICARE

## 2025-01-14 VITALS
SYSTOLIC BLOOD PRESSURE: 126 MMHG | RESPIRATION RATE: 16 BRPM | OXYGEN SATURATION: 97 % | HEIGHT: 69 IN | HEART RATE: 85 BPM | BODY MASS INDEX: 40.73 KG/M2 | TEMPERATURE: 97.5 F | DIASTOLIC BLOOD PRESSURE: 54 MMHG | WEIGHT: 275 LBS

## 2025-01-14 DIAGNOSIS — D84.9 IMMUNODEFICIENCY, UNSPECIFIED (HCC): ICD-10-CM

## 2025-01-14 DIAGNOSIS — M35.3 POLYMYALGIA RHEUMATICA (HCC): ICD-10-CM

## 2025-01-14 DIAGNOSIS — N18.2 TYPE 2 DIABETES MELLITUS WITH STAGE 2 CHRONIC KIDNEY DISEASE, WITHOUT LONG-TERM CURRENT USE OF INSULIN  (HCC): ICD-10-CM

## 2025-01-14 DIAGNOSIS — F32.0 MILD MAJOR DEPRESSION, SINGLE EPISODE (HCC): ICD-10-CM

## 2025-01-14 DIAGNOSIS — E11.22 TYPE 2 DIABETES MELLITUS WITH STAGE 2 CHRONIC KIDNEY DISEASE, WITHOUT LONG-TERM CURRENT USE OF INSULIN  (HCC): ICD-10-CM

## 2025-01-14 DIAGNOSIS — I10 ESSENTIAL HYPERTENSION: ICD-10-CM

## 2025-01-14 DIAGNOSIS — G89.29 CHRONIC PAIN OF LEFT KNEE: ICD-10-CM

## 2025-01-14 DIAGNOSIS — N18.30 CKD STAGE 3 DUE TO TYPE 2 DIABETES MELLITUS (HCC): ICD-10-CM

## 2025-01-14 DIAGNOSIS — Z00.00 ANNUAL PHYSICAL EXAM: Primary | ICD-10-CM

## 2025-01-14 DIAGNOSIS — C61 PROSTATE CANCER (HCC): ICD-10-CM

## 2025-01-14 DIAGNOSIS — M25.562 CHRONIC PAIN OF LEFT KNEE: ICD-10-CM

## 2025-01-14 DIAGNOSIS — I47.10 SVT (SUPRAVENTRICULAR TACHYCARDIA) (HCC): ICD-10-CM

## 2025-01-14 DIAGNOSIS — E66.01 MORBID OBESITY WITH BMI OF 40.0-44.9, ADULT (HCC): ICD-10-CM

## 2025-01-14 DIAGNOSIS — E11.22 CKD STAGE 3 DUE TO TYPE 2 DIABETES MELLITUS (HCC): ICD-10-CM

## 2025-01-14 LAB — SL AMB POCT HEMOGLOBIN AIC: 7.5 (ref ?–6.5)

## 2025-01-14 PROCEDURE — 99214 OFFICE O/P EST MOD 30 MIN: CPT | Performed by: INTERNAL MEDICINE

## 2025-01-14 PROCEDURE — G0438 PPPS, INITIAL VISIT: HCPCS | Performed by: INTERNAL MEDICINE

## 2025-01-14 PROCEDURE — 83036 HEMOGLOBIN GLYCOSYLATED A1C: CPT | Performed by: INTERNAL MEDICINE

## 2025-01-14 NOTE — ASSESSMENT & PLAN NOTE
Hemoglobin A1c is improving.  Continue current dose of metformin.  Lab Results   Component Value Date    HGBA1C 7.5 (A) 01/14/2025       Orders:    POCT hemoglobin A1c

## 2025-01-14 NOTE — PATIENT INSTRUCTIONS
Medicare Preventive Visit Patient Instructions  Thank you for completing your Welcome to Medicare Visit or Medicare Annual Wellness Visit today. Your next wellness visit will be due in one year (1/15/2026).  The screening/preventive services that you may require over the next 5-10 years are detailed below. Some tests may not apply to you based off risk factors and/or age. Screening tests ordered at today's visit but not completed yet may show as past due. Also, please note that scanned in results may not display below.  Preventive Screenings:  Service Recommendations Previous Testing/Comments   Colorectal Cancer Screening  Colonoscopy    Fecal Occult Blood Test (FOBT)/Fecal Immunochemical Test (FIT)  Fecal DNA/Cologuard Test  Flexible Sigmoidoscopy Age: 45-75 years old   Colonoscopy: every 10 years (May be performed more frequently if at higher risk)  OR  FOBT/FIT: every 1 year  OR  Cologuard: every 3 years  OR  Sigmoidoscopy: every 5 years  Screening may be recommended earlier than age 45 if at higher risk for colorectal cancer. Also, an individualized decision between you and your healthcare provider will decide whether screening between the ages of 76-85 would be appropriate. Colonoscopy: 01/05/2023  FOBT/FIT: Not on file  Cologuard: Not on file  Sigmoidoscopy: Not on file          Prostate Cancer Screening Individualized decision between patient and health care provider in men between ages of 55-69   Medicare will cover every 12 months beginning on the day after your 50th birthday PSA: 6.98 ng/mL           Hepatitis C Screening Once for adults born between 1945 and 1965  More frequently in patients at high risk for Hepatitis C Hep C Antibody: 03/15/2022        Diabetes Screening 1-2 times per year if you're at risk for diabetes or have pre-diabetes Fasting glucose: 111 mg/dL (1/4/2025)  A1C: 7.8 % (8/26/2024)      Cholesterol Screening Once every 5 years if you don't have a lipid disorder. May order more often  based on risk factors. Lipid panel: 10/30/2024         Other Preventive Screenings Covered by Medicare:  Abdominal Aortic Aneurysm (AAA) Screening: covered once if your at risk. You're considered to be at risk if you have a family history of AAA or a male between the age of 65-75 who smoking at least 100 cigarettes in your lifetime.  Lung Cancer Screening: covers low dose CT scan once per year if you meet all of the following conditions: (1) Age 55-77; (2) No signs or symptoms of lung cancer; (3) Current smoker or have quit smoking within the last 15 years; (4) You have a tobacco smoking history of at least 20 pack years (packs per day x number of years you smoked); (5) You get a written order from a healthcare provider.  Glaucoma Screening: covered annually if you're considered high risk: (1) You have diabetes OR (2) Family history of glaucoma OR (3)  aged 50 and older OR (4)  American aged 65 and older  Osteoporosis Screening: covered every 2 years if you meet one of the following conditions: (1) Have a vertebral abnormality; (2) On glucocorticoid therapy for more than 3 months; (3) Have primary hyperparathyroidism; (4) On osteoporosis medications and need to assess response to drug therapy.  HIV Screening: covered annually if you're between the age of 15-65. Also covered annually if you are younger than 15 and older than 65 with risk factors for HIV infection. For pregnant patients, it is covered up to 3 times per pregnancy.    Immunizations:  Immunization Recommendations   Influenza Vaccine Annual influenza vaccination during flu season is recommended for all persons aged >= 6 months who do not have contraindications   Pneumococcal Vaccine   * Pneumococcal conjugate vaccine = PCV13 (Prevnar 13), PCV15 (Vaxneuvance), PCV20 (Prevnar 20)  * Pneumococcal polysaccharide vaccine = PPSV23 (Pneumovax) Adults 19-63 yo with certain risk factors or if 65+ yo  If never received any pneumonia vaccine:  recommend Prevnar 20 (PCV20)  Give PCV20 if previously received 1 dose of PCV13 or PPSV23   Hepatitis B Vaccine 3 dose series if at intermediate or high risk (ex: diabetes, end stage renal disease, liver disease)   Respiratory syncytial virus (RSV) Vaccine - COVERED BY MEDICARE PART D  * RSVPreF3 (Arexvy) CDC recommends that adults 60 years of age and older may receive a single dose of RSV vaccine using shared clinical decision-making (SCDM)   Tetanus (Td) Vaccine - COST NOT COVERED BY MEDICARE PART B Following completion of primary series, a booster dose should be given every 10 years to maintain immunity against tetanus. Td may also be given as tetanus wound prophylaxis.   Tdap Vaccine - COST NOT COVERED BY MEDICARE PART B Recommended at least once for all adults. For pregnant patients, recommended with each pregnancy.   Shingles Vaccine (Shingrix) - COST NOT COVERED BY MEDICARE PART B  2 shot series recommended in those 19 years and older who have or will have weakened immune systems or those 50 years and older     Health Maintenance Due:      Topic Date Due   • Colorectal Cancer Screening  01/03/2030   • HIV Screening  Completed   • Hepatitis C Screening  Completed     Immunizations Due:      Topic Date Due   • COVID-19 Vaccine (1) Never done   • Influenza Vaccine (1) Never done     Advance Directives   What are advance directives?  Advance directives are legal documents that state your wishes and plans for medical care. These plans are made ahead of time in case you lose your ability to make decisions for yourself. Advance directives can apply to any medical decision, such as the treatments you want, and if you want to donate organs.   What are the types of advance directives?  There are many types of advance directives, and each state has rules about how to use them. You may choose a combination of any of the following:  Living will:  This is a written record of the treatment you want. You can also choose  which treatments you do not want, which to limit, and which to stop at a certain time. This includes surgery, medicine, IV fluid, and tube feedings.   Durable power of  for healthcare (DPAHC):  This is a written record that states who you want to make healthcare choices for you when you are unable to make them for yourself. This person, called a proxy, is usually a family member or a friend. You may choose more than 1 proxy.  Do not resuscitate (DNR) order:  A DNR order is used in case your heart stops beating or you stop breathing. It is a request not to have certain forms of treatment, such as CPR. A DNR order may be included in other types of advance directives.  Medical directive:  This covers the care that you want if you are in a coma, near death, or unable to make decisions for yourself. You can list the treatments you want for each condition. Treatment may include pain medicine, surgery, blood transfusions, dialysis, IV or tube feedings, and a ventilator (breathing machine).  Values history:  This document has questions about your views, beliefs, and how you feel and think about life. This information can help others choose the care that you would choose.  Why are advance directives important?  An advance directive helps you control your care. Although spoken wishes may be used, it is better to have your wishes written down. Spoken wishes can be misunderstood, or not followed. Treatments may be given even if you do not want them. An advance directive may make it easier for your family to make difficult choices about your care.   Weight Management   Why it is important to manage your weight:  Being overweight increases your risk of health conditions such as heart disease, high blood pressure, type 2 diabetes, and certain types of cancer. It can also increase your risk for osteoarthritis, sleep apnea, and other respiratory problems. Aim for a slow, steady weight loss. Even a small amount of weight loss  can lower your risk of health problems.  How to lose weight safely:  A safe and healthy way to lose weight is to eat fewer calories and get regular exercise. You can lose up about 1 pound a week by decreasing the number of calories you eat by 500 calories each day.   Healthy meal plan for weight management:  A healthy meal plan includes a variety of foods, contains fewer calories, and helps you stay healthy. A healthy meal plan includes the following:  Eat whole-grain foods more often.  A healthy meal plan should contain fiber. Fiber is the part of grains, fruits, and vegetables that is not broken down by your body. Whole-grain foods are healthy and provide extra fiber in your diet. Some examples of whole-grain foods are whole-wheat breads and pastas, oatmeal, brown rice, and bulgur.  Eat a variety of vegetables every day.  Include dark, leafy greens such as spinach, kale, andres greens, and mustard greens. Eat yellow and orange vegetables such as carrots, sweet potatoes, and winter squash.   Eat a variety of fruits every day.  Choose fresh or canned fruit (canned in its own juice or light syrup) instead of juice. Fruit juice has very little or no fiber.  Eat low-fat dairy foods.  Drink fat-free (skim) milk or 1% milk. Eat fat-free yogurt and low-fat cottage cheese. Try low-fat cheeses such as mozzarella and other reduced-fat cheeses.  Choose meat and other protein foods that are low in fat.  Choose beans or other legumes such as split peas or lentils. Choose fish, skinless poultry (chicken or turkey), or lean cuts of red meat (beef or pork). Before you cook meat or poultry, cut off any visible fat.   Use less fat and oil.  Try baking foods instead of frying them. Add less fat, such as margarine, sour cream, regular salad dressing and mayonnaise to foods. Eat fewer high-fat foods. Some examples of high-fat foods include french fries, doughnuts, ice cream, and cakes.  Eat fewer sweets.  Limit foods and drinks that  are high in sugar. This includes candy, cookies, regular soda, and sweetened drinks.  Exercise:  Exercise at least 30 minutes per day on most days of the week. Some examples of exercise include walking, biking, dancing, and swimming. You can also fit in more physical activity by taking the stairs instead of the elevator or parking farther away from stores. Ask your healthcare provider about the best exercise plan for you.      © Copyright [facility] 2018 Information is for End User's use only and may not be sold, redistributed or otherwise used for commercial purposes. All illustrations and images included in CareNotes® are the copyrighted property of [employer] Inc[employer] or [employer]

## 2025-01-14 NOTE — PROGRESS NOTES
Name: Faisal Smith      : 1960      MRN: [id number]  Encounter Provider: Amrit Faust MD  Encounter Date: 2025   Encounter department: [facility] PRIMARY [facility]    Assessment & Plan  CKD stage 3 due to type 2 diabetes mellitus (HCC)  Hemoglobin A1c is improving.  Continue current dose of metformin.  Lab Results   Component Value Date    HGBA1C 7.5 (A) 2025       Orders:    POCT hemoglobin A1c    Type 2 diabetes mellitus with stage 2 chronic kidney disease, without long-term current use of insulin  (HCC)    Lab Results   Component Value Date    HGBA1C 7.5 (A) 2025            Essential hypertension  Blood pressures are well-controlled.  Continue current meds.       Chronic pain of left knee  Complains of chronic left knee pain, will use Voltaren gel.  Tylenol did not help.  We may consider intra-articular steroid injection if his hemoglobin A1c will continue to improve.  Orders:    Diclofenac Sodium (VOLTAREN) 1 %; Apply 2 g topically 4 (four) times a day    Annual physical exam         Immunodeficiency, unspecified (HCC)         Polymyalgia rheumatica (HCC)         Mild major depression, single episode (HCC)           SVT (supraventricular tachycardia) (HCC)         Prostate cancer (HCC)         Morbid obesity with BMI of 40.0-44.9, adult (HCC)              Preventive health issues were discussed with patient, and age appropriate screening tests were ordered as noted in patient's After Visit Summary. Personalized health advice and appropriate referrals for health education or preventive services given if needed, as noted in patient's After Visit Summary.    History of Present Illness     Patient came today for Medicare wellness visit and follow-up on his chronic problems.  Up-to-date on vaccinations.  Up-to-date on colonoscopy, he follows up with urology for PSA.  He is trying to be active, tries to follow a healthier diet.      Knee Pain        Patient Care  Team:  Amrit Faust MD as PCP - General (Internal Medicine)  Emely Bone MD (Nephrology)  PEREZ Cornell as Nurse Practitioner (Nurse Practitioner)  Marcy Junior PA-C (Hematology and Oncology)    Review of Systems   Constitutional:  Negative for chills and fever.   Respiratory:  Negative for cough, shortness of breath and wheezing.    Cardiovascular:  Negative for chest pain, palpitations and leg swelling.   Musculoskeletal:  Positive for arthralgias. Negative for back pain, gait problem and joint swelling.     Medical History Reviewed by provider this encounter:       Annual Wellness Visit Questionnaire   Faisal is here for his Subsequent Wellness visit.     Health Risk Assessment:   Patient rates overall health as good. Patient feels that their physical health rating is same. Patient is satisfied with their life. Eyesight was rated as same. Hearing was rated as same. Patient feels that their emotional and mental health rating is same. Patients states they are never, rarely angry. Patient states they are sometimes unusually tired/fatigued. Pain experienced in the last 7 days has been some. Patient's pain rating has been 4/10. Patient states that he has experienced no weight loss or gain in last 6 months.     Depression Screening:   PHQ-9 Score: 0      Fall Risk Screening:   In the past year, patient has experienced: no history of falling in past year      Home Safety:  Patient does not have trouble with stairs inside or outside of their home. Patient has working smoke alarms and has working carbon monoxide detector. Home safety hazards include: none.     Nutrition:   Current diet is Regular.     Medications:   Patient is currently taking over-the-counter supplements. OTC medications include: see medication list. Patient is able to manage medications.     Activities of Daily Living (ADLs)/Instrumental Activities of Daily Living (IADLs):   Walk and transfer into and out of bed  and chair?: Yes  Dress and groom yourself?: Yes    Bathe or shower yourself?: Yes    Feed yourself? Yes  Do your laundry/housekeeping?: Yes  Manage your money, pay your bills and track your expenses?: Yes  Make your own meals?: Yes    Do your own shopping?: Yes    Previous Hospitalizations:   Any hospitalizations or ED visits within the last 12 months?: No      Advance Care Planning:   Living will: Yes    Durable POA for healthcare: Yes    Advanced directive: Yes      PREVENTIVE SCREENINGS      Cardiovascular Screening:    General: Screening Not Indicated and History Lipid Disorder      Diabetes Screening:     General: Screening Not Indicated and History Diabetes      Colorectal Cancer Screening:     General: Screening Current      Prostate Cancer Screening:    General: History Prostate Cancer      Abdominal Aortic Aneurysm (AAA) Screening:    Risk factors include: age between 65-76 yo        Lung Cancer Screening:     General: Screening Not Indicated      Hepatitis C Screening:    General: Screening Current    Screening, Brief Intervention, and Referral to Treatment (SBIRT)    Screening    Typical number of drinks in a week: 0    Single Item Drug Screening:  How often have you used an illegal drug (including marijuana) or a prescription medication for non-medical reasons in the past year? never    Single Item Drug Screen Score: 0  Interpretation: Negative screen for possible drug use disorder    Social Drivers of Health     Financial Resource Strain: Low Risk  (1/12/2024)    Overall Financial Resource Strain (CARDIA)     Difficulty of Paying Living Expenses: Not hard at all   Food Insecurity: No Food Insecurity (1/14/2025)    Hunger Vital Sign     Worried About Running Out of Food in the Last Year: Never true     Ran Out of Food in the Last Year: Never true   Transportation Needs: No Transportation Needs (1/14/2025)    PRAPARE - Transportation     Lack of Transportation (Medical): No     Lack of Transportation  "(Non-Medical): No   Housing Stability: Low Risk  (1/14/2025)    Housing Stability Vital Sign     Unable to Pay for Housing in the Last Year: No     Number of Times Moved in the Last Year: 0     Homeless in the Last Year: No   Utilities: Not At Risk (1/14/2025)    [facility] Utilities     Threatened with loss of utilities: No     No results found.    Objective   /54 (BP Location: Left arm, Patient Position: Sitting, Cuff Size: Large)   Pulse 85   Temp 97.5 °F (36.4 °C) (Temporal)   Resp 16   Ht 5' 9\" (1.753 m)   Wt 125 kg (275 lb)   SpO2 97%   BMI 40.61 kg/m²     Physical Exam  Constitutional:       General: He is not in acute distress.     Appearance: He is not toxic-appearing.   HENT:      Nose: No congestion.   Eyes:      Pupils: Pupils are equal, round, and reactive to light.   Cardiovascular:      Rate and Rhythm: Normal rate and regular rhythm.      Pulses: Normal pulses.      Heart sounds: No murmur heard.     No gallop.   Pulmonary:      Effort: Pulmonary effort is normal. No respiratory distress.      Breath sounds: No wheezing or rales.   Abdominal:      General: Abdomen is flat.      Tenderness: There is no abdominal tenderness.   Musculoskeletal:         General: Tenderness present. Normal range of motion.   Neurological:      General: No focal deficit present.      Mental Status: He is alert and oriented to person, place, and time.         "

## 2025-01-14 NOTE — ASSESSMENT & PLAN NOTE
Complains of chronic left knee pain, will use Voltaren gel.  Tylenol did not help.  We may consider intra-articular steroid injection if his hemoglobin A1c will continue to improve.  Orders:    Diclofenac Sodium (VOLTAREN) 1 %; Apply 2 g topically 4 (four) times a day

## 2025-02-25 ENCOUNTER — OFFICE VISIT (OUTPATIENT)
Dept: FAMILY MEDICINE CLINIC | Facility: CLINIC | Age: 65
End: 2025-02-25
Payer: MEDICARE

## 2025-02-25 VITALS
HEART RATE: 88 BPM | WEIGHT: 275 LBS | OXYGEN SATURATION: 99 % | HEIGHT: 69 IN | SYSTOLIC BLOOD PRESSURE: 150 MMHG | DIASTOLIC BLOOD PRESSURE: 86 MMHG | BODY MASS INDEX: 40.73 KG/M2 | RESPIRATION RATE: 18 BRPM

## 2025-02-25 DIAGNOSIS — I10 ESSENTIAL HYPERTENSION: ICD-10-CM

## 2025-02-25 DIAGNOSIS — R05.8 OTHER COUGH: Primary | ICD-10-CM

## 2025-02-25 DIAGNOSIS — J01.41 ACUTE RECURRENT PANSINUSITIS: ICD-10-CM

## 2025-02-25 PROCEDURE — 87804 INFLUENZA ASSAY W/OPTIC: CPT | Performed by: INTERNAL MEDICINE

## 2025-02-25 PROCEDURE — 87811 SARS-COV-2 COVID19 W/OPTIC: CPT | Performed by: INTERNAL MEDICINE

## 2025-02-25 PROCEDURE — G2211 COMPLEX E/M VISIT ADD ON: HCPCS | Performed by: INTERNAL MEDICINE

## 2025-02-25 PROCEDURE — 99214 OFFICE O/P EST MOD 30 MIN: CPT | Performed by: INTERNAL MEDICINE

## 2025-02-25 NOTE — PROGRESS NOTES
"Name: Faisal Smith      : 1960      MRN: [id number]  Encounter Provider: Amrit Faust MD  Encounter Date: 2025   Encounter department: [facility]    Assessment & Plan  Other cough    Orders:    POCT rapid flu A and B    POCT Rapid Covid Ag    Acute recurrent pansinusitis  Continue with symptomatic management, he will continue with Mucinex, will add Augmentin 875 mg twice daily for 7 days.  Side effects discussed.  Update if no improvement in 3 to 5 days.    Orders:    amoxicillin-clavulanate (AUGMENTIN) 875-125 mg per tablet; Take 1 tablet by mouth every 12 (twelve) hours for 7 days    Essential hypertension  Blood pressure is slightly on the higher side likely due to current sickness.  Will continue to monitor.              History of Present Illness     Patient came today with complaints of sinus pain and congestion that started few days ago.    Cough  Associated symptoms include headaches and rhinorrhea. Pertinent negatives include no chest pain, chills, fever, myalgias, sore throat or shortness of breath.   Headache    Review of Systems   Constitutional:  Negative for appetite change, chills, fatigue and fever.   HENT:  Positive for congestion, rhinorrhea and sinus pain. Negative for sore throat.    Respiratory:  Positive for cough. Negative for chest tightness and shortness of breath.    Cardiovascular:  Negative for chest pain.   Gastrointestinal:  Negative for diarrhea, nausea and vomiting.   Musculoskeletal:  Negative for arthralgias and myalgias.   Neurological:  Positive for headaches.     Past Medical History:   Diagnosis Date    Anesthesia     per pt \"takes a little longer to wake up sometimes'    BPH without obstruction/lower urinary tract symptoms     Cancer (HCC)     prostate     CPAP (continuous positive airway pressure) dependence     Elevated PSA     GERD (gastroesophageal reflux disease)     Gout     Heart murmur     History of cancer " "chemotherapy     Hyperlipidemia     Hypertension     Kidney stone     past    Moderate exercise     works PT delivering propane    Obese abdomen     Obesity     Polymyalgia (HCC)     Prediabetes     Prostate cancer (HCC)     Rash     per pt not all the time--usually \"heat rash\"--under both arms and in between legs - family doctor aware - pt uses Desitin    Sleep apnea     Testicular carcinoma (HCC)     Tiredness     some days    Urinary frequency     Urinary urgency     Wears glasses      Past Surgical History:   Procedure Laterality Date    CARPAL TUNNEL RELEASE Bilateral 07/2020    COLONOSCOPY      CYSTOSCOPY W/ LASER LITHOTRIPSY  2001    CYSTOSTOMY W/ STENT INSERTION  2011    ESOPHAGOGASTRODUODENOSCOPY      EXTRACORPOREAL SHOCK WAVE LITHOTRIPSY Right 2011    HERNIA REPAIR      KNEE ARTHROSCOPY Left     AK CYSTO/URETERO W/LITHOTRIPSY &INDWELL STENT INSRT Right 4/29/2018    Procedure: CYSTOSCOPY URETEROSCOPY, RETROGRADE PYELOGRAM AND INSERTION STENT URETERAL;  Surgeon: Carlin Taylor MD;  [facility];  Service: Urology    AK ORCHIECTOMY RADICAL TUMOR INGUINAL APPROACH Left 2/19/2021    Procedure: Radical  ORCHIECTOMY;  Surgeon: Fawad Manzo MD;  [facility];  Service: Urology    AK PROSTATE NEEDLE BIOPSY ANY APPROACH N/A 9/11/2024    Procedure: TRANSPERINEAL MRI FUSION BX PROSTATE;  Surgeon: Tucker Ivan MD;  [facility];  Service: Urology     Family History   Problem Relation Age of Onset    Nephrolithiasis Father     Alzheimer's disease Mother      Social History     Tobacco Use    Smoking status: Never    Smokeless tobacco: Never   Vaping Use    Vaping status: Never Used   Substance and Sexual Activity    Alcohol use: Not Currently    Drug use: No    Sexual activity: Not on file     Comment: defer     Current Outpatient Medications on File Prior to Visit   Medication Sig    acetaminophen (TYLENOL) 500 mg tablet Take 500 mg by mouth every 6 (six) hours as needed for " mild pain    albuterol (ProAir HFA) 90 mcg/act inhaler Inhale 2 puffs every 6 (six) hours as needed for wheezing or shortness of breath    allopurinol (ZYLOPRIM) 100 mg tablet Take 2 tablets (200 mg total) by mouth daily    benzonatate (TESSALON) 200 MG capsule Take 1 capsule (200 mg total) by mouth 3 (three) times a day as needed for cough    Cholecalciferol (Vitamin D3) 50 MCG (2000 UT) capsule Take 2 capsules (4,000 Units total) by mouth daily    Diclofenac Sodium (VOLTAREN) 1 % Apply 2 g topically 4 (four) times a day    doxazosin (CARDURA) 2 mg tablet Take 1 tablet (2 mg total) by mouth daily at bedtime    Empagliflozin (JARDIANCE) 10 MG TABS tablet Take 1 tablet (10 mg total) by mouth every morning    losartan (COZAAR) 100 MG tablet Take 1 tablet (100 mg total) by mouth daily    Magnesium Oxide 400 MG CAPS Take 1 tablet (400 mg total) by mouth 3 (three) times a day    metFORMIN (GLUCOPHAGE-XR) 750 mg 24 hr tablet Take 1 tablet (750 mg total) by mouth daily with breakfast    NIFEdipine (ADALAT CC) 90 mg 24 hr tablet TAKE ONE TABLET BY MOUTH EVERY DAY    pantoprazole (PROTONIX) 40 mg tablet Take 1 tablet by mouth daily before breakfast    rosuvastatin (CRESTOR) 40 MG tablet Take 1 tablet (40 mg total) by mouth daily    [DISCONTINUED] methocarbamol (ROBAXIN) 500 mg tablet Take 1 tablet (500 mg total) by mouth 3 (three) times a day for 10 days     Allergies   Allergen Reactions    Aspirin Other (See Comments)     Per pt told by kidney doctor not to take    Nsaids Other (See Comments)     Per pt instructed by his kidney doctor-Dr Bone---Not to take     Immunization History   Administered Date(s) Administered    Pneumococcal Conjugate 13-Valent 11/19/2019    Pneumococcal Polysaccharide PPV23 10/07/2009, 12/01/2020    Tdap 05/13/2014, 02/27/2024    Zoster Vaccine Recombinant 01/29/2024, 04/15/2024     Objective   /86 (BP Location: Left arm, Patient Position: Sitting, Cuff Size: Large)   Pulse 88   Resp 18    "Ht 5' 9\" (1.753 m)   Wt 125 kg (275 lb)   SpO2 99%   BMI 40.61 kg/m²     Physical Exam  Constitutional:       General: He is not in acute distress.     Appearance: He is not ill-appearing or toxic-appearing.   HENT:      Nose: Congestion and rhinorrhea present.      Mouth/Throat:      Pharynx: Posterior oropharyngeal erythema present. No oropharyngeal exudate.   Cardiovascular:      Rate and Rhythm: Normal rate.   Pulmonary:      Effort: No respiratory distress.      Breath sounds: No wheezing or rales.         "

## 2025-02-25 NOTE — ASSESSMENT & PLAN NOTE
Blood pressure is slightly on the higher side likely due to current sickness.  Will continue to monitor.

## 2025-02-25 NOTE — ASSESSMENT & PLAN NOTE
Continue with symptomatic management, he will continue with Mucinex, will add Augmentin 875 mg twice daily for 7 days.  Side effects discussed.  Update if no improvement in 3 to 5 days.    Orders:    amoxicillin-clavulanate (AUGMENTIN) 875-125 mg per tablet; Take 1 tablet by mouth every 12 (twelve) hours for 7 days

## 2025-02-28 NOTE — TELEPHONE ENCOUNTER
Left pt VM . Please give him PCP message   Please call with Normal chest xray    [facility] Attending Consultation Note     Patient seen and evaluated at bedside    Chief Complaint:    HPI:      PMHx:   No pertinent past medical history    HTN (hypertension), benign    HLD (hyperlipidemia)        PSHx:   No significant past surgical history    H/O neck surgery        Allergies:  No Known Allergies      Home Meds:    Current Medications:       FAMILY HISTORY:      Social History: Personally reviewed   No tobacco, EtOH or IVDU     REVIEW OF SYSTEMS:  Constitutional:     [x ] negative [ ] fevers [ ] chills [ ] weight loss [ ] weight gain  HEENT:                  [x ] negative [ ] dry eyes [ ] eye irritation [ ] postnasal drip [ ] nasal congestion  CV:                         [ x] negative  [ ] chest pain [ ] orthopnea [ ] palpitations [ ] murmur  Resp:                     [x ] negative [ ] cough [ ] shortness of breath [ ] dyspnea [ ] wheezing [ ] sputum [ ]hemoptysis  GI:                          [ x] negative [ ] nausea [ ] vomiting [ ] diarrhea [ ] constipation [ ] abd pain [ ] dysphagia   :                        [ x] negative [ ] dysuria [ ] nocturia [ ] hematuria [ ] increased urinary frequency  Musculoskeletal: [x ] negative [ ] back pain [ ] myalgias [ ] arthralgias [ ] fracture  Skin:                       [ x] negative [ ] rash [ ] itch  Neurological:        [ x] negative [ ] headache [ ] dizziness [ ] syncope [ ] weakness [ ] numbness  Psychiatric:           [ x] negative [ ] anxiety [ ] depression  Endocrine:            [ x] negative [ ] diabetes [ ] thyroid problem  Heme/Lymph:      [ x] negative [ ] anemia [ ] bleeding problem  Allergic/Immune: [ x] negative [ ] itchy eyes [ ] nasal discharge [ ] hives [ ] angioedema    [ x] All other systems negative  [ ] Unable to assess ROS due to      Physical Exam:  T(F): 98 (02-28), Max: 98.1 (02-28)  HR: 59 (02-28) (59 - 65)  BP: 145/82 (02-28) (132/77 - 172/79)  RR: 18 (02-28)  SpO2: 97% (02-28)    Gen: Well appearing   HENNT: No JVP   CV: regular rate, regular rhtyhm, no murmur   Pulm: clear to auscultation bilaterally   Abdomen: Non-tender, non-distended   Ext: no edema b/l   Neuro: grossly non-focal     Cardiovascular Diagnostic Testing:      Labs: Personally reviewed                        14.8   7.28  )-----------( 204      ( 28 Feb 2025 12:11 )             43.5     02-28    139  |  102  |  15  ----------------------------<  136[H]  4.2   |  25  |  1.02    Ca    9.4      28 Feb 2025 12:11    TPro  7.4  /  Alb  4.4  /  TBili  0.4  /  DBili  x   /  AST  18  /  ALT  25  /  AlkPhos  99  02-28    PT/INR - ( 28 Feb 2025 12:11 )   PT: 11.2 sec;   INR: 0.98 ratio         PTT - ( 28 Feb 2025 12:11 )  PTT:35.8 sec         [facility] Attending Consultation Note     Patient seen and evaluated at bedside    Chief Complaint: cp     HPI:  54M with PMH of HTN, HLD, T2DM, hx of STEMI in 2022, s/p PCI to LAD, c/b cardiogenic shock and CCU stay. TTE w/ largely preserved EF and no significant valvular heart disease. He is here for intermittent cp x 1-2 weeks.     PMHx:   No pertinent past medical history    HTN (hypertension), benign    HLD (hyperlipidemia)        PSHx:   No significant past surgical history    H/O neck surgery        Allergies:  No Known Allergies      Home Meds:    Current Medications:       FAMILY HISTORY:      Social History: Personally reviewed   No tobacco, EtOH or IVDU     REVIEW OF SYSTEMS:  Constitutional:     [x ] negative [ ] fevers [ ] chills [ ] weight loss [ ] weight gain  HEENT:                  [x ] negative [ ] dry eyes [ ] eye irritation [ ] postnasal drip [ ] nasal congestion  CV:                         [ x] negative  [ ] chest pain [ ] orthopnea [ ] palpitations [ ] murmur  Resp:                     [x ] negative [ ] cough [ ] shortness of breath [ ] dyspnea [ ] wheezing [ ] sputum [ ]hemoptysis  GI:                          [ x] negative [ ] nausea [ ] vomiting [ ] diarrhea [ ] constipation [ ] abd pain [ ] dysphagia   :                        [ x] negative [ ] dysuria [ ] nocturia [ ] hematuria [ ] increased urinary frequency  Musculoskeletal: [x ] negative [ ] back pain [ ] myalgias [ ] arthralgias [ ] fracture  Skin:                       [ x] negative [ ] rash [ ] itch  Neurological:        [ x] negative [ ] headache [ ] dizziness [ ] syncope [ ] weakness [ ] numbness  Psychiatric:           [ x] negative [ ] anxiety [ ] depression  Endocrine:            [ x] negative [ ] diabetes [ ] thyroid problem  Heme/Lymph:      [ x] negative [ ] anemia [ ] bleeding problem  Allergic/Immune: [ x] negative [ ] itchy eyes [ ] nasal discharge [ ] hives [ ] angioedema    [ x] All other systems negative  [ ] Unable to assess ROS due to      Physical Exam:  T(F): 98 (02-28), Max: 98.1 (02-28)  HR: 59 (02-28) (59 - 65)  BP: 145/82 (02-28) (132/77 - 172/79)  RR: 18 (02-28)  SpO2: 97% (02-28)    Gen: Well appearing   HENNT: No JVP   CV: regular rate, regular rhtyhm, no murmur   Pulm: clear to auscultation bilaterally   Abdomen: Non-tender, non-distended   Ext: no edema b/l   Neuro: grossly non-focal     Cardiovascular Diagnostic Testing:      Labs: Personally reviewed                        14.8   7.28  )-----------( 204      ( 28 Feb 2025 12:11 )             43.5     02-28    139  |  102  |  15  ----------------------------<  136[H]  4.2   |  25  |  1.02    Ca    9.4      28 Feb 2025 12:11    TPro  7.4  /  Alb  4.4  /  TBili  0.4  /  DBili  x   /  AST  18  /  ALT  25  /  AlkPhos  99  02-28    PT/INR - ( 28 Feb 2025 12:11 )   PT: 11.2 sec;   INR: 0.98 ratio         PTT - ( 28 Feb 2025 12:11 )  PTT:35.8 sec

## 2025-03-17 ENCOUNTER — APPOINTMENT (OUTPATIENT)
Dept: LAB | Facility: CLINIC | Age: 65
End: 2025-03-17
Payer: MEDICARE

## 2025-03-17 DIAGNOSIS — C61 PROSTATE CANCER (HCC): ICD-10-CM

## 2025-03-17 LAB
PSA FREE MFR SERPL: 28.65 %
PSA FREE SERPL-MCNC: 2.11 NG/ML
PSA SERPL-MCNC: 7.37 NG/ML (ref 0–4)

## 2025-03-17 PROCEDURE — 84153 ASSAY OF PSA TOTAL: CPT

## 2025-03-17 PROCEDURE — 36415 COLL VENOUS BLD VENIPUNCTURE: CPT

## 2025-03-17 PROCEDURE — 84154 ASSAY OF PSA FREE: CPT

## 2025-03-18 ENCOUNTER — OFFICE VISIT (OUTPATIENT)
Dept: FAMILY MEDICINE CLINIC | Facility: CLINIC | Age: 65
End: 2025-03-18
Payer: MEDICARE

## 2025-03-18 ENCOUNTER — APPOINTMENT (OUTPATIENT)
Dept: LAB | Facility: MEDICAL CENTER | Age: 65
End: 2025-03-18
Payer: MEDICARE

## 2025-03-18 VITALS
SYSTOLIC BLOOD PRESSURE: 120 MMHG | TEMPERATURE: 97.5 F | BODY MASS INDEX: 40.02 KG/M2 | WEIGHT: 271 LBS | DIASTOLIC BLOOD PRESSURE: 58 MMHG | OXYGEN SATURATION: 97 % | HEART RATE: 83 BPM

## 2025-03-18 DIAGNOSIS — R19.7 DIARRHEA, UNSPECIFIED TYPE: ICD-10-CM

## 2025-03-18 DIAGNOSIS — R19.7 DIARRHEA, UNSPECIFIED TYPE: Primary | ICD-10-CM

## 2025-03-18 LAB
ANION GAP SERPL CALCULATED.3IONS-SCNC: 12 MMOL/L (ref 4–13)
BASOPHILS # BLD AUTO: 0.07 THOUSANDS/ÂΜL (ref 0–0.1)
BASOPHILS NFR BLD AUTO: 1 % (ref 0–1)
BUN SERPL-MCNC: 18 MG/DL (ref 5–25)
CALCIUM SERPL-MCNC: 9 MG/DL (ref 8.4–10.2)
CHLORIDE SERPL-SCNC: 108 MMOL/L (ref 96–108)
CO2 SERPL-SCNC: 23 MMOL/L (ref 21–32)
CREAT SERPL-MCNC: 1.34 MG/DL (ref 0.6–1.3)
EOSINOPHIL # BLD AUTO: 0.2 THOUSAND/ÂΜL (ref 0–0.61)
EOSINOPHIL NFR BLD AUTO: 2 % (ref 0–6)
ERYTHROCYTE [DISTWIDTH] IN BLOOD BY AUTOMATED COUNT: 13 % (ref 11.6–15.1)
GFR SERPL CREATININE-BSD FRML MDRD: 54 ML/MIN/1.73SQ M
GLUCOSE P FAST SERPL-MCNC: 125 MG/DL (ref 65–99)
HCT VFR BLD AUTO: 43.3 % (ref 36.5–49.3)
HGB BLD-MCNC: 13.7 G/DL (ref 12–17)
IMM GRANULOCYTES # BLD AUTO: 0.05 THOUSAND/UL (ref 0–0.2)
IMM GRANULOCYTES NFR BLD AUTO: 0 % (ref 0–2)
LYMPHOCYTES # BLD AUTO: 4.36 THOUSANDS/ÂΜL (ref 0.6–4.47)
LYMPHOCYTES NFR BLD AUTO: 39 % (ref 14–44)
MAGNESIUM SERPL-MCNC: 1.7 MG/DL (ref 1.9–2.7)
MCH RBC QN AUTO: 29.2 PG (ref 26.8–34.3)
MCHC RBC AUTO-ENTMCNC: 31.6 G/DL (ref 31.4–37.4)
MCV RBC AUTO: 92 FL (ref 82–98)
MONOCYTES # BLD AUTO: 0.75 THOUSAND/ÂΜL (ref 0.17–1.22)
MONOCYTES NFR BLD AUTO: 7 % (ref 4–12)
NEUTROPHILS # BLD AUTO: 5.86 THOUSANDS/ÂΜL (ref 1.85–7.62)
NEUTS SEG NFR BLD AUTO: 51 % (ref 43–75)
NRBC BLD AUTO-RTO: 0 /100 WBCS
PLATELET # BLD AUTO: 253 THOUSANDS/UL (ref 149–390)
PMV BLD AUTO: 9.8 FL (ref 8.9–12.7)
POTASSIUM SERPL-SCNC: 3.9 MMOL/L (ref 3.5–5.3)
RBC # BLD AUTO: 4.69 MILLION/UL (ref 3.88–5.62)
SODIUM SERPL-SCNC: 143 MMOL/L (ref 135–147)
WBC # BLD AUTO: 11.29 THOUSAND/UL (ref 4.31–10.16)

## 2025-03-18 PROCEDURE — G2211 COMPLEX E/M VISIT ADD ON: HCPCS | Performed by: INTERNAL MEDICINE

## 2025-03-18 PROCEDURE — 80048 BASIC METABOLIC PNL TOTAL CA: CPT

## 2025-03-18 PROCEDURE — 85025 COMPLETE CBC W/AUTO DIFF WBC: CPT

## 2025-03-18 PROCEDURE — 99214 OFFICE O/P EST MOD 30 MIN: CPT | Performed by: INTERNAL MEDICINE

## 2025-03-18 PROCEDURE — 36415 COLL VENOUS BLD VENIPUNCTURE: CPT

## 2025-03-18 PROCEDURE — 83735 ASSAY OF MAGNESIUM: CPT

## 2025-03-18 NOTE — PROGRESS NOTES
"Name: Faisal Smith      : 1960      MRN: [id number]  Encounter Provider: Amrit Faust MD  Encounter Date: 3/18/2025   Encounter department: [facility]IMARY [facility]    Assessment & Plan  Diarrhea, unspecified type  Patient has diarrhea for 2 weeks, orthostatic vital signs were somewhat positive.  Decrease dose of losartan down to 50 mg daily.  He was on oral antibiotics due to sinusitis not too long ago.  Will send his stool sample for C. difficile.  Will send for CBC, BMP and magnesium to get more information on his electrolyte status.  Continue with as needed Imodium.  Start BRAT diet.  Continue with Gatorade.  Orders:    Clostridioides difficile toxin by PCR with EIA; Future    Basic metabolic panel; Future    Magnesium; Future    CBC and differential; Future         History of Present Illness     Patient came today with complaints of ongoing diarrhea that started few weeks ago.  He was recently treated with antibiotics due to sinusitis.      Review of Systems   Constitutional:  Negative for chills, diaphoresis and fever.   Gastrointestinal:  Positive for diarrhea. Negative for abdominal distention, abdominal pain and vomiting.     Past Medical History:   Diagnosis Date    Anesthesia     per pt \"takes a little longer to wake up sometimes'    BPH without obstruction/lower urinary tract symptoms     Cancer (HCC)     prostate     CPAP (continuous positive airway pressure) dependence     Elevated PSA     GERD (gastroesophageal reflux disease)     Gout     Heart murmur     History of cancer chemotherapy     Hyperlipidemia     Hypertension     Kidney stone     past    Moderate exercise     works PT delivering propane    Obese abdomen     Obesity     Polymyalgia (HCC)     Prediabetes     Prostate cancer (HCC)     Rash     per pt not all the time--usually \"heat rash\"--under both arms and in between legs - family doctor aware - pt uses Desitin    Sleep apnea     Testicular " carcinoma (HCC)     Tiredness     some days    Urinary frequency     Urinary urgency     Wears glasses      Past Surgical History:   Procedure Laterality Date    CARPAL TUNNEL RELEASE Bilateral 07/2020    COLONOSCOPY      CYSTOSCOPY W/ LASER LITHOTRIPSY  2001    CYSTOSTOMY W/ STENT INSERTION  2011    ESOPHAGOGASTRODUODENOSCOPY      EXTRACORPOREAL SHOCK WAVE LITHOTRIPSY Right 2011    HERNIA REPAIR      KNEE ARTHROSCOPY Left     [place] CYSTO/URETERO W/LITHOTRIPSY &INDWELL STENT INSRT Right 4/29/2018    Procedure: CYSTOSCOPY URETEROSCOPY, RETROGRADE PYELOGRAM AND INSERTION STENT URETERAL;  Surgeon: Carlin Taylor MD;  [facility];  Service: Urology    DC ORCHIECTOMY RADICAL TUMOR INGUINAL APPROACH Left 2/19/2021    Procedure: Radical  ORCHIECTOMY;  Surgeon: Fawad Manzo MD;  [facility];  Service: Urology    DC PROSTATE NEEDLE BIOPSY ANY APPROACH N/A 9/11/2024    Procedure: TRANSPERINEAL MRI FUSION BX PROSTATE;  Surgeon: Tucker Ivan MD;  [facility];  Service: Urology     Family History   Problem Relation Age of Onset    Nephrolithiasis Father     Alzheimer's disease Mother      Social History     Tobacco Use    Smoking status: Never    Smokeless tobacco: Never   Vaping Use    Vaping status: Never Used   Substance and Sexual Activity    Alcohol use: Not Currently    Drug use: No    Sexual activity: Not on file     Comment: defer     Current Outpatient Medications on File Prior to Visit   Medication Sig    acetaminophen (TYLENOL) 500 mg tablet Take 500 mg by mouth every 6 (six) hours as needed for mild pain    albuterol (ProAir HFA) 90 mcg/act inhaler Inhale 2 puffs every 6 (six) hours as needed for wheezing or shortness of breath    allopurinol (ZYLOPRIM) 100 mg tablet Take 2 tablets (200 mg total) by mouth daily    benzonatate (TESSALON) 200 MG capsule Take 1 capsule (200 mg total) by mouth 3 (three) times a day as needed for cough    Cholecalciferol (Vitamin D3) 50 MCG (2000  UT) capsule Take 2 capsules (4,000 Units total) by mouth daily    Diclofenac Sodium (VOLTAREN) 1 % Apply 2 g topically 4 (four) times a day    doxazosin (CARDURA) 2 mg tablet Take 1 tablet (2 mg total) by mouth daily at bedtime    Empagliflozin (JARDIANCE) 10 MG TABS tablet Take 1 tablet (10 mg total) by mouth every morning    losartan (COZAAR) 100 MG tablet Take 1 tablet (100 mg total) by mouth daily    Magnesium Oxide 400 MG CAPS Take 1 tablet (400 mg total) by mouth 3 (three) times a day    metFORMIN (GLUCOPHAGE-XR) 750 mg 24 hr tablet Take 1 tablet (750 mg total) by mouth daily with breakfast    NIFEdipine (ADALAT CC) 90 mg 24 hr tablet TAKE ONE TABLET BY MOUTH EVERY DAY    pantoprazole (PROTONIX) 40 mg tablet Take 1 tablet by mouth daily before breakfast    rosuvastatin (CRESTOR) 40 MG tablet Take 1 tablet (40 mg total) by mouth daily    [DISCONTINUED] methocarbamol (ROBAXIN) 500 mg tablet Take 1 tablet (500 mg total) by mouth 3 (three) times a day for 10 days     Allergies   Allergen Reactions    Aspirin Other (See Comments)     Per pt told by kidney doctor not to take    Nsaids Other (See Comments)     Per pt instructed by his kidney doctor-Dr Bone---Not to take     Immunization History   Administered Date(s) Administered    Pneumococcal Conjugate 13-Valent 11/19/2019    Pneumococcal Polysaccharide PPV23 10/07/2009, 12/01/2020    Tdap 05/13/2014, 02/27/2024    Zoster Vaccine Recombinant 01/29/2024, 04/15/2024     Objective   /58   Pulse 83   Temp 97.5 °F (36.4 °C)   Wt 123 kg (271 lb)   SpO2 97%   BMI 40.02 kg/m²     Physical Exam  Constitutional:       General: He is not in acute distress.     Appearance: He is not toxic-appearing.   Abdominal:      General: There is no distension.      Tenderness: There is no abdominal tenderness. There is no guarding.

## 2025-03-19 PROBLEM — R19.7 DIARRHEA: Status: ACTIVE | Noted: 2025-03-19

## 2025-03-19 NOTE — ASSESSMENT & PLAN NOTE
Patient has diarrhea for 2 weeks, orthostatic vital signs were somewhat positive.  Decrease dose of losartan down to 50 mg daily.  He was on oral antibiotics due to sinusitis not too long ago.  Will send his stool sample for C. difficile.  Will send for CBC, BMP and magnesium to get more information on his electrolyte status.  Continue with as needed Imodium.  Start BRAT diet.  Continue with Gatorade.  Orders:    Clostridioides difficile toxin by PCR with EIA; Future    Basic metabolic panel; Future    Magnesium; Future    CBC and differential; Future

## 2025-03-20 ENCOUNTER — APPOINTMENT (EMERGENCY)
Dept: CT IMAGING | Facility: HOSPITAL | Age: 65
End: 2025-03-20
Payer: MEDICARE

## 2025-03-20 ENCOUNTER — APPOINTMENT (OUTPATIENT)
Dept: LAB | Facility: MEDICAL CENTER | Age: 65
End: 2025-03-20
Payer: MEDICARE

## 2025-03-20 ENCOUNTER — HOSPITAL ENCOUNTER (EMERGENCY)
Facility: HOSPITAL | Age: 65
Discharge: HOME | End: 2025-03-20
Attending: EMERGENCY MEDICINE
Payer: MEDICARE

## 2025-03-20 ENCOUNTER — TELEPHONE (OUTPATIENT)
Age: 65
End: 2025-03-20

## 2025-03-20 VITALS
WEIGHT: 266.32 LBS | HEART RATE: 80 BPM | DIASTOLIC BLOOD PRESSURE: 66 MMHG | TEMPERATURE: 98.1 F | BODY MASS INDEX: 39.33 KG/M2 | SYSTOLIC BLOOD PRESSURE: 145 MMHG | RESPIRATION RATE: 18 BRPM | OXYGEN SATURATION: 100 %

## 2025-03-20 DIAGNOSIS — N18.9 CKD (CHRONIC KIDNEY DISEASE): ICD-10-CM

## 2025-03-20 DIAGNOSIS — R19.7 NAUSEA VOMITING AND DIARRHEA: Primary | ICD-10-CM

## 2025-03-20 DIAGNOSIS — R19.7 DIARRHEA, UNSPECIFIED TYPE: ICD-10-CM

## 2025-03-20 DIAGNOSIS — R11.2 NAUSEA VOMITING AND DIARRHEA: Primary | ICD-10-CM

## 2025-03-20 LAB
ALBUMIN SERPL BCG-MCNC: 4.3 G/DL (ref 3.5–5)
ALP SERPL-CCNC: 59 U/L (ref 34–104)
ALT SERPL W P-5'-P-CCNC: 48 U/L (ref 7–52)
ANION GAP SERPL CALCULATED.3IONS-SCNC: 10 MMOL/L (ref 4–13)
APTT PPP: 31 SECONDS (ref 23–34)
AST SERPL W P-5'-P-CCNC: 28 U/L (ref 13–39)
BASOPHILS # BLD AUTO: 0.02 THOUSANDS/ÂΜL (ref 0–0.1)
BASOPHILS NFR BLD AUTO: 0 % (ref 0–1)
BILIRUB SERPL-MCNC: 1.06 MG/DL (ref 0.2–1)
BILIRUB UR QL STRIP: NEGATIVE
BUN SERPL-MCNC: 23 MG/DL (ref 5–25)
CALCIUM SERPL-MCNC: 9.4 MG/DL (ref 8.4–10.2)
CHLORIDE SERPL-SCNC: 108 MMOL/L (ref 96–108)
CLARITY UR: CLEAR
CO2 SERPL-SCNC: 22 MMOL/L (ref 21–32)
COLOR UR: NORMAL
CREAT SERPL-MCNC: 1.5 MG/DL (ref 0.6–1.3)
EOSINOPHIL # BLD AUTO: 0.15 THOUSAND/ÂΜL (ref 0–0.61)
EOSINOPHIL NFR BLD AUTO: 1 % (ref 0–6)
ERYTHROCYTE [DISTWIDTH] IN BLOOD BY AUTOMATED COUNT: 13.1 % (ref 11.6–15.1)
GFR SERPL CREATININE-BSD FRML MDRD: 47 ML/MIN/1.73SQ M
GLUCOSE SERPL-MCNC: 108 MG/DL (ref 65–140)
GLUCOSE UR STRIP-MCNC: NEGATIVE MG/DL
HCT VFR BLD AUTO: 41.7 % (ref 36.5–49.3)
HGB BLD-MCNC: 13.7 G/DL (ref 12–17)
HGB UR QL STRIP.AUTO: NEGATIVE
IMM GRANULOCYTES # BLD AUTO: 0.03 THOUSAND/UL (ref 0–0.2)
IMM GRANULOCYTES NFR BLD AUTO: 0 % (ref 0–2)
INR PPP: 1.32 (ref 0.85–1.19)
KETONES UR STRIP-MCNC: NEGATIVE MG/DL
LACTATE SERPL-SCNC: 1.3 MMOL/L (ref 0.5–2)
LEUKOCYTE ESTERASE UR QL STRIP: NEGATIVE
LYMPHOCYTES # BLD AUTO: 4.22 THOUSANDS/ÂΜL (ref 0.6–4.47)
LYMPHOCYTES NFR BLD AUTO: 37 % (ref 14–44)
MAGNESIUM SERPL-MCNC: 1.6 MG/DL (ref 1.9–2.7)
MCH RBC QN AUTO: 29.2 PG (ref 26.8–34.3)
MCHC RBC AUTO-ENTMCNC: 32.9 G/DL (ref 31.4–37.4)
MCV RBC AUTO: 89 FL (ref 82–98)
MONOCYTES # BLD AUTO: 0.96 THOUSAND/ÂΜL (ref 0.17–1.22)
MONOCYTES NFR BLD AUTO: 9 % (ref 4–12)
NEUTROPHILS # BLD AUTO: 5.97 THOUSANDS/ÂΜL (ref 1.85–7.62)
NEUTS SEG NFR BLD AUTO: 53 % (ref 43–75)
NITRITE UR QL STRIP: NEGATIVE
NRBC BLD AUTO-RTO: 0 /100 WBCS
PH UR STRIP.AUTO: 5 [PH]
PLATELET # BLD AUTO: 261 THOUSANDS/UL (ref 149–390)
PMV BLD AUTO: 9.6 FL (ref 8.9–12.7)
POTASSIUM SERPL-SCNC: 4.1 MMOL/L (ref 3.5–5.3)
PROT SERPL-MCNC: 7.4 G/DL (ref 6.4–8.4)
PROT UR STRIP-MCNC: NEGATIVE MG/DL
PROTHROMBIN TIME: 16.5 SECONDS (ref 12.3–15)
RBC # BLD AUTO: 4.69 MILLION/UL (ref 3.88–5.62)
SODIUM SERPL-SCNC: 140 MMOL/L (ref 135–147)
SP GR UR STRIP.AUTO: 1.02 (ref 1–1.03)
UROBILINOGEN UR STRIP-ACNC: <2 MG/DL
WBC # BLD AUTO: 11.35 THOUSAND/UL (ref 4.31–10.16)

## 2025-03-20 PROCEDURE — 83605 ASSAY OF LACTIC ACID: CPT | Performed by: EMERGENCY MEDICINE

## 2025-03-20 PROCEDURE — 96374 THER/PROPH/DIAG INJ IV PUSH: CPT

## 2025-03-20 PROCEDURE — 36415 COLL VENOUS BLD VENIPUNCTURE: CPT | Performed by: EMERGENCY MEDICINE

## 2025-03-20 PROCEDURE — 85610 PROTHROMBIN TIME: CPT | Performed by: EMERGENCY MEDICINE

## 2025-03-20 PROCEDURE — 83735 ASSAY OF MAGNESIUM: CPT | Performed by: EMERGENCY MEDICINE

## 2025-03-20 PROCEDURE — 99285 EMERGENCY DEPT VISIT HI MDM: CPT | Performed by: EMERGENCY MEDICINE

## 2025-03-20 PROCEDURE — 87493 C DIFF AMPLIFIED PROBE: CPT

## 2025-03-20 PROCEDURE — 85730 THROMBOPLASTIN TIME PARTIAL: CPT | Performed by: EMERGENCY MEDICINE

## 2025-03-20 PROCEDURE — 80053 COMPREHEN METABOLIC PANEL: CPT | Performed by: EMERGENCY MEDICINE

## 2025-03-20 PROCEDURE — 99284 EMERGENCY DEPT VISIT MOD MDM: CPT

## 2025-03-20 PROCEDURE — 85025 COMPLETE CBC W/AUTO DIFF WBC: CPT | Performed by: EMERGENCY MEDICINE

## 2025-03-20 PROCEDURE — 81003 URINALYSIS AUTO W/O SCOPE: CPT | Performed by: EMERGENCY MEDICINE

## 2025-03-20 PROCEDURE — 96361 HYDRATE IV INFUSION ADD-ON: CPT

## 2025-03-20 PROCEDURE — 74176 CT ABD & PELVIS W/O CONTRAST: CPT

## 2025-03-20 RX ORDER — ONDANSETRON 4 MG/1
4 TABLET, ORALLY DISINTEGRATING ORAL EVERY 8 HOURS PRN
Qty: 15 TABLET | Refills: 0 | Status: SHIPPED | OUTPATIENT
Start: 2025-03-20

## 2025-03-20 RX ORDER — ONDANSETRON 2 MG/ML
4 INJECTION INTRAMUSCULAR; INTRAVENOUS EVERY 6 HOURS PRN
Status: DISCONTINUED | OUTPATIENT
Start: 2025-03-20 | End: 2025-03-20

## 2025-03-20 RX ORDER — ONDANSETRON 4 MG/1
4 TABLET, ORALLY DISINTEGRATING ORAL ONCE
Status: COMPLETED | OUTPATIENT
Start: 2025-03-20 | End: 2025-03-20

## 2025-03-20 RX ORDER — ONDANSETRON 2 MG/ML
4 INJECTION INTRAMUSCULAR; INTRAVENOUS ONCE
Status: DISCONTINUED | OUTPATIENT
Start: 2025-03-20 | End: 2025-03-20

## 2025-03-20 RX ORDER — SIMETHICONE 80 MG
80 TABLET,CHEWABLE ORAL ONCE
Status: COMPLETED | OUTPATIENT
Start: 2025-03-20 | End: 2025-03-20

## 2025-03-20 RX ADMIN — SIMETHICONE 80 MG: 80 TABLET, CHEWABLE ORAL at 22:06

## 2025-03-20 RX ADMIN — SODIUM CHLORIDE 1000 ML: 0.9 INJECTION, SOLUTION INTRAVENOUS at 18:53

## 2025-03-20 RX ADMIN — ONDANSETRON 4 MG: 2 INJECTION INTRAMUSCULAR; INTRAVENOUS at 18:54

## 2025-03-20 RX ADMIN — ONDANSETRON 4 MG: 4 TABLET, ORALLY DISINTEGRATING ORAL at 22:06

## 2025-03-20 NOTE — TELEPHONE ENCOUNTER
Pt called and said he woke up vomiting and with diarrhea again, and its been on and off and its wearing him out. He said he gave the stool sample to his daughter to drop off today and wanted to know if he needs to wait for him to get the results or does he have to see him again because he is still having diarrhea. Please advise [phone] thank you.

## 2025-03-20 NOTE — ED PROVIDER NOTES
"Time reflects when diagnosis was documented in both MDM as applicable and the Disposition within this note       Time User Action Codes Description Comment    3/20/2025  7:35 PM Lilli Leal Add [R11.2,  R19.7] Nausea vomiting and diarrhea     3/20/2025  9:49 PM Lilli Leal Add [N18.9] CKD (chronic kidney disease)           ED Disposition       ED Disposition   Discharge    Condition   Stable    Date/Time   Thu Mar 20, 2025  9:49 PM    Comment   Faisal Smith discharge to home/self care.                   Assessment & Plan       Medical Decision Making      Differential diagnosis includes but not limited to:  Viral etiology, biliary colic, cholecystitis, obstruction, gastritis, diabetic gastroparesis, appendicitis, hepatitis, mi, AFib, torsion, kidney stones, DKA, increased ICP, meningitis, withdrawal from medication, pregnancy, psychogenic, radiation, migraines, labyrinthitis, Meniere's    Based on history and physical differential diagnosis includes but not limited to: Infectious agents such as viral or bacterial (C. difficile, dysentery, cholera, Shigella, Campylobacter, E. coli, Salmonella, etc.), parasitic, food poisoning , medications, stress/anxiety, inflammatory bowel disease, malabsorption, functional,     Will obtain CBC to assess for leukocytosis or anemia; CMP to assess for JULIO C versus electrolyte abnormalities versus elevated LFTs.  Will also CT abdomen pelvis to rule out intra-abdominal pathology.  Will also add on lactic acid and UA        Problems Addressed:  Nausea vomiting and diarrhea: acute illness or injury    Amount and/or Complexity of Data Reviewed  Independent Historian: spouse     Details:   Wife at bedside      External Data Reviewed: notes.     Details:     Patient was seen by PCP on 18 March.  Patient was on oral antibiotics due to sinusitis.  At that time they decreased his losartan in the office visit to 50 mg daily for \"somewhat positive orthostatic vital signs\".  Stool " "samples were sent for C. Difficile    Colonoscopy October 2022 with multiple polyps      Labs: ordered. Decision-making details documented in ED Course.     Details:   Mild leukocytosis without anemia thrombocytopenia  Lactic acid within normal range  Creatinine 1.5 with no acute electrolyte abnormalities except for magnesium mildly low  Coags noted    Urine clear    Radiology: ordered. Decision-making details documented in ED Course.     Details:     CT abdomen pelvis interpreted by radiologist as No acute findings in the abdomen or pelvis within the limits of unenhanced technique.     Hepatomegaly with hepatic steatosis.             Risk  OTC drugs.  Prescription drug management.        ED Course as of 03/20/25 2204   Thu Mar 20, 2025   1838 Patient is a 65-year-old male here with wife sent in by PCP for persistent and intermittent vomiting and diarrhea after antibiotic use.  On exam he is resting in bed in no distress.  Hemodynamically stable.  Will check lab work as well as CT and reviewed chart    Disclosure: Voice to text software was used in the preparation of this document and could have resulted in translational errors.      Occasional wrong word or \"sound a like\" substitutions may have occurred due to the inherent limitations of voice recognition software.  Read the chart carefully and recognize, using context, where substitutions have occurred.       I have independently reviewed external records are available to me to the level of detail possible within the time constraints of my patient care responsibilities in the ED.       1935 CBC and differential(!)  WBC compared to 2 days ago without acute       1935 Lactic acid, plasma (w/reflex if result > 2.0)  Lactic acid within normal range       2006 Comprehensive metabolic panel(!)  Baseline       2148 UA (URINE) with reflex to Scope  Clear       2149 CT without acute findings.  Long discussion with patient and family regarding workup including urine.  No " "acute abnormalities.  Long discussion with patient and offered observation overnight.  Patient declined and wishes to go home.    Discussed with them using brat diet as well as follow-up with PCP.  Strict return to ER instructions given.  Will give Zofran and simethicone prior to discharge           Medications   simethicone (MYLICON) chewable tablet 80 mg (has no administration in time range)   ondansetron (ZOFRAN-ODT) dispersible tablet 4 mg (has no administration in time range)   sodium chloride 0.9 % bolus 1,000 mL (0 mL Intravenous Stopped 3/20/25 1953)       ED Risk Strat Scores                        SBIRT 20yo+      Flowsheet Row Most Recent Value   Initial Alcohol Screen: US AUDIT-C     1. How often do you have a drink containing alcohol? 0 Filed at: 03/20/2025 1809   2. How many drinks containing alcohol do you have on a typical day you are drinking?  0 Filed at: 03/20/2025 1809   3b. FEMALE Any Age, or MALE 65+: How often do you have 4 or more drinks on one occassion? 0 Filed at: 03/20/2025 1809   Audit-C Score 0 Filed at: 03/20/2025 1809   PERLA: How many times in the past year have you...    Used an illegal drug or used a prescription medication for non-medical reasons? Never Filed at: 03/20/2025 1809                            History of Present Illness       Chief Complaint   Patient presents with    Vomiting     Vomiting and diarrhea. Sx x2 weeks. Took stool to outpatient lab this morning.       Past Medical History:   Diagnosis Date    Anesthesia     per pt \"takes a little longer to wake up sometimes'    BPH without obstruction/lower urinary tract symptoms     Cancer (HCC)     prostate     CPAP (continuous positive airway pressure) dependence     Elevated PSA     GERD (gastroesophageal reflux disease)     Gout     Heart murmur     History of cancer chemotherapy     Hyperlipidemia     Hypertension     Kidney stone     past    Moderate exercise     works PT delivering propane    Obese abdomen     " "Obesity     Polymyalgia (HCC)     Prediabetes     Prostate cancer (HCC)     Rash     per pt not all the time--usually \"heat rash\"--under both arms and in between legs - family doctor aware - pt uses Desitin    Sleep apnea     Testicular carcinoma (HCC)     Tiredness     some days    Urinary frequency     Urinary urgency     Wears glasses       Past Surgical History:   Procedure Laterality Date    CARPAL TUNNEL RELEASE Bilateral 07/2020    COLONOSCOPY      CYSTOSCOPY W/ LASER LITHOTRIPSY  2001    CYSTOSTOMY W/ STENT INSERTION  2011    ESOPHAGOGASTRODUODENOSCOPY      EXTRACORPOREAL SHOCK WAVE LITHOTRIPSY Right 2011    HERNIA REPAIR      KNEE ARTHROSCOPY Left     IL CYSTO/URETERO W/LITHOTRIPSY &INDWELL STENT INSRT Right 4/29/2018    Procedure: CYSTOSCOPY URETEROSCOPY, RETROGRADE PYELOGRAM AND INSERTION STENT URETERAL;  Surgeon: Carlin Taylor MD;  Location: [facility];  Service: Urology    IL ORCHIECTOMY RADICAL TUMOR INGUINAL APPROACH Left 2/19/2021    Procedure: Radical  ORCHIECTOMY;  Surgeon: Fawad Manzo MD;  Location: [facility];  Service: Urology    IL PROSTATE NEEDLE BIOPSY ANY APPROACH N/A 9/11/2024    Procedure: TRANSPERINEAL MRI FUSION BX PROSTATE;  Surgeon: Tucker Ivan MD;  Location: [facility];  Service: Urology      Family History   Problem Relation Age of Onset    Nephrolithiasis Father     Alzheimer's disease Mother       Social History     Tobacco Use    Smoking status: Never    Smokeless tobacco: Never   Vaping Use    Vaping status: Never Used   Substance Use Topics    Alcohol use: Not Currently    Drug use: No      E-Cigarette/Vaping    E-Cigarette Use Never User       E-Cigarette/Vaping Substances    Nicotine No     THC No     CBD No     Flavoring No     Other No     Unknown No       I have reviewed and agree with the history as documented.     Patient is a 65-year-old male with a history of hypertension, chronic knee pain, polymyalgia rheumatica, SVT, prostate cancer, morbid " obesity, chronic kidney disease stage III, diabetes, coming in today with wife at bedside.  Patient states that approximately 4 to 6 weeks ago he was on antibiotic for sinus infection.  He states that cleared up but approximately 2 weeks later he started with vomiting and diarrhea.  He states for the past 2 weeks, he will have 1 to 1-1/2 days where he has persistent vomiting and diarrhea.  It then subsequently resolved and he is able to eat and drink for about 2 or 3 days.  However his symptoms returned.  He went to his PCP and sent for lab work as well as stool studies.  The stool studies were just dropped off today.  Reports that today is one of the days where he is just very fatigued and tired from persistent vomiting and diarrhea.  He has no recent travel.  No known sick contacts.  He has no chest pain, shortness of breath or palpitations.        History provided by:  Medical records, patient and spouse   used: No    Diarrhea  Quality:  Semi-solid  Severity:  Moderate  Onset quality:  Gradual  Duration:  2 weeks  Timing:  Intermittent  Progression:  Unchanged  Relieved by:  Nothing  Worsened by:  Nothing  Ineffective treatments:  Change in diet  Associated symptoms: abdominal pain, chills and vomiting    Associated symptoms: no arthralgias, no recent cough, no diaphoresis, no fever, no headaches, no myalgias and no URI    Risk factors: recent antibiotic use    Risk factors: no suspicious food intake and no travel to endemic areas        Review of Systems   Constitutional:  Positive for chills. Negative for diaphoresis and fever.   HENT: Negative.  Negative for ear pain and sore throat.    Eyes: Negative.  Negative for pain and visual disturbance.   Respiratory: Negative.  Negative for cough and shortness of breath.    Cardiovascular: Negative.  Negative for chest pain and palpitations.   Gastrointestinal:  Positive for abdominal pain, diarrhea and vomiting.   Genitourinary: Negative.   Negative for dysuria and hematuria.   Musculoskeletal: Negative.  Negative for arthralgias, back pain and myalgias.   Skin: Negative.  Negative for color change and rash.   Neurological:  Negative for seizures, syncope and headaches.   Hematological: Negative.    Psychiatric/Behavioral: Negative.     All other systems reviewed and are negative.          Objective       ED Triage Vitals   Temperature Pulse Blood Pressure Respirations SpO2 Patient Position - Orthostatic VS   03/20/25 1754 03/20/25 1754 03/20/25 1811 03/20/25 1754 03/20/25 1754 03/20/25 1811   98.1 °F (36.7 °C) 82 149/68 18 96 % Sitting      Temp src Heart Rate Source BP Location FiO2 (%) Pain Score    -- 03/20/25 2137 03/20/25 1811 -- --     Monitor Right arm        Vitals      Date and Time Temp Pulse SpO2 Resp BP Pain Score FACES Pain Rating User   03/20/25 2137 -- 80 100 % 18 145/66 -- -- NM   03/20/25 1811 -- -- -- -- 149/68 -- -- JEAN MARIE   03/20/25 1754 98.1 °F (36.7 °C) 82 96 % 18 -- -- -- JEAN MARIE            Physical Exam  Vitals and nursing note reviewed.   Constitutional:       General: He is awake. He is not in acute distress.     Appearance: Normal appearance. He is well-developed. He is obese.   HENT:      Head: Normocephalic and atraumatic.      Right Ear: External ear normal.      Left Ear: External ear normal.      Nose: Nose normal.      Mouth/Throat:      Mouth: Mucous membranes are dry.   Eyes:      General: Lids are normal. Gaze aligned appropriately.      Extraocular Movements: Extraocular movements intact.      Conjunctiva/sclera: Conjunctivae normal.      Pupils: Pupils are equal, round, and reactive to light.   Cardiovascular:      Rate and Rhythm: Normal rate and regular rhythm.      Pulses:           Radial pulses are 2+ on the right side.        Dorsalis pedis pulses are 2+ on the right side.      Heart sounds: Normal heart sounds, S1 normal and S2 normal. No murmur heard.  Pulmonary:      Effort: Pulmonary effort is normal. No  respiratory distress.      Breath sounds: Normal breath sounds.      Comments: No conversational dyspnea  Abdominal:      General: Bowel sounds are increased.      Palpations: Abdomen is soft.      Tenderness: There is no abdominal tenderness. There is no guarding or rebound. Negative signs include Fuentes's sign, Rovsing's sign and McBurney's sign.   Musculoskeletal:         General: No swelling.      Cervical back: Neck supple. No rigidity.      Right lower leg: No edema.      Left lower leg: No edema.   Skin:     General: Skin is warm and dry.      Capillary Refill: Capillary refill takes less than 2 seconds.   Neurological:      General: No focal deficit present.      Mental Status: He is alert and oriented to person, place, and time.      GCS: GCS eye subscore is 4. GCS verbal subscore is 5. GCS motor subscore is 6.      Cranial Nerves: Cranial nerves 2-12 are intact.      Sensory: Sensation is intact.      Motor: Motor function is intact.      Coordination: Coordination is intact.      Comments: No slurred speech, facial asymmetry or tongue deviation   Psychiatric:         Mood and Affect: Mood normal.         Behavior: Behavior is cooperative.         Results Reviewed       Procedure Component Value Units Date/Time    UA (URINE) with reflex to Scope [[id number]] Collected: 03/20/25 2053    Lab Status: Final result Specimen: Urine, Clean Catch Updated: 03/20/25 2130     Color, UA Light Yellow     Clarity, UA Clear     Specific Gravity, UA 1.018     pH, UA 5.0     Leukocytes, UA Negative     Nitrite, UA Negative     Protein, UA Negative mg/dl      Glucose, UA Negative mg/dl      Ketones, UA Negative mg/dl      Urobilinogen, UA <2.0 mg/dl      Bilirubin, UA Negative     Occult Blood, UA Negative    Comprehensive metabolic panel [[id number]]  (Abnormal) Collected: 03/20/25 1850    Lab Status: Final result Specimen: Blood from Arm, Left Updated: 03/20/25 1941     Sodium 140 mmol/L      Potassium 4.1 mmol/L       Chloride 108 mmol/L      CO2 22 mmol/L      ANION GAP 10 mmol/L      BUN 23 mg/dL      Creatinine 1.50 mg/dL      Glucose 108 mg/dL      Calcium 9.4 mg/dL      AST 28 U/L      ALT 48 U/L      Alkaline Phosphatase 59 U/L      Total Protein 7.4 g/dL      Albumin 4.3 g/dL      Total Bilirubin 1.06 mg/dL      eGFR 47 ml/min/1.73sq m     Narrative:      National Kid[employer] guidelines for Chronic Kidney Disease (CKD):     Stage 1 with normal or high GFR (GFR > 90 mL/min/1.73 square meters)    Stage 2 Mild CKD (GFR = 60-89 mL/min/1.73 square meters)    Stage 3A Moderate CKD (GFR = 45-59 mL/min/1.73 square meters)    Stage 3B Moderate CKD (GFR = 30-44 mL/min/1.73 square meters)    Stage 4 Severe CKD (GFR = 15-29 mL/min/1.73 square meters)    Stage 5 End Stage CKD (GFR <15 mL/min/1.73 square meters)  Note: GFR calculation is accurate only with a steady state creatinine    Magnesium [[id number]]  (Abnormal) Collected: 03/20/25 1850    Lab Status: Final result Specimen: Blood from Arm, Left Updated: 03/20/25 1941     Magnesium 1.6 mg/dL     Protime-INR [[id number]]  (Abnormal) Collected: 03/20/25 1850    Lab Status: Final result Specimen: Blood from Arm, Left Updated: 03/20/25 1939     Protime 16.5 seconds      INR 1.32    Narrative:      INR Therapeutic Range    Indication                                             INR Range      Atrial Fibrillation                                               2.0-3.0  Hypercoagulable State                                    2.0.2.3  Left Ventricular Asist Device                            2.0-3.0  Mechanical Heart Valve                                  -    Aortic(with afib, MI, embolism, HF, LA enlargement,    and/or coagulopathy)                                     2.0-3.0 (2.5-3.5)     Mitral                                                             2.5-3.5  Prosthetic/Bioprosthetic Heart Valve               2.0-3.0  Venous thromboembolism (VTE: VT, PE        2.0-3.0     APTT [[id number]]  (Normal) Collected: 03/20/25 1850    Lab Status: Final result Specimen: Blood from Arm, Left Updated: 03/20/25 1939     PTT 31 seconds     Lactic acid, plasma (w/reflex if result > 2.0) [[id number]]  (Normal) Collected: 03/20/25 1850    Lab Status: Final result Specimen: Blood from Arm, Left Updated: 03/20/25 1913     LACTIC ACID 1.3 mmol/L     Narrative:      Result may be elevated if tourniquet was used during collection.    CBC and differential [[id number]]  (Abnormal) Collected: 03/20/25 1850    Lab Status: Final result Specimen: Blood from Arm, Left Updated: 03/20/25 1900     WBC 11.35 Thousand/uL      RBC 4.69 Million/uL      Hemoglobin 13.7 g/dL      Hematocrit 41.7 %      MCV 89 fL      MCH 29.2 pg      MCHC 32.9 g/dL      RDW 13.1 %      MPV 9.6 fL      Platelets 261 Thousands/uL      nRBC 0 /100 WBCs      Segmented % 53 %      Immature Grans % 0 %      Lymphocytes % 37 %      Monocytes % 9 %      Eosinophils Relative 1 %      Basophils Relative 0 %      Absolute Neutrophils 5.97 Thousands/µL      Absolute Immature Grans 0.03 Thousand/uL      Absolute Lymphocytes 4.22 Thousands/µL      Absolute Monocytes 0.96 Thousand/µL      Eosinophils Absolute 0.15 Thousand/µL      Basophils Absolute 0.02 Thousands/µL     Rotavirus antigen, stool [[id number]]     Lab Status: No result Specimen: Stool from Rectum     Stool Enteric Bacterial Panel by PCR [[id number]]     Lab Status: No result Specimen: Stool             CT abdomen pelvis wo contrast   Final Interpretation by Butch Lucas MD (03/20 2046)      No acute findings in the abdomen or pelvis within the limits of unenhanced technique.      Hepatomegaly with hepatic steatosis.      Workstation performed: [id number]             Procedures    ED Medication and Procedure Management   Prior to Admission Medications   Prescriptions Last Dose Informant Patient Reported? Taking?   Cholecalciferol (Vitamin D3) 50 MCG (2000 UT) capsule  Self No No   Sig: Take 2  capsules (4,000 Units total) by mouth daily   Diclofenac Sodium (VOLTAREN) 1 %  Self No No   Sig: Apply 2 g topically 4 (four) times a day   Empagliflozin (JARDIANCE) 10 MG TABS tablet  Self No No   Sig: Take 1 tablet (10 mg total) by mouth every morning   Magnesium Oxide 400 MG CAPS  Self No No   Sig: Take 1 tablet (400 mg total) by mouth 3 (three) times a day   NIFEdipine (ADALAT CC) 90 mg 24 hr tablet  Self No No   Sig: TAKE ONE TABLET BY MOUTH EVERY DAY   acetaminophen (TYLENOL) 500 mg tablet  Self Yes No   Sig: Take 500 mg by mouth every 6 (six) hours as needed for mild pain   albuterol (ProAir HFA) 90 mcg/act inhaler  Self No No   Sig: Inhale 2 puffs every 6 (six) hours as needed for wheezing or shortness of breath   allopurinol (ZYLOPRIM) 100 mg tablet  Self No No   Sig: Take 2 tablets (200 mg total) by mouth daily   benzonatate (TESSALON) 200 MG capsule  Self No No   Sig: Take 1 capsule (200 mg total) by mouth 3 (three) times a day as needed for cough   doxazosin (CARDURA) 2 mg tablet  Self No No   Sig: Take 1 tablet (2 mg total) by mouth daily at bedtime   losartan (COZAAR) 100 MG tablet  Self No No   Sig: Take 1 tablet (100 mg total) by mouth daily   metFORMIN (GLUCOPHAGE-XR) 750 mg 24 hr tablet  Self No No   Sig: Take 1 tablet (750 mg total) by mouth daily with breakfast   pantoprazole (PROTONIX) 40 mg tablet  Self No No   Sig: Take 1 tablet by mouth daily before breakfast   rosuvastatin (CRESTOR) 40 MG tablet  Self No No   Sig: Take 1 tablet (40 mg total) by mouth daily      Facility-Administered Medications: None     Patient's Medications   Discharge Prescriptions    ONDANSETRON (ZOFRAN-ODT) 4 MG DISINTEGRATING TABLET    Take 1 tablet (4 mg total) by mouth every 8 (eight) hours as needed for nausea or vomiting for up to 15 doses       Start Date: 3/20/2025 End Date: --       Order Dose: 4 mg       Quantity: 15 tablet    Refills: 0     No discharge procedures on file.  ED SEPSIS DOCUMENTATION   Time  reflects when diagnosis was documented in both MDM as applicable and the Disposition within this note       Time User Action Codes Description Comment    3/20/2025  7:35 PM Lilli Leal [R11.2,  R19.7] Nausea vomiting and diarrhea     3/20/2025  9:49 PM Lilli Leal [N18.9] CKD (chronic kidney disease)                  Lilli Leal DO  03/20/25 5458

## 2025-03-21 ENCOUNTER — RESULTS FOLLOW-UP (OUTPATIENT)
Dept: FAMILY MEDICINE CLINIC | Facility: CLINIC | Age: 65
End: 2025-03-21

## 2025-03-21 ENCOUNTER — VBI (OUTPATIENT)
Dept: FAMILY MEDICINE CLINIC | Facility: CLINIC | Age: 65
End: 2025-03-21

## 2025-03-21 LAB — C DIFF TOX GENS STL QL NAA+PROBE: NEGATIVE

## 2025-03-21 NOTE — TELEPHONE ENCOUNTER
----- Message from Amrit Faust MD sent at 3/21/2025 12:40 PM EDT -----  Test for C. difficile was negative.  Continue with symptomatic management.  Continue with oral hydration with Gatorade and fluids.

## 2025-03-21 NOTE — INCIDENTAL FINDINGS
The following findings require follow up:  Radiographic finding   Finding: bilateral small inguinal hernias, not strangulated   Follow up required: yes   Follow up should be done within 2 week(s)    Please notify the following clinician to assist with the follow up:   PCP    Incidental finding results were discussed with the Patienta by Lilli Leal DO on 03/20/25.   They expressed understanding and all questions answered.

## 2025-03-21 NOTE — DISCHARGE INSTRUCTIONS
B.R.A.T. DIET Your doctor has recommended the B.R.A.T. diet for you  until your condition improves. This is often used to help control diarrhea and vomiting symptoms. If you tolerate clear liquids, you may offer: · Bananas · Rice · Applesauce · Toast (and other simple starches such as crackers, potatoes, noodles) Be sure to avoid dairy products, meats, and fatty foods until your symptoms are completely better.     IF YOU USE GATORADE - SPLIT A BOTTLE IN HALF AND WATER DOWN.  TAKE SIPS OF FLUIDS AND DO NOT CHUG    IF YOU USE PEPTO-BISMOL, IT CAN TURN YOUR TONGUE AND OR STOOLS BLACK

## 2025-03-21 NOTE — TELEPHONE ENCOUNTER
03/21/25 10:37 AM    Patient contacted post ED visit, outreach attempt made but message could not be left. Additional outreach attempt will be made.     Thank you.  Christopher Lujan MA  [facility]

## 2025-03-24 NOTE — TELEPHONE ENCOUNTER
03/24/25 8:48 AM    Patient contacted post ED visit, outreach attempt made but message could not be left. Additional outreach attempt will be made.     Thank you.  Christopher Lujan MA  [facility]

## 2025-03-25 NOTE — TELEPHONE ENCOUNTER
03/25/25 8:45 AM    Patient contacted post ED visit, phone outreaches were unsuccessful; patient does not have MyChart, a MyChart letter has not been sent.     Thank you.  Christopher Lujan MA  [facility]

## 2025-03-27 PROBLEM — J01.41 ACUTE RECURRENT PANSINUSITIS: Status: RESOLVED | Noted: 2024-02-05 | Resolved: 2025-03-27

## 2025-04-14 ENCOUNTER — APPOINTMENT (OUTPATIENT)
Dept: LAB | Facility: CLINIC | Age: 65
End: 2025-04-14
Payer: MEDICARE

## 2025-04-14 DIAGNOSIS — E83.9 CHRONIC KIDNEY DISEASE-MINERAL AND BONE DISORDER (CKD-MBD): ICD-10-CM

## 2025-04-14 DIAGNOSIS — R80.1 PERSISTENT PROTEINURIA: ICD-10-CM

## 2025-04-14 DIAGNOSIS — E79.0 HYPERURICEMIA: ICD-10-CM

## 2025-04-14 DIAGNOSIS — N18.31 HYPERTENSIVE KIDNEY DISEASE WITH STAGE 3A CHRONIC KIDNEY DISEASE (HCC): ICD-10-CM

## 2025-04-14 DIAGNOSIS — E78.5 HYPERLIPIDEMIA, UNSPECIFIED HYPERLIPIDEMIA TYPE: ICD-10-CM

## 2025-04-14 DIAGNOSIS — C61 PROSTATE CANCER (HCC): ICD-10-CM

## 2025-04-14 DIAGNOSIS — N18.30 CKD STAGE 3 DUE TO TYPE 2 DIABETES MELLITUS (HCC): ICD-10-CM

## 2025-04-14 DIAGNOSIS — Z87.442 HISTORY OF NEPHROLITHIASIS: ICD-10-CM

## 2025-04-14 DIAGNOSIS — E66.01 MORBID OBESITY WITH BMI OF 40.0-44.9, ADULT (HCC): ICD-10-CM

## 2025-04-14 DIAGNOSIS — N18.9 CHRONIC KIDNEY DISEASE-MINERAL AND BONE DISORDER (CKD-MBD): ICD-10-CM

## 2025-04-14 DIAGNOSIS — E83.42 HYPOMAGNESEMIA: ICD-10-CM

## 2025-04-14 DIAGNOSIS — E11.22 CKD STAGE 3 DUE TO TYPE 2 DIABETES MELLITUS (HCC): ICD-10-CM

## 2025-04-14 DIAGNOSIS — I12.9 HYPERTENSIVE KIDNEY DISEASE WITH STAGE 3A CHRONIC KIDNEY DISEASE (HCC): ICD-10-CM

## 2025-04-14 DIAGNOSIS — M89.9 CHRONIC KIDNEY DISEASE-MINERAL AND BONE DISORDER (CKD-MBD): ICD-10-CM

## 2025-04-14 LAB
25(OH)D3 SERPL-MCNC: 23.8 NG/ML (ref 30–100)
ALBUMIN SERPL BCG-MCNC: 4.2 G/DL (ref 3.5–5)
ANION GAP SERPL CALCULATED.3IONS-SCNC: 12 MMOL/L (ref 4–13)
BUN SERPL-MCNC: 24 MG/DL (ref 5–25)
CALCIUM SERPL-MCNC: 9.3 MG/DL (ref 8.4–10.2)
CHLORIDE SERPL-SCNC: 105 MMOL/L (ref 96–108)
CO2 SERPL-SCNC: 24 MMOL/L (ref 21–32)
CREAT SERPL-MCNC: 1.42 MG/DL (ref 0.6–1.3)
CREAT UR-MCNC: 182.2 MG/DL
GFR SERPL CREATININE-BSD FRML MDRD: 51 ML/MIN/1.73SQ M
GLUCOSE P FAST SERPL-MCNC: 137 MG/DL (ref 65–99)
MAGNESIUM SERPL-MCNC: 1.6 MG/DL (ref 1.9–2.7)
MICROALBUMIN UR-MCNC: 13.3 MG/L
MICROALBUMIN/CREAT 24H UR: 7 MG/G CREATININE (ref 0–30)
PHOSPHATE SERPL-MCNC: 3.1 MG/DL (ref 2.3–4.1)
POTASSIUM SERPL-SCNC: 4.2 MMOL/L (ref 3.5–5.3)
PTH-INTACT SERPL-MCNC: 62.9 PG/ML (ref 12–88)
SODIUM SERPL-SCNC: 141 MMOL/L (ref 135–147)
URATE SERPL-MCNC: 9.6 MG/DL (ref 3.5–8.5)

## 2025-04-14 PROCEDURE — 82570 ASSAY OF URINE CREATININE: CPT

## 2025-04-14 PROCEDURE — 82043 UR ALBUMIN QUANTITATIVE: CPT

## 2025-04-14 PROCEDURE — 36415 COLL VENOUS BLD VENIPUNCTURE: CPT

## 2025-04-14 PROCEDURE — 82306 VITAMIN D 25 HYDROXY: CPT

## 2025-04-14 PROCEDURE — 80069 RENAL FUNCTION PANEL: CPT

## 2025-04-14 PROCEDURE — 83735 ASSAY OF MAGNESIUM: CPT

## 2025-04-14 PROCEDURE — 84550 ASSAY OF BLOOD/URIC ACID: CPT

## 2025-04-14 PROCEDURE — 83970 ASSAY OF PARATHORMONE: CPT

## 2025-04-15 ENCOUNTER — OFFICE VISIT (OUTPATIENT)
Dept: UROLOGY | Facility: MEDICAL CENTER | Age: 65
End: 2025-04-15
Payer: MEDICARE

## 2025-04-15 VITALS
BODY MASS INDEX: 38.8 KG/M2 | HEIGHT: 69 IN | DIASTOLIC BLOOD PRESSURE: 80 MMHG | OXYGEN SATURATION: 97 % | WEIGHT: 262 LBS | SYSTOLIC BLOOD PRESSURE: 120 MMHG | HEART RATE: 69 BPM

## 2025-04-15 DIAGNOSIS — N13.8 PROSTATE NODULE WITH URINARY OBSTRUCTION: ICD-10-CM

## 2025-04-15 DIAGNOSIS — N40.3 PROSTATE NODULE WITH URINARY OBSTRUCTION: ICD-10-CM

## 2025-04-15 DIAGNOSIS — C62.12 SEMINOMA OF DESCENDED LEFT TESTIS (HCC): ICD-10-CM

## 2025-04-15 DIAGNOSIS — N13.8 BPH WITH OBSTRUCTION/LOWER URINARY TRACT SYMPTOMS: ICD-10-CM

## 2025-04-15 DIAGNOSIS — C61 PROSTATE CANCER (HCC): Primary | ICD-10-CM

## 2025-04-15 DIAGNOSIS — N40.1 BPH WITH OBSTRUCTION/LOWER URINARY TRACT SYMPTOMS: ICD-10-CM

## 2025-04-15 DIAGNOSIS — N20.0 CALCULUS OF KIDNEY: ICD-10-CM

## 2025-04-15 PROCEDURE — 81003 URINALYSIS AUTO W/O SCOPE: CPT | Performed by: UROLOGY

## 2025-04-15 PROCEDURE — 99214 OFFICE O/P EST MOD 30 MIN: CPT | Performed by: UROLOGY

## 2025-04-15 NOTE — ASSESSMENT & PLAN NOTE
As above with PI-RADS 4 lesion of the prostate on multiparametric MRI which will be repeated in 6 months to see if there is any difference in reading or size of lesion

## 2025-04-15 NOTE — LETTER
April 15, 2025     Amrit Faust MD  [address]   [address]  [facility] [address]    Patient: Faisal Smith   YOB: 1960   Date of Visit: 4/15/2025       Dear Dr. Amrit Faust MD:    Thank you for referring Faisal Smith to me for evaluation. Below are my notes for this consultation.    If you have questions, please do not hesitate to call me. I look forward to following your patient along with you.         Sincerely,        Fawad Manzo MD        CC: No Recipients    Fawad Manzo MD  4/15/2025  9:50 AM  Sign when Signing Visit  Name: Faisal Smith      : 1960      MRN: [id number]  Encounter Provider: Fawad Manzo MD  Encounter Date: 4/15/2025   Encounter department: [facility] [place]  :  Assessment & Plan  Prostate cancer (HCC)  PSA 7.3, slowly rising however PSA velocity is still within normal limits  Orders:  •  POCT urine dip auto non-scope  •  PSA, total and free; Future  •  MRI prostate multiparametric wo w contrast; Future  •  Basic metabolic panel; Future    Seminoma of descended left testis (HCC)  Remote history-no evidence of recurrence       Prostate nodule with urinary obstruction  As above with PI-RADS 4 lesion of the prostate on multiparametric MRI which will be repeated in 6 months to see if there is any difference in reading or size of lesion       BPH with obstruction/lower urinary tract symptoms  Voiding adequately AUA symptom score 14       Calculus of kidney  Asymptomatic           History of Present Illness  Faisal Smith is a 65 y.o. male who presents  with a history of adenocarcinoma of the prostate on prostate biopsy -[place] pattern score 6.  The patient was on active surveillance and there is a slight increase in his PSA to 6.98.  Multiparametric MRI revealed a 0.4 x 0.3 cm lesion in the right posterolateral peripheral zone in the mid gland consistent with a PI-RADS 4  "lesion.  Most recent fusion biopsy revealed no evidence of persistent or recurrent disease.  The patient presents now to review his PSA which is 7.3.  AUA symptom score is 14 with urgency and some frequency as major complaints, nocturia twice nightly, denies hematuria dysuria  AUA SYMPTOM SCORE      Flowsheet Row Most Recent Value   AUA SYMPTOM SCORE    How often have you had a sensation of not emptying your bladder completely after you finished urinating? 2   How often have you had to urinate again less than two hours after you finished urinating? 2   How often have you found you stopped and started again several times when you urinate? 2   How often have you found it difficult to postpone urination? 3   How often have you had a weak urinary stream? 2   How often have you had to push or strain to begin urination? 1   How many times did you most typically get up to urinate from the time you went to bed at night until the time you got up in the morning? 2   Quality of Life: If you were to spend the rest of your life with your urinary condition just the way it is now, how would you feel about that? 2   AUA SYMPTOM SCORE 14          Review of Systems   All other systems reviewed and are negative.    Pertinent Medical History            Medical History Reviewed by provider this encounter:  Tobacco  Allergies  Meds  Med Hx  Surg Hx  Fam Hx  Soc Hx    .  Past Medical History  Past Medical History:   Diagnosis Date   • Anesthesia     per pt \"takes a little longer to wake up sometimes'   • BPH without obstruction/lower urinary tract symptoms    • Cancer (HCC)     prostate    • CPAP (continuous positive airway pressure) dependence    • Elevated PSA    • GERD (gastroesophageal reflux disease)    • Gout    • Heart murmur    • History of cancer chemotherapy    • Hyperlipidemia    • Hypertension    • Kidney stone     past   • Moderate exercise     works PT delivering propane   • Obese abdomen    • Obesity    • Polymyalgia " "(HCC)    • Prediabetes    • Prostate cancer (HCC)    • Rash     per pt not all the time--usually \"heat rash\"--under both arms and in between legs - family doctor aware - pt uses Desitin   • Sleep apnea    • Testicular carcinoma (HCC)    • Tiredness     some days   • Urinary frequency    • Urinary urgency    • Wears glasses      Past Surgical History:   Procedure Laterality Date   • CARPAL TUNNEL RELEASE Bilateral 07/2020   • COLONOSCOPY     • CYSTOSCOPY W/ LASER LITHOTRIPSY  2001   • CYSTOSTOMY W/ STENT INSERTION  2011   • ESOPHAGOGASTRODUODENOSCOPY     • EXTRACORPOREAL SHOCK WAVE LITHOTRIPSY Right 2011   • HERNIA REPAIR     • KNEE ARTHROSCOPY Left    • IN CYSTO/URETERO W/LITHOTRIPSY &INDWELL STENT INSRT Right 4/29/2018    Procedure: CYSTOSCOPY URETEROSCOPY, RETROGRADE PYELOGRAM AND INSERTION STENT URETERAL;  Surgeon: Carlin Taylor MD;  [facility];  Service: Urology   • IN ORCHIECTOMY RADICAL TUMOR INGUINAL APPROACH Left 2/19/2021    Procedure: Radical  ORCHIECTOMY;  Surgeon: Fawad Manzo MD;  [facility];  Service: Urology   • IN PROSTATE NEEDLE BIOPSY ANY APPROACH N/A 9/11/2024    Procedure: TRANSPERINEAL MRI FUSION BX PROSTATE;  Surgeon: Tucker Ivan MD;  [facility];  Service: Urology     Family History   Problem Relation Age of Onset   • Nephrolithiasis Father    • Alzheimer's disease Mother       reports that he has never smoked. He has never used smokeless tobacco. He reports that he does not currently use alcohol. He reports that he does not use drugs.  Current Outpatient Medications   Medication Instructions   • acetaminophen (TYLENOL) 500 mg, Every 6 hours PRN   • albuterol (ProAir HFA) 90 mcg/act inhaler 2 puffs, Inhalation, Every 6 hours PRN   • allopurinol (ZYLOPRIM) 200 mg, Oral, Daily   • benzonatate (TESSALON) 200 mg, Oral, 3 times daily PRN   • Diclofenac Sodium (VOLTAREN) 2 g, Topical, 4 times daily   • doxazosin (CARDURA) 2 mg, Oral, Daily at bedtime "   • Empagliflozin (JARDIANCE) 10 mg, Oral, Every morning   • losartan (COZAAR) 100 mg, Oral, Daily   • Magnesium Oxide 400 mg, Oral, 3 times daily   • metFORMIN (GLUCOPHAGE-XR) 750 mg, Oral, Daily with breakfast   • NIFEdipine (ADALAT CC) 90 mg, Oral, Daily   • ondansetron (ZOFRAN-ODT) 4 mg, Oral, Every 8 hours PRN   • pantoprazole (PROTONIX) 40 mg, Oral, Daily before breakfast   • rosuvastatin (CRESTOR) 40 mg, Oral, Daily   • Vitamin D3 4,000 Units, Oral, Daily     Allergies   Allergen Reactions   • Aspirin Other (See Comments)     Per pt told by kidney doctor not to take   • Nsaids Other (See Comments)     Per pt instructed by his kidney doctor-Dr Bone---Not to take      Current Outpatient Medications on File Prior to Visit   Medication Sig Dispense Refill   • acetaminophen (TYLENOL) 500 mg tablet Take 500 mg by mouth every 6 (six) hours as needed for mild pain     • albuterol (ProAir HFA) 90 mcg/act inhaler Inhale 2 puffs every 6 (six) hours as needed for wheezing or shortness of breath 8.5 g 1   • allopurinol (ZYLOPRIM) 100 mg tablet Take 2 tablets (200 mg total) by mouth daily 180 tablet 1   • Cholecalciferol (Vitamin D3) 50 MCG (2000 UT) capsule Take 2 capsules (4,000 Units total) by mouth daily     • Diclofenac Sodium (VOLTAREN) 1 % Apply 2 g topically 4 (four) times a day 240 g 1   • doxazosin (CARDURA) 2 mg tablet Take 1 tablet (2 mg total) by mouth daily at bedtime 180 tablet 0   • losartan (COZAAR) 100 MG tablet Take 1 tablet (100 mg total) by mouth daily 90 tablet 3   • Magnesium Oxide 400 MG CAPS Take 1 tablet (400 mg total) by mouth 3 (three) times a day 270 tablet 3   • metFORMIN (GLUCOPHAGE-XR) 750 mg 24 hr tablet Take 1 tablet (750 mg total) by mouth daily with breakfast 90 tablet 1   • NIFEdipine (ADALAT CC) 90 mg 24 hr tablet TAKE ONE TABLET BY MOUTH EVERY DAY 90 tablet 1   • pantoprazole (PROTONIX) 40 mg tablet Take 1 tablet by mouth daily before breakfast 90 tablet 1   • rosuvastatin (CRESTOR)  "40 MG tablet Take 1 tablet (40 mg total) by mouth daily 90 tablet 1   • benzonatate (TESSALON) 200 MG capsule Take 1 capsule (200 mg total) by mouth 3 (three) times a day as needed for cough 30 capsule 0   • Empagliflozin (JARDIANCE) 10 MG TABS tablet Take 1 tablet (10 mg total) by mouth every morning 90 tablet 3   • ondansetron (ZOFRAN-ODT) 4 mg disintegrating tablet Take 1 tablet (4 mg total) by mouth every 8 (eight) hours as needed for nausea or vomiting for up to 15 doses 15 tablet 0   • [DISCONTINUED] methocarbamol (ROBAXIN) 500 mg tablet Take 1 tablet (500 mg total) by mouth 3 (three) times a day for 10 days 30 tablet 0     No current facility-administered medications on file prior to visit.      Social History     Tobacco Use   • Smoking status: Never   • Smokeless tobacco: Never   Vaping Use   • Vaping status: Never Used   Substance and Sexual Activity   • Alcohol use: Not Currently   • Drug use: No   • Sexual activity: Not on file     Comment: defer        Objective  /80 (BP Location: Left arm, Patient Position: Sitting, Cuff Size: Standard)   Pulse 69   Ht 5' 9\" (1.753 m)   Wt 119 kg (262 lb)   SpO2 97%   BMI 38.69 kg/m²     Physical Exam  Vitals reviewed.   Constitutional:       General: He is not in acute distress.     Appearance: Normal appearance. He is obese. He is not ill-appearing, toxic-appearing or diaphoretic.   HENT:      Head: Normocephalic and atraumatic.      Mouth/Throat:      Mouth: Mucous membranes are moist.   Eyes:      Extraocular Movements: Extraocular movements intact.   Pulmonary:      Effort: Pulmonary effort is normal. No respiratory distress.   Abdominal:      General: There is no distension.      Palpations: Abdomen is soft.   Musculoskeletal:         General: Normal range of motion.      Cervical back: Neck supple.   Skin:     General: Skin is dry.   Neurological:      Mental Status: He is alert and oriented to person, place, and time.   Psychiatric:         Mood and " Affect: Mood normal.         Behavior: Behavior normal.         Thought Content: Thought content normal.         Judgment: Judgment normal.            Results   Lab Results   Component Value Date    PSA 7.368 (H) 03/17/2025    PSA 6.98 (H) 06/07/2024    PSA 6.63 (H) 11/27/2023     Lab Results   Component Value Date    CALCIUM 9.3 04/14/2025    K 4.2 04/14/2025    CO2 24 04/14/2025     04/14/2025    BUN 24 04/14/2025    CREATININE 1.42 (H) 04/14/2025     Lab Results   Component Value Date    WBC 11.35 (H) 03/20/2025    HGB 13.7 03/20/2025    HCT 41.7 03/20/2025    MCV 89 03/20/2025     03/20/2025       Office Urine Dip  No results found for this or any previous visit (from the past hour).

## 2025-04-15 NOTE — PROGRESS NOTES
Name: Faisal Smith      : 1960      MRN: [id number]  Encounter Provider: Fawad Manzo MD  Encounter Date: 4/15/2025   [facility]  :  Assessment & Plan  Prostate cancer (HCC)  PSA 7.3, slowly rising however PSA velocity is still within normal limits  Orders:    POCT urine dip auto non-scope    PSA, total and free; Future    MRI prostate multiparametric wo w contrast; Future    Basic metabolic panel; Future    Seminoma of descended left testis (HCC)  Remote history-no evidence of recurrence       Prostate nodule with urinary obstruction  As above with PI-RADS 4 lesion of the prostate on multiparametric MRI which will be repeated in 6 months to see if there is any difference in reading or size of lesion       BPH with obstruction/lower urinary tract symptoms  Voiding adequately AUA symptom score 14       Calculus of kidney  Asymptomatic           History of Present Illness   Faisal Smith is a 65 y.o. male who presents  with a history of adenocarcinoma of the prostate on prostate biopsy 2019-[place] pattern score 6.  The patient was on active surveillance and there is a slight increase in his PSA to 6.98.  Multiparametric MRI revealed a 0.4 x 0.3 cm lesion in the right posterolateral peripheral zone in the mid gland consistent with a PI-RADS 4 lesion.  Most recent fusion biopsy revealed no evidence of persistent or recurrent disease.  The patient presents now to review his PSA which is 7.3.  AUA symptom score is 14 with urgency and some frequency as major complaints, nocturia twice nightly, denies hematuria dysuria  AUA SYMPTOM SCORE      Flowsheet Row Most Recent Value   AUA SYMPTOM SCORE    How often have you had a sensation of not emptying your bladder completely after you finished urinating? 2   How often have you had to urinate again less than two hours after you finished urinating? 2   How often have you found you stopped and started  "again several times when you urinate? 2   How often have you found it difficult to postpone urination? 3   How often have you had a weak urinary stream? 2   How often have you had to push or strain to begin urination? 1   How many times did you most typically get up to urinate from the time you went to bed at night until the time you got up in the morning? 2   Quality of Life: If you were to spend the rest of your life with your urinary condition just the way it is now, how would you feel about that? 2   AUA SYMPTOM SCORE 14          Review of Systems   All other systems reviewed and are negative.    Pertinent Medical History             Medical History Reviewed by provider this encounter:  Tobacco  Allergies  Meds  Med Hx  Surg Hx  Fam Hx  Soc Hx    .  Past Medical History   Past Medical History:   Diagnosis Date    Anesthesia     per pt \"takes a little longer to wake up sometimes'    BPH without obstruction/lower urinary tract symptoms     Cancer (HCC)     prostate     CPAP (continuous positive airway pressure) dependence     Elevated PSA     GERD (gastroesophageal reflux disease)     Gout     Heart murmur     History of cancer chemotherapy     Hyperlipidemia     Hypertension     Kidney stone     past    Moderate exercise     works PT delivering propane    Obese abdomen     Obesity     Polymyalgia (HCC)     Prediabetes     Prostate cancer (HCC)     Rash     per pt not all the time--usually \"heat rash\"--under both arms and in between legs - family doctor aware - pt uses Desitin    Sleep apnea     Testicular carcinoma (HCC)     Tiredness     some days    Urinary frequency     Urinary urgency     Wears glasses      Past Surgical History:   Procedure Laterality Date    CARPAL TUNNEL RELEASE Bilateral 07/2020    COLONOSCOPY      CYSTOSCOPY W/ LASER LITHOTRIPSY  2001    CYSTOSTOMY W/ STENT INSERTION  2011    ESOPHAGOGASTRODUODENOSCOPY      EXTRACORPOREAL SHOCK WAVE LITHOTRIPSY Right 2011    HERNIA REPAIR      KNEE " ARTHROSCOPY Left     NV CYSTO/URETERO W/LITHOTRIPSY &INDWELL STENT INSRT Right 4/29/2018    Procedure: CYSTOSCOPY URETEROSCOPY, RETROGRADE PYELOGRAM AND INSERTION STENT URETERAL;  Surgeon: Carlin Taylor MD;  [facility];  Service: Urology    NV ORCHIECTOMY RADICAL TUMOR INGUINAL APPROACH Left 2/19/2021    Procedure: Radical  ORCHIECTOMY;  Surgeon: Fawad Manzo MD;  [facility];  Service: Urology    NV PROSTATE NEEDLE BIOPSY ANY APPROACH N/A 9/11/2024    Procedure: TRANSPERINEAL MRI FUSION BX PROSTATE;  Surgeon: Tucker Ivan MD;  [facility];  Service: Urology     Family History   Problem Relation Age of Onset    Nephrolithiasis Father     Alzheimer's disease Mother       reports that he has never smoked. He has never used smokeless tobacco. He reports that he does not currently use alcohol. He reports that he does not use drugs.  Current Outpatient Medications   Medication Instructions    acetaminophen (TYLENOL) 500 mg, Every 6 hours PRN    albuterol (ProAir HFA) 90 mcg/act inhaler 2 puffs, Inhalation, Every 6 hours PRN    allopurinol (ZYLOPRIM) 200 mg, Oral, Daily    benzonatate (TESSALON) 200 mg, Oral, 3 times daily PRN    Diclofenac Sodium (VOLTAREN) 2 g, Topical, 4 times daily    doxazosin (CARDURA) 2 mg, Oral, Daily at bedtime    Empagliflozin (JARDIANCE) 10 mg, Oral, Every morning    losartan (COZAAR) 100 mg, Oral, Daily    Magnesium Oxide 400 mg, Oral, 3 times daily    metFORMIN (GLUCOPHAGE-XR) 750 mg, Oral, Daily with breakfast    NIFEdipine (ADALAT CC) 90 mg, Oral, Daily    ondansetron (ZOFRAN-ODT) 4 mg, Oral, Every 8 hours PRN    pantoprazole (PROTONIX) 40 mg, Oral, Daily before breakfast    rosuvastatin (CRESTOR) 40 mg, Oral, Daily    Vitamin D3 4,000 Units, Oral, Daily     Allergies   Allergen Reactions    Aspirin Other (See Comments)     Per pt told by kidney doctor not to take    Nsaids Other (See Comments)     Per pt instructed by his kidney doctor-  Lizandro---Not to take      Current Outpatient Medications on File Prior to Visit   Medication Sig Dispense Refill    acetaminophen (TYLENOL) 500 mg tablet Take 500 mg by mouth every 6 (six) hours as needed for mild pain      albuterol (ProAir HFA) 90 mcg/act inhaler Inhale 2 puffs every 6 (six) hours as needed for wheezing or shortness of breath 8.5 g 1    allopurinol (ZYLOPRIM) 100 mg tablet Take 2 tablets (200 mg total) by mouth daily 180 tablet 1    Cholecalciferol (Vitamin D3) 50 MCG (2000 UT) capsule Take 2 capsules (4,000 Units total) by mouth daily      Diclofenac Sodium (VOLTAREN) 1 % Apply 2 g topically 4 (four) times a day 240 g 1    doxazosin (CARDURA) 2 mg tablet Take 1 tablet (2 mg total) by mouth daily at bedtime 180 tablet 0    losartan (COZAAR) 100 MG tablet Take 1 tablet (100 mg total) by mouth daily 90 tablet 3    Magnesium Oxide 400 MG CAPS Take 1 tablet (400 mg total) by mouth 3 (three) times a day 270 tablet 3    metFORMIN (GLUCOPHAGE-XR) 750 mg 24 hr tablet Take 1 tablet (750 mg total) by mouth daily with breakfast 90 tablet 1    NIFEdipine (ADALAT CC) 90 mg 24 hr tablet TAKE ONE TABLET BY MOUTH EVERY DAY 90 tablet 1    pantoprazole (PROTONIX) 40 mg tablet Take 1 tablet by mouth daily before breakfast 90 tablet 1    rosuvastatin (CRESTOR) 40 MG tablet Take 1 tablet (40 mg total) by mouth daily 90 tablet 1    benzonatate (TESSALON) 200 MG capsule Take 1 capsule (200 mg total) by mouth 3 (three) times a day as needed for cough 30 capsule 0    Empagliflozin (JARDIANCE) 10 MG TABS tablet Take 1 tablet (10 mg total) by mouth every morning 90 tablet 3    ondansetron (ZOFRAN-ODT) 4 mg disintegrating tablet Take 1 tablet (4 mg total) by mouth every 8 (eight) hours as needed for nausea or vomiting for up to 15 doses 15 tablet 0    [DISCONTINUED] methocarbamol (ROBAXIN) 500 mg tablet Take 1 tablet (500 mg total) by mouth 3 (three) times a day for 10 days 30 tablet 0     No current facility-administered  "medications on file prior to visit.      Social History     Tobacco Use    Smoking status: Never    Smokeless tobacco: Never   Vaping Use    Vaping status: Never Used   Substance and Sexual Activity    Alcohol use: Not Currently    Drug use: No    Sexual activity: Not on file     Comment: defer        Objective   /80 (BP Location: Left arm, Patient Position: Sitting, Cuff Size: Standard)   Pulse 69   Ht 5' 9\" (1.753 m)   Wt 119 kg (262 lb)   SpO2 97%   BMI 38.69 kg/m²     Physical Exam  Vitals reviewed.   Constitutional:       General: He is not in acute distress.     Appearance: Normal appearance. He is obese. He is not ill-appearing, toxic-appearing or diaphoretic.   HENT:      Head: Normocephalic and atraumatic.      Mouth/Throat:      Mouth: Mucous membranes are moist.   Eyes:      Extraocular Movements: Extraocular movements intact.   Pulmonary:      Effort: Pulmonary effort is normal. No respiratory distress.   Abdominal:      General: There is no distension.      Palpations: Abdomen is soft.   Musculoskeletal:         General: Normal range of motion.      Cervical back: Neck supple.   Skin:     General: Skin is dry.   Neurological:      Mental Status: He is alert and oriented to person, place, and time.   Psychiatric:         Mood and Affect: Mood normal.         Behavior: Behavior normal.         Thought Content: Thought content normal.         Judgment: Judgment normal.            Results   Lab Results   Component Value Date    PSA 7.368 (H) 03/17/2025    PSA 6.98 (H) 06/07/2024    PSA 6.63 (H) 11/27/2023     Lab Results   Component Value Date    CALCIUM 9.3 04/14/2025    K 4.2 04/14/2025    CO2 24 04/14/2025     04/14/2025    BUN 24 04/14/2025    CREATININE 1.42 (H) 04/14/2025     Lab Results   Component Value Date    WBC 11.35 (H) 03/20/2025    HGB 13.7 03/20/2025    HCT 41.7 03/20/2025    MCV 89 03/20/2025     03/20/2025       Office Urine Dip  No results found for this or any " previous visit (from the past hour).

## 2025-04-15 NOTE — ASSESSMENT & PLAN NOTE
PSA 7.3, slowly rising however PSA velocity is still within normal limits  Orders:    POCT urine dip auto non-scope    PSA, total and free; Future    MRI prostate multiparametric wo w contrast; Future    Basic metabolic panel; Future

## 2025-05-12 ENCOUNTER — OCCMED (OUTPATIENT)
Dept: URGENT CARE | Facility: CLINIC | Age: 65
End: 2025-05-12

## 2025-05-12 ENCOUNTER — TELEPHONE (OUTPATIENT)
Age: 65
End: 2025-05-12

## 2025-05-12 DIAGNOSIS — Z02.89 ENCOUNTER FOR EXAMINATION REQUIRED BY DEPARTMENT OF TRANSPORTATION (DOT): Primary | ICD-10-CM

## 2025-05-12 NOTE — TELEPHONE ENCOUNTER
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 He stated he was told he needs a lab test for his diabetes. Please call patient to advise.

## 2025-05-13 NOTE — TELEPHONE ENCOUNTER
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 Patient called and said if someone can please call him back this morning. Please advise [phone] thank you.

## 2025-05-14 ENCOUNTER — OFFICE VISIT (OUTPATIENT)
Dept: NEPHROLOGY | Facility: CLINIC | Age: 65
End: 2025-05-14
Payer: MEDICARE

## 2025-05-14 VITALS
SYSTOLIC BLOOD PRESSURE: 126 MMHG | HEIGHT: 69 IN | DIASTOLIC BLOOD PRESSURE: 84 MMHG | BODY MASS INDEX: 39.69 KG/M2 | HEART RATE: 66 BPM | WEIGHT: 268 LBS

## 2025-05-14 DIAGNOSIS — R80.1 PERSISTENT PROTEINURIA: ICD-10-CM

## 2025-05-14 DIAGNOSIS — M89.9 CHRONIC KIDNEY DISEASE-MINERAL AND BONE DISORDER (CKD-MBD): ICD-10-CM

## 2025-05-14 DIAGNOSIS — N18.31 HYPERTENSIVE KIDNEY DISEASE WITH STAGE 3A CHRONIC KIDNEY DISEASE (HCC): ICD-10-CM

## 2025-05-14 DIAGNOSIS — E83.9 CHRONIC KIDNEY DISEASE-MINERAL AND BONE DISORDER (CKD-MBD): ICD-10-CM

## 2025-05-14 DIAGNOSIS — E83.42 HYPOMAGNESEMIA: ICD-10-CM

## 2025-05-14 DIAGNOSIS — E79.0 HYPERURICEMIA: ICD-10-CM

## 2025-05-14 DIAGNOSIS — N18.9 CHRONIC KIDNEY DISEASE-MINERAL AND BONE DISORDER (CKD-MBD): ICD-10-CM

## 2025-05-14 DIAGNOSIS — N20.0 CALCULUS OF KIDNEY: ICD-10-CM

## 2025-05-14 DIAGNOSIS — E78.2 MIXED HYPERLIPIDEMIA: ICD-10-CM

## 2025-05-14 DIAGNOSIS — E11.9 TYPE 2 DIABETES MELLITUS WITHOUT COMPLICATION, WITHOUT LONG-TERM CURRENT USE OF INSULIN (HCC): Primary | ICD-10-CM

## 2025-05-14 DIAGNOSIS — N18.31 TYPE 2 DIABETES MELLITUS WITH STAGE 3A CHRONIC KIDNEY DISEASE, WITHOUT LONG-TERM CURRENT USE OF INSULIN (HCC): Primary | ICD-10-CM

## 2025-05-14 DIAGNOSIS — E11.22 TYPE 2 DIABETES MELLITUS WITH STAGE 3A CHRONIC KIDNEY DISEASE, WITHOUT LONG-TERM CURRENT USE OF INSULIN (HCC): Primary | ICD-10-CM

## 2025-05-14 DIAGNOSIS — E66.01 MORBID OBESITY WITH BMI OF 40.0-44.9, ADULT (HCC): ICD-10-CM

## 2025-05-14 DIAGNOSIS — I12.9 HYPERTENSIVE KIDNEY DISEASE WITH STAGE 3A CHRONIC KIDNEY DISEASE (HCC): ICD-10-CM

## 2025-05-14 PROBLEM — N18.2 TYPE 2 DIABETES MELLITUS WITH STAGE 2 CHRONIC KIDNEY DISEASE, WITHOUT LONG-TERM CURRENT USE OF INSULIN  (HCC): Status: RESOLVED | Noted: 2021-05-30 | Resolved: 2025-05-14

## 2025-05-14 PROBLEM — E55.9 VITAMIN D DEFICIENCY: Status: RESOLVED | Noted: 2021-08-06 | Resolved: 2025-05-14

## 2025-05-14 PROCEDURE — 99214 OFFICE O/P EST MOD 30 MIN: CPT | Performed by: INTERNAL MEDICINE

## 2025-05-14 NOTE — ASSESSMENT & PLAN NOTE
Lab Results   Component Value Date    EGFR 51 04/14/2025    EGFR 47 03/20/2025    EGFR 54 03/18/2025    CREATININE 1.42 (H) 04/14/2025    CREATININE 1.50 (H) 03/20/2025    CREATININE 1.34 (H) 03/18/2025   Based on patients CKD stage following is the goal of therapy.  Maintain calcium phosphorus product of < 55.  Stage 3 CKD - Goal Ca 8.5-10 mg/dL , goal Phos 2.7-4.6 mg/dL  , goal iPTH 30-70 pg/m  Currently on: Vitamin D 4000 units p.o. daily  Changes made today: increase to vit D 5000 units po Q24  most recent ipth 62.9 from 4/14/2025 and vit D 23.8 from 4/14/2025  Check iPTH vitamin D prior to next visit

## 2025-05-14 NOTE — ASSESSMENT & PLAN NOTE
Discussed with the patient that the most common types of kidney stones are calcium oxalate stones.   Advised to follow a diet with increased fluid intake so that urine output is greater than 2-2.5L/day.  Advised patient to decrease salt, protein and oxalate intake.   Dietary handouts given.  24-hour stone workup as last known episode in 2019

## 2025-05-14 NOTE — ASSESSMENT & PLAN NOTE
Encouraged patient to follow a renal diet comprising of moderate potassium, low phosphorus and protein restriction to 0.8gm/kg.  Dietary handouts provided  Encouraged weight loss.  Will check serum albumin with next blood work.   Orders:  •  PTH, intact; Future  •  Renal function panel; Future  •  Vitamin D 25 hydroxy; Future  •  Magnesium; Future  •  PTH, intact; Future  •  Renal function panel; Future  •  Vitamin D 25 hydroxy; Future  •  Albumin / creatinine urine ratio; Future  •  Magnesium; Future  •  Phosphorus; Future  •  CBC; Future

## 2025-05-14 NOTE — ASSESSMENT & PLAN NOTE
On magnesium 400 mg p.o. 3 times daily  Check magnesium level in 3 months and then again prior to next visit  Most recent magnesium 1.6

## 2025-05-14 NOTE — ASSESSMENT & PLAN NOTE
likely has underlying proteinuria due to obesity related hyperfiltration  most recent MACR is 7 mg/dL from April 2025  Recheck MACR prior to next visit  For proteinuria reduction continue on rosuvastatin, losartan, vitamin D, Jardiance

## 2025-05-14 NOTE — PROGRESS NOTES
Name: Faisal Smith      : 1960      MRN: [id number]  Encounter Provider: Emely Bone MD  Encounter Date: 2025   Encounter department: [facility]  :65 y.o.  male with pmh of multiple co-morbidities including  Hypertension, nephrolithiasis,  Polymyalgia rheumatica, testicular cancer presents to the office for routine follow-up.     Assessment & Plan  Type 2 diabetes mellitus with stage 3a chronic kidney disease, without long-term current use of insulin ([facility])    Lab Results   Component Value Date    HGBA1C 7.5 (A) 2025     Most recent A1c at 7.5% from 2025  Hemoglobin A1c as high as 7.8% in 2024  Advised patient of tight glycemic control as that will help delay CKD progression  Currently on: metformin  after review of records In [facility] as well as Care everywhere patient has a baseline creatinine of 1.3-1.6 mg/dL.   Most recent labs show a Creatinine of 1.42 mg/dL . Renal function remains stable.  Check blood work in 3 months and then again prior to next visit  Likely has underlying CKD due to hypertensive nephrosclerosis plus obesity related hyperfiltration plus prior renal injury from chemotherapy plus age-related nephron loss plus history of JULIO C nondialysis requiring  Imaging:   CT abdomen 2024 punctate nonobstructing calculi bilaterally no hydronephrosis prostatomegaly  Recommend to avoid use of NSAIDs, nephrotoxins. Caution advised with regards to exposure to IV contrast dye.   Discussed with the patient in depth his renal status, including the possible etiologies for CKD.   Advised the patient that when his GFR is close to 20mL/min then will start discussing about RRT(renal replacement therapy) options such as renal transplant, peritoneal dialysis and hemodialysis.   Informed the patient about the various options for Renal Replacement therapy.  Discussed with the patient how we need to work together to delay the progression of  CKD with optimal BP control based on their age and co-morbidities, optimal BS control with HbA1c of <7% and trying to reduce proteinuria by the use of anti-proteinuric agents.   CKD education/Kidney smart: Referral placed 12/22/2021  Follow-up: Patient is to follow-up in 6 months, with lab work to be performed in 3 months and then again a few days prior to the next visit. Advised patient to call me in 10 days to review the results if they do not hear back from me, as I may have not received the results.    Orders:  •  PTH, intact; Future  •  Renal function panel; Future  •  Vitamin D 25 hydroxy; Future  •  Magnesium; Future  •  PTH, intact; Future  •  Renal function panel; Future  •  Vitamin D 25 hydroxy; Future  •  Albumin / creatinine urine ratio; Future  •  Magnesium; Future  •  Phosphorus; Future  •  CBC; Future    Hypertensive kidney disease with stage 3a chronic kidney disease (HCC)  Lab Results   Component Value Date    EGFR 51 04/14/2025    EGFR 47 03/20/2025    EGFR 54 03/18/2025    CREATININE 1.42 (H) 04/14/2025    CREATININE 1.50 (H) 03/20/2025    CREATININE 1.34 (H) 03/18/2025     BP Readings from Last 3 Encounters:   05/14/25 126/84   04/15/25 120/80   03/20/25 145/66     Current medications: losartan 100 mg p.o. daily, Cardura 2 mg p.o. nightly, Procardia XL 90 mg p.o. daily  Changes made today: None blood pressures volume status stable  Not on jardiance due to cost  Goal BP of < 130/80 based on age and comorbidities  Instructed to follow low sodium (2gm)diet.  Advised to hold ACEI/ARBs if patient suffers from dehydration due to gastrointestinal losses due to risk of JULIO C secondary to failure to autoregulate.  after review of records In [facility] as well as Care everywhere patient has a baseline creatinine of 1.3-1.6 mg/dL.   Most recent labs show a Creatinine of 1.42 mg/dL on/14/25. Renal function remains stable.  Check blood work in 3 months and then again prior to next visit  Likely has underlying CKD due  to hypertensive nephrosclerosis plus obesity related hyperfiltration plus prior renal injury from chemotherapy plus age-related nephron loss plus history of JULIO C nondialysis requiring  Imaging:   CT abdomen 6/12/2024 punctate nonobstructing calculi bilaterally no hydronephrosis prostatomegaly  Recommend to avoid use of NSAIDs, nephrotoxins. Caution advised with regards to exposure to IV contrast dye.   Discussed with the patient in depth his renal status, including the possible etiologies for CKD.   Advised the patient that when his GFR is close to 20mL/min then will start discussing about RRT(renal replacement therapy) options such as renal transplant, peritoneal dialysis and hemodialysis.   Informed the patient about the various options for Renal Replacement therapy.  Discussed with the patient how we need to work together to delay the progression of CKD with optimal BP control based on their age and co-morbidities, optimal BS control with HbA1c of <7% and trying to reduce proteinuria by the use of anti-proteinuric agents.   CKD education/Kidney smart: Referral placed 12/22/2021  Follow-up: Patient is to follow-up in 6 months, with lab work to be performed in 3 months and then again a few days prior to the next visit. Advised patient to call me in 10 days to review the results if they do not hear back from me, as I may have not received the results.    Orders:  •  PTH, intact; Future  •  Renal function panel; Future  •  Vitamin D 25 hydroxy; Future  •  Magnesium; Future  •  PTH, intact; Future  •  Renal function panel; Future  •  Vitamin D 25 hydroxy; Future  •  Albumin / creatinine urine ratio; Future  •  Magnesium; Future  •  Phosphorus; Future  •  CBC; Future    Chronic kidney disease-mineral and bone disorder (CKD-MBD)  Lab Results   Component Value Date    EGFR 51 04/14/2025    EGFR 47 03/20/2025    EGFR 54 03/18/2025    CREATININE 1.42 (H) 04/14/2025    CREATININE 1.50 (H) 03/20/2025    CREATININE 1.34 (H)  03/18/2025   Based on patients CKD stage following is the goal of therapy.  Maintain calcium phosphorus product of < 55.  Stage 3 CKD - Goal Ca 8.5-10 mg/dL , goal Phos 2.7-4.6 mg/dL  , goal iPTH 30-70 pg/m  Currently on: Vitamin D 4000 units p.o. daily  Changes made today: increase to vit D 5000 units po Q24  most recent ipth 62.9 from 4/14/2025 and vit D 23.8 from 4/14/2025  Check iPTH vitamin D prior to next visit    Calculus of kidney  Discussed with the patient that the most common types of kidney stones are calcium oxalate stones.   Advised to follow a diet with increased fluid intake so that urine output is greater than 2-2.5L/day.  Advised patient to decrease salt, protein and oxalate intake.   Dietary handouts given.  24-hour stone workup as last known episode in 2019    Hyperuricemia  .  Continue allopurinol 200 mg p.o. daily  May need dosage adjustments if GFR continues to drop  Check uric acid level prior to next visit  Most recent uric acid 9.6  Hypomagnesemia  On magnesium 400 mg p.o. 3 times daily  Check magnesium level in 3 months and then again prior to next visit  Most recent magnesium 1.6      Persistent proteinuria  likely has underlying proteinuria due to obesity related hyperfiltration  most recent MACR is 7 mg/dL from April 2025  Recheck MACR prior to next visit  For proteinuria reduction continue on rosuvastatin, losartan, vitamin D, Jardiance    Mixed hyperlipidemia  Goal LDL less than 70  Continue on Crestor  Morbid obesity with BMI of 40.0-44.9, adult (HCC)  Encouraged patient to follow a renal diet comprising of moderate potassium, low phosphorus and protein restriction to 0.8gm/kg.  Dietary handouts provided  Encouraged weight loss.  Will check serum albumin with next blood work.   Orders:  •  PTH, intact; Future  •  Renal function panel; Future  •  Vitamin D 25 hydroxy; Future  •  Magnesium; Future  •  PTH, intact; Future  •  Renal function panel; Future  •  Vitamin D 25 hydroxy;  "Future  •  Albumin / creatinine urine ratio; Future  •  Magnesium; Future  •  Phosphorus; Future  •  CBC; Future        History of Present Illness   HPI  Faisal Smith is a 65 y.o. male who presents to the office for routine follow-up feels well has no complaints or recent hospitalization no issues with edema unfortunately did not start Jardiance due to cost of more than $400 a month.  He reports his friend got it for approximately $10 and he will try to see if he can get it for the same cost and will let me know in case he is starting on that then we can order for repeat blood work.      Review of Systems   Constitutional:  Negative for chills and fever.   HENT:  Negative for congestion.    Respiratory:  Negative for cough and shortness of breath.    Cardiovascular:  Negative for leg swelling.   Gastrointestinal:  Negative for constipation.   Genitourinary:  Negative for dysuria and hematuria.   Musculoskeletal:  Negative for back pain.   Neurological:  Negative for dizziness and headaches.   Psychiatric/Behavioral:  Negative for agitation and confusion.    All other systems reviewed and are negative.         Objective   /84 (BP Location: Left arm, Patient Position: Sitting, Cuff Size: Large)   Pulse 66   Ht 5' 9\" (1.753 m)   Wt 122 kg (268 lb)   BMI 39.58 kg/m²      Physical Exam  Vitals reviewed.   Constitutional:       General: He is not in acute distress.     Appearance: Normal appearance. He is obese. He is not toxic-appearing or diaphoretic.   HENT:      Head: Normocephalic and atraumatic.      Mouth/Throat:      Mouth: Mucous membranes are moist.      Pharynx: No oropharyngeal exudate.     Eyes:      General: No scleral icterus.      Cardiovascular:      Rate and Rhythm: Normal rate.   Pulmonary:      Effort: No respiratory distress.      Breath sounds: Normal breath sounds. No stridor. No wheezing.   Abdominal:      Palpations: There is no mass.      Tenderness: There is no right CVA " tenderness or left CVA tenderness.     Musculoskeletal:         General: No swelling.      Cervical back: Normal range of motion. No rigidity.     Skin:     Coloration: Skin is not jaundiced.     Neurological:      General: No focal deficit present.      Mental Status: He is alert and oriented to person, place, and time. Mental status is at baseline.     Psychiatric:         Mood and Affect: Mood normal.         Behavior: Behavior normal.

## 2025-05-14 NOTE — ASSESSMENT & PLAN NOTE
Lab Results   Component Value Date    HGBA1C 7.5 (A) 01/14/2025     Most recent A1c at 7.5% from January 2025  Hemoglobin A1c as high as 7.8% in August 2024  Advised patient of tight glycemic control as that will help delay CKD progression  Currently on: metformin  after review of records In [facility] as well as Care everywhere patient has a baseline creatinine of 1.3-1.6 mg/dL.   Most recent labs show a Creatinine of 1.42 mg/dL on/14/25. Renal function remains stable.  Check blood work in 3 months and then again prior to next visit  Likely has underlying CKD due to hypertensive nephrosclerosis plus obesity related hyperfiltration plus prior renal injury from chemotherapy plus age-related nephron loss plus history of JULIO C nondialysis requiring  Imaging:   CT abdomen 6/12/2024 punctate nonobstructing calculi bilaterally no hydronephrosis prostatomegaly  Recommend to avoid use of NSAIDs, nephrotoxins. Caution advised with regards to exposure to IV contrast dye.   Discussed with the patient in depth his renal status, including the possible etiologies for CKD.   Advised the patient that when his GFR is close to 20mL/min then will start discussing about RRT(renal replacement therapy) options such as renal transplant, peritoneal dialysis and hemodialysis.   Informed the patient about the various options for Renal Replacement therapy.  Discussed with the patient how we need to work together to delay the progression of CKD with optimal BP control based on their age and co-morbidities, optimal BS control with HbA1c of <7% and trying to reduce proteinuria by the use of anti-proteinuric agents.   CKD education/Kidney smart: Referral placed 12/22/2021  Follow-up: Patient is to follow-up in 6 months, with lab work to be performed in 3 months and then again a few days prior to the next visit. Advised patient to call me in 10 days to review the results if they do not hear back from me, as I may have not received the  results.    Orders:  •  PTH, intact; Future  •  Renal function panel; Future  •  Vitamin D 25 hydroxy; Future  •  Magnesium; Future  •  PTH, intact; Future  •  Renal function panel; Future  •  Vitamin D 25 hydroxy; Future  •  Albumin / creatinine urine ratio; Future  •  Magnesium; Future  •  Phosphorus; Future  •  CBC; Future

## 2025-05-14 NOTE — PATIENT INSTRUCTIONS
"Blood work in 3months    Please call me in 10 days after having your blood work done to review the results if you do not hear back from me or my office, as I may have not received the results.  please remember to perform blood work prior to the next visit.  Please call if the blood pressure top number is greater than 140 or less than 110 consistently.  Please call if you are gaining more than 2lbs in 2 days for adjustment of water pills.  ~ Please AVOID the following pain medications.  LIST OF NSAIDS (NONSTEROIDAL ANTI-INFLAMMATORY DRUGS) AND VILLAGOMEZ-2 INHIBITORS    DIFLUNISAL (DOLOBID)  IBUPROFEN (MOTRIN, ADVIL)  FLURBIPROFEN (ANSAID)  KETOPROFEN (ORUDIS, ORUVAIL)  FENOPROFEN (NALFON)  NABUMETONE (RELAFEN)  PIROXICAM (FELDENE)  NAPROXEN (ALEVE, NAPROSYN, NAPRELAN, ANAPROX)  DICLOFENAC (VOLTAREN, CATAFLAM)  INDOMETHACIN (INDOCIN)  SULINDAC (CLINORIL)  TOLMETIN (TOLETIN)  ETODOLAC (LODINE)  MELOXICAM (MOBIC)  KETOROLAC (TORADOL)  OXAPROZIN (DAYPRO)  CELECOXIB (CELEBREX)Phosphorus diet  Follow a low phosphorus diet.    Avoid these higher phosphorus foods: Choose these lower phosphorus foods:   Milk, pudding or yogurt (from animals and from many soy varieties) Rice milk (unfortified), nondairy creamer (if it doesn't have terms in the ingredients list that contain the letters \"phos\")   Hard cheeses, ricotta or cottage cheese, fat-free cream cheese Regular and low-fat cream cheese   Ice cream or frozen yogurt Sherbet or frozen fruit pops   Soups made with higher phosphorus ingredients (milk, dried peas, beans, lentils) Soups made with lower phosphorus ingredients (broth- or water-based with other lower phosphorus ingredients)   Whole grains, including whole-grain breads, crackers, cereal, rice and pasta Refined grains, including white bread, crackers, cereals, rice and pasta   Quick breads, biscuits, cornbread, muffins, pancakes or waffles Homemade refined (white) dinner rolls, bagels or English muffins   Dried peas (split, " "black-eyed), beans (black, garbanzo, lima, kidney, navy, weinberg) or lentils Green peas (canned, frozen), green beans or wax beans   Organ meats, walleye, pollock or sardines Lean beef, pork, lamb, poultry or other fish   Nuts and seeds Popcorn   Peanut butter and other nut butters Jam, jelly or honey   Chocolate, including chocolate drinks Carob (chocolate-flavored) candy, hard candy or gumdrops   Yuliana and pepper-type sodas, flavored kincaid, bottled teas (if a term in the ingredients list contains the letters \"phos\") Lemon-lime soda, ginger ale or root beer, plain water   Things to do to reduce your blood pressure include working with all your physician to do the following:  stop smoking if you smoke.  increase cardiovascular exercise like walking and swimming.   modify your diet to decrease fat and salt intake.  reduce your weight if you are overweight or obese.  increase the consumption of fruits, vegetables and whole grains.  decrease alcohol consumption if you consume alcohol.   try to minimize stress in your life with lifestyle modifications.   be compliant with your anti-hypertensive medications.   adjust your medications to help improve your vascular stiffness and decrease risks for heart attacks and strokes. Follow a moderate potassium diet.      Exercise to Lose Weight     Exercise and a healthy diet may help you lose weight. Your doctor may suggest specific exercises.     EXERCISE IDEAS AND TIPS     Choose low-cost things you enjoy doing, such as walking, bicycling, or exercising to workout videos.   Take stairs instead of the elevator.   Walk during your lunch break.   Park your car further away from work or school.   Go to a gym or an exercise class.   Start with 5 to 10 minutes of exercise each day. Build up to 30 minutes of exercise 4 to 6 days a week.   Wear shoes with good support and comfortable clothes.   Stretch before and after working out.   Work out until you breathe harder and your heart beats " faster.   Drink extra water when you exercise.   Do not do so much that you hurt yourself, feel dizzy, or get very short of breath.     Exercises that burn about 150 calories:   Running 1 ½ miles in 15 minutes.   Playing volleyball for 45 to 60 minutes.   Washing and waxing a car for 45 to 60 minutes.   Playing touch football for 45 minutes.   Walking 1 ¾ miles in 35 minutes.   Pushing a stroller 1 ½ miles in 30 minutes.   Playing basketball for 30 minutes.   Raking leaves for 30 minutes.   Bicycling 5 miles in 30 minutes.   Walking 2 miles in 30 minutes.   Dancing for 30 minutes.   Shoveling snow for 15 minutes.   Swimming laps for 20 minutes.   Walking up stairs for 15 minutes.   Bicycling 4 miles in 15 minutes.   Gardening for 30 to 45 minutes.   Jumping rope for 15 minutes.   Washing windows or floors for 45 to 60 minutes.

## 2025-05-14 NOTE — ASSESSMENT & PLAN NOTE
.  Continue allopurinol 200 mg p.o. daily  May need dosage adjustments if GFR continues to drop  Check uric acid level prior to next visit  Most recent uric acid 9.6

## 2025-05-14 NOTE — ASSESSMENT & PLAN NOTE
Lab Results   Component Value Date    EGFR 51 04/14/2025    EGFR 47 03/20/2025    EGFR 54 03/18/2025    CREATININE 1.42 (H) 04/14/2025    CREATININE 1.50 (H) 03/20/2025    CREATININE 1.34 (H) 03/18/2025     BP Readings from Last 3 Encounters:   05/14/25 126/84   04/15/25 120/80   03/20/25 145/66     Current medications: losartan 100 mg p.o. daily, Cardura 2 mg p.o. nightly, Procardia XL 90 mg p.o. daily  Changes made today: None blood pressures volume status stable  Not on jardiance due to cost  Goal BP of < 130/80 based on age and comorbidities  Instructed to follow low sodium (2gm)diet.  Advised to hold ACEI/ARBs if patient suffers from dehydration due to gastrointestinal losses due to risk of JULIO C secondary to failure to autoregulate.  after review of records In [facility] as well as Care everywhere patient has a baseline creatinine of 1.3-1.6 mg/dL.   Most recent labs show a Creatinine of 1.42 mg/dL on/14/25. Renal function remains stable.  Check blood work in 3 months and then again prior to next visit  Likely has underlying CKD due to hypertensive nephrosclerosis plus obesity related hyperfiltration plus prior renal injury from chemotherapy plus age-related nephron loss plus history of JULIO C nondialysis requiring  Imaging:   CT abdomen 6/12/2024 punctate nonobstructing calculi bilaterally no hydronephrosis prostatomegaly  Recommend to avoid use of NSAIDs, nephrotoxins. Caution advised with regards to exposure to IV contrast dye.   Discussed with the patient in depth his renal status, including the possible etiologies for CKD.   Advised the patient that when his GFR is close to 20mL/min then will start discussing about RRT(renal replacement therapy) options such as renal transplant, peritoneal dialysis and hemodialysis.   Informed the patient about the various options for Renal Replacement therapy.  Discussed with the patient how we need to work together to delay the progression of CKD with optimal BP control based on  their age and co-morbidities, optimal BS control with HbA1c of <7% and trying to reduce proteinuria by the use of anti-proteinuric agents.   CKD education/Kidney smart: Referral placed 12/22/2021  Follow-up: Patient is to follow-up in 6 months, with lab work to be performed in 3 months and then again a few days prior to the next visit. Advised patient to call me in 10 days to review the results if they do not hear back from me, as I may have not received the results.    Orders:  •  PTH, intact; Future  •  Renal function panel; Future  •  Vitamin D 25 hydroxy; Future  •  Magnesium; Future  •  PTH, intact; Future  •  Renal function panel; Future  •  Vitamin D 25 hydroxy; Future  •  Albumin / creatinine urine ratio; Future  •  Magnesium; Future  •  Phosphorus; Future  •  CBC; Future

## 2025-05-16 ENCOUNTER — APPOINTMENT (OUTPATIENT)
Dept: LAB | Facility: CLINIC | Age: 65
End: 2025-05-16
Payer: MEDICARE

## 2025-05-16 DIAGNOSIS — N20.0 CALCULUS OF KIDNEY: ICD-10-CM

## 2025-05-16 DIAGNOSIS — R80.1 PERSISTENT PROTEINURIA: ICD-10-CM

## 2025-05-16 DIAGNOSIS — N18.31 HYPERTENSIVE KIDNEY DISEASE WITH STAGE 3A CHRONIC KIDNEY DISEASE (HCC): ICD-10-CM

## 2025-05-16 DIAGNOSIS — E83.9 CHRONIC KIDNEY DISEASE-MINERAL AND BONE DISORDER (CKD-MBD): ICD-10-CM

## 2025-05-16 DIAGNOSIS — N18.9 CHRONIC KIDNEY DISEASE-MINERAL AND BONE DISORDER (CKD-MBD): ICD-10-CM

## 2025-05-16 DIAGNOSIS — E79.0 HYPERURICEMIA: ICD-10-CM

## 2025-05-16 DIAGNOSIS — I12.9 HYPERTENSIVE KIDNEY DISEASE WITH STAGE 3A CHRONIC KIDNEY DISEASE (HCC): ICD-10-CM

## 2025-05-16 DIAGNOSIS — N18.31 TYPE 2 DIABETES MELLITUS WITH STAGE 3A CHRONIC KIDNEY DISEASE, WITHOUT LONG-TERM CURRENT USE OF INSULIN (HCC): ICD-10-CM

## 2025-05-16 DIAGNOSIS — E66.01 MORBID OBESITY WITH BMI OF 40.0-44.9, ADULT (HCC): ICD-10-CM

## 2025-05-16 DIAGNOSIS — E78.2 MIXED HYPERLIPIDEMIA: ICD-10-CM

## 2025-05-16 DIAGNOSIS — E11.22 TYPE 2 DIABETES MELLITUS WITH STAGE 3A CHRONIC KIDNEY DISEASE, WITHOUT LONG-TERM CURRENT USE OF INSULIN (HCC): ICD-10-CM

## 2025-05-16 DIAGNOSIS — M89.9 CHRONIC KIDNEY DISEASE-MINERAL AND BONE DISORDER (CKD-MBD): ICD-10-CM

## 2025-05-16 DIAGNOSIS — E11.9 TYPE 2 DIABETES MELLITUS WITHOUT COMPLICATION, WITHOUT LONG-TERM CURRENT USE OF INSULIN (HCC): ICD-10-CM

## 2025-05-16 DIAGNOSIS — E83.42 HYPOMAGNESEMIA: ICD-10-CM

## 2025-05-16 LAB
ERYTHROCYTE [DISTWIDTH] IN BLOOD BY AUTOMATED COUNT: 13 % (ref 11.6–15.1)
EST. AVERAGE GLUCOSE BLD GHB EST-MCNC: 171 MG/DL
HBA1C MFR BLD: 7.6 %
HCT VFR BLD AUTO: 41.9 % (ref 36.5–49.3)
HGB BLD-MCNC: 13.4 G/DL (ref 12–17)
MCH RBC QN AUTO: 29.3 PG (ref 26.8–34.3)
MCHC RBC AUTO-ENTMCNC: 32 G/DL (ref 31.4–37.4)
MCV RBC AUTO: 92 FL (ref 82–98)
PLATELET # BLD AUTO: 244 THOUSANDS/UL (ref 149–390)
PMV BLD AUTO: 10.2 FL (ref 8.9–12.7)
RBC # BLD AUTO: 4.57 MILLION/UL (ref 3.88–5.62)
WBC # BLD AUTO: 8.41 THOUSAND/UL (ref 4.31–10.16)

## 2025-05-16 PROCEDURE — 85027 COMPLETE CBC AUTOMATED: CPT

## 2025-05-16 PROCEDURE — 83036 HEMOGLOBIN GLYCOSYLATED A1C: CPT

## 2025-05-16 PROCEDURE — 36415 COLL VENOUS BLD VENIPUNCTURE: CPT

## 2025-05-19 ENCOUNTER — TELEPHONE (OUTPATIENT)
Dept: HEMATOLOGY ONCOLOGY | Facility: CLINIC | Age: 65
End: 2025-05-19

## 2025-05-19 ENCOUNTER — RESULTS FOLLOW-UP (OUTPATIENT)
Dept: NEPHROLOGY | Facility: CLINIC | Age: 65
End: 2025-05-19

## 2025-05-19 NOTE — TELEPHONE ENCOUNTER
----- Message from Emely Bone MD sent at 5/19/2025  1:00 PM EDT -----  Please let the patient know that most recent lab work in terms of hemoglobin level is stable.    Please do not miss your next appointment.  Will discuss further at the upcoming visit, let me know if they have any questions or concerns.    Thanks    ----- Message -----  From: Lab, Background User  Sent: 5/16/2025   4:27 PM EDT  To: Eemly Bone MD

## 2025-05-19 NOTE — RESULT ENCOUNTER NOTE
Please let the patient know that most recent lab work in terms of hemoglobin level is stable.    Please do not miss your next appointment.  Will discuss further at the upcoming visit, let me know if they have any questions or concerns.    Thanks

## 2025-05-19 NOTE — TELEPHONE ENCOUNTER
LM for patient that due to a change in the provider's schedule, the appt. On 6/16/25 with Dr. Atkins has been rescheduled to 7/17/25.

## 2025-05-20 ENCOUNTER — TELEPHONE (OUTPATIENT)
Age: 65
End: 2025-05-20

## 2025-05-20 NOTE — TELEPHONE ENCOUNTER
Patient called asking if the result of his A1C from 5/16 can be printed and signed by the doctor. He has to pick it up from the office tomorrow for his [employer] license.

## 2025-05-20 NOTE — TELEPHONE ENCOUNTER
Patient called stating that his wife is coming in for an appointment this afternoon and would like the office to please print out his recent labs from 5/16 so that his wife can take a copy home for him.

## 2025-05-21 NOTE — TELEPHONE ENCOUNTER
Per patient he said his wife got the lab scripts from his PCP it all taken care of.     Hemoglobin A1c was ordered by PCP.

## 2025-05-23 ENCOUNTER — RESULTS FOLLOW-UP (OUTPATIENT)
Dept: FAMILY MEDICINE CLINIC | Facility: CLINIC | Age: 65
End: 2025-05-23

## 2025-05-23 DIAGNOSIS — M10.9 GOUT, UNSPECIFIED CAUSE, UNSPECIFIED CHRONICITY, UNSPECIFIED SITE: ICD-10-CM

## 2025-05-23 DIAGNOSIS — K21.9 GASTROESOPHAGEAL REFLUX DISEASE, UNSPECIFIED WHETHER ESOPHAGITIS PRESENT: ICD-10-CM

## 2025-05-23 DIAGNOSIS — I10 ESSENTIAL HYPERTENSION: ICD-10-CM

## 2025-05-23 RX ORDER — DOXAZOSIN 2 MG/1
2 TABLET ORAL
Qty: 180 TABLET | Refills: 1 | Status: SHIPPED | OUTPATIENT
Start: 2025-05-23

## 2025-05-23 RX ORDER — PANTOPRAZOLE SODIUM 40 MG/1
40 TABLET, DELAYED RELEASE ORAL
Qty: 90 TABLET | Refills: 1 | Status: SHIPPED | OUTPATIENT
Start: 2025-05-23

## 2025-05-23 RX ORDER — ALLOPURINOL 100 MG/1
200 TABLET ORAL DAILY
Qty: 180 TABLET | Refills: 1 | Status: SHIPPED | OUTPATIENT
Start: 2025-05-23

## 2025-06-02 ENCOUNTER — APPOINTMENT (OUTPATIENT)
Dept: LAB | Facility: CLINIC | Age: 65
End: 2025-06-02
Payer: MEDICARE

## 2025-06-02 DIAGNOSIS — C62.92 SEMINOMA OF TESTIS, STAGE 3, LEFT (HCC): ICD-10-CM

## 2025-06-02 LAB
AFP-TM SERPL-MCNC: 2.09 NG/ML (ref 0–9)
ALBUMIN SERPL BCG-MCNC: 4.3 G/DL (ref 3.5–5)
ALP SERPL-CCNC: 76 U/L (ref 34–104)
ALT SERPL W P-5'-P-CCNC: 26 U/L (ref 7–52)
ANION GAP SERPL CALCULATED.3IONS-SCNC: 9 MMOL/L (ref 4–13)
AST SERPL W P-5'-P-CCNC: 22 U/L (ref 13–39)
BASOPHILS # BLD AUTO: 0.07 THOUSANDS/ÂΜL (ref 0–0.1)
BASOPHILS NFR BLD AUTO: 1 % (ref 0–1)
BILIRUB SERPL-MCNC: 0.84 MG/DL (ref 0.2–1)
BUN SERPL-MCNC: 21 MG/DL (ref 5–25)
CALCIUM SERPL-MCNC: 9.4 MG/DL (ref 8.4–10.2)
CHLORIDE SERPL-SCNC: 104 MMOL/L (ref 96–108)
CO2 SERPL-SCNC: 26 MMOL/L (ref 21–32)
CREAT SERPL-MCNC: 1.33 MG/DL (ref 0.6–1.3)
EOSINOPHIL # BLD AUTO: 0.14 THOUSAND/ÂΜL (ref 0–0.61)
EOSINOPHIL NFR BLD AUTO: 2 % (ref 0–6)
ERYTHROCYTE [DISTWIDTH] IN BLOOD BY AUTOMATED COUNT: 12.7 % (ref 11.6–15.1)
GFR SERPL CREATININE-BSD FRML MDRD: 55 ML/MIN/1.73SQ M
GLUCOSE P FAST SERPL-MCNC: 113 MG/DL (ref 65–99)
HCG-TM SERPL-SCNC: <0.6 MLU/ML
HCT VFR BLD AUTO: 44.1 % (ref 36.5–49.3)
HGB BLD-MCNC: 14 G/DL (ref 12–17)
IMM GRANULOCYTES # BLD AUTO: 0.04 THOUSAND/UL (ref 0–0.2)
IMM GRANULOCYTES NFR BLD AUTO: 1 % (ref 0–2)
LDH SERPL-CCNC: 178 U/L (ref 140–271)
LYMPHOCYTES # BLD AUTO: 3.13 THOUSANDS/ÂΜL (ref 0.6–4.47)
LYMPHOCYTES NFR BLD AUTO: 39 % (ref 14–44)
MCH RBC QN AUTO: 29 PG (ref 26.8–34.3)
MCHC RBC AUTO-ENTMCNC: 31.7 G/DL (ref 31.4–37.4)
MCV RBC AUTO: 92 FL (ref 82–98)
MONOCYTES # BLD AUTO: 0.59 THOUSAND/ÂΜL (ref 0.17–1.22)
MONOCYTES NFR BLD AUTO: 7 % (ref 4–12)
NEUTROPHILS # BLD AUTO: 4.05 THOUSANDS/ÂΜL (ref 1.85–7.62)
NEUTS SEG NFR BLD AUTO: 50 % (ref 43–75)
NRBC BLD AUTO-RTO: 0 /100 WBCS
PLATELET # BLD AUTO: 264 THOUSANDS/UL (ref 149–390)
PMV BLD AUTO: 9.9 FL (ref 8.9–12.7)
POTASSIUM SERPL-SCNC: 4.1 MMOL/L (ref 3.5–5.3)
PROT SERPL-MCNC: 7.4 G/DL (ref 6.4–8.4)
RBC # BLD AUTO: 4.82 MILLION/UL (ref 3.88–5.62)
SODIUM SERPL-SCNC: 139 MMOL/L (ref 135–147)
WBC # BLD AUTO: 8.02 THOUSAND/UL (ref 4.31–10.16)

## 2025-06-02 PROCEDURE — 83615 LACTATE (LD) (LDH) ENZYME: CPT

## 2025-06-02 PROCEDURE — 82105 ALPHA-FETOPROTEIN SERUM: CPT

## 2025-06-02 PROCEDURE — 85025 COMPLETE CBC W/AUTO DIFF WBC: CPT

## 2025-06-02 PROCEDURE — 80053 COMPREHEN METABOLIC PANEL: CPT

## 2025-06-02 PROCEDURE — 36415 COLL VENOUS BLD VENIPUNCTURE: CPT

## 2025-06-02 PROCEDURE — 84702 CHORIONIC GONADOTROPIN TEST: CPT

## 2025-06-09 ENCOUNTER — HOSPITAL ENCOUNTER (OUTPATIENT)
Dept: CT IMAGING | Facility: HOSPITAL | Age: 65
Discharge: HOME | End: 2025-06-09
Payer: MEDICARE

## 2025-06-09 DIAGNOSIS — C62.92 SEMINOMA OF TESTIS, STAGE 3, LEFT (HCC): ICD-10-CM

## 2025-06-09 PROCEDURE — 74176 CT ABD & PELVIS W/O CONTRAST: CPT

## 2025-06-25 DIAGNOSIS — I10 ESSENTIAL HYPERTENSION: ICD-10-CM

## 2025-06-26 RX ORDER — NIFEDIPINE 90 MG/1
90 TABLET, FILM COATED, EXTENDED RELEASE ORAL DAILY
Qty: 90 TABLET | Refills: 1 | Status: SHIPPED | OUTPATIENT
Start: 2025-06-26

## 2025-07-14 ENCOUNTER — OFFICE VISIT (OUTPATIENT)
Dept: FAMILY MEDICINE CLINIC | Facility: CLINIC | Age: 65
End: 2025-07-14
Payer: MEDICARE

## 2025-07-14 VITALS
OXYGEN SATURATION: 96 % | WEIGHT: 265.8 LBS | HEART RATE: 76 BPM | SYSTOLIC BLOOD PRESSURE: 139 MMHG | BODY MASS INDEX: 39.25 KG/M2 | DIASTOLIC BLOOD PRESSURE: 80 MMHG

## 2025-07-14 DIAGNOSIS — Z13.29 SCREENING FOR HYPOTHYROIDISM: ICD-10-CM

## 2025-07-14 DIAGNOSIS — E11.22 TYPE 2 DIABETES MELLITUS WITH STAGE 2 CHRONIC KIDNEY DISEASE, WITHOUT LONG-TERM CURRENT USE OF INSULIN  (HCC): Primary | ICD-10-CM

## 2025-07-14 DIAGNOSIS — L02.92 FURUNCLE: ICD-10-CM

## 2025-07-14 DIAGNOSIS — I10 ESSENTIAL HYPERTENSION: ICD-10-CM

## 2025-07-14 DIAGNOSIS — N18.2 TYPE 2 DIABETES MELLITUS WITH STAGE 2 CHRONIC KIDNEY DISEASE, WITHOUT LONG-TERM CURRENT USE OF INSULIN  (HCC): Primary | ICD-10-CM

## 2025-07-14 DIAGNOSIS — N18.31 STAGE 3A CHRONIC KIDNEY DISEASE (HCC): ICD-10-CM

## 2025-07-14 PROCEDURE — G2211 COMPLEX E/M VISIT ADD ON: HCPCS | Performed by: INTERNAL MEDICINE

## 2025-07-14 PROCEDURE — 99214 OFFICE O/P EST MOD 30 MIN: CPT | Performed by: INTERNAL MEDICINE

## 2025-07-14 RX ORDER — MUPIROCIN 2 %
OINTMENT (GRAM) TOPICAL DAILY
Qty: 22 G | Refills: 0 | Status: SHIPPED | OUTPATIENT
Start: 2025-07-14

## 2025-07-14 RX ORDER — DOXYCYCLINE HYCLATE 100 MG
100 TABLET ORAL 2 TIMES DAILY
Qty: 14 TABLET | Refills: 0 | Status: SHIPPED | OUTPATIENT
Start: 2025-07-14 | End: 2025-07-21

## 2025-07-14 NOTE — PROGRESS NOTES
Name: Faisal Smith      : 1960      MRN: [id number]  Encounter Provider: Amrit Faust MD  Encounter Date: 2025   Encounter department: [facility]    Assessment & Plan  Type 2 diabetes mellitus with stage 2 chronic kidney disease, without long-term current use of insulin  (HCC)  Hemoglobin A1c is overall at his baseline.  Continue current meds, recheck again in few months.  He will try to be more compliant with his low-carb diet.  Lab Results   Component Value Date    HGBA1C 7.6 (H) 2025       Orders:    Hemoglobin A1C; Future    Lipid panel; Future    Screening for hypothyroidism    Orders:    TSH w/Reflex; Future    Furuncle  Draining small amount of purulent discharge, as per patient it did open recently and drained a lot of discharge.  On palpation there is no fluctuation so we will hold off on incision.  Will start patient on doxycycline 100 mg twice a day for 7 days.  Continue with topical mupirocin, cover with dressing.  Change it daily.  Follow-up in the office in 7 days if no improvement.  Orders:    mupirocin (BACTROBAN) 2 % ointment; Apply topically daily    doxycycline hyclate (VIBRA-TABS) 100 mg tablet; Take 1 tablet (100 mg total) by mouth 2 (two) times a day for 7 days    Essential hypertension  Blood pressure is overall acceptable.  Continue current meds.       Stage 3a chronic kidney disease (HCC)  Lab Results   Component Value Date    EGFR 55 2025    EGFR 51 2025    EGFR 47 2025    CREATININE 1.33 (H) 2025    CREATININE 1.42 (H) 2025    CREATININE 1.50 (H) 2025   Stable, avoid NSAIDs.              History of Present Illness     Patient came today for follow-up on his chronic medical problems and with a new complaint of draining lesion at the left of his lower back.      Review of Systems   Constitutional:  Negative for chills and fever.   Respiratory:  Negative for cough, shortness of breath and  "wheezing.    Cardiovascular:  Negative for chest pain, palpitations and leg swelling.   Musculoskeletal:  Positive for arthralgias.   Skin:  Positive for color change and wound.   Psychiatric/Behavioral:  Negative for confusion.      Past Medical History[1]  Past Surgical History[2]  Family History[3]  Social History[4]  Medications[5]  Allergies   Allergen Reactions    Aspirin Other (See Comments)     Per pt told by kidney doctor not to take    Nsaids Other (See Comments)     Per pt instructed by his kidney doctor-Dr Bone---Not to take     Immunization History   Administered Date(s) Administered    Pneumococcal Conjugate 13-Valent 11/19/2019    Pneumococcal Polysaccharide PPV23 10/07/2009, 12/01/2020    Tdap 05/13/2014, 02/27/2024    Zoster Vaccine Recombinant 01/29/2024, 04/15/2024     Objective   /80   Pulse 76   Wt 121 kg (265 lb 12.8 oz)   SpO2 96%   BMI 39.25 kg/m²     Physical Exam  Constitutional:       General: He is not in acute distress.     Appearance: He is obese. He is not ill-appearing or toxic-appearing.     Cardiovascular:      Heart sounds: Murmur heard.      No gallop.   Pulmonary:      Effort: No respiratory distress.      Breath sounds: No wheezing or rales.   Abdominal:      Tenderness: There is no abdominal tenderness. There is no guarding.     Skin:     Comments: Left lower back skin lesion, draining small amount of clear discharge, on palpation it does not look like containing any more fluid, patient reported that it opened up and drained recently.  Small area of surrounding erythema.              [1]   Past Medical History:  Diagnosis Date    Anesthesia     per pt \"takes a little longer to wake up sometimes'    BPH without obstruction/lower urinary tract symptoms     Cancer (HCC)     prostate     CPAP (continuous positive airway pressure) dependence     Elevated PSA     GERD (gastroesophageal reflux disease)     Gout     Heart murmur     History of cancer chemotherapy     " "Hyperlipidemia     Hypertension     Kidney stone     past    Moderate exercise     works PT delivering propane    Obese abdomen     Obesity     Polymyalgia (HCC)     Prediabetes     Prostate cancer (HCC)     Rash     per pt not all the time--usually \"heat rash\"--under both arms and in between legs - family doctor aware - pt uses Desitin    Sleep apnea     Testicular carcinoma (HCC)     Tiredness     some days    Urinary frequency     Urinary urgency     Wears glasses    [2]   Past Surgical History:  Procedure Laterality Date    CARPAL TUNNEL RELEASE Bilateral 07/2020    COLONOSCOPY      CYSTOSCOPY W/ LASER LITHOTRIPSY  2001    CYSTOSTOMY W/ STENT INSERTION  2011    ESOPHAGOGASTRODUODENOSCOPY      EXTRACORPOREAL SHOCK WAVE LITHOTRIPSY Right 2011    HERNIA REPAIR      KNEE ARTHROSCOPY Left     DC CYSTO/URETERO W/LITHOTRIPSY &INDWELL STENT INSRT Right 4/29/2018    Procedure: CYSTOSCOPY URETEROSCOPY, RETROGRADE PYELOGRAM AND INSERTION STENT URETERAL;  Surgeon: Carlin Taylor MD;  Location: [facility];  Service: Urology    DC ORCHIECTOMY RADICAL TUMOR INGUINAL APPROACH Left 2/19/2021    Procedure: Radical  ORCHIECTOMY;  Surgeon: Fawad Manzo MD;  Location: [facility];  Service: Urology    DC PROSTATE NEEDLE BIOPSY ANY APPROACH N/A 9/11/2024    Procedure: TRANSPERINEAL MRI FUSION BX PROSTATE;  Surgeon: Tucker Ivan MD;  Location: [facility];  Service: Urology   [3]   Family History  Problem Relation Name Age of Onset    Nephrolithiasis Father      Alzheimer's disease Mother     [4]   Social History  Tobacco Use    Smoking status: Never    Smokeless tobacco: Never   Vaping Use    Vaping status: Never Used   Substance and Sexual Activity    Alcohol use: Not Currently    Drug use: No   [5]   Current Outpatient Medications on File Prior to Visit   Medication Sig    acetaminophen (TYLENOL) 500 mg tablet Take 500 mg by mouth every 6 (six) hours as needed for mild pain    albuterol (ProAir HFA) 90 mcg/act " inhaler Inhale 2 puffs every 6 (six) hours as needed for wheezing or shortness of breath    allopurinol (ZYLOPRIM) 100 mg tablet TAKE TWO TABLETS BY MOUTH DAILY    benzonatate (TESSALON) 200 MG capsule Take 1 capsule (200 mg total) by mouth 3 (three) times a day as needed for cough    Cholecalciferol (Vitamin D3) 50 MCG (2000 UT) capsule Take 2 capsules (4,000 Units total) by mouth daily    Diclofenac Sodium (VOLTAREN) 1 % Apply 2 g topically 4 (four) times a day    doxazosin (CARDURA) 2 mg tablet TAKE ONE TABLET BY MOUTH NIGHTLY AT BEDTIME    losartan (COZAAR) 100 MG tablet Take 1 tablet (100 mg total) by mouth daily    Magnesium Oxide 400 MG CAPS Take 1 tablet (400 mg total) by mouth 3 (three) times a day    metFORMIN (GLUCOPHAGE-XR) 750 mg 24 hr tablet Take 1 tablet (750 mg total) by mouth daily with breakfast    NIFEdipine (ADALAT CC) 90 mg 24 hr tablet TAKE ONE TABLET BY MOUTH EVERY DAY    ondansetron (ZOFRAN-ODT) 4 mg disintegrating tablet Take 1 tablet (4 mg total) by mouth every 8 (eight) hours as needed for nausea or vomiting for up to 15 doses    pantoprazole (PROTONIX) 40 mg tablet Take 1 tablet by mouth daily before breakfast    rosuvastatin (CRESTOR) 40 MG tablet Take 1 tablet (40 mg total) by mouth daily    [DISCONTINUED] methocarbamol (ROBAXIN) 500 mg tablet Take 1 tablet (500 mg total) by mouth 3 (three) times a day for 10 days

## 2025-07-14 NOTE — ASSESSMENT & PLAN NOTE
Lab Results   Component Value Date    EGFR 55 06/02/2025    EGFR 51 04/14/2025    EGFR 47 03/20/2025    CREATININE 1.33 (H) 06/02/2025    CREATININE 1.42 (H) 04/14/2025    CREATININE 1.50 (H) 03/20/2025   Stable, avoid NSAIDs.

## 2025-07-17 ENCOUNTER — OFFICE VISIT (OUTPATIENT)
Dept: HEMATOLOGY ONCOLOGY | Facility: CLINIC | Age: 65
End: 2025-07-17
Payer: MEDICARE

## 2025-07-17 VITALS
SYSTOLIC BLOOD PRESSURE: 134 MMHG | DIASTOLIC BLOOD PRESSURE: 56 MMHG | TEMPERATURE: 97.9 F | HEIGHT: 69 IN | HEART RATE: 81 BPM | WEIGHT: 268 LBS | OXYGEN SATURATION: 97 % | BODY MASS INDEX: 39.69 KG/M2 | RESPIRATION RATE: 18 BRPM

## 2025-07-17 DIAGNOSIS — C62.92 SEMINOMA OF TESTIS, STAGE 3, LEFT (HCC): Primary | ICD-10-CM

## 2025-07-17 PROCEDURE — 99213 OFFICE O/P EST LOW 20 MIN: CPT | Performed by: INTERNAL MEDICINE

## 2025-07-17 NOTE — PROGRESS NOTES
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 He underwent left inguinal orchiectomy. Tumor invaded into but not through tunica albuginea, invaded hilar fat, +LVI, spermatic cord was not involved confirmed by [facility] second opinion. He was started on cisplatin/etoposide therapy but had very poor tolerance after just 1 cycle and hence during cycle 2 he was switch to carboplatin/etoposide but he tolerated that very poorly as well requiring hospital admission. Hence decision was made to stop further chemotherapy and just continue with surveillance. Since then patient has had regular monitoring and surveillance without any evidence of disease.     Patient presents for annual surveillance and monitoring. He mentions feeling well without any acute issues. Good apptetite, stable weight, and good energy levels.  Recent CBC, CMP, AFP, B-HCG, and LD within normal limits  Recent CT AP without evidence of malignancy    Clinically remains well without any evidence of disease    Plan  Follow up in 1 year with CBC, CMP, LD, AFP, B-HCG and CT abdomen pelvis wo contrast (due to CKD)  If no evidence of disease next year, we can stop further surveillance as he will complete 5 years without evidence of disease  Aware to call in the interim with questions/concerns    Return in about 1 year (around 2026) for Office Visit, Labs - See Treatment Plan, Imaging - See orders.    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 He underwent left inguinal orchiectomy. Tumor invaded into but not through tunica albuginea, invaded hilar fat, +LVI, spermatic cord was not involved confirmed by [facility] second opinion. He was started on cisplatin/etoposide therapy but had very poor tolerance after just 1 cycle and hence during cycle 2 he was switch to carboplatin/etoposide but he tolerated that very poorly as well requiring hospital admission. Hence decision was made to stop further chemotherapy and just continue with surveillance. Since then patient has had regular monitoring and surveillance without any evidence of disease.     07/17/25: Patient presents for annual surveillance and monitoring. He mentions feeling well without any acute issues. Good apptetite, stable weight, and good energy levels.     Oncology History   Cancer Staging   Seminoma of testis, stage 3, left (HCC)  Staging form: Testis, AJCC 8th Edition  - Clinical: cT4, cN2, cM0, S1 - Signed by Shea Atkins MD on 6/21/2023  Oncology History   Seminoma of testis, stage 3, left (HCC)   4/1/2021 Initial Diagnosis    Seminoma of testis, stage 3, left (HCC)     6/21/2023 -  Cancer Staged    Staging form: Testis, AJCC 8th Edition  - Clinical: cT4, cN2, cM0, S1 - Signed by Shea Atkins MD on 6/21/2023          Pertinent Medical History   Medical History Reviewed by provider this encounter    Review of Systems   Constitutional:  Negative for chills and fever.   HENT:  Negative for ear pain and sore throat.    Eyes:  Negative for pain and visual disturbance.   Respiratory:  Negative for cough and shortness of breath.    Cardiovascular:  Negative for chest pain and palpitations.   Gastrointestinal:  Negative for abdominal pain and vomiting.   Genitourinary:  Negative for dysuria and hematuria.   Musculoskeletal:  Negative for arthralgias and back pain.   Skin:  Negative for color change and rash.   Neurological:  Negative for  "seizures and syncope.   All other systems reviewed and are negative.      Objective   /56 (BP Location: Left arm, Patient Position: Sitting, Cuff Size: Large)   Pulse 81   Temp 97.9 °F (36.6 °C) (Temporal)   Resp 18   Ht 5' 9\" (1.753 m)   Wt 122 kg (268 lb)   SpO2 97%   BMI 39.58 kg/m²     Pain Screening:  Pain Score: 0-No pain  ECOG 0    Physical Exam  Constitutional:       Appearance: Normal appearance.     Eyes:      Conjunctiva/sclera: Conjunctivae normal.       Cardiovascular:      Rate and Rhythm: Normal rate and regular rhythm.      Pulses: Normal pulses.      Heart sounds: Normal heart sounds.   Pulmonary:      Effort: Pulmonary effort is normal. No respiratory distress.      Breath sounds: Normal breath sounds. No wheezing or rales.   Abdominal:      General: Abdomen is flat. There is no distension.      Palpations: Abdomen is soft. There is no mass.      Tenderness: There is no abdominal tenderness.     Musculoskeletal:      Right lower leg: No edema.      Left lower leg: No edema.     Skin:     General: Skin is warm.      Coloration: Skin is not jaundiced or pale.     Neurological:      Mental Status: He is alert and oriented to person, place, and time.     Psychiatric:         Mood and Affect: Mood normal.         Behavior: Behavior normal.     Labs: I have reviewed the following labs:  Results for orders placed or performed in visit on 06/02/25   CBC and differential   Result Value Ref Range    WBC 8.02 4.31 - 10.16 Thousand/uL    RBC 4.82 3.88 - 5.62 Million/uL    Hemoglobin 14.0 12.0 - 17.0 g/dL    Hematocrit 44.1 36.5 - 49.3 %    MCV 92 82 - 98 fL    MCH 29.0 26.8 - 34.3 pg    MCHC 31.7 31.4 - 37.4 g/dL    RDW 12.7 11.6 - 15.1 %    MPV 9.9 8.9 - 12.7 fL    Platelets 264 149 - 390 Thousands/uL    nRBC 0 /100 WBCs    Segmented % 50 43 - 75 %    Immature Grans % 1 0 - 2 %    Lymphocytes % 39 14 - 44 %    Monocytes % 7 4 - 12 %    Eosinophils Relative 2 0 - 6 %    Basophils Relative 1 0 - 1 " %    Absolute Neutrophils 4.05 1.85 - 7.62 Thousands/µL    Absolute Immature Grans 0.04 0.00 - 0.20 Thousand/uL    Absolute Lymphocytes 3.13 0.60 - 4.47 Thousands/µL    Absolute Monocytes 0.59 0.17 - 1.22 Thousand/µL    Eosinophils Absolute 0.14 0.00 - 0.61 Thousand/µL    Basophils Absolute 0.07 0.00 - 0.10 Thousands/µL   Comprehensive metabolic panel   Result Value Ref Range    Sodium 139 135 - 147 mmol/L    Potassium 4.1 3.5 - 5.3 mmol/L    Chloride 104 96 - 108 mmol/L    CO2 26 21 - 32 mmol/L    ANION GAP 9 4 - 13 mmol/L    BUN 21 5 - 25 mg/dL    Creatinine 1.33 (H) 0.60 - 1.30 mg/dL    Glucose, Fasting 113 (H) 65 - 99 mg/dL    Calcium 9.4 8.4 - 10.2 mg/dL    AST 22 13 - 39 U/L    ALT 26 7 - 52 U/L    Alkaline Phosphatase 76 34 - 104 U/L    Total Protein 7.4 6.4 - 8.4 g/dL    Albumin 4.3 3.5 - 5.0 g/dL    Total Bilirubin 0.84 0.20 - 1.00 mg/dL    eGFR 55 ml/min/1.73sq m   LD,Blood   Result Value Ref Range     140 - 271 U/L   HCG, tumor marker   Result Value Ref Range    hCG Tumor Marker <0.6 <5 mlU/mL   AFP tumor marker   Result Value Ref Range    AFP TUMOR MARKER 2.09 0.00 - 9.00 ng/mL      Radiology Results Review: I have reviewed radiology reports from 06/09/2025 including: CT abdomen/pelvis.    Wallace Erwin MD  PGY-4, Hematology and Medical Oncology  [facility]

## 2025-07-17 NOTE — PATIENT INSTRUCTIONS
No evidence of cancer on your recent blood work and imaging  Follow up in 1 year with labs and imaging before visit  Call in the interim with any questions or concerns

## 2025-07-17 NOTE — ASSESSMENT & PLAN NOTE
65/M who was diagnosed with Stage IIB (T2,N2,S1) pure seminoma on the left (02/2021) with positive left iliac and left para-aortic lymph nodes. He underwent left inguinal orchiectomy. Tumor invaded into but not through tunica albuginea, invaded hilar fat, +LVI, spermatic cord was not involved confirmed by [facility] second opinion. He was started on cisplatin/etoposide therapy but had very poor tolerance after just 1 cycle and hence during cycle 2 he was switch to carboplatin/etoposide but he tolerated that very poorly as well requiring hospital admission. Hence decision was made to stop further chemotherapy and just continue with surveillance. Since then patient has had regular monitoring and surveillance without any evidence of disease.     Patient presents for annual surveillance and monitoring. He mentions feeling well without any acute issues. Good apptetite, stable weight, and good energy levels.  Recent CBC, CMP, AFP, B-HCG, and LD within normal limits  Recent CT AP without evidence of malignancy    Clinically remains well without any evidence of disease    Plan  Follow up in 1 year with CBC, CMP, LD, AFP, B-HCG and CT abdomen pelvis wo contrast (due to CKD)  If no evidence of disease next year, we can stop further surveillance as he will complete 5 years without evidence of disease  Aware to call in the interim with questions/concerns

## 2025-08-04 LAB
LEFT EYE DIABETIC RETINOPATHY: NORMAL
RIGHT EYE DIABETIC RETINOPATHY: NORMAL

## (undated) DEVICE — 3M™ IOBAN™ 2 ANTIMICROBIAL INCISE DRAPE 6650EZ: Brand: IOBAN™ 2

## (undated) DEVICE — PLUMEPEN PRO 10FT

## (undated) DEVICE — SCD SEQUENTIAL COMPRESSION COMFORT SLEEVE MEDIUM KNEE LENGTH: Brand: KENDALL SCD

## (undated) DEVICE — STERILE (2 X 30CM) LATEX COVER: Brand: PROCOVERS™

## (undated) DEVICE — SPONGE LAP 18 X 18 IN STRL RFD

## (undated) DEVICE — DRAPE EQUIPMENT RF WAND

## (undated) DEVICE — GLOVE INDICATOR PI UNDERGLOVE SZ 7 BLUE

## (undated) DEVICE — RENTAL PROBE ULTRASOUND DROP IN BK5000

## (undated) DEVICE — HOLDER PROBE URONAV BK8848

## (undated) DEVICE — GLOVE SRG BIOGEL 6.5

## (undated) DEVICE — NEEDLE 25G X 1 1/2

## (undated) DEVICE — UTILITY MARKER,BLACK WITH LABELS: Brand: DEVON

## (undated) DEVICE — SCROTAL SUPPORT XL

## (undated) DEVICE — GLOVE SRG BIOGEL 7

## (undated) DEVICE — SKIN MARKER DUAL TIP WITH RULER CAP, FLEXIBLE RULER AND LABELS: Brand: DEVON

## (undated) DEVICE — TELFA NON-ADHERENT ABSORBENT DRESSING: Brand: TELFA

## (undated) DEVICE — RAZOR STERILE

## (undated) DEVICE — SPONGE STICK WITH PVP-I: Brand: KENDALL

## (undated) DEVICE — UROCATCH BAG

## (undated) DEVICE — GLOVE INDICATOR PI UNDERGLOVE SZ 8 BLUE

## (undated) DEVICE — STANDARD SURGICAL GOWN, L: Brand: CONVERTORS

## (undated) DEVICE — 3M™ TRANSPORE™ WHITE SURGICAL TAPE 1534-1, 1 INCH X 10 YARD (2,5CM X 9,1M), 12 ROLLS/CARTON 10 CARTONS/CASE: Brand: 3M™ TRANSPORE™

## (undated) DEVICE — GAUZE SPONGES,USP TYPE VII GAUZE, 12 PLY: Brand: CURITY

## (undated) DEVICE — SUT SILK 0 CT-1 30 IN 424H

## (undated) DEVICE — ALL PURPOSE SPONGES,NON-WOVEN, 4 PLY: Brand: CURITY

## (undated) DEVICE — GLOVE SRG BIOGEL 7.5

## (undated) DEVICE — SYSTEM TRANSPERINEAL ACCESS PRECISIONPOINT

## (undated) DEVICE — SUT VICRYL 2-0 SH 27 IN UNDYED J417H

## (undated) DEVICE — MAX-CORE® DISPOSABLE CORE BIOPSY INSTRUMENT, 18G X 25CM: Brand: MAX-CORE

## (undated) DEVICE — BETHLEHEM UNIVERSAL MINOR GEN: Brand: CARDINAL HEALTH

## (undated) DEVICE — DRAPE TOWEL: Brand: CONVERTORS

## (undated) DEVICE — PACK TUR

## (undated) DEVICE — SPECIMEN CONTAINER STERILE PEEL PACK

## (undated) DEVICE — STRL PENROSE DRAIN 18" X 1/4": Brand: CARDINAL HEALTH

## (undated) DEVICE — SPONGE CHERRY 1/2IN

## (undated) DEVICE — TUBING SUCTION 5MM X 12 FT

## (undated) DEVICE — SYRINGE 10ML LL

## (undated) DEVICE — POOLE SUCTION HANDLE: Brand: CARDINAL HEALTH

## (undated) DEVICE — SPONGE 4 X 4 XRAY 16 PLY STRL LF RFD

## (undated) DEVICE — MEDI-VAC YANKAUER SUCTION HANDLE W/BULBOUS AND CONTROL VENT: Brand: CARDINAL HEALTH

## (undated) DEVICE — CHLORAPREP HI-LITE 26ML ORANGE

## (undated) DEVICE — ULTRASOUND GEL STERILE FOIL PK

## (undated) DEVICE — GUIDEWIRE STRGHT TIP 0.035 IN  SOLO PLUS

## (undated) DEVICE — BULB SYRINGE,IRRIGATION WITH PROTECTIVE CAP: Brand: DOVER

## (undated) DEVICE — SUT MONOCRYL 4-0 PS-2 27 IN Y426H

## (undated) DEVICE — FORMALIN PRE-FILLED 10 PCT PROSTATE BIOPSY

## (undated) DEVICE — LUBRICANT JELLY SURGILUBE TUBE 2OZ FLIP TOP

## (undated) DEVICE — SUT SILK 0 30 IN A306H

## (undated) DEVICE — 3000CC GUARDIAN II: Brand: GUARDIAN

## (undated) DEVICE — ADHESIVE SKIN HIGH VISCOSITY EXOFIN 1ML

## (undated) DEVICE — GLOVE PI ULTRA TOUCH SZ.6.5

## (undated) DEVICE — CATH URETERAL 5FR X 70 CM FLEX TIP POLYUR BARD

## (undated) DEVICE — PREP PAD BNS: Brand: CONVERTORS

## (undated) DEVICE — BOWL: 16OZ PEELPOUCH 75/CS 16/PLT: Brand: MEDEGEN MEDICAL PRODUCTS, LLC